# Patient Record
Sex: MALE | Race: WHITE | NOT HISPANIC OR LATINO | Employment: OTHER | URBAN - NONMETROPOLITAN AREA
[De-identification: names, ages, dates, MRNs, and addresses within clinical notes are randomized per-mention and may not be internally consistent; named-entity substitution may affect disease eponyms.]

---

## 2017-01-23 ENCOUNTER — LAB (OUTPATIENT)
Dept: LAB | Facility: HOSPITAL | Age: 57
End: 2017-01-23
Attending: INTERNAL MEDICINE

## 2017-01-23 ENCOUNTER — TRANSCRIBE ORDERS (OUTPATIENT)
Dept: ADMINISTRATIVE | Facility: HOSPITAL | Age: 57
End: 2017-01-23

## 2017-01-23 DIAGNOSIS — N25.81 HYPERPARATHYROIDISM DUE TO RENAL INSUFFICIENCY (HCC): ICD-10-CM

## 2017-01-23 DIAGNOSIS — B18.2 CHRONIC HEPATITIS C WITHOUT HEPATIC COMA (HCC): Primary | ICD-10-CM

## 2017-01-23 DIAGNOSIS — N18.4 CHRONIC KIDNEY DISEASE, STAGE IV (SEVERE) (HCC): ICD-10-CM

## 2017-01-23 DIAGNOSIS — B18.2 CHRONIC HEPATITIS C WITHOUT HEPATIC COMA (HCC): ICD-10-CM

## 2017-01-23 DIAGNOSIS — N18.4 CHRONIC KIDNEY DISEASE, STAGE IV (SEVERE) (HCC): Primary | ICD-10-CM

## 2017-01-23 LAB
25(OH)D3 SERPL-MCNC: 24 NG/ML
ANION GAP SERPL CALCULATED.3IONS-SCNC: 9.1 MMOL/L (ref 3.6–11.2)
BACTERIA UR QL AUTO: NORMAL /HPF
BILIRUB UR QL STRIP: NEGATIVE
BUN BLD-MCNC: 34 MG/DL (ref 7–21)
BUN/CREAT SERPL: 15.7 (ref 7–25)
CALCIUM SPEC-SCNC: 8.7 MG/DL (ref 7.7–10)
CHLORIDE SERPL-SCNC: 106 MMOL/L (ref 99–112)
CLARITY UR: CLEAR
CO2 SERPL-SCNC: 25.9 MMOL/L (ref 24.3–31.9)
COLOR UR: YELLOW
CREAT BLD-MCNC: 2.17 MG/DL (ref 0.43–1.29)
CREAT UR-MCNC: 26.2 MG/DL
GFR SERPL CREATININE-BSD FRML MDRD: 31 ML/MIN/1.73
GLUCOSE BLD-MCNC: 164 MG/DL (ref 70–110)
GLUCOSE UR STRIP-MCNC: NEGATIVE MG/DL
HGB UR QL STRIP.AUTO: NEGATIVE
HYALINE CASTS UR QL AUTO: NORMAL /LPF
KETONES UR QL STRIP: NEGATIVE
LEUKOCYTE ESTERASE UR QL STRIP.AUTO: NEGATIVE
NITRITE UR QL STRIP: NEGATIVE
OSMOLALITY SERPL CALC.SUM OF ELEC: 292.5 MOSM/KG (ref 273–305)
PH UR STRIP.AUTO: 6 [PH] (ref 5–8)
POTASSIUM BLD-SCNC: 4.5 MMOL/L (ref 3.5–5.3)
PROT UR QL STRIP: ABNORMAL
PROT UR-MCNC: 60.1 MG/DL
PROT/CREAT UR: 2293.9 MG/G CREA (ref 0–200)
PTH-INTACT SERPL-MCNC: 275.4 PG/ML (ref 14–72)
RBC # UR: NORMAL /HPF
REF LAB TEST METHOD: NORMAL
SODIUM BLD-SCNC: 141 MMOL/L (ref 135–153)
SP GR UR STRIP: 1.01 (ref 1–1.03)
SQUAMOUS #/AREA URNS HPF: NORMAL /HPF
UROBILINOGEN UR QL STRIP: ABNORMAL
WBC UR QL AUTO: NORMAL /HPF

## 2017-01-23 PROCEDURE — 87522 HEPATITIS C REVRS TRNSCRPJ: CPT | Performed by: INTERNAL MEDICINE

## 2017-01-23 PROCEDURE — 83970 ASSAY OF PARATHORMONE: CPT | Performed by: INTERNAL MEDICINE

## 2017-01-23 PROCEDURE — 36415 COLL VENOUS BLD VENIPUNCTURE: CPT

## 2017-01-23 PROCEDURE — 87521 HEPATITIS C PROBE&RVRS TRNSC: CPT | Performed by: INTERNAL MEDICINE

## 2017-01-23 PROCEDURE — 82570 ASSAY OF URINE CREATININE: CPT | Performed by: INTERNAL MEDICINE

## 2017-01-23 PROCEDURE — 80048 BASIC METABOLIC PNL TOTAL CA: CPT | Performed by: INTERNAL MEDICINE

## 2017-01-23 PROCEDURE — 82306 VITAMIN D 25 HYDROXY: CPT | Performed by: INTERNAL MEDICINE

## 2017-01-23 PROCEDURE — 84156 ASSAY OF PROTEIN URINE: CPT | Performed by: INTERNAL MEDICINE

## 2017-01-23 PROCEDURE — 81001 URINALYSIS AUTO W/SCOPE: CPT | Performed by: INTERNAL MEDICINE

## 2017-01-25 LAB
HCV RNA SERPL NAA+PROBE-ACNC: NORMAL IU/ML
TEST INFORMATION: NORMAL

## 2017-01-26 LAB — HEPATITIS C RNA-NAA: NEGATIVE

## 2017-02-27 ENCOUNTER — TRANSCRIBE ORDERS (OUTPATIENT)
Dept: ADMINISTRATIVE | Facility: HOSPITAL | Age: 57
End: 2017-02-27

## 2017-02-27 ENCOUNTER — LAB (OUTPATIENT)
Dept: LAB | Facility: HOSPITAL | Age: 57
End: 2017-02-27

## 2017-02-27 DIAGNOSIS — B18.2 HEP C W/ COMA, CHRONIC: Primary | ICD-10-CM

## 2017-02-27 DIAGNOSIS — B18.2 HEP C W/ COMA, CHRONIC: ICD-10-CM

## 2017-02-27 PROCEDURE — 87521 HEPATITIS C PROBE&RVRS TRNSC: CPT | Performed by: NURSE PRACTITIONER

## 2017-02-27 PROCEDURE — 87522 HEPATITIS C REVRS TRNSCRPJ: CPT | Performed by: NURSE PRACTITIONER

## 2017-02-27 PROCEDURE — 36415 COLL VENOUS BLD VENIPUNCTURE: CPT

## 2017-03-01 LAB
HCV RNA SERPL NAA+PROBE-ACNC: NORMAL IU/ML
HEPATITIS C RNA-NAA: NEGATIVE
TEST INFORMATION: NORMAL

## 2017-03-27 ENCOUNTER — LAB (OUTPATIENT)
Dept: LAB | Facility: HOSPITAL | Age: 57
End: 2017-03-27
Attending: INTERNAL MEDICINE

## 2017-03-27 ENCOUNTER — TRANSCRIBE ORDERS (OUTPATIENT)
Dept: ADMINISTRATIVE | Facility: HOSPITAL | Age: 57
End: 2017-03-27

## 2017-03-27 DIAGNOSIS — N18.4 CHRONIC KIDNEY DISEASE, STAGE IV (SEVERE) (HCC): ICD-10-CM

## 2017-03-27 DIAGNOSIS — N18.4 CHRONIC KIDNEY DISEASE, STAGE IV (SEVERE) (HCC): Primary | ICD-10-CM

## 2017-03-27 LAB
ANION GAP SERPL CALCULATED.3IONS-SCNC: 4.5 MMOL/L (ref 3.6–11.2)
BACTERIA UR QL AUTO: ABNORMAL /HPF
BILIRUB UR QL STRIP: NEGATIVE
BUN BLD-MCNC: 42 MG/DL (ref 7–21)
BUN/CREAT SERPL: 19.4 (ref 7–25)
CALCIUM SPEC-SCNC: 8.9 MG/DL (ref 7.7–10)
CHLORIDE SERPL-SCNC: 108 MMOL/L (ref 99–112)
CLARITY UR: CLEAR
CO2 SERPL-SCNC: 27.5 MMOL/L (ref 24.3–31.9)
COLOR UR: YELLOW
CREAT BLD-MCNC: 2.17 MG/DL (ref 0.43–1.29)
CREAT UR-MCNC: 62.5 MG/DL
GFR SERPL CREATININE-BSD FRML MDRD: 31 ML/MIN/1.73
GLUCOSE BLD-MCNC: 93 MG/DL (ref 70–110)
GLUCOSE UR STRIP-MCNC: NEGATIVE MG/DL
HGB UR QL STRIP.AUTO: NEGATIVE
HYALINE CASTS UR QL AUTO: ABNORMAL /LPF
KETONES UR QL STRIP: NEGATIVE
LEUKOCYTE ESTERASE UR QL STRIP.AUTO: NEGATIVE
NITRITE UR QL STRIP: NEGATIVE
OSMOLALITY SERPL CALC.SUM OF ELEC: 289.6 MOSM/KG (ref 273–305)
PH UR STRIP.AUTO: 5.5 [PH] (ref 5–8)
POTASSIUM BLD-SCNC: 5.3 MMOL/L (ref 3.5–5.3)
PROT UR QL STRIP: ABNORMAL
PROT UR-MCNC: 112.4 MG/DL
PROT/CREAT UR: 1798.4 MG/G CREA (ref 0–200)
RBC # UR: ABNORMAL /HPF
REF LAB TEST METHOD: ABNORMAL
SODIUM BLD-SCNC: 140 MMOL/L (ref 135–153)
SP GR UR STRIP: 1.02 (ref 1–1.03)
SQUAMOUS #/AREA URNS HPF: ABNORMAL /HPF
UROBILINOGEN UR QL STRIP: ABNORMAL
WBC UR QL AUTO: ABNORMAL /HPF

## 2017-03-27 PROCEDURE — 36415 COLL VENOUS BLD VENIPUNCTURE: CPT

## 2017-03-27 PROCEDURE — 80048 BASIC METABOLIC PNL TOTAL CA: CPT | Performed by: INTERNAL MEDICINE

## 2017-03-27 PROCEDURE — 82570 ASSAY OF URINE CREATININE: CPT | Performed by: INTERNAL MEDICINE

## 2017-03-27 PROCEDURE — 81001 URINALYSIS AUTO W/SCOPE: CPT | Performed by: INTERNAL MEDICINE

## 2017-03-27 PROCEDURE — 84156 ASSAY OF PROTEIN URINE: CPT | Performed by: INTERNAL MEDICINE

## 2017-08-16 ENCOUNTER — TRANSCRIBE ORDERS (OUTPATIENT)
Dept: ADMINISTRATIVE | Facility: HOSPITAL | Age: 57
End: 2017-08-16

## 2017-08-16 ENCOUNTER — LAB (OUTPATIENT)
Dept: LAB | Facility: HOSPITAL | Age: 57
End: 2017-08-16
Attending: INTERNAL MEDICINE

## 2017-08-16 DIAGNOSIS — E11.9 DIABETES MELLITUS WITHOUT COMPLICATION (HCC): ICD-10-CM

## 2017-08-16 DIAGNOSIS — N18.4 CHRONIC KIDNEY DISEASE, STAGE IV (SEVERE) (HCC): Primary | ICD-10-CM

## 2017-08-16 DIAGNOSIS — N18.4 CHRONIC KIDNEY DISEASE, STAGE IV (SEVERE) (HCC): ICD-10-CM

## 2017-08-16 LAB
ANION GAP SERPL CALCULATED.3IONS-SCNC: 6.1 MMOL/L (ref 3.6–11.2)
BACTERIA UR QL AUTO: ABNORMAL /HPF
BILIRUB UR QL STRIP: NEGATIVE
BUN BLD-MCNC: 50 MG/DL (ref 7–21)
BUN/CREAT SERPL: 26 (ref 7–25)
CALCIUM SPEC-SCNC: 9.1 MG/DL (ref 7.7–10)
CHLORIDE SERPL-SCNC: 106 MMOL/L (ref 99–112)
CLARITY UR: CLEAR
CO2 SERPL-SCNC: 24.9 MMOL/L (ref 24.3–31.9)
COLOR UR: YELLOW
CREAT BLD-MCNC: 1.92 MG/DL (ref 0.43–1.29)
CREAT UR-MCNC: 43.5 MG/DL
GFR SERPL CREATININE-BSD FRML MDRD: 36 ML/MIN/1.73
GLUCOSE BLD-MCNC: 124 MG/DL (ref 70–110)
GLUCOSE UR STRIP-MCNC: NEGATIVE MG/DL
HBA1C MFR BLD: 5.7 % (ref 4.5–5.7)
HGB UR QL STRIP.AUTO: NEGATIVE
HYALINE CASTS UR QL AUTO: ABNORMAL /LPF
KETONES UR QL STRIP: NEGATIVE
LEUKOCYTE ESTERASE UR QL STRIP.AUTO: NEGATIVE
NITRITE UR QL STRIP: NEGATIVE
OSMOLALITY SERPL CALC.SUM OF ELEC: 288.6 MOSM/KG (ref 273–305)
PH UR STRIP.AUTO: 5.5 [PH] (ref 5–8)
POTASSIUM BLD-SCNC: 5.7 MMOL/L (ref 3.5–5.3)
PROT UR QL STRIP: ABNORMAL
PROT UR-MCNC: 31.6 MG/DL
PROT/CREAT UR: 726.4 MG/G CREA (ref 0–200)
RBC # UR: ABNORMAL /HPF
REF LAB TEST METHOD: ABNORMAL
SODIUM BLD-SCNC: 137 MMOL/L (ref 135–153)
SP GR UR STRIP: 1.01 (ref 1–1.03)
SQUAMOUS #/AREA URNS HPF: ABNORMAL /HPF
UROBILINOGEN UR QL STRIP: ABNORMAL
WBC UR QL AUTO: ABNORMAL /HPF

## 2017-08-16 PROCEDURE — 83036 HEMOGLOBIN GLYCOSYLATED A1C: CPT | Performed by: INTERNAL MEDICINE

## 2017-08-16 PROCEDURE — 80048 BASIC METABOLIC PNL TOTAL CA: CPT | Performed by: INTERNAL MEDICINE

## 2017-08-16 PROCEDURE — 81001 URINALYSIS AUTO W/SCOPE: CPT | Performed by: INTERNAL MEDICINE

## 2017-08-16 PROCEDURE — 36415 COLL VENOUS BLD VENIPUNCTURE: CPT

## 2017-08-16 PROCEDURE — 84156 ASSAY OF PROTEIN URINE: CPT | Performed by: INTERNAL MEDICINE

## 2017-08-16 PROCEDURE — 82570 ASSAY OF URINE CREATININE: CPT | Performed by: INTERNAL MEDICINE

## 2017-08-25 ENCOUNTER — LAB (OUTPATIENT)
Dept: LAB | Facility: HOSPITAL | Age: 57
End: 2017-08-25
Attending: INTERNAL MEDICINE

## 2017-08-25 ENCOUNTER — TRANSCRIBE ORDERS (OUTPATIENT)
Dept: ADMINISTRATIVE | Facility: HOSPITAL | Age: 57
End: 2017-08-25

## 2017-08-25 DIAGNOSIS — N18.4 CHRONIC KIDNEY DISEASE, STAGE IV (SEVERE) (HCC): Primary | ICD-10-CM

## 2017-08-25 DIAGNOSIS — N18.4 CHRONIC KIDNEY DISEASE, STAGE IV (SEVERE) (HCC): ICD-10-CM

## 2017-08-25 LAB
ANION GAP SERPL CALCULATED.3IONS-SCNC: 6.6 MMOL/L (ref 3.6–11.2)
BUN BLD-MCNC: 56 MG/DL (ref 7–21)
BUN/CREAT SERPL: 21.7 (ref 7–25)
CALCIUM SPEC-SCNC: 9.1 MG/DL (ref 7.7–10)
CHLORIDE SERPL-SCNC: 106 MMOL/L (ref 99–112)
CO2 SERPL-SCNC: 23.4 MMOL/L (ref 24.3–31.9)
CREAT BLD-MCNC: 2.58 MG/DL (ref 0.43–1.29)
GFR SERPL CREATININE-BSD FRML MDRD: 26 ML/MIN/1.73
GLUCOSE BLD-MCNC: 108 MG/DL (ref 70–110)
OSMOLALITY SERPL CALC.SUM OF ELEC: 288 MOSM/KG (ref 273–305)
POTASSIUM BLD-SCNC: 4.3 MMOL/L (ref 3.5–5.3)
SODIUM BLD-SCNC: 136 MMOL/L (ref 135–153)

## 2017-08-25 PROCEDURE — 80048 BASIC METABOLIC PNL TOTAL CA: CPT | Performed by: INTERNAL MEDICINE

## 2017-08-25 PROCEDURE — 36415 COLL VENOUS BLD VENIPUNCTURE: CPT

## 2017-10-10 ENCOUNTER — TRANSCRIBE ORDERS (OUTPATIENT)
Dept: INFUSION THERAPY | Facility: HOSPITAL | Age: 57
End: 2017-10-10

## 2017-10-10 ENCOUNTER — HOSPITAL ENCOUNTER (OUTPATIENT)
Dept: INFUSION THERAPY | Facility: HOSPITAL | Age: 57
Discharge: HOME | End: 2017-10-10
Attending: INTERNAL MEDICINE | Admitting: INTERNAL MEDICINE

## 2017-10-10 VITALS
BODY MASS INDEX: 20.57 KG/M2 | WEIGHT: 128 LBS | RESPIRATION RATE: 20 BRPM | HEIGHT: 66 IN | HEART RATE: 98 BPM | SYSTOLIC BLOOD PRESSURE: 203 MMHG | DIASTOLIC BLOOD PRESSURE: 89 MMHG | TEMPERATURE: 98.9 F

## 2017-10-10 DIAGNOSIS — N18.4 CHRONIC KIDNEY DISEASE, STAGE IV (SEVERE) (HCC): Primary | ICD-10-CM

## 2017-10-10 PROCEDURE — 96361 HYDRATE IV INFUSION ADD-ON: CPT

## 2017-10-10 PROCEDURE — 96360 HYDRATION IV INFUSION INIT: CPT

## 2017-10-10 RX ORDER — HYDRALAZINE HYDROCHLORIDE 50 MG/1
100 TABLET, FILM COATED ORAL 3 TIMES DAILY
Status: ON HOLD | COMMUNITY
End: 2018-09-19

## 2017-10-10 RX ORDER — GLIMEPIRIDE 4 MG/1
4 TABLET ORAL
Status: ON HOLD | COMMUNITY
End: 2018-07-02

## 2017-10-10 RX ORDER — SODIUM CHLORIDE 9 MG/ML
1000 INJECTION, SOLUTION INTRAVENOUS ONCE
Status: COMPLETED | OUTPATIENT
Start: 2017-10-10 | End: 2017-10-10

## 2017-10-10 RX ORDER — METOPROLOL TARTRATE 50 MG/1
50 TABLET, FILM COATED ORAL 2 TIMES DAILY
COMMUNITY
End: 2018-06-05

## 2017-10-10 RX ORDER — SERTRALINE HYDROCHLORIDE 100 MG/1
100 TABLET, FILM COATED ORAL DAILY
COMMUNITY
End: 2019-04-23 | Stop reason: HOSPADM

## 2017-10-10 RX ORDER — BUPRENORPHINE HYDROCHLORIDE AND NALOXONE HYDROCHLORIDE DIHYDRATE 8; 2 MG/1; MG/1
3 TABLET SUBLINGUAL DAILY
Status: ON HOLD | COMMUNITY
End: 2018-07-02

## 2017-10-10 RX ORDER — NIFEDIPINE 90 MG/1
90 TABLET, EXTENDED RELEASE ORAL DAILY
Status: ON HOLD | COMMUNITY
End: 2018-07-02

## 2017-10-10 RX ORDER — AMLODIPINE BESYLATE 10 MG/1
10 TABLET ORAL DAILY
Status: ON HOLD | COMMUNITY
End: 2018-07-02 | Stop reason: SDDI

## 2017-10-10 RX ADMIN — SODIUM CHLORIDE 1000 ML: 9 INJECTION, SOLUTION INTRAVENOUS at 12:30

## 2017-10-10 NOTE — PATIENT INSTRUCTIONS
Rehydration, Adult  Rehydration is the replacement of body fluids lost during dehydration. Dehydration is an extreme loss of body fluids to the point of body function impairment. There are many ways extreme fluid loss can occur, including vomiting, diarrhea, or excess sweating. Recovering from dehydration requires replacing lost fluids, continuing to eat to maintain strength, and avoiding foods and beverages that may contribute to further fluid loss or may increase nausea.       EATING WHEN DEHYDRATED  Even if you have had severe sweating or you are having diarrhea, do not stop eating. Many healthy items in a normal diet are okay to continue eating while recovering from dehydration. The following tips can help you to lessen nausea when you eat:  · Ask someone else to prepare your food. Cooking smells may worsen nausea.  · Eat in a well-ventilated room away from cooking smells.  · Sit up when you eat. Avoid lying down until 1-2 hours after eating.  · Eat small amounts when you eat.  · Eat foods that are easy to digest. These include soft, well-cooked, or mashed foods.  FOODS AND BEVERAGES TO AVOID  Avoid eating or drinking the following foods and beverages that may increase nausea or further loss of fluid:   · Fruit juices with a high sugar content, such as concentrated juices.  · Alcohol.  · Beverages containing caffeine.  · Carbonated drinks. They may cause a lot of gas.  · Foods that may cause a lot of gas, such as cabbage, broccoli, and beans.  · Fatty, greasy, and fried foods.  · Spicy, very salty, and very sweet foods or drinks.  · Foods or drinks that are very hot or very cold. Consume food or drinks at or near room temperature.  · Foods that need a lot of chewing, such as raw vegetables.  · Foods that are sticky or hard to swallow, such as peanut butter.     This information is not intended to replace advice given to you by your health care provider. Make sure you discuss any questions you have with your  health care provider.     PT INSTRUCTED TO TAKE BLOOD PRESSURE PILLS AT HOME.  INSTRUCTED IF BLOOD PRESSURE DOES NOT COME DOWN TO NORMAL FOR PATIENT, PT INSTRUCTED TO GO TO EMERGENCY ROOM     Document Released: 03/11/2013 Document Revised: 09/11/2013 Document Reviewed: 03/11/2013  [employer] Patient Education ©2017 [employer]

## 2017-10-10 NOTE — CODE DOCUMENTATION
"DR. KRAMER MADE AWARE OF PT'S B/P  /89.  INFORMED HIM PT HADN'T TAKEN B/P MEDS AND PT STATED WOULD TAKE B/P MEDS AS SOON AS HE GETS HOME.DR. KRAMER \" STATED OK TO LET PATIENT GO HOME AND INSTRUCTED HIM IF B/P DOES NOT GO DOWN AFTER TAKING MEDS, HE SHOULD GO TO THE  EMERGENCY ROOM. PT VERBALIZED UNDERSTANDING  "

## 2017-10-10 NOTE — PROGRESS NOTES
Talked with Alysia at [facility] .  Requested a list of patients medication.  [phone]

## 2017-11-16 ENCOUNTER — TRANSCRIBE ORDERS (OUTPATIENT)
Dept: ADMINISTRATIVE | Facility: HOSPITAL | Age: 57
End: 2017-11-16

## 2017-11-16 ENCOUNTER — LAB (OUTPATIENT)
Dept: LAB | Facility: HOSPITAL | Age: 57
End: 2017-11-16
Attending: INTERNAL MEDICINE

## 2017-11-16 DIAGNOSIS — N18.4 CHRONIC RENAL DISEASE, STAGE IV (HCC): ICD-10-CM

## 2017-11-16 DIAGNOSIS — N18.4 CHRONIC RENAL DISEASE, STAGE IV (HCC): Primary | ICD-10-CM

## 2017-11-16 LAB
ANION GAP SERPL CALCULATED.3IONS-SCNC: 5.9 MMOL/L (ref 3.6–11.2)
BACTERIA UR QL AUTO: ABNORMAL /HPF
BILIRUB UR QL STRIP: NEGATIVE
BUN BLD-MCNC: 54 MG/DL (ref 7–21)
BUN/CREAT SERPL: 18.5 (ref 7–25)
CALCIUM SPEC-SCNC: 8.7 MG/DL (ref 7.7–10)
CHLORIDE SERPL-SCNC: 109 MMOL/L (ref 99–112)
CLARITY UR: CLEAR
CO2 SERPL-SCNC: 23.1 MMOL/L (ref 24.3–31.9)
COLOR UR: YELLOW
CREAT BLD-MCNC: 2.92 MG/DL (ref 0.43–1.29)
CREAT UR-MCNC: 121.4 MG/DL
GFR SERPL CREATININE-BSD FRML MDRD: 22 ML/MIN/1.73
GLUCOSE BLD-MCNC: 151 MG/DL (ref 70–110)
GLUCOSE UR STRIP-MCNC: NEGATIVE MG/DL
HGB UR QL STRIP.AUTO: NEGATIVE
HYALINE CASTS UR QL AUTO: ABNORMAL /LPF
KETONES UR QL STRIP: ABNORMAL
LEUKOCYTE ESTERASE UR QL STRIP.AUTO: NEGATIVE
NITRITE UR QL STRIP: NEGATIVE
OSMOLALITY SERPL CALC.SUM OF ELEC: 293.4 MOSM/KG (ref 273–305)
PH UR STRIP.AUTO: <=5 [PH] (ref 5–8)
POTASSIUM BLD-SCNC: 4.9 MMOL/L (ref 3.5–5.3)
PROT UR QL STRIP: ABNORMAL
PROT UR-MCNC: 34.3 MG/DL
PROT/CREAT UR: 282.5 MG/G CREA (ref 0–200)
RBC # UR: ABNORMAL /HPF
REF LAB TEST METHOD: ABNORMAL
SODIUM BLD-SCNC: 138 MMOL/L (ref 135–153)
SP GR UR STRIP: 1.02 (ref 1–1.03)
SQUAMOUS #/AREA URNS HPF: ABNORMAL /HPF
UROBILINOGEN UR QL STRIP: ABNORMAL
WBC UR QL AUTO: ABNORMAL /HPF

## 2017-11-16 PROCEDURE — 84156 ASSAY OF PROTEIN URINE: CPT | Performed by: INTERNAL MEDICINE

## 2017-11-16 PROCEDURE — 80048 BASIC METABOLIC PNL TOTAL CA: CPT | Performed by: INTERNAL MEDICINE

## 2017-11-16 PROCEDURE — 81001 URINALYSIS AUTO W/SCOPE: CPT | Performed by: INTERNAL MEDICINE

## 2017-11-16 PROCEDURE — 36415 COLL VENOUS BLD VENIPUNCTURE: CPT

## 2017-11-16 PROCEDURE — 82570 ASSAY OF URINE CREATININE: CPT | Performed by: INTERNAL MEDICINE

## 2018-04-17 ENCOUNTER — LAB (OUTPATIENT)
Dept: LAB | Facility: HOSPITAL | Age: 58
End: 2018-04-17

## 2018-04-17 ENCOUNTER — TRANSCRIBE ORDERS (OUTPATIENT)
Dept: GENERAL RADIOLOGY | Facility: HOSPITAL | Age: 58
End: 2018-04-17

## 2018-04-17 DIAGNOSIS — N18.4 CHRONIC KIDNEY DISEASE, STAGE IV (SEVERE) (HCC): ICD-10-CM

## 2018-04-17 DIAGNOSIS — N18.4 CHRONIC KIDNEY DISEASE, STAGE IV (SEVERE) (HCC): Primary | ICD-10-CM

## 2018-04-17 LAB
ALBUMIN SERPL-MCNC: 3.8 G/DL (ref 3.5–5)
ANION GAP SERPL CALCULATED.3IONS-SCNC: 4.5 MMOL/L (ref 3.6–11.2)
BACTERIA UR QL AUTO: ABNORMAL /HPF
BASOPHILS # BLD AUTO: 0.03 10*3/MM3 (ref 0–0.3)
BASOPHILS NFR BLD AUTO: 0.4 % (ref 0–2)
BILIRUB UR QL STRIP: NEGATIVE
BUN BLD-MCNC: 62 MG/DL (ref 7–21)
BUN/CREAT SERPL: 23.8 (ref 7–25)
CALCIUM SPEC-SCNC: 8.7 MG/DL (ref 7.7–10)
CHLORIDE SERPL-SCNC: 105 MMOL/L (ref 99–112)
CLARITY UR: CLEAR
CO2 SERPL-SCNC: 23.5 MMOL/L (ref 24.3–31.9)
COLOR UR: YELLOW
CREAT BLD-MCNC: 2.61 MG/DL (ref 0.43–1.29)
CREAT UR-MCNC: 72.9 MG/DL
DEPRECATED RDW RBC AUTO: 49 FL (ref 37–54)
EOSINOPHIL # BLD AUTO: 0.37 10*3/MM3 (ref 0–0.7)
EOSINOPHIL NFR BLD AUTO: 5.2 % (ref 0–5)
ERYTHROCYTE [DISTWIDTH] IN BLOOD BY AUTOMATED COUNT: 15.2 % (ref 11.5–14.5)
GFR SERPL CREATININE-BSD FRML MDRD: 25 ML/MIN/1.73
GLUCOSE BLD-MCNC: 119 MG/DL (ref 70–110)
GLUCOSE UR STRIP-MCNC: NEGATIVE MG/DL
HCT VFR BLD AUTO: 32.6 % (ref 42–52)
HGB BLD-MCNC: 10.7 G/DL (ref 14–18)
HGB UR QL STRIP.AUTO: NEGATIVE
HYALINE CASTS UR QL AUTO: ABNORMAL /LPF
IMM GRANULOCYTES # BLD: 0.04 10*3/MM3 (ref 0–0.03)
IMM GRANULOCYTES NFR BLD: 0.6 % (ref 0–0.5)
KETONES UR QL STRIP: NEGATIVE
LEUKOCYTE ESTERASE UR QL STRIP.AUTO: NEGATIVE
LYMPHOCYTES # BLD AUTO: 2.56 10*3/MM3 (ref 1–3)
LYMPHOCYTES NFR BLD AUTO: 36.2 % (ref 21–51)
MCH RBC QN AUTO: 29.8 PG (ref 27–33)
MCHC RBC AUTO-ENTMCNC: 32.8 G/DL (ref 33–37)
MCV RBC AUTO: 90.8 FL (ref 80–94)
MICRO TOTAL PROTEIN: 115 MG/DL
MONOCYTES # BLD AUTO: 0.74 10*3/MM3 (ref 0.1–0.9)
MONOCYTES NFR BLD AUTO: 10.5 % (ref 0–10)
NEUTROPHILS # BLD AUTO: 3.33 10*3/MM3 (ref 1.4–6.5)
NEUTROPHILS NFR BLD AUTO: 47.1 % (ref 30–70)
NITRITE UR QL STRIP: NEGATIVE
PH UR STRIP.AUTO: 6.5 [PH] (ref 5–8)
PHOSPHATE SERPL-MCNC: 5.9 MG/DL (ref 2.7–4.5)
PLATELET # BLD AUTO: 193 10*3/MM3 (ref 130–400)
PMV BLD AUTO: 10.5 FL (ref 6–10)
POTASSIUM BLD-SCNC: 5.3 MMOL/L (ref 3.5–5.3)
PROT UR QL STRIP: ABNORMAL
PTH-INTACT SERPL-MCNC: 541.1 PG/ML (ref 14–72)
RBC # BLD AUTO: 3.59 10*6/MM3 (ref 4.7–6.1)
RBC # UR: ABNORMAL /HPF
REF LAB TEST METHOD: ABNORMAL
SODIUM BLD-SCNC: 133 MMOL/L (ref 135–153)
SP GR UR STRIP: 1.01 (ref 1–1.03)
SQUAMOUS #/AREA URNS HPF: ABNORMAL /HPF
UROBILINOGEN UR QL STRIP: ABNORMAL
WBC NRBC COR # BLD: 7.07 10*3/MM3 (ref 4.5–12.5)
WBC UR QL AUTO: ABNORMAL /HPF

## 2018-04-17 PROCEDURE — 36415 COLL VENOUS BLD VENIPUNCTURE: CPT

## 2018-04-17 PROCEDURE — 82570 ASSAY OF URINE CREATININE: CPT

## 2018-04-17 PROCEDURE — 83970 ASSAY OF PARATHORMONE: CPT

## 2018-04-17 PROCEDURE — 81001 URINALYSIS AUTO W/SCOPE: CPT

## 2018-04-17 PROCEDURE — 84156 ASSAY OF PROTEIN URINE: CPT

## 2018-04-17 PROCEDURE — 85025 COMPLETE CBC W/AUTO DIFF WBC: CPT

## 2018-04-17 PROCEDURE — 80069 RENAL FUNCTION PANEL: CPT

## 2018-06-05 ENCOUNTER — OFFICE VISIT (OUTPATIENT)
Dept: CARDIOLOGY | Facility: CLINIC | Age: 58
End: 2018-06-05

## 2018-06-05 VITALS
OXYGEN SATURATION: 99 % | HEIGHT: 70 IN | BODY MASS INDEX: 20.57 KG/M2 | HEART RATE: 48 BPM | DIASTOLIC BLOOD PRESSURE: 77 MMHG | SYSTOLIC BLOOD PRESSURE: 113 MMHG | WEIGHT: 143.7 LBS

## 2018-06-05 DIAGNOSIS — R00.1 BRADYCARDIA: Primary | ICD-10-CM

## 2018-06-05 PROCEDURE — 99204 OFFICE O/P NEW MOD 45 MIN: CPT | Performed by: INTERNAL MEDICINE

## 2018-06-05 PROCEDURE — 93000 ELECTROCARDIOGRAM COMPLETE: CPT | Performed by: INTERNAL MEDICINE

## 2018-06-05 RX ORDER — METOPROLOL SUCCINATE 25 MG/1
25 TABLET, EXTENDED RELEASE ORAL DAILY
Qty: 30 TABLET | Refills: 3 | Status: SHIPPED | OUTPATIENT
Start: 2018-06-05 | End: 2018-09-16

## 2018-06-05 RX ORDER — LISINOPRIL 40 MG/1
40 TABLET ORAL DAILY
Status: ON HOLD | COMMUNITY
End: 2018-07-02 | Stop reason: ALTCHOICE

## 2018-06-05 RX ORDER — DOXAZOSIN 2 MG/1
2 TABLET ORAL NIGHTLY
COMMUNITY
End: 2019-03-28

## 2018-06-05 RX ORDER — CALCITRIOL 0.5 UG/1
0.5 CAPSULE, LIQUID FILLED ORAL 3 TIMES WEEKLY
COMMUNITY
End: 2019-03-28

## 2018-06-05 NOTE — PROGRESS NOTES
[facility] CARDIOLOGY  [address]  [place] [address]  Phone: [phone]  Fax: [phone]    06/05/2018    Chief Complaint: New Patient, CAD    History:   Axel Desir is a 58 y.o. male seen in consultation, referred by IVETTE Hester  for blockage in main arteries noted in [place]. Several months ago. No records available. Ultrasound of heart. Chemical stress test. Never underwent cath. Hx of kidney trouble. No hx MI. No hx stents. No hx OHS. Office settings. Was seeing cardiologist Dr. Rincon. Hx creatinine 2.6 up from 1.7 2016. Denies hx DM, No XOL. +HTN. Pt smoking. Occasional chest discomfort. Left sided chest. Sharp pain. 3-4 minutes. Breathing OK. No CHF or arrhythmias. No syncope. No eToh or DU. Pt with HR 46 today on lopressor. Feels some anxiety attacks treated with xanax.          Past Medical History:   Diagnosis Date   • Back pain    • Chronic kidney disease    • Diabetes mellitus    • High blood pressure        Review of Systems:  Please see HPI  Constitution: No chills, no rigors, no unexplained weight loss or weight gain  Eyes:  No diplopia, no blurred vision, no loss of vision, conjunctiva is pink and sclera is anicteric  ENT:  No tinnitus, no otorrhea, no epistaxis, no sore throat   Respiratory: No cough, no hemoptysis  Cardiovascular: see HPI  Gastrointestinal: No nausea, no vomiting, no hematemesis, no diarrhea or constipation, no melena  Genitourinary: No frequency of dysuria no hematuria  Integument: No pruritis and  no skin rash  Hematologic / Lymphatic: No excessive bleeding, easy bruising, fatigue, lymphadenopathy and petechiae  Musculoskeletal: No joint pain, joint stiffness, joint swelling, muscle pain, muscle weakness and neck pain  Neurological: No dizziness, headaches, light headedness, seizures and vertigo  Endocrine: No frequent urination and nocturia, temperature intolerance, weight gain, unintended and weight loss,  unintended        Past Social History:  Social History     Social History   • Marital status: Single     Social History Main Topics   • Smoking status: Former Smoker     Packs/day: 0.50     Years: 25.00     Types: Cigarettes     Quit date: 4/1/2017   • Smokeless tobacco: Never Used   • Alcohol use No   • Drug use: No     Other Topics Concern   • Not on file       Past Family History:  Family History   Problem Relation Age of Onset   • Heart disease Father        Current Outpatient Prescriptions on File Prior to Visit   Medication Sig Dispense Refill   • hydrALAZINE (APRESOLINE) 50 MG tablet Take 100 mg by mouth 3 (Three) Times a Day.     • metoprolol tartrate (LOPRESSOR) 50 MG tablet Take 50 mg by mouth 2 (Two) Times a Day.     • NIFEdipine XL (PROCARDIA XL) 90 MG 24 hr tablet Take 90 mg by mouth Daily.     • sertraline (ZOLOFT) 100 MG tablet Take 100 mg by mouth Daily.     • amLODIPine (NORVASC) 10 MG tablet Take 10 mg by mouth Daily.     • buprenorphine-naloxone (SUBOXONE) 8-2 MG per SL tablet Place 3 tablets under the tongue Daily. DISSOLVE 3 FILMS UNDER THE TONGUE DAILY     • CloNIDine (CATAPRES-TTS) 0.3 MG/24HR patch Place 1 patch on the skin 1 (One) Time Per Week.     • glimepiride (AMARYL) 4 MG tablet Take 4 mg by mouth Every Morning Before Breakfast.       No current facility-administered medications on file prior to visit.        No Known Allergies    Objective:  Vitals:    06/05/18 1159   BP: 113/77   Pulse: (!) 48   SpO2: 99%         Comfortable NAD  PERRL, conjunctiva clear  Neck supple, no JVD or thyromegaly appreciated  S1/S2 RRR, no m/r/g  Lungs CTA B, normal effort  Abdomen S/NT/ND (+) BS, no HSM appreciated  Extremities warm, no clubbing, cyanosis, or edema  Normal gait  No visible or palpable skin lesions  A/Ox4, mood and affect appropriate  Pulse exam: Graeme's:    DATA:                               ECG 12 Lead  Date/Time: 6/5/2018 12:04 PM  Performed by: MENDOZA RG  Authorized by: IFRAH  MENDOZA POWELL personally viewed and interpreted the patient's EKG/Telemetry data.    A/P:     Chest pain: atypical. Request records from [place] and have patient f/u in 1-2 weeks.    Bradycardia: decrease toprol xl to 25mg QD instead of 50 mg QD.           Thank you for allowing me to participate in the care of Axel Desir. Feel free to contact me directly with any further questions or concerns.

## 2018-06-27 ENCOUNTER — OFFICE VISIT (OUTPATIENT)
Dept: CARDIOLOGY | Facility: CLINIC | Age: 58
End: 2018-06-27

## 2018-06-27 VITALS
SYSTOLIC BLOOD PRESSURE: 144 MMHG | OXYGEN SATURATION: 99 % | DIASTOLIC BLOOD PRESSURE: 83 MMHG | WEIGHT: 136.4 LBS | HEART RATE: 67 BPM | HEIGHT: 70 IN | BODY MASS INDEX: 19.53 KG/M2

## 2018-06-27 DIAGNOSIS — R94.39 ABNORMAL STRESS TEST: Primary | ICD-10-CM

## 2018-06-27 DIAGNOSIS — N28.9 KIDNEY DISEASE: ICD-10-CM

## 2018-06-27 PROCEDURE — 99213 OFFICE O/P EST LOW 20 MIN: CPT | Performed by: NURSE PRACTITIONER

## 2018-06-27 NOTE — PROGRESS NOTES
Subjective     Chief Complaint: Slow Heart Rate    History of Present Illness   Axel Desir is a 58 y.o. male who presents with a past medical history significant for chronic kidney disease, essential hypertension, hyperlipidemia, borderline diabetes, hepatitis C, low back pain, tobacco use, and anxiety.  He denies past medical history of any coronary artery disease.  He has never been diagnosed with a heart condition.  He is under the impression that he is here today for the first time, however he has been seen earlier this month by Dr. Lema and this is his return appointment.    In January he was seen by cardiology NP Curry Mendez in Dr Marquez's office in [place].  A nuclear stress test was ordered and it was abnormal.  He was supposed to have a catheterization in [place] however he was taking care of his father who did not want him to be so far from home.  He presents now to our office because of that abnormal nuclear stress test.          Current Outpatient Prescriptions:   •  amLODIPine (NORVASC) 10 MG tablet, Take 10 mg by mouth Daily., Disp: , Rfl:   •  buprenorphine-naloxone (SUBOXONE) 8-2 MG per SL tablet, Place 3 tablets under the tongue Daily. DISSOLVE 3 FILMS UNDER THE TONGUE DAILY, Disp: , Rfl:   •  calcitriol (ROCALTROL) 0.5 MCG capsule, Take 0.5 mcg by mouth 3 (Three) Times a Week., Disp: , Rfl:   •  CloNIDine (CATAPRES-TTS) 0.3 MG/24HR patch, Place 1 patch on the skin 1 (One) Time Per Week., Disp: , Rfl:   •  doxazosin (CARDURA) 2 MG tablet, Take 2 mg by mouth Every Night., Disp: , Rfl:   •  glimepiride (AMARYL) 4 MG tablet, Take 4 mg by mouth Every Morning Before Breakfast., Disp: , Rfl:   •  hydrALAZINE (APRESOLINE) 50 MG tablet, Take 100 mg by mouth 3 (Three) Times a Day., Disp: , Rfl:   •  lisinopril (PRINIVIL,ZESTRIL) 40 MG tablet, Take 40 mg by mouth Daily., Disp: , Rfl:   •  metoprolol succinate XL (TOPROL-XL) 25 MG 24 hr tablet, Take 1 tablet by mouth Daily., Disp: 30 tablet, Rfl: 3  •   "NIFEdipine XL (PROCARDIA XL) 90 MG 24 hr tablet, Take 90 mg by mouth Daily., Disp: , Rfl:   •  sertraline (ZOLOFT) 100 MG tablet, Take 100 mg by mouth Daily., Disp: , Rfl:      The following portions of the patient's history were reviewed and updated as appropriate: allergies, current medications, past family history, past medical history, past social history, past surgical history and problem list.    Review of Systems   Constitution: Positive for weakness.   HENT: Negative.    Eyes: Negative.    Cardiovascular: Positive for leg swelling and palpitations.   Endocrine: Negative.    Hematologic/Lymphatic: Bruises/bleeds easily.   Skin: Negative.    Musculoskeletal: Negative.    Gastrointestinal: Negative.    Genitourinary: Negative.    Neurological: Positive for light-headedness.   Psychiatric/Behavioral: Negative.    Allergic/Immunologic: Negative.          Objective     /83 (BP Location: Left arm)   Pulse 67   Ht 177.8 cm (70\")   Wt 61.9 kg (136 lb 6.4 oz)   SpO2 99%   BMI 19.57 kg/m²     Physical Exam   Constitutional: He appears well-developed and well-nourished.   HENT:   Head: Normocephalic and atraumatic.   Eyes: Pupils are equal, round, and reactive to light.   Neck: No JVD present.   Cardiovascular: Normal rate and regular rhythm.  Exam reveals no gallop and no friction rub.    No murmur heard.  Pulses:       Dorsalis pedis pulses are 1+ on the right side, and 1+ on the left side.        Posterior tibial pulses are 0 on the right side, and 0 on the left side.   Bilateral ankle edema.     Pulmonary/Chest: Effort normal and breath sounds normal. No respiratory distress. He has no wheezes. He has no rales.   Abdominal: Soft. He exhibits no mass. There is no tenderness. No hernia.   Skin: Skin is warm and dry.   Psychiatric: He has a normal mood and affect.   Vitals reviewed.      Procedures      Assessment/Plan       There are no diagnoses linked to this encounter.      Assessment:  1. Abnormal " nuclear stress test      Plan:  1. Unfortunately Dr. Lema will need to review the nuclear stress test himself before I can schedule a cardiac catheterization.  I will have him do so as soon as possible and make plans at that time.  I have instructed the patient that I will call him and let him know the plan.    2. A three week follow up appointment has been made.      IVETTE Jackson    Addendum:  I have spoken with Dr Lema who has now reviewed the nuclear stress test from Dr Marquez's office.  He recommended that Mr Desir have a BMP drawn tomorrow and come to the clinic to see him afterwards.    I have tried to call Mr Desir twice this afternoon and I have left a message for him to contact the clinic.

## 2018-06-28 ENCOUNTER — OFFICE VISIT (OUTPATIENT)
Dept: CARDIOLOGY | Facility: CLINIC | Age: 58
End: 2018-06-28

## 2018-06-28 ENCOUNTER — LAB (OUTPATIENT)
Dept: LAB | Facility: HOSPITAL | Age: 58
End: 2018-06-28

## 2018-06-28 VITALS
WEIGHT: 136.6 LBS | BODY MASS INDEX: 19.56 KG/M2 | DIASTOLIC BLOOD PRESSURE: 75 MMHG | HEART RATE: 67 BPM | SYSTOLIC BLOOD PRESSURE: 119 MMHG | OXYGEN SATURATION: 99 % | HEIGHT: 70 IN

## 2018-06-28 DIAGNOSIS — N28.9 KIDNEY DISEASE: ICD-10-CM

## 2018-06-28 DIAGNOSIS — I20.9 ANGINA PECTORIS (HCC): Primary | ICD-10-CM

## 2018-06-28 DIAGNOSIS — R94.39 ABNORMAL STRESS TEST: ICD-10-CM

## 2018-06-28 LAB
ANION GAP SERPL CALCULATED.3IONS-SCNC: 9.6 MMOL/L (ref 3.6–11.2)
BUN BLD-MCNC: 70 MG/DL (ref 7–21)
BUN/CREAT SERPL: 24.3 (ref 7–25)
CALCIUM SPEC-SCNC: 8.4 MG/DL (ref 7.7–10)
CHLORIDE SERPL-SCNC: 106 MMOL/L (ref 99–112)
CO2 SERPL-SCNC: 21.4 MMOL/L (ref 24.3–31.9)
CREAT BLD-MCNC: 2.88 MG/DL (ref 0.43–1.29)
DEPRECATED RDW RBC AUTO: 50.8 FL (ref 37–54)
ERYTHROCYTE [DISTWIDTH] IN BLOOD BY AUTOMATED COUNT: 15.6 % (ref 11.5–14.5)
GFR SERPL CREATININE-BSD FRML MDRD: 22 ML/MIN/1.73
GLUCOSE BLD-MCNC: 174 MG/DL (ref 70–110)
HCT VFR BLD AUTO: 33.8 % (ref 42–52)
HGB BLD-MCNC: 11.3 G/DL (ref 14–18)
MCH RBC QN AUTO: 29.9 PG (ref 27–33)
MCHC RBC AUTO-ENTMCNC: 33.4 G/DL (ref 33–37)
MCV RBC AUTO: 89.4 FL (ref 80–94)
OSMOLALITY SERPL CALC.SUM OF ELEC: 298.5 MOSM/KG (ref 273–305)
PLATELET # BLD AUTO: 349 10*3/MM3 (ref 130–400)
PMV BLD AUTO: 10.1 FL (ref 6–10)
POTASSIUM BLD-SCNC: 4.7 MMOL/L (ref 3.5–5.3)
RBC # BLD AUTO: 3.78 10*6/MM3 (ref 4.7–6.1)
SODIUM BLD-SCNC: 137 MMOL/L (ref 135–153)
WBC NRBC COR # BLD: 9.53 10*3/MM3 (ref 4.5–12.5)

## 2018-06-28 PROCEDURE — 80048 BASIC METABOLIC PNL TOTAL CA: CPT

## 2018-06-28 PROCEDURE — 85027 COMPLETE CBC AUTOMATED: CPT

## 2018-06-28 PROCEDURE — 99213 OFFICE O/P EST LOW 20 MIN: CPT | Performed by: INTERNAL MEDICINE

## 2018-06-28 PROCEDURE — 36415 COLL VENOUS BLD VENIPUNCTURE: CPT

## 2018-06-28 RX ORDER — SODIUM CHLORIDE 9 MG/ML
1 INJECTION, SOLUTION INTRAVENOUS ONCE
Status: CANCELLED | OUTPATIENT
Start: 2018-06-28 | End: 2018-06-28

## 2018-06-28 RX ORDER — SODIUM CHLORIDE 9 MG/ML
3 INJECTION, SOLUTION INTRAVENOUS ONCE
Status: CANCELLED | OUTPATIENT
Start: 2018-06-28 | End: 2018-06-28

## 2018-06-28 NOTE — H&P
[facility] CARDIOLOGY  [address] [place] [address]  Phone: [phone]  Fax: [phone]    06/28/2018    Chief Complaint: Routine followup of , CAD    History:   Axel Desir is a 58 y.o. male seen in followup, pt seen in follow up. 2/2018 scheduled for CCL for abnormal ST but never completed it. Had high risk ST noted concerning for multivessel CAD. Reversible defect in distal LAD territory. LV dilation and TID c/w multivessel CAD. Pt with renal disease and is aware risk of ROSENDO would be elevated and asked to stop lisinopril prior to cath.             rECENT visit:   Axel Desir is a 58 y.o. male seen in consultation, referred by IVETTE Hester  for blockage in main arteries noted in [place]. Several months ago. No records available. Ultrasound of heart. Chemical stress test. Never underwent cath. Hx of kidney trouble. No hx MI. No hx stents. No hx OHS. Office settings. Was seeing cardiologist Dr. Rincon. Hx creatinine 2.6 up from 1.7 2016. Denies hx DM, No XOL. +HTN. Pt smoking. Occasional chest discomfort. Left sided chest. Sharp pain. 3-4 minutes. Breathing OK. No CHF or arrhythmias. No syncope. No eToh or DU. Pt with HR 46 today on lopressor. Feels some anxiety attacks treated with xanax.          Past Medical History:   Diagnosis Date   • Back pain    • Chronic kidney disease    • Diabetes mellitus    • High blood pressure        Past Social History:  Social History     Social History   • Marital status: Single     Social History Main Topics   • Smoking status: Former Smoker     Packs/day: 0.50     Years: 25.00     Types: Cigarettes     Quit date: 4/1/2017   • Smokeless tobacco: Never Used   • Alcohol use No   • Drug use: No     Other Topics Concern   • Not on file       Past Family History:  Family History   Problem Relation Age of Onset   • Heart disease Father        Review of Systems:   Please see HPI  Constitution: No chills, no rigors, no unexplained  weight loss or weight gain  Eyes:  No diplopia, no blurred vision, no loss of vision, conjunctiva is pink and sclera is anicteric  ENT:  No tinnitus, no otorrhea, no epistaxis, no sore throat   Respiratory: No cough, no hemoptysis  Cardiovascular: see HPI  Gastrointestinal: No nausea, no vomiting, no hematemesis, no diarrhea or constipation, no melena  Genitourinary: No frequency of dysuria no hematuria  Integument: No pruritis and  no skin rash  Hematologic / Lymphatic: No excessive bleeding, easy bruising, fatigue, lymphadenopathy and petechiae  Musculoskeletal: No joint pain, joint stiffness, joint swelling, muscle pain, muscle weakness and neck pain  Neurological: No dizziness, headaches, light headedness, seizures and vertigo  Endocrine: No frequent urination and nocturia, temperature intolerance, weight gain, unintended and weight loss, unintended      Current Outpatient Prescriptions on File Prior to Visit   Medication Sig Dispense Refill   • buprenorphine-naloxone (SUBOXONE) 8-2 MG per SL tablet Place 3 tablets under the tongue Daily. DISSOLVE 3 FILMS UNDER THE TONGUE DAILY     • calcitriol (ROCALTROL) 0.5 MCG capsule Take 0.5 mcg by mouth 3 (Three) Times a Week.     • CloNIDine (CATAPRES-TTS) 0.3 MG/24HR patch Place 1 patch on the skin 1 (One) Time Per Week.     • hydrALAZINE (APRESOLINE) 50 MG tablet Take 100 mg by mouth 3 (Three) Times a Day.     • lisinopril (PRINIVIL,ZESTRIL) 40 MG tablet Take 40 mg by mouth Daily.     • metoprolol succinate XL (TOPROL-XL) 25 MG 24 hr tablet Take 1 tablet by mouth Daily. 30 tablet 3   • amLODIPine (NORVASC) 10 MG tablet Take 10 mg by mouth Daily.     • doxazosin (CARDURA) 2 MG tablet Take 2 mg by mouth Every Night.     • glimepiride (AMARYL) 4 MG tablet Take 4 mg by mouth Every Morning Before Breakfast.     • NIFEdipine XL (PROCARDIA XL) 90 MG 24 hr tablet Take 90 mg by mouth Daily.     • sertraline (ZOLOFT) 100 MG tablet Take 100 mg by mouth Daily.       No current  facility-administered medications on file prior to visit.        No Known Allergies    Objective:  Vitals:    06/28/18 1439   BP: 119/75   Pulse: 67   SpO2: 99%     Comfortable NAD  PERRL, conjunctiva clear  Neck supple, no JVD or thyromegaly appreciated  S1/S2 RRR, no m/r/g  Lungs CTA B, normal effort  Abdomen S/NT/ND (+) BS, no HSM appreciated  Extremities warm, no clubbing, cyanosis, or edema  Normal gait  No visible or palpable skin lesions  A/Ox4, mood and affect appropriate  Pulse exam: Graeme's:    DATA:                                 A/P:    CAD: abnormal ST. Occasional chest pain. Severe renal insufficiency. Stop ACEI periprocedurally. Hydrate for several hours and perform late morning procedure. Also, only diagnositic procedure planned to minimize contrast exposure. Nml Allens test. Pt aware renal insufficiency is higher probability even possibly HD.    After discussing risks, benefits and alternatives with the patient,written informed consent was obtained.        Thank you for allowing me to participate in the care of Axel Desir. Feel free to contact me directly with any further questions or concerns.

## 2018-06-28 NOTE — PROGRESS NOTES
[facility] CARDIOLOGY  [address] [place] [place] [address]  Phone: [phone]  Fax: [phone]    06/28/2018    Chief Complaint: Routine followup of , CAD    History:   Axel Desir is a 58 y.o. male seen in followup, pt seen in follow up. 2/2018 scheduled for CCL for abnormal ST but never completed it. Had high risk ST noted concerning for multivessel CAD. Reversible defect in distal LAD territory. LV dilation and TID c/w multivessel CAD. Pt with renal disease and is aware risk of ROSENDO would be elevated and asked to stop lisinopril prior to cath.             rECENT visit:   Axel Desir is a 58 y.o. male seen in consultation, referred by IVETTE Hester  for blockage in main arteries noted in [place]. Several months ago. No records available. Ultrasound of heart. Chemical stress test. Never underwent cath. Hx of kidney trouble. No hx MI. No hx stents. No hx OHS. Office settings. Was seeing cardiologist Dr. Rincon. Hx creatinine 2.6 up from 1.7 2016. Denies hx DM, No XOL. +HTN. Pt smoking. Occasional chest discomfort. Left sided chest. Sharp pain. 3-4 minutes. Breathing OK. No CHF or arrhythmias. No syncope. No eToh or DU. Pt with HR 46 today on lopressor. Feels some anxiety attacks treated with xanax.          Past Medical History:   Diagnosis Date   • Back pain    • Chronic kidney disease    • Diabetes mellitus    • High blood pressure        Past Social History:  Social History     Social History   • Marital status: Single     Social History Main Topics   • Smoking status: Former Smoker     Packs/day: 0.50     Years: 25.00     Types: Cigarettes     Quit date: 4/1/2017   • Smokeless tobacco: Never Used   • Alcohol use No   • Drug use: No     Other Topics Concern   • Not on file       Past Family History:  Family History   Problem Relation Age of Onset   • Heart disease Father        Review of Systems:   Please see HPI  Constitution: No chills, no rigors, no unexplained  weight loss or weight gain  Eyes:  No diplopia, no blurred vision, no loss of vision, conjunctiva is pink and sclera is anicteric  ENT:  No tinnitus, no otorrhea, no epistaxis, no sore throat   Respiratory: No cough, no hemoptysis  Cardiovascular: see HPI  Gastrointestinal: No nausea, no vomiting, no hematemesis, no diarrhea or constipation, no melena  Genitourinary: No frequency of dysuria no hematuria  Integument: No pruritis and  no skin rash  Hematologic / Lymphatic: No excessive bleeding, easy bruising, fatigue, lymphadenopathy and petechiae  Musculoskeletal: No joint pain, joint stiffness, joint swelling, muscle pain, muscle weakness and neck pain  Neurological: No dizziness, headaches, light headedness, seizures and vertigo  Endocrine: No frequent urination and nocturia, temperature intolerance, weight gain, unintended and weight loss, unintended      Current Outpatient Prescriptions on File Prior to Visit   Medication Sig Dispense Refill   • buprenorphine-naloxone (SUBOXONE) 8-2 MG per SL tablet Place 3 tablets under the tongue Daily. DISSOLVE 3 FILMS UNDER THE TONGUE DAILY     • calcitriol (ROCALTROL) 0.5 MCG capsule Take 0.5 mcg by mouth 3 (Three) Times a Week.     • CloNIDine (CATAPRES-TTS) 0.3 MG/24HR patch Place 1 patch on the skin 1 (One) Time Per Week.     • hydrALAZINE (APRESOLINE) 50 MG tablet Take 100 mg by mouth 3 (Three) Times a Day.     • lisinopril (PRINIVIL,ZESTRIL) 40 MG tablet Take 40 mg by mouth Daily.     • metoprolol succinate XL (TOPROL-XL) 25 MG 24 hr tablet Take 1 tablet by mouth Daily. 30 tablet 3   • amLODIPine (NORVASC) 10 MG tablet Take 10 mg by mouth Daily.     • doxazosin (CARDURA) 2 MG tablet Take 2 mg by mouth Every Night.     • glimepiride (AMARYL) 4 MG tablet Take 4 mg by mouth Every Morning Before Breakfast.     • NIFEdipine XL (PROCARDIA XL) 90 MG 24 hr tablet Take 90 mg by mouth Daily.     • sertraline (ZOLOFT) 100 MG tablet Take 100 mg by mouth Daily.       No current  facility-administered medications on file prior to visit.        No Known Allergies    Objective:  Vitals:    06/28/18 1439   BP: 119/75   Pulse: 67   SpO2: 99%     Comfortable NAD  PERRL, conjunctiva clear  Neck supple, no JVD or thyromegaly appreciated  S1/S2 RRR, no m/r/g  Lungs CTA B, normal effort  Abdomen S/NT/ND (+) BS, no HSM appreciated  Extremities warm, no clubbing, cyanosis, or edema  Normal gait  No visible or palpable skin lesions  A/Ox4, mood and affect appropriate  Pulse exam: Graeme's:    DATA:                                 A/P:    CAD: abnormal ST. Occasional chest pain. Severe renal insufficiency. Stop ACEI periprocedurally. Hydrate for several hours and perform late morning procedure. Also, only diagnositic procedure planned to minimize contrast exposure. Nml Allens test. Pt aware renal insufficiency is higher probability even possibly HD. Stop ACEI. PT aware    After discussing risks, benefits and alternatives with the patient,written informed consent was obtained.        Thank you for allowing me to participate in the care of Axel Desir. Feel free to contact me directly with any further questions or concerns.

## 2018-06-29 ENCOUNTER — TRANSCRIBE ORDERS (OUTPATIENT)
Dept: INFUSION THERAPY | Facility: HOSPITAL | Age: 58
End: 2018-06-29

## 2018-06-29 ENCOUNTER — TELEPHONE (OUTPATIENT)
Dept: CARDIOLOGY | Facility: CLINIC | Age: 58
End: 2018-06-29

## 2018-06-29 DIAGNOSIS — N18.4 CHRONIC KIDNEY DISEASE (CKD) STAGE G4/A1, SEVERELY DECREASED GLOMERULAR FILTRATION RATE (GFR) BETWEEN 15-29 ML/MIN/1.73 SQUARE METER AND ALBUMINURIA CREATININE RATIO LESS THAN 30 MG/G (HCC): Primary | ICD-10-CM

## 2018-06-29 DIAGNOSIS — N18.9 CHRONIC KIDNEY DISEASE, UNSPECIFIED CKD STAGE: Primary | ICD-10-CM

## 2018-06-29 RX ORDER — SODIUM CHLORIDE 9 MG/ML
100 INJECTION, SOLUTION INTRAVENOUS ONCE
Status: DISCONTINUED | OUTPATIENT
Start: 2018-07-02 | End: 2018-07-02

## 2018-06-29 NOTE — TELEPHONE ENCOUNTER
Called pt.  Instructed him to be at the hospital[facility] at 7:30 for his IV fluids on Monday July 2.  He stated understanding.

## 2018-07-02 ENCOUNTER — TELEPHONE (OUTPATIENT)
Dept: CARDIOLOGY | Facility: CLINIC | Age: 58
End: 2018-07-02

## 2018-07-02 ENCOUNTER — HOSPITAL ENCOUNTER (OUTPATIENT)
Dept: INFUSION THERAPY | Facility: HOSPITAL | Age: 58
Discharge: HOME | End: 2018-07-02
Attending: INTERNAL MEDICINE

## 2018-07-02 ENCOUNTER — HOSPITAL ENCOUNTER (OUTPATIENT)
Facility: HOSPITAL | Age: 58
Discharge: HOME | End: 2018-07-02
Attending: INTERNAL MEDICINE | Admitting: INTERNAL MEDICINE

## 2018-07-02 VITALS
HEIGHT: 70 IN | WEIGHT: 132 LBS | DIASTOLIC BLOOD PRESSURE: 73 MMHG | TEMPERATURE: 98.4 F | SYSTOLIC BLOOD PRESSURE: 144 MMHG | HEART RATE: 56 BPM | RESPIRATION RATE: 18 BRPM | BODY MASS INDEX: 18.9 KG/M2

## 2018-07-02 VITALS
RESPIRATION RATE: 18 BRPM | HEART RATE: 70 BPM | BODY MASS INDEX: 18.58 KG/M2 | SYSTOLIC BLOOD PRESSURE: 150 MMHG | DIASTOLIC BLOOD PRESSURE: 82 MMHG | WEIGHT: 129.8 LBS | HEIGHT: 70 IN | OXYGEN SATURATION: 98 % | TEMPERATURE: 98.4 F

## 2018-07-02 DIAGNOSIS — I20.9 ANGINA PECTORIS (HCC): Primary | ICD-10-CM

## 2018-07-02 DIAGNOSIS — I20.9 ANGINA PECTORIS (HCC): ICD-10-CM

## 2018-07-02 DIAGNOSIS — I25.119 CORONARY ARTERY DISEASE INVOLVING NATIVE CORONARY ARTERY OF NATIVE HEART WITH ANGINA PECTORIS (HCC): ICD-10-CM

## 2018-07-02 DIAGNOSIS — N18.4 CHRONIC KIDNEY DISEASE (CKD) STAGE G4/A1, SEVERELY DECREASED GLOMERULAR FILTRATION RATE (GFR) BETWEEN 15-29 ML/MIN/1.73 SQUARE METER AND ALBUMINURIA CREATININE RATIO LESS THAN 30 MG/G (HCC): ICD-10-CM

## 2018-07-02 LAB
ANION GAP SERPL CALCULATED.3IONS-SCNC: 4.5 MMOL/L (ref 3.6–11.2)
ANION GAP SERPL CALCULATED.3IONS-SCNC: 5.7 MMOL/L (ref 3.6–11.2)
BUN BLD-MCNC: 39 MG/DL (ref 7–21)
BUN BLD-MCNC: 49 MG/DL (ref 7–21)
BUN/CREAT SERPL: 19.9 (ref 7–25)
BUN/CREAT SERPL: 24.1 (ref 7–25)
CALCIUM SPEC-SCNC: 8.3 MG/DL (ref 7.7–10)
CALCIUM SPEC-SCNC: 8.4 MG/DL (ref 7.7–10)
CHLORIDE SERPL-SCNC: 109 MMOL/L (ref 99–112)
CHLORIDE SERPL-SCNC: 111 MMOL/L (ref 99–112)
CO2 SERPL-SCNC: 18.3 MMOL/L (ref 24.3–31.9)
CO2 SERPL-SCNC: 19.5 MMOL/L (ref 24.3–31.9)
CREAT BLD-MCNC: 1.96 MG/DL (ref 0.43–1.29)
CREAT BLD-MCNC: 2.03 MG/DL (ref 0.43–1.29)
GFR SERPL CREATININE-BSD FRML MDRD: 34 ML/MIN/1.73
GFR SERPL CREATININE-BSD FRML MDRD: 35 ML/MIN/1.73
GLUCOSE BLD-MCNC: 124 MG/DL (ref 70–110)
GLUCOSE BLD-MCNC: 127 MG/DL (ref 70–110)
OSMOLALITY SERPL CALC.SUM OF ELEC: 280.8 MOSM/KG (ref 273–305)
OSMOLALITY SERPL CALC.SUM OF ELEC: 281.1 MOSM/KG (ref 273–305)
POTASSIUM BLD-SCNC: 4.9 MMOL/L (ref 3.5–5.3)
POTASSIUM BLD-SCNC: 5.6 MMOL/L (ref 3.5–5.3)
SODIUM BLD-SCNC: 133 MMOL/L (ref 135–153)
SODIUM BLD-SCNC: 135 MMOL/L (ref 135–153)

## 2018-07-02 PROCEDURE — C1894 INTRO/SHEATH, NON-LASER: HCPCS | Performed by: INTERNAL MEDICINE

## 2018-07-02 PROCEDURE — 96361 HYDRATE IV INFUSION ADD-ON: CPT

## 2018-07-02 PROCEDURE — S0260 H&P FOR SURGERY: HCPCS | Performed by: INTERNAL MEDICINE

## 2018-07-02 PROCEDURE — 63710000001 ASPIRIN 81 MG CHEWABLE TABLET: Performed by: INTERNAL MEDICINE

## 2018-07-02 PROCEDURE — G0378 HOSPITAL OBSERVATION PER HR: HCPCS

## 2018-07-02 PROCEDURE — 93454 CORONARY ARTERY ANGIO S&I: CPT | Performed by: INTERNAL MEDICINE

## 2018-07-02 PROCEDURE — A9270 NON-COVERED ITEM OR SERVICE: HCPCS | Performed by: INTERNAL MEDICINE

## 2018-07-02 PROCEDURE — 94799 UNLISTED PULMONARY SVC/PX: CPT

## 2018-07-02 PROCEDURE — C1769 GUIDE WIRE: HCPCS | Performed by: INTERNAL MEDICINE

## 2018-07-02 PROCEDURE — 0 IOPAMIDOL PER 1 ML: Performed by: INTERNAL MEDICINE

## 2018-07-02 PROCEDURE — 96360 HYDRATION IV INFUSION INIT: CPT

## 2018-07-02 PROCEDURE — 36415 COLL VENOUS BLD VENIPUNCTURE: CPT

## 2018-07-02 PROCEDURE — 25010000002 HEPARIN (PORCINE) PER 1000 UNITS: Performed by: INTERNAL MEDICINE

## 2018-07-02 PROCEDURE — 80048 BASIC METABOLIC PNL TOTAL CA: CPT | Performed by: INTERNAL MEDICINE

## 2018-07-02 RX ORDER — SODIUM CHLORIDE 9 MG/ML
150 INJECTION, SOLUTION INTRAVENOUS CONTINUOUS
Status: DISCONTINUED | OUTPATIENT
Start: 2018-07-02 | End: 2018-07-02 | Stop reason: HOSPADM

## 2018-07-02 RX ORDER — SODIUM CHLORIDE 9 MG/ML
1 INJECTION, SOLUTION INTRAVENOUS ONCE
Status: DISCONTINUED | OUTPATIENT
Start: 2018-07-02 | End: 2018-07-04 | Stop reason: HOSPADM

## 2018-07-02 RX ORDER — ASPIRIN 81 MG/1
81 TABLET, CHEWABLE ORAL DAILY
Status: DISCONTINUED | OUTPATIENT
Start: 2018-07-02 | End: 2018-07-02 | Stop reason: HOSPADM

## 2018-07-02 RX ORDER — ATORVASTATIN CALCIUM 10 MG/1
10 TABLET, FILM COATED ORAL DAILY
Qty: 30 TABLET | Refills: 11 | Status: SHIPPED | OUTPATIENT
Start: 2018-07-02 | End: 2018-08-16 | Stop reason: SDUPTHER

## 2018-07-02 RX ORDER — SODIUM CHLORIDE 9 MG/ML
3 INJECTION, SOLUTION INTRAVENOUS ONCE
Status: COMPLETED | OUTPATIENT
Start: 2018-07-02 | End: 2018-07-02

## 2018-07-02 RX ORDER — GABAPENTIN 800 MG/1
800 TABLET ORAL 3 TIMES DAILY
COMMUNITY
End: 2019-04-23 | Stop reason: HOSPADM

## 2018-07-02 RX ORDER — SODIUM POLYSTYRENE SULFONATE 4.1 MEQ/G
15 POWDER, FOR SUSPENSION ORAL; RECTAL ONCE
Status: DISCONTINUED | OUTPATIENT
Start: 2018-07-02 | End: 2018-07-02 | Stop reason: HOSPADM

## 2018-07-02 RX ORDER — SODIUM CHLORIDE 9 MG/ML
INJECTION, SOLUTION INTRAVENOUS CONTINUOUS PRN
Status: COMPLETED | OUTPATIENT
Start: 2018-07-02 | End: 2018-07-02

## 2018-07-02 RX ORDER — SODIUM BICARBONATE 650 MG/1
650 TABLET ORAL 3 TIMES DAILY
Qty: 90 TABLET | Refills: 0 | Status: ON HOLD | OUTPATIENT
Start: 2018-07-02 | End: 2018-09-19

## 2018-07-02 RX ORDER — BUPRENORPHINE AND NALOXONE 8; 2 MG/1; MG/1
1 FILM, SOLUBLE BUCCAL; SUBLINGUAL 3 TIMES DAILY
COMMUNITY
End: 2019-04-23 | Stop reason: HOSPADM

## 2018-07-02 RX ORDER — LIDOCAINE HYDROCHLORIDE 20 MG/ML
INJECTION, SOLUTION INFILTRATION; PERINEURAL AS NEEDED
Status: DISCONTINUED | OUTPATIENT
Start: 2018-07-02 | End: 2018-07-02 | Stop reason: HOSPADM

## 2018-07-02 RX ORDER — ASPIRIN 81 MG/1
81 TABLET, CHEWABLE ORAL DAILY
Qty: 30 TABLET | Refills: 0 | Status: SHIPPED | OUTPATIENT
Start: 2018-07-03 | End: 2019-03-28

## 2018-07-02 RX ORDER — ASPIRIN 81 MG/1
TABLET, CHEWABLE ORAL AS NEEDED
Status: DISCONTINUED | OUTPATIENT
Start: 2018-07-02 | End: 2018-07-02 | Stop reason: HOSPADM

## 2018-07-02 RX ORDER — SODIUM BICARBONATE 650 MG/1
650 TABLET ORAL 3 TIMES DAILY
Status: DISCONTINUED | OUTPATIENT
Start: 2018-07-02 | End: 2018-07-02 | Stop reason: HOSPADM

## 2018-07-02 RX ORDER — ISOSORBIDE MONONITRATE 30 MG/1
30 TABLET, EXTENDED RELEASE ORAL DAILY
Qty: 30 TABLET | Refills: 11 | Status: SHIPPED | OUTPATIENT
Start: 2018-07-02 | End: 2018-10-01 | Stop reason: ALTCHOICE

## 2018-07-02 RX ORDER — SODIUM CHLORIDE 9 MG/ML
100 INJECTION, SOLUTION INTRAVENOUS CONTINUOUS
Status: DISCONTINUED | OUTPATIENT
Start: 2018-07-02 | End: 2018-07-02

## 2018-07-02 RX ADMIN — SODIUM CHLORIDE 3 ML/KG/HR: 9 INJECTION, SOLUTION INTRAVENOUS at 08:09

## 2018-07-02 RX ADMIN — SODIUM CHLORIDE 150 ML/HR: 9 INJECTION, SOLUTION INTRAVENOUS at 14:05

## 2018-07-02 RX ADMIN — ASPIRIN 81 MG: 81 TABLET, CHEWABLE ORAL at 15:00

## 2018-07-02 NOTE — DISCHARGE INSTR - ACTIVITY
As tolerated. No lifting, flexing, pushing or pulling with RT wrist for 24-48 hours. See additional resources.

## 2018-07-02 NOTE — CONSULTS
Nephrology Consult Note    Referring Provider: Dr Lema  Reason for Consultation: CKD stage 4    Subjective       History of present illness:  Axel Desir is a 58 y.o. male who underwent elective cardiac catheterization today which showed severe 2 vessel CAD and medical management is recommended. He was give 50 cc dye. He has CKD stage 4 and baseline creatinine around from presumed diabetic nephropathy. He has not followed up with me for several months as his father was sick and recently passed. His lisinopril was stopped outpatietn in preparation for cardiac cath and creatinine has improved to 2 . K was 5.6 earlier today and repeat is  Normal. He was given kayexalate. He denies eating high K diet. Complains of loose stools. He denies chest pain , SOB, fever or chills. No nausea, vomiting. no Chronic NSAIDS use. Patient denies hematuria, dysuria, difficulty passing urine. No prior history of renal stones . No family history of renal disease      History  Past Medical History:   Diagnosis Date   • Back pain    • Chronic kidney disease    • Closed left hip fracture (CMS/HCC)    • Diabetes mellitus (CMS/HCC)    • High blood pressure    • Skull fracture (CMS/HCC)    , No past surgical history on file., Family History   Problem Relation Age of Onset   • Heart disease Father    , Social History   Substance Use Topics   • Smoking status: Former Smoker     Packs/day: 0.50     Years: 25.00     Types: Cigarettes     Quit date: 2017   • Smokeless tobacco: Never Used   • Alcohol use No   , Facility-Administered Medications Prior to Admission   Medication Dose Route Frequency Provider Last Rate Last Dose   • [] sodium chloride 0.9 % infusion  150 mL/hr Intravenous Continuous Rodney Lema MD       • [DISCONTINUED] sodium chloride 0.9 % infusion  100 mL/hr Intravenous Once Rodney Lema MD         Prescriptions Prior to Admission   Medication Sig Dispense Refill Last Dose   • buprenorphine-naloxone (SUBOXONE)  8-2 MG film film Place 3 films under the tongue Daily.      • gabapentin (NEURONTIN) 800 MG tablet Take 800 mg by mouth 2 (Two) Times a Day.      • hydrALAZINE (APRESOLINE) 50 MG tablet Take 100 mg by mouth 3 (Three) Times a Day.   7/2/2018 at Unknown time   • calcitriol (ROCALTROL) 0.5 MCG capsule Take 0.5 mcg by mouth 3 (Three) Times a Week.   Taking   • CloNIDine (CATAPRES-TTS) 0.3 MG/24HR patch Place 1 patch on the skin 1 (One) Time Per Week.   Taking   • doxazosin (CARDURA) 2 MG tablet Take 2 mg by mouth Every Night.   Not Taking   • metoprolol succinate XL (TOPROL-XL) 25 MG 24 hr tablet Take 1 tablet by mouth Daily. 30 tablet 3 Taking   • sertraline (ZOLOFT) 100 MG tablet Take 100 mg by mouth Daily.   Not Taking   , Scheduled Meds:    aspirin 81 mg Oral Daily   sodium polystyrene 15 g Oral Once   , Continuous Infusions:    sodium chloride 150 mL/hr Last Rate: 150 mL/hr (07/02/18 1405)   , PRN Meds:   and Allergies:  Patient has no known allergies.    Review of Systems  More than 10 point review of systems was done. Pertinent items are noted in HPI, all other systems reviewed and negative    Objective     Vital Signs  Temp:  [98.3 °F (36.8 °C)-98.4 °F (36.9 °C)] 98.3 °F (36.8 °C)  Heart Rate:  [56-93] 93  Resp:  [14-18] 18  BP: (137-189)/(62-90) 155/62    I/O this shift:  In: 321.5 [I.V.:321.5]  Out: 400 [Urine:400]  No intake/output data recorded.    Physical Examination:    General Appearance : alert, appears stated age, cooperative and no distress  Head : normocephalic, without obvious abnormality and atraumatic  Eyes : conjunctivae and sclerae normal, no icterus, no pallor and PERRLA  Throat : oral mucosa moist  Neck: no adenopathy, suppple, no carotid bruit and no JVD  Lungs : clear to auscultation, respirations regular and unlabored  Heart : regular rhythm & normal rate, normal S1, S2, no murmur, no sudarshan, no rub   Abdomen :  normal bowel sounds, no masses and soft non-tender  Rectal :  Deferred  Extremities : moves extremities well, no redness and no edema  Pulses :  palpable and equal bilaterally  Skin : no bleeding, bruising or rash  Neurologic : orientated to person, place, time, grossly no focal deficitis    Laboratory Data :      WBC No results found for: WBC   HGB No results found for: HGB   HCT No results found for: HCT   Platlets No results found for: LABPLAT   MCV No results found for: MCV       Sodium Sodium   Date Value Ref Range Status   07/02/2018 135 135 - 153 mmol/L Final   07/02/2018 133 (L) 135 - 153 mmol/L Final      Potassium Potassium   Date Value Ref Range Status   07/02/2018 4.9 3.5 - 5.3 mmol/L Final   07/02/2018 5.6 (C) 3.5 - 5.3 mmol/L Final     Comment:     Verified by Repeat Analysis       Chloride Chloride   Date Value Ref Range Status   07/02/2018 111 99 - 112 mmol/L Final   07/02/2018 109 99 - 112 mmol/L Final      CO2 CO2   Date Value Ref Range Status   07/02/2018 18.3 (L) 24.3 - 31.9 mmol/L Final   07/02/2018 19.5 (L) 24.3 - 31.9 mmol/L Final      BUN BUN   Date Value Ref Range Status   07/02/2018 39 (H) 7 - 21 mg/dL Final   07/02/2018 49 (H) 7 - 21 mg/dL Final      Creatinine Creatinine   Date Value Ref Range Status   07/02/2018 1.96 (H) 0.43 - 1.29 mg/dL Final   07/02/2018 2.03 (H) 0.43 - 1.29 mg/dL Final      Calcium Calcium   Date Value Ref Range Status   07/02/2018 8.3 7.7 - 10.0 mg/dL Final   07/02/2018 8.4 7.7 - 10.0 mg/dL Final      PO4 No results found for: CAPO4   Albumin No results found for: ALBUMIN   Magnesium No results found for: MG   Uric Acid No results found for: URICACID     Radiology results :     Imaging Results (last 72 hours)     ** No results found for the last 72 hours. **            Medications:        aspirin 81 mg Oral Daily   sodium polystyrene 15 g Oral Once       sodium chloride 150 mL/hr Last Rate: 150 mL/hr (07/02/18 1405)       Assessment/Plan     Active Problems:    Angina pectoris (CMS/HCC)      1. CKD stage 4 from presume  diabetic nephropathy: creatinine is better at 1.9 today. His baseline is around 2 but it got worse to 2.8 and is better after stopping lisinopril. Moderate risk of contrast nephropathy due to CKD 4 and proteinuria. Agree with normal saline     2. Metabolic acidosis 2/2 CKD : start sodium bicarbonate tablets    3. Hyperkalemia resolved    4. Bone mineral disorder: check  Labs    5. HTN : was elevated earlier today but improved now. Continue to monitor    Thanks Dr Lema for the consult. We will follow with you.  I discussed the patient's findings and my recommendations with patient, nursing staff and consulting provider    Tae Lott MD  07/02/18  4:22 PM

## 2018-07-02 NOTE — TELEPHONE ENCOUNTER
"Infusion clinic has called office to tell us that Axel has finished his hydration infusion. I told her it was my understanding on Friday when I discussed the case with Dr. Lema he was wanting him to be on an IV up until the time of heart cath but I would call and discuss this with him.    Called Dr. Lema who is requesting that Axel stay on 150 cc/hr of saline until time of procedure. I have confirmed that he wants to start cath at 12PM. He also requests STAT Basic Metabolic Panel at this time. Anastasia Lema is wanting to know if he is on any blood thinners/etc. I told him he was not; he is asking that I read off Axel's current medications which are as follows:     Metoprolol XL 25MG  Amlodipine 10MG  Suboxone 8/2MG  Calcitriol 0.5MG  Clonidine 0.3/24HR patch  Doxazosin 2MG  Glimepiride 4MG  Hydralazine 50MG  Lisinopril 40MG  Nifedipine XL 90MG  Sertraline 100MG  -- Dr. Lema is aware.    Dr. Lema wants to confirm with the patient that he stopped his Lisinopril the day he saw him in office (6/28/18; Thursday) and also wants to know if he has had a CBC; he has on 6/28. He is wanting to know the following:    WBC: 9.53   Hemoglobin: 11.3  Platelets: 349    Called infusion clinic back. Told them about STAT BMP and to ask patient about Lisinopril. Patient states he \"stopped Lisinopril the day he saw the doctor in the office\" (Dr. Lema has been made aware). Monica in the infusion clinic now tells me that patient has mentioned being \"on antibiotic that Dr. Lema put him on and took the last dose this AM\" I told her Dr. Lema has not ordered any antibiotics but I will call patient's pharmacy to confirm because I have just given Dr. Lema a list of medications that he currently is taking and it did not include any antibiotics.     Called [facility] to find out medication that patient is taking. He is taking Augmentin 500/125MG. I asked who prescribing physician is: Radha Brush. " I have called Dr. Lema aware of the antibiotics and he was already aware from talking with patient.    I have called infusion clinic to make them aware of all of this and that patient is still set to have cath at 12PM today. They are aware and understand.

## 2018-07-02 NOTE — PLAN OF CARE
Problem: Cardiac: ACS (Acute Coronary Syndrome) (Adult)  Goal: Signs and Symptoms of Listed Potential Problems Will be Absent, Minimized or Managed (Cardiac: ACS)  Outcome: Outcome(s) achieved Date Met: 07/02/18

## 2018-07-02 NOTE — DISCHARGE SUMMARY
Date of Discharge:  7/2/2018    Discharge Diagnosis: CAD    Presenting Problem/History of Present Illness  Angina pectoris [I20.9]  Angina pectoris [I20.9]       Hospital Course  Patient is a 58 y.o. male presented with elective cardiac cath for high risk stress test and chest pain. Pt with renal insifficiency therefore ACEI held for a week and creatinine came down from 2.8 to 2.0. K was elevated and bicarb administered as well for some mild acidemia. Kayexelate given for mild hyperkalemia. Only 53 cc contrast administered and 2 vessel calcified CAD noted. Staged PCI recommended. ASA, statin and imdur started. ACEI stopped. Repeat BMP ordered for 5 days. Pt hydrated prior to and post procedurally. Pt referred to [facility] for elective staged PCI where atherectomy can be performed due to extensive calcification.      Procedures Performed  Procedure(s):  Left Heart Cath       Consults:   Consults     No orders found from 6/3/2018 to 7/3/2018.          Pertinent Test Results:     Lab Results (last 24 hours)     ** No results found for the last 24 hours. **          Imaging Results (last 24 hours)     ** No results found for the last 24 hours. **          ECG/EMG Results (last 24 hours)     ** No results found for the last 24 hours. **          Condition on Discharge:  good    Vital Signs  Temp:  [98.4 °F (36.9 °C)] 98.4 °F (36.9 °C)  Heart Rate:  [56-72] 70  Resp:  [14-18] 14  BP: (137-189)/(73-87) 189/85    Discharge Disposition      Hospital Medications      Discharge Medications     Discharge Medications      New Medications      Instructions Start Date   atorvastatin 10 MG tablet  Commonly known as:  LIPITOR   10 mg, Oral, Daily      isosorbide mononitrate 30 MG 24 hr tablet  Commonly known as:  IMDUR   30 mg, Oral, Daily         Continue These Medications      Instructions Start Date   amLODIPine 10 MG tablet  Commonly known as:  NORVASC   10 mg, Oral, Daily      buprenorphine-naloxone 8-2 MG per SL  tablet  Commonly known as:  SUBOXONE   3 tablets, Sublingual, Daily, DISSOLVE 3 FILMS UNDER THE TONGUE DAILY       calcitriol 0.5 MCG capsule  Commonly known as:  ROCALTROL   0.5 mcg, Oral, 3 Times Weekly      CloNIDine 0.3 MG/24HR patch  Commonly known as:  CATAPRES-TTS   1 patch, Transdermal, Weekly      doxazosin 2 MG tablet  Commonly known as:  CARDURA   2 mg, Oral, Nightly      glimepiride 4 MG tablet  Commonly known as:  AMARYL   4 mg, Oral, Every Morning Before Breakfast      hydrALAZINE 50 MG tablet  Commonly known as:  APRESOLINE   100 mg, Oral, 3 Times Daily      metoprolol succinate XL 25 MG 24 hr tablet  Commonly known as:  TOPROL-XL   25 mg, Oral, Daily      NIFEdipine XL 90 MG 24 hr tablet  Commonly known as:  PROCARDIA XL   90 mg, Oral, Daily      sertraline 100 MG tablet  Commonly known as:  ZOLOFT   100 mg, Oral, Daily         Stop These Medications    lisinopril 40 MG tablet  Commonly known as:  PRINIVIL,ZESTRIL           Start Aspirin 81 mg daily    Discharge Diet: Low potassium    Activity at Discharge:     Follow-up Appointments  No future appointments.  Additional Instructions for the Follow-ups that You Need to Schedule     Basic Metabolic Panel     Jul 07, 2018 (Approximate)            Test Results Pending at Discharge       Rodney Lema MD  07/02/18  1:42 PM

## 2018-07-03 NOTE — NURSING NOTE
Patient educated on his increased risk for bleeding. Patient educated not to flex, push, pull, or lift anything for the next 24-48 hours. Also educated to hold pressure and return to ER as soon as possible if bleeding were to occur.  Patient verbalized understanding.

## 2018-07-05 ENCOUNTER — DOCUMENTATION (OUTPATIENT)
Dept: CARDIAC REHAB | Facility: HOSPITAL | Age: 58
End: 2018-07-05

## 2018-07-05 NOTE — PROGRESS NOTES
Reviewed patient chart Patient did not qualify at this time for Cardiac Rehab . See MD notes. Patient being referred to [facility] for further treatment.

## 2018-07-05 NOTE — H&P (VIEW-ONLY)
[facility] CARDIOLOGY  [address] [place] [address]  Phone: [phone]  Fax: [phone]    06/28/2018    Chief Complaint: Routine followup of , CAD    History:   Axel Desir is a 58 y.o. male seen in followup, pt seen in follow up. 2/2018 scheduled for CCL for abnormal ST but never completed it. Had high risk ST noted concerning for multivessel CAD. Reversible defect in distal LAD territory. LV dilation and TID c/w multivessel CAD. Pt with renal disease and is aware risk of ROSENDO would be elevated and asked to stop lisinopril prior to cath.             rECENT visit:   Axel Desir is a 58 y.o. male seen in consultation, referred by IVETTE Hester  for blockage in main arteries noted in [place]. Several months ago. No records available. Ultrasound of heart. Chemical stress test. Never underwent cath. Hx of kidney trouble. No hx MI. No hx stents. No hx OHS. Office settings. Was seeing cardiologist Dr. Rincon. Hx creatinine 2.6 up from 1.7 2016. Denies hx DM, No XOL. +HTN. Pt smoking. Occasional chest discomfort. Left sided chest. Sharp pain. 3-4 minutes. Breathing OK. No CHF or arrhythmias. No syncope. No eToh or DU. Pt with HR 46 today on lopressor. Feels some anxiety attacks treated with xanax.          Past Medical History:   Diagnosis Date   • Back pain    • Chronic kidney disease    • Diabetes mellitus    • High blood pressure        Past Social History:  Social History     Social History   • Marital status: Single     Social History Main Topics   • Smoking status: Former Smoker     Packs/day: 0.50     Years: 25.00     Types: Cigarettes     Quit date: 4/1/2017   • Smokeless tobacco: Never Used   • Alcohol use No   • Drug use: No     Other Topics Concern   • Not on file       Past Family History:  Family History   Problem Relation Age of Onset   • Heart disease Father        Review of Systems:   Please see HPI  Constitution: No chills, no rigors, no unexplained  weight loss or weight gain  Eyes:  No diplopia, no blurred vision, no loss of vision, conjunctiva is pink and sclera is anicteric  ENT:  No tinnitus, no otorrhea, no epistaxis, no sore throat   Respiratory: No cough, no hemoptysis  Cardiovascular: see HPI  Gastrointestinal: No nausea, no vomiting, no hematemesis, no diarrhea or constipation, no melena  Genitourinary: No frequency of dysuria no hematuria  Integument: No pruritis and  no skin rash  Hematologic / Lymphatic: No excessive bleeding, easy bruising, fatigue, lymphadenopathy and petechiae  Musculoskeletal: No joint pain, joint stiffness, joint swelling, muscle pain, muscle weakness and neck pain  Neurological: No dizziness, headaches, light headedness, seizures and vertigo  Endocrine: No frequent urination and nocturia, temperature intolerance, weight gain, unintended and weight loss, unintended      Current Outpatient Prescriptions on File Prior to Visit   Medication Sig Dispense Refill   • buprenorphine-naloxone (SUBOXONE) 8-2 MG per SL tablet Place 3 tablets under the tongue Daily. DISSOLVE 3 FILMS UNDER THE TONGUE DAILY     • calcitriol (ROCALTROL) 0.5 MCG capsule Take 0.5 mcg by mouth 3 (Three) Times a Week.     • CloNIDine (CATAPRES-TTS) 0.3 MG/24HR patch Place 1 patch on the skin 1 (One) Time Per Week.     • hydrALAZINE (APRESOLINE) 50 MG tablet Take 100 mg by mouth 3 (Three) Times a Day.     • lisinopril (PRINIVIL,ZESTRIL) 40 MG tablet Take 40 mg by mouth Daily.     • metoprolol succinate XL (TOPROL-XL) 25 MG 24 hr tablet Take 1 tablet by mouth Daily. 30 tablet 3   • amLODIPine (NORVASC) 10 MG tablet Take 10 mg by mouth Daily.     • doxazosin (CARDURA) 2 MG tablet Take 2 mg by mouth Every Night.     • glimepiride (AMARYL) 4 MG tablet Take 4 mg by mouth Every Morning Before Breakfast.     • NIFEdipine XL (PROCARDIA XL) 90 MG 24 hr tablet Take 90 mg by mouth Daily.     • sertraline (ZOLOFT) 100 MG tablet Take 100 mg by mouth Daily.       No current  facility-administered medications on file prior to visit.        No Known Allergies    Objective:  Vitals:    06/28/18 1439   BP: 119/75   Pulse: 67   SpO2: 99%     Comfortable NAD  PERRL, conjunctiva clear  Neck supple, no JVD or thyromegaly appreciated  S1/S2 RRR, no m/r/g  Lungs CTA B, normal effort  Abdomen S/NT/ND (+) BS, no HSM appreciated  Extremities warm, no clubbing, cyanosis, or edema  Normal gait  No visible or palpable skin lesions  A/Ox4, mood and affect appropriate  Pulse exam: Graeme's:    DATA:                                 A/P:    CAD: abnormal ST. Occasional chest pain. Severe renal insufficiency. Stop ACEI periprocedurally. Hydrate for several hours and perform late morning procedure. Also, only diagnositic procedure planned to minimize contrast exposure. Nml Allens test. Pt aware renal insufficiency is higher probability even possibly HD.    After discussing risks, benefits and alternatives with the patient,written informed consent was obtained.        Thank you for allowing me to participate in the care of Axel Desir. Feel free to contact me directly with any further questions or concerns.

## 2018-07-11 ENCOUNTER — OFFICE VISIT (OUTPATIENT)
Dept: CARDIOLOGY | Facility: CLINIC | Age: 58
End: 2018-07-11

## 2018-07-11 ENCOUNTER — LAB (OUTPATIENT)
Dept: LAB | Facility: HOSPITAL | Age: 58
End: 2018-07-11
Attending: INTERNAL MEDICINE

## 2018-07-11 VITALS
HEART RATE: 66 BPM | HEIGHT: 70 IN | BODY MASS INDEX: 20.81 KG/M2 | WEIGHT: 145.4 LBS | SYSTOLIC BLOOD PRESSURE: 151 MMHG | OXYGEN SATURATION: 99 % | DIASTOLIC BLOOD PRESSURE: 78 MMHG

## 2018-07-11 DIAGNOSIS — I25.10 ASCVD (ARTERIOSCLEROTIC CARDIOVASCULAR DISEASE): Primary | ICD-10-CM

## 2018-07-11 DIAGNOSIS — I20.9 ANGINA PECTORIS (HCC): ICD-10-CM

## 2018-07-11 LAB
ANION GAP SERPL CALCULATED.3IONS-SCNC: 5.8 MMOL/L (ref 3.6–11.2)
BUN BLD-MCNC: 52 MG/DL (ref 7–21)
BUN/CREAT SERPL: 22.6 (ref 7–25)
CALCIUM SPEC-SCNC: 8.4 MG/DL (ref 7.7–10)
CHLORIDE SERPL-SCNC: 107 MMOL/L (ref 99–112)
CO2 SERPL-SCNC: 25.2 MMOL/L (ref 24.3–31.9)
CREAT BLD-MCNC: 2.3 MG/DL (ref 0.43–1.29)
GFR SERPL CREATININE-BSD FRML MDRD: 29 ML/MIN/1.73
GLUCOSE BLD-MCNC: 171 MG/DL (ref 70–110)
OSMOLALITY SERPL CALC.SUM OF ELEC: 293.8 MOSM/KG (ref 273–305)
POTASSIUM BLD-SCNC: 5.4 MMOL/L (ref 3.5–5.3)
SODIUM BLD-SCNC: 138 MMOL/L (ref 135–153)

## 2018-07-11 PROCEDURE — 80048 BASIC METABOLIC PNL TOTAL CA: CPT

## 2018-07-11 PROCEDURE — 99213 OFFICE O/P EST LOW 20 MIN: CPT | Performed by: NURSE PRACTITIONER

## 2018-07-11 PROCEDURE — 36415 COLL VENOUS BLD VENIPUNCTURE: CPT

## 2018-07-11 RX ORDER — DILTIAZEM HYDROCHLORIDE 240 MG/1
240 CAPSULE, COATED, EXTENDED RELEASE ORAL 2 TIMES DAILY
COMMUNITY
End: 2018-09-23 | Stop reason: HOSPADM

## 2018-07-11 NOTE — PROGRESS NOTES
Subjective     Chief Complaint: Slow Heart Rate    History of Present Illness   Axel Desir is a 58 y.o. male who presents  with a past medical history significant for chronic kidney disease, essential hypertension, hyperlipidemia, borderline diabetes, hepatitis C, low back pain, tobacco use, and anxiety.  He denies past medical history of any coronary artery disease.  He has never been diagnosed with a heart condition.  He was seen in January 2018 by cardiology NP Curry Mendez in Dr. Marquez's to office in [place].  His nuclear stress test was abnormal.  He presented to our office in May with request for follow-up to the abnormal stress test.  He underwent cardiac catheterization on 7/2/2018 and was found to have severe 2 vessel coronary artery disease.  He is being referred to [facility] due to the probable need for atherectomy due to extensive calcification.  He is here today for follow-up of his recent cardiac catheterization.    Axel does report compliance with his medications.  He denies any recent chest pain, shortness of air and palpitations.  Referral to [facility] is still in process.      Current Outpatient Prescriptions:   •  aspirin 81 MG chewable tablet, Chew 1 tablet Daily., Disp: 30 tablet, Rfl: 0  •  atorvastatin (LIPITOR) 10 MG tablet, Take 1 tablet by mouth Daily., Disp: 30 tablet, Rfl: 11  •  calcitriol (ROCALTROL) 0.5 MCG capsule, Take 0.5 mcg by mouth 3 (Three) Times a Week., Disp: , Rfl:   •  diltiaZEM CD (CARDIZEM CD) 240 MG 24 hr capsule, Take 240 mg by mouth 2 (Two) Times a Day., Disp: , Rfl:   •  hydrALAZINE (APRESOLINE) 50 MG tablet, Take 100 mg by mouth 3 (Three) Times a Day., Disp: , Rfl:   •  isosorbide mononitrate (IMDUR) 30 MG 24 hr tablet, Take 1 tablet by mouth Daily., Disp: 30 tablet, Rfl: 11  •  metoprolol succinate XL (TOPROL-XL) 25 MG 24 hr tablet, Take 1 tablet by mouth Daily. (Patient taking differently: Take 50 mg by mouth Daily.), Disp: 30 tablet, Rfl: 3  •  sertraline  "(ZOLOFT) 100 MG tablet, Take 100 mg by mouth Daily., Disp: , Rfl:   •  sodium bicarbonate 650 MG tablet, Take 1 tablet by mouth 3 (Three) Times a Day., Disp: 90 tablet, Rfl: 0  •  buprenorphine-naloxone (SUBOXONE) 8-2 MG film film, Place 3 films under the tongue Daily., Disp: , Rfl:   •  CloNIDine (CATAPRES-TTS) 0.3 MG/24HR patch, Place 1 patch on the skin 1 (One) Time Per Week., Disp: , Rfl:   •  doxazosin (CARDURA) 2 MG tablet, Take 2 mg by mouth Every Night., Disp: , Rfl:   •  gabapentin (NEURONTIN) 800 MG tablet, Take 800 mg by mouth 2 (Two) Times a Day., Disp: , Rfl:      The following portions of the patient's history were reviewed and updated as appropriate: allergies, current medications, past family history, past medical history, past social history, past surgical history and problem list.    Review of Systems   Constitution: Negative.   HENT: Negative.    Eyes: Negative.    Cardiovascular: Negative.    Endocrine: Negative.    Skin: Negative.    Musculoskeletal: Negative.    Gastrointestinal: Negative.    Genitourinary: Negative.    Neurological: Negative.    Psychiatric/Behavioral: Negative.    Allergic/Immunologic: Negative.          Objective     /78 (BP Location: Left arm)   Pulse 66   Ht 177.8 cm (70\")   Wt 66 kg (145 lb 6.4 oz)   SpO2 99%   BMI 20.86 kg/m²     Physical Exam   Constitutional: He appears well-developed and well-nourished.   HENT:   Head: Normocephalic and atraumatic.   Eyes: Pupils are equal, round, and reactive to light.   Neck: No JVD present.   Cardiovascular: Normal rate, regular rhythm and intact distal pulses.  Exam reveals no gallop and no friction rub.    No murmur heard.  Right radial access site: No hematoma, no mass, no evidence of infection.  Pulses intact.   Pulmonary/Chest: Effort normal and breath sounds normal. No respiratory distress. He has no wheezes. He has no rales.   Abdominal: Soft. He exhibits no mass. There is no tenderness. No hernia.   Skin: Skin is " warm and dry.   Psychiatric: He has a normal mood and affect.   Vitals reviewed.      Procedures    7/2/2018  Cardiac Catheterization    Pre-operative Diagnosis: High risk stress test; CAD        Procedure Performed:     Selective coronary angiography. With precatheterization hydration. ACEI held x 5 days, Creatinine improved from 2.8 to 2.0 on day of procedure. Contrast sparing procedure performed with limited views and only 53 cc iodonated contrast administered. Bicarbonate administered for some acidemia. Kayexelate given for some mild hyperkalemia. Staged procedure elected for contrast sparing.        Technique: The patient was brought to the cardiac catheterization laboratory after informed consent was obtained. right radial artery area was prepped, draped, anesthestized in the usual manner. The radial artery was entered martha a Seldinger technique. A 6-Bahamian sheath over the wire was introduced into the radial artery. This was followed by the use of a 5 -Bahamian tig diagnostic catheter to perform the diagnostic cardiac catheterization. Patient tolerated the procedure well, was hemodynamically stable throughout the procedure. Radial cocktail was administered via the sheath side arm at the time of access.     At the end of the procedure sheath was removed, wrist band was placed. Excellent hemostasis was achieved. Patient transferred to post cath holding area in stable condition.     Findings:     Coronary anatomy:     The left main coronary artery bifurcated into the LAD and left circumflex coronary.  The LAD coursed in the anterior interventricular groove, gave rise to diagonal branches and reached the apex.     Left circumflex coursed in the left atrial ventricular groove and gives rise to several marginal branches.     The right coronary artery course in the right atrial ventricular groove I gave rise to several acute marginal branches.                               Dominant vessel: Co-dominant                                LM: Patent                            LAD: diffusely moderately calcified. Mid 80% lesion                              Diagonal Branch: patent                               LCX: IRregular but patent                              Obtuse marginal branch:   patent                                                                 RCA: Diffusely calcified moderately with 80% long mid lesion     LVEF: NA  LVEDP: NA  Aortic Gradient: NA     Impression: Severe 2 vessel CAD.        Post-operative Diagnosis:  CAD. Recommend stage PCI at facility with atherectomy available due to extensive calcification. Med rx for now.     Rodney Lema MD  Assessment/Plan       There are no diagnoses linked to this encounter.      Assessment:  1. Severe 2 vessel coronary artery disease, await consult with interventional cardiology in [place]  2. Chronic kidney disease, patient seen by nephrology  3. Essential hypertension  4. Tobacco use      Plan:  1. Continue current medications: Aspirin, atorvastatin, Cardizem CD, Imdur, metoprolol, Cardura, and clonidine  2. Patient counseled for less than 3 minutes regarding tobacco use.  3. We will see a him back in clinic in 3 months, sooner for any worsening or concerning symptoms.  We are working on his referral.  I have instructed the patient to call this office if he has not heard from [place] by Tuesday.      Return in about 3 months (around 10/11/2018).      IVETTE Jackson

## 2018-07-23 DIAGNOSIS — R73.09 ELEVATED GLUCOSE: ICD-10-CM

## 2018-07-23 DIAGNOSIS — I25.119 CORONARY ARTERY DISEASE INVOLVING NATIVE CORONARY ARTERY OF NATIVE HEART WITH ANGINA PECTORIS (HCC): Primary | ICD-10-CM

## 2018-07-23 DIAGNOSIS — E78.5 DYSLIPIDEMIA, GOAL LDL BELOW 70: ICD-10-CM

## 2018-07-23 NOTE — PROGRESS NOTES
Chente reviewed chart and films, will see in clinic prior to scheduling CBI. Order noted for BMP and A1c. Shruthi in office scheduling aware. CONNER

## 2018-08-16 ENCOUNTER — OFFICE VISIT (OUTPATIENT)
Dept: CARDIOLOGY | Facility: CLINIC | Age: 58
End: 2018-08-16

## 2018-08-16 ENCOUNTER — LAB (OUTPATIENT)
Dept: LAB | Facility: HOSPITAL | Age: 58
End: 2018-08-16

## 2018-08-16 ENCOUNTER — HOSPITAL ENCOUNTER (OUTPATIENT)
Dept: GENERAL RADIOLOGY | Facility: HOSPITAL | Age: 58
Discharge: HOME | End: 2018-08-16
Attending: INTERNAL MEDICINE | Admitting: INTERNAL MEDICINE

## 2018-08-16 VITALS
SYSTOLIC BLOOD PRESSURE: 106 MMHG | DIASTOLIC BLOOD PRESSURE: 65 MMHG | WEIGHT: 142.8 LBS | HEART RATE: 55 BPM | BODY MASS INDEX: 20.49 KG/M2

## 2018-08-16 DIAGNOSIS — I25.119 CORONARY ARTERY DISEASE INVOLVING NATIVE CORONARY ARTERY OF NATIVE HEART WITH ANGINA PECTORIS (HCC): ICD-10-CM

## 2018-08-16 DIAGNOSIS — Z72.0 TOBACCO ABUSE: ICD-10-CM

## 2018-08-16 DIAGNOSIS — R73.09 ELEVATED GLUCOSE: ICD-10-CM

## 2018-08-16 DIAGNOSIS — E78.5 DYSLIPIDEMIA, GOAL LDL BELOW 70: ICD-10-CM

## 2018-08-16 DIAGNOSIS — E78.2 MIXED HYPERLIPIDEMIA: ICD-10-CM

## 2018-08-16 DIAGNOSIS — I25.119 CORONARY ARTERY DISEASE INVOLVING NATIVE CORONARY ARTERY OF NATIVE HEART WITH ANGINA PECTORIS (HCC): Primary | ICD-10-CM

## 2018-08-16 LAB
ANION GAP SERPL CALCULATED.3IONS-SCNC: 9 MMOL/L (ref 3–11)
ARTICHOKE IGE QN: 66 MG/DL (ref 0–130)
BUN BLD-MCNC: 51 MG/DL (ref 9–23)
BUN/CREAT SERPL: 22.7 (ref 7–25)
CALCIUM SPEC-SCNC: 9.2 MG/DL (ref 8.7–10.4)
CHLORIDE SERPL-SCNC: 107 MMOL/L (ref 99–109)
CHOLEST SERPL-MCNC: 137 MG/DL (ref 0–200)
CO2 SERPL-SCNC: 20 MMOL/L (ref 20–31)
CREAT BLD-MCNC: 2.25 MG/DL (ref 0.6–1.3)
GFR SERPL CREATININE-BSD FRML MDRD: 30 ML/MIN/1.73
GLUCOSE BLD-MCNC: 139 MG/DL (ref 70–100)
HBA1C MFR BLD: 5.9 % (ref 4.8–5.6)
HDLC SERPL-MCNC: 70 MG/DL (ref 40–60)
POTASSIUM BLD-SCNC: 5 MMOL/L (ref 3.5–5.5)
SODIUM BLD-SCNC: 136 MMOL/L (ref 132–146)
TRIGL SERPL-MCNC: 39 MG/DL (ref 0–150)

## 2018-08-16 PROCEDURE — 71046 X-RAY EXAM CHEST 2 VIEWS: CPT

## 2018-08-16 PROCEDURE — 80061 LIPID PANEL: CPT

## 2018-08-16 PROCEDURE — 36415 COLL VENOUS BLD VENIPUNCTURE: CPT

## 2018-08-16 PROCEDURE — 99214 OFFICE O/P EST MOD 30 MIN: CPT | Performed by: INTERNAL MEDICINE

## 2018-08-16 PROCEDURE — 80048 BASIC METABOLIC PNL TOTAL CA: CPT

## 2018-08-16 PROCEDURE — 93000 ELECTROCARDIOGRAM COMPLETE: CPT | Performed by: INTERNAL MEDICINE

## 2018-08-16 PROCEDURE — 83036 HEMOGLOBIN GLYCOSYLATED A1C: CPT

## 2018-08-16 RX ORDER — NITROGLYCERIN 0.4 MG/1
TABLET SUBLINGUAL
Qty: 25 TABLET | Refills: 11 | Status: SHIPPED | OUTPATIENT
Start: 2018-08-16 | End: 2019-04-23 | Stop reason: HOSPADM

## 2018-08-16 RX ORDER — NALOXONE HYDROCHLORIDE 4 MG/.1ML
SPRAY NASAL AS NEEDED
Refills: 5 | COMMUNITY
Start: 2018-07-17 | End: 2018-10-30

## 2018-08-16 RX ORDER — ATORVASTATIN CALCIUM 80 MG/1
80 TABLET, FILM COATED ORAL NIGHTLY
Qty: 30 TABLET | Refills: 11 | Status: SHIPPED | OUTPATIENT
Start: 2018-08-16 | End: 2018-10-30
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
## 2018-08-27 ENCOUNTER — OFFICE VISIT (OUTPATIENT)
Dept: CARDIAC SURGERY | Facility: CLINIC | Age: 58
End: 2018-08-27

## 2018-08-27 VITALS
WEIGHT: 140 LBS | HEIGHT: 70 IN | SYSTOLIC BLOOD PRESSURE: 124 MMHG | BODY MASS INDEX: 20.04 KG/M2 | DIASTOLIC BLOOD PRESSURE: 68 MMHG | TEMPERATURE: 98.2 F | HEART RATE: 61 BPM | OXYGEN SATURATION: 97 %

## 2018-08-27 DIAGNOSIS — I25.10 ASCVD (ARTERIOSCLEROTIC CARDIOVASCULAR DISEASE): Primary | ICD-10-CM

## 2018-08-27 PROCEDURE — 99205 OFFICE O/P NEW HI 60 MIN: CPT | Performed by: THORACIC SURGERY (CARDIOTHORACIC VASCULAR SURGERY)

## 2018-08-27 RX ORDER — NIFEDIPINE 90 MG/1
90 TABLET, EXTENDED RELEASE ORAL DAILY
COMMUNITY
End: 2018-09-23 | Stop reason: HOSPADM

## 2018-08-27 RX ORDER — FUROSEMIDE 20 MG/1
20 TABLET ORAL DAILY
Status: ON HOLD | COMMUNITY
End: 2018-09-19

## 2018-08-27 NOTE — PROGRESS NOTES
08/27/2018  Patient Information  Axel Desir                                                                                          [address] [facility] [place] [address]   1960  'PCP/Referring Physician'  Sammy Glass MD  [phone]  No ref. provider found    Chief Complaint   Patient presents with   • Consult     Np referred for CAD, complains of shortness of breath, fatigue and occasional chest pain.   • Coronary Artery Disease       History of Present Illness:   The patient is an 84-year-old white male who has multiple medical problems.  He had been referred to Dr. Eldridge because of shortness of breath, fatigue and chest discomfort.  He underwent cardiac catheterization and was found to have severe two-vessel coronary disease of his right coronary artery and LAD.  Dr. Eldridge discussed this with me and gone over the case.  He feels that catheter-based intervention is extremely high risk because of the calcifications and other problems including his stage IV renal dysfunction.      Patient Active Problem List   Diagnosis   • Angina pectoris (CMS/[facility])   • ASCVD (arteriosclerotic cardiovascular disease), severe 2 vessel disease per [facility] 7/2/18     Past Medical History:   Diagnosis Date   • Anemia    • Anxiety    • Arthritis    • Back pain    • Chronic kidney disease    • Closed left hip fracture (CMS/HCC)    • Coronary artery disease    • Depression    • Diabetes mellitus (CMS/HCC)    • High blood pressure    • Hyperlipidemia    • Seasonal allergies    • Skull fracture (CMS/HCC)      Past Surgical History:   Procedure Laterality Date   • CARDIAC CATHETERIZATION N/A 7/2/2018    Procedure: Left Heart Cath;  Surgeon: Rodney Lema MD;  Location: [facility] INVASIVE LOCATION;  Service: Cardiovascular   • SHOULDER SURGERY Right        Current Outpatient Prescriptions:   •  aspirin 81 MG chewable tablet, Chew 1 tablet Daily., Disp: 30 tablet, Rfl: 0  •  atorvastatin (LIPITOR) 80 MG tablet, Take 1  tablet by mouth Every Night., Disp: 30 tablet, Rfl: 11  •  buprenorphine-naloxone (SUBOXONE) 8-2 MG film film, Place 1 film under the tongue Daily., Disp: , Rfl:   •  calcitriol (ROCALTROL) 0.5 MCG capsule, Take 0.5 mcg by mouth 3 (Three) Times a Week., Disp: , Rfl:   •  CloNIDine (CATAPRES-TTS) 0.3 MG/24HR patch, Place 1 patch on the skin 1 (One) Time Per Week., Disp: , Rfl:   •  diltiaZEM CD (CARDIZEM CD) 240 MG 24 hr capsule, Take 240 mg by mouth Daily., Disp: , Rfl:   •  doxazosin (CARDURA) 2 MG tablet, Take 2 mg by mouth Every Night., Disp: , Rfl:   •  furosemide (LASIX) 20 MG tablet, Take 20 mg by mouth Daily., Disp: , Rfl:   •  gabapentin (NEURONTIN) 800 MG tablet, Take 800 mg by mouth 2 (Two) Times a Day., Disp: , Rfl:   •  hydrALAZINE (APRESOLINE) 50 MG tablet, Take 100 mg by mouth 3 (Three) Times a Day., Disp: , Rfl:   •  isosorbide mononitrate (IMDUR) 30 MG 24 hr tablet, Take 1 tablet by mouth Daily., Disp: 30 tablet, Rfl: 11  •  metoprolol succinate XL (TOPROL-XL) 25 MG 24 hr tablet, Take 1 tablet by mouth Daily., Disp: 30 tablet, Rfl: 3  •  NARCAN 4 MG/0.1ML nasal spray, As Needed., Disp: , Rfl: 5  •  NIFEdipine XL (PROCARDIA XL) 90 MG 24 hr tablet, Take 90 mg by mouth Daily., Disp: , Rfl:   •  nitroglycerin (NITROSTAT) 0.4 MG SL tablet, 1 under the tongue as needed for angina, may repeat every 5mins for up to three doses, Disp: 25 tablet, Rfl: 11  •  sertraline (ZOLOFT) 100 MG tablet, Take 100 mg by mouth Daily., Disp: , Rfl:   •  sodium bicarbonate 650 MG tablet, Take 1 tablet by mouth 3 (Three) Times a Day. (Patient taking differently: Take 650 mg by mouth Daily.), Disp: 90 tablet, Rfl: 0  No Known Allergies  Social History     Social History   • Marital status: Single     Spouse name: N/A   • Number of children: 0   • Years of education: N/A     Occupational History   • disabled/      back problems     Social History Main Topics   • Smoking status: Current Every Day Smoker     Packs/day: 1.00  "    Years: 20.00     Types: Cigarettes     Last attempt to quit: 4/1/2017   • Smokeless tobacco: Never Used   • Alcohol use No   • Drug use: No   • Sexual activity: Defer     Other Topics Concern   • Not on file     Social History Narrative    Lives alone in [facility]     Family History   Problem Relation Age of Onset   • Heart disease Father    • No Known Problems Sister      Review of Systems   Constitution: Positive for chills and malaise/fatigue. Negative for fever, night sweats and weight loss.   HENT: Negative for hearing loss, odynophagia and sore throat.    Cardiovascular: Positive for chest pain, claudication and dyspnea on exertion. Negative for leg swelling, orthopnea and palpitations.   Respiratory: Positive for shortness of breath. Negative for cough and hemoptysis.    Endocrine: Negative.  Negative for cold intolerance, heat intolerance, polydipsia, polyphagia and polyuria.   Hematologic/Lymphatic: Bruises/bleeds easily.   Skin: Positive for rash. Negative for itching.   Musculoskeletal: Positive for arthritis, back pain, joint pain, muscle cramps and muscle weakness. Negative for joint swelling and myalgias.   Gastrointestinal: Positive for abdominal pain, diarrhea and dysphagia. Negative for constipation, hematemesis, hematochezia, melena, nausea and vomiting.   Genitourinary: Negative.  Negative for dysuria, frequency and hematuria.   Neurological: Positive for dizziness, headaches, loss of balance and numbness. Negative for focal weakness and seizures.   Psychiatric/Behavioral: Positive for depression. Negative for suicidal ideas.   Allergic/Immunologic: Positive for environmental allergies.   All other systems reviewed and are negative.    Vitals:    08/27/18 1203   BP: 124/68   BP Location: Right arm   Patient Position: Sitting   Pulse: 61   Temp: 98.2 °F (36.8 °C)   SpO2: 97%   Weight: 63.5 kg (140 lb)   Height: 177.8 cm (70\")      Physical Exam   Constitutional: He is oriented to person, place, " and time. He appears well-developed and well-nourished. No distress.   HENT:   Head: Normocephalic.   Eyes: Pupils are equal, round, and reactive to light. EOM are normal.   Neck: Normal range of motion. Carotid bruit is not present. No thyromegaly present.   Cardiovascular: Normal rate and regular rhythm.  Exam reveals no gallop and no friction rub.    No murmur heard.  Pulmonary/Chest: He has no wheezes. He has no rales.   Abdominal: Soft. Bowel sounds are normal. He exhibits no distension and no mass. There is no hepatomegaly. There is no tenderness.   Musculoskeletal: Normal range of motion. He exhibits no deformity.   Neurological: He is alert and oriented to person, place, and time. He has normal strength. No cranial nerve deficit or sensory deficit.   Skin: No bruising and no petechiae noted. No cyanosis. Nails show no clubbing.   Psychiatric: He has a normal mood and affect.       Labs/Imaging:  I obtained and reviewed medical records from Dr. Eldridge including the cardiac catheterization films demonstrating 70-80% right coronary artery lesion and 80% LAD.    Assessment/Plan:   This gentleman is a 58-year-old male who has been referred for evaluation and possible coronary artery bypass grafting.  I have obtained and reviewed his cardiac catheterization films.  I concur with about a 70-80% right coronary artery lesion.  He also has approximately 80% LAD lesion.  I have also discussed the case with Dr. Eldridge.  He poses some clinical dilemmas.  He has stage IV renal dysfunction.  He certainly would be very high risk for catheter-based intervention according to Dr. Eldridge and I would concur with that.  I think coronary bypass surgery offers him the best long-term option and result.  I discussed that with him and his family member present.  I have discussed the operation, risks, alternatives including risk to his life, bleeding, infection, stroke, organ failure, and permanent dialysis among other risks.  He appears  to understand all of the above and desires to proceed.         Patient Active Problem List   Diagnosis   • Angina pectoris (CMS/HCC)   • ASCVD (arteriosclerotic cardiovascular disease), severe 2 vessel disease per [facility] 7/2/18     CC: MD Axel Stafford MD Debbie Moore, , editing for Oral Calero M.D.    I, Oral Calero MD, have read and agree with the editing done by Candace Guy, .

## 2018-08-28 DIAGNOSIS — I25.119 CORONARY ARTERY DISEASE INVOLVING NATIVE HEART WITH ANGINA PECTORIS, UNSPECIFIED VESSEL OR LESION TYPE (HCC): Primary | ICD-10-CM

## 2018-09-05 ENCOUNTER — PREP FOR SURGERY (OUTPATIENT)
Dept: OTHER | Facility: HOSPITAL | Age: 58
End: 2018-09-05

## 2018-09-05 DIAGNOSIS — I25.10 CAD IN NATIVE ARTERY: Primary | ICD-10-CM

## 2018-09-05 RX ORDER — CHLORHEXIDINE GLUCONATE 500 MG/1
1 CLOTH TOPICAL EVERY 12 HOURS PRN
Status: CANCELLED | OUTPATIENT
Start: 2018-09-17

## 2018-09-05 RX ORDER — CHLORHEXIDINE GLUCONATE 500 MG/1
1 CLOTH TOPICAL EVERY 12 HOURS PRN
Status: CANCELLED | OUTPATIENT
Start: 2018-09-16

## 2018-09-05 RX ORDER — ACETAMINOPHEN 325 MG/1
650 TABLET ORAL EVERY 4 HOURS PRN
Status: CANCELLED | OUTPATIENT
Start: 2018-09-17

## 2018-09-05 RX ORDER — NITROGLYCERIN 0.4 MG/1
0.4 TABLET SUBLINGUAL
Status: CANCELLED | OUTPATIENT
Start: 2018-09-17

## 2018-09-05 RX ORDER — CHLORHEXIDINE GLUCONATE 0.12 MG/ML
15 RINSE ORAL ONCE
Status: CANCELLED | OUTPATIENT
Start: 2018-09-17 | End: 2018-09-17

## 2018-09-05 RX ORDER — ASPIRIN 325 MG
325 TABLET ORAL NIGHTLY
Status: CANCELLED | OUTPATIENT
Start: 2018-09-16 | End: 2018-09-17

## 2018-09-16 ENCOUNTER — HOSPITAL ENCOUNTER (OUTPATIENT)
Dept: PULMONOLOGY | Facility: HOSPITAL | Age: 58
Discharge: HOME | End: 2018-09-16

## 2018-09-16 ENCOUNTER — APPOINTMENT (OUTPATIENT)
Dept: PREADMISSION TESTING | Facility: HOSPITAL | Age: 58
End: 2018-09-16

## 2018-09-16 ENCOUNTER — ANESTHESIA EVENT (OUTPATIENT)
Dept: PERIOP | Facility: HOSPITAL | Age: 58
End: 2018-09-16

## 2018-09-16 ENCOUNTER — HOSPITAL ENCOUNTER (OUTPATIENT)
Dept: GENERAL RADIOLOGY | Facility: HOSPITAL | Age: 58
Discharge: HOME | End: 2018-09-16
Admitting: PHYSICIAN ASSISTANT

## 2018-09-16 DIAGNOSIS — I25.10 CAD IN NATIVE ARTERY: ICD-10-CM

## 2018-09-16 LAB
ABO GROUP BLD: NORMAL
ALBUMIN SERPL-MCNC: 4.09 G/DL (ref 3.2–4.8)
ALBUMIN/GLOB SERPL: 1.6 G/DL (ref 1.5–2.5)
ALP SERPL-CCNC: 86 U/L (ref 25–100)
ALT SERPL W P-5'-P-CCNC: 18 U/L (ref 7–40)
AMPHET+METHAMPHET UR QL: NEGATIVE
AMPHETAMINES UR QL: POSITIVE
ANION GAP SERPL CALCULATED.3IONS-SCNC: 12 MMOL/L (ref 3–11)
APTT PPP: 30.6 SECONDS (ref 24–31)
AST SERPL-CCNC: 27 U/L (ref 0–33)
BARBITURATES UR QL SCN: NEGATIVE
BASOPHILS # BLD AUTO: 0.03 10*3/MM3 (ref 0–0.2)
BASOPHILS NFR BLD AUTO: 0.3 % (ref 0–1)
BENZODIAZ UR QL SCN: NEGATIVE
BILIRUB SERPL-MCNC: 0.2 MG/DL (ref 0.3–1.2)
BLD GP AB SCN SERPL QL: NEGATIVE
BUN BLD-MCNC: 74 MG/DL (ref 9–23)
BUN/CREAT SERPL: 27.1 (ref 7–25)
BUPRENORPHINE SERPL-MCNC: POSITIVE NG/ML
CALCIUM SPEC-SCNC: 8.1 MG/DL (ref 8.7–10.4)
CANNABINOIDS SERPL QL: POSITIVE
CHLORIDE SERPL-SCNC: 105 MMOL/L (ref 99–109)
CO2 SERPL-SCNC: 18 MMOL/L (ref 20–31)
COCAINE UR QL: NEGATIVE
CREAT BLD-MCNC: 2.73 MG/DL (ref 0.6–1.3)
DEPRECATED RDW RBC AUTO: 51.8 FL (ref 37–54)
EOSINOPHIL # BLD AUTO: 0.05 10*3/MM3 (ref 0–0.3)
EOSINOPHIL NFR BLD AUTO: 0.5 % (ref 0–3)
ERYTHROCYTE [DISTWIDTH] IN BLOOD BY AUTOMATED COUNT: 15.6 % (ref 11.3–14.5)
GFR SERPL CREATININE-BSD FRML MDRD: 24 ML/MIN/1.73
GLOBULIN UR ELPH-MCNC: 2.5 GM/DL
GLUCOSE BLD-MCNC: 99 MG/DL (ref 70–100)
HBA1C MFR BLD: 5.7 % (ref 4.8–5.6)
HCT VFR BLD AUTO: 33.9 % (ref 38.9–50.9)
HGB BLD-MCNC: 11.3 G/DL (ref 13.1–17.5)
IMM GRANULOCYTES # BLD: 0.03 10*3/MM3 (ref 0–0.03)
IMM GRANULOCYTES NFR BLD: 0.3 % (ref 0–0.6)
INR PPP: 1.05 (ref 0.91–1.09)
LYMPHOCYTES # BLD AUTO: 1.73 10*3/MM3 (ref 0.6–4.8)
LYMPHOCYTES NFR BLD AUTO: 18.8 % (ref 24–44)
MAGNESIUM SERPL-MCNC: 2.2 MG/DL (ref 1.3–2.7)
MCH RBC QN AUTO: 30.1 PG (ref 27–31)
MCHC RBC AUTO-ENTMCNC: 33.3 G/DL (ref 32–36)
MCV RBC AUTO: 90.4 FL (ref 80–99)
METHADONE UR QL SCN: NEGATIVE
MONOCYTES # BLD AUTO: 0.78 10*3/MM3 (ref 0–1)
MONOCYTES NFR BLD AUTO: 8.5 % (ref 0–12)
NEUTROPHILS # BLD AUTO: 6.6 10*3/MM3 (ref 1.5–8.3)
NEUTROPHILS NFR BLD AUTO: 71.6 % (ref 41–71)
OPIATES UR QL: NEGATIVE
OXYCODONE UR QL SCN: NEGATIVE
PA ADP PRP-ACNC: 251 PRU
PCP UR QL SCN: NEGATIVE
PLATELET # BLD AUTO: 218 10*3/MM3 (ref 150–450)
PMV BLD AUTO: 10.1 FL (ref 6–12)
POTASSIUM BLD-SCNC: 4.3 MMOL/L (ref 3.5–5.5)
PROPOXYPH UR QL: NEGATIVE
PROT SERPL-MCNC: 6.6 G/DL (ref 5.7–8.2)
PROTHROMBIN TIME: 11 SECONDS (ref 9.6–11.5)
RBC # BLD AUTO: 3.75 10*6/MM3 (ref 4.2–5.76)
RH BLD: NEGATIVE
SODIUM BLD-SCNC: 135 MMOL/L (ref 132–146)
T&S EXPIRATION DATE: NORMAL
TRICYCLICS UR QL SCN: NEGATIVE
WBC NRBC COR # BLD: 9.22 10*3/MM3 (ref 3.5–10.8)

## 2018-09-16 PROCEDURE — 94010 BREATHING CAPACITY TEST: CPT

## 2018-09-16 PROCEDURE — 86923 COMPATIBILITY TEST ELECTRIC: CPT

## 2018-09-16 PROCEDURE — 85730 THROMBOPLASTIN TIME PARTIAL: CPT | Performed by: PHYSICIAN ASSISTANT

## 2018-09-16 PROCEDURE — 86850 RBC ANTIBODY SCREEN: CPT | Performed by: PHYSICIAN ASSISTANT

## 2018-09-16 PROCEDURE — 85576 BLOOD PLATELET AGGREGATION: CPT | Performed by: PHYSICIAN ASSISTANT

## 2018-09-16 PROCEDURE — 85025 COMPLETE CBC W/AUTO DIFF WBC: CPT | Performed by: PHYSICIAN ASSISTANT

## 2018-09-16 PROCEDURE — 85610 PROTHROMBIN TIME: CPT | Performed by: PHYSICIAN ASSISTANT

## 2018-09-16 PROCEDURE — 36415 COLL VENOUS BLD VENIPUNCTURE: CPT

## 2018-09-16 PROCEDURE — 94010 BREATHING CAPACITY TEST: CPT | Performed by: INTERNAL MEDICINE

## 2018-09-16 PROCEDURE — 86901 BLOOD TYPING SEROLOGIC RH(D): CPT | Performed by: PHYSICIAN ASSISTANT

## 2018-09-16 PROCEDURE — 86900 BLOOD TYPING SEROLOGIC ABO: CPT | Performed by: PHYSICIAN ASSISTANT

## 2018-09-16 PROCEDURE — 80053 COMPREHEN METABOLIC PANEL: CPT | Performed by: PHYSICIAN ASSISTANT

## 2018-09-16 PROCEDURE — 83735 ASSAY OF MAGNESIUM: CPT | Performed by: PHYSICIAN ASSISTANT

## 2018-09-16 PROCEDURE — 83036 HEMOGLOBIN GLYCOSYLATED A1C: CPT | Performed by: PHYSICIAN ASSISTANT

## 2018-09-16 PROCEDURE — 80306 DRUG TEST PRSMV INSTRMNT: CPT | Performed by: PHYSICIAN ASSISTANT

## 2018-09-16 PROCEDURE — 71046 X-RAY EXAM CHEST 2 VIEWS: CPT

## 2018-09-16 RX ORDER — DIPHENHYDRAMINE HCL 25 MG
50 CAPSULE ORAL EVERY 6 HOURS PRN
COMMUNITY
End: 2018-10-30

## 2018-09-16 RX ORDER — METOPROLOL SUCCINATE 50 MG/1
50 TABLET, EXTENDED RELEASE ORAL DAILY
COMMUNITY
End: 2018-09-23 | Stop reason: HOSPADM

## 2018-09-16 RX ORDER — LORATADINE 10 MG/1
10 CAPSULE, LIQUID FILLED ORAL DAILY
COMMUNITY
End: 2018-10-30

## 2018-09-17 ENCOUNTER — APPOINTMENT (OUTPATIENT)
Dept: GENERAL RADIOLOGY | Facility: HOSPITAL | Age: 58
End: 2018-09-17

## 2018-09-17 ENCOUNTER — HOSPITAL ENCOUNTER (INPATIENT)
Facility: HOSPITAL | Age: 58
LOS: 6 days | Discharge: HOME | End: 2018-09-23
Attending: THORACIC SURGERY (CARDIOTHORACIC VASCULAR SURGERY) | Admitting: THORACIC SURGERY (CARDIOTHORACIC VASCULAR SURGERY)

## 2018-09-17 ENCOUNTER — ANESTHESIA (OUTPATIENT)
Dept: PERIOP | Facility: HOSPITAL | Age: 58
End: 2018-09-17

## 2018-09-17 DIAGNOSIS — Z74.09 IMPAIRED FUNCTIONAL MOBILITY, BALANCE, GAIT, AND ENDURANCE: Primary | ICD-10-CM

## 2018-09-17 DIAGNOSIS — I25.10 CAD IN NATIVE ARTERY: ICD-10-CM

## 2018-09-17 PROBLEM — B19.20 HEPATITIS C: Status: ACTIVE | Noted: 2018-09-17

## 2018-09-17 PROBLEM — N18.4 CKD (CHRONIC KIDNEY DISEASE) STAGE 4, GFR 15-29 ML/MIN (HCC): Status: ACTIVE | Noted: 2018-09-17

## 2018-09-17 PROBLEM — E11.9 TYPE 2 DIABETES MELLITUS (HCC): Status: ACTIVE | Noted: 2018-09-17

## 2018-09-17 LAB
ABO GROUP BLD: NORMAL
ACT BLD: 120 SECONDS (ref 82–152)
ACT BLD: 136 SECONDS (ref 82–152)
ACT BLD: 400 SECONDS (ref 82–152)
ACT BLD: 406 SECONDS (ref 82–152)
ACT BLD: 450 SECONDS (ref 82–152)
ALBUMIN SERPL-MCNC: 3.18 G/DL (ref 3.2–4.8)
ALBUMIN SERPL-MCNC: 3.2 G/DL (ref 3.2–4.8)
ALBUMIN SERPL-MCNC: 3.77 G/DL (ref 3.2–4.8)
ANION GAP SERPL CALCULATED.3IONS-SCNC: 11 MMOL/L (ref 3–11)
ANION GAP SERPL CALCULATED.3IONS-SCNC: 13 MMOL/L (ref 3–11)
ANION GAP SERPL CALCULATED.3IONS-SCNC: 5 MMOL/L (ref 3–11)
ANION GAP SERPL CALCULATED.3IONS-SCNC: 8 MMOL/L (ref 3–11)
APTT PPP: 27.8 SECONDS (ref 24–31)
ARTERIAL PATENCY WRIST A: ABNORMAL
ATMOSPHERIC PRESS: ABNORMAL MMHG
BASE EXCESS BLDA CALC-SCNC: -10 MMOL/L (ref -5–5)
BASE EXCESS BLDA CALC-SCNC: -3 MMOL/L (ref -5–5)
BASE EXCESS BLDA CALC-SCNC: -3.9 MMOL/L (ref 0–2)
BASE EXCESS BLDA CALC-SCNC: -4 MMOL/L (ref -5–5)
BASE EXCESS BLDA CALC-SCNC: -4 MMOL/L (ref -5–5)
BASE EXCESS BLDA CALC-SCNC: -4.1 MMOL/L (ref 0–2)
BASE EXCESS BLDA CALC-SCNC: -6.1 MMOL/L (ref 0–2)
BASE EXCESS BLDA CALC-SCNC: -8 MMOL/L (ref -5–5)
BDY SITE: ABNORMAL
BUN BLD-MCNC: 50 MG/DL (ref 9–23)
BUN BLD-MCNC: 53 MG/DL (ref 9–23)
BUN BLD-MCNC: 56 MG/DL (ref 9–23)
BUN BLD-MCNC: 58 MG/DL (ref 9–23)
BUN/CREAT SERPL: 22.3 (ref 7–25)
BUN/CREAT SERPL: 22.7 (ref 7–25)
BUN/CREAT SERPL: 23 (ref 7–25)
BUN/CREAT SERPL: 24.5 (ref 7–25)
CA-I BLDA-SCNC: 0.91 MMOL/L (ref 1.2–1.32)
CA-I BLDA-SCNC: 0.93 MMOL/L (ref 1.2–1.32)
CA-I BLDA-SCNC: 1.11 MMOL/L (ref 1.2–1.32)
CA-I BLDA-SCNC: 1.16 MMOL/L (ref 1.2–1.32)
CA-I BLDA-SCNC: 1.38 MMOL/L (ref 1.2–1.32)
CA-I SERPL ISE-MCNC: 1.19 MMOL/L (ref 1.12–1.32)
CALCIUM SPEC-SCNC: 7.9 MG/DL (ref 8.7–10.4)
CALCIUM SPEC-SCNC: 8 MG/DL (ref 8.7–10.4)
CALCIUM SPEC-SCNC: 8.3 MG/DL (ref 8.7–10.4)
CALCIUM SPEC-SCNC: 8.7 MG/DL (ref 8.7–10.4)
CHLORIDE SERPL-SCNC: 108 MMOL/L (ref 99–109)
CHLORIDE SERPL-SCNC: 108 MMOL/L (ref 99–109)
CHLORIDE SERPL-SCNC: 110 MMOL/L (ref 99–109)
CHLORIDE SERPL-SCNC: 114 MMOL/L (ref 99–109)
CO2 BLDA-SCNC: 18 MMOL/L (ref 24–29)
CO2 BLDA-SCNC: 20.9 MMOL/L (ref 22–33)
CO2 BLDA-SCNC: 21.6 MMOL/L (ref 22–33)
CO2 BLDA-SCNC: 22 MMOL/L (ref 24–29)
CO2 BLDA-SCNC: 22.3 MMOL/L (ref 22–33)
CO2 BLDA-SCNC: 23 MMOL/L (ref 24–29)
CO2 BLDA-SCNC: 24 MMOL/L (ref 24–29)
CO2 BLDA-SCNC: 24 MMOL/L (ref 24–29)
CO2 SERPL-SCNC: 15 MMOL/L (ref 20–31)
CO2 SERPL-SCNC: 20 MMOL/L (ref 20–31)
CO2 SERPL-SCNC: 21 MMOL/L (ref 20–31)
CO2 SERPL-SCNC: 21 MMOL/L (ref 20–31)
COHGB MFR BLD: 0.5 % (ref 0–2)
COHGB MFR BLD: 0.6 % (ref 0–2)
COHGB MFR BLD: 0.7 % (ref 0–2)
CREAT BLD-MCNC: 2.2 MG/DL (ref 0.6–1.3)
CREAT BLD-MCNC: 2.29 MG/DL (ref 0.6–1.3)
CREAT BLD-MCNC: 2.3 MG/DL (ref 0.6–1.3)
CREAT BLD-MCNC: 2.6 MG/DL (ref 0.6–1.3)
DEPRECATED RDW RBC AUTO: 51.8 FL (ref 37–54)
DEPRECATED RDW RBC AUTO: 52 FL (ref 37–54)
ERYTHROCYTE [DISTWIDTH] IN BLOOD BY AUTOMATED COUNT: 15.7 % (ref 11.3–14.5)
ERYTHROCYTE [DISTWIDTH] IN BLOOD BY AUTOMATED COUNT: 16.1 % (ref 11.3–14.5)
GFR SERPL CREATININE-BSD FRML MDRD: 25 ML/MIN/1.73
GFR SERPL CREATININE-BSD FRML MDRD: 29 ML/MIN/1.73
GFR SERPL CREATININE-BSD FRML MDRD: 29 ML/MIN/1.73
GFR SERPL CREATININE-BSD FRML MDRD: 31 ML/MIN/1.73
GLUCOSE BLD-MCNC: 128 MG/DL (ref 70–100)
GLUCOSE BLD-MCNC: 136 MG/DL (ref 70–100)
GLUCOSE BLD-MCNC: 146 MG/DL (ref 70–100)
GLUCOSE BLD-MCNC: 150 MG/DL (ref 70–100)
GLUCOSE BLDC GLUCOMTR-MCNC: 104 MG/DL (ref 70–130)
GLUCOSE BLDC GLUCOMTR-MCNC: 115 MG/DL (ref 70–130)
GLUCOSE BLDC GLUCOMTR-MCNC: 124 MG/DL (ref 70–130)
GLUCOSE BLDC GLUCOMTR-MCNC: 131 MG/DL (ref 70–130)
GLUCOSE BLDC GLUCOMTR-MCNC: 134 MG/DL (ref 70–130)
GLUCOSE BLDC GLUCOMTR-MCNC: 135 MG/DL (ref 70–130)
GLUCOSE BLDC GLUCOMTR-MCNC: 143 MG/DL (ref 70–130)
GLUCOSE BLDC GLUCOMTR-MCNC: 144 MG/DL (ref 70–130)
GLUCOSE BLDC GLUCOMTR-MCNC: 148 MG/DL (ref 70–130)
GLUCOSE BLDC GLUCOMTR-MCNC: 151 MG/DL (ref 70–130)
GLUCOSE BLDC GLUCOMTR-MCNC: 159 MG/DL (ref 70–130)
GLUCOSE BLDC GLUCOMTR-MCNC: 183 MG/DL (ref 70–130)
GLUCOSE BLDC GLUCOMTR-MCNC: 189 MG/DL (ref 70–130)
GLUCOSE BLDC GLUCOMTR-MCNC: 194 MG/DL (ref 70–130)
GLUCOSE BLDC GLUCOMTR-MCNC: 92 MG/DL (ref 70–130)
GLUCOSE BLDC GLUCOMTR-MCNC: 98 MG/DL (ref 70–130)
HCO3 BLDA-SCNC: 17 MMOL/L (ref 22–26)
HCO3 BLDA-SCNC: 19.7 MMOL/L (ref 20–26)
HCO3 BLDA-SCNC: 20.2 MMOL/L (ref 22–26)
HCO3 BLDA-SCNC: 20.6 MMOL/L (ref 20–26)
HCO3 BLDA-SCNC: 21.1 MMOL/L (ref 20–26)
HCO3 BLDA-SCNC: 22 MMOL/L (ref 22–26)
HCO3 BLDA-SCNC: 22.3 MMOL/L (ref 22–26)
HCO3 BLDA-SCNC: 22.7 MMOL/L (ref 22–26)
HCT VFR BLD AUTO: 23.1 % (ref 38.9–50.9)
HCT VFR BLD AUTO: 24.9 % (ref 38.9–50.9)
HCT VFR BLD AUTO: 27.3 % (ref 38.9–50.9)
HCT VFR BLD CALC: 25.2 %
HCT VFR BLD CALC: 27.3 %
HCT VFR BLD CALC: 28.3 %
HCT VFR BLDA CALC: 21 % (ref 38–51)
HCT VFR BLDA CALC: 23 % (ref 38–51)
HCT VFR BLDA CALC: 24 % (ref 38–51)
HCT VFR BLDA CALC: 27 % (ref 38–51)
HCT VFR BLDA CALC: 32 % (ref 38–51)
HGB BLD-MCNC: 7.9 G/DL (ref 13.1–17.5)
HGB BLD-MCNC: 8.2 G/DL (ref 13.1–17.5)
HGB BLD-MCNC: 9.1 G/DL (ref 13.1–17.5)
HGB BLDA-MCNC: 10.9 G/DL (ref 12–17)
HGB BLDA-MCNC: 7.1 G/DL (ref 12–17)
HGB BLDA-MCNC: 7.8 G/DL (ref 12–17)
HGB BLDA-MCNC: 8.2 G/DL (ref 12–17)
HGB BLDA-MCNC: 8.2 G/DL (ref 13.5–17.5)
HGB BLDA-MCNC: 8.9 G/DL (ref 13.5–17.5)
HGB BLDA-MCNC: 9.2 G/DL (ref 12–17)
HGB BLDA-MCNC: 9.2 G/DL (ref 13.5–17.5)
HOROWITZ INDEX BLD+IHG-RTO: 40 %
HOROWITZ INDEX BLD+IHG-RTO: 40 %
HOROWITZ INDEX BLD+IHG-RTO: 60 %
INR PPP: 1.09 (ref 0.91–1.09)
MAGNESIUM SERPL-MCNC: 2.3 MG/DL (ref 1.3–2.7)
MAGNESIUM SERPL-MCNC: 2.6 MG/DL (ref 1.3–2.7)
MCH RBC QN AUTO: 30 PG (ref 27–31)
MCH RBC QN AUTO: 30.4 PG (ref 27–31)
MCHC RBC AUTO-ENTMCNC: 33.3 G/DL (ref 32–36)
MCHC RBC AUTO-ENTMCNC: 34.2 G/DL (ref 32–36)
MCV RBC AUTO: 88.8 FL (ref 80–99)
MCV RBC AUTO: 90.1 FL (ref 80–99)
METHGB BLD QL: 1.8 % (ref 0–1.5)
METHGB BLD QL: 1.9 % (ref 0–1.5)
METHGB BLD QL: 1.9 % (ref 0–1.5)
MODALITY: ABNORMAL
OXYHGB MFR BLDV: 95.5 % (ref 94–99)
OXYHGB MFR BLDV: 96.4 % (ref 94–99)
OXYHGB MFR BLDV: 96.5 % (ref 94–99)
PCO2 BLDA: 34 MM HG (ref 35–48)
PCO2 BLDA: 36.1 MM HG (ref 35–45)
PCO2 BLDA: 37.9 MM HG (ref 35–48)
PCO2 BLDA: 39.1 MM HG (ref 35–48)
PCO2 BLDA: 40.4 MM HG (ref 35–45)
PCO2 BLDA: 41.4 MM HG (ref 35–45)
PCO2 BLDA: 42.2 MM HG (ref 35–45)
PCO2 BLDA: 49.3 MM HG (ref 35–45)
PEEP RESPIRATORY: 10 CM[H2O]
PEEP RESPIRATORY: 10 CM[H2O]
PH BLDA: 7.22 PH UNITS (ref 7.35–7.6)
PH BLDA: 7.28 PH UNITS (ref 7.35–7.6)
PH BLDA: 7.31 PH UNITS (ref 7.35–7.45)
PH BLDA: 7.33 PH UNITS (ref 7.35–7.6)
PH BLDA: 7.34 PH UNITS (ref 7.35–7.6)
PH BLDA: 7.35 PH UNITS (ref 7.35–7.45)
PH BLDA: 7.35 PH UNITS (ref 7.35–7.6)
PH BLDA: 7.39 PH UNITS (ref 7.35–7.45)
PHOSPHATE SERPL-MCNC: 3.8 MG/DL (ref 2.4–5.1)
PHOSPHATE SERPL-MCNC: 4.1 MG/DL (ref 2.4–5.1)
PHOSPHATE SERPL-MCNC: 4.2 MG/DL (ref 2.4–5.1)
PLATELET # BLD AUTO: 164 10*3/MM3 (ref 150–450)
PLATELET # BLD AUTO: 184 10*3/MM3 (ref 150–450)
PMV BLD AUTO: 9.5 FL (ref 6–12)
PMV BLD AUTO: 9.6 FL (ref 6–12)
PO2 BLDA: 108 MM HG (ref 83–108)
PO2 BLDA: 142 MM HG (ref 83–108)
PO2 BLDA: 162 MM HG (ref 83–108)
PO2 BLDA: 261 MMHG (ref 80–105)
PO2 BLDA: 334 MMHG (ref 80–105)
PO2 BLDA: 434 MMHG (ref 80–105)
PO2 BLDA: 450 MMHG (ref 80–105)
PO2 BLDA: 488 MMHG (ref 80–105)
POTASSIUM BLD-SCNC: 3.3 MMOL/L (ref 3.5–5.5)
POTASSIUM BLD-SCNC: 3.6 MMOL/L (ref 3.5–5.5)
POTASSIUM BLD-SCNC: 3.8 MMOL/L (ref 3.5–5.5)
POTASSIUM BLD-SCNC: 4 MMOL/L (ref 3.5–5.5)
POTASSIUM BLDA-SCNC: 3.3 MMOL/L (ref 3.5–4.9)
POTASSIUM BLDA-SCNC: 3.8 MMOL/L (ref 3.5–4.9)
POTASSIUM BLDA-SCNC: 3.8 MMOL/L (ref 3.5–4.9)
POTASSIUM BLDA-SCNC: 4 MMOL/L (ref 3.5–4.9)
POTASSIUM BLDA-SCNC: 4.1 MMOL/L (ref 3.5–4.9)
PROTHROMBIN TIME: 11.4 SECONDS (ref 9.6–11.5)
RBC # BLD AUTO: 2.6 10*6/MM3 (ref 4.2–5.76)
RBC # BLD AUTO: 3.03 10*6/MM3 (ref 4.2–5.76)
RH BLD: NEGATIVE
SAO2 % BLDA: 100 % (ref 95–98)
SET MECH RESP RATE: 14
SET MECH RESP RATE: 16
SODIUM BLD-SCNC: 134 MMOL/L (ref 132–146)
SODIUM BLD-SCNC: 139 MMOL/L (ref 132–146)
SODIUM BLD-SCNC: 140 MMOL/L (ref 132–146)
SODIUM BLD-SCNC: 141 MMOL/L (ref 132–146)
SODIUM BLDA-SCNC: 139 MMOL/L (ref 138–146)
VENTILATOR MODE: ABNORMAL
VT ON VENT VENT: 550 ML
VT ON VENT VENT: 550 ML
WBC NRBC COR # BLD: 10.1 10*3/MM3 (ref 3.5–10.8)
WBC NRBC COR # BLD: 17.93 10*3/MM3 (ref 3.5–10.8)

## 2018-09-17 PROCEDURE — 85730 THROMBOPLASTIN TIME PARTIAL: CPT | Performed by: PHYSICIAN ASSISTANT

## 2018-09-17 PROCEDURE — 85014 HEMATOCRIT: CPT

## 2018-09-17 PROCEDURE — 33508 ENDOSCOPIC VEIN HARVEST: CPT | Performed by: THORACIC SURGERY (CARDIOTHORACIC VASCULAR SURGERY)

## 2018-09-17 PROCEDURE — 85027 COMPLETE CBC AUTOMATED: CPT | Performed by: PHYSICIAN ASSISTANT

## 2018-09-17 PROCEDURE — 0210093 BYPASS CORONARY ARTERY, ONE ARTERY FROM CORONARY ARTERY WITH AUTOLOGOUS VENOUS TISSUE, OPEN APPROACH: ICD-10-PCS | Performed by: THORACIC SURGERY (CARDIOTHORACIC VASCULAR SURGERY)

## 2018-09-17 PROCEDURE — 93005 ELECTROCARDIOGRAM TRACING: CPT | Performed by: PHYSICIAN ASSISTANT

## 2018-09-17 PROCEDURE — 86901 BLOOD TYPING SEROLOGIC RH(D): CPT

## 2018-09-17 PROCEDURE — 63710000001 INSULIN REGULAR HUMAN PER 5 UNITS: Performed by: ANESTHESIOLOGY

## 2018-09-17 PROCEDURE — 80069 RENAL FUNCTION PANEL: CPT | Performed by: THORACIC SURGERY (CARDIOTHORACIC VASCULAR SURGERY)

## 2018-09-17 PROCEDURE — 82330 ASSAY OF CALCIUM: CPT | Performed by: PHYSICIAN ASSISTANT

## 2018-09-17 PROCEDURE — 25010000002 HEPARIN (PORCINE) PER 1000 UNITS: Performed by: THORACIC SURGERY (CARDIOTHORACIC VASCULAR SURGERY)

## 2018-09-17 PROCEDURE — 33517 CABG ARTERY-VEIN SINGLE: CPT | Performed by: PHYSICIAN ASSISTANT

## 2018-09-17 PROCEDURE — 84295 ASSAY OF SERUM SODIUM: CPT

## 2018-09-17 PROCEDURE — 94799 UNLISTED PULMONARY SVC/PX: CPT

## 2018-09-17 PROCEDURE — A4648 IMPLANTABLE TISSUE MARKER: HCPCS | Performed by: THORACIC SURGERY (CARDIOTHORACIC VASCULAR SURGERY)

## 2018-09-17 PROCEDURE — 36430 TRANSFUSION BLD/BLD COMPNT: CPT

## 2018-09-17 PROCEDURE — P9035 PLATELET PHERES LEUKOREDUCED: HCPCS

## 2018-09-17 PROCEDURE — 82805 BLOOD GASES W/O2 SATURATION: CPT | Performed by: PHYSICIAN ASSISTANT

## 2018-09-17 PROCEDURE — 25010000002 CEFUROXIME: Performed by: PHYSICIAN ASSISTANT

## 2018-09-17 PROCEDURE — 33533 CABG ARTERIAL SINGLE: CPT | Performed by: PHYSICIAN ASSISTANT

## 2018-09-17 PROCEDURE — 33533 CABG ARTERIAL SINGLE: CPT | Performed by: THORACIC SURGERY (CARDIOTHORACIC VASCULAR SURGERY)

## 2018-09-17 PROCEDURE — 71045 X-RAY EXAM CHEST 1 VIEW: CPT

## 2018-09-17 PROCEDURE — 82803 BLOOD GASES ANY COMBINATION: CPT

## 2018-09-17 PROCEDURE — 25010000002 PROTAMINE SULFATE PER 10 MG: Performed by: ANESTHESIOLOGY

## 2018-09-17 PROCEDURE — 25010000003 DOPAMINE PER 40 MG: Performed by: ANESTHESIOLOGY

## 2018-09-17 PROCEDURE — 25010000002 PROPOFOL 10 MG/ML EMULSION: Performed by: ANESTHESIOLOGY

## 2018-09-17 PROCEDURE — 25010000002 PROTAMINE SULFATE PER 10 MG: Performed by: THORACIC SURGERY (CARDIOTHORACIC VASCULAR SURGERY)

## 2018-09-17 PROCEDURE — 5A1221Z PERFORMANCE OF CARDIAC OUTPUT, CONTINUOUS: ICD-10-PCS | Performed by: THORACIC SURGERY (CARDIOTHORACIC VASCULAR SURGERY)

## 2018-09-17 PROCEDURE — 25010000002 MIDAZOLAM PER 1 MG: Performed by: ANESTHESIOLOGY

## 2018-09-17 PROCEDURE — 94002 VENT MGMT INPAT INIT DAY: CPT

## 2018-09-17 PROCEDURE — 02100Z9 BYPASS CORONARY ARTERY, ONE ARTERY FROM LEFT INTERNAL MAMMARY, OPEN APPROACH: ICD-10-PCS | Performed by: THORACIC SURGERY (CARDIOTHORACIC VASCULAR SURGERY)

## 2018-09-17 PROCEDURE — 25010000002 MORPHINE SULFATE (PF) 2 MG/ML SOLUTION: Performed by: THORACIC SURGERY (CARDIOTHORACIC VASCULAR SURGERY)

## 2018-09-17 PROCEDURE — 99232 SBSQ HOSP IP/OBS MODERATE 35: CPT | Performed by: INTERNAL MEDICINE

## 2018-09-17 PROCEDURE — 86900 BLOOD TYPING SEROLOGIC ABO: CPT

## 2018-09-17 PROCEDURE — 99291 CRITICAL CARE FIRST HOUR: CPT | Performed by: INTERNAL MEDICINE

## 2018-09-17 PROCEDURE — 82947 ASSAY GLUCOSE BLOOD QUANT: CPT

## 2018-09-17 PROCEDURE — 33517 CABG ARTERY-VEIN SINGLE: CPT | Performed by: THORACIC SURGERY (CARDIOTHORACIC VASCULAR SURGERY)

## 2018-09-17 PROCEDURE — 85347 COAGULATION TIME ACTIVATED: CPT

## 2018-09-17 PROCEDURE — 25010000002 FENTANYL CITRATE (PF) 100 MCG/2ML SOLUTION: Performed by: THORACIC SURGERY (CARDIOTHORACIC VASCULAR SURGERY)

## 2018-09-17 PROCEDURE — 25010000002 CEFUROXIME PER 750 MG: Performed by: ANESTHESIOLOGY

## 2018-09-17 PROCEDURE — 80048 BASIC METABOLIC PNL TOTAL CA: CPT | Performed by: PHYSICIAN ASSISTANT

## 2018-09-17 PROCEDURE — 82330 ASSAY OF CALCIUM: CPT

## 2018-09-17 PROCEDURE — 25010000002 HEPARIN (PORCINE) PER 1000 UNITS: Performed by: ANESTHESIOLOGY

## 2018-09-17 PROCEDURE — 25010000002 PAPAVERINE PER 60 MG: Performed by: THORACIC SURGERY (CARDIOTHORACIC VASCULAR SURGERY)

## 2018-09-17 PROCEDURE — 25010000003 POTASSIUM CHLORIDE PER 2 MEQ: Performed by: PHYSICIAN ASSISTANT

## 2018-09-17 PROCEDURE — 25010000002 ALBUMIN HUMAN 5% PER 50 ML

## 2018-09-17 PROCEDURE — 85018 HEMOGLOBIN: CPT | Performed by: THORACIC SURGERY (CARDIOTHORACIC VASCULAR SURGERY)

## 2018-09-17 PROCEDURE — 80069 RENAL FUNCTION PANEL: CPT | Performed by: PHYSICIAN ASSISTANT

## 2018-09-17 PROCEDURE — P9041 ALBUMIN (HUMAN),5%, 50ML: HCPCS

## 2018-09-17 PROCEDURE — 83735 ASSAY OF MAGNESIUM: CPT | Performed by: PHYSICIAN ASSISTANT

## 2018-09-17 PROCEDURE — 06BP4ZZ EXCISION OF RIGHT SAPHENOUS VEIN, PERCUTANEOUS ENDOSCOPIC APPROACH: ICD-10-PCS | Performed by: THORACIC SURGERY (CARDIOTHORACIC VASCULAR SURGERY)

## 2018-09-17 PROCEDURE — 25010000002 PROPOFOL 1000 MG/ML EMULSION: Performed by: ANESTHESIOLOGY

## 2018-09-17 PROCEDURE — P9017 PLASMA 1 DONOR FRZ W/IN 8 HR: HCPCS

## 2018-09-17 PROCEDURE — 84132 ASSAY OF SERUM POTASSIUM: CPT

## 2018-09-17 PROCEDURE — C1751 CATH, INF, PER/CENT/MIDLINE: HCPCS | Performed by: ANESTHESIOLOGY

## 2018-09-17 PROCEDURE — 82805 BLOOD GASES W/O2 SATURATION: CPT | Performed by: THORACIC SURGERY (CARDIOTHORACIC VASCULAR SURGERY)

## 2018-09-17 PROCEDURE — 85014 HEMATOCRIT: CPT | Performed by: THORACIC SURGERY (CARDIOTHORACIC VASCULAR SURGERY)

## 2018-09-17 PROCEDURE — 85610 PROTHROMBIN TIME: CPT | Performed by: PHYSICIAN ASSISTANT

## 2018-09-17 PROCEDURE — 86927 PLASMA FRESH FROZEN: CPT

## 2018-09-17 PROCEDURE — 93010 ELECTROCARDIOGRAM REPORT: CPT | Performed by: INTERNAL MEDICINE

## 2018-09-17 DEVICE — DISK-SHAPED STYLE, SILICONE (1 PER STERILE PKG)
Type: IMPLANTABLE DEVICE | Site: HEART | Status: FUNCTIONAL
Brand: SCANLAN® RADIOMARK® GRAFT MARKERS

## 2018-09-17 RX ORDER — NITROGLYCERIN 20 MG/100ML
5-200 INJECTION INTRAVENOUS CONTINUOUS PRN
Status: DISCONTINUED | OUTPATIENT
Start: 2018-09-17 | End: 2018-09-21

## 2018-09-17 RX ORDER — ALBUMIN, HUMAN INJ 5% 5 %
SOLUTION INTRAVENOUS
Status: COMPLETED
Start: 2018-09-17 | End: 2018-09-17

## 2018-09-17 RX ORDER — SENNA AND DOCUSATE SODIUM 50; 8.6 MG/1; MG/1
2 TABLET, FILM COATED ORAL 2 TIMES DAILY
Status: DISCONTINUED | OUTPATIENT
Start: 2018-09-17 | End: 2018-09-23 | Stop reason: HOSPADM

## 2018-09-17 RX ORDER — ALBUMIN, HUMAN INJ 5% 5 %
500 SOLUTION INTRAVENOUS AS NEEDED
Status: DISCONTINUED | OUTPATIENT
Start: 2018-09-17 | End: 2018-09-22

## 2018-09-17 RX ORDER — SUFENTANIL CITRATE 50 UG/ML
INJECTION EPIDURAL; INTRAVENOUS AS NEEDED
Status: DISCONTINUED | OUTPATIENT
Start: 2018-09-17 | End: 2018-09-17 | Stop reason: SURG

## 2018-09-17 RX ORDER — CHLORHEXIDINE GLUCONATE 0.12 MG/ML
15 RINSE ORAL EVERY 12 HOURS SCHEDULED
Status: DISCONTINUED | OUTPATIENT
Start: 2018-09-17 | End: 2018-09-18

## 2018-09-17 RX ORDER — NITROGLYCERIN 0.4 MG/1
0.4 TABLET SUBLINGUAL
Status: DISCONTINUED | OUTPATIENT
Start: 2018-09-17 | End: 2018-09-17 | Stop reason: HOSPADM

## 2018-09-17 RX ORDER — AMINOCAPROIC ACID 250 MG/ML
INJECTION, SOLUTION INTRAVENOUS AS NEEDED
Status: DISCONTINUED | OUTPATIENT
Start: 2018-09-17 | End: 2018-09-17 | Stop reason: SURG

## 2018-09-17 RX ORDER — CHLORHEXIDINE GLUCONATE 500 MG/1
1 CLOTH TOPICAL EVERY 12 HOURS PRN
Status: DISCONTINUED | OUTPATIENT
Start: 2018-09-17 | End: 2018-09-17

## 2018-09-17 RX ORDER — ASPIRIN 325 MG
325 TABLET, DELAYED RELEASE (ENTERIC COATED) ORAL DAILY
Status: DISCONTINUED | OUTPATIENT
Start: 2018-09-18 | End: 2018-09-23 | Stop reason: HOSPADM

## 2018-09-17 RX ORDER — NALOXONE HCL 0.4 MG/ML
0.4 VIAL (ML) INJECTION
Status: DISCONTINUED | OUTPATIENT
Start: 2018-09-17 | End: 2018-09-18

## 2018-09-17 RX ORDER — SODIUM CHLORIDE 9 MG/ML
INJECTION, SOLUTION INTRAVENOUS AS NEEDED
Status: DISCONTINUED | OUTPATIENT
Start: 2018-09-17 | End: 2018-09-17 | Stop reason: HOSPADM

## 2018-09-17 RX ORDER — DEXTROSE MONOHYDRATE 50 MG/ML
30 INJECTION, SOLUTION INTRAVENOUS CONTINUOUS
Status: DISCONTINUED | OUTPATIENT
Start: 2018-09-17 | End: 2018-09-18

## 2018-09-17 RX ORDER — PAPAVERINE HYDROCHLORIDE 30 MG/ML
INJECTION INTRAMUSCULAR; INTRAVENOUS AS NEEDED
Status: DISCONTINUED | OUTPATIENT
Start: 2018-09-17 | End: 2018-09-17 | Stop reason: HOSPADM

## 2018-09-17 RX ORDER — PROTAMINE SULFATE 10 MG/ML
INJECTION, SOLUTION INTRAVENOUS
Status: DISPENSED
Start: 2018-09-17 | End: 2018-09-17

## 2018-09-17 RX ORDER — NOREPINEPHRINE BIT/0.9 % NACL 8 MG/250ML
.02-.3 INFUSION BOTTLE (ML) INTRAVENOUS CONTINUOUS PRN
Status: DISCONTINUED | OUTPATIENT
Start: 2018-09-17 | End: 2018-09-21

## 2018-09-17 RX ORDER — BISACODYL 5 MG/1
10 TABLET, DELAYED RELEASE ORAL DAILY PRN
Status: DISCONTINUED | OUTPATIENT
Start: 2018-09-17 | End: 2018-09-23 | Stop reason: HOSPADM

## 2018-09-17 RX ORDER — PROTAMINE SULFATE 10 MG/ML
50 INJECTION, SOLUTION INTRAVENOUS ONCE
Status: DISCONTINUED | OUTPATIENT
Start: 2018-09-17 | End: 2018-09-17

## 2018-09-17 RX ORDER — DOCUSATE SODIUM 100 MG/1
100 CAPSULE, LIQUID FILLED ORAL 2 TIMES DAILY PRN
Status: DISCONTINUED | OUTPATIENT
Start: 2018-09-17 | End: 2018-09-23 | Stop reason: HOSPADM

## 2018-09-17 RX ORDER — ATORVASTATIN CALCIUM 40 MG/1
40 TABLET, FILM COATED ORAL NIGHTLY
Status: DISCONTINUED | OUTPATIENT
Start: 2018-09-17 | End: 2018-09-18

## 2018-09-17 RX ORDER — PROPOFOL 10 MG/ML
VIAL (ML) INTRAVENOUS AS NEEDED
Status: DISCONTINUED | OUTPATIENT
Start: 2018-09-17 | End: 2018-09-17 | Stop reason: SURG

## 2018-09-17 RX ORDER — FAMOTIDINE 20 MG/1
20 TABLET, FILM COATED ORAL
Status: DISCONTINUED | OUTPATIENT
Start: 2018-09-17 | End: 2018-09-17 | Stop reason: HOSPADM

## 2018-09-17 RX ORDER — ASPIRIN 325 MG
325 TABLET ORAL NIGHTLY
Status: DISCONTINUED | OUTPATIENT
Start: 2018-09-17 | End: 2018-09-17

## 2018-09-17 RX ORDER — ACETAMINOPHEN 325 MG/1
650 TABLET ORAL EVERY 4 HOURS PRN
Status: DISCONTINUED | OUTPATIENT
Start: 2018-09-17 | End: 2018-09-23 | Stop reason: HOSPADM

## 2018-09-17 RX ORDER — FENTANYL CITRATE 50 UG/ML
25 INJECTION, SOLUTION INTRAMUSCULAR; INTRAVENOUS
Status: DISCONTINUED | OUTPATIENT
Start: 2018-09-17 | End: 2018-09-18

## 2018-09-17 RX ORDER — DOPAMINE HYDROCHLORIDE 80 MG/100ML
INJECTION, SOLUTION INTRAVENOUS CONTINUOUS PRN
Status: DISCONTINUED | OUTPATIENT
Start: 2018-09-17 | End: 2018-09-17 | Stop reason: SURG

## 2018-09-17 RX ORDER — CHLORHEXIDINE GLUCONATE 500 MG/1
1 CLOTH TOPICAL EVERY 12 HOURS PRN
Status: DISCONTINUED | OUTPATIENT
Start: 2018-09-17 | End: 2018-09-17 | Stop reason: HOSPADM

## 2018-09-17 RX ORDER — SODIUM CHLORIDE 9 MG/ML
9 INJECTION, SOLUTION INTRAVENOUS CONTINUOUS PRN
Status: DISCONTINUED | OUTPATIENT
Start: 2018-09-17 | End: 2018-09-17 | Stop reason: HOSPADM

## 2018-09-17 RX ORDER — POTASSIUM CHLORIDE 1.5 G/1.77G
40 POWDER, FOR SOLUTION ORAL AS NEEDED
Status: DISCONTINUED | OUTPATIENT
Start: 2018-09-17 | End: 2018-09-23 | Stop reason: HOSPADM

## 2018-09-17 RX ORDER — LIDOCAINE HYDROCHLORIDE 10 MG/ML
0.5 INJECTION, SOLUTION EPIDURAL; INFILTRATION; INTRACAUDAL; PERINEURAL ONCE AS NEEDED
Status: COMPLETED | OUTPATIENT
Start: 2018-09-17 | End: 2018-09-17

## 2018-09-17 RX ORDER — ALBUTEROL SULFATE 2.5 MG/3ML
2.5 SOLUTION RESPIRATORY (INHALATION) EVERY 4 HOURS PRN
Status: DISCONTINUED | OUTPATIENT
Start: 2018-09-17 | End: 2018-09-18

## 2018-09-17 RX ORDER — SODIUM CHLORIDE 0.9 % (FLUSH) 0.9 %
1-10 SYRINGE (ML) INJECTION AS NEEDED
Status: DISCONTINUED | OUTPATIENT
Start: 2018-09-17 | End: 2018-09-17 | Stop reason: HOSPADM

## 2018-09-17 RX ORDER — ROCURONIUM BROMIDE 10 MG/ML
INJECTION, SOLUTION INTRAVENOUS AS NEEDED
Status: DISCONTINUED | OUTPATIENT
Start: 2018-09-17 | End: 2018-09-17 | Stop reason: SURG

## 2018-09-17 RX ORDER — PHENYLEPHRINE HCL IN 0.9% NACL 0.5 MG/5ML
.5-3 SYRINGE (ML) INTRAVENOUS CONTINUOUS PRN
Status: DISCONTINUED | OUTPATIENT
Start: 2018-09-17 | End: 2018-09-21

## 2018-09-17 RX ORDER — OXYCODONE HYDROCHLORIDE AND ACETAMINOPHEN 5; 325 MG/1; MG/1
2 TABLET ORAL EVERY 4 HOURS PRN
Status: DISCONTINUED | OUTPATIENT
Start: 2018-09-17 | End: 2018-09-18

## 2018-09-17 RX ORDER — DEXMEDETOMIDINE HYDROCHLORIDE 4 UG/ML
.2-1.5 INJECTION, SOLUTION INTRAVENOUS CONTINUOUS PRN
Status: DISCONTINUED | OUTPATIENT
Start: 2018-09-17 | End: 2018-09-21

## 2018-09-17 RX ORDER — METOPROLOL TARTRATE 5 MG/5ML
2.5 INJECTION INTRAVENOUS EVERY 6 HOURS SCHEDULED
Status: DISCONTINUED | OUTPATIENT
Start: 2018-09-17 | End: 2018-09-18

## 2018-09-17 RX ORDER — ASPIRIN 325 MG
325 TABLET ORAL ONCE
Status: COMPLETED | OUTPATIENT
Start: 2018-09-17 | End: 2018-09-17

## 2018-09-17 RX ORDER — POTASSIUM CHLORIDE 750 MG/1
40 CAPSULE, EXTENDED RELEASE ORAL AS NEEDED
Status: DISCONTINUED | OUTPATIENT
Start: 2018-09-17 | End: 2018-09-23 | Stop reason: HOSPADM

## 2018-09-17 RX ORDER — POTASSIUM CHLORIDE, DEXTROSE MONOHYDRATE 150; 5 MG/100ML; G/100ML
30 INJECTION, SOLUTION INTRAVENOUS CONTINUOUS
Status: DISCONTINUED | OUTPATIENT
Start: 2018-09-17 | End: 2018-09-17

## 2018-09-17 RX ORDER — MIDAZOLAM HYDROCHLORIDE 1 MG/ML
INJECTION INTRAMUSCULAR; INTRAVENOUS AS NEEDED
Status: DISCONTINUED | OUTPATIENT
Start: 2018-09-17 | End: 2018-09-17 | Stop reason: SURG

## 2018-09-17 RX ORDER — MEPERIDINE HYDROCHLORIDE 25 MG/ML
25 INJECTION INTRAMUSCULAR; INTRAVENOUS; SUBCUTANEOUS EVERY 4 HOURS PRN
Status: DISPENSED | OUTPATIENT
Start: 2018-09-17 | End: 2018-09-17

## 2018-09-17 RX ORDER — PROTAMINE SULFATE 10 MG/ML
INJECTION, SOLUTION INTRAVENOUS AS NEEDED
Status: DISCONTINUED | OUTPATIENT
Start: 2018-09-17 | End: 2018-09-17 | Stop reason: SURG

## 2018-09-17 RX ORDER — HEPARIN SODIUM 1000 [USP'U]/ML
INJECTION, SOLUTION INTRAVENOUS; SUBCUTANEOUS AS NEEDED
Status: DISCONTINUED | OUTPATIENT
Start: 2018-09-17 | End: 2018-09-17 | Stop reason: SURG

## 2018-09-17 RX ORDER — DOPAMINE HYDROCHLORIDE 160 MG/100ML
2-20 INJECTION, SOLUTION INTRAVENOUS CONTINUOUS PRN
Status: DISCONTINUED | OUTPATIENT
Start: 2018-09-17 | End: 2018-09-18

## 2018-09-17 RX ORDER — SODIUM CHLORIDE 0.9 % (FLUSH) 0.9 %
30 SYRINGE (ML) INJECTION ONCE AS NEEDED
Status: DISCONTINUED | OUTPATIENT
Start: 2018-09-17 | End: 2018-09-17

## 2018-09-17 RX ORDER — MAGNESIUM HYDROXIDE 1200 MG/15ML
LIQUID ORAL AS NEEDED
Status: DISCONTINUED | OUTPATIENT
Start: 2018-09-17 | End: 2018-09-17 | Stop reason: HOSPADM

## 2018-09-17 RX ORDER — DOBUTAMINE HYDROCHLORIDE 100 MG/100ML
2-20 INJECTION INTRAVENOUS CONTINUOUS PRN
Status: DISCONTINUED | OUTPATIENT
Start: 2018-09-17 | End: 2018-09-18

## 2018-09-17 RX ORDER — SODIUM CHLORIDE 9 MG/ML
30 INJECTION, SOLUTION INTRAVENOUS CONTINUOUS PRN
Status: DISCONTINUED | OUTPATIENT
Start: 2018-09-17 | End: 2018-09-17

## 2018-09-17 RX ORDER — SODIUM CHLORIDE, SODIUM LACTATE, POTASSIUM CHLORIDE, CALCIUM CHLORIDE 600; 310; 30; 20 MG/100ML; MG/100ML; MG/100ML; MG/100ML
9 INJECTION, SOLUTION INTRAVENOUS CONTINUOUS PRN
Status: DISCONTINUED | OUTPATIENT
Start: 2018-09-17 | End: 2018-09-17

## 2018-09-17 RX ORDER — BISACODYL 10 MG
10 SUPPOSITORY, RECTAL RECTAL DAILY PRN
Status: DISCONTINUED | OUTPATIENT
Start: 2018-09-18 | End: 2018-09-23 | Stop reason: HOSPADM

## 2018-09-17 RX ORDER — ONDANSETRON 2 MG/ML
4 INJECTION INTRAMUSCULAR; INTRAVENOUS EVERY 6 HOURS PRN
Status: DISCONTINUED | OUTPATIENT
Start: 2018-09-17 | End: 2018-09-20

## 2018-09-17 RX ORDER — ACETAMINOPHEN 325 MG/1
650 TABLET ORAL EVERY 4 HOURS PRN
Status: DISCONTINUED | OUTPATIENT
Start: 2018-09-17 | End: 2018-09-17 | Stop reason: HOSPADM

## 2018-09-17 RX ORDER — LIDOCAINE HYDROCHLORIDE 20 MG/ML
INJECTION, SOLUTION INFILTRATION; PERINEURAL AS NEEDED
Status: DISCONTINUED | OUTPATIENT
Start: 2018-09-17 | End: 2018-09-17 | Stop reason: SURG

## 2018-09-17 RX ORDER — CHLORHEXIDINE GLUCONATE 0.12 MG/ML
15 RINSE ORAL ONCE
Status: COMPLETED | OUTPATIENT
Start: 2018-09-17 | End: 2018-09-17

## 2018-09-17 RX ORDER — PROTAMINE SULFATE 10 MG/ML
100 INJECTION, SOLUTION INTRAVENOUS ONCE
Status: COMPLETED | OUTPATIENT
Start: 2018-09-17 | End: 2018-09-17

## 2018-09-17 RX ORDER — POTASSIUM CHLORIDE 29.8 MG/ML
20 INJECTION INTRAVENOUS
Status: DISCONTINUED | OUTPATIENT
Start: 2018-09-17 | End: 2018-09-18

## 2018-09-17 RX ORDER — NITROGLYCERIN 20 MG/100ML
INJECTION INTRAVENOUS CONTINUOUS PRN
Status: DISCONTINUED | OUTPATIENT
Start: 2018-09-17 | End: 2018-09-17 | Stop reason: SURG

## 2018-09-17 RX ORDER — MORPHINE SULFATE 2 MG/ML
2 INJECTION, SOLUTION INTRAMUSCULAR; INTRAVENOUS
Status: DISCONTINUED | OUTPATIENT
Start: 2018-09-17 | End: 2018-09-18

## 2018-09-17 RX ORDER — HYDROCODONE BITARTRATE AND ACETAMINOPHEN 7.5; 325 MG/1; MG/1
1 TABLET ORAL EVERY 4 HOURS PRN
Status: DISCONTINUED | OUTPATIENT
Start: 2018-09-17 | End: 2018-09-23 | Stop reason: HOSPADM

## 2018-09-17 RX ADMIN — AMINOCAPROIC ACID 10 G: 250 INJECTION, SOLUTION INTRAVENOUS at 07:38

## 2018-09-17 RX ADMIN — ALBUMIN HUMAN 500 ML: 0.05 INJECTION, SOLUTION INTRAVENOUS at 10:45

## 2018-09-17 RX ADMIN — ROCURONIUM BROMIDE 25 MG: 10 SOLUTION INTRAVENOUS at 09:20

## 2018-09-17 RX ADMIN — POTASSIUM CHLORIDE 20 MEQ: 400 INJECTION, SOLUTION INTRAVENOUS at 17:30

## 2018-09-17 RX ADMIN — POTASSIUM CHLORIDE 20 MEQ: 400 INJECTION, SOLUTION INTRAVENOUS at 19:06

## 2018-09-17 RX ADMIN — MEPERIDINE HYDROCHLORIDE 25 MG: 25 INJECTION INTRAMUSCULAR; INTRAVENOUS; SUBCUTANEOUS at 21:38

## 2018-09-17 RX ADMIN — SUFENTANIL CITRATE 75 MCG: 50 INJECTION, SOLUTION EPIDURAL; INTRAVENOUS at 07:03

## 2018-09-17 RX ADMIN — CEFUROXIME 1.5 G: 1.5 INJECTION, POWDER, FOR SOLUTION INTRAVENOUS at 17:17

## 2018-09-17 RX ADMIN — SODIUM CHLORIDE 3 UNITS/HR: 9 INJECTION, SOLUTION INTRAVENOUS at 07:30

## 2018-09-17 RX ADMIN — METOPROLOL TARTRATE 2.5 MG: 1 INJECTION, SOLUTION INTRAVENOUS at 12:50

## 2018-09-17 RX ADMIN — CHLORHEXIDINE GLUCONATE 15 ML: 1.2 RINSE ORAL at 06:22

## 2018-09-17 RX ADMIN — CEFUROXIME 1.5 G: 90 INJECTION, POWDER, FOR SOLUTION INTRAVENOUS at 09:44

## 2018-09-17 RX ADMIN — SUFENTANIL CITRATE 75 MCG: 50 INJECTION, SOLUTION EPIDURAL; INTRAVENOUS at 07:51

## 2018-09-17 RX ADMIN — SODIUM CHLORIDE 9 ML/HR: 9 INJECTION, SOLUTION INTRAVENOUS at 06:27

## 2018-09-17 RX ADMIN — MUPIROCIN 1 APPLICATION: 20 OINTMENT TOPICAL at 06:22

## 2018-09-17 RX ADMIN — FENTANYL CITRATE 25 MCG: 50 INJECTION, SOLUTION INTRAMUSCULAR; INTRAVENOUS at 15:15

## 2018-09-17 RX ADMIN — HEPARIN SODIUM 23000 UNITS: 1000 INJECTION, SOLUTION INTRAVENOUS; SUBCUTANEOUS at 08:04

## 2018-09-17 RX ADMIN — METOPROLOL TARTRATE 2.5 MG: 1 INJECTION, SOLUTION INTRAVENOUS at 17:17

## 2018-09-17 RX ADMIN — DOCUSATE SODIUM,SENNOSIDES 2 TABLET: 50; 8.6 TABLET, FILM COATED ORAL at 20:17

## 2018-09-17 RX ADMIN — NITROGLYCERIN 75 MCG/MIN: 20 INJECTION INTRAVENOUS at 22:02

## 2018-09-17 RX ADMIN — DEXTROSE MONOHYDRATE 30 ML/HR: 50 INJECTION, SOLUTION INTRAVENOUS at 10:05

## 2018-09-17 RX ADMIN — AMINOCAPROIC ACID 10 G: 250 INJECTION, SOLUTION INTRAVENOUS at 09:34

## 2018-09-17 RX ADMIN — SODIUM BICARBONATE 50 MEQ: 84 INJECTION, SOLUTION INTRAVENOUS at 10:46

## 2018-09-17 RX ADMIN — PROTAMINE SULFATE 100 MG: 10 INJECTION, SOLUTION INTRAVENOUS at 10:46

## 2018-09-17 RX ADMIN — DESMOPRESSIN ACETATE 40 MG: 0.2 TABLET ORAL at 20:17

## 2018-09-17 RX ADMIN — CEFUROXIME 1.5 G: 90 INJECTION, POWDER, FOR SOLUTION INTRAVENOUS at 07:25

## 2018-09-17 RX ADMIN — DEXMEDETOMIDINE HYDROCHLORIDE 0.5 MCG/KG/HR: 4 INJECTION, SOLUTION INTRAVENOUS at 12:49

## 2018-09-17 RX ADMIN — LIDOCAINE HYDROCHLORIDE 100 MG: 20 INJECTION, SOLUTION INFILTRATION; PERINEURAL at 07:03

## 2018-09-17 RX ADMIN — SUFENTANIL CITRATE 50 MCG: 50 INJECTION, SOLUTION EPIDURAL; INTRAVENOUS at 09:00

## 2018-09-17 RX ADMIN — LIDOCAINE HYDROCHLORIDE 0.5 ML: 10 INJECTION, SOLUTION EPIDURAL; INFILTRATION; INTRACAUDAL; PERINEURAL at 06:23

## 2018-09-17 RX ADMIN — ROCURONIUM BROMIDE 15 MG: 10 SOLUTION INTRAVENOUS at 08:50

## 2018-09-17 RX ADMIN — DOPAMINE HYDROCHLORIDE 5 MCG/KG/MIN: 80 INJECTION, SOLUTION INTRAVENOUS at 09:11

## 2018-09-17 RX ADMIN — DEXMEDETOMIDINE HYDROCHLORIDE 1 MCG/KG/HR: 4 INJECTION, SOLUTION INTRAVENOUS at 17:17

## 2018-09-17 RX ADMIN — MORPHINE SULFATE 2 MG: 2 INJECTION, SOLUTION INTRAMUSCULAR; INTRAVENOUS at 15:41

## 2018-09-17 RX ADMIN — ASPIRIN 325 MG ORAL TABLET 325 MG: 325 PILL ORAL at 12:47

## 2018-09-17 RX ADMIN — MIDAZOLAM HYDROCHLORIDE 2 MG: 1 INJECTION, SOLUTION INTRAMUSCULAR; INTRAVENOUS at 06:30

## 2018-09-17 RX ADMIN — ROCURONIUM BROMIDE 65 MG: 10 SOLUTION INTRAVENOUS at 07:03

## 2018-09-17 RX ADMIN — NITROGLYCERIN 0.4 MCG/KG/MIN: 20 INJECTION INTRAVENOUS at 07:53

## 2018-09-17 RX ADMIN — OXYCODONE HYDROCHLORIDE AND ACETAMINOPHEN 2 TABLET: 5; 325 TABLET ORAL at 15:40

## 2018-09-17 RX ADMIN — ALBUMIN, HUMAN INJ 5% 500 ML: 5 SOLUTION at 10:45

## 2018-09-17 RX ADMIN — ROCURONIUM BROMIDE 20 MG: 10 SOLUTION INTRAVENOUS at 08:07

## 2018-09-17 RX ADMIN — PROPOFOL 100 MG: 10 INJECTION, EMULSION INTRAVENOUS at 07:03

## 2018-09-17 RX ADMIN — FAMOTIDINE 20 MG: 20 TABLET ORAL at 06:22

## 2018-09-17 RX ADMIN — OXYCODONE HYDROCHLORIDE AND ACETAMINOPHEN 2 TABLET: 5; 325 TABLET ORAL at 21:37

## 2018-09-17 RX ADMIN — PROTAMINE SULFATE 325 MG: 10 INJECTION, SOLUTION INTRAVENOUS at 09:18

## 2018-09-17 RX ADMIN — SUFENTANIL CITRATE 50 MCG: 50 INJECTION, SOLUTION EPIDURAL; INTRAVENOUS at 09:40

## 2018-09-17 RX ADMIN — PROPOFOL 50 MCG/KG/MIN: 10 INJECTION, EMULSION INTRAVENOUS at 09:39
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
## 2018-09-17 NOTE — OP NOTE
Operative Report  Axel Desir  [id number]  1960    Preop Diagnosis: Severe two-vessel coronary artery disease        Postop Diagnosis same        Procedure: Coronary artery bypass graft ×2 with EVH.  Saphenous vein graft to PDA, left internal mammary artery LAD        Surgeons: Oral Calero        Assistant: Benjamin sawant        Operative Findings:         Description: Patient was brought to the operating room placed under general anesthesia.  Patient had placement of [place] catheter, arterial line, and Ramírez catheter.  Patient was sterilely prepped and draped.  The vein was harvested from the right leg from below the knee to groin using EVH technique.  Simultaneously median sternotomy incision was made.  The left internal mammary artery taken down and divided distally.  The patient was heparinized and activating clotting time was confirmed be adequate.  Pericardium was opened stay sutures were placed.  2 Ethibond sutures and placed in ascending aorta and right appendage for cannulation purposes.  Patient was cannulated with a arterial and venous cannula.  Cardiopulmonary bypass was initiated and the aorta was cross-clamp.  The blood cardioplegia was given and the heart was elevated.  The PDA vessels opened sharply extended proximal distally.  An end-to-side anastomosis running 7-0 Prolene suture was constructed was tied down.  The LAD is open the midportion.  The left internal mammary is brought through pericardial tunnel.  It is anastomosed to this running 7-0 Prolene suture.  Single aortotomies then made and proximal anastomoses was carried out running 6-0 Prolene suture.  Prior to tying this down hot shot of cardioplegia was given.  Aortic cross-clamp was removed.  Patient was warmed and weaned from cardiopulmonary bypass in standard fashion.  Decannulation was carried out after administration of protamine.  A left chest tube and a mediastinal tubes were placed inferiorly and sutured in place.  The  sternum was reapproximated #7 wire.  The linea alba was closed with #1 Ethibond sutures.  Subcutaneous tissues were closed with 0 Vicryl, 2-0 Vicryl, Vicryl, and 0 Monocryl subcuticular stitch.  The cardiopulmonary bypass was 46 minutes cross-clamp time was 41 minutes.    EBL: 750 cc      Please note that portions of this note were completed with a voice recognition program. Efforts were made to edit the dictations, but words may be mistranscribed      Oral Calero MD  09/17/18 9:40 AM

## 2018-09-17 NOTE — PLAN OF CARE
Problem: Patient Care Overview  Goal: Plan of Care Review  Outcome: Ongoing (interventions implemented as appropriate)   09/17/18 1734   Coping/Psychosocial   Plan of Care Reviewed With patient;family   Plan of Care Review   Progress improving   OTHER   Outcome Summary S/P CABG X 2, extubated, on precedex and nitro.       Problem: Cardiac Surgery (Adult)  Goal: Signs and Symptoms of Listed Potential Problems Will be Absent, Minimized or Managed (Cardiac Surgery)  Outcome: Ongoing (interventions implemented as appropriate)   09/17/18 1734   Goal/Outcome Evaluation   Problems Assessed (Cardiac Surgery) all   Problems Present (Cardiac Surgery) pain     Goal: Anesthesia/Sedation Recovery  Outcome: Ongoing (interventions implemented as appropriate)   09/17/18 1734   Goal/Outcome Evaluation   Anesthesia/Sedation Recovery recovered to baseline       Problem: Fall Risk (Adult)  Goal: Identify Related Risk Factors and Signs and Symptoms  Outcome: Ongoing (interventions implemented as appropriate)   09/17/18 1734   Fall Risk (Adult)   Related Risk Factors (Fall Risk) age-related changes   Signs and Symptoms (Fall Risk) presence of risk factors     Goal: Absence of Fall  Outcome: Ongoing (interventions implemented as appropriate)   09/17/18 1734   Fall Risk (Adult)   Absence of Fall achieves outcome       Problem: Skin Injury Risk (Adult)  Goal: Identify Related Risk Factors and Signs and Symptoms  Outcome: Ongoing (interventions implemented as appropriate)   09/17/18 1734   Skin Injury Risk (Adult)   Related Risk Factors (Skin Injury Risk) critical care admission     Goal: Skin Health and Integrity  Outcome: Ongoing (interventions implemented as appropriate)   09/17/18 1734   Skin Injury Risk (Adult)   Skin Health and Integrity achieves outcome

## 2018-09-17 NOTE — ANESTHESIA PROCEDURE NOTES
Arterial Line    Patient location during procedure: pre-op  Start time: 9/17/2018 6:30 AM  Stop Time:9/17/2018 6:40 AM       Line placed for hemodynamic monitoring.  Performed By   Anesthesiologist: HUGO CELIS  Preanesthetic Checklist  Completed: patient identified, site marked, surgical consent, pre-op evaluation, timeout performed, IV checked, risks and benefits discussed and monitors and equipment checked  Arterial Line Prep   Sterile Tech: cap, gloves and sterile barriers  Prep: ChloraPrep  Patient monitoring: blood pressure monitoring, continuous pulse oximetry and EKG  Arterial Line Procedure   Laterality:right  Location:  radial artery  Catheter size: 20 G   Guidance: palpation technique  Number of attempts: 1  Successful placement: yes          Post Assessment   Dressing Type: line sutured, occlusive dressing applied, secured with tape and wrist guard applied.   Complications no  Circ/Move/Sens Assessment: normal and unchanged.   Patient Tolerance: patient tolerated the procedure well with no apparent complications
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
## 2018-09-17 NOTE — H&P (VIEW-ONLY)
08/27/2018  Patient Information  Axel Desir                                                                                          [address] [facility] [place] [address]   1960  'PCP/Referring Physician'  Sammy Glass MD  [phone]  No ref. provider found    Chief Complaint   Patient presents with   • Consult     Np referred for CAD, complains of shortness of breath, fatigue and occasional chest pain.   • Coronary Artery Disease       History of Present Illness:   The patient is an 84-year-old white male who has multiple medical problems.  He had been referred to Dr. Eldridge because of shortness of breath, fatigue and chest discomfort.  He underwent cardiac catheterization and was found to have severe two-vessel coronary disease of his right coronary artery and LAD.  Dr. Eldridge discussed this with me and gone over the case.  He feels that catheter-based intervention is extremely high risk because of the calcifications and other problems including his stage IV renal dysfunction.      Patient Active Problem List   Diagnosis   • Angina pectoris (CMS/[facility])   • ASCVD (arteriosclerotic cardiovascular disease), severe 2 vessel disease per [facility] 7/2/18     Past Medical History:   Diagnosis Date   • Anemia    • Anxiety    • Arthritis    • Back pain    • Chronic kidney disease    • Closed left hip fracture (CMS/HCC)    • Coronary artery disease    • Depression    • Diabetes mellitus (CMS/HCC)    • High blood pressure    • Hyperlipidemia    • Seasonal allergies    • Skull fracture (CMS/HCC)      Past Surgical History:   Procedure Laterality Date   • CARDIAC CATHETERIZATION N/A 7/2/2018    Procedure: Left Heart Cath;  Surgeon: Rodney Lema MD;  Location: [facility] INVASIVE LOCATION;  Service: Cardiovascular   • SHOULDER SURGERY Right        Current Outpatient Prescriptions:   •  aspirin 81 MG chewable tablet, Chew 1 tablet Daily., Disp: 30 tablet, Rfl: 0  •  atorvastatin (LIPITOR) 80 MG tablet, Take 1  tablet by mouth Every Night., Disp: 30 tablet, Rfl: 11  •  buprenorphine-naloxone (SUBOXONE) 8-2 MG film film, Place 1 film under the tongue Daily., Disp: , Rfl:   •  calcitriol (ROCALTROL) 0.5 MCG capsule, Take 0.5 mcg by mouth 3 (Three) Times a Week., Disp: , Rfl:   •  CloNIDine (CATAPRES-TTS) 0.3 MG/24HR patch, Place 1 patch on the skin 1 (One) Time Per Week., Disp: , Rfl:   •  diltiaZEM CD (CARDIZEM CD) 240 MG 24 hr capsule, Take 240 mg by mouth Daily., Disp: , Rfl:   •  doxazosin (CARDURA) 2 MG tablet, Take 2 mg by mouth Every Night., Disp: , Rfl:   •  furosemide (LASIX) 20 MG tablet, Take 20 mg by mouth Daily., Disp: , Rfl:   •  gabapentin (NEURONTIN) 800 MG tablet, Take 800 mg by mouth 2 (Two) Times a Day., Disp: , Rfl:   •  hydrALAZINE (APRESOLINE) 50 MG tablet, Take 100 mg by mouth 3 (Three) Times a Day., Disp: , Rfl:   •  isosorbide mononitrate (IMDUR) 30 MG 24 hr tablet, Take 1 tablet by mouth Daily., Disp: 30 tablet, Rfl: 11  •  metoprolol succinate XL (TOPROL-XL) 25 MG 24 hr tablet, Take 1 tablet by mouth Daily., Disp: 30 tablet, Rfl: 3  •  NARCAN 4 MG/0.1ML nasal spray, As Needed., Disp: , Rfl: 5  •  NIFEdipine XL (PROCARDIA XL) 90 MG 24 hr tablet, Take 90 mg by mouth Daily., Disp: , Rfl:   •  nitroglycerin (NITROSTAT) 0.4 MG SL tablet, 1 under the tongue as needed for angina, may repeat every 5mins for up to three doses, Disp: 25 tablet, Rfl: 11  •  sertraline (ZOLOFT) 100 MG tablet, Take 100 mg by mouth Daily., Disp: , Rfl:   •  sodium bicarbonate 650 MG tablet, Take 1 tablet by mouth 3 (Three) Times a Day. (Patient taking differently: Take 650 mg by mouth Daily.), Disp: 90 tablet, Rfl: 0  No Known Allergies  Social History     Social History   • Marital status: Single     Spouse name: N/A   • Number of children: 0   • Years of education: N/A     Occupational History   • disabled/      back problems     Social History Main Topics   • Smoking status: Current Every Day Smoker     Packs/day: 1.00  "    Years: 20.00     Types: Cigarettes     Last attempt to quit: 4/1/2017   • Smokeless tobacco: Never Used   • Alcohol use No   • Drug use: No   • Sexual activity: Defer     Other Topics Concern   • Not on file     Social History Narrative    Lives alone in [facility]     Family History   Problem Relation Age of Onset   • Heart disease Father    • No Known Problems Sister      Review of Systems   Constitution: Positive for chills and malaise/fatigue. Negative for fever, night sweats and weight loss.   HENT: Negative for hearing loss, odynophagia and sore throat.    Cardiovascular: Positive for chest pain, claudication and dyspnea on exertion. Negative for leg swelling, orthopnea and palpitations.   Respiratory: Positive for shortness of breath. Negative for cough and hemoptysis.    Endocrine: Negative.  Negative for cold intolerance, heat intolerance, polydipsia, polyphagia and polyuria.   Hematologic/Lymphatic: Bruises/bleeds easily.   Skin: Positive for rash. Negative for itching.   Musculoskeletal: Positive for arthritis, back pain, joint pain, muscle cramps and muscle weakness. Negative for joint swelling and myalgias.   Gastrointestinal: Positive for abdominal pain, diarrhea and dysphagia. Negative for constipation, hematemesis, hematochezia, melena, nausea and vomiting.   Genitourinary: Negative.  Negative for dysuria, frequency and hematuria.   Neurological: Positive for dizziness, headaches, loss of balance and numbness. Negative for focal weakness and seizures.   Psychiatric/Behavioral: Positive for depression. Negative for suicidal ideas.   Allergic/Immunologic: Positive for environmental allergies.   All other systems reviewed and are negative.    Vitals:    08/27/18 1203   BP: 124/68   BP Location: Right arm   Patient Position: Sitting   Pulse: 61   Temp: 98.2 °F (36.8 °C)   SpO2: 97%   Weight: 63.5 kg (140 lb)   Height: 177.8 cm (70\")      Physical Exam   Constitutional: He is oriented to person, place, " and time. He appears well-developed and well-nourished. No distress.   HENT:   Head: Normocephalic.   Eyes: Pupils are equal, round, and reactive to light. EOM are normal.   Neck: Normal range of motion. Carotid bruit is not present. No thyromegaly present.   Cardiovascular: Normal rate and regular rhythm.  Exam reveals no gallop and no friction rub.    No murmur heard.  Pulmonary/Chest: He has no wheezes. He has no rales.   Abdominal: Soft. Bowel sounds are normal. He exhibits no distension and no mass. There is no hepatomegaly. There is no tenderness.   Musculoskeletal: Normal range of motion. He exhibits no deformity.   Neurological: He is alert and oriented to person, place, and time. He has normal strength. No cranial nerve deficit or sensory deficit.   Skin: No bruising and no petechiae noted. No cyanosis. Nails show no clubbing.   Psychiatric: He has a normal mood and affect.       Labs/Imaging:  I obtained and reviewed medical records from Dr. Eldridge including the cardiac catheterization films demonstrating 70-80% right coronary artery lesion and 80% LAD.    Assessment/Plan:   This gentleman is a 58-year-old male who has been referred for evaluation and possible coronary artery bypass grafting.  I have obtained and reviewed his cardiac catheterization films.  I concur with about a 70-80% right coronary artery lesion.  He also has approximately 80% LAD lesion.  I have also discussed the case with Dr. Eldridge.  He poses some clinical dilemmas.  He has stage IV renal dysfunction.  He certainly would be very high risk for catheter-based intervention according to Dr. Eldridge and I would concur with that.  I think coronary bypass surgery offers him the best long-term option and result.  I discussed that with him and his family member present.  I have discussed the operation, risks, alternatives including risk to his life, bleeding, infection, stroke, organ failure, and permanent dialysis among other risks.  He appears  to understand all of the above and desires to proceed.         Patient Active Problem List   Diagnosis   • Angina pectoris (CMS/HCC)   • ASCVD (arteriosclerotic cardiovascular disease), severe 2 vessel disease per [facility] 7/2/18     CC: MD Axel Stafford MD Debbie Moore, , editing for Oral Calero M.D.    I, Oral Calero MD, have read and agree with the editing done by Candace Guy, .

## 2018-09-17 NOTE — PROGRESS NOTES
Intensive Care Follow-up     Hospital:  LOS: 0 days   Mr. Axel Desir, 58 y.o. male is followed for:   Coronary artery disease involving native heart with angina pectoris (CMS/HCC)   With postoperative management of diabetes mellitus and acute postoperative respiratory insufficiency     Subjective   Interval History:  This is a 58-year-old former smoker with a history of coronary artery disease, uncontrolled diabetes mellitus, hepatitis C, and stage IV chronic kidney disease who was found to have multivessel coronary artery disease and it was felt that it was too risky for percutaneous intervention secondary to calcified vessels as well as his chronic kidney disease. He underwent coronary artery bypass grafting ×2 today per Dr. Calero. I been in to review his preoperative pulmonary function tests which show normal spirometry. The patient is brought to the intensive care unit for postoperative management. He is currently on an insulin drip as well as dobutamine.    The patient's relevant past medical, surgical and social history were reviewed and updated in Epic as appropriate.        Objective     Infusions:    dexmedetomidine 0.2-1.5 mcg/kg/hr   dextrose 30 mL/hr   DOBUTamine 2-20 mcg/kg/min   DOPamine 2-20 mcg/kg/min   EPINEPHrine 0.02-0.3 mcg/kg/min   insulin regular infusion 1 unit/mL (CCU use) 0-50 Units/hr   niCARdipine 5-15 mg/hr   nitroglycerin 5-200 mcg/min   norepinephrine 0.02-0.3 mcg/kg/min   phenylephrine 0.5-3 mcg/kg/min   sodium chloride 30 mL/hr   vasopressin 0.02-0.1 Units/min     Medications:    albumin human      [START ON 9/18/2018] aspirin 325 mg Oral Daily   aspirin 325 mg Oral Once   atorvastatin 40 mg Oral Nightly   cefuroxime 1.5 g Intravenous Q8H   chlorhexidine 15 mL Mouth/Throat Q12H   [START ON 9/18/2018] metoprolol tartrate 12.5 mg Oral Q12H   metoprolol tartrate 2.5 mg Intravenous Q6H   [START ON 9/18/2018] pharmacy consult - MTM  Does not apply Daily   protamine      protamine     "  protamine 100 mg Intravenous Once   sennosides-docusate sodium 2 tablet Oral BID       Vital Sign Min/Max for last 24 hours  Temp  Min: 99.6 °F (37.6 °C)  Max: 99.6 °F (37.6 °C)   BP  Min: 115/95  Max: 138/77   Pulse  Min: 70  Max: 70   Resp  Min: 18  Max: 18   SpO2  Min: 95 %  Max: 100 %   No Data Recorded       Input/Output for last 24 hour shift  No intake/output data recorded.   FiO2 (%):  [60 %] 60 %  S RR:  [14] 14  PEEP/CPAP (cm H2O):  [5 cm H20] 5 cm H20  SD SUP:  [10 cm H20] 10 cm H20  MAP (cm H2O):  [0] 0  Objective:  General Appearance:  Comfortable, well-appearing and in no acute distress.    Vital signs: (most recent): Blood pressure 115/95, pulse 70, temperature 99.6 °F (37.6 °C), temperature source Temporal Artery , resp. rate 18, height 177.8 cm (70\"), weight 63.5 kg (140 lb), SpO2 100 %.  No fever.    Output: Producing urine.    HEENT: (Endotracheal tube is in place. There is a right internal jugular transducer with pulmonary artery catheter.)    Lungs:  Normal effort and normal respiratory rate.  Breath sounds clear to auscultation.  He is not in respiratory distress.  No stridor.  No rales, decreased breath sounds or rhonchi.    Heart: Normal rate.  Regular rhythm.  S1 normal and S2 normal.  No murmur or friction rub.   Chest: Symmetric chest wall expansion. (Mediastinal tubes in place. Patient status post median sternotomy which is dressed and dry.)  Abdomen: Abdomen is soft and non-distended.  Bowel sounds are normal.     Extremities: There is no deformity or dependent edema.    Neurological: (Currently sedated).    Pupils:  Pupils are equal, round, and reactive to light.  Pupils are equal.   Skin:  Warm and pale.                Results from last 7 days  Lab Units 09/17/18  0925 09/17/18  0855 09/17/18  0835  09/16/18  1426   WBC 10*3/mm3  --   --   --   --  9.22   HEMOGLOBIN g/dL  --   --   --   --  11.3*   HEMOGLOBIN, POC g/dL 8.2* 7.8* 7.1*  < >  --    PLATELETS 10*3/mm3  --   --   --   --  " 218   < > = values in this interval not displayed.    Results from last 7 days  Lab Units 09/17/18  0616 09/16/18  1426   SODIUM mmol/L 134 135   POTASSIUM mmol/L 3.6 4.3   CO2 mmol/L 15.0* 18.0*   BUN mg/dL 58* 74*   CREATININE mg/dL 2.60* 2.73*   MAGNESIUM mg/dL  --  2.2   GLUCOSE mg/dL 146* 99     Estimated Creatinine Clearance: 27.1 mL/min (A) (by C-G formula based on SCr of 2.6 mg/dL (H)).      Results from last 7 days  Lab Units 09/17/18  0925   PH, ARTERIAL pH units 7.34*       Images:   Postoperative chest film shows a well-placed endotracheal tube. Mediastinal tubes are in place. There are no pneumothoraces. There is mild congestion at the biapical lungs. No other infiltrates are noted. [place]-Rosaura catheter is in place.    I reviewed the patient's results and images.     Assessment/Plan   Impression      Principal Problem:    Coronary artery disease s/p CABG x 2  Active Problems:    CKD (chronic kidney disease) stage 4, GFR 15-29 ml/min (CMS/[facility])    Type 2 diabetes mellitus (CMS/[facility])    Hepatitis C       Plan        We will wean the ventilator per protocol.  Nebulizer therapy as needed. The patient has no underlying obstruction.  Continue with insulin drip and we will transition over to subcutaneous insulin when ready.  Avoid all nephrotoxins and we will monitor his renal function closely.  Follow-up labs and orders have been placed for tomorrow.  The patient remains critically ill from postoperative respiratory insufficiency.      discussed the patient's findings and my recommendations with nursing staff     Time spent Critical care 32 min (It does not include procedure time).    David Rosas MD, [facility]  Pulmonary and Critical Care Medicine  09/17/18 10:28 AM

## 2018-09-17 NOTE — ANESTHESIA PROCEDURE NOTES
Airway  Urgency: elective    Date/Time: 9/17/2018 7:04 AM  End Time:9/17/2018 7:04 AM  Airway not difficult    General Information and Staff    Patient location during procedure: OR  Anesthesiologist: HUGO CELIS    Indications and Patient Condition  Indications for airway management: airway protection    Preoxygenated: yes  MILS not maintained throughout  Mask difficulty assessment: 1 - vent by mask    Final Airway Details  Final airway type: endotracheal airway      Successful airway: ETT  Cuffed: yes   Successful intubation technique: direct laryngoscopy  Endotracheal tube insertion site: oral  Blade: Anjum  Blade size: 3  ETT size: 8.0 mm  Cormack-Lehane Classification: grade I - full view of glottis  Placement verified by: chest auscultation and capnometry   Measured from: lips  ETT to lips (cm): 20  Number of attempts at approach: 1    Additional Comments  Negative epigastric sounds, Breath sound equal bilaterally with symmetric chest rise and fall

## 2018-09-17 NOTE — RESEARCH
Procedure: CAB Only   Risk of Mortality: 1.084%   Morbidity or Mortality: 13.374%   Long Length of Stay: 4.122%   Short Length of Stay: 53.79%   Permanent Stroke: 0.639%   Prolonged Ventilation: 7.328%   DSW Infection: 0.256%   Renal Failure: 4.364%   Reoperation: 4.77%

## 2018-09-17 NOTE — ANESTHESIA PROCEDURE NOTES
Central Line    Patient location during procedure: OR  Start time: 9/17/2018 7:10 AM  Stop Time:9/17/2018 7:22 AM  Indications: vascular access  Staff  Anesthesiologist: HUGO CELIS  Preanesthetic Checklist  Completed: patient identified, site marked, surgical consent, pre-op evaluation, timeout performed, IV checked, risks and benefits discussed and monitors and equipment checked  Central Line Prep  Sterile Tech:cap, gloves, gown, mask and sterile barriers  Prep: chloraprep  Patient monitoring: blood pressure monitoring, continuous pulse oximetry and EKG  Central Line Procedure  Location:internal jugular  Catheter Type:Cordis and Hagerman-Rosaura  Catheter Size:9 Fr  Guidance:ultrasound guided  PROCEDURE NOTE/ULTRASOUND INTERPRETATION.  Using ultrasound guidance the potential vascular sites for insertion of the catheter were visualized to determine the patency of the vessel to be used for vascular access.  After selecting the appropriate site for insertion, the needle was visualized under ultrasound being inserted into the internal jugular vein, followed by ultrasound confirmation of wire and catheter placement. There were no abnormalities seen on ultrasound; an image was taken; and the patient tolerated the procedure with no complications.   Assessment  Post procedure:biopatch applied, line sutured, occlusive dressing applied and secured with tape  Assessement:blood return through all ports, free fluid flow and chest x-ray ordered  Complications:no  Patient Tolerance:patient tolerated the procedure well with no apparent complications

## 2018-09-17 NOTE — ANESTHESIA PREPROCEDURE EVALUATION
Anesthesia Evaluation     Patient summary reviewed and Nursing notes reviewed   no history of anesthetic complications:  NPO Solid Status: > 2 hours  NPO Liquid Status: > 8 hours           Airway   Mallampati: II  TM distance: >3 FB  Neck ROM: full  No difficulty expected  Dental    (+) upper dentures and poor dentition    Pulmonary    (+) a smoker Current Abstained day of surgery, COPD moderate, decreased breath sounds,   Cardiovascular   Exercise tolerance: poor (<4 METS)    ECG reviewed  Rhythm: regular  Rate: normal    (+) hypertension well controlled 2 medications or greater, CAD, angina, hyperlipidemia,     ROS comment: 2 vessel 80% L main and 80% RCA  60% EF  TR    Neuro/Psych  (+) psychiatric history Anxiety,     GI/Hepatic/Renal/Endo    (+)   hepatitis C, liver disease, renal disease, diabetes mellitus type 2 well controlled,     ROS Comment: PT WITH KNOWN RENAL INSUFFICIENCY AND FAMILY AWARE OF POSSIBILITY OF NEED FOR DIALYSIS AFTER CABG, HIGH RISK PT    Musculoskeletal     (+) back pain,   Abdominal   (-) obese    Abdomen: soft.   Substance History      OB/GYN          Other   (+) arthritis                   Anesthesia Plan    ASA 4     general     intravenous induction   Anesthetic plan, all risks, benefits, and alternatives have been provided, discussed and informed consent has been obtained with: patient.

## 2018-09-17 NOTE — ANESTHESIA POSTPROCEDURE EVALUATION
Patient: Axel Desir    Procedure Summary     Date:  09/17/18 Room / Location:   [facility] /  [facility]    Anesthesia Start:  0658 Anesthesia Stop:  1004    Procedure:  CORONARY ARTERY BYPASSx 2 WITH INTERNAL MAMMARY WITH EVH of the right greater saphenous vein (N/A Chest) Diagnosis:       Coronary artery disease involving native heart with angina pectoris, unspecified vessel or lesion type (CMS/HCC)      (Coronary artery disease involving native heart with angina pectoris, unspecified vessel or lesion type (CMS/HCC) [I25.119])    Surgeon:  Oral Calero MD Provider:  Kiran Echevarria MD    Anesthesia Type:  general ASA Status:  4          Anesthesia Type: general  Last vitals  BP   138/77 (09/17/18 0634)   Temp   99.6 °F (37.6 °C) (09/17/18 0631)   Pulse   70 (09/17/18 0631)   Resp   18 (09/17/18 0631)     SpO2   95 % (09/17/18 0631)     Post Anesthesia Care and Evaluation    Patient location during evaluation: ICU  Patient participation: complete - patient cannot participate  Level of consciousness: obtunded/minimal responses  Pain score: 0  Pain management: adequate  Airway patency: patent  Anesthetic complications: No anesthetic complications  PONV Status: none  Cardiovascular status: acceptable  Respiratory status: acceptable  Hydration status: acceptable

## 2018-09-18 ENCOUNTER — APPOINTMENT (OUTPATIENT)
Dept: GENERAL RADIOLOGY | Facility: HOSPITAL | Age: 58
End: 2018-09-18

## 2018-09-18 LAB
ABO + RH BLD: NORMAL
ALBUMIN SERPL-MCNC: 2.91 G/DL (ref 3.2–4.8)
ANION GAP SERPL CALCULATED.3IONS-SCNC: 11 MMOL/L (ref 3–11)
BASOPHILS # BLD AUTO: 0.01 10*3/MM3 (ref 0–0.2)
BASOPHILS NFR BLD AUTO: 0.1 % (ref 0–1)
BH BB BLOOD EXPIRATION DATE: NORMAL
BH BB BLOOD TYPE BARCODE: 6200
BH BB DISPENSE STATUS: NORMAL
BH BB PRODUCT CODE: NORMAL
BH BB UNIT NUMBER: NORMAL
BUN BLD-MCNC: 46 MG/DL (ref 9–23)
BUN/CREAT SERPL: 21.1 (ref 7–25)
CALCIUM SPEC-SCNC: 7.9 MG/DL (ref 8.7–10.4)
CHLORIDE SERPL-SCNC: 109 MMOL/L (ref 99–109)
CO2 SERPL-SCNC: 19 MMOL/L (ref 20–31)
CREAT BLD-MCNC: 2.18 MG/DL (ref 0.6–1.3)
DEPRECATED RDW RBC AUTO: 52.3 FL (ref 37–54)
EOSINOPHIL # BLD AUTO: 0.01 10*3/MM3 (ref 0–0.3)
EOSINOPHIL NFR BLD AUTO: 0.1 % (ref 0–3)
ERYTHROCYTE [DISTWIDTH] IN BLOOD BY AUTOMATED COUNT: 16 % (ref 11.3–14.5)
GFR SERPL CREATININE-BSD FRML MDRD: 31 ML/MIN/1.73
GLUCOSE BLD-MCNC: 106 MG/DL (ref 70–100)
GLUCOSE BLDC GLUCOMTR-MCNC: 104 MG/DL (ref 70–130)
GLUCOSE BLDC GLUCOMTR-MCNC: 105 MG/DL (ref 70–130)
GLUCOSE BLDC GLUCOMTR-MCNC: 106 MG/DL (ref 70–130)
GLUCOSE BLDC GLUCOMTR-MCNC: 107 MG/DL (ref 70–130)
GLUCOSE BLDC GLUCOMTR-MCNC: 109 MG/DL (ref 70–130)
GLUCOSE BLDC GLUCOMTR-MCNC: 110 MG/DL (ref 70–130)
GLUCOSE BLDC GLUCOMTR-MCNC: 112 MG/DL (ref 70–130)
GLUCOSE BLDC GLUCOMTR-MCNC: 116 MG/DL (ref 70–130)
GLUCOSE BLDC GLUCOMTR-MCNC: 116 MG/DL (ref 70–130)
GLUCOSE BLDC GLUCOMTR-MCNC: 130 MG/DL (ref 70–130)
GLUCOSE BLDC GLUCOMTR-MCNC: 145 MG/DL (ref 70–130)
GLUCOSE BLDC GLUCOMTR-MCNC: 156 MG/DL (ref 70–130)
GLUCOSE BLDC GLUCOMTR-MCNC: 216 MG/DL (ref 70–130)
GLUCOSE BLDC GLUCOMTR-MCNC: 231 MG/DL (ref 70–130)
HCT VFR BLD AUTO: 23.2 % (ref 38.9–50.9)
HGB BLD-MCNC: 7.7 G/DL (ref 13.1–17.5)
IMM GRANULOCYTES # BLD: 0.02 10*3/MM3 (ref 0–0.03)
IMM GRANULOCYTES NFR BLD: 0.3 % (ref 0–0.6)
INR PPP: 1.13 (ref 0.91–1.09)
LYMPHOCYTES # BLD AUTO: 0.99 10*3/MM3 (ref 0.6–4.8)
LYMPHOCYTES NFR BLD AUTO: 12.5 % (ref 24–44)
MAGNESIUM SERPL-MCNC: 2 MG/DL (ref 1.3–2.7)
MCH RBC QN AUTO: 30 PG (ref 27–31)
MCHC RBC AUTO-ENTMCNC: 33.2 G/DL (ref 32–36)
MCV RBC AUTO: 90.3 FL (ref 80–99)
MONOCYTES # BLD AUTO: 0.65 10*3/MM3 (ref 0–1)
MONOCYTES NFR BLD AUTO: 8.2 % (ref 0–12)
NEUTROPHILS # BLD AUTO: 6.26 10*3/MM3 (ref 1.5–8.3)
NEUTROPHILS NFR BLD AUTO: 79.1 % (ref 41–71)
PHOSPHATE SERPL-MCNC: 5 MG/DL (ref 2.4–5.1)
PLATELET # BLD AUTO: 154 10*3/MM3 (ref 150–450)
PMV BLD AUTO: 10.6 FL (ref 6–12)
POTASSIUM BLD-SCNC: 4 MMOL/L (ref 3.5–5.5)
PROTHROMBIN TIME: 11.9 SECONDS (ref 9.6–11.5)
RBC # BLD AUTO: 2.57 10*6/MM3 (ref 4.2–5.76)
SODIUM BLD-SCNC: 139 MMOL/L (ref 132–146)
UNIT  ABO: NORMAL
UNIT  RH: NORMAL
WBC NRBC COR # BLD: 7.92 10*3/MM3 (ref 3.5–10.8)

## 2018-09-18 PROCEDURE — 93010 ELECTROCARDIOGRAM REPORT: CPT | Performed by: INTERNAL MEDICINE

## 2018-09-18 PROCEDURE — 80069 RENAL FUNCTION PANEL: CPT | Performed by: PHYSICIAN ASSISTANT

## 2018-09-18 PROCEDURE — 94799 UNLISTED PULMONARY SVC/PX: CPT

## 2018-09-18 PROCEDURE — 97163 PT EVAL HIGH COMPLEX 45 MIN: CPT

## 2018-09-18 PROCEDURE — 93005 ELECTROCARDIOGRAM TRACING: CPT | Performed by: INTERNAL MEDICINE

## 2018-09-18 PROCEDURE — 25010000002 MORPHINE SULFATE (PF) 2 MG/ML SOLUTION: Performed by: THORACIC SURGERY (CARDIOTHORACIC VASCULAR SURGERY)

## 2018-09-18 PROCEDURE — 25010000002 HYDROMORPHONE PER 4 MG: Performed by: NURSE PRACTITIONER

## 2018-09-18 PROCEDURE — 25010000002 AMIODARONE IN DEXTROSE 5% 150-4.21 MG/100ML-% SOLUTION

## 2018-09-18 PROCEDURE — 85025 COMPLETE CBC W/AUTO DIFF WBC: CPT | Performed by: PHYSICIAN ASSISTANT

## 2018-09-18 PROCEDURE — 63710000001 INSULIN LISPRO (HUMAN) PER 5 UNITS: Performed by: THORACIC SURGERY (CARDIOTHORACIC VASCULAR SURGERY)

## 2018-09-18 PROCEDURE — 25010000002 CEFUROXIME: Performed by: PHYSICIAN ASSISTANT

## 2018-09-18 PROCEDURE — 82962 GLUCOSE BLOOD TEST: CPT

## 2018-09-18 PROCEDURE — 93005 ELECTROCARDIOGRAM TRACING: CPT | Performed by: PHYSICIAN ASSISTANT

## 2018-09-18 PROCEDURE — 71045 X-RAY EXAM CHEST 1 VIEW: CPT

## 2018-09-18 PROCEDURE — 83735 ASSAY OF MAGNESIUM: CPT | Performed by: PHYSICIAN ASSISTANT

## 2018-09-18 PROCEDURE — 94640 AIRWAY INHALATION TREATMENT: CPT

## 2018-09-18 PROCEDURE — 25010000002 AMIODARONE IN DEXTROSE 5% 360-4.14 MG/200ML-% SOLUTION

## 2018-09-18 PROCEDURE — 99233 SBSQ HOSP IP/OBS HIGH 50: CPT | Performed by: INTERNAL MEDICINE

## 2018-09-18 PROCEDURE — 99232 SBSQ HOSP IP/OBS MODERATE 35: CPT | Performed by: INTERNAL MEDICINE

## 2018-09-18 PROCEDURE — 85610 PROTHROMBIN TIME: CPT | Performed by: PHYSICIAN ASSISTANT

## 2018-09-18 RX ORDER — ATORVASTATIN CALCIUM 40 MG/1
80 TABLET, FILM COATED ORAL NIGHTLY
Status: DISCONTINUED | OUTPATIENT
Start: 2018-09-18 | End: 2018-09-23 | Stop reason: HOSPADM

## 2018-09-18 RX ORDER — AMIODARONE HYDROCHLORIDE 200 MG/1
200 TABLET ORAL EVERY 8 HOURS SCHEDULED
Status: DISCONTINUED | OUTPATIENT
Start: 2018-09-19 | End: 2018-09-19

## 2018-09-18 RX ORDER — AMIODARONE HYDROCHLORIDE 200 MG/1
200 TABLET ORAL ONCE
Status: DISCONTINUED | OUTPATIENT
Start: 2018-09-19 | End: 2018-09-19

## 2018-09-18 RX ORDER — CLONIDINE HYDROCHLORIDE 0.1 MG/1
0.1 TABLET ORAL EVERY 12 HOURS SCHEDULED
Status: DISCONTINUED | OUTPATIENT
Start: 2018-09-18 | End: 2018-09-18

## 2018-09-18 RX ORDER — SERTRALINE HYDROCHLORIDE 100 MG/1
100 TABLET, FILM COATED ORAL DAILY
Status: DISCONTINUED | OUTPATIENT
Start: 2018-09-18 | End: 2018-09-23 | Stop reason: HOSPADM

## 2018-09-18 RX ORDER — NIFEDIPINE 60 MG/1
60 TABLET, EXTENDED RELEASE ORAL
Status: DISCONTINUED | OUTPATIENT
Start: 2018-09-18 | End: 2018-09-21

## 2018-09-18 RX ORDER — CLONIDINE HYDROCHLORIDE 0.1 MG/1
0.1 TABLET ORAL EVERY 8 HOURS SCHEDULED
Status: DISCONTINUED | OUTPATIENT
Start: 2018-09-18 | End: 2018-09-22

## 2018-09-18 RX ORDER — OXYCODONE AND ACETAMINOPHEN 10; 325 MG/1; MG/1
1 TABLET ORAL EVERY 4 HOURS PRN
Status: DISCONTINUED | OUTPATIENT
Start: 2018-09-18 | End: 2018-09-22

## 2018-09-18 RX ORDER — IPRATROPIUM BROMIDE AND ALBUTEROL SULFATE 2.5; .5 MG/3ML; MG/3ML
3 SOLUTION RESPIRATORY (INHALATION)
Status: DISCONTINUED | OUTPATIENT
Start: 2018-09-18 | End: 2018-09-23 | Stop reason: HOSPADM

## 2018-09-18 RX ORDER — MORPHINE SULFATE 2 MG/ML
2 INJECTION, SOLUTION INTRAMUSCULAR; INTRAVENOUS
Status: DISCONTINUED | OUTPATIENT
Start: 2018-09-18 | End: 2018-09-22

## 2018-09-18 RX ORDER — AMIODARONE HYDROCHLORIDE 200 MG/1
200 TABLET ORAL EVERY 12 HOURS SCHEDULED
Status: DISCONTINUED | OUTPATIENT
Start: 2018-09-26 | End: 2018-09-19

## 2018-09-18 RX ORDER — DILTIAZEM HYDROCHLORIDE 60 MG/1
60 TABLET, FILM COATED ORAL EVERY 12 HOURS SCHEDULED
Status: DISCONTINUED | OUTPATIENT
Start: 2018-09-18 | End: 2018-09-19

## 2018-09-18 RX ORDER — AMIODARONE HYDROCHLORIDE 200 MG/1
200 TABLET ORAL DAILY
Status: DISCONTINUED | OUTPATIENT
Start: 2018-10-10 | End: 2018-09-19

## 2018-09-18 RX ORDER — GABAPENTIN 100 MG/1
100 CAPSULE ORAL EVERY 8 HOURS SCHEDULED
Status: DISCONTINUED | OUTPATIENT
Start: 2018-09-18 | End: 2018-09-23 | Stop reason: HOSPADM

## 2018-09-18 RX ORDER — HYDRALAZINE HYDROCHLORIDE 50 MG/1
50 TABLET, FILM COATED ORAL EVERY 8 HOURS SCHEDULED
Status: DISCONTINUED | OUTPATIENT
Start: 2018-09-18 | End: 2018-09-20

## 2018-09-18 RX ADMIN — INSULIN LISPRO 3 UNITS: 100 INJECTION, SOLUTION INTRAVENOUS; SUBCUTANEOUS at 16:49

## 2018-09-18 RX ADMIN — NITROGLYCERIN 45 MCG/MIN: 20 INJECTION INTRAVENOUS at 16:21

## 2018-09-18 RX ADMIN — NIFEDIPINE 60 MG: 60 TABLET, FILM COATED, EXTENDED RELEASE ORAL at 10:27

## 2018-09-18 RX ADMIN — AMIODARONE HYDROCHLORIDE 150 MG: 1.5 INJECTION, SOLUTION INTRAVENOUS at 10:31

## 2018-09-18 RX ADMIN — DOCUSATE SODIUM,SENNOSIDES 2 TABLET: 50; 8.6 TABLET, FILM COATED ORAL at 08:49

## 2018-09-18 RX ADMIN — NICARDIPINE HYDROCHLORIDE 5 MG/HR: 0.2 INJECTION, SOLUTION INTRAVENOUS at 08:45

## 2018-09-18 RX ADMIN — HYDROCODONE BITARTRATE AND ACETAMINOPHEN 1 TABLET: 7.5; 325 TABLET ORAL at 16:12

## 2018-09-18 RX ADMIN — SERTRALINE HYDROCHLORIDE 100 MG: 100 TABLET ORAL at 10:14

## 2018-09-18 RX ADMIN — GABAPENTIN 100 MG: 100 CAPSULE ORAL at 13:54

## 2018-09-18 RX ADMIN — CEFUROXIME 1.5 G: 1.5 INJECTION, POWDER, FOR SOLUTION INTRAVENOUS at 10:12

## 2018-09-18 RX ADMIN — OXYCODONE HYDROCHLORIDE AND ACETAMINOPHEN 1 TABLET: 10; 325 TABLET ORAL at 10:26

## 2018-09-18 RX ADMIN — CEFUROXIME 1.5 G: 1.5 INJECTION, POWDER, FOR SOLUTION INTRAVENOUS at 18:34

## 2018-09-18 RX ADMIN — MORPHINE SULFATE 2 MG: 2 INJECTION, SOLUTION INTRAMUSCULAR; INTRAVENOUS at 05:42

## 2018-09-18 RX ADMIN — MORPHINE SULFATE 2 MG: 2 INJECTION, SOLUTION INTRAMUSCULAR; INTRAVENOUS at 13:53

## 2018-09-18 RX ADMIN — HYDROCODONE BITARTRATE AND ACETAMINOPHEN 1 TABLET: 7.5; 325 TABLET ORAL at 11:38

## 2018-09-18 RX ADMIN — MORPHINE SULFATE 2 MG: 2 INJECTION, SOLUTION INTRAMUSCULAR; INTRAVENOUS at 11:39

## 2018-09-18 RX ADMIN — ASPIRIN 325 MG: 325 TABLET, DELAYED RELEASE ORAL at 08:49

## 2018-09-18 RX ADMIN — IPRATROPIUM BROMIDE AND ALBUTEROL SULFATE 3 ML: 2.5; .5 SOLUTION RESPIRATORY (INHALATION) at 19:37

## 2018-09-18 RX ADMIN — OXYCODONE HYDROCHLORIDE AND ACETAMINOPHEN 2 TABLET: 5; 325 TABLET ORAL at 05:39

## 2018-09-18 RX ADMIN — CLONIDINE HYDROCHLORIDE 0.1 MG: 0.1 TABLET ORAL at 13:54

## 2018-09-18 RX ADMIN — DILTIAZEM HYDROCHLORIDE 60 MG: 60 TABLET, FILM COATED ORAL at 17:24

## 2018-09-18 RX ADMIN — METOPROLOL TARTRATE 12.5 MG: 25 TABLET, FILM COATED ORAL at 10:13

## 2018-09-18 RX ADMIN — CEFUROXIME 1.5 G: 1.5 INJECTION, POWDER, FOR SOLUTION INTRAVENOUS at 02:09

## 2018-09-18 RX ADMIN — MORPHINE SULFATE 2 MG: 2 INJECTION, SOLUTION INTRAMUSCULAR; INTRAVENOUS at 10:25

## 2018-09-18 RX ADMIN — OXYCODONE HYDROCHLORIDE AND ACETAMINOPHEN 1 TABLET: 10; 325 TABLET ORAL at 21:36

## 2018-09-18 RX ADMIN — NITROGLYCERIN 200 MCG/MIN: 20 INJECTION INTRAVENOUS at 19:53

## 2018-09-18 RX ADMIN — IPRATROPIUM BROMIDE AND ALBUTEROL SULFATE 3 ML: 2.5; .5 SOLUTION RESPIRATORY (INHALATION) at 12:47

## 2018-09-18 RX ADMIN — CLONIDINE HYDROCHLORIDE 0.1 MG: 0.1 TABLET ORAL at 21:36

## 2018-09-18 RX ADMIN — AMIODARONE HYDROCHLORIDE 1 MG/MIN: 1.8 INJECTION, SOLUTION INTRAVENOUS at 10:32

## 2018-09-18 RX ADMIN — ATORVASTATIN CALCIUM 80 MG: 40 TABLET, FILM COATED ORAL at 21:36

## 2018-09-18 RX ADMIN — HYDROCODONE BITARTRATE AND ACETAMINOPHEN 1 TABLET: 7.5; 325 TABLET ORAL at 08:48

## 2018-09-18 RX ADMIN — METOPROLOL TARTRATE 12.5 MG: 25 TABLET, FILM COATED ORAL at 12:58

## 2018-09-18 RX ADMIN — DOCUSATE SODIUM,SENNOSIDES 2 TABLET: 50; 8.6 TABLET, FILM COATED ORAL at 21:37

## 2018-09-18 RX ADMIN — OXYCODONE HYDROCHLORIDE AND ACETAMINOPHEN 1 TABLET: 10; 325 TABLET ORAL at 15:06

## 2018-09-18 RX ADMIN — DEXMEDETOMIDINE HYDROCHLORIDE 0.8 MCG/KG/HR: 4 INJECTION, SOLUTION INTRAVENOUS at 02:09

## 2018-09-18 RX ADMIN — GABAPENTIN 100 MG: 100 CAPSULE ORAL at 21:36

## 2018-09-18 RX ADMIN — CHLORHEXIDINE GLUCONATE 15 ML: 1.2 RINSE ORAL at 08:49

## 2018-09-18 RX ADMIN — METOPROLOL TARTRATE 25 MG: 25 TABLET ORAL at 21:37

## 2018-09-18 RX ADMIN — HYDRALAZINE HYDROCHLORIDE 50 MG: 50 TABLET, FILM COATED ORAL at 13:54

## 2018-09-18 RX ADMIN — MORPHINE SULFATE 2 MG: 2 INJECTION, SOLUTION INTRAMUSCULAR; INTRAVENOUS at 16:11

## 2018-09-18 RX ADMIN — HYDROMORPHONE HYDROCHLORIDE 1 MG: 1 INJECTION, SOLUTION INTRAMUSCULAR; INTRAVENOUS; SUBCUTANEOUS at 18:35

## 2018-09-18 RX ADMIN — HYDRALAZINE HYDROCHLORIDE 50 MG: 50 TABLET, FILM COATED ORAL at 21:36

## 2018-09-18 RX ADMIN — INSULIN LISPRO 3 UNITS: 100 INJECTION, SOLUTION INTRAVENOUS; SUBCUTANEOUS at 21:40
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
## 2018-09-18 NOTE — NURSING NOTE
Argenis Tineo with cardiology paged regarding pt hypertension despite medication intervention. Cardizem ordered, see orders.

## 2018-09-18 NOTE — PROGRESS NOTES
"   LOS: 1 day    Patient Care Team:  Sammy Glass MD as PCP - General  aSmmy Glass MD as PCP - Family Medicine    Chief Complaint:  Shortness of breath  58-year-old  male history of chronic kidney disease likely diabetic nephropathy, nephrosclerosis.  He was last seen on 8/28/2018 [facility]    His previous labs weight from 2.05-2.61.  Has CK D stage IV.  Patient had a heart catheter done in July 2018 with 2 vessel disease, patient was admitted yesterday underwent a CABG today on 9/17/2018 with LIMA to LAD and SVG to PDA.  Subjective    Overall improved.  Renal function stable.   No new events    Review of Systems:   Denies nausea vomiting chest pain shortness of breath dysuria hematuria.    Objective     Vital Sign Min/Max for last 24 hours  Temp  Min: 96.8 °F (36 °C)  Max: 98.7 °F (37.1 °C)   BP  Min: 108/69  Max: 150/110   Pulse  Min: 58  Max: 103   Resp  Min: 14  Max: 22   SpO2  Min: 93 %  Max: 100 %   No Data Recorded   Weight  Min: 63.5 kg (139 lb 15.9 oz)  Max: 63.5 kg (139 lb 15.9 oz)     Flowsheet Rows      First Filed Value   Admission Height  177.8 cm (70\") Documented at 09/17/2018 0940   Admission Weight  63.5 kg (140 lb) Documented at 09/17/2018 0940          I/O this shift:  In: 438 [P.O.:125; I.V.:313]  Out: 1400 [Urine:1300; Chest Tube:100]  I/O last 3 completed shifts:  In: 3636.3 [P.O.:240; I.V.:1915.3; Blood:881; IV Piggyback:600]  Out: 3225 [Urine:2565; Chest Tube:660]    Physical Exam:   General appearance:  male comfortable in bed alert oriented ×3.  Eyes: Pupils reactive EOMI.  Neck: Supple no JVD.  Lungs: Few rhonchi's are heard equal chest movement.  Chest.  Chest tube in place.  Heart: No gallop or rub.  Abdomen: Soft nontender positive bowel sounds.  : Ramírez catheter in place.  Urine clear.  Extremities.  No edema cyanosis.  Neuro: Grossly intact.    Results from last 7 days  Lab Units 09/18/18  0413 09/17/18  9052 09/17/18  8093 " 09/17/18  1029 09/17/18  0925 09/17/18  0855 09/17/18  0835  09/17/18  0616 09/16/18  1426   WBC 10*3/mm3 7.92  --  10.10 17.93*  --   --   --   --   --  9.22   HEMOGLOBIN g/dL 7.7* 8.2* 7.9* 9.1*  --   --   --   --   --  11.3*   HEMOGLOBIN, POC g/dL  --   --   --   --  8.2* 7.8* 7.1*  < >  --   --    HEMATOCRIT % 23.2* 24.9* 23.1* 27.3*  --   --   --   --   --  33.9*   HEMATOCRIT POC %  --   --   --   --  24* 23* 21*  < >  --   --    PLATELETS 10*3/mm3 154  --  164 184  --   --   --   --   --  218   SODIUM mmol/L 139 140 139 141  --   --   --   --  134 135   POTASSIUM mmol/L 4.0 4.0 3.3* 3.8  --   --   --   --  3.6 4.3   CHLORIDE mmol/L 109 114* 110* 108  --   --   --   --  108 105   CO2 mmol/L 19.0* 21.0 21.0 20.0  --   --   --   --  15.0* 18.0*   BUN mg/dL 46* 50* 56* 53*  --   --   --   --  58* 74*   CREATININE mg/dL 2.18* 2.20* 2.29* 2.30*  --   --   --   --  2.60* 2.73*   GLUCOSE mg/dL 106* 128* 136* 150*  --   --   --   --  146* 99   CALCIUM mg/dL 7.9* 7.9* 8.0* 8.7  --   --   --   --  8.3* 8.1*   PHOSPHORUS mg/dL 5.0 4.1 3.8 4.2  --   --   --   --   --   --    < > = values in this interval not displayed.         Results Review:     I reviewed the patient's new clinical results.      [START ON 9/19/2018] amiodarone 200 mg Oral Once   Followed by      [START ON 9/19/2018] amiodarone 200 mg Oral Q8H   Followed by      [START ON 9/26/2018] amiodarone 200 mg Oral Q12H   Followed by      [START ON 10/10/2018] amiodarone 200 mg Oral Daily   aspirin 325 mg Oral Daily   atorvastatin 80 mg Oral Nightly   cefuroxime 1.5 g Intravenous Q8H   CloNIDine 0.1 mg Oral Q8H   gabapentin 100 mg Oral Q8H   hydrALAZINE 50 mg Oral Q8H   insulin lispro 0-7 Units Subcutaneous 4x Daily With Meals & Nightly   ipratropium-albuterol 3 mL Nebulization Q6H - RT   metoprolol tartrate 25 mg Oral Q12H   NIFEdipine XL 60 mg Oral Q24H   pharmacy consult - MTM  Does not apply Daily   sennosides-docusate sodium 2 tablet Oral BID   sertraline 100  mg Oral Daily       amiodarone 1 mg/min Last Rate: 1 mg/min (09/18/18 1032)   Followed by     amiodarone 0.5 mg/min    dexmedetomidine 0.2-1.5 mcg/kg/hr Last Rate: 0.4 mcg/kg/hr (09/18/18 0744)   niCARdipine 5-15 mg/hr Last Rate: 5 mg/hr (09/18/18 0845)   nitroglycerin 5-200 mcg/min Last Rate: 100 mcg/min (09/18/18 0605)   norepinephrine 0.02-0.3 mcg/kg/min    phenylephrine 0.5-3 mcg/kg/min        Medication Review: As above    Assessment/Plan      1.  Acute on chronic renal failure is.  Status post CABG, Ramírez catheter in place still making good urine.  2.  Status post CABG ×2 today.  Chest tube in place.   3.  Diabetes type 2.  4.  Anemia of chronic disease.  5.  Secondary hyperparathyroidism secondary to renal insufficiency  6.  Hepatitis C    Plan: Continue the current management.  Avoid nephrotoxic medications.  Keep systolic blood pressure greater than 100.  Check volume status.  Check labs in the morning.  Kartik Encinas MD  09/18/18  2:32 PM

## 2018-09-18 NOTE — NURSING NOTE
Bay Armstrong APRN in unit and updated on pt HTN despite medication interventions and uncontrolled pain. Orders received. See orders.

## 2018-09-18 NOTE — PLAN OF CARE
Problem: Patient Care Overview  Goal: Plan of Care Review  Outcome: Ongoing (interventions implemented as appropriate)   09/17/18 2000 09/18/18 0622   Coping/Psychosocial   Plan of Care Reviewed With patient;family --    Plan of Care Review   Progress --  improving   OTHER   Outcome Summary --  Pt stable overnight on insulin, nitro, and Precedex gtts. Moderate CT output, H/H dropped to 7.7/23.2 this AM. Significant UOP. Pain managed with PO meds overnight, but pt complains of more significant pain this morning. Up in chair this AM.     Goal: Individualization and Mutuality  Outcome: Ongoing (interventions implemented as appropriate)   09/18/18 0622   Individualization   Patient Specific Goals (Include Timeframe) Would like to walk today       Problem: Cardiac Surgery (Adult)  Goal: Signs and Symptoms of Listed Potential Problems Will be Absent, Minimized or Managed (Cardiac Surgery)  Outcome: Ongoing (interventions implemented as appropriate)   09/18/18 0622   Goal/Outcome Evaluation   Problems Assessed (Cardiac Surgery) all   Problems Present (Cardiac Surgery) hemodynamic instability;pain;situational response     Goal: Anesthesia/Sedation Recovery  Outcome: Outcome(s) achieved Date Met: 09/18/18 09/18/18 0622   Goal/Outcome Evaluation   Anesthesia/Sedation Recovery recovered to baseline       Problem: Fall Risk (Adult)  Goal: Identify Related Risk Factors and Signs and Symptoms  Outcome: Outcome(s) achieved Date Met: 09/18/18 09/18/18 0622   Fall Risk (Adult)   Related Risk Factors (Fall Risk) depression/anxiety;fatigue/slow reaction;fear of falling;history of falls;polypharmacy;sleep pattern alteration;environment unfamiliar   Signs and Symptoms (Fall Risk) presence of risk factors     Goal: Absence of Fall  Outcome: Ongoing (interventions implemented as appropriate)   09/18/18 0622   Fall Risk (Adult)   Absence of Fall making progress toward outcome       Problem: Skin Injury Risk (Adult)  Goal: Identify  Related Risk Factors and Signs and Symptoms  Outcome: Ongoing (interventions implemented as appropriate)   09/18/18 0622   Skin Injury Risk (Adult)   Related Risk Factors (Skin Injury Risk) critical care admission;medical devices;medication;mobility impaired;nutritional deficiencies     Goal: Skin Health and Integrity  Outcome: Ongoing (interventions implemented as appropriate)   09/18/18 0622   Skin Injury Risk (Adult)   Skin Health and Integrity making progress toward outcome
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
## 2018-09-18 NOTE — PROGRESS NOTES
Intensive Care Follow-up     Hospital:  LOS: 1 day   Mr. Axel Desir, 58 y.o. male is followed for:   Coronary artery disease involving native heart with angina pectoris (CMS/HCC)   With postoperative management of acute postoperative respiratory insufficiency     Subjective   Interval History:  The patient was extubated without difficulty. He is having some wheezing but he feels like he is breathing a bit better now. He is receiving minimal IV insulin.    The patient's relevant past medical, surgical and social history were reviewed and updated in Epic as appropriate.        Objective     Infusions:    amiodarone 1 mg/min Last Rate: 1 mg/min (09/18/18 1032)   Followed by     amiodarone 0.5 mg/min    dexmedetomidine 0.2-1.5 mcg/kg/hr Last Rate: 0.4 mcg/kg/hr (09/18/18 0744)   niCARdipine 5-15 mg/hr Last Rate: 5 mg/hr (09/18/18 0845)   nitroglycerin 5-200 mcg/min Last Rate: 100 mcg/min (09/18/18 0605)   norepinephrine 0.02-0.3 mcg/kg/min    phenylephrine 0.5-3 mcg/kg/min      Medications:    [START ON 9/19/2018] amiodarone 200 mg Oral Once   Followed by      [START ON 9/19/2018] amiodarone 200 mg Oral Q8H   Followed by      [START ON 9/26/2018] amiodarone 200 mg Oral Q12H   Followed by      [START ON 10/10/2018] amiodarone 200 mg Oral Daily   aspirin 325 mg Oral Daily   atorvastatin 80 mg Oral Nightly   cefuroxime 1.5 g Intravenous Q8H   gabapentin 100 mg Oral Q8H   hydrALAZINE 50 mg Oral Q8H   insulin lispro 0-7 Units Subcutaneous 4x Daily With Meals & Nightly   ipratropium-albuterol 3 mL Nebulization Q6H - RT   metoprolol tartrate 12.5 mg Oral Q12H   NIFEdipine XL 60 mg Oral Q24H   pharmacy consult - MTM  Does not apply Daily   sennosides-docusate sodium 2 tablet Oral BID   sertraline 100 mg Oral Daily       Vital Sign Min/Max for last 24 hours  Temp  Min: 94.6 °F (34.8 °C)  Max: 98.5 °F (36.9 °C)   BP  Min: 107/74  Max: 150/90   Pulse  Min: 58  Max: 75   Resp  Min: 14  Max: 22   SpO2  Min: 94 %  Max: 100 %  "  Flow (L/min)  Min: 2  Max: 4       Input/Output for last 24 hour shift  09/17 0701 - 09/18 0700  In: 3636.3 [P.O.:240; I.V.:1915.3]  Out: 3225 [Urine:2565]   FiO2 (%):  [40 %] 40 %  S RR:  [6-16] 6  PEEP/CPAP (cm H2O):  [5 cm H20-10 cm H20] 5 cm H20  CO SUP:  [10 cm H20] 10 cm H20  MAP (cm H2O):  [8.3-16] 8.3  Objective:  General Appearance:  Comfortable, well-appearing and in no acute distress.    Vital signs: (most recent): Blood pressure 131/93, pulse 64, temperature 98.3 °F (36.8 °C), temperature source Core, resp. rate 22, height 177.8 cm (70\"), weight 63.5 kg (139 lb 15.9 oz), SpO2 94 %.  No fever.    Output: Producing urine.    HEENT: (There is a right internal jugular transducer with pulmonary artery catheter.)    Lungs:  Normal effort and normal respiratory rate.  Breath sounds clear to auscultation.  He is not in respiratory distress.  No stridor.  No rales, decreased breath sounds or rhonchi.    Heart: Normal rate.  Regular rhythm.  S1 normal and S2 normal.  No murmur or friction rub.   Chest: Symmetric chest wall expansion. (Mediastinal tubes in place. Patient status post median sternotomy which is dressed and dry.)  Abdomen: Abdomen is soft and non-distended.  Bowel sounds are normal.   There is no abdominal tenderness.     Extremities: Normal range of motion.  There is no deformity or dependent edema.    Neurological: Patient is alert and oriented to person, place and time.    Pupils:  Pupils are equal, round, and reactive to light.  Pupils are equal.   Skin:  Warm and pale.                Results from last 7 days  Lab Units 09/18/18  0413 09/17/18  2112 09/17/18  1355 09/17/18  1029   WBC 10*3/mm3 7.92  --  10.10 17.93*   HEMOGLOBIN g/dL 7.7* 8.2* 7.9* 9.1*   PLATELETS 10*3/mm3 154  --  164 184       Results from last 7 days  Lab Units 09/18/18  0413 09/17/18  2112 09/17/18  1355 09/17/18  1029   SODIUM mmol/L 139 140 139 141   POTASSIUM mmol/L 4.0 4.0 3.3* 3.8   CO2 mmol/L 19.0* 21.0 21.0 20.0 "   BUN mg/dL 46* 50* 56* 53*   CREATININE mg/dL 2.18* 2.20* 2.29* 2.30*   MAGNESIUM mg/dL 2.0  --  2.3 2.6   PHOSPHORUS mg/dL 5.0 4.1 3.8 4.2   GLUCOSE mg/dL 106* 128* 136* 150*     Estimated Creatinine Clearance: 32.4 mL/min (A) (by C-G formula based on SCr of 2.18 mg/dL (H)).      Results from last 7 days  Lab Units 09/17/18  1500   PH, ARTERIAL pH units 7.391   PCO2, ARTERIAL mm Hg 34.0*   PO2 ART mm Hg 142.0*       I reviewed the patient's results and images.     Assessment/Plan   Impression      Principal Problem:    Coronary artery disease s/p CABG x 2  Active Problems:    CKD (chronic kidney disease) stage 4, GFR 15-29 ml/min (CMS/[facility])    Type 2 diabetes mellitus (CMS/[facility])    Hepatitis C       Plan        We will restart the patient's Procardia and hydralazine at lower doses. I will also restart a lower dose of his Neurontin.  Transition to subcutaneous coverage insulin at noon.  Follow-up labs and orders are placed for tomorrow morning.    Plan of care and goals reviewed with mulitdisciplinary team at daily rounds.   I discussed the patient's findings and my recommendations with patient, family and nursing staff         David Rosas MD, [facility]  Pulmonary and Critical Care Medicine  09/18/18 10:37 AM

## 2018-09-18 NOTE — PROGRESS NOTES
"Axel Desir  [id number]  1960     LOS: 1 day   Patient Care Team:  Sammy Glass MD as PCP - General  Sammy Glass MD as PCP - Family Medicine    Chief Complaint: Coronary artery disease      Subjective: No complaints    Objective:     Vital Sign Min/Max for last 24 hours  Temp  Min: 94.1 °F (34.5 °C)  Max: 98.5 °F (36.9 °C)   BP  Min: 105/56  Max: 150/90   Pulse  Min: 58  Max: 75   Resp  Min: 14  Max: 22   SpO2  Min: 94 %  Max: 100 %   No Data Recorded   Weight  Min: 63.5 kg (139 lb 15.9 oz)  Max: 63.5 kg (140 lb)     Flowsheet Rows      First Filed Value   Admission Height  177.8 cm (70\") Documented at 09/17/2018 0940   Admission Weight  63.5 kg (140 lb) Documented at 09/17/2018 0940          Physical Exam:    Wound: Satisfactory    Pulses:     Mediastinal and Chest Tube Drainage:       Results Review:     Results from last 7 days  Lab Units 09/18/18  0413   WBC 10*3/mm3 7.92   HEMOGLOBIN g/dL 7.7*   HEMATOCRIT % 23.2*   PLATELETS 10*3/mm3 154       Results from last 7 days  Lab Units 09/18/18  0413   SODIUM mmol/L 139   POTASSIUM mmol/L 4.0   CHLORIDE mmol/L 109   CO2 mmol/L 19.0*   BUN mg/dL 46*   CREATININE mg/dL 2.18*   GLUCOSE mg/dL 106*   CALCIUM mg/dL 7.9*       Results from last 7 days  Lab Units 09/17/18  1500   PH, ARTERIAL pH units 7.391   PO2 ART mm Hg 142.0*   PCO2, ARTERIAL mm Hg 34.0*   HCO3 ART mmol/L 20.6         Assessment    Principal Problem:    Coronary artery disease s/p CABG x 2  Active Problems:    CKD (chronic kidney disease) stage 4, GFR 15-29 ml/min (CMS/HCC)    Type 2 diabetes mellitus (CMS/HCC)    Hepatitis C      In satisfactory, renal status stable        Oral Calero MD  09/18/18  6:44 AM      Please note that portions of this note were completed with a voice recognition program. Efforts were made to edit the dictations, but words may be mistranscribed  "

## 2018-09-18 NOTE — PLAN OF CARE
Problem: Patient Care Overview  Goal: Plan of Care Review  Outcome: Ongoing (interventions implemented as appropriate)   09/18/18 1045   Coping/Psychosocial   Plan of Care Reviewed With patient;family   Plan of Care Review   Progress improving   OTHER   Outcome Summary PT evaluation completed patient is able to ambulate 70 ft with assist of 2 people he is limited by pain and fatigue today. He had sats drop to 88% with ambulation he is in A-fib but highest ventricular rate with activity was 100.

## 2018-09-18 NOTE — PROGRESS NOTES
Discharge Planning Assessment  [facility]     Patient Name: Axel Desir  MRN: [id number]  Today's Date: 9/18/2018    Admit Date: 9/17/2018          Discharge Needs Assessment     Row Name 09/18/18 1022       Living Environment    Lives With alone    Unique Family Situation Patient alone in a home with a ramp. Patient's cousin Candace to stay with him 1-2 weeks     Current Living Arrangements home/apartment/condo    Primary Care Provided by self    Provides Primary Care For no one    Family Caregiver if Needed other relative(s)    Family Caregiver Names Cousin Candace [phone]    Quality of Family Relationships involved;supportive    Able to Return to Prior Arrangements yes    Living Arrangement Comments Plan to return to his home at discharge and cousin Candace to be there.        Resource/Environmental Concerns    Transportation Concerns car, none       Transition Planning    Patient/Family Anticipates Transition to home with family    Patient/Family Anticipated Services at Transition none    Transportation Anticipated family or friend will provide       Discharge Needs Assessment    Equipment Currently Used at Home --   Patient has medical equipment in the home from his dad. Patient doesn't use this equipment. Ramp into the home, shower bench, handicapped bathtub, elevated toliet seat and lift chair.     Anticipated Changes Related to Illness none    Equipment Needed After Discharge none    Offered/Gave Vendor List no    Current Discharge Risk lives alone    Discharge Coordination/Progress Plan home  at discharge             Discharge Plan     Row Name 09/18/18 1027       Plan    Plan Home     Patient/Family in Agreement with Plan yes    Plan Comments Spoke with patient and cousin Candace at bedside. Candace's # [phone]. Candace lives in [place] but has a house next to patient in [place]. She plans to drive him home at discharge and stay 1-2 weeks with him. CM to follow for discharge needs. Ana @ 0279      Final Discharge Disposition Code 01 - home or self-care        Destination     No service coordination in this encounter.      Durable Medical Equipment     No service coordination in this encounter.      Dialysis/Infusion     No service coordination in this encounter.      Home Medical Care     No service coordination in this encounter.      Social Care     No service coordination in this encounter.                Demographic Summary     Row Name 09/18/18 1020       General Information    Admission Type inpatient    Referral Source admission list    Reason for Consult discharge planning    Preferred Language English     Used During This Interaction no       Contact Information    Permission Granted to Share Info With     Contact Information Obtained for     Contact Information Comments PCP: Sammy Glass             Functional Status     Row Name 09/18/18 1021       Functional Status    Usual Activity Tolerance good    Current Activity Tolerance fair       Functional Status, IADL    Medications independent    Meal Preparation independent    Housekeeping independent    Laundry independent    Shopping independent    IADL Comments Patient has coverage for medications with [employer] insurance.             Psychosocial    No documentation.           Abuse/Neglect    No documentation.           Legal    No documentation.           Substance Abuse    No documentation.           Patient Forms    No documentation.         Candace Oliveira RN

## 2018-09-19 ENCOUNTER — APPOINTMENT (OUTPATIENT)
Dept: GENERAL RADIOLOGY | Facility: HOSPITAL | Age: 58
End: 2018-09-19

## 2018-09-19 LAB
ANION GAP SERPL CALCULATED.3IONS-SCNC: 6 MMOL/L (ref 3–11)
BUN BLD-MCNC: 37 MG/DL (ref 9–23)
BUN/CREAT SERPL: 17.3 (ref 7–25)
CALCIUM SPEC-SCNC: 8.3 MG/DL (ref 8.7–10.4)
CHLORIDE SERPL-SCNC: 107 MMOL/L (ref 99–109)
CO2 SERPL-SCNC: 19 MMOL/L (ref 20–31)
CREAT BLD-MCNC: 2.14 MG/DL (ref 0.6–1.3)
DEPRECATED RDW RBC AUTO: 51.8 FL (ref 37–54)
ERYTHROCYTE [DISTWIDTH] IN BLOOD BY AUTOMATED COUNT: 16.1 % (ref 11.3–14.5)
GFR SERPL CREATININE-BSD FRML MDRD: 32 ML/MIN/1.73
GLUCOSE BLD-MCNC: 152 MG/DL (ref 70–100)
GLUCOSE BLDC GLUCOMTR-MCNC: 152 MG/DL (ref 70–130)
GLUCOSE BLDC GLUCOMTR-MCNC: 156 MG/DL (ref 70–130)
GLUCOSE BLDC GLUCOMTR-MCNC: 194 MG/DL (ref 70–130)
GLUCOSE BLDC GLUCOMTR-MCNC: 204 MG/DL (ref 70–130)
HCT VFR BLD AUTO: 27.9 % (ref 38.9–50.9)
HGB BLD-MCNC: 9.2 G/DL (ref 13.1–17.5)
MCH RBC QN AUTO: 29.4 PG (ref 27–31)
MCHC RBC AUTO-ENTMCNC: 33 G/DL (ref 32–36)
MCV RBC AUTO: 89.1 FL (ref 80–99)
PLATELET # BLD AUTO: 179 10*3/MM3 (ref 150–450)
PMV BLD AUTO: 10.7 FL (ref 6–12)
POTASSIUM BLD-SCNC: 3.5 MMOL/L (ref 3.5–5.5)
POTASSIUM BLD-SCNC: 3.9 MMOL/L (ref 3.5–5.5)
RBC # BLD AUTO: 3.13 10*6/MM3 (ref 4.2–5.76)
SODIUM BLD-SCNC: 132 MMOL/L (ref 132–146)
WBC NRBC COR # BLD: 11.94 10*3/MM3 (ref 3.5–10.8)

## 2018-09-19 PROCEDURE — 94640 AIRWAY INHALATION TREATMENT: CPT

## 2018-09-19 PROCEDURE — 99233 SBSQ HOSP IP/OBS HIGH 50: CPT | Performed by: INTERNAL MEDICINE

## 2018-09-19 PROCEDURE — 94799 UNLISTED PULMONARY SVC/PX: CPT

## 2018-09-19 PROCEDURE — 97530 THERAPEUTIC ACTIVITIES: CPT

## 2018-09-19 PROCEDURE — 93005 ELECTROCARDIOGRAM TRACING: CPT | Performed by: PHYSICIAN ASSISTANT

## 2018-09-19 PROCEDURE — 94760 N-INVAS EAR/PLS OXIMETRY 1: CPT

## 2018-09-19 PROCEDURE — 93010 ELECTROCARDIOGRAM REPORT: CPT | Performed by: INTERNAL MEDICINE

## 2018-09-19 PROCEDURE — 80048 BASIC METABOLIC PNL TOTAL CA: CPT | Performed by: PHYSICIAN ASSISTANT

## 2018-09-19 PROCEDURE — 82962 GLUCOSE BLOOD TEST: CPT

## 2018-09-19 PROCEDURE — 99232 SBSQ HOSP IP/OBS MODERATE 35: CPT | Performed by: INTERNAL MEDICINE

## 2018-09-19 PROCEDURE — 84132 ASSAY OF SERUM POTASSIUM: CPT | Performed by: THORACIC SURGERY (CARDIOTHORACIC VASCULAR SURGERY)

## 2018-09-19 PROCEDURE — 25010000002 AMIODARONE IN DEXTROSE 5% 360-4.14 MG/200ML-% SOLUTION: Performed by: THORACIC SURGERY (CARDIOTHORACIC VASCULAR SURGERY)

## 2018-09-19 PROCEDURE — 71045 X-RAY EXAM CHEST 1 VIEW: CPT

## 2018-09-19 PROCEDURE — 85027 COMPLETE CBC AUTOMATED: CPT | Performed by: PHYSICIAN ASSISTANT

## 2018-09-19 PROCEDURE — 25010000002 HYDROMORPHONE PER 4 MG: Performed by: NURSE PRACTITIONER

## 2018-09-19 PROCEDURE — 25010000002 CEFUROXIME: Performed by: PHYSICIAN ASSISTANT

## 2018-09-19 RX ORDER — FUROSEMIDE 20 MG/1
20 TABLET ORAL DAILY PRN
COMMUNITY
End: 2019-03-28

## 2018-09-19 RX ORDER — HYDRALAZINE HYDROCHLORIDE 100 MG/1
100 TABLET, FILM COATED ORAL 3 TIMES DAILY
COMMUNITY
End: 2019-04-23 | Stop reason: HOSPADM

## 2018-09-19 RX ADMIN — OXYCODONE HYDROCHLORIDE AND ACETAMINOPHEN 1 TABLET: 10; 325 TABLET ORAL at 02:49

## 2018-09-19 RX ADMIN — POTASSIUM CHLORIDE 40 MEQ: 750 CAPSULE, EXTENDED RELEASE ORAL at 14:56

## 2018-09-19 RX ADMIN — NITROGLYCERIN 100 MCG/MIN: 20 INJECTION INTRAVENOUS at 17:26

## 2018-09-19 RX ADMIN — CLONIDINE HYDROCHLORIDE 0.1 MG: 0.1 TABLET ORAL at 06:37

## 2018-09-19 RX ADMIN — OXYCODONE HYDROCHLORIDE AND ACETAMINOPHEN 1 TABLET: 10; 325 TABLET ORAL at 12:50

## 2018-09-19 RX ADMIN — GABAPENTIN 100 MG: 100 CAPSULE ORAL at 20:59

## 2018-09-19 RX ADMIN — CLONIDINE HYDROCHLORIDE 0.1 MG: 0.1 TABLET ORAL at 20:59

## 2018-09-19 RX ADMIN — NIFEDIPINE 60 MG: 60 TABLET, FILM COATED, EXTENDED RELEASE ORAL at 08:09

## 2018-09-19 RX ADMIN — ATORVASTATIN CALCIUM 80 MG: 40 TABLET, FILM COATED ORAL at 20:59

## 2018-09-19 RX ADMIN — METOPROLOL TARTRATE 25 MG: 25 TABLET ORAL at 08:09

## 2018-09-19 RX ADMIN — IPRATROPIUM BROMIDE AND ALBUTEROL SULFATE 3 ML: 2.5; .5 SOLUTION RESPIRATORY (INHALATION) at 01:15

## 2018-09-19 RX ADMIN — CEFUROXIME 1.5 G: 1.5 INJECTION, POWDER, FOR SOLUTION INTRAVENOUS at 01:57

## 2018-09-19 RX ADMIN — HYDROMORPHONE HYDROCHLORIDE 1 MG: 1 INJECTION, SOLUTION INTRAMUSCULAR; INTRAVENOUS; SUBCUTANEOUS at 00:29

## 2018-09-19 RX ADMIN — IPRATROPIUM BROMIDE AND ALBUTEROL SULFATE 3 ML: 2.5; .5 SOLUTION RESPIRATORY (INHALATION) at 07:13

## 2018-09-19 RX ADMIN — DOCUSATE SODIUM,SENNOSIDES 2 TABLET: 50; 8.6 TABLET, FILM COATED ORAL at 20:59

## 2018-09-19 RX ADMIN — ASPIRIN 325 MG: 325 TABLET, DELAYED RELEASE ORAL at 08:09

## 2018-09-19 RX ADMIN — HYDRALAZINE HYDROCHLORIDE 50 MG: 50 TABLET, FILM COATED ORAL at 14:56

## 2018-09-19 RX ADMIN — INSULIN LISPRO 3 UNITS: 100 INJECTION, SOLUTION INTRAVENOUS; SUBCUTANEOUS at 21:00

## 2018-09-19 RX ADMIN — SERTRALINE HYDROCHLORIDE 100 MG: 100 TABLET ORAL at 08:09

## 2018-09-19 RX ADMIN — OXYCODONE HYDROCHLORIDE AND ACETAMINOPHEN 1 TABLET: 10; 325 TABLET ORAL at 17:48

## 2018-09-19 RX ADMIN — GABAPENTIN 100 MG: 100 CAPSULE ORAL at 15:03

## 2018-09-19 RX ADMIN — INSULIN LISPRO 2 UNITS: 100 INJECTION, SOLUTION INTRAVENOUS; SUBCUTANEOUS at 08:19

## 2018-09-19 RX ADMIN — HYDROMORPHONE HYDROCHLORIDE 1 MG: 1 INJECTION, SOLUTION INTRAMUSCULAR; INTRAVENOUS; SUBCUTANEOUS at 09:52

## 2018-09-19 RX ADMIN — GABAPENTIN 100 MG: 100 CAPSULE ORAL at 06:38

## 2018-09-19 RX ADMIN — INSULIN LISPRO 2 UNITS: 100 INJECTION, SOLUTION INTRAVENOUS; SUBCUTANEOUS at 17:25

## 2018-09-19 RX ADMIN — INSULIN LISPRO 2 UNITS: 100 INJECTION, SOLUTION INTRAVENOUS; SUBCUTANEOUS at 12:12

## 2018-09-19 RX ADMIN — IPRATROPIUM BROMIDE AND ALBUTEROL SULFATE 3 ML: 2.5; .5 SOLUTION RESPIRATORY (INHALATION) at 19:22

## 2018-09-19 RX ADMIN — AMIODARONE HYDROCHLORIDE 0.5 MG/MIN: 1.8 INJECTION, SOLUTION INTRAVENOUS at 00:29

## 2018-09-19 RX ADMIN — CLONIDINE HYDROCHLORIDE 0.1 MG: 0.1 TABLET ORAL at 14:56

## 2018-09-19 RX ADMIN — HYDRALAZINE HYDROCHLORIDE 50 MG: 50 TABLET, FILM COATED ORAL at 06:37

## 2018-09-19 RX ADMIN — IPRATROPIUM BROMIDE AND ALBUTEROL SULFATE 3 ML: 2.5; .5 SOLUTION RESPIRATORY (INHALATION) at 13:28

## 2018-09-19 RX ADMIN — HYDRALAZINE HYDROCHLORIDE 50 MG: 50 TABLET, FILM COATED ORAL at 21:00

## 2018-09-19 RX ADMIN — METOPROLOL TARTRATE 12.5 MG: 25 TABLET, FILM COATED ORAL at 20:59

## 2018-09-19 RX ADMIN — HYDROCODONE BITARTRATE AND ACETAMINOPHEN 1 TABLET: 7.5; 325 TABLET ORAL at 20:59

## 2018-09-19 RX ADMIN — OXYCODONE HYDROCHLORIDE AND ACETAMINOPHEN 1 TABLET: 10; 325 TABLET ORAL at 07:35

## 2018-09-19 RX ADMIN — DOCUSATE SODIUM,SENNOSIDES 2 TABLET: 50; 8.6 TABLET, FILM COATED ORAL at 08:09

## 2018-09-19 NOTE — PROGRESS NOTES
"   LOS: 2 days    Patient Care Team:  Sammy Glass MD as PCP - General  Sammy Glass MD as PCP - Family Medicine    Chief Complaint:  Shortness of breath  58-year-old  male history of chronic kidney disease likely diabetic nephropathy, nephrosclerosis.  He was [facility] on 8/28/[facility] and [facility]    His previous labs weight from 2.05-2.61.  Has CK D stage IV.  Patient had a heart catheter done in July 2018 with 2 vessel disease, patient was admitted yesterday underwent a CABG today on 9/17/2018 with LIMA to LAD and SVG to PDA.  Subjective   Renal function stable, good urine output, no new complaints, no new events.    Review of Systems:      Denies nausea vomiting, chest pain or shortness of breath.  Objective     Vital Sign Min/Max for last 24 hours  Temp  Min: 96.8 °F (36 °C)  Max: 98.8 °F (37.1 °C)   BP  Min: 87/70  Max: 154/94   Pulse  Min: 45  Max: 123   Resp  Min: 16  Max: 22   SpO2  Min: 82 %  Max: 96 %   No Data Recorded   No Data Recorded     Flowsheet Rows      First Filed Value   Admission Height  177.8 cm (70\") Documented at 09/17/2018 0940   Admission Weight  63.5 kg (140 lb) Documented at 09/17/2018 0940          No intake/output data recorded.  I/O last 3 completed shifts:  In: 3113.6 [P.O.:125; I.V.:2593.6; IV Piggyback:395]  Out: 6350 [Urine:5720; Chest Tube:630]    Physical Exam:   General appearance: Sitting comfortable in chair.  No obvious distress.  Eyes: Pupils reactive EOMI.  Neck: Supple, no JVD.  Lungs: Few rhonchi's are heard equal chest movement.  Chest.  Chest tube in place.  Heart: No gallop or rub.  Abdomen: Soft nontender positive bowel sounds.  : Ramírez catheter in place.  Urine clear.  Extremities.  No edema cyanosis.  Neuro: Grossly intact.    Results from last 7 days  Lab Units 09/19/18  0325 09/19/18  0322 09/18/18  0413 09/17/18  2112 09/17/18  1355 09/17/18  1029 09/17/18  0925 09/17/18  0855  09/17/18  0616 09/16/18  1426   WBC " 10*3/mm3  --  11.94* 7.92  --  10.10 17.93*  --   --   --   --  9.22   HEMOGLOBIN g/dL  --  9.2* 7.7* 8.2* 7.9* 9.1*  --   --   --   --  11.3*   HEMOGLOBIN, POC g/dL  --   --   --   --   --   --  8.2* 7.8*  < >  --   --    HEMATOCRIT %  --  27.9* 23.2* 24.9* 23.1* 27.3*  --   --   --   --  33.9*   HEMATOCRIT POC %  --   --   --   --   --   --  24* 23*  < >  --   --    PLATELETS 10*3/mm3  --  179 154  --  164 184  --   --   --   --  218   SODIUM mmol/L 132  --  139 140 139 141  --   --   --  134 135   POTASSIUM mmol/L 3.5  --  4.0 4.0 3.3* 3.8  --   --   --  3.6 4.3   CHLORIDE mmol/L 107  --  109 114* 110* 108  --   --   --  108 105   CO2 mmol/L 19.0*  --  19.0* 21.0 21.0 20.0  --   --   --  15.0* 18.0*   BUN mg/dL 37*  --  46* 50* 56* 53*  --   --   --  58* 74*   CREATININE mg/dL 2.14*  --  2.18* 2.20* 2.29* 2.30*  --   --   --  2.60* 2.73*   GLUCOSE mg/dL 152*  --  106* 128* 136* 150*  --   --   --  146* 99   CALCIUM mg/dL 8.3*  --  7.9* 7.9* 8.0* 8.7  --   --   --  8.3* 8.1*   PHOSPHORUS mg/dL  --   --  5.0 4.1 3.8 4.2  --   --   --   --   --    < > = values in this interval not displayed.         Results Review:     I reviewed the patient's new clinical results.      aspirin 325 mg Oral Daily   atorvastatin 80 mg Oral Nightly   CloNIDine 0.1 mg Oral Q8H   gabapentin 100 mg Oral Q8H   hydrALAZINE 50 mg Oral Q8H   insulin lispro 0-7 Units Subcutaneous 4x Daily With Meals & Nightly   ipratropium-albuterol 3 mL Nebulization Q6H - RT   metoprolol tartrate 12.5 mg Oral Q12H   NIFEdipine XL 60 mg Oral Q24H   pharmacy consult - MTM  Does not apply Daily   sennosides-docusate sodium 2 tablet Oral BID   sertraline 100 mg Oral Daily       dexmedetomidine 0.2-1.5 mcg/kg/hr Last Rate: 0.4 mcg/kg/hr (09/18/18 0744)   niCARdipine 5-15 mg/hr Last Rate: 5 mg/hr (09/18/18 0845)   nitroglycerin 5-200 mcg/min Last Rate: Stopped (09/18/18 2249)   norepinephrine 0.02-0.3 mcg/kg/min    phenylephrine 0.5-3 mcg/kg/min        Medication  Review: As above    Assessment/Plan      1.  Acute on chronic renal failure is.  Status post CABG, Ramírez catheter in place still making good urine.  2.  Status post CABG ×2 today.  Chest tube in place.   3.  Diabetes type 2.  4.  Anemia of chronic disease.  5.  Secondary hyperparathyroidism secondary to renal insufficiency  6.  Hepatitis C    Plan: Discontinue Ramírez catheter  Avoid nephrotoxic medications.  Keep systolic blood pressure greater than 100.  Check volume status.  Check labs in the morning.  Kartik Encinas MD  09/19/18  10:10 AM

## 2018-09-19 NOTE — PLAN OF CARE
Problem: Patient Care Overview  Goal: Plan of Care Review  Outcome: Ongoing (interventions implemented as appropriate)   09/19/18 1805   Coping/Psychosocial   Plan of Care Reviewed With patient   OTHER   Outcome Summary Patient restarted on nitroglycerin, Patient ambulated twice, hi-flow NC with O2 sats >93%, ames catheter removed, Potassium replaced       Problem: Cardiac Surgery (Adult)  Goal: Signs and Symptoms of Listed Potential Problems Will be Absent, Minimized or Managed (Cardiac Surgery)  Outcome: Ongoing (interventions implemented as appropriate)   09/19/18 1805   Goal/Outcome Evaluation   Problems Assessed (Cardiac Surgery) all   Problems Present (Cardiac Surgery) bowel motility decreased;cardiac complications;hemodynamic instability;situational response       Problem: Fall Risk (Adult)  Goal: Absence of Fall  Outcome: Outcome(s) achieved Date Met: 09/19/18 09/19/18 1805   Fall Risk (Adult)   Absence of Fall achieves outcome       Problem: Skin Injury Risk (Adult)  Goal: Skin Health and Integrity  Outcome: Ongoing (interventions implemented as appropriate)   09/19/18 1805   Skin Injury Risk (Adult)   Skin Health and Integrity making progress toward outcome

## 2018-09-19 NOTE — PROGRESS NOTES
Clinical Nutrition     Multidisciplinary Rounds    Time: 20min  Patient Name: Axel Desir  Date of Encounter: 09/19/18 9:29 AM  MRN: [id number]  Admission date: 9/17/2018      Reason for visit: MDR. RD to continue to follow per protocol.     Additional information obtained during MDR:  Remains on HFNC 50%. Chest tube removed this morning.      Patient reports ate better this morning.     Current diet: Diet Regular; Cardiac, Consistent Carbohydrate    Intake: 15% x 2 meals.          EMR reviewed     Intervention:  Follow treatment plan  Care plan reviewed  Review menu options   Encourage intake     Follow up:   Per protocol      Lucie Conrad RD  9:29 AM

## 2018-09-19 NOTE — PROGRESS NOTES
Continued Stay Note   Silvino     Patient Name: Axel Desir  MRN: [id number]  Today's Date: 9/19/2018    Admit Date: 9/17/2018          Discharge Plan     Row Name 09/19/18 1310       Plan    Plan Home    Patient/Family in Agreement with Plan yes    Plan Comments Spoke with patient and cousin Candace at bedside. Patient remains in ICU, improving. His plan remains the same, home with Candace to help as needed. Ana @ [phone]              Discharge Codes    No documentation.           Candace Oliveira RN
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
## 2018-09-19 NOTE — PLAN OF CARE
Problem: Patient Care Overview  Goal: Plan of Care Review  Outcome: Ongoing (interventions implemented as appropriate)   09/19/18 0910   Coping/Psychosocial   Plan of Care Reviewed With patient   Plan of Care Review   Progress improving   OTHER   Outcome Summary Pt increased ambulation distance to 150ft with CGA 1+1. Pt demonstrated slow марина and decreased step length. Pt's mobility limited by increased SOA. Continue to progress as appropriate.

## 2018-09-19 NOTE — THERAPY TREATMENT NOTE
Acute Care - Physical Therapy Treatment Note  [facility]     Patient Name: Axel Desir  : 1960  MRN: [id number]  Today's Date: 2018  Onset of Illness/Injury or Date of Surgery: 18  Date of Referral to PT: 18  Referring Physician: MD Calero    Admit Date: 2018    Visit Dx:    ICD-10-CM ICD-9-CM   1. Impaired functional mobility, balance, gait, and endurance Z74.09 V49.89   2. CAD in native artery I25.10 414.01     Patient Active Problem List   Diagnosis   • Angina pectoris (CMS/HCC)   • ASCVD (arteriosclerotic cardiovascular disease), severe 2 vessel disease per [facility] 18   • Coronary artery disease s/p CABG x 2   • CAD in native artery   • CKD (chronic kidney disease) stage 4, GFR 15-29 ml/min (CMS/HCC)   • Type 2 diabetes mellitus (CMS/HCC)   • Hepatitis C       Therapy Treatment          Rehabilitation Treatment Summary     Row Name 18 0910             Treatment Time/Intention    Discipline physical therapist  -KR      Document Type therapy note (daily note)  -KR      Subjective Information complains of;pain  -KR      Mode of Treatment physical therapy  -KR      Care Plan Review care plan/treatment goals reviewed;risks/benefits reviewed;patient/other agree to care plan  -KR      Therapy Frequency (PT Clinical Impression) daily  -KR      Patient Effort good  -KR      Existing Precautions/Restrictions cardiac;fall;oxygen therapy device and L/min;sternal  -KR      Recorded by [KR] Roxana Vasques, PT 18 1152      Row Name 18 0910             Vital Signs    Pre Systolic BP Rehab 140  -KR      Pre Treatment Diastolic BP 81  -KR      Post Systolic BP Rehab 156  -KR      Post Treatment Diastolic BP 81  -KR      Pretreatment Heart Rate (beats/min) 69  -KR      Posttreatment Heart Rate (beats/min) 70  -KR      Pre SpO2 (%) 95  -KR      O2 Delivery Pre Treatment hi-flow  -KR      Post SpO2 (%) 92  -KR      O2 Delivery Post Treatment supplemental O2  -KR      Pre Patient  Position Sitting  -KR      Intra Patient Position Standing  -KR      Post Patient Position Sitting  -KR      Recorded by [KR] Roxana Vasques, PT 09/19/18 1152      Row Name 09/19/18 0910             Cognitive Assessment/Intervention    Additional Documentation Cognitive Assessment/Intervention (Group)  -KR      Recorded by [KR] Roxana Vasques, PT 09/19/18 1152      Row Name 09/19/18 0910             Cognitive Assessment/Intervention- PT/OT    Affect/Mental Status (Cognitive) WFL  -KR      Orientation Status (Cognition) oriented x 4  -KR      Follows Commands (Cognition) WFL  -KR      Cognitive Function (Cognitive) safety deficit  -KR      Safety Deficit (Cognitive) mild deficit;awareness of need for assistance;insight into deficits/self awareness;safety precautions awareness;safety precautions follow-through/compliance  -KR      Personal Safety Interventions fall prevention program maintained;gait belt;nonskid shoes/slippers when out of bed  -KR      Recorded by [KR] Roxana Vasques, PT 09/19/18 1152      Row Name 09/19/18 0910             Safety Issues, Functional Mobility    Safety Issues Affecting Function (Mobility) awareness of need for assistance;insight into deficits/self awareness;safety precaution awareness;safety precautions follow-through/compliance  -KR      Impairments Affecting Function (Mobility) balance;endurance/activity tolerance;shortness of breath;strength  -KR      Recorded by [KR] Roxana Vasques, PT 09/19/18 1152      Row Name 09/19/18 0910             Bed Mobility Assessment/Treatment    Comment (Bed Mobility) UIC  -KR      Recorded by [KR] Roxana Vasques, PT 09/19/18 1152      Row Name 09/19/18 0910             Transfer Assessment/Treatment    Transfer Assessment/Treatment sit-stand transfer;stand-sit transfer  -KR      Comment (Transfers) VC's for sequencing and hand placement.   -KR      Recorded by [KR] Roxana Vasques, PT 09/19/18 1152      Row Name 09/19/18 0910             Sit-Stand  Transfer    Sit-Stand [place] (Transfers) contact guard;verbal cues  -KR      Recorded by [KR] Roxana Vasques, PT 09/19/18 1152      Row Name 09/19/18 0910             Stand-Sit Transfer    Stand-Sit [place] (Transfers) contact guard;verbal cues  -KR      Recorded by [KR] Roxana Vasques, PT 09/19/18 1152      Row Name 09/19/18 0910             Gait/Stairs Assessment/Training    Gait/Stairs Assessment/Training gait/ambulation independence  -KR      [place] Level (Gait) contact guard;1 person assist;1 person to manage equipment;verbal cues  -KR      Distance in Feet (Gait) 150  -KR      Pattern (Gait) step-through  -KR      Deviations/Abnormal Patterns (Gait) base of support, narrow;марина decreased;other (see comments)   decreased step length  -KR      Bilateral Gait Deviations forward flexed posture  -KR      Comment (Gait/Stairs) Pt demonstrated step through gait pattern with slow марина and narrow MONTEZ. VC's for upright posture and increased step length. Pt limited by increased SOA.   -KR      Recorded by [KR] Roxana Vasques, PT 09/19/18 1152      Row Name 09/19/18 0910             Motor Skills Assessment/Interventions    Additional Documentation Balance (Group);Therapeutic Exercise (Group)  -KR      Recorded by [KR] Roxana Vasques, PT 09/19/18 1156      Row Name 09/19/18 0910             Therapeutic Exercise    99738 - PT Therapeutic Activity Minutes 23  -KR      Recorded by [KR] Roxana Vasques, PT 09/19/18 1156      Row Name 09/19/18 0910             Balance    Balance static sitting balance;static standing balance  -KR      Recorded by [KR] Roxana Vasques, PT 09/19/18 1152      Row Name 09/19/18 0910             Static Sitting Balance    Level of [place] (Unsupported Sitting, Static Balance) supervision  -KR      Sitting Position (Unsupported Sitting, Static Balance) sitting in chair  -KR      Recorded by [KR] Roxana Vasques, PT 09/19/18 1152      Row Name 09/19/18 0910              Static Standing Balance    Level of [place] (Supported Standing, Static Balance) contact guard assist  -KR      Recorded by [KR] Roxana Vasques, PT 09/19/18 1152      Row Name 09/19/18 0910             Positioning and Restraints    Pre-Treatment Position sitting in chair/recliner  -KR      Post Treatment Position chair  -KR      In Chair notified nsg;reclined;call light within reach;encouraged to call for assist;with family/caregiver;RUE elevated;LUE elevated;legs elevated  -KR      Recorded by [KR] Roxana Vasques, PT 09/19/18 1152      Row Name 09/19/18 0910             Pain Assessment    Additional Documentation Pain Scale: Numbers Pre/Post-Treatment (Group)  -KR      Recorded by [KR] Roxana Vasques, PT 09/19/18 1152      Row Name 09/19/18 0910             Pain Scale: Numbers Pre/Post-Treatment    Pain Scale: Numbers, Pretreatment 8/10  -KR      Pain Scale: Numbers, Post-Treatment 8/10  -KR      Pain Location - Orientation incisional  -KR      Pain Location chest  -KR      Pain Intervention(s) Repositioned;Ambulation/increased activity  -KR      Recorded by [KR] Roxana Vasques, PT 09/19/18 1152      Row Name                Wound 09/17/18 0634 chest incision    Wound - Properties Group Date first assessed: 09/17/18 [SH] Time first assessed: 0634 [SH] Location: chest [SH] Type: incision [SH] Recorded by:  [SH] Haase, Sherri L, RN 09/17/18 0634    Row Name                Wound 09/17/18 0750 Right leg incision    Wound - Properties Group Date first assessed: 09/17/18 [SH] Time first assessed: 0750 [SH] Side: Right [SH] Location: leg [SH] Type: incision [SH] Recorded by:  [SH] Haase, Sherri L, RN 09/17/18 0750    Row Name 09/19/18 0910             Outcome Summary/Treatment Plan (PT)    Daily Summary of Progress (PT) progress toward functional goals is good  -KR      Recorded by [KR] Roxana Vasques, PT 09/19/18 1152        User Key  (r) = Recorded By, (t) = Taken By, (c) = Cosigned By    Initials Name  Effective Dates Discipline     Haase, Sherri L, RN 06/16/16 -  Nurse    Roxana Lozano, PT 04/03/18 -  PT          Wound 09/17/18 0634 chest incision (Active)   Dressing Appearance dry;intact 9/19/2018 10:00 AM   Closure Approximated;Liquid skin adhesive 9/19/2018  6:00 AM   Drainage Amount none 9/19/2018 10:00 AM   Care, Wound cleansed with;soap and water;antimicrobial agent applied 9/18/2018  8:00 PM   Dressing Care, Wound low-adherent 9/19/2018 10:00 AM       Wound 09/17/18 0750 Right leg incision (Active)   Dressing Appearance dry;intact 9/19/2018 10:00 AM   Closure Approximated;Liquid skin adhesive 9/19/2018  6:00 AM   Drainage Amount none 9/19/2018 10:00 AM   Care, Wound cleansed with;soap and water;antimicrobial agent applied 9/18/2018  8:00 PM   Dressing Care, Wound open to air 9/19/2018 10:00 AM             Physical Therapy Education     Title: PT OT SLP Therapies (Active)     Topic: Physical Therapy (Active)     Point: Mobility training (Active)    Learning Progress Summary     Learner Status Readiness Method Response Comment Documented by    Patient Active Acceptance E NR  KR 09/19/18 1153     Active Acceptance E NR  MUSA 09/18/18 1045          Point: Home exercise program (Active)    Learning Progress Summary     Learner Status Readiness Method Response Comment Documented by    Patient Active Acceptance E NR  KR 09/19/18 1153     Active Acceptance E NR  MUSA 09/18/18 1045          Point: Body mechanics (Active)    Learning Progress Summary     Learner Status Readiness Method Response Comment Documented by    Patient Active Acceptance E NR  KR 09/19/18 1153     Active Acceptance E NR  MUSA 09/18/18 1045          Point: Precautions (Active)    Learning Progress Summary     Learner Status Readiness Method Response Comment Documented by    Patient Active Acceptance E NR  KR 09/19/18 1153     Active Acceptance E NR  MUSA 09/18/18 1045                      User Key     Initials Effective Dates Name Provider Type  Discipline    MUSA 06/19/15 -  Elizabeth Michel, PT Physical Therapist PT    KR 04/03/18 -  Roxana Vasques PT Physical Therapist PT                    PT Recommendation and Plan  Therapy Frequency (PT Clinical Impression): daily  Outcome Summary/Treatment Plan (PT)  Daily Summary of Progress (PT): progress toward functional goals is good  Plan of Care Reviewed With: patient  Progress: improving  Outcome Summary: Pt increased ambulation distance to 150ft with CGA 1+1. Pt demonstrated slow марина and decreased step length. Pt's mobility limited by increased SOA. Continue to progress as appropriate.           Outcome Measures     Row Name 09/19/18 0910 09/18/18 1045          How much help from another person do you currently need...    Turning from your back to your side while in flat bed without using bedrails? 3  -KR 3  -MUSA     Moving from lying on back to sitting on the side of a flat bed without bedrails? 2  -KR 2  -MUSA     Moving to and from a bed to a chair (including a wheelchair)? 3  -KR 3  -MUSA     Standing up from a chair using your arms (e.g., wheelchair, bedside chair)? 3  -KR 3  -MUSA     Climbing 3-5 steps with a railing? 2  -KR 2  -MUSA     To walk in hospital room? 3  -KR 3  -MUSA     AM-PAC 6 Clicks Score 16  -KR 16  -MUSA        Functional Assessment    Outcome Measure Options AM-PAC 6 Clicks Basic Mobility (PT)  -KR AM-PAC 6 Clicks Basic Mobility (PT)  -MUSA       User Key  (r) = Recorded By, (t) = Taken By, (c) = Cosigned By    Initials Name Provider Type    Elizabeth Ortiz, PT Physical Therapist    Roxana Lozano PT Physical Therapist           Time Calculation:         PT Charges     Row Name 09/19/18 0910             Time Calculation    Start Time 0910  -KR      PT Received On 09/19/18  -KR      PT Goal Re-Cert Due Date 09/28/18  -KR         Time Calculation- PT    Total Timed Code Minutes- PT 23 minute(s)  -KR         Timed Charges    77370 - PT Therapeutic Activity Minutes 23  -KR        User  Key  (r) = Recorded By, (t) = Taken By, (c) = Cosigned By    Initials Name Provider Type    KR Roxana Vasques, PT Physical Therapist        Therapy Suggested Charges     Code   Minutes Charges    00067 (CPT®) Hc Pt Neuromusc Re Education Ea 15 Min      25357 (CPT®) Hc Pt Ther Proc Ea 15 Min      83177 (CPT®) Hc Gait Training Ea 15 Min      83956 (CPT®) Hc Pt Therapeutic Act Ea 15 Min 23 2    78938 (CPT®) Hc Pt Manual Therapy Ea 15 Min      55149 (CPT®) Hc Pt Iontophoresis Ea 15 Min      39235 (CPT®) Hc Pt Elec Stim Ea-Per 15 Min      24960 (CPT®) Hc Pt Ultrasound Ea 15 Min      50528 (CPT®) Hc Pt Self Care/Mgmt/Train Ea 15 Min      09258 (CPT®) Hc Pt Prosthetic (S) Train Initial Encounter, Each 15 Min      55175 (CPT®) Hc Pt Orthotic(S)/Prosthetic(S) Encounter, Each 15 Min      47570 (CPT®) Hc Orthotic(S) Mgmt/Train Initial Encounter, Each 15min      Total  23 2        Therapy Charges for Today     Code Description Service Date Service Provider Modifiers Qty    66425597183 HC PT THERAPEUTIC ACT EA 15 MIN 9/19/2018 Roxana Vasques, PT GP 2    50525482224 HC PT THER SUPP EA 15 MIN 9/19/2018 Roxana Vasques, PT GP 2          PT G-Codes  Outcome Measure Options: AM-PAC 6 Clicks Basic Mobility (PT)  AM-PAC 6 Clicks Score: 16    Miriam Vasques PT  9/19/2018

## 2018-09-19 NOTE — PLAN OF CARE
Problem: Patient Care Overview  Goal: Plan of Care Review  Outcome: Ongoing (interventions implemented as appropriate)   09/19/18 0400 09/19/18 0651   Coping/Psychosocial   Plan of Care Reviewed With patient;family --    Plan of Care Review   Progress --  improving   OTHER   Outcome Summary --  Pt stable overnight on 60-70% hi-flow nasal cannula. Nitro off with PO antihypertensives. Afib converted to SR/SB at ~0500; amio gtt turned off. Decreasing CT output, moderate UOP.        Problem: Cardiac Surgery (Adult)  Goal: Signs and Symptoms of Listed Potential Problems Will be Absent, Minimized or Managed (Cardiac Surgery)  Outcome: Ongoing (interventions implemented as appropriate)   09/19/18 0651   Goal/Outcome Evaluation   Problems Assessed (Cardiac Surgery) all   Problems Present (Cardiac Surgery) cardiac complications;pain;respiratory compromise       Problem: Fall Risk (Adult)  Goal: Absence of Fall  Outcome: Ongoing (interventions implemented as appropriate)   09/19/18 0651   Fall Risk (Adult)   Absence of Fall making progress toward outcome       Problem: Skin Injury Risk (Adult)  Goal: Identify Related Risk Factors and Signs and Symptoms  Outcome: Outcome(s) achieved Date Met: 09/19/18 09/19/18 0651   Skin Injury Risk (Adult)   Related Risk Factors (Skin Injury Risk) critical care admission;medical devices;medication;mobility impaired;nutritional deficiencies     Goal: Skin Health and Integrity  Outcome: Ongoing (interventions implemented as appropriate)   09/19/18 0651   Skin Injury Risk (Adult)   Skin Health and Integrity making progress toward outcome

## 2018-09-19 NOTE — PROGRESS NOTES
Intensive Care Follow-up     Hospital:  LOS: 2 days   Mr. Axel Desir, 58 y.o. male is followed for:   Coronary artery disease involving native heart with angina pectoris (CMS/HCC)   With postoperative management of hyperglycemia and acute postoperative respiratory insufficiency.     Subjective   Interval History:  The chart has been reviewed. The patient remains on high flow nasal cannula oxygen. He did have episodes of bradycardia through the night. His amiodarone has been turned off as he is now in normal sinus rhythm.    The patient's relevant past medical, surgical and social history were reviewed and updated in Epic as appropriate.        Objective     Infusions:    dexmedetomidine 0.2-1.5 mcg/kg/hr Last Rate: 0.4 mcg/kg/hr (09/18/18 0744)   niCARdipine 5-15 mg/hr Last Rate: 5 mg/hr (09/18/18 0845)   nitroglycerin 5-200 mcg/min Last Rate: Stopped (09/18/18 2249)   norepinephrine 0.02-0.3 mcg/kg/min    phenylephrine 0.5-3 mcg/kg/min      Medications:    aspirin 325 mg Oral Daily   atorvastatin 80 mg Oral Nightly   CloNIDine 0.1 mg Oral Q8H   gabapentin 100 mg Oral Q8H   hydrALAZINE 50 mg Oral Q8H   insulin lispro 0-7 Units Subcutaneous 4x Daily With Meals & Nightly   ipratropium-albuterol 3 mL Nebulization Q6H - RT   metoprolol tartrate 12.5 mg Oral Q12H   NIFEdipine XL 60 mg Oral Q24H   pharmacy consult - MTM  Does not apply Daily   sennosides-docusate sodium 2 tablet Oral BID   sertraline 100 mg Oral Daily       Vital Sign Min/Max for last 24 hours  Temp  Min: 97.8 °F (36.6 °C)  Max: 98.8 °F (37.1 °C)   BP  Min: 87/70  Max: 154/94   Pulse  Min: 45  Max: 123   Resp  Min: 16  Max: 22   SpO2  Min: 82 %  Max: 96 %   Flow (L/min)  Min: 6  Max: 60       Input/Output for last 24 hour shift  09/18 0701 - 09/19 0700  In: 1969.3 [P.O.:125; I.V.:1549.3]  Out: 4395 [Urine:4075]      Objective:  General Appearance:  Comfortable, well-appearing and in no acute distress.    Vital signs: (most recent): Blood pressure  "152/86, pulse 69, temperature 97.9 °F (36.6 °C), temperature source Oral, resp. rate 18, height 177.8 cm (70\"), weight 63.5 kg (139 lb 15.9 oz), SpO2 94 %.  No fever.    Output: Producing urine.    Lungs:  Normal effort and normal respiratory rate.  Breath sounds clear to auscultation.  He is not in respiratory distress.  No stridor.  No rales, decreased breath sounds or rhonchi.    Heart: Normal rate.  Regular rhythm.  S1 normal and S2 normal.  No murmur or friction rub.   Chest: Symmetric chest wall expansion. (Patient status post median sternotomy which is dressed and dry.)  Abdomen: Abdomen is soft and non-distended.  Bowel sounds are normal.   There is no abdominal tenderness.     Extremities: Normal range of motion.  There is no deformity or dependent edema.    Neurological: Patient is alert and oriented to person, place and time.    Pupils:  Pupils are equal, round, and reactive to light.  Pupils are equal.   Skin:  Warm and pale.  No rash or cyanosis.               Results from last 7 days  Lab Units 09/19/18  0322 09/18/18  0413 09/17/18 2112 09/17/18  1355   WBC 10*3/mm3 11.94* 7.92  --  10.10   HEMOGLOBIN g/dL 9.2* 7.7* 8.2* 7.9*   PLATELETS 10*3/mm3 179 154  --  164       Results from last 7 days  Lab Units 09/19/18  0325 09/18/18  0413 09/17/18  2112 09/17/18  1355 09/17/18  1029   SODIUM mmol/L 132 139 140 139 141   POTASSIUM mmol/L 3.5 4.0 4.0 3.3* 3.8   CO2 mmol/L 19.0* 19.0* 21.0 21.0 20.0   BUN mg/dL 37* 46* 50* 56* 53*   CREATININE mg/dL 2.14* 2.18* 2.20* 2.29* 2.30*   MAGNESIUM mg/dL  --  2.0  --  2.3 2.6   PHOSPHORUS mg/dL  --  5.0 4.1 3.8 4.2   GLUCOSE mg/dL 152* 106* 128* 136* 150*     Estimated Creatinine Clearance: 33 mL/min (A) (by C-G formula based on SCr of 2.14 mg/dL (H)).      Results from last 7 days  Lab Units 09/17/18  1500   PH, ARTERIAL pH units 7.391   PCO2, ARTERIAL mm Hg 34.0*   PO2 ART mm Hg 142.0*         I reviewed the patient's results and images.     Assessment/Plan "   Impression      Principal Problem:    Coronary artery disease s/p CABG x 2  Active Problems:    CKD (chronic kidney disease) stage 4, GFR 15-29 ml/min (CMS/[facility])    Type 2 diabetes mellitus (CMS/[facility])    Hepatitis C       Plan        Discontinue Cardizem for now and we will hold Lopressor today due to bradycardia.  Continue to wean oxygen as tolerated.  Creatinine is stable.  Mobilize as tolerated.  Follow-up labs and orders are placed for tomorrow morning.    Plan of care and goals reviewed with mulitdisciplinary team at daily rounds.   I discussed the patient's findings and my recommendations with patient and nursing staff         David Rosas MD, [facility]  Pulmonary and Critical Care Medicine  09/19/18 12:32 PM

## 2018-09-19 NOTE — PROGRESS NOTES
"Axel Desir  [id number]  1960     LOS: 2 days   Patient Care Team:  Sammy Glass MD as PCP - General  Sammy Glass MD as PCP - Family Medicine    Chief Complaint: Coronary artery disease      Subjective: No complaints    Objective:     Vital Sign Min/Max for last 24 hours  Temp  Min: 96.8 °F (36 °C)  Max: 98.8 °F (37.1 °C)   BP  Min: 87/70  Max: 154/94   Pulse  Min: 60  Max: 123   Resp  Min: 16  Max: 22   SpO2  Min: 87 %  Max: 95 %   No Data Recorded   No Data Recorded     Flowsheet Rows      First Filed Value   Admission Height  177.8 cm (70\") Documented at 09/17/2018 0940   Admission Weight  63.5 kg (140 lb) Documented at 09/17/2018 0940          Physical Exam:    Wound: Satisfactory    Pulses:     Mediastinal and Chest Tube Drainage:       Results Review:     Results from last 7 days  Lab Units 09/19/18  0322   WBC 10*3/mm3 11.94*   HEMOGLOBIN g/dL 9.2*   HEMATOCRIT % 27.9*   PLATELETS 10*3/mm3 179       Results from last 7 days  Lab Units 09/19/18  0325   SODIUM mmol/L 132   POTASSIUM mmol/L 3.5   CHLORIDE mmol/L 107   CO2 mmol/L 19.0*   BUN mg/dL 37*   CREATININE mg/dL 2.14*   GLUCOSE mg/dL 152*   CALCIUM mg/dL 8.3*       Results from last 7 days  Lab Units 09/17/18  1500   PH, ARTERIAL pH units 7.391   PO2 ART mm Hg 142.0*   PCO2, ARTERIAL mm Hg 34.0*   HCO3 ART mmol/L 20.6         Assessment    Principal Problem:    Coronary artery disease s/p CABG x 2  Active Problems:    CKD (chronic kidney disease) stage 4, GFR 15-29 ml/min (CMS/HCC)    Type 2 diabetes mellitus (CMS/[facility])    Hepatitis C      Doing satisfactory as per renal        Oral Calero MD  09/19/18  6:48 AM      Please note that portions of this note were completed with a voice recognition program. Efforts were made to edit the dictations, but words may be mistranscribed  "

## 2018-09-20 ENCOUNTER — APPOINTMENT (OUTPATIENT)
Dept: GENERAL RADIOLOGY | Facility: HOSPITAL | Age: 58
End: 2018-09-20

## 2018-09-20 LAB
ABO + RH BLD: NORMAL
ABO + RH BLD: NORMAL
ANION GAP SERPL CALCULATED.3IONS-SCNC: 10 MMOL/L (ref 3–11)
BH BB BLOOD EXPIRATION DATE: NORMAL
BH BB BLOOD EXPIRATION DATE: NORMAL
BH BB BLOOD TYPE BARCODE: 600
BH BB BLOOD TYPE BARCODE: 600
BH BB DISPENSE STATUS: NORMAL
BH BB DISPENSE STATUS: NORMAL
BH BB PRODUCT CODE: NORMAL
BH BB PRODUCT CODE: NORMAL
BH BB UNIT NUMBER: NORMAL
BH BB UNIT NUMBER: NORMAL
BUN BLD-MCNC: 36 MG/DL (ref 9–23)
BUN/CREAT SERPL: 18 (ref 7–25)
CALCIUM SPEC-SCNC: 8.3 MG/DL (ref 8.7–10.4)
CHLORIDE SERPL-SCNC: 106 MMOL/L (ref 99–109)
CO2 SERPL-SCNC: 21 MMOL/L (ref 20–31)
CREAT BLD-MCNC: 2 MG/DL (ref 0.6–1.3)
DEPRECATED RDW RBC AUTO: 52 FL (ref 37–54)
ERYTHROCYTE [DISTWIDTH] IN BLOOD BY AUTOMATED COUNT: 16 % (ref 11.3–14.5)
GFR SERPL CREATININE-BSD FRML MDRD: 34 ML/MIN/1.73
GLUCOSE BLD-MCNC: 106 MG/DL (ref 70–100)
GLUCOSE BLDC GLUCOMTR-MCNC: 136 MG/DL (ref 70–130)
GLUCOSE BLDC GLUCOMTR-MCNC: 174 MG/DL (ref 70–130)
GLUCOSE BLDC GLUCOMTR-MCNC: 202 MG/DL (ref 70–130)
GLUCOSE BLDC GLUCOMTR-MCNC: 99 MG/DL (ref 70–130)
HCT VFR BLD AUTO: 27.2 % (ref 38.9–50.9)
HGB BLD-MCNC: 9.1 G/DL (ref 13.1–17.5)
MCH RBC QN AUTO: 29.8 PG (ref 27–31)
MCHC RBC AUTO-ENTMCNC: 33.5 G/DL (ref 32–36)
MCV RBC AUTO: 89.2 FL (ref 80–99)
PLATELET # BLD AUTO: 166 10*3/MM3 (ref 150–450)
PMV BLD AUTO: 10.2 FL (ref 6–12)
POTASSIUM BLD-SCNC: 3.8 MMOL/L (ref 3.5–5.5)
RBC # BLD AUTO: 3.05 10*6/MM3 (ref 4.2–5.76)
SODIUM BLD-SCNC: 137 MMOL/L (ref 132–146)
UNIT  ABO: NORMAL
UNIT  ABO: NORMAL
UNIT  RH: NORMAL
UNIT  RH: NORMAL
WBC NRBC COR # BLD: 9.04 10*3/MM3 (ref 3.5–10.8)

## 2018-09-20 PROCEDURE — 97530 THERAPEUTIC ACTIVITIES: CPT

## 2018-09-20 PROCEDURE — 94799 UNLISTED PULMONARY SVC/PX: CPT

## 2018-09-20 PROCEDURE — 71045 X-RAY EXAM CHEST 1 VIEW: CPT

## 2018-09-20 PROCEDURE — 94640 AIRWAY INHALATION TREATMENT: CPT

## 2018-09-20 PROCEDURE — 85027 COMPLETE CBC AUTOMATED: CPT | Performed by: PHYSICIAN ASSISTANT

## 2018-09-20 PROCEDURE — 82962 GLUCOSE BLOOD TEST: CPT

## 2018-09-20 PROCEDURE — 99233 SBSQ HOSP IP/OBS HIGH 50: CPT | Performed by: INTERNAL MEDICINE

## 2018-09-20 PROCEDURE — 80048 BASIC METABOLIC PNL TOTAL CA: CPT | Performed by: PHYSICIAN ASSISTANT

## 2018-09-20 PROCEDURE — 94760 N-INVAS EAR/PLS OXIMETRY 1: CPT

## 2018-09-20 RX ORDER — AMIODARONE HYDROCHLORIDE 200 MG/1
200 TABLET ORAL EVERY 12 HOURS SCHEDULED
Status: DISCONTINUED | OUTPATIENT
Start: 2018-09-20 | End: 2018-09-23 | Stop reason: HOSPADM

## 2018-09-20 RX ORDER — HYDRALAZINE HYDROCHLORIDE 50 MG/1
100 TABLET, FILM COATED ORAL EVERY 8 HOURS SCHEDULED
Status: DISCONTINUED | OUTPATIENT
Start: 2018-09-20 | End: 2018-09-23 | Stop reason: HOSPADM

## 2018-09-20 RX ORDER — NICOTINE 21 MG/24HR
1 PATCH, TRANSDERMAL 24 HOURS TRANSDERMAL
Status: DISCONTINUED | OUTPATIENT
Start: 2018-09-20 | End: 2018-09-23 | Stop reason: HOSPADM

## 2018-09-20 RX ADMIN — AMIODARONE HYDROCHLORIDE 200 MG: 200 TABLET ORAL at 20:47

## 2018-09-20 RX ADMIN — NITROGLYCERIN 25 MCG/MIN: 20 INJECTION INTRAVENOUS at 04:37

## 2018-09-20 RX ADMIN — OXYCODONE HYDROCHLORIDE AND ACETAMINOPHEN 1 TABLET: 10; 325 TABLET ORAL at 08:00

## 2018-09-20 RX ADMIN — SERTRALINE HYDROCHLORIDE 100 MG: 100 TABLET ORAL at 08:00

## 2018-09-20 RX ADMIN — CLONIDINE HYDROCHLORIDE 0.1 MG: 0.1 TABLET ORAL at 14:15

## 2018-09-20 RX ADMIN — IPRATROPIUM BROMIDE AND ALBUTEROL SULFATE 3 ML: 2.5; .5 SOLUTION RESPIRATORY (INHALATION) at 19:20

## 2018-09-20 RX ADMIN — METOPROLOL TARTRATE 12.5 MG: 25 TABLET, FILM COATED ORAL at 20:47

## 2018-09-20 RX ADMIN — IPRATROPIUM BROMIDE AND ALBUTEROL SULFATE 3 ML: 2.5; .5 SOLUTION RESPIRATORY (INHALATION) at 00:16

## 2018-09-20 RX ADMIN — HYDROCODONE BITARTRATE AND ACETAMINOPHEN 1 TABLET: 7.5; 325 TABLET ORAL at 20:47

## 2018-09-20 RX ADMIN — CLONIDINE HYDROCHLORIDE 0.1 MG: 0.1 TABLET ORAL at 20:48

## 2018-09-20 RX ADMIN — INSULIN LISPRO 2 UNITS: 100 INJECTION, SOLUTION INTRAVENOUS; SUBCUTANEOUS at 20:48

## 2018-09-20 RX ADMIN — HYDRALAZINE HYDROCHLORIDE 50 MG: 50 TABLET, FILM COATED ORAL at 05:52

## 2018-09-20 RX ADMIN — ATORVASTATIN CALCIUM 80 MG: 40 TABLET, FILM COATED ORAL at 20:47

## 2018-09-20 RX ADMIN — INSULIN LISPRO 3 UNITS: 100 INJECTION, SOLUTION INTRAVENOUS; SUBCUTANEOUS at 12:28

## 2018-09-20 RX ADMIN — NIFEDIPINE 60 MG: 60 TABLET, FILM COATED, EXTENDED RELEASE ORAL at 08:00

## 2018-09-20 RX ADMIN — IPRATROPIUM BROMIDE AND ALBUTEROL SULFATE 3 ML: 2.5; .5 SOLUTION RESPIRATORY (INHALATION) at 07:25

## 2018-09-20 RX ADMIN — HYDRALAZINE HYDROCHLORIDE 100 MG: 50 TABLET, FILM COATED ORAL at 14:24

## 2018-09-20 RX ADMIN — GABAPENTIN 100 MG: 100 CAPSULE ORAL at 05:52

## 2018-09-20 RX ADMIN — DOCUSATE SODIUM,SENNOSIDES 2 TABLET: 50; 8.6 TABLET, FILM COATED ORAL at 08:00

## 2018-09-20 RX ADMIN — HYDRALAZINE HYDROCHLORIDE 100 MG: 50 TABLET, FILM COATED ORAL at 20:46

## 2018-09-20 RX ADMIN — ASPIRIN 325 MG: 325 TABLET, DELAYED RELEASE ORAL at 08:00

## 2018-09-20 RX ADMIN — GABAPENTIN 100 MG: 100 CAPSULE ORAL at 20:47

## 2018-09-20 RX ADMIN — GABAPENTIN 100 MG: 100 CAPSULE ORAL at 14:15

## 2018-09-20 RX ADMIN — METOPROLOL TARTRATE 12.5 MG: 25 TABLET, FILM COATED ORAL at 08:00

## 2018-09-20 RX ADMIN — AMIODARONE HYDROCHLORIDE 200 MG: 200 TABLET ORAL at 12:28

## 2018-09-20 RX ADMIN — CLONIDINE HYDROCHLORIDE 0.1 MG: 0.1 TABLET ORAL at 08:00

## 2018-09-20 RX ADMIN — DOCUSATE SODIUM,SENNOSIDES 2 TABLET: 50; 8.6 TABLET, FILM COATED ORAL at 20:47

## 2018-09-20 RX ADMIN — NICOTINE 1 PATCH: 21 PATCH, EXTENDED RELEASE TRANSDERMAL at 14:15

## 2018-09-20 NOTE — PROGRESS NOTES
"   LOS: 3 days    Patient Care Team:  Sammy Glass MD as PCP - General  Sammy Glass MD as PCP - Family Medicine    Chief Complaint:  Shortness of breath  58-year-old  male history of chronic kidney disease likely diabetic nephropathy, nephrosclerosis.  He was last seen on 8/28/2018 and [facility]    His previous labs weight from 2.05-2.61.  Has CK D stage IV.  Patient had a heart catheter done in July 2018 with 2 vessel disease, patient was admitted yesterday underwent a CABG today on 9/17/2018 with LIMA to LAD and SVG to PDA.  Subjective   Renal function improved , good urine output, no new complaints, no new events.    Review of Systems:      Denies  Dysuria, hematuria, nausea vomiting, chest pain or shortness of breath.  Objective     Vital Sign Min/Max for last 24 hours  Temp  Min: 97.8 °F (36.6 °C)  Max: 99 °F (37.2 °C)   BP  Min: 97/69  Max: 156/90   Pulse  Min: 61  Max: 147   Resp  Min: 16  Max: 22   SpO2  Min: 91 %  Max: 100 %   No Data Recorded   No Data Recorded     Flowsheet Rows      First Filed Value   Admission Height  177.8 cm (70\") Documented at 09/17/2018 0940   Admission Weight  63.5 kg (140 lb) Documented at 09/17/2018 0940          I/O this shift:  In: 107.5 [I.V.:107.5]  Out: 850 [Urine:850]  I/O last 3 completed shifts:  In: 1570.3 [P.O.:487; I.V.:883.3; IV Piggyback:200]  Out: 4710 [Urine:4600; Chest Tube:110]    Physical Exam:   General appearance: Alert oriented ×3 no obvious distress.  Eyes: Pupils reactive EOMI.  Neck: Supple, no JVD.  Lungs: Few rhonchi's are heard equal chest movement.  Chest.  Chest tube in place.  Heart: No gallop or rub.  Abdomen: Soft nontender positive bowel sounds.  : No suprapubic fullness, urine clear  Extremities.  No edema cyanosis.  Neuro: Grossly intact.  No focal deficit.  Moving all extremities.    Results from last 7 days  Lab Units 09/20/18  0401 09/19/18  1925 09/19/18  0325 09/19/18  0322 09/18/18  0413 " 09/17/18  2112 09/17/18  1355 09/17/18  1029 09/17/18  0925  09/17/18  0616 09/16/18  1426   WBC 10*3/mm3 9.04  --   --  11.94* 7.92  --  10.10 17.93*  --   --   --  9.22   HEMOGLOBIN g/dL 9.1*  --   --  9.2* 7.7* 8.2* 7.9* 9.1*  --   --   --  11.3*   HEMOGLOBIN, POC g/dL  --   --   --   --   --   --   --   --  8.2*  < >  --   --    HEMATOCRIT % 27.2*  --   --  27.9* 23.2* 24.9* 23.1* 27.3*  --   --   --  33.9*   HEMATOCRIT POC %  --   --   --   --   --   --   --   --  24*  < >  --   --    PLATELETS 10*3/mm3 166  --   --  179 154  --  164 184  --   --   --  218   SODIUM mmol/L 137  --  132  --  139 140 139 141  --   --  134 135   POTASSIUM mmol/L 3.8 3.9 3.5  --  4.0 4.0 3.3* 3.8  --   --  3.6 4.3   CHLORIDE mmol/L 106  --  107  --  109 114* 110* 108  --   --  108 105   CO2 mmol/L 21.0  --  19.0*  --  19.0* 21.0 21.0 20.0  --   --  15.0* 18.0*   BUN mg/dL 36*  --  37*  --  46* 50* 56* 53*  --   --  58* 74*   CREATININE mg/dL 2.00*  --  2.14*  --  2.18* 2.20* 2.29* 2.30*  --   --  2.60* 2.73*   GLUCOSE mg/dL 106*  --  152*  --  106* 128* 136* 150*  --   --  146* 99   CALCIUM mg/dL 8.3*  --  8.3*  --  7.9* 7.9* 8.0* 8.7  --   --  8.3* 8.1*   PHOSPHORUS mg/dL  --   --   --   --  5.0 4.1 3.8 4.2  --   --   --   --    < > = values in this interval not displayed.         Results Review:     I reviewed the patient's new clinical results.      amiodarone 200 mg Oral Q12H   aspirin 325 mg Oral Daily   atorvastatin 80 mg Oral Nightly   CloNIDine 0.1 mg Oral Q8H   gabapentin 100 mg Oral Q8H   hydrALAZINE 100 mg Oral Q8H   insulin lispro 0-7 Units Subcutaneous 4x Daily With Meals & Nightly   ipratropium-albuterol 3 mL Nebulization Q6H - RT   metoprolol tartrate 12.5 mg Oral Q12H   nicotine 1 patch Transdermal Q24H   NIFEdipine XL 60 mg Oral Q24H   pharmacy consult - MTM  Does not apply Daily   sennosides-docusate sodium 2 tablet Oral BID   sertraline 100 mg Oral Daily       dexmedetomidine 0.2-1.5 mcg/kg/hr Last Rate: 0.4  mcg/kg/hr (09/18/18 0744)   niCARdipine 5-15 mg/hr Last Rate: 5 mg/hr (09/18/18 0845)   nitroglycerin 5-200 mcg/min Last Rate: 25 mcg/min (09/20/18 0437)   norepinephrine 0.02-0.3 mcg/kg/min    phenylephrine 0.5-3 mcg/kg/min        Medication Review: As above    Assessment/Plan      1.  Acute on chronic renal failure creatinine better.  Status post CABG, still making good urine.  2.  Status post CABG ×2 today.  Chest tube in place.   3.  Diabetes type 2.  4.  Anemia of chronic disease.  5.  Secondary hyperparathyroidism secondary to renal insufficiency  6.  Hepatitis C    Plan:    Avoid nephrotoxic medications.  Keep systolic blood pressure greater than 100.  Check volume status.  Check labs in the morning.  Kartik Encinas MD  09/20/18  5:02 PM

## 2018-09-20 NOTE — THERAPY TREATMENT NOTE
Acute Care - Physical Therapy Treatment Note  [facility]     Patient Name: Axel Desir  : 1960  MRN: [id number]  Today's Date: 2018  Onset of Illness/Injury or Date of Surgery: 18  Date of Referral to PT: 18  Referring Physician: MD Calero    Admit Date: 2018    Visit Dx:    ICD-10-CM ICD-9-CM   1. Impaired functional mobility, balance, gait, and endurance Z74.09 V49.89   2. CAD in native artery I25.10 414.01     Patient Active Problem List   Diagnosis   • Angina pectoris (CMS/HCC)   • ASCVD (arteriosclerotic cardiovascular disease), severe 2 vessel disease per [facility] 18   • Coronary artery disease s/p CABG x 2   • CAD in native artery   • CKD (chronic kidney disease) stage 4, GFR 15-29 ml/min (CMS/HCC)   • Type 2 diabetes mellitus (CMS/HCC)   • Hepatitis C       Therapy Treatment          Rehabilitation Treatment Summary     Row Name 18 0830             Treatment Time/Intention    Discipline physical therapist  -MUSA      Document Type therapy note (daily note)  -MUSA      Subjective Information complains of;fatigue  -MUSA      Mode of Treatment physical therapy  -MUSA      Care Plan Review care plan/treatment goals reviewed;risks/benefits reviewed;patient/other agree to care plan  -MUSA      Therapy Frequency (PT Clinical Impression) daily  -MUSA      Patient Effort good  -MUSA      Existing Precautions/Restrictions cardiac;oxygen therapy device and L/min;sternal  -MUSA      Recorded by [MUSA] Elizabeth Michel, PT 18 1123      Row Name 18 0830             Vital Signs    Pre Systolic BP Rehab 120  -MUSA      Pre Treatment Diastolic BP 80  -MUSA      Post Systolic BP Rehab 140  -MUSA      Post Treatment Diastolic BP 80  -MUSA      Pretreatment Heart Rate (beats/min) 103  -MUSA      Intratreatment Heart Rate (beats/min) 125  -MUSA      Posttreatment Heart Rate (beats/min) 100   A-fib  -MUSA      Pre SpO2 (%) 98  -MUSA      O2 Delivery Pre Treatment hi-flow  -MUSA      Intra SpO2 (%) 95  -MUSA       O2 Delivery Intra Treatment supplemental O2   100% nonrebreather to ambulate  -MUSA      Post SpO2 (%) 100  -MUSA      O2 Delivery Post Treatment supplemental O2   high flow nasal canula  -MUSA      Pre Patient Position Sitting  -MUSA      Intra Patient Position Standing  -MUSA      Post Patient Position Sitting  -MUSA      Recorded by [MUSA] Elizabeth Michel, PT 09/20/18 1123      Row Name 09/20/18 0830             Cognitive Assessment/Intervention- PT/OT    Affect/Mental Status (Cognitive) WFL  -MUSA      Orientation Status (Cognition) oriented x 4  -MUSA      Follows Commands (Cognition) WFL  -MSUA      Cognitive Function (Cognitive) WFL  -MUSA      Safety Deficit (Cognitive) safety precautions awareness;safety precautions follow-through/compliance  -MUSA      Personal Safety Interventions gait belt;nonskid shoes/slippers when out of bed  -MUSA      Recorded by [MUSA] Elizabeth Michel, PT 09/20/18 1123      Row Name 09/20/18 0830             Safety Issues, Functional Mobility    Safety Issues Affecting Function (Mobility) insight into deficits/self awareness;safety precaution awareness;safety precautions follow-through/compliance;awareness of need for assistance  -MUSA      Impairments Affecting Function (Mobility) balance;endurance/activity tolerance;shortness of breath;strength  -MUSA      Recorded by [MUSA] Elizabeth Michel, PT 09/20/18 1123      Row Name 09/20/18 0830             Bed Mobility Assessment/Treatment    Comment (Bed Mobility) patient is OOB and returns to the chair  -MUSA      Recorded by [MUSA] Elizabeth Michel, PT 09/20/18 1123      Row Name 09/20/18 0830             Transfer Assessment/Treatment    Transfer Assessment/Treatment sit-stand transfer;stand-sit transfer  -MUSA      Recorded by [MUSA] Elizabeth Michel, PT 09/20/18 1123      Row Name 09/20/18 0830             Sit-Stand Transfer    Sit-Stand Roy (Transfers) supervision  -MUSA      Recorded by [MUSA] Elizabeth Michel, PT 09/20/18 1123      Row Name 09/20/18  0830             Stand-Sit Transfer    Stand-Sit [place] (Transfers) supervision  -MUSA      Recorded by [MUSA] Elizabeth Michel, PT 09/20/18 1123      Row Name 09/20/18 0830             Gait/Stairs Assessment/Training    Gait/Stairs Assessment/Training gait/ambulation independence  -MUSA      Carbon Level (Gait) contact guard  -MUSA      Distance in Feet (Gait) 200  -MUSA      Pattern (Gait) step-through  -MUSA      Deviations/Abnormal Patterns (Gait) stride length decreased  -MUSA      Bilateral Gait Deviations forward flexed posture  -MUSA      Comment (Gait/Stairs) patient able to ambulate with CGA and stable sats today on 100% non rebreather mask  -MUSA      Recorded by [MUSA] Elizabeth Michel, PT 09/20/18 1123      Row Name 09/20/18 0830             Motor Skills Assessment/Interventions    Additional Documentation Therapeutic Exercise Interventions (Group)  -MUSA      Recorded by [MUSA] Elizabeth Michel, PT 09/20/18 1123      Row Name 09/20/18 0830             Therapeutic Exercise    Therapeutic Exercise seated, lower extremities;seated, upper extremities  -MUSA      Additional Documentation Therapeutic Exercise (Row)  -MUSA      36010 - PT Therapeutic Activity Minutes 23  -MUSA      Recorded by [MUSA] Elizabeth Michel, PT 09/20/18 1123      Row Name 09/20/18 0830             Upper Extremity Seated Therapeutic Exercise    Performed, Seated Upper Extremity (Therapeutic Exercise) shoulder flexion/extension;shoulder abduction/adduction;elbow flexion/extension;wrist flexion/extension  -MUSA      Exercise Type, Seated Upper Extremity (Therapeutic Exercise) AROM (active range of motion)  -MUSA      Sets/Reps Detail, Seated Upper Extremity (Therapeutic Exercise) 1/10  -MUSA      Recorded by [MUSA] Elizabeth Michel, PT 09/20/18 1123      Row Name 09/20/18 0830             Lower Extremity Seated Therapeutic Exercise    Performed, Seated Lower Extremity (Therapeutic Exercise) hip flexion/extension;knee flexion/extension;ankle  dorsiflexion/plantarflexion  -MUSA      Exercise Type, Seated Lower Extremity (Therapeutic Exercise) AROM (active range of motion)  -MUSA      Sets/Reps Detail, Seated Lower Extremity (Therapeutic Exercise) 1/10  -MUSA      Recorded by [MUSA] Elizabeth Michel, PT 09/20/18 1123      Row Name 09/20/18 0830             Static Sitting Balance    Level of Castella (Unsupported Sitting, Static Balance) independent  -MUSA      Sitting Position (Unsupported Sitting, Static Balance) sitting in chair  -MUSA      Recorded by [MUSA] Elizabeth Michel, PT 09/20/18 1123      Row Name 09/20/18 0830             Static Standing Balance    Level of Castella (Supported Standing, Static Balance) contact guard assist  -MUSA      Recorded by [MUSA] Elizabeth Michel, PT 09/20/18 1123      Row Name 09/20/18 0830             Positioning and Restraints    Pre-Treatment Position sitting in chair/recliner  -MUSA      Post Treatment Position chair  -MUSA      In Chair notified nsg;sitting;reclined;call light within reach  -MUSA      Recorded by [MUSA] Elizabeth Michel, PT 09/20/18 1123      Row Name 09/20/18 0830             Pain Scale: Numbers Pre/Post-Treatment    Pain Scale: Numbers, Pretreatment 3/10  -MUSA      Pain Scale: Numbers, Post-Treatment 4/10  -MUSA      Pain Location - Orientation incisional  -MUSA      Pain Location chest  -MUSA      Pain Intervention(s) Repositioned;Ambulation/increased activity;Splinting  -MUSA      Recorded by [MUSA] Elizabeth Michel, PT 09/20/18 1123      Row Name                Wound 09/17/18 0634 chest incision    Wound - Properties Group Date first assessed: 09/17/18 [SH] Time first assessed: 0634 [SH] Location: chest [SH] Type: incision [SH] Recorded by:  [SH] Haase, Sherri L, RN 09/17/18 0634    Row Name                Wound 09/17/18 0750 Right leg incision    Wound - Properties Group Date first assessed: 09/17/18 [SH] Time first assessed: 0750 [SH] Side: Right [SH] Location: leg [SH] Type: incision [SH] Recorded by:  [SH]  Haase, Jayleen L, RN 09/17/18 0750    Row Name 09/20/18 0830             Coping    Observed Emotional State calm;cooperative  -MUSA      Verbalized Emotional State acceptance  -MUSA      Recorded by [MUSA] Elizabeth Michel, PT 09/20/18 1123      Row Name 09/20/18 0830             Plan of Care Review    Plan of Care Reviewed With patient  -MUSA      Recorded by [MUSA] Elizabeth Michel, PT 09/20/18 1123      Row Name 09/20/18 0830             Outcome Summary/Treatment Plan (PT)    Daily Summary of Progress (PT) progress toward functional goals as expected  -MUSA      Barriers to Overall Progress (PT) still on high flow nasal cannula  -MUSA      Anticipated Discharge Disposition (PT) home with assist  -MUSA      Recorded by [MUSA] Elizabeth Michel, PT 09/20/18 1123        User Key  (r) = Recorded By, (t) = Taken By, (c) = Cosigned By    Initials Name Effective Dates Discipline    MUSA Elizabeth Michel, PT 06/19/15 -  PT    SH Haase, Sherri L, RN 06/16/16 -  Nurse          Wound 09/17/18 0634 chest incision (Active)   Dressing Appearance intact;dry 9/20/2018  8:00 AM   Closure Approximated 9/20/2018  8:00 AM   Drainage Amount none 9/20/2018  8:00 AM   Dressing Care, Wound low-adherent 9/19/2018  6:00 PM       Wound 09/17/18 0750 Right leg incision (Active)   Dressing Appearance dry;intact 9/20/2018  8:00 AM   Closure Approximated 9/20/2018  8:00 AM   Drainage Amount none 9/20/2018  8:00 AM   Dressing Care, Wound open to air 9/19/2018  8:00 PM             Physical Therapy Education     Title: PT OT SLP Therapies (Active)     Topic: Physical Therapy (Active)     Point: Mobility training (Active)    Learning Progress Summary     Learner Status Readiness Method Response Comment Documented by    Patient Active Acceptance E NR  MUSA 09/20/18 0830     Active Acceptance E NR  KR 09/19/18 1153     Active Acceptance E NR  MUSA 09/18/18 1045          Point: Home exercise program (Active)    Learning Progress Summary     Learner Status Readiness  Method Response Comment Documented by    Patient Active Acceptance E NR  MUSA 09/20/18 0830     Active Acceptance E NR  KR 09/19/18 1153     Active Acceptance E NR  MUSA 09/18/18 1045          Point: Body mechanics (Active)    Learning Progress Summary     Learner Status Readiness Method Response Comment Documented by    Patient Active Acceptance E NR  MUSA 09/20/18 0830     Active Acceptance E NR  KR 09/19/18 1153     Active Acceptance E NR  MUSA 09/18/18 1045          Point: Precautions (Active)    Learning Progress Summary     Learner Status Readiness Method Response Comment Documented by    Patient Active Acceptance E NR  MUSA 09/20/18 0830     Active Acceptance E NR  KR 09/19/18 1153     Active Acceptance E NR  MUSA 09/18/18 1045                      User Key     Initials Effective Dates Name Provider Type Discipline    MUSA 06/19/15 -  Elizabeth Michel, PT Physical Therapist PT    KR 04/03/18 -  Roxana Vasques, PT Physical Therapist PT                    PT Recommendation and Plan  Anticipated Discharge Disposition (PT): home with assist  Planned Therapy Interventions (PT Eval): balance training, bed mobility training, gait training, home exercise program, transfer training  Therapy Frequency (PT Clinical Impression): daily  Outcome Summary/Treatment Plan (PT)  Daily Summary of Progress (PT): progress toward functional goals as expected  Barriers to Overall Progress (PT): still on high flow nasal cannula  Anticipated Discharge Disposition (PT): home with assist  Plan of Care Reviewed With: patient  Progress: improving  Outcome Summary: patient is able to ambulate 200 ft with stable sats on 100% nonrebreather for ambulation.           Outcome Measures     Row Name 09/20/18 0830 09/19/18 0910 09/18/18 1045       How much help from another person do you currently need...    Turning from your back to your side while in flat bed without using bedrails? 3  -MUSA 3  -KR 3  -MUSA    Moving from lying on back to sitting on the side  of a flat bed without bedrails? 3  -MUSA 2  -KR 2  -MUSA    Moving to and from a bed to a chair (including a wheelchair)? 3  -MUSA 3  -KR 3  -MUSA    Standing up from a chair using your arms (e.g., wheelchair, bedside chair)? 3  -MUSA 3  -KR 3  -MUSA    Climbing 3-5 steps with a railing? 3  -MUSA 2  -KR 2  -MUSA    To walk in hospital room? 3  -MUSA 3  -KR 3  -MUSA    AM-PAC 6 Clicks Score 18  -MUSA 16  -KR 16  -MUSA       Functional Assessment    Outcome Measure Options  -- AM-PAC 6 Clicks Basic Mobility (PT)  -KR AM-PAC 6 Clicks Basic Mobility (PT)  -MUSA      User Key  (r) = Recorded By, (t) = Taken By, (c) = Cosigned By    Initials Name Provider Type    Elizabeth Ortiz, PT Physical Therapist    Roxana Lozano, PT Physical Therapist           Time Calculation:         PT Charges     Row Name 09/20/18 0830             Time Calculation    Start Time 0830  -MUSA      PT Received On 09/20/18  -MUSA      PT Goal Re-Cert Due Date 09/28/18  -MUSA         Time Calculation- PT    Total Timed Code Minutes- PT 23 minute(s)  -MUSA         Timed Charges    28256 - PT Therapeutic Activity Minutes 23  -MUSA        User Key  (r) = Recorded By, (t) = Taken By, (c) = Cosigned By    Initials Name Provider Type    Elizabeth Ortiz, PT Physical Therapist        Therapy Suggested Charges     Code   Minutes Charges    65069 (CPT®) Hc Pt Neuromusc Re Education Ea 15 Min      03001 (CPT®) Hc Pt Ther Proc Ea 15 Min      45171 (CPT®) Hc Gait Training Ea 15 Min      62768 (CPT®) Hc Pt Therapeutic Act Ea 15 Min 23 2    54381 (CPT®) Hc Pt Manual Therapy Ea 15 Min      17495 (CPT®) Hc Pt Iontophoresis Ea 15 Min      82084 (CPT®) Hc Pt Elec Stim Ea-Per 15 Min      90667 (CPT®) Hc Pt Ultrasound Ea 15 Min      47478 (CPT®) Hc Pt Self Care/Mgmt/Train Ea 15 Min      17539 (CPT®) Hc Pt Prosthetic (S) Train Initial Encounter, Each 15 Min      05950 (CPT®) Hc Pt Orthotic(S)/Prosthetic(S) Encounter, Each 15 Min      02538 (CPT®) Hc Orthotic(S) Mgmt/Train Initial Encounter,  Each 15min      Total  23 2        Therapy Charges for Today     Code Description Service Date Service Provider Modifiers Qty    [id number] HC PT THERAPEUTIC ACT EA 15 MIN 9/20/2018 Elizabeth Michel, PT GP 2          PT G-Codes  Outcome Measure Options: AM-PAC 6 Clicks Basic Mobility (PT)  AM-PAC 6 Clicks Score: 18    Elizabeth Michel, PT  9/20/2018

## 2018-09-20 NOTE — PLAN OF CARE
Problem: Patient Care Overview  Goal: Plan of Care Review  Outcome: Ongoing (interventions implemented as appropriate)   09/20/18 8540   Coping/Psychosocial   Plan of Care Reviewed With patient   Plan of Care Review   Progress improving   OTHER   Outcome Summary Pt remains on high-flow NC. Pt's Nitro gtt titrated from 100 to 25mcg. Only one request for pain meds overnight. Pt starting to ask about DC home date. No BM yet.

## 2018-09-20 NOTE — PLAN OF CARE
Problem: Patient Care Overview  Goal: Plan of Care Review  Outcome: Ongoing (interventions implemented as appropriate)   09/20/18 [facility]   Plan of Care Review   Progress improving   OTHER   Outcome Summary no c/o pain today. pt able to have a BM is on HFNC. NTG drip is set for 7.5mcg/hr. VSS will continue to monitor.     Goal: Individualization and Mutuality  Outcome: Ongoing (interventions implemented as appropriate)      Problem: Cardiac Surgery (Adult)  Goal: Signs and Symptoms of Listed Potential Problems Will be Absent, Minimized or Managed (Cardiac Surgery)  Outcome: Ongoing (interventions implemented as appropriate)      Problem: Fall Risk (Adult)  Goal: Absence of Fall  Outcome: Ongoing (interventions implemented as appropriate)      Problem: Skin Injury Risk (Adult)  Goal: Skin Health and Integrity  Outcome: Ongoing (interventions implemented as appropriate)

## 2018-09-20 NOTE — PROGRESS NOTES
Intensive Care Follow-up     Hospital:  LOS: 3 days   Mr. Axel Desir, 58 y.o. male is followed for:   Coronary artery disease involving native heart with angina pectoris (CMS/HCC)   Followed for hyperglycemia and acute postoperative respiratory insufficiency     Subjective   Interval History:  The chart is been reviewed. The patient unfortunately has gone back into atrial fibrillation this morning. He states that he feels he is breathing much better today however. He remains on high flow nasal cannula oxygen.    The patient's relevant past medical, surgical and social history were reviewed and updated in Epic as appropriate.        Objective     Infusions:    dexmedetomidine 0.2-1.5 mcg/kg/hr Last Rate: 0.4 mcg/kg/hr (09/18/18 0744)   niCARdipine 5-15 mg/hr Last Rate: 5 mg/hr (09/18/18 0845)   nitroglycerin 5-200 mcg/min Last Rate: 25 mcg/min (09/20/18 0437)   norepinephrine 0.02-0.3 mcg/kg/min    phenylephrine 0.5-3 mcg/kg/min      Medications:    amiodarone 200 mg Oral Q12H   aspirin 325 mg Oral Daily   atorvastatin 80 mg Oral Nightly   CloNIDine 0.1 mg Oral Q8H   gabapentin 100 mg Oral Q8H   hydrALAZINE 100 mg Oral Q8H   insulin lispro 0-7 Units Subcutaneous 4x Daily With Meals & Nightly   ipratropium-albuterol 3 mL Nebulization Q6H - RT   metoprolol tartrate 12.5 mg Oral Q12H   NIFEdipine XL 60 mg Oral Q24H   pharmacy consult - MTM  Does not apply Daily   sennosides-docusate sodium 2 tablet Oral BID   sertraline 100 mg Oral Daily       Vital Sign Min/Max for last 24 hours  Temp  Min: 97.8 °F (36.6 °C)  Max: 99 °F (37.2 °C)   BP  Min: 97/69  Max: 161/93   Pulse  Min: 61  Max: 147   Resp  Min: 18  Max: 22   SpO2  Min: 92 %  Max: 100 %   Flow (L/min)  Min: 35  Max: 50       Input/Output for last 24 hour shift  09/19 0701 - 09/20 0700  In: 968 [P.O.:487; I.V.:481]  Out: 3575 [Urine:3575]      Objective:  General Appearance:  Comfortable, well-appearing, in no acute distress and not in pain.    Vital signs:  "(most recent): Blood pressure 137/90, pulse 75, temperature 97.8 °F (36.6 °C), temperature source Oral, resp. rate 18, height 177.8 cm (70\"), weight 63.5 kg (139 lb 15.9 oz), SpO2 100 %.  No fever.    Output: Producing urine.    Lungs:  Normal effort and normal respiratory rate.  He is not in respiratory distress.  No stridor.  There are decreased breath sounds.  No rales, wheezes or rhonchi.    Heart: Normal rate.  Irregular rhythm.  S1 normal and S2 normal.  No murmur or friction rub.   Chest: Symmetric chest wall expansion. (Patient status post median sternotomy)  Abdomen: Abdomen is soft and non-distended.  Bowel sounds are normal.   There is no abdominal tenderness.     Extremities: Normal range of motion.  There is no deformity or dependent edema.    Neurological: Patient is alert and oriented to person, place and time.    Pupils:  Pupils are equal, round, and reactive to light.  Pupils are equal.   Skin:  Warm and pale.  No rash or cyanosis.               Results from last 7 days  Lab Units 09/20/18  0401 09/19/18  0322 09/18/18  0413   WBC 10*3/mm3 9.04 11.94* 7.92   HEMOGLOBIN g/dL 9.1* 9.2* 7.7*   PLATELETS 10*3/mm3 166 179 154       Results from last 7 days  Lab Units 09/20/18  0401 09/19/18  1925 09/19/18  0325 09/18/18  0413 09/17/18  2112 09/17/18  1355 09/17/18  1029   SODIUM mmol/L 137  --  132 139 140 139 141   POTASSIUM mmol/L 3.8 3.9 3.5 4.0 4.0 3.3* 3.8   CO2 mmol/L 21.0  --  19.0* 19.0* 21.0 21.0 20.0   BUN mg/dL 36*  --  37* 46* 50* 56* 53*   CREATININE mg/dL 2.00*  --  2.14* 2.18* 2.20* 2.29* 2.30*   MAGNESIUM mg/dL  --   --   --  2.0  --  2.3 2.6   PHOSPHORUS mg/dL  --   --   --  5.0 4.1 3.8 4.2   GLUCOSE mg/dL 106*  --  152* 106* 128* 136* 150*     Estimated Creatinine Clearance: 35.3 mL/min (A) (by C-G formula based on SCr of 2 mg/dL (H)).      Results from last 7 days  Lab Units 09/17/18  1500   PH, ARTERIAL pH units 7.391   PCO2, ARTERIAL mm Hg 34.0*   PO2 ART mm Hg 142.0*       Images: "   Chest x-ray has been reviewed and shows increased aeration at the right base.    I reviewed the patient's results and images.     Assessment/Plan   Impression      Principal Problem:    Coronary artery disease s/p CABG x 2  Active Problems:    CKD (chronic kidney disease) stage 4, GFR 15-29 ml/min (CMS/[facility])    Type 2 diabetes mellitus (CMS/[facility])    Hepatitis C    Acute postoperative respiratory insufficiency     Plan        We will plan to restart the patient's amiodarone as he is back in atrial fibrillation.  Continue Lopressor for now.  Continue with good pulmonary hygiene and we will wean oxygen as tolerated.  He may require anticoagulation secondary to his paroxysmal atrial fibrillation.  We will continue to monitor his renal function.  Follow-up orders have been placed for tomorrow morning.  He will remain in the intensive care unit today.    Plan of care and goals reviewed with mulitdisciplinary team at daily rounds.   I discussed the patient's findings and my recommendations with patient and nursing staff       David Rosas MD, [facility]  Pulmonary and Critical Care Medicine  09/20/18 12:18 PM

## 2018-09-20 NOTE — PLAN OF CARE
Problem: Cardiac Surgery (Adult)  Goal: Signs and Symptoms of Listed Potential Problems Will be Absent, Minimized or Managed (Cardiac Surgery)  Outcome: Ongoing (interventions implemented as appropriate)

## 2018-09-20 NOTE — PROGRESS NOTES
"Axel Desir  [id number]  1960     LOS: 3 days   Patient Care Team:  Sammy Glass MD as PCP - General  Sammy Glass MD as PCP - Family Medicine    Chief Complaint: Coronary artery disease      Subjective: No complaints    Objective:     Vital Sign Min/Max for last 24 hours  Temp  Min: 97.9 °F (36.6 °C)  Max: 99 °F (37.2 °C)   BP  Min: 97/69  Max: 161/93   Pulse  Min: 61  Max: 113   Resp  Min: 18  Max: 22   SpO2  Min: 91 %  Max: 99 %   No Data Recorded   No Data Recorded     Flowsheet Rows      First Filed Value   Admission Height  177.8 cm (70\") Documented at 09/17/2018 0940   Admission Weight  63.5 kg (140 lb) Documented at 09/17/2018 0940          Physical Exam:    Wound:    Pulses:     Mediastinal and Chest Tube Drainage:       Results Review:     Results from last 7 days  Lab Units 09/20/18  0401   WBC 10*3/mm3 9.04   HEMOGLOBIN g/dL 9.1*   HEMATOCRIT % 27.2*   PLATELETS 10*3/mm3 166       Results from last 7 days  Lab Units 09/20/18  0401   SODIUM mmol/L 137   POTASSIUM mmol/L 3.8   CHLORIDE mmol/L 106   CO2 mmol/L 21.0   BUN mg/dL 36*   CREATININE mg/dL 2.00*   GLUCOSE mg/dL 106*   CALCIUM mg/dL 8.3*       Results from last 7 days  Lab Units 09/17/18  1500   PH, ARTERIAL pH units 7.391   PO2 ART mm Hg 142.0*   PCO2, ARTERIAL mm Hg 34.0*   HCO3 ART mmol/L 20.6         Assessment    Principal Problem:    Coronary artery disease s/p CABG x 2  Active Problems:    CKD (chronic kidney disease) stage 4, GFR 15-29 ml/min (CMS/HCC)    Type 2 diabetes mellitus (CMS/HCC)    Hepatitis C      Doing satisfactory.  Creatinine stable        Oral Calero MD  09/20/18  6:41 AM      Please note that portions of this note were completed with a voice recognition program. Efforts were made to edit the dictations, but words may be mistranscribed  "

## 2018-09-20 NOTE — PLAN OF CARE
Problem: Patient Care Overview  Goal: Plan of Care Review  Outcome: Ongoing (interventions implemented as appropriate)   09/20/18 0830   Coping/Psychosocial   Plan of Care Reviewed With patient   Plan of Care Review   Progress improving   OTHER   Outcome Summary patient is able to ambulate 200 ft with stable sats on 100% nonrebreather for ambulation.
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
## 2018-09-21 LAB
ANION GAP SERPL CALCULATED.3IONS-SCNC: 11 MMOL/L (ref 3–11)
BUN BLD-MCNC: 43 MG/DL (ref 9–23)
BUN/CREAT SERPL: 21.6 (ref 7–25)
CALCIUM SPEC-SCNC: 8 MG/DL (ref 8.7–10.4)
CHLORIDE SERPL-SCNC: 104 MMOL/L (ref 99–109)
CO2 SERPL-SCNC: 23 MMOL/L (ref 20–31)
CREAT BLD-MCNC: 1.99 MG/DL (ref 0.6–1.3)
GFR SERPL CREATININE-BSD FRML MDRD: 34 ML/MIN/1.73
GLUCOSE BLD-MCNC: 116 MG/DL (ref 70–100)
GLUCOSE BLDC GLUCOMTR-MCNC: 135 MG/DL (ref 70–130)
GLUCOSE BLDC GLUCOMTR-MCNC: 161 MG/DL (ref 70–130)
GLUCOSE BLDC GLUCOMTR-MCNC: 173 MG/DL (ref 70–130)
GLUCOSE BLDC GLUCOMTR-MCNC: 179 MG/DL (ref 70–130)
POTASSIUM BLD-SCNC: 3.9 MMOL/L (ref 3.5–5.5)
SODIUM BLD-SCNC: 138 MMOL/L (ref 132–146)

## 2018-09-21 PROCEDURE — 94799 UNLISTED PULMONARY SVC/PX: CPT

## 2018-09-21 PROCEDURE — 99232 SBSQ HOSP IP/OBS MODERATE 35: CPT | Performed by: INTERNAL MEDICINE

## 2018-09-21 PROCEDURE — 94760 N-INVAS EAR/PLS OXIMETRY 1: CPT

## 2018-09-21 PROCEDURE — 97530 THERAPEUTIC ACTIVITIES: CPT

## 2018-09-21 PROCEDURE — 80048 BASIC METABOLIC PNL TOTAL CA: CPT | Performed by: PHYSICIAN ASSISTANT

## 2018-09-21 PROCEDURE — 82962 GLUCOSE BLOOD TEST: CPT

## 2018-09-21 PROCEDURE — 94640 AIRWAY INHALATION TREATMENT: CPT

## 2018-09-21 RX ORDER — NIFEDIPINE 30 MG/1
30 TABLET, EXTENDED RELEASE ORAL ONCE
Status: COMPLETED | OUTPATIENT
Start: 2018-09-21 | End: 2018-09-21

## 2018-09-21 RX ORDER — NITROGLYCERIN 20 MG/100ML
5-200 INJECTION INTRAVENOUS CONTINUOUS PRN
Status: DISCONTINUED | OUTPATIENT
Start: 2018-09-21 | End: 2018-09-23 | Stop reason: HOSPADM

## 2018-09-21 RX ADMIN — HYDRALAZINE HYDROCHLORIDE 100 MG: 50 TABLET, FILM COATED ORAL at 05:33

## 2018-09-21 RX ADMIN — INSULIN LISPRO 2 UNITS: 100 INJECTION, SOLUTION INTRAVENOUS; SUBCUTANEOUS at 20:35

## 2018-09-21 RX ADMIN — IPRATROPIUM BROMIDE AND ALBUTEROL SULFATE 3 ML: 2.5; .5 SOLUTION RESPIRATORY (INHALATION) at 19:49

## 2018-09-21 RX ADMIN — CLONIDINE HYDROCHLORIDE 0.1 MG: 0.1 TABLET ORAL at 05:33

## 2018-09-21 RX ADMIN — HYDRALAZINE HYDROCHLORIDE 100 MG: 50 TABLET, FILM COATED ORAL at 14:51

## 2018-09-21 RX ADMIN — GABAPENTIN 100 MG: 100 CAPSULE ORAL at 20:35

## 2018-09-21 RX ADMIN — HYDRALAZINE HYDROCHLORIDE 100 MG: 50 TABLET, FILM COATED ORAL at 20:35

## 2018-09-21 RX ADMIN — IPRATROPIUM BROMIDE AND ALBUTEROL SULFATE 3 ML: 2.5; .5 SOLUTION RESPIRATORY (INHALATION) at 07:21

## 2018-09-21 RX ADMIN — HYDROCODONE BITARTRATE AND ACETAMINOPHEN 1 TABLET: 7.5; 325 TABLET ORAL at 09:43

## 2018-09-21 RX ADMIN — CLONIDINE HYDROCHLORIDE 0.1 MG: 0.1 TABLET ORAL at 14:51

## 2018-09-21 RX ADMIN — NIFEDIPINE 30 MG: 30 TABLET, FILM COATED, EXTENDED RELEASE ORAL at 12:26

## 2018-09-21 RX ADMIN — HYDROCODONE BITARTRATE AND ACETAMINOPHEN 1 TABLET: 7.5; 325 TABLET ORAL at 20:35

## 2018-09-21 RX ADMIN — INSULIN LISPRO 2 UNITS: 100 INJECTION, SOLUTION INTRAVENOUS; SUBCUTANEOUS at 08:18

## 2018-09-21 RX ADMIN — GABAPENTIN 100 MG: 100 CAPSULE ORAL at 14:51

## 2018-09-21 RX ADMIN — METOPROLOL TARTRATE 12.5 MG: 25 TABLET, FILM COATED ORAL at 20:35

## 2018-09-21 RX ADMIN — AMIODARONE HYDROCHLORIDE 200 MG: 200 TABLET ORAL at 20:35

## 2018-09-21 RX ADMIN — METOPROLOL TARTRATE 12.5 MG: 25 TABLET, FILM COATED ORAL at 08:15

## 2018-09-21 RX ADMIN — AMIODARONE HYDROCHLORIDE 200 MG: 200 TABLET ORAL at 08:15

## 2018-09-21 RX ADMIN — INSULIN LISPRO 2 UNITS: 100 INJECTION, SOLUTION INTRAVENOUS; SUBCUTANEOUS at 12:30

## 2018-09-21 RX ADMIN — NICOTINE 1 PATCH: 21 PATCH, EXTENDED RELEASE TRANSDERMAL at 08:19

## 2018-09-21 RX ADMIN — CLONIDINE HYDROCHLORIDE 0.1 MG: 0.1 TABLET ORAL at 20:35

## 2018-09-21 RX ADMIN — GABAPENTIN 100 MG: 100 CAPSULE ORAL at 05:33

## 2018-09-21 RX ADMIN — IPRATROPIUM BROMIDE AND ALBUTEROL SULFATE 3 ML: 2.5; .5 SOLUTION RESPIRATORY (INHALATION) at 12:52

## 2018-09-21 RX ADMIN — HYDROCODONE BITARTRATE AND ACETAMINOPHEN 1 TABLET: 7.5; 325 TABLET ORAL at 14:51

## 2018-09-21 RX ADMIN — HYDROCODONE BITARTRATE AND ACETAMINOPHEN 1 TABLET: 7.5; 325 TABLET ORAL at 04:43

## 2018-09-21 RX ADMIN — SERTRALINE HYDROCHLORIDE 100 MG: 100 TABLET ORAL at 08:15

## 2018-09-21 RX ADMIN — NIFEDIPINE 60 MG: 60 TABLET, FILM COATED, EXTENDED RELEASE ORAL at 08:18

## 2018-09-21 RX ADMIN — ASPIRIN 325 MG: 325 TABLET, DELAYED RELEASE ORAL at 08:15

## 2018-09-21 RX ADMIN — IPRATROPIUM BROMIDE AND ALBUTEROL SULFATE 3 ML: 2.5; .5 SOLUTION RESPIRATORY (INHALATION) at 01:28

## 2018-09-21 RX ADMIN — ATORVASTATIN CALCIUM 80 MG: 40 TABLET, FILM COATED ORAL at 20:35
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
## 2018-09-21 NOTE — PLAN OF CARE
Problem: Patient Care Overview  Goal: Plan of Care Review  Outcome: Ongoing (interventions implemented as appropriate)   09/21/18 1005   Coping/Psychosocial   Plan of Care Reviewed With patient   Plan of Care Review   Progress improving   OTHER   Outcome Summary patient is able to increase ambulation to 300 ft his ventricular rate is controlled today during ambulation. patient met goals for transfers and is progressing well toward independence

## 2018-09-21 NOTE — PROGRESS NOTES
"Axel Desir  [id number]  1960     LOS: 4 days   Patient Care Team:  Sammy Glass MD as PCP - General  Sammy Glass MD as PCP - Family Medicine    Chief Complaint: Coronary artery disease      Subjective: No complaints    Objective:     Vital Sign Min/Max for last 24 hours  Temp  Min: 97.8 °F (36.6 °C)  Max: 98.7 °F (37.1 °C)   BP  Min: 105/72  Max: 168/100   Pulse  Min: 62  Max: 147   Resp  Min: 16  Max: 20   SpO2  Min: 87 %  Max: 100 %   No Data Recorded   No Data Recorded     Flowsheet Rows      First Filed Value   Admission Height  177.8 cm (70\") Documented at 09/17/2018 0940   Admission Weight  63.5 kg (140 lb) Documented at 09/17/2018 0940          Physical Exam:    Wound: Satisfactory    Pulses:     Mediastinal and Chest Tube Drainage:       Results Review:     Results from last 7 days  Lab Units 09/20/18  0401   WBC 10*3/mm3 9.04   HEMOGLOBIN g/dL 9.1*   HEMATOCRIT % 27.2*   PLATELETS 10*3/mm3 166       Results from last 7 days  Lab Units 09/21/18  0325   SODIUM mmol/L 138   POTASSIUM mmol/L 3.9   CHLORIDE mmol/L 104   CO2 mmol/L 23.0   BUN mg/dL 43*   CREATININE mg/dL 1.99*   GLUCOSE mg/dL 116*   CALCIUM mg/dL 8.0*       Results from last 7 days  Lab Units 09/17/18  1500   PH, ARTERIAL pH units 7.391   PO2 ART mm Hg 142.0*   PCO2, ARTERIAL mm Hg 34.0*   HCO3 ART mmol/L 20.6         Assessment    Principal Problem:    Coronary artery disease s/p CABG x 2  Active Problems:    CKD (chronic kidney disease) stage 4, GFR 15-29 ml/min (CMS/[facility])    Type 2 diabetes mellitus (CMS/[facility])    Hepatitis C      Doing satisfactory, transfer to telemetry        Oral Calero MD  09/21/18  6:34 AM      Please note that portions of this note were completed with a voice recognition program. Efforts were made to edit the dictations, but words may be mistranscribed  "
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
## 2018-09-21 NOTE — PROGRESS NOTES
"   LOS: 4 days    Patient Care Team:  Sammy Glass MD as PCP - General  Sammy Glass MD as PCP - Family Medicine    Chief Complaint:  Shortness of breath  58-year-old  male history of chronic kidney disease likely diabetic nephropathy, nephrosclerosis.  He was last seen on 8/28/2018 and [facility]    His previous labs weight from 2.05-2.61.  Has CK D stage IV.  Patient had a heart catheter done in July 2018 with 2 vessel disease, patient was admitted yesterday underwent a CABG today on 9/17/2018 with LIMA to LAD and SVG to PDA.  Subjective   No new events, renal function improved, good urine output.    Review of Systems:      Denies shortness of breath chest pain nausea or vomiting.  No dysuria or hematuria.  Objective     Vital Sign Min/Max for last 24 hours  Temp  Min: 98 °F (36.7 °C)  Max: 98.7 °F (37.1 °C)   BP  Min: 105/72  Max: 168/100   Pulse  Min: 62  Max: 147   Resp  Min: 16  Max: 18   SpO2  Min: 87 %  Max: 100 %   No Data Recorded   No Data Recorded     Flowsheet Rows      First Filed Value   Admission Height  177.8 cm (70\") Documented at 09/17/2018 0940   Admission Weight  63.5 kg (140 lb) Documented at 09/17/2018 0940          No intake/output data recorded.  I/O last 3 completed shifts:  In: 1067.7 [P.O.:450; I.V.:617.7]  Out: 3625 [Urine:3625]    Physical Exam:   General appearance: No obvious distress, alert 20×3 sitting in chair.  Eyes: Pupils reactive EOMI.  Neck: Supple, no JVD.  Lungs: Few rhonchi's, good chest movement.   Heart: No gallop or rub.  Abdomen: Soft nontender positive bowel sounds.  : No suprapubic fullness, urine clear  Extremities.  No edema cyanosis.  Neuro: Grossly intact.  No focal deficit.  Moving all extremities.    Results from last 7 days  Lab Units 09/21/18  0325 09/20/18  0401 09/19/18  1925 09/19/18  0325 09/19/18  0322 09/18/18  0413 09/17/18  2112 09/17/18  1355 09/17/18  1029 09/17/18  0925  09/16/18  1426   WBC 10*3/mm3  --  " 9.04  --   --  11.94* 7.92  --  10.10 17.93*  --   --  9.22   HEMOGLOBIN g/dL  --  9.1*  --   --  9.2* 7.7* 8.2* 7.9* 9.1*  --   --  11.3*   HEMOGLOBIN, POC g/dL  --   --   --   --   --   --   --   --   --  8.2*  < >  --    HEMATOCRIT %  --  27.2*  --   --  27.9* 23.2* 24.9* 23.1* 27.3*  --   --  33.9*   HEMATOCRIT POC %  --   --   --   --   --   --   --   --   --  24*  < >  --    PLATELETS 10*3/mm3  --  166  --   --  179 154  --  164 184  --   --  218   SODIUM mmol/L 138 137  --  132  --  139 140 139 141  --   < > 135   POTASSIUM mmol/L 3.9 3.8 3.9 3.5  --  4.0 4.0 3.3* 3.8  --   < > 4.3   CHLORIDE mmol/L 104 106  --  107  --  109 114* 110* 108  --   < > 105   CO2 mmol/L 23.0 21.0  --  19.0*  --  19.0* 21.0 21.0 20.0  --   < > 18.0*   BUN mg/dL 43* 36*  --  37*  --  46* 50* 56* 53*  --   < > 74*   CREATININE mg/dL 1.99* 2.00*  --  2.14*  --  2.18* 2.20* 2.29* 2.30*  --   < > 2.73*   GLUCOSE mg/dL 116* 106*  --  152*  --  106* 128* 136* 150*  --   < > 99   CALCIUM mg/dL 8.0* 8.3*  --  8.3*  --  7.9* 7.9* 8.0* 8.7  --   < > 8.1*   PHOSPHORUS mg/dL  --   --   --   --   --  5.0 4.1 3.8 4.2  --   --   --    < > = values in this interval not displayed.         Results Review:     I reviewed the patient's new clinical results.      amiodarone 200 mg Oral Q12H   aspirin 325 mg Oral Daily   atorvastatin 80 mg Oral Nightly   CloNIDine 0.1 mg Oral Q8H   gabapentin 100 mg Oral Q8H   hydrALAZINE 100 mg Oral Q8H   insulin lispro 0-7 Units Subcutaneous 4x Daily With Meals & Nightly   ipratropium-albuterol 3 mL Nebulization Q6H - RT   metoprolol tartrate 12.5 mg Oral Q12H   nicotine 1 patch Transdermal Q24H   NIFEdipine XL 60 mg Oral Q24H   pharmacy consult - MTM  Does not apply Daily   sennosides-docusate sodium 2 tablet Oral BID   sertraline 100 mg Oral Daily       dexmedetomidine 0.2-1.5 mcg/kg/hr Last Rate: 0.4 mcg/kg/hr (09/18/18 0744)   niCARdipine 5-15 mg/hr Last Rate: 5 mg/hr (09/18/18 0845)   nitroglycerin 5-200 mcg/min  Last Rate: 30 mcg/min (09/20/18 4714)   norepinephrine 0.02-0.3 mcg/kg/min    phenylephrine 0.5-3 mcg/kg/min        Medication Review: As above    Assessment/Plan      1.   acute kidney injury on chronic kidney disease, renal function is better status post CABG.   2.  Status post CABG ×2 today.  Chest tube in place.   3.  Diabetes type 2.  4.  Anemia of chronic disease.  5.  Secondary hyperparathyroidism secondary to renal insufficiency  6.  Hepatitis C    Plan:    Avoid nephrotoxic medications.  Keep systolic blood pressure greater than 100.  Check volume status.  Check labs in the morning.  Okay to transfer to the floor from renal point  Kartik Encinas MD  09/21/18  8:15 AM

## 2018-09-21 NOTE — PROGRESS NOTES
"Intensive Care Follow-up     Hospital:  LOS: 4 days   Mr. Axel Desir, 58 y.o. male is followed for:   Coronary artery disease involving native heart with angina pectoris (CMS/HCC)   With postoperative management of diabetes mellitus and acute postoperative respiratory insufficiency     Subjective   Interval History:  The chart has been reviewed. The patient has been weaned down to room air and is breathing without difficulty. He is been participated with physical therapy without problems.    The patient's relevant past medical, surgical and social history were reviewed and updated in Epic as appropriate.        Objective     Infusions:     Medications:    amiodarone 200 mg Oral Q12H   aspirin 325 mg Oral Daily   atorvastatin 80 mg Oral Nightly   CloNIDine 0.1 mg Oral Q8H   gabapentin 100 mg Oral Q8H   hydrALAZINE 100 mg Oral Q8H   insulin lispro 0-7 Units Subcutaneous 4x Daily With Meals & Nightly   ipratropium-albuterol 3 mL Nebulization Q6H - RT   metoprolol tartrate 12.5 mg Oral Q12H   nicotine 1 patch Transdermal Q24H   NIFEdipine XL 30 mg Oral Once   [START ON 9/22/2018] NIFEdipine XL 90 mg Oral Q24H   pharmacy consult - MTM  Does not apply Daily   sennosides-docusate sodium 2 tablet Oral BID   sertraline 100 mg Oral Daily       Vital Sign Min/Max for last 24 hours  Temp  Min: 97.8 °F (36.6 °C)  Max: 98.7 °F (37.1 °C)   BP  Min: 105/72  Max: 168/100   Pulse  Min: 62  Max: 116   Resp  Min: 14  Max: 18   SpO2  Min: 87 %  Max: 100 %   Flow (L/min)  Min: 2  Max: 45       Input/Output for last 24 hour shift  09/20 0701 - 09/21 0700  In: 744.7 [P.O.:450; I.V.:294.7]  Out: 1950 [Urine:1950]      Objective:  General Appearance:  Comfortable, well-appearing, in no acute distress and not in pain.    Vital signs: (most recent): Blood pressure 139/94, pulse 68, temperature 97.8 °F (36.6 °C), temperature source Oral, resp. rate 14, height 177.8 cm (70\"), weight 63.5 kg (139 lb 15.9 oz), SpO2 97 %.  No fever.    Output: " Producing urine.    Lungs:  Normal effort and normal respiratory rate.  Breath sounds clear to auscultation.  He is not in respiratory distress.  No stridor.  No rales, decreased breath sounds, wheezes or rhonchi.    Heart: Normal rate.  Irregular rhythm.  S1 normal and S2 normal.  No murmur or friction rub.   Chest: Symmetric chest wall expansion. (Patient status post median sternotomy)  Abdomen: Abdomen is soft and non-distended.  Bowel sounds are normal.   There is no abdominal tenderness.     Extremities: Normal range of motion.  There is no deformity or dependent edema.    Neurological: Patient is alert and oriented to person, place and time.    Pupils:  Pupils are equal, round, and reactive to light.  Pupils are equal.   Skin:  Warm and pale.  No rash or cyanosis.               Results from last 7 days  Lab Units 09/20/18  0401 09/19/18  0322 09/18/18  0413   WBC 10*3/mm3 9.04 11.94* 7.92   HEMOGLOBIN g/dL 9.1* 9.2* 7.7*   PLATELETS 10*3/mm3 166 179 154       Results from last 7 days  Lab Units 09/21/18  0325 09/20/18  0401 09/19/18  1925 09/19/18  0325 09/18/18  0413 09/17/18  2112 09/17/18  1355 09/17/18  1029   SODIUM mmol/L 138 137  --  132 139 140 139 141   POTASSIUM mmol/L 3.9 3.8 3.9 3.5 4.0 4.0 3.3* 3.8   CO2 mmol/L 23.0 21.0  --  19.0* 19.0* 21.0 21.0 20.0   BUN mg/dL 43* 36*  --  37* 46* 50* 56* 53*   CREATININE mg/dL 1.99* 2.00*  --  2.14* 2.18* 2.20* 2.29* 2.30*   MAGNESIUM mg/dL  --   --   --   --  2.0  --  2.3 2.6   PHOSPHORUS mg/dL  --   --   --   --  5.0 4.1 3.8 4.2   GLUCOSE mg/dL 116* 106*  --  152* 106* 128* 136* 150*     Estimated Creatinine Clearance: 35.5 mL/min (A) (by C-G formula based on SCr of 1.99 mg/dL (H)).      Results from last 7 days  Lab Units 09/17/18  1500   PH, ARTERIAL pH units 7.391   PCO2, ARTERIAL mm Hg 34.0*   PO2 ART mm Hg 142.0*       I reviewed the patient's results and images.     Assessment/Plan   Impression      Principal Problem:    Coronary artery disease s/p  CABG x 2  Active Problems:    CKD (chronic kidney disease) stage 4, GFR 15-29 ml/min (CMS/[facility])    Type 2 diabetes mellitus (CMS/HCC)    Hepatitis C       Plan        Continue with good pulmonary hygiene.  Continue with current diabetes control.  Physical therapy as tolerated.  Orders of been placed for tomorrow. I will ask the hospitalist to follow him for his diabetes on the floor.    Plan of care and goals reviewed with mulitdisciplinary team at daily rounds.   I discussed the patient's findings and my recommendations with patient and nursing staff         David Rosas MD, [facility]  Pulmonary and Critical Care Medicine  09/21/18 10:50 AM

## 2018-09-21 NOTE — THERAPY TREATMENT NOTE
Acute Care - Physical Therapy Treatment Note  [facility]     Patient Name: Axel Desir  : 1960  MRN: [id number]  Today's Date: 2018  Onset of Illness/Injury or Date of Surgery: 18  Date of Referral to PT: 18  Referring Physician: MD Calero    Admit Date: 2018    Visit Dx:    ICD-10-CM ICD-9-CM   1. Impaired functional mobility, balance, gait, and endurance Z74.09 V49.89   2. CAD in native artery I25.10 414.01     Patient Active Problem List   Diagnosis   • Angina pectoris (CMS/HCC)   • ASCVD (arteriosclerotic cardiovascular disease), severe 2 vessel disease per [facility] 18   • Coronary artery disease s/p CABG x 2   • CAD in native artery   • CKD (chronic kidney disease) stage 4, GFR 15-29 ml/min (CMS/HCC)   • Type 2 diabetes mellitus (CMS/HCC)   • Hepatitis C       Therapy Treatment          Rehabilitation Treatment Summary     Row Name 18 1005             Treatment Time/Intention    Discipline physical therapist  -MUSA      Document Type therapy note (daily note)  -MUSA      Subjective Information complains of;fatigue  -MUSA      Mode of Treatment physical therapy  -MUSA      Care Plan Review care plan/treatment goals reviewed;risks/benefits reviewed;patient/other agree to care plan  -MUSA      Therapy Frequency (PT Clinical Impression) daily  -MSUA      Patient Effort good  -MUSA      Existing Precautions/Restrictions cardiac;oxygen therapy device and L/min;sternal  -MUSA      Recorded by [MUSA] Elizabeth Michel, PT 18 1156      Row Name 18 1005             Vital Signs    Pre Systolic BP Rehab 149  -MUSA      Pre Treatment Diastolic BP 90  -MUSA      Post Systolic BP Rehab 144  -MUSA      Post Treatment Diastolic BP 87  -MUSA      Pretreatment Heart Rate (beats/min) 64  -MUSA      Posttreatment Heart Rate (beats/min) 70  -MUSA      Pre SpO2 (%) 95  -MUSA      O2 Delivery Pre Treatment nasal cannula  -MUSA      Post SpO2 (%) 98  -MUSA      O2 Delivery Post Treatment supplemental O2  -MUSA      Pre  Patient Position Sitting  -MUSA      Intra Patient Position Standing  -MUSA      Post Patient Position Supine  -MUSA      Recorded by [MUSA] Elizabeth Michel, PT 09/21/18 1156      Row Name 09/21/18 1005             Cognitive Assessment/Intervention- PT/OT    Affect/Mental Status (Cognitive) WFL  -MUSA      Orientation Status (Cognition) oriented x 4  -MUSA      Follows Commands (Cognition) WFL  -MUSA      Cognitive Function (Cognitive) WFL  -MUSA      Safety Deficit (Cognitive) safety precautions awareness;safety precautions follow-through/compliance  -MUSA      Personal Safety Interventions gait belt;nonskid shoes/slippers when out of bed  -MUSA      Recorded by [MUSA] Elizabeth Michel, PT 09/21/18 1156      Row Name 09/21/18 1005             Safety Issues, Functional Mobility    Safety Issues Affecting Function (Mobility) insight into deficits/self awareness;safety precautions follow-through/compliance  -MUSA      Impairments Affecting Function (Mobility) balance;endurance/activity tolerance;shortness of breath;strength  -MUSA      Recorded by [MUSA] Elizabeth Michel, PT 09/21/18 1156      Row Name 09/21/18 1005             Bed Mobility Assessment/Treatment    Bed Mobility Assessment/Treatment scooting/bridging;sit-supine  -MUSA      Scooting/Bridging [place] (Bed Mobility) contact guard  -MUSA      Sit-Supine [place] (Bed Mobility) minimum assist (75% patient effort);2 person assist  -MUSA      Bed Mobility, Safety Issues decreased use of arms for pushing/pulling  -MUSA      Assistive Device (Bed Mobility) draw sheet  -MUSA      Recorded by [MUSA] Elizabeth Michel, PT 09/21/18 1156      Row Name 09/21/18 1005             Transfer Assessment/Treatment    Transfer Assessment/Treatment sit-stand transfer;stand-sit transfer  -MUSA      Recorded by [MUSA] Elizabeth Michel, PT 09/21/18 1156      Row Name 09/21/18 1005             Chair-Bed Transfer    Chair-Bed [place] (Transfers) contact guard  -MUSA      Recorded by [MUSA] Anand  Elizabeth BECKER, PT 09/21/18 1156      Row Name 09/21/18 1005             Sit-Stand Transfer    Sit-Stand [place] (Transfers) independent  -MUSA      Recorded by [MUSA] Elizabeth Michel, PT 09/21/18 1156      Row Name 09/21/18 1005             Stand-Sit Transfer    Stand-Sit [place] (Transfers) independent  -MUSA      Recorded by [MUSA] Elizabeth Michel, PT 09/21/18 1156      Row Name 09/21/18 1005             Gait/Stairs Assessment/Training    Gait/Stairs Assessment/Training gait/ambulation independence  -MUSA      [place] Level (Gait) contact guard  -MUSA      Distance in Feet (Gait) 300  -MUSA      Pattern (Gait) step-through  -MUSA      Recorded by [MUSA] Elizabeth Michel, PT 09/21/18 1156      Row Name 09/21/18 1005             Therapeutic Exercise    38621 - PT Therapeutic Activity Minutes 15  -MUSA      Recorded by [MUSA] Elizabeth Michel, PT 09/21/18 1156      Row Name 09/21/18 1005             Lower Extremity Seated Therapeutic Exercise    Performed, Seated Lower Extremity (Therapeutic Exercise) hip flexion/extension;knee flexion/extension;ankle dorsiflexion/plantarflexion  -MUSA      Exercise Type, Seated Lower Extremity (Therapeutic Exercise) AROM (active range of motion)  -MUSA      Sets/Reps Detail, Seated Lower Extremity (Therapeutic Exercise) 1/10  -MUSA      Recorded by [MUSA] Elizabeth Michel, PT 09/21/18 1156      Row Name 09/21/18 1005             Static Standing Balance    Level of [place] (Supported Standing, Static Balance) contact guard assist  -MUSA      Recorded by [MUSA] Elizabeth Michel, PT 09/21/18 1156      Row Name 09/21/18 1005             Positioning and Restraints    Pre-Treatment Position sitting in chair/recliner  -MUSA      Post Treatment Position bed  -MUSA      In Bed notified nsg;supine;call light within reach;encouraged to call for assist  -MUSA      Recorded by [MUSA] Elizabeth Michel, PT 09/21/18 1156      Row Name                Wound 09/17/18 0634 chest incision    Wound - Properties  Group Date first assessed: 09/17/18 [SH] Time first assessed: 0634 [SH] Location: chest [SH] Type: incision [SH] Recorded by:  [SH] Haase, Sherri L, RN 09/17/18 0634    Row Name                Wound 09/17/18 0750 Right leg incision    Wound - Properties Group Date first assessed: 09/17/18 [SH] Time first assessed: 0750 [SH] Side: Right [SH] Location: leg [SH] Type: incision [SH] Recorded by:  [SH] Haase, Sherri L, RN 09/17/18 0750      User Key  (r) = Recorded By, (t) = Taken By, (c) = Cosigned By    Initials Name Effective Dates Discipline    Elizabeth Ortiz, PT 06/19/15 -  PT    SH Haase, Sherri L, RN 06/16/16 -  Nurse          Wound 09/17/18 0634 chest incision (Active)   Dressing Appearance intact;dry 9/21/2018  8:00 AM   Closure Liquid skin adhesive 9/21/2018  8:00 AM   Base dry;pink;red/granulating;scab 9/21/2018  8:00 AM   Drainage Amount none 9/21/2018  8:00 AM   Dressing Care, Wound low-adherent 9/21/2018  8:00 AM       Wound 09/17/18 0750 Right leg incision (Active)   Dressing Appearance dry;intact 9/20/2018  6:00 PM   Closure Open to air;Liquid skin adhesive 9/21/2018  8:00 AM   Base dry;pink;red/granulating;scab 9/21/2018  8:00 AM   Drainage Amount none 9/21/2018  8:00 AM   Care, Wound cleansed with;soap and water;antimicrobial agent applied 9/20/2018  8:00 PM   Dressing Care, Wound open to air 9/21/2018  8:00 AM               PT Rehab Goals     Row Name 09/21/18 1100             Transfer Goal 1 (PT)    Activity/Assistive Device (Transfer Goal 1, PT) sit-to-stand/stand-to-sit  -MUSA      [place] Level/Cues Needed (Transfer Goal 1, PT) independent  -MUSA      Time Frame (Transfer Goal 1, PT) long term goal (LTG);10 days  -MUSA      Progress/Outcome (Transfer Goal 1, PT) goal met  -MUSA        User Key  (r) = Recorded By, (t) = Taken By, (c) = Cosigned By    Initials Name Provider Type Discipline    Elizabeth Ortiz, PT Physical Therapist PT          Physical Therapy Education     Title: PT OT SLP  Therapies (Active)     Topic: Physical Therapy (Active)     Point: Mobility training (Active)    Learning Progress Summary     Learner Status Readiness Method Response Comment Documented by    Patient Active Acceptance E NR  MUSA 09/21/18 1005     Active Acceptance E NR  MUSA 09/20/18 0830     Active Acceptance E NR  KR 09/19/18 1153     Active Acceptance E NR  MUSA 09/18/18 1045          Point: Home exercise program (Active)    Learning Progress Summary     Learner Status Readiness Method Response Comment Documented by    Patient Active Acceptance E NR  MUSA 09/21/18 1005     Active Acceptance E NR  MUSA 09/20/18 0830     Active Acceptance E NR  KR 09/19/18 1153     Active Acceptance E NR  MUSA 09/18/18 1045          Point: Body mechanics (Active)    Learning Progress Summary     Learner Status Readiness Method Response Comment Documented by    Patient Active Acceptance E NR  MUSA 09/21/18 1005     Active Acceptance E NR  MUSA 09/20/18 0830     Active Acceptance E NR  KR 09/19/18 1153     Active Acceptance E NR  MUSA 09/18/18 1045          Point: Precautions (Active)    Learning Progress Summary     Learner Status Readiness Method Response Comment Documented by    Patient Active Acceptance E NR  MUSA 09/21/18 1005     Active Acceptance E NR  MUSA 09/20/18 0830     Active Acceptance E NR  KR 09/19/18 1153     Active Acceptance E NR  MUSA 09/18/18 1045                      User Key     Initials Effective Dates Name Provider Type Discipline    MUSA 06/19/15 -  Elizabeth Michel, PT Physical Therapist PT    KR 04/03/18 -  Roxana Vasques, PT Physical Therapist PT                    PT Recommendation and Plan  Anticipated Discharge Disposition (PT): home with assist  Planned Therapy Interventions (PT Eval): balance training, bed mobility training, gait training, home exercise program, transfer training  Therapy Frequency (PT Clinical Impression): daily  Outcome Summary/Treatment Plan (PT)  Daily Summary of Progress (PT): progress toward  functional goals as expected  Barriers to Overall Progress (PT): still on high flow nasal cannula  Anticipated Discharge Disposition (PT): home with assist  Plan of Care Reviewed With: patient  Progress: improving  Outcome Summary: patient is able to increase ambulation to 300 ft his ventricular rate is controlled today during ambulation. patient met goals for transfers and is progressing well toward independence          Outcome Measures     Row Name 09/21/18 1005 09/20/18 0830 09/19/18 0910       How much help from another person do you currently need...    Turning from your back to your side while in flat bed without using bedrails? 3  -MUSA 3  -MUSA 3  -KR    Moving from lying on back to sitting on the side of a flat bed without bedrails? 3  -MUSA 3  -MUSA 2  -KR    Moving to and from a bed to a chair (including a wheelchair)? 3  -MUSA 3  -MUSA 3  -KR    Standing up from a chair using your arms (e.g., wheelchair, bedside chair)? 4  -MUSA 3  -MUSA 3  -KR    Climbing 3-5 steps with a railing? 3  -MUSA 3  -MUSA 2  -KR    To walk in hospital room? 3  -MUSA 3  -MUSA 3  -KR    AM-PAC 6 Clicks Score 19  -MUSA 18  -MUSA 16  -KR       Functional Assessment    Outcome Measure Options  --  -- AM-PAC 6 Clicks Basic Mobility (PT)  -KR      User Key  (r) = Recorded By, (t) = Taken By, (c) = Cosigned By    Initials Name Provider Type    Elizabeth Ortiz, PT Physical Therapist    Roxana Lozano, PT Physical Therapist           Time Calculation:         PT Charges     Row Name 09/21/18 1005             Time Calculation    Start Time 1005  -MUSA      PT Received On 09/21/18  -MUSA      PT Goal Re-Cert Due Date 09/28/18  -MUSA         Time Calculation- PT    Total Timed Code Minutes- PT 15 minute(s)  -MUSA         Timed Charges    33543 - PT Therapeutic Activity Minutes 15  -MUSA        User Key  (r) = Recorded By, (t) = Taken By, (c) = Cosigned By    Initials Name Provider Type    Elizabeth Ortiz, PT Physical Therapist        Therapy Suggested Charges      Code   Minutes Charges    33563 (CPT®) Hc Pt Neuromusc Re Education Ea 15 Min      74409 (CPT®) Hc Pt Ther Proc Ea 15 Min      92003 (CPT®) Hc Gait Training Ea 15 Min      41749 (CPT®) Hc Pt Therapeutic Act Ea 15 Min 15 1    86717 (CPT®) Hc Pt Manual Therapy Ea 15 Min      15865 (CPT®) Hc Pt Iontophoresis Ea 15 Min      79552 (CPT®) Hc Pt Elec Stim Ea-Per 15 Min      11142 (CPT®) Hc Pt Ultrasound Ea 15 Min      46660 (CPT®) Hc Pt Self Care/Mgmt/Train Ea 15 Min      33171 (CPT®) Hc Pt Prosthetic (S) Train Initial Encounter, Each 15 Min      18704 (CPT®) Hc Pt Orthotic(S)/Prosthetic(S) Encounter, Each 15 Min      76612 (CPT®) Hc Orthotic(S) Mgmt/Train Initial Encounter, Each 15min      Total  15 1        Therapy Charges for Today     Code Description Service Date Service Provider Modifiers Qty    49827709079 HC PT THERAPEUTIC ACT EA 15 MIN 9/20/2018 Elizabeth Michel, PT GP 2    90728480123 HC PT THERAPEUTIC ACT EA 15 MIN 9/21/2018 Elizabeth Michel, PT GP 1    09040722333 HC PT THER SUPP EA 15 MIN 9/21/2018 Elizabeth Michel, PT GP 1          PT G-Codes  Outcome Measure Options: AM-PAC 6 Clicks Basic Mobility (PT)  AM-PAC 6 Clicks Score: 19    Elizabeth Michel, PT  9/21/2018

## 2018-09-21 NOTE — PROGRESS NOTES
"                  Clinical Nutrition     Nutrition Assessment  Reason for Visit:   MDR, Follow-up protocol    Patient Name: Axel Desir  YOB: 1960  MRN: [id number]  Date of Encounter: 09/21/18 10:38 AM  Admission date: 9/17/2018    Nutrition Assessment     Hospital Problem List  Principal Problem:    Coronary artery disease s/p CABG x 2  Active Problems:    CKD (chronic kidney disease) stage 4, GFR 15-29 ml/min (CMS/HCC)    Type 2 diabetes mellitus (CMS/HCC)    Hepatitis C    Reported/Observed/Food/Nutrition Related History:   Reports good appetite; improved PO Intake.  Order to transfer out of the ICU.     Anthropometrics     Height: 177.8 cm (70\")  Last filed wt: Weight: 63.5 kg (139 lb 15.9 oz) (09/17/18 2000)       Labs reviewed       Results from last 7 days  Lab Units 09/21/18  0325  09/16/18  1426   SODIUM mmol/L 138  < > 135   POTASSIUM mmol/L 3.9  < > 4.3   CHLORIDE mmol/L 104  < > 105   CO2 mmol/L 23.0  < > 18.0*   BUN mg/dL 43*  < > 74*   CREATININE mg/dL 1.99*  < > 2.73*   CALCIUM mg/dL 8.0*  < > 8.1*   BILIRUBIN mg/dL  --   --  0.2*   ALK PHOS U/L  --   --  86   ALT (SGPT) U/L  --   --  18   AST (SGOT) U/L  --   --  27   GLUCOSE mg/dL 116*  < > 99   < > = values in this interval not displayed.      Results from last 7 days  Lab Units 09/21/18  0727 09/20/18  2032 09/20/18  1613 09/20/18  1120 09/20/18  0741 09/19/18  2047   GLUCOSE mg/dL 161* 174* 99 202* 136* 204*       Medications reviewed   Pertinent:  Reviewed     Current Nutrition Prescription     PO: Diet Regular; Cardiac, Consistent Carbohydrate    Intake/Delivery:  PO Evaluation    Number of Days PO Intake Evaluated:  3 days    Number of Meals: 5 meals    % of PO Intake:  60%       Nutrition Diagnosis   9/18/2018  Problem No nutrition diagnosis at this time   Etiology    Signs/Symptoms      Nutrition Intervention     Interventions Goal   General: Maintain nutrition    Nutrition Interventions  1.  Follow treatment progress, " Care plan reviewed, Menu provided    Monitor/ Evaluation   Per protocol, PO intake, Pertinent labs      Will Continue to follow per protocol      Lucie Conrad RD  Time Spent: 20min

## 2018-09-21 NOTE — PROGRESS NOTES
Continued Stay Note   [facility]     Patient Name: Axel Desir  MRN: [id number]  Today's Date: 9/21/2018    Admit Date: 9/17/2018          Discharge Plan     Row Name 09/21/18 1246       Plan    Plan Home     Patient/Family in Agreement with Plan yes    Plan Comments Patient in ICU with orders to transfer to tele bed. Plan is still to return to his home in [place] and cousin Candace to stay with him up to two weeks. Ana @ [id number]               Discharge Codes    No documentation.           Candace Oliveira RN

## 2018-09-22 LAB
GLUCOSE BLDC GLUCOMTR-MCNC: 133 MG/DL (ref 70–130)
GLUCOSE BLDC GLUCOMTR-MCNC: 146 MG/DL (ref 70–130)
GLUCOSE BLDC GLUCOMTR-MCNC: 258 MG/DL (ref 70–130)
GLUCOSE BLDC GLUCOMTR-MCNC: 266 MG/DL (ref 70–130)

## 2018-09-22 PROCEDURE — 99232 SBSQ HOSP IP/OBS MODERATE 35: CPT | Performed by: INTERNAL MEDICINE

## 2018-09-22 PROCEDURE — 99024 POSTOP FOLLOW-UP VISIT: CPT | Performed by: PHYSICIAN ASSISTANT

## 2018-09-22 PROCEDURE — 97530 THERAPEUTIC ACTIVITIES: CPT

## 2018-09-22 PROCEDURE — 94640 AIRWAY INHALATION TREATMENT: CPT

## 2018-09-22 PROCEDURE — 94799 UNLISTED PULMONARY SVC/PX: CPT

## 2018-09-22 PROCEDURE — 94760 N-INVAS EAR/PLS OXIMETRY 1: CPT

## 2018-09-22 PROCEDURE — 82962 GLUCOSE BLOOD TEST: CPT

## 2018-09-22 PROCEDURE — 25010000002 HEPARIN (PORCINE) PER 1000 UNITS: Performed by: INTERNAL MEDICINE

## 2018-09-22 RX ORDER — CLONIDINE HYDROCHLORIDE 0.1 MG/1
0.2 TABLET ORAL EVERY 8 HOURS SCHEDULED
Status: DISCONTINUED | OUTPATIENT
Start: 2018-09-22 | End: 2018-09-23 | Stop reason: HOSPADM

## 2018-09-22 RX ORDER — HEPARIN SODIUM 5000 [USP'U]/ML
5000 INJECTION, SOLUTION INTRAVENOUS; SUBCUTANEOUS EVERY 8 HOURS SCHEDULED
Status: DISCONTINUED | OUTPATIENT
Start: 2018-09-22 | End: 2018-09-23 | Stop reason: HOSPADM

## 2018-09-22 RX ADMIN — HEPARIN SODIUM 5000 UNITS: 5000 INJECTION, SOLUTION INTRAVENOUS; SUBCUTANEOUS at 21:39

## 2018-09-22 RX ADMIN — METOPROLOL TARTRATE 12.5 MG: 25 TABLET, FILM COATED ORAL at 21:39

## 2018-09-22 RX ADMIN — IPRATROPIUM BROMIDE AND ALBUTEROL SULFATE 3 ML: 2.5; .5 SOLUTION RESPIRATORY (INHALATION) at 07:25

## 2018-09-22 RX ADMIN — AMIODARONE HYDROCHLORIDE 200 MG: 200 TABLET ORAL at 21:40

## 2018-09-22 RX ADMIN — OXYCODONE HYDROCHLORIDE AND ACETAMINOPHEN 1 TABLET: 10; 325 TABLET ORAL at 05:35

## 2018-09-22 RX ADMIN — GABAPENTIN 100 MG: 100 CAPSULE ORAL at 21:50

## 2018-09-22 RX ADMIN — ATORVASTATIN CALCIUM 80 MG: 40 TABLET, FILM COATED ORAL at 21:39

## 2018-09-22 RX ADMIN — HYDRALAZINE HYDROCHLORIDE 100 MG: 50 TABLET, FILM COATED ORAL at 21:40

## 2018-09-22 RX ADMIN — HYDRALAZINE HYDROCHLORIDE 100 MG: 50 TABLET, FILM COATED ORAL at 05:35

## 2018-09-22 RX ADMIN — IPRATROPIUM BROMIDE AND ALBUTEROL SULFATE 3 ML: 2.5; .5 SOLUTION RESPIRATORY (INHALATION) at 00:03

## 2018-09-22 RX ADMIN — IPRATROPIUM BROMIDE AND ALBUTEROL SULFATE 3 ML: 2.5; .5 SOLUTION RESPIRATORY (INHALATION) at 19:04

## 2018-09-22 RX ADMIN — GABAPENTIN 100 MG: 100 CAPSULE ORAL at 05:35

## 2018-09-22 RX ADMIN — AMIODARONE HYDROCHLORIDE 200 MG: 200 TABLET ORAL at 08:34

## 2018-09-22 RX ADMIN — OXYCODONE HYDROCHLORIDE AND ACETAMINOPHEN 1 TABLET: 10; 325 TABLET ORAL at 00:06

## 2018-09-22 RX ADMIN — METOPROLOL TARTRATE 12.5 MG: 25 TABLET, FILM COATED ORAL at 08:34

## 2018-09-22 RX ADMIN — ASPIRIN 325 MG: 325 TABLET, DELAYED RELEASE ORAL at 08:34

## 2018-09-22 RX ADMIN — NITROGLYCERIN 5 MCG/MIN: 20 INJECTION INTRAVENOUS at 00:06

## 2018-09-22 RX ADMIN — INSULIN LISPRO 4 UNITS: 100 INJECTION, SOLUTION INTRAVENOUS; SUBCUTANEOUS at 12:05

## 2018-09-22 RX ADMIN — CLONIDINE HYDROCHLORIDE 0.2 MG: 0.1 TABLET ORAL at 21:40

## 2018-09-22 RX ADMIN — NIFEDIPINE 90 MG: 60 TABLET, FILM COATED, EXTENDED RELEASE ORAL at 08:34

## 2018-09-22 RX ADMIN — GABAPENTIN 100 MG: 100 CAPSULE ORAL at 13:42

## 2018-09-22 RX ADMIN — HYDRALAZINE HYDROCHLORIDE 100 MG: 50 TABLET, FILM COATED ORAL at 13:43

## 2018-09-22 RX ADMIN — CLONIDINE HYDROCHLORIDE 0.1 MG: 0.1 TABLET ORAL at 05:35

## 2018-09-22 RX ADMIN — INSULIN LISPRO 4 UNITS: 100 INJECTION, SOLUTION INTRAVENOUS; SUBCUTANEOUS at 21:50

## 2018-09-22 RX ADMIN — CLONIDINE HYDROCHLORIDE 0.2 MG: 0.1 TABLET ORAL at 13:42

## 2018-09-22 RX ADMIN — SERTRALINE HYDROCHLORIDE 100 MG: 100 TABLET ORAL at 08:34

## 2018-09-22 RX ADMIN — NICOTINE 1 PATCH: 21 PATCH, EXTENDED RELEASE TRANSDERMAL at 08:38

## 2018-09-22 RX ADMIN — IPRATROPIUM BROMIDE AND ALBUTEROL SULFATE 3 ML: 2.5; .5 SOLUTION RESPIRATORY (INHALATION) at 12:23

## 2018-09-22 NOTE — THERAPY TREATMENT NOTE
Acute Care - Physical Therapy Treatment Note  [facility]     Patient Name: Axel Desir  : 1960  MRN: [id number]  Today's Date: 2018  Onset of Illness/Injury or Date of Surgery: 18  Date of Referral to PT: 18  Referring Physician: MD Calero    Admit Date: 2018    Visit Dx:    ICD-10-CM ICD-9-CM   1. Impaired functional mobility, balance, gait, and endurance Z74.09 V49.89   2. CAD in native artery I25.10 414.01     Patient Active Problem List   Diagnosis   • Angina pectoris (CMS/HCC)   • ASCVD (arteriosclerotic cardiovascular disease), severe 2 vessel disease per [facility] 18   • Coronary artery disease s/p CABG x 2   • CAD in native artery   • CKD (chronic kidney disease) stage 4, GFR 15-29 ml/min (CMS/HCC)   • Type 2 diabetes mellitus (CMS/HCC)   • Hepatitis C       Therapy Treatment          Rehabilitation Treatment Summary     Row Name 18 0925             Treatment Time/Intention    Discipline physical therapist  -KR      Document Type therapy note (daily note)  -KR      Subjective Information no complaints  -KR      Mode of Treatment physical therapy  -KR      Care Plan Review care plan/treatment goals reviewed;risks/benefits reviewed;patient/other agree to care plan  -KR      Therapy Frequency (PT Clinical Impression) daily  -KR      Patient Effort good  -KR      Existing Precautions/Restrictions cardiac;fall;sternal  -KR      Recorded by [KR] Roxana Vasques, PT 18 1505      Row Name 18 0925             Vital Signs    Pre Systolic BP Rehab 133  -KR      Pre Treatment Diastolic BP 84  -KR      Post Systolic BP Rehab 149  -KR      Post Treatment Diastolic BP 84  -KR      Pretreatment Heart Rate (beats/min) 81  -KR      Posttreatment Heart Rate (beats/min) 76  -KR      Pre SpO2 (%) 97  -KR      O2 Delivery Pre Treatment room air  -KR      Post SpO2 (%) 98  -KR      O2 Delivery Post Treatment room air  -KR      Pre Patient Position Sitting  -KR      Intra Patient  Position Standing  -KR      Post Patient Position Supine  -KR      Recorded by [KR] Roxana Vasques, PT 09/22/18 1505      Row Name 09/22/18 0925             Cognitive Assessment/Intervention    Additional Documentation Cognitive Assessment/Intervention (Group)  -KR      Recorded by [KR] Roxana Vasques, PT 09/22/18 1505      Row Name 09/22/18 0925             Cognitive Assessment/Intervention- PT/OT    Affect/Mental Status (Cognitive) WFL  -KR      Orientation Status (Cognition) oriented x 4  -KR      Follows Commands (Cognition) WFL  -KR      Cognitive Function (Cognitive) WFL  -KR      Personal Safety Interventions fall prevention program maintained;gait belt;nonskid shoes/slippers when out of bed  -KR      Recorded by [KR] Roxana Vasques, PT 09/22/18 1505      Row Name 09/22/18 0925             Safety Issues, Functional Mobility    Impairments Affecting Function (Mobility) balance;endurance/activity tolerance;shortness of breath  -KR      Recorded by [KR] Roxana Vasques, PT 09/22/18 1505      Row Name 09/22/18 0925             Bed Mobility Assessment/Treatment    Bed Mobility Assessment/Treatment sit-supine  -KR      Sit-Supine [place] (Bed Mobility) minimum assist (75% patient effort);verbal cues  -KR      Bed Mobility, Safety Issues decreased use of arms for pushing/pulling  -KR      Assistive Device (Bed Mobility) head of bed elevated  -KR      Comment (Bed Mobility) VC's for sequencing.   -KR      Recorded by [KR] Roxana Vasques, PT 09/22/18 1505      Row Name 09/22/18 0925             Transfer Assessment/Treatment    Transfer Assessment/Treatment sit-stand transfer;stand-sit transfer  -KR      Comment (Transfers) VC's for sequencing and hand placement.   -KR      Recorded by [KR] Roxana Vasques, PT 09/22/18 1505      Row Name 09/22/18 0925             Sit-Stand Transfer    Sit-Stand [place] (Transfers) supervision;verbal cues  -KR      Recorded by [KR] Roxana Vasques, PT 09/22/18 1501       Row Name 09/22/18 0925             Stand-Sit Transfer    Stand-Sit [place] (Transfers) supervision;verbal cues  -KR      Recorded by [KR] Roxana Vasques, PT 09/22/18 1505      Row Name 09/22/18 0925             Gait/Stairs Assessment/Training    Gait/Stairs Assessment/Training gait/ambulation independence  -KR      [place] Level (Gait) contact guard;verbal cues  -KR      Distance in Feet (Gait) 300  -KR      Pattern (Gait) step-through  -KR      Deviations/Abnormal Patterns (Gait) марина decreased;other (see comments)   decreased step length  -KR      Bilateral Gait Deviations forward flexed posture  -KR      Comment (Gait/Stairs) Pt demonstrated step through gait pattern with slow мариан and decreased step length. Pt demonstrated improved stability. Mobility limited by fatigue.   -KR      Recorded by [KR] Roxana Vasques, PT 09/22/18 1505      Row Name 09/22/18 0925             Motor Skills Assessment/Interventions    Additional Documentation Balance (Group)  -KR      Recorded by [KR] Roxana Vasques, PT 09/22/18 1505      Row Name 09/22/18 0925             Therapeutic Exercise    36284 - PT Therapeutic Activity Minutes 23  -KR      Recorded by [KR] Roxana Vasques, PT 09/22/18 1505      Row Name 09/22/18 0925             Balance    Balance static sitting balance;static standing balance  -KR      Recorded by [KR] Roxana Vasques, PT 09/22/18 1505      Row Name 09/22/18 0925             Static Sitting Balance    Level of [place] (Unsupported Sitting, Static Balance) supervision  -KR      Sitting Position (Unsupported Sitting, Static Balance) sitting in chair  -KR      Recorded by [KR] Roxana Vasques, PT 09/22/18 1505      Row Name 09/22/18 0925             Static Standing Balance    Level of [place] (Supported Standing, Static Balance) supervision  -KR      Recorded by [KR] Roxana Vasques, PT 09/22/18 1505      Row Name 09/22/18 0925             Positioning and Restraints    Pre-Treatment  Position sitting in chair/recliner  -KR      Post Treatment Position bed  -KR      In Bed notified nsg;supine;call light within reach;encouraged to call for assist;side rails up x2  -KR      Recorded by [KR] Roxana Vasques, PT 09/22/18 1505      Row Name 09/22/18 0925             Pain Assessment    Additional Documentation Pain Scale: Numbers Pre/Post-Treatment (Group)  -KR      Recorded by [DEJON] Roxana Vasques, PT 09/22/18 1505      Row Name 09/22/18 0925             Pain Scale: Numbers Pre/Post-Treatment    Pain Scale: Numbers, Pretreatment 5/10  -KR      Pain Scale: Numbers, Post-Treatment 5/10  -KR      Pain Location - Orientation incisional  -KR      Pain Location chest  -KR      Pain Intervention(s) Repositioned;Ambulation/increased activity  -KR      Recorded by [DEJON] Roxana Vasques, PT 09/22/18 1505      Row Name                Wound 09/17/18 0634 chest incision    Wound - Properties Group Date first assessed: 09/17/18 [SH] Time first assessed: 0634 [SH] Location: chest [SH] Type: incision [SH] Recorded by:  [SH] Haase, Sherri L, RN 09/17/18 0634    Row Name                Wound 09/17/18 0750 Right leg incision    Wound - Properties Group Date first assessed: 09/17/18 [SH] Time first assessed: 0750 [SH] Side: Right [SH] Location: leg [SH] Type: incision [SH] Recorded by:  [SH] Haase, Sherri L, RN 09/17/18 0750    Row Name 09/22/18 0925             Plan of Care Review    Plan of Care Reviewed With patient  -KR      Recorded by [KR] Roxana Vasques, PT 09/22/18 1505      Row Name 09/22/18 0945             Outcome Summary/Treatment Plan (PT)    Daily Summary of Progress (PT) progress toward functional goals is good  -KR      Recorded by [DEJON] Roxana Vasques, PT 09/22/18 1505        User Key  (r) = Recorded By, (t) = Taken By, (c) = Cosigned By    Initials Name Effective Dates Discipline     Haase, Sherri L, RN 06/16/16 -  Nurse    Roxana Lozano, PT 04/03/18 -  PT          Wound 09/17/18 0634 chest  incision (Active)   Dressing Appearance open to air 9/22/2018 12:00 PM   Closure Liquid skin adhesive 9/22/2018 12:00 PM   Base dry;pink;red/granulating;scab 9/22/2018 12:00 PM   Drainage Amount none 9/22/2018 12:00 PM   Dressing Care, Wound open to air 9/22/2018 12:00 PM       Wound 09/17/18 0750 Right leg incision (Active)   Closure Open to air;Liquid skin adhesive 9/22/2018 12:00 PM   Base dry;pink;red/granulating;scab 9/22/2018 12:00 PM   Drainage Amount none 9/22/2018 12:00 PM   Dressing Care, Wound open to air 9/22/2018 12:00 PM             Physical Therapy Education     Title: PT OT SLP Therapies (Active)     Topic: Physical Therapy (Active)     Point: Mobility training (Active)    Learning Progress Summary     Learner Status Readiness Method Response Comment Documented by    Patient Active Acceptance E NR  KR 09/22/18 1505     Active Acceptance E NR  MUSA 09/21/18 1005     Active Acceptance E NR  MUSA 09/20/18 0830     Active Acceptance E NR  KR 09/19/18 1153     Active Acceptance E NR  MUSA 09/18/18 1045          Point: Home exercise program (Active)    Learning Progress Summary     Learner Status Readiness Method Response Comment Documented by    Patient Active Acceptance E NR  KR 09/22/18 1505     Active Acceptance E NR  MUSA 09/21/18 1005     Active Acceptance E NR  MUSA 09/20/18 0830     Active Acceptance E NR  KR 09/19/18 1153     Active Acceptance E NR  MUSA 09/18/18 1045          Point: Body mechanics (Active)    Learning Progress Summary     Learner Status Readiness Method Response Comment Documented by    Patient Active Acceptance E NR  KR 09/22/18 1505     Active Acceptance E NR  MUSA 09/21/18 1005     Active Acceptance E NR  MUSA 09/20/18 0830     Active Acceptance E NR  KR 09/19/18 1153     Active Acceptance E NR  MUSA 09/18/18 1045          Point: Precautions (Active)    Learning Progress Summary     Learner Status Readiness Method Response Comment Documented by    Patient Active Acceptance E NR  KR 09/22/18  1505     Active Acceptance E NR  MUSA 09/21/18 1005     Active Acceptance E NR  MUSA 09/20/18 0830     Active Acceptance E NR  KR 09/19/18 1153     Active Acceptance E NR  MUSA 09/18/18 1045                      User Key     Initials Effective Dates Name Provider Type Discipline    MUSA 06/19/15 -  Elizabeth Michel, PT Physical Therapist PT    DEJON 04/03/18 -  Roxana Vasques, PT Physical Therapist PT                    PT Recommendation and Plan  Therapy Frequency (PT Clinical Impression): daily  Outcome Summary/Treatment Plan (PT)  Daily Summary of Progress (PT): progress toward functional goals is good  Plan of Care Reviewed With: patient  Progress: improving  Outcome Summary: Pt ambulated 300ft with CGA. Pt demonstrated improved stability and gait speed. VC's for increased step length. Pt limited by fatigue and chest pain. Continue to progress as appropriate.           Outcome Measures     Row Name 09/22/18 0925 09/21/18 1005 09/20/18 0830       How much help from another person do you currently need...    Turning from your back to your side while in flat bed without using bedrails? 3  -KR 3  -MUSA 3  -MUSA    Moving from lying on back to sitting on the side of a flat bed without bedrails? 3  -KR 3  -MUSA 3  -MUSA    Moving to and from a bed to a chair (including a wheelchair)? 3  -KR 3  -MSUA 3  -MUSA    Standing up from a chair using your arms (e.g., wheelchair, bedside chair)? 3  -KR 4  -MUSA 3  -MUSA    Climbing 3-5 steps with a railing? 3  -KR 3  -MUSA 3  -MUSA    To walk in hospital room? 3  -KR 3  -MUSA 3  -MUSA    AM-PAC 6 Clicks Score 18  -KR 19  -MUSA 18  -MUSA       Functional Assessment    Outcome Measure Options AM-PAC 6 Clicks Basic Mobility (PT)  -KR  --  --      User Key  (r) = Recorded By, (t) = Taken By, (c) = Cosigned By    Initials Name Provider Type    Elizabeth Ortiz, PT Physical Therapist    Roxana Lozano, PT Physical Therapist           Time Calculation:         PT Charges     Row Name 09/22/18 0925              Time Calculation    Start Time 0925  -KR      PT Received On 09/22/18  -KR      PT Goal Re-Cert Due Date 09/28/18  -KR         Time Calculation- PT    Total Timed Code Minutes- PT 23 minute(s)  -KR         Timed Charges    57217 - PT Therapeutic Activity Minutes 23  -KR        User Key  (r) = Recorded By, (t) = Taken By, (c) = Cosigned By    Initials Name Provider Type    Roxana Lozano, PT Physical Therapist        Therapy Suggested Charges     Code   Minutes Charges    37882 (CPT®) Hc Pt Neuromusc Re Education Ea 15 Min      26047 (CPT®) Hc Pt Ther Proc Ea 15 Min      37629 (CPT®) Hc Gait Training Ea 15 Min      63613 (CPT®) Hc Pt Therapeutic Act Ea 15 Min 23 2    38982 (CPT®) Hc Pt Manual Therapy Ea 15 Min      96727 (CPT®) Hc Pt Iontophoresis Ea 15 Min      29739 (CPT®) Hc Pt Elec Stim Ea-Per 15 Min      84043 (CPT®) Hc Pt Ultrasound Ea 15 Min      19987 (CPT®) Hc Pt Self Care/Mgmt/Train Ea 15 Min      73569 (CPT®) Hc Pt Prosthetic (S) Train Initial Encounter, Each 15 Min      89936 (CPT®) Hc Pt Orthotic(S)/Prosthetic(S) Encounter, Each 15 Min      87306 (CPT®) Hc Orthotic(S) Mgmt/Train Initial Encounter, Each 15min      Total  23 2        Therapy Charges for Today     Code Description Service Date Service Provider Modifiers Qty    [id number] HC PT THERAPEUTIC ACT EA 15 MIN 9/22/2018 Roxana Vasques, PT GP 2          PT G-Codes  Outcome Measure Options: AM-PAC 6 Clicks Basic Mobility (PT)  AM-PAC 6 Clicks Score: 18    Miriam Vasques PT  9/22/2018

## 2018-09-22 NOTE — PROGRESS NOTES
"[facility] Progress Note     LOS: 5 days   Patient Care Team:  Sammy Glass MD as PCP - General  Sammy Glass MD as PCP - Family Medicine  PCP:  Sammy Glass MD    Chief Complaint:  Follow-up coronary artery disease    Subjective: Patient feels well this morning without complaints.  He is scheduled to admitted to telemetry.  Blood pressure is better controlled systolics in the 140s, case will spikes to 150      Review of Systems:   All systems have been reviewed and are negative with the exception of those mentioned above.      Objective:    Vital Sign Min/Max for last 24 hours  Temp  Min: 97.7 °F (36.5 °C)  Max: 98.4 °F (36.9 °C)   BP  Min: 110/71  Max: 175/99   Pulse  Min: 61  Max: 79   Resp  Min: 14  Max: 16   SpO2  Min: 94 %  Max: 99 %   No Data Recorded   No Data Recorded     Flowsheet Rows      First Filed Value   Admission Height  177.8 cm (70\") Documented at 09/17/2018 0940   Admission Weight  63.5 kg (140 lb) Documented at 09/17/2018 0940          Telemetry: Sinus rhythm      Intake/Output Summary (Last 24 hours) at 09/22/18 1247  Last data filed at 09/22/18 0600   Gross per 24 hour   Intake           340.76 ml   Output             1550 ml   Net         -1209.24 ml     Intake & Output (last 3 days)       09/19 0701 - 09/20 0700 09/20 0701 - 09/21 0700 09/21 0701 - 09/22 0700 09/22 0701 - 09/23 0700    P.O. 487 450 200     I.V. (mL/kg) 481 (7.6) 294.7 (4.6) 140.8 (2.2)     Total Intake(mL/kg) 968 (15.2) 744.7 (11.7) 340.8 (5.4)     Urine (mL/kg/hr) 3575 (2.3) 1950 (1.3) 1550 (1)     Total Output 3575 1950 1550      Net -2607 -1205.3 -1209.2              Unmeasured Urine Occurrence  1 x 3 x     Unmeasured Stool Occurrence  3 x 4 x            Physical Exam:  Physical Exam   GENERAL: Alert, cooperative, in no acute distress.   HEENT: Normocephalic, no jugular venous distention  HEART: No discrete PMI is noted. Regular rhythm, normal rate, " and no murmur, peripheral pulses intact, healing sternotomy  LUNGS:  No wheezing, rales or rhonchi. 97% on room air  ABDOMEN: Soft, bowel sounds present, non-tender   NEUROLOGIC: No focal abnormalities involving strength or sensation are noted.   EXTREMITIES: No clubbing, cyanosis, or edema noted.      LABS/DIAGNOSTIC DATA:    Results from last 7 days  Lab Units 09/20/18  0401 09/19/18  0322 09/18/18  0413   WBC 10*3/mm3 9.04 11.94* 7.92   HEMOGLOBIN g/dL 9.1* 9.2* 7.7*   HEMATOCRIT % 27.2* 27.9* 23.2*   PLATELETS 10*3/mm3 166 179 154     No results found for: TROPONINT    Results from last 7 days  Lab Units 09/18/18  0413 09/17/18  1029 09/16/18  1426   INR  1.13* 1.09 1.05   APTT seconds  --  27.8 30.6       Results from last 7 days  Lab Units 09/21/18  0325 09/20/18  0401 09/19/18  1925 09/19/18  0325  09/16/18  1426   SODIUM mmol/L 138 137  --  132  < > 135   POTASSIUM mmol/L 3.9 3.8 3.9 3.5  < > 4.3   CHLORIDE mmol/L 104 106  --  107  < > 105   CO2 mmol/L 23.0 21.0  --  19.0*  < > 18.0*   BUN mg/dL 43* 36*  --  37*  < > 74*   CREATININE mg/dL 1.99* 2.00*  --  2.14*  < > 2.73*   CALCIUM mg/dL 8.0* 8.3*  --  8.3*  < > 8.1*   BILIRUBIN mg/dL  --   --   --   --   --  0.2*   ALK PHOS U/L  --   --   --   --   --  86   ALT (SGPT) U/L  --   --   --   --   --  18   AST (SGOT) U/L  --   --   --   --   --  27   GLUCOSE mg/dL 116* 106*  --  152*  < > 99   < > = values in this interval not displayed.    Results from last 7 days  Lab Units 09/16/18  1426   HEMOGLOBIN A1C % 5.70*                   Medication Review:     amiodarone 200 mg Oral Q12H   aspirin 325 mg Oral Daily   atorvastatin 80 mg Oral Nightly   CloNIDine 0.1 mg Oral Q8H   gabapentin 100 mg Oral Q8H   hydrALAZINE 100 mg Oral Q8H   insulin lispro 0-7 Units Subcutaneous 4x Daily With Meals & Nightly   ipratropium-albuterol 3 mL Nebulization Q6H - RT   metoprolol tartrate 12.5 mg Oral Q12H   nicotine 1 patch Transdermal Q24H   NIFEdipine XL 90 mg Oral Q24H    pharmacy consult - MTM  Does not apply Daily   sennosides-docusate sodium 2 tablet Oral BID   sertraline 100 mg Oral Daily        nitroglycerin 5-200 mcg/min Last Rate: Stopped (09/22/18 0839)        Principal Problem:    Coronary artery disease s/p CABG x 2  Active Problems:    CKD (chronic kidney disease) stage 4, GFR 15-29 ml/min (CMS/[facility])    Type 2 diabetes mellitus (CMS/[facility])    Hepatitis C      Assessment/Plan:  1. CAD with severe calcifications  - normal EF pre-op  - s/p CABG x2, POD 4  - stable post op course   -Continue aspirin and statin beta blocker     2. Post-op PAF  - converted to NSR with Amiodarone  - maintaining SR on PO Amiodarone  -Continue aspirin, holding on anticoagulation given current resolution  -We'll consider discharge with Holter to assess for any A. fib burden as an outpatient     3. CKD IV  - Cr is stable  - NAL following     4. DM2   - per intensivist      5. HTN  - remains elevated, will increase Nifedipine to 90mg  -increase clonidine 0.2mg TID     6. Tobacco abuse  - Nicotine patch while in hospital     Transferring to telemetry      Micah Olivera MD   09/22/18  12:47 PM

## 2018-09-22 NOTE — PLAN OF CARE
Problem: Patient Care Overview  Goal: Plan of Care Review  Outcome: Ongoing (interventions implemented as appropriate)   09/22/18 3554   Coping/Psychosocial   Plan of Care Reviewed With patient   Plan of Care Review   Progress improving   OTHER   Outcome Summary Pt ambulated 300ft with CGA. Pt demonstrated improved stability and gait speed. VC's for increased step length. Pt limited by fatigue and chest pain. Continue to progress as appropriate.

## 2018-09-22 NOTE — PROGRESS NOTES
"   LOS: 5 days    Patient Care Team:  Sammy Glass MD as PCP - General  Sammy Glass MD as PCP - Family Medicine    Chief Complaint:  Shortness of breath  58-year-old  male history of chronic kidney disease likely diabetic nephropathy, nephrosclerosis.  He was last [facility] on 8/28/2018 and [facility]    His previous labs weight from 2.05-2.61.  Has CK D stage IV.  Patient had a heart catheter done in July 2018 with 2 vessel disease, patient was admitted yesterday underwent a CABG today on 9/17/2018 with LIMA to LAD and SVG to PDA.  Subjective   No new events, renal function stable.    Review of Systems:      Patient denies shortness of breath, chest pain, dysuria, hematuria, nausea, vomiting.    Objective     Vital Sign Min/Max for last 24 hours  Temp  Min: 97.7 °F (36.5 °C)  Max: 98.4 °F (36.9 °C)   BP  Min: 110/71  Max: 175/99   Pulse  Min: 60  Max: 79   Resp  Min: 14  Max: 16   SpO2  Min: 94 %  Max: 99 %   No Data Recorded   No Data Recorded     Flowsheet Rows      First Filed Value   Admission Height  177.8 cm (70\") Documented at 09/17/2018 0940   Admission Weight  63.5 kg (140 lb) Documented at 09/17/2018 0940          No intake/output data recorded.  I/O last 3 completed shifts:  In: 728.7 [P.O.:450; I.V.:278.7]  Out: 2650 [Urine:2650]    Physical Exam:   General appearance: Awake alert, oriented, no obvious distress.  Eyes: Pupils reactive EOMI.  Neck: Supple, no JVD.  Lungs: Few rhonchi's, good chest movement.   Heart: No gallop or rub.  Abdomen: Soft nontender positive bowel sounds.  : No suprapubic fullness,    Extremities.  No edema cyanosis.  Neuro: Grossly intact.  No focal deficit.  Moving all extremities.    Results from last 7 days  Lab Units 09/21/18  0325 09/20/18  0401 09/19/18  1925 09/19/18  0325 09/19/18  0322 09/18/18  0413 09/17/18  2112 09/17/18  1355 09/17/18  1029 09/17/18 0925 09/16/18  1426   WBC 10*3/mm3  --  9.04  --   --  11.94* 7.92  --  " 10.10 17.93*  --   --  9.22   HEMOGLOBIN g/dL  --  9.1*  --   --  9.2* 7.7* 8.2* 7.9* 9.1*  --   --  11.3*   HEMOGLOBIN, POC g/dL  --   --   --   --   --   --   --   --   --  8.2*  < >  --    HEMATOCRIT %  --  27.2*  --   --  27.9* 23.2* 24.9* 23.1* 27.3*  --   --  33.9*   HEMATOCRIT POC %  --   --   --   --   --   --   --   --   --  24*  < >  --    PLATELETS 10*3/mm3  --  166  --   --  179 154  --  164 184  --   --  218   SODIUM mmol/L 138 137  --  132  --  139 140 139 141  --   < > 135   POTASSIUM mmol/L 3.9 3.8 3.9 3.5  --  4.0 4.0 3.3* 3.8  --   < > 4.3   CHLORIDE mmol/L 104 106  --  107  --  109 114* 110* 108  --   < > 105   CO2 mmol/L 23.0 21.0  --  19.0*  --  19.0* 21.0 21.0 20.0  --   < > 18.0*   BUN mg/dL 43* 36*  --  37*  --  46* 50* 56* 53*  --   < > 74*   CREATININE mg/dL 1.99* 2.00*  --  2.14*  --  2.18* 2.20* 2.29* 2.30*  --   < > 2.73*   GLUCOSE mg/dL 116* 106*  --  152*  --  106* 128* 136* 150*  --   < > 99   CALCIUM mg/dL 8.0* 8.3*  --  8.3*  --  7.9* 7.9* 8.0* 8.7  --   < > 8.1*   PHOSPHORUS mg/dL  --   --   --   --   --  5.0 4.1 3.8 4.2  --   --   --    < > = values in this interval not displayed.         Results Review:     I reviewed the patient's new clinical results.      amiodarone 200 mg Oral Q12H   aspirin 325 mg Oral Daily   atorvastatin 80 mg Oral Nightly   CloNIDine 0.1 mg Oral Q8H   gabapentin 100 mg Oral Q8H   hydrALAZINE 100 mg Oral Q8H   insulin lispro 0-7 Units Subcutaneous 4x Daily With Meals & Nightly   ipratropium-albuterol 3 mL Nebulization Q6H - RT   metoprolol tartrate 12.5 mg Oral Q12H   nicotine 1 patch Transdermal Q24H   NIFEdipine XL 90 mg Oral Q24H   pharmacy consult - MTM  Does not apply Daily   sennosides-docusate sodium 2 tablet Oral BID   sertraline 100 mg Oral Daily       nitroglycerin 5-200 mcg/min Last Rate: Stopped (09/22/18 0839)       Medication Review: As above    Assessment/Plan      1.   acute kidney injury on chronic kidney disease, renal function stable  check the labs again tomorrow morning.   2.  Status post CABG ×2 today.  Chest tube in place.   3.  Diabetes type 2.  4.  Anemia of chronic disease.  5.  Secondary hyperparathyroidism secondary to renal insufficiency  6.  Hepatitis C    Plan:    Avoid nephrotoxic medications.  Keep systolic blood pressure greater than 100.  Check volume status.  Check labs in the morning.  Okay to transfer to the floor from renal point  Kartik Encinas MD  09/22/18  10:45 AM

## 2018-09-22 NOTE — PROGRESS NOTES
INTENSIVIST   PROGRESS NOTE     Hospital:  LOS: 5 days     Mr. Axel Desir, 58 y.o. male is followed for a Chief Complaint of: CAD s/p CABG, Postoperative management of hyperglycemia       Subjective   S   Mr. Desir is a 57yo M who underwent CABG by Dr. Calero on 9/17/18.     Interval History:  No events overnight.        The patient's relevant past medical, surgical and social history were reviewed and updated in Epic as appropriate.      ROS:   Constitutional: Negative for fever.   Respiratory: Negative for dyspnea.   Cardiovascular: Negative for chest pain.   Gastrointestinal: Negative for  nausea, vomiting and diarrhea.     Objective   O     Vitals:  Temp  Min: 97.7 °F (36.5 °C)  Max: 98.4 °F (36.9 °C)  BP  Min: 110/71  Max: 175/99  Pulse  Min: 62  Max: 79  Resp  Min: 14  Max: 16  SpO2  Min: 94 %  Max: 99 % No Data Recorded    Intake/Ouptut 24 hrs (7:00AM - 6:59 AM)  Intake & Output (last 3 days)       09/19 0701 - 09/20 0700 09/20 0701 - 09/21 0700 09/21 0701 - 09/22 0700 09/22 0701 - 09/23 0700    P.O. 487 450 200     I.V. (mL/kg) 481 (7.6) 294.7 (4.6) 140.8 (2.2)     Total Intake(mL/kg) 968 (15.2) 744.7 (11.7) 340.8 (5.4)     Urine (mL/kg/hr) 3575 (2.3) 1950 (1.3) 1550 (1)     Total Output 3575 1950 1550      Net -2607 -1205.3 -1209.2              Unmeasured Urine Occurrence  1 x 3 x     Unmeasured Stool Occurrence  3 x 4 x             Physical Examination  Telemetry:  Normal sinus rhythm.    Constitutional:  No acute distress.  Walking with PT.    Cardiovascular: Normal rate, regular and rhythm. Normal heart sounds.  No murmurs, gallop or rub.   Respiratory: No respiratory distress. Normal respiratory effort.  Normal breath sounds  Clear to ascultation.    Abdominal:  Soft. No masses. Non-tender. No distension. No HSM.   Extremities: No digital cyanosis. No clubbing.  No peripheral edema.   Neurological:   Alert and Oriented to person, place, and time.             Results from last 7 days  Lab Units  09/20/18  0401 09/19/18  0322 09/18/18  0413   WBC 10*3/mm3 9.04 11.94* 7.92   HEMOGLOBIN g/dL 9.1* 9.2* 7.7*   MCV fL 89.2 89.1 90.3   PLATELETS 10*3/mm3 166 179 154       Results from last 7 days  Lab Units 09/21/18  0325 09/20/18  0401 09/19/18  1925 09/19/18  0325 09/18/18  0413 09/17/18  2112 09/17/18  1355 09/17/18  1029   SODIUM mmol/L 138 137  --  132 139 140 139 141   POTASSIUM mmol/L 3.9 3.8 3.9 3.5 4.0 4.0 3.3* 3.8   CO2 mmol/L 23.0 21.0  --  19.0* 19.0* 21.0 21.0 20.0   CREATININE mg/dL 1.99* 2.00*  --  2.14* 2.18* 2.20* 2.29* 2.30*   GLUCOSE mg/dL 116* 106*  --  152* 106* 128* 136* 150*   MAGNESIUM mg/dL  --   --   --   --  2.0  --  2.3 2.6   PHOSPHORUS mg/dL  --   --   --   --  5.0 4.1 3.8 4.2     Estimated Creatinine Clearance: 35.5 mL/min (A) (by C-G formula based on SCr of 1.99 mg/dL (H)).    Results from last 7 days  Lab Units 09/16/18  1426   ALK PHOS U/L 86   BILIRUBIN mg/dL 0.2*   ALT (SGPT) U/L 18   AST (SGOT) U/L 27         Results from last 7 days  Lab Units 09/17/18  1500 09/17/18  1232 09/17/18  1029 09/17/18  0925 09/17/18  0855   PH, ARTERIAL pH units 7.391 7.354 7.311* 7.34* 7.35   PCO2, ARTERIAL mm Hg 34.0* 37.9 39.1  --   --    PO2 ART mm Hg 142.0* 108.0 162.0*  --   --    FIO2 % 40 40 60  --   --        Images:  Imaging Results (last 24 hours)     ** No results found for the last 24 hours. **            Results: Reviewed.  I reviewed the patient's new laboratory and imaging results.  I independently reviewed the patient's new images.    Medications: Reviewed.    Assessment/Plan   A / P     Mr. Desir is a 57yo M who underwent CABG by Dr. Calero on 9/17/18.     Nutrition:   Diet Regular; Cardiac, Consistent Carbohydrate  Advance Directives:   Code Status and Medical Interventions:   Ordered at: 09/17/18 1029     Code Status:    CPR     Medical Interventions (Level of Support Prior to Arrest):    Full       Hospital Problem List     * (Principal)Coronary artery disease s/p CABG x 2     Overview Signed 8/28/2018  2:08 PM by Tricia Bianchi PA-C     Added automatically from request for surgery [id number]         CKD (chronic kidney disease) stage 4, GFR 15-29 ml/min (CMS/[facility])    Type 2 diabetes mellitus (CMS/[facility])    Hepatitis C          Assessment / Plan:    1. Continue SSI  2. Ambulation/ IS  3. Nifedipine and Clonidine increased per Cardiology  4. Start SQH now that chest tubes are out.   5. Okay to transfer to telemetry.       Plan of care and goals reviewed during interdisciplinary rounds.  I discussed the patient's findings and my recommendations with patient and nursing staff    Time: was greater than 25 minutes.      Kiersten Khan, DO    Intensive Care Medicine and Pulmonary Medicine

## 2018-09-22 NOTE — PLAN OF CARE
Problem: Patient Care Overview  Goal: Plan of Care Review  Outcome: Ongoing (interventions implemented as appropriate)   09/22/18 4293   Coping/Psychosocial   Plan of Care Reviewed With patient   Plan of Care Review   Progress improving   OTHER   Outcome Summary nitro drip off on dayshift, htn increasing more during night nitro at 5 mcg/hr restarted and tolerated, good uop, rested better tonight than last night, did c/o incisional pain at times meds tolerated, ra, sb, ordered to tele waiting for bed

## 2018-09-22 NOTE — PLAN OF CARE
Problem: Patient Care Overview  Goal: Plan of Care Review  Outcome: Ongoing (interventions implemented as appropriate)   09/22/18 1345   Coping/Psychosocial   Plan of Care Reviewed With patient   Plan of Care Review   Progress improving   OTHER   Outcome Summary pt transfered to the floor. nitro weaned off. cordis d/c'd. pt ambualting well. tolerating pain well.        Problem: Cardiac Surgery (Adult)  Goal: Signs and Symptoms of Listed Potential Problems Will be Absent, Minimized or Managed (Cardiac Surgery)  Outcome: Ongoing (interventions implemented as appropriate)   09/22/18 1437   Goal/Outcome Evaluation   Problems Assessed (Cardiac Surgery) all   Problems Present (Cardiac Surgery) pain;situational response       Problem: Fall Risk (Adult)  Goal: Absence of Fall  Outcome: Ongoing (interventions implemented as appropriate)   09/22/18 1437   Fall Risk (Adult)   Absence of Fall making progress toward outcome       Problem: Skin Injury Risk (Adult)  Goal: Skin Health and Integrity  Outcome: Ongoing (interventions implemented as appropriate)   09/22/18 1437   Skin Injury Risk (Adult)   Skin Health and Integrity making progress toward outcome

## 2018-09-22 NOTE — PROGRESS NOTES
CTS Progress Note      POD 5 s/p CABG x2     LOS: 5 days     Subjective  On room air.  No acute events overnight.  VSS. Ambulating 300 ft with PT.  No complaints.  Reports multiple BM.    Objective    Vital Signs  Temp:  [97.7 °F (36.5 °C)-98.4 °F (36.9 °C)] 97.7 °F (36.5 °C)  Heart Rate:  [60-76] 72  Resp:  [14-16] 16  BP: (110-175)/(67-99) 136/79    Physical Exam:   General Appearance: alert, appears stated age and cooperative   Lungs: clear to auscultation     Heart: regular rhythm & normal rate, normal S1, S2 and no murmur, no gallop, no rub   Chest: sternum stable   Skin: Incision c/d/i     Results     Results from last 7 days  Lab Units 09/20/18  0401   WBC 10*3/mm3 9.04   HEMOGLOBIN g/dL 9.1*   HEMATOCRIT % 27.2*   PLATELETS 10*3/mm3 166       Results from last 7 days  Lab Units 09/21/18  0325   SODIUM mmol/L 138   POTASSIUM mmol/L 3.9   CHLORIDE mmol/L 104   CO2 mmol/L 23.0   BUN mg/dL 43*   CREATININE mg/dL 1.99*   GLUCOSE mg/dL 116*   CALCIUM mg/dL 8.0*       Imaging Results (last 24 hours)     ** No results found for the last 24 hours. **          Assessment  Principal Problem:    Coronary artery disease s/p CABG x 2  Active Problems:    CKD (chronic kidney disease) stage 4, GFR 15-29 ml/min (CMS/HCC)    Type 2 diabetes mellitus (CMS/HCC)    Hepatitis C      Plan   Hold bowel regimen  Ambulate  Pulm toilet    Awaiting telemetry bed    Tricia Bianchi PA-C  09/22/18  9:04 AM

## 2018-09-23 VITALS
RESPIRATION RATE: 18 BRPM | OXYGEN SATURATION: 97 % | BODY MASS INDEX: 19.9 KG/M2 | TEMPERATURE: 98.6 F | SYSTOLIC BLOOD PRESSURE: 128 MMHG | DIASTOLIC BLOOD PRESSURE: 86 MMHG | HEIGHT: 70 IN | HEART RATE: 64 BPM | WEIGHT: 139 LBS

## 2018-09-23 LAB
ALBUMIN SERPL-MCNC: 3.3 G/DL (ref 3.2–4.8)
ANION GAP SERPL CALCULATED.3IONS-SCNC: 8 MMOL/L (ref 3–11)
BUN BLD-MCNC: 44 MG/DL (ref 9–23)
BUN/CREAT SERPL: 22.6 (ref 7–25)
CALCIUM SPEC-SCNC: 8.2 MG/DL (ref 8.7–10.4)
CHLORIDE SERPL-SCNC: 106 MMOL/L (ref 99–109)
CO2 SERPL-SCNC: 22 MMOL/L (ref 20–31)
CREAT BLD-MCNC: 1.95 MG/DL (ref 0.6–1.3)
GFR SERPL CREATININE-BSD FRML MDRD: 35 ML/MIN/1.73
GLUCOSE BLD-MCNC: 141 MG/DL (ref 70–100)
GLUCOSE BLDC GLUCOMTR-MCNC: 147 MG/DL (ref 70–130)
GLUCOSE BLDC GLUCOMTR-MCNC: 230 MG/DL (ref 70–130)
PHOSPHATE SERPL-MCNC: 3.5 MG/DL (ref 2.4–5.1)
POTASSIUM BLD-SCNC: 4.9 MMOL/L (ref 3.5–5.5)
SODIUM BLD-SCNC: 136 MMOL/L (ref 132–146)

## 2018-09-23 PROCEDURE — 80069 RENAL FUNCTION PANEL: CPT | Performed by: INTERNAL MEDICINE

## 2018-09-23 PROCEDURE — 99024 POSTOP FOLLOW-UP VISIT: CPT | Performed by: PHYSICIAN ASSISTANT

## 2018-09-23 PROCEDURE — 99232 SBSQ HOSP IP/OBS MODERATE 35: CPT | Performed by: INTERNAL MEDICINE

## 2018-09-23 PROCEDURE — 94760 N-INVAS EAR/PLS OXIMETRY 1: CPT

## 2018-09-23 PROCEDURE — 25010000002 HEPARIN (PORCINE) PER 1000 UNITS: Performed by: INTERNAL MEDICINE

## 2018-09-23 PROCEDURE — 94799 UNLISTED PULMONARY SVC/PX: CPT

## 2018-09-23 PROCEDURE — 82962 GLUCOSE BLOOD TEST: CPT

## 2018-09-23 PROCEDURE — 94640 AIRWAY INHALATION TREATMENT: CPT

## 2018-09-23 RX ORDER — AMIODARONE HYDROCHLORIDE 200 MG/1
200 TABLET ORAL EVERY 12 HOURS SCHEDULED
Qty: 60 TABLET | Refills: 1 | Status: SHIPPED | OUTPATIENT
Start: 2018-09-23 | End: 2018-10-01 | Stop reason: ALTCHOICE

## 2018-09-23 RX ORDER — CLONIDINE HYDROCHLORIDE 0.2 MG/1
0.2 TABLET ORAL EVERY 8 HOURS SCHEDULED
Qty: 90 TABLET | Refills: 1 | Status: SHIPPED | OUTPATIENT
Start: 2018-09-23 | End: 2019-03-28

## 2018-09-23 RX ORDER — NIFEDIPINE 90 MG/1
90 TABLET, EXTENDED RELEASE ORAL
Qty: 30 TABLET | Refills: 0 | Status: SHIPPED | OUTPATIENT
Start: 2018-09-24 | End: 2019-04-23 | Stop reason: HOSPADM

## 2018-09-23 RX ADMIN — HEPARIN SODIUM 5000 UNITS: 5000 INJECTION, SOLUTION INTRAVENOUS; SUBCUTANEOUS at 05:13

## 2018-09-23 RX ADMIN — CLONIDINE HYDROCHLORIDE 0.2 MG: 0.1 TABLET ORAL at 05:13

## 2018-09-23 RX ADMIN — HYDRALAZINE HYDROCHLORIDE 100 MG: 50 TABLET, FILM COATED ORAL at 05:13

## 2018-09-23 RX ADMIN — INSULIN LISPRO 3 UNITS: 100 INJECTION, SOLUTION INTRAVENOUS; SUBCUTANEOUS at 12:03

## 2018-09-23 RX ADMIN — GABAPENTIN 100 MG: 100 CAPSULE ORAL at 14:06

## 2018-09-23 RX ADMIN — HYDROCODONE BITARTRATE AND ACETAMINOPHEN 1 TABLET: 7.5; 325 TABLET ORAL at 16:52

## 2018-09-23 RX ADMIN — GABAPENTIN 100 MG: 100 CAPSULE ORAL at 05:13

## 2018-09-23 RX ADMIN — HYDRALAZINE HYDROCHLORIDE 100 MG: 50 TABLET, FILM COATED ORAL at 14:06

## 2018-09-23 RX ADMIN — METOPROLOL TARTRATE 12.5 MG: 25 TABLET, FILM COATED ORAL at 09:51

## 2018-09-23 RX ADMIN — IPRATROPIUM BROMIDE AND ALBUTEROL SULFATE 3 ML: 2.5; .5 SOLUTION RESPIRATORY (INHALATION) at 07:11

## 2018-09-23 RX ADMIN — ASPIRIN 325 MG: 325 TABLET, DELAYED RELEASE ORAL at 09:51

## 2018-09-23 RX ADMIN — NIFEDIPINE 90 MG: 60 TABLET, FILM COATED, EXTENDED RELEASE ORAL at 09:51

## 2018-09-23 RX ADMIN — CLONIDINE HYDROCHLORIDE 0.2 MG: 0.1 TABLET ORAL at 14:06

## 2018-09-23 RX ADMIN — AMIODARONE HYDROCHLORIDE 200 MG: 200 TABLET ORAL at 09:51

## 2018-09-23 RX ADMIN — SERTRALINE HYDROCHLORIDE 100 MG: 100 TABLET ORAL at 09:51

## 2018-09-23 RX ADMIN — HEPARIN SODIUM 5000 UNITS: 5000 INJECTION, SOLUTION INTRAVENOUS; SUBCUTANEOUS at 14:06

## 2018-09-23 RX ADMIN — IPRATROPIUM BROMIDE AND ALBUTEROL SULFATE 3 ML: 2.5; .5 SOLUTION RESPIRATORY (INHALATION) at 00:48

## 2018-09-23 NOTE — PROGRESS NOTES
CTS Progress Note      POD 6 s/p CABG x2     LOS: 6 days     Subjective  On room air.  No acute events overnight.  VSS. Ambulating 300 ft with PT.  No complaints.  Reports multiple BM.    Objective    Vital Signs  Temp:  [97.6 °F (36.4 °C)-98.7 °F (37.1 °C)] 98.6 °F (37 °C)  Heart Rate:  [63-79] 69  Resp:  [14-20] 18  BP: (121-155)/(72-94) 129/82    Physical Exam:   General Appearance: alert, appears stated age and cooperative   Lungs: clear to auscultation     Heart: regular rhythm & normal rate, normal S1, S2 and no murmur, no gallop, no rub   Chest: sternum stable   Skin: Incision c/d/i     Results     Results from last 7 days  Lab Units 09/20/18  0401   WBC 10*3/mm3 9.04   HEMOGLOBIN g/dL 9.1*   HEMATOCRIT % 27.2*   PLATELETS 10*3/mm3 166       Results from last 7 days  Lab Units 09/21/18  0325   SODIUM mmol/L 138   POTASSIUM mmol/L 3.9   CHLORIDE mmol/L 104   CO2 mmol/L 23.0   BUN mg/dL 43*   CREATININE mg/dL 1.99*   GLUCOSE mg/dL 116*   CALCIUM mg/dL 8.0*       Imaging Results (last 24 hours)     ** No results found for the last 24 hours. **          Assessment  Principal Problem:    Coronary artery disease s/p CABG x 2  Active Problems:    CKD (chronic kidney disease) stage 4, GFR 15-29 ml/min (CMS/HCC)    Type 2 diabetes mellitus (CMS/HCC)    Hepatitis C      Plan   Hold bowel regimen  Ambulate  Pulm toilet  Follow creatinine       Kali Kamara PA-C  09/23/18  7:39 AM

## 2018-09-23 NOTE — PROGRESS NOTES
"[facility] at [facility]  IP Progress Note   LOS: 6 days   Patient Care Team:  Sammy Glass MD as PCP - General  Sammy Glass MD as PCP - Family Medicine    Chief Complaint: Follow up for Coronary Artery Disease  Atrial Fibrilation    Subjective Denies Chest Pain Denies Dyspnea Eating OK Ambulating.  Feels ready for discharge             No problems updated.        Tele: Sinus Rythym    Vitals:  -151  Blood pressure 151/95, pulse 74, temperature 98.6 °F (37 °C), temperature source Oral, resp. rate 18, height 177.8 cm (70\"), weight 63 kg to  (139 lb), SpO2 97 %.   No intake or output data in the 24 hours ending 09/23/18 1249    Physical Exam:      General: alert, no acute distress, acyanotic, well developed, well nourished   Chest: Clear Auscultation and No Wheezes.  + sternotomy   CV: Heart sounds are normal.  Regular rate and rhythm without murmur, gallop or rub.   Extremities: negative    Results Review:     I reviewed the patient's new clinical results.      Results from last 7 days  Lab Units 09/20/18  0401   WBC 10*3/mm3 9.04   HEMOGLOBIN g/dL 9.1*   HEMATOCRIT % 27.2*   PLATELETS 10*3/mm3 166       Results from last 7 days  Lab Units 09/23/18  0728  09/16/18  1426   SODIUM mmol/L 136  < > 135   POTASSIUM mmol/L 4.9  < > 4.3   CHLORIDE mmol/L 106  < > 105   CO2 mmol/L 22.0  < > 18.0*   BUN mg/dL 44*  < > 74*   CREATININE mg/dL 1.95*  < > 2.73*   CALCIUM mg/dL 8.2*  < > 8.1*   BILIRUBIN mg/dL  --   --  0.2*   ALK PHOS U/L  --   --  86   ALT (SGPT) U/L  --   --  18   AST (SGOT) U/L  --   --  27   GLUCOSE mg/dL 141*  < > 99   < > = values in this interval not displayed.    Results from last 7 days  Lab Units 09/18/18  0413 09/17/18  1029 09/16/18  1426   INR  1.13* 1.09 1.05     No results found for: TROPONINI                Scheduled Meds:  amiodarone 200 mg Oral Q12H   aspirin 325 mg Oral Daily   atorvastatin 80 mg Oral Nightly   CloNIDine 0.2 mg Oral Q8H "   gabapentin 100 mg Oral Q8H   heparin (porcine) 5,000 Units Subcutaneous Q8H   hydrALAZINE 100 mg Oral Q8H   insulin lispro 0-7 Units Subcutaneous 4x Daily With Meals & Nightly   ipratropium-albuterol 3 mL Nebulization Q6H - RT   metoprolol tartrate 12.5 mg Oral Q12H   nicotine 1 patch Transdermal Q24H   NIFEdipine XL 90 mg Oral Q24H   pharmacy consult - MTM  Does not apply Daily   sennosides-docusate sodium 2 tablet Oral BID   sertraline 100 mg Oral Daily       Continuous Infusions:  nitroglycerin 5-200 mcg/min Last Rate: Stopped (09/22/18 0839)         Assessment/Plan:  1. CAD with severe calcifications  - normal EF pre-op  - s/p CABG x2, POD 5  - stable post op course   -Continue aspirin and statin beta blocker     2. Post-op PAF  - converted to NSR with Amiodarone  - maintaining SR on PO Amiodarone  -Continue aspirin, holding on anticoagulation given current resolution  -We'll consider discharge with Holter to assess for any A. fib burden as an outpatient     3. CKD IV  - Cr is improving  - NAL following     4. DM2   - per intensivist      5. HTN  - remains elevated, will increase Nifedipine to 90mg  -increase clonidine 0.2mg TID     6. Tobacco abuse  - Nicotine patch while in hospital       Home today or tomorrow Okay with us.   Follow up with Dr Eldridge in 1 month          MARLENY Pena  09/23/18  12:49 PM    IMicah M.D.,  have reviewed the notes, assessments, and/or procedures performed by IVETTE/PA, I CONCUR with the documentation of Axel Desir.    To monitor for recurrence of A. fib as outpatient will arrange to send patient mobile telemetry monitor.

## 2018-09-23 NOTE — PROGRESS NOTES
"   LOS: 6 days    Patient Care Team:  Sammy Glass MD as PCP - General  Sammy Glass MD as PCP - Family Medicine    Chief Complaint:  Shortness of breath  58-year-old  male history of chronic kidney disease likely diabetic nephropathy, nephrosclerosis.  He was last seen on 8/28/2018 and [facility]    His previous labs weight from 2.05-2.61.  Has CK D stage IV.  Patient had a heart catheter done in July 2018 with 2 vessel disease, patient was admitted yesterday underwent a CABG today on 9/17/2018 with LIMA to LAD and SVG to PDA.  Subjective   Renal function improved.    Review of Systems:      Patient denies shortness of breath, chest pain, dysuria, hematuria, nausea, vomiting.    Objective     Vital Sign Min/Max for last 24 hours  Temp  Min: 97.6 °F (36.4 °C)  Max: 98.7 °F (37.1 °C)   BP  Min: 121/83  Max: 155/94   Pulse  Min: 63  Max: 76   Resp  Min: 16  Max: 20   SpO2  Min: 94 %  Max: 100 %   No Data Recorded   Weight  Min: 63 kg (139 lb)  Max: 63 kg (139 lb)     Flowsheet Rows      First Filed Value   Admission Height  177.8 cm (70\") Documented at 09/17/2018 0940   Admission Weight  63.5 kg (140 lb) Documented at 09/17/2018 0940          No intake/output data recorded.  I/O last 3 completed shifts:  In: 205.8 [P.O.:200; I.V.:5.8]  Out: 1400 [Urine:1400]    Physical Exam:   General appearance: Awake alert, oriented, no obvious distress.  Eyes: Pupils reactive EOMI.  Neck: Supple, no JVD.  Lungs: Few rhonchi's, good chest movement.   Heart: No gallop or rub.  Abdomen: Soft nontender positive bowel sounds.  : No suprapubic fullness,    Extremities.  No edema cyanosis.  Neuro: Grossly intact.  No focal deficit.  Moving all extremities.    Results from last 7 days  Lab Units 09/23/18  0728 09/21/18  0325 09/20/18  0401 09/19/18  1925 09/19/18  0325 09/19/18  0322 09/18/18  0413 09/17/18  2112 09/17/18  1355 09/17/18  1029 09/17/18  0925  09/16/18  1426   WBC 10*3/mm3  --   " --  9.04  --   --  11.94* 7.92  --  10.10 17.93*  --   --  9.22   HEMOGLOBIN g/dL  --   --  9.1*  --   --  9.2* 7.7* 8.2* 7.9* 9.1*  --   --  11.3*   HEMOGLOBIN, POC g/dL  --   --   --   --   --   --   --   --   --   --  8.2*  < >  --    HEMATOCRIT %  --   --  27.2*  --   --  27.9* 23.2* 24.9* 23.1* 27.3*  --   --  33.9*   HEMATOCRIT POC %  --   --   --   --   --   --   --   --   --   --  24*  < >  --    PLATELETS 10*3/mm3  --   --  166  --   --  179 154  --  164 184  --   --  218   SODIUM mmol/L 136 138 137  --  132  --  139 140 139 141  --   < > 135   POTASSIUM mmol/L 4.9 3.9 3.8 3.9 3.5  --  4.0 4.0 3.3* 3.8  --   < > 4.3   CHLORIDE mmol/L 106 104 106  --  107  --  109 114* 110* 108  --   < > 105   CO2 mmol/L 22.0 23.0 21.0  --  19.0*  --  19.0* 21.0 21.0 20.0  --   < > 18.0*   BUN mg/dL 44* 43* 36*  --  37*  --  46* 50* 56* 53*  --   < > 74*   CREATININE mg/dL 1.95* 1.99* 2.00*  --  2.14*  --  2.18* 2.20* 2.29* 2.30*  --   < > 2.73*   GLUCOSE mg/dL 141* 116* 106*  --  152*  --  106* 128* 136* 150*  --   < > 99   CALCIUM mg/dL 8.2* 8.0* 8.3*  --  8.3*  --  7.9* 7.9* 8.0* 8.7  --   < > 8.1*   PHOSPHORUS mg/dL 3.5  --   --   --   --   --  5.0 4.1 3.8 4.2  --   --   --    < > = values in this interval not displayed.         Results Review:     I reviewed the patient's new clinical results.      amiodarone 200 mg Oral Q12H   aspirin 325 mg Oral Daily   atorvastatin 80 mg Oral Nightly   CloNIDine 0.2 mg Oral Q8H   gabapentin 100 mg Oral Q8H   heparin (porcine) 5,000 Units Subcutaneous Q8H   hydrALAZINE 100 mg Oral Q8H   insulin lispro 0-7 Units Subcutaneous 4x Daily With Meals & Nightly   ipratropium-albuterol 3 mL Nebulization Q6H - RT   metoprolol tartrate 12.5 mg Oral Q12H   nicotine 1 patch Transdermal Q24H   NIFEdipine XL 90 mg Oral Q24H   pharmacy consult - MTM  Does not apply Daily   sennosides-docusate sodium 2 tablet Oral BID   sertraline 100 mg Oral Daily       nitroglycerin 5-200 mcg/min Last Rate: Stopped  (09/22/18 0839)       Medication Review: As above    Assessment/Plan      1.   acute kidney injury on chronic kidney disease, renal function improving   2.  Status post CABG ×2 .   3.  Diabetes type 2.  4.  Anemia of chronic disease.  5.  Secondary hyperparathyroidism secondary to renal insufficiency  6.  Hepatitis C    Plan:    Avoid nephrotoxic medications.  Keep systolic blood pressure greater than 100.  Check volume status.  Check labs in the morning.     Kartik Encinas MD  09/23/18  1:52 PM

## 2018-09-23 NOTE — PLAN OF CARE
Problem: Patient Care Overview  Goal: Plan of Care Review  Outcome: Ongoing (interventions implemented as appropriate)   09/23/18 0329   Coping/Psychosocial   Plan of Care Reviewed With patient   Plan of Care Review   Progress improving   OTHER   Outcome Summary VSS. Incision is dry and intact. Stated he walked 3x last shift. No c/o of pain and rested well.        Problem: Cardiac Surgery (Adult)  Goal: Signs and Symptoms of Listed Potential Problems Will be Absent, Minimized or Managed (Cardiac Surgery)  Outcome: Ongoing (interventions implemented as appropriate)   09/23/18 0329   Goal/Outcome Evaluation   Problems Assessed (Cardiac Surgery) infection;VTE (venous thromboembolism);wound healing impaired       Problem: Fall Risk (Adult)  Goal: Absence of Fall  Outcome: Outcome(s) achieved Date Met: 09/23/18 09/23/18 0329   Fall Risk (Adult)   Absence of Fall making progress toward outcome       Problem: Skin Injury Risk (Adult)  Goal: Skin Health and Integrity  Outcome: Ongoing (interventions implemented as appropriate)   09/23/18 0329   Skin Injury Risk (Adult)   Skin Health and Integrity making progress toward outcome

## 2018-09-24 ENCOUNTER — READMISSION MANAGEMENT (OUTPATIENT)
Dept: CALL CENTER | Facility: HOSPITAL | Age: 58
End: 2018-09-24

## 2018-09-24 ENCOUNTER — DOCUMENTATION (OUTPATIENT)
Dept: CARDIAC REHAB | Facility: HOSPITAL | Age: 58
End: 2018-09-24

## 2018-09-24 NOTE — PROGRESS NOTES
Order received for Phase II Cardiac Rehab.  Staff to contact patient regarding program information and scheduling.

## 2018-09-24 NOTE — OUTREACH NOTE
Prep Survey      Responses   Facility patient discharged from?  [facility]   Is patient eligible?  Yes   Discharge diagnosis  CABG X 2   Does the patient have one of the following disease processes/diagnoses(primary or secondary)?  Cardiothoracic surgery   Does the patient have Home health ordered?  No   Is there a DME ordered?  No   Comments regarding appointments  call for apmt   Prep survey completed?  Yes          Marcy Cruz RN

## 2018-09-25 ENCOUNTER — READMISSION MANAGEMENT (OUTPATIENT)
Dept: CALL CENTER | Facility: HOSPITAL | Age: 58
End: 2018-09-25

## 2018-09-25 ENCOUNTER — DOCUMENTATION (OUTPATIENT)
Dept: CARDIAC REHAB | Facility: HOSPITAL | Age: 58
End: 2018-09-25

## 2018-09-25 NOTE — PROGRESS NOTES
Order received for Phase II Cardiac Rehab. Staff to forward referral to [facility] for scheduling.

## 2018-09-25 NOTE — OUTREACH NOTE
CT Surgery Week 1 Survey      Responses   Facility patient discharged from?  [facility]   Does the patient have one of the following disease processes/diagnoses(primary or secondary)?  Cardiothoracic surgery   Is there a successful TCM telephone encounter documented?  No   Week 1 attempt successful?  No   Unsuccessful attempts  Attempt 1            Collette Escobar RN

## 2018-09-26 ENCOUNTER — HOSPITAL ENCOUNTER (OUTPATIENT)
Dept: RESPIRATORY THERAPY | Facility: HOSPITAL | Age: 58
Discharge: HOME | End: 2018-09-26
Admitting: INTERNAL MEDICINE

## 2018-09-26 ENCOUNTER — CLINICAL SUPPORT (OUTPATIENT)
Dept: CARDIOLOGY | Facility: CLINIC | Age: 58
End: 2018-09-26

## 2018-09-26 ENCOUNTER — READMISSION MANAGEMENT (OUTPATIENT)
Dept: CALL CENTER | Facility: HOSPITAL | Age: 58
End: 2018-09-26

## 2018-09-26 VITALS
SYSTOLIC BLOOD PRESSURE: 111 MMHG | WEIGHT: 141.1 LBS | DIASTOLIC BLOOD PRESSURE: 64 MMHG | HEART RATE: 49 BPM | BODY MASS INDEX: 20.25 KG/M2

## 2018-09-26 DIAGNOSIS — R00.1 BRADYCARDIA: Primary | ICD-10-CM

## 2018-09-26 DIAGNOSIS — I25.10 CORONARY ARTERY DISEASE INVOLVING NATIVE CORONARY ARTERY OF NATIVE HEART WITHOUT ANGINA PECTORIS: ICD-10-CM

## 2018-09-26 DIAGNOSIS — I48.0 PAROXYSMAL ATRIAL FIBRILLATION (HCC): ICD-10-CM

## 2018-09-26 DIAGNOSIS — R00.1 BRADYCARDIA: ICD-10-CM

## 2018-09-26 PROCEDURE — 93005 ELECTROCARDIOGRAM TRACING: CPT

## 2018-09-26 NOTE — PROGRESS NOTES
"Patient is presenting to office: \"Per [facility] pt needs EKG today. Pt aware and will go to [facility] for EKG ASAP\"     Patient registered at outpatient registration for EKG however came to our office for EKG.      Vitals:    09/26/18 1558   BP: 111/64   Pulse: (!) 49           I have called Dr. Eldridge' nurse Meli to make her aware that I was scanning this into patient's chart. She is aware and will be looking for it to give to Dr. Eldridge.   "

## 2018-09-26 NOTE — OUTREACH NOTE
CT Surgery Week 1 Survey      Responses   Facility patient discharged from?  [facility]   Does the patient have one of the following disease processes/diagnoses(primary or secondary)?  Cardiothoracic surgery   Is there a successful TCM telephone encounter documented?  No   Week 1 attempt successful?  No   Unsuccessful attempts  Attempt 2            Long Huerta RN

## 2018-09-26 NOTE — PROGRESS NOTES
Per Dr. Olivera at discharge pt needs to wear monitor to check AF burden post op. On amiodarone and spoke with caretaker today. He reports HR in the high 40's and 50's. SBP in the 100-110's. Per nephrology keep SBP > 100. Will discuss low HR with [facility]J and defer BP management to nephrology.    Current medications:  Amiodarone 200 mg BID  ASA 81 mg daily  Atorvastatin 80 mg daily at bedtime  Calcitrol 0.5 mcg 3 times weekly  Clonidine 0.2 mg every eight hours  Doxazosin 2 mg daily at bedtime  Gabapentin 800 mg TID  Hydralazine 100 mg TID  Isosorbide mononitrate 30 mg daily  Metoprolol tartrate 25 mg BID  Nifedipine XL 90 mg daily  Sertraline 100 mg daily  Suboxone 2 mg TID (taking once daily)      Per [facility] pt needs EKG today. Pt aware and will go to [facility] for EKG ASAP.    EKG reviewed, SB noted. Per [facility] discontinue amiodarone. Cousin and pt aware. Will schedule follow up in 1 month with [facility].

## 2018-09-27 ENCOUNTER — READMISSION MANAGEMENT (OUTPATIENT)
Dept: CALL CENTER | Facility: HOSPITAL | Age: 58
End: 2018-09-27

## 2018-09-27 NOTE — OUTREACH NOTE
CT Surgery Week 1 Survey      Responses   Facility patient discharged from?  [place]   Does the patient have one of the following disease processes/diagnoses(primary or secondary)?  Cardiothoracic surgery   Is there a successful TCM telephone encounter documented?  No   Week 1 attempt successful?  Yes   Call start time  1148   Call end time  1155   Discharge diagnosis  CABG X 2   Meds reviewed with patient/caregiver?  Yes   Is the patient having any side effects they believe may be caused by any medication additions or changes?  No   Does the patient have all medications related to this admission filled (includes all antibiotics, pain medications, cardiac medications, etc.)  Yes   Is the patient taking all medications as directed (includes completed medication regime)?  Yes   Comments regarding appointments  Has appt with Laya Hebertn on Oct 1 2018 Has an appt on 10/29/2018   Does the patient have a primary care provider?   Yes   Does the patient have an appointment scheduled with their C/T surgeon?  Yes   Has the patient kept scheduled appointments due by today?  Yes   Psychosocial issues?  No   Did the patient receive a copy of their discharge instructions?  Yes   Nursing interventions  Reviewed instructions with patient   What is the patient's perception of their health status since discharge?  Improving   Nursing interventions  Nurse provided patient education   Is the patient/caregiver able to teach back normal signs of recovery?  Nausea and lack of appetite, Depression or irritability, Pain or discomfort at incisional site, Constipation   Nursing interventions  Reassured on normal signs of recovery   Is the patient /caregiver able to teach back basic post-op care?  Lifting as instructed by MD in discharge instructions, Take showers only when approved by MD-sponge bathe until then, Drive as instructed by MD in discharge instructions, Practice 'cough and deep breath', No tub bath, swimming, or hot tub until  instructed by MD, Keep incision areas clean, dry and protected   Is the patient/caregiver able to teach back signs and symptoms of incisional infection?  Increased redness, swelling or pain at the incisonal site, Incisional warmth, Increased drainage or bleeding, Pus or odor from incision, Fever   Is the patient/caregiver able to teach back steps to recovery at home?  Set small, achievable goals for return to baseline health, Rest and rebuild strength, gradually increase activity, Make a list of questions for surgeon's appointment   Is the patient /caregiver able to teach back the importance of cardiac rehab?  Yes   Nursing interventions  Provided education on importance of cardiac rehab   Is the patient/caregiver able to teach back the hierarchy of who to call/visit for symptoms/problems? PCP, Specialist, Home health nurse, Urgent Care, ED, 911  Yes   Week 1 call completed?  Yes            Long Huerta RN

## 2018-10-01 ENCOUNTER — OFFICE VISIT (OUTPATIENT)
Dept: CARDIOLOGY | Facility: HOSPITAL | Age: 58
End: 2018-10-01

## 2018-10-01 ENCOUNTER — APPOINTMENT (OUTPATIENT)
Dept: LAB | Facility: HOSPITAL | Age: 58
End: 2018-10-01

## 2018-10-01 ENCOUNTER — HOSPITAL ENCOUNTER (OUTPATIENT)
Dept: CARDIOLOGY | Facility: HOSPITAL | Age: 58
Discharge: HOME | End: 2018-10-01
Admitting: NURSE PRACTITIONER

## 2018-10-01 VITALS
SYSTOLIC BLOOD PRESSURE: 122 MMHG | BODY MASS INDEX: 20.7 KG/M2 | HEART RATE: 52 BPM | RESPIRATION RATE: 16 BRPM | DIASTOLIC BLOOD PRESSURE: 76 MMHG | OXYGEN SATURATION: 97 % | WEIGHT: 144.6 LBS | HEIGHT: 70 IN | TEMPERATURE: 97.8 F

## 2018-10-01 DIAGNOSIS — I48.0 PAF (PAROXYSMAL ATRIAL FIBRILLATION) (HCC): ICD-10-CM

## 2018-10-01 DIAGNOSIS — N18.4 TYPE 2 DIABETES MELLITUS WITH STAGE 4 CHRONIC KIDNEY DISEASE, WITH LONG-TERM CURRENT USE OF INSULIN (HCC): ICD-10-CM

## 2018-10-01 DIAGNOSIS — E11.22 TYPE 2 DIABETES MELLITUS WITH STAGE 4 CHRONIC KIDNEY DISEASE, WITH LONG-TERM CURRENT USE OF INSULIN (HCC): ICD-10-CM

## 2018-10-01 DIAGNOSIS — I25.119 CORONARY ARTERY DISEASE INVOLVING NATIVE HEART WITH ANGINA PECTORIS, UNSPECIFIED VESSEL OR LESION TYPE (HCC): Primary | ICD-10-CM

## 2018-10-01 DIAGNOSIS — Z79.4 TYPE 2 DIABETES MELLITUS WITH STAGE 4 CHRONIC KIDNEY DISEASE, WITH LONG-TERM CURRENT USE OF INSULIN (HCC): ICD-10-CM

## 2018-10-01 DIAGNOSIS — I25.10 ASCVD (ARTERIOSCLEROTIC CARDIOVASCULAR DISEASE): ICD-10-CM

## 2018-10-01 DIAGNOSIS — N18.4 CKD (CHRONIC KIDNEY DISEASE) STAGE 4, GFR 15-29 ML/MIN (HCC): ICD-10-CM

## 2018-10-01 LAB
ANION GAP SERPL CALCULATED.3IONS-SCNC: 10 MMOL/L (ref 3–11)
BUN BLD-MCNC: 77 MG/DL (ref 9–23)
BUN/CREAT SERPL: 29.2 (ref 7–25)
CALCIUM SPEC-SCNC: 8.6 MG/DL (ref 8.7–10.4)
CHLORIDE SERPL-SCNC: 103 MMOL/L (ref 99–109)
CO2 SERPL-SCNC: 20 MMOL/L (ref 20–31)
CREAT BLD-MCNC: 2.64 MG/DL (ref 0.6–1.3)
GFR SERPL CREATININE-BSD FRML MDRD: 25 ML/MIN/1.73
GLUCOSE BLD-MCNC: 150 MG/DL (ref 70–100)
POTASSIUM BLD-SCNC: 5.2 MMOL/L (ref 3.5–5.5)
SODIUM BLD-SCNC: 133 MMOL/L (ref 132–146)

## 2018-10-01 PROCEDURE — 36415 COLL VENOUS BLD VENIPUNCTURE: CPT | Performed by: NURSE PRACTITIONER

## 2018-10-01 PROCEDURE — 80048 BASIC METABOLIC PNL TOTAL CA: CPT | Performed by: NURSE PRACTITIONER

## 2018-10-01 PROCEDURE — 93005 ELECTROCARDIOGRAM TRACING: CPT | Performed by: NURSE PRACTITIONER

## 2018-10-01 PROCEDURE — 93010 ELECTROCARDIOGRAM REPORT: CPT | Performed by: INTERNAL MEDICINE

## 2018-10-01 PROCEDURE — 99024 POSTOP FOLLOW-UP VISIT: CPT | Performed by: NURSE PRACTITIONER
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
## 2018-10-08 ENCOUNTER — READMISSION MANAGEMENT (OUTPATIENT)
Dept: CALL CENTER | Facility: HOSPITAL | Age: 58
End: 2018-10-08

## 2018-10-08 NOTE — OUTREACH NOTE
CT Surgery Week 2 Survey      Responses   Facility patient discharged from?  [place]   Does the patient have one of the following disease processes/diagnoses(primary or secondary)?  Cardiothoracic surgery   Week 2 attempt successful?  Yes   Call start time  1029   Call end time  1030   Discharge diagnosis  CABG X 2   Meds reviewed with patient/caregiver?  Yes   Is the patient taking all medications as directed (includes completed medication regime)?  Yes   Has the patient kept scheduled appointments due by today?  Yes   What is the patient's perception of their health status since discharge?  Improving   Week 2 call completed?  Yes          Crystal Jo, RN

## 2018-10-16 ENCOUNTER — READMISSION MANAGEMENT (OUTPATIENT)
Dept: CALL CENTER | Facility: HOSPITAL | Age: 58
End: 2018-10-16

## 2018-10-16 NOTE — OUTREACH NOTE
CT Surgery Week 3 Survey      Responses   Facility patient discharged from?  [place]   Does the patient have one of the following disease processes/diagnoses(primary or secondary)?  Cardiothoracic surgery   Week 3 attempt successful?  Yes   Call start time  1700   Call end time  1704   Discharge diagnosis  CABG X 2   Is patient permission given to speak with other caregiver?  No   Meds reviewed with patient/caregiver?  Yes   Is the patient having any side effects they believe may be caused by any medication additions or changes?  No   Does the patient have all medications related to this admission filled (includes all antibiotics, pain medications, cardiac medications, etc.)  Yes   Is the patient taking all medications as directed (includes completed medication regime)?  Yes   Does the patient have a primary care provider?   Yes   Does the patient have an appointment scheduled with their C/T surgeon?  Yes   Comments regarding PCP  He saw his PCP on October 1st   Has the patient kept scheduled appointments due by today?  Yes   Comments  October 30th    Did the patient receive a copy of their discharge instructions?  Yes   Nursing interventions  Reviewed instructions with patient   What is the patient's perception of their health status since discharge?  Improving   Nursing interventions  Nurse provided patient education   Is the patient/caregiver able to teach back normal signs of recovery?  Nausea and lack of appetite, Depression or irritability, Pain or discomfort at incisional site, Constipation   Nursing interventions  Reassured on normal signs of recovery   Is the patient /caregiver able to teach back basic post-op care?  Lifting as instructed by MD in discharge instructions, Take showers only when approved by MD-sponge bathe until then, Drive as instructed by MD in discharge instructions, Practice 'cough and deep breath', No tub bath, swimming, or hot tub until instructed by MD, Keep incision areas clean, dry  and protected   Is the patient/caregiver able to teach back steps to recovery at home?  Set small, achievable goals for return to baseline health, Rest and rebuild strength, gradually increase activity, Make a list of questions for surgeon's appointment   Is the patient /caregiver able to teach back the importance of cardiac rehab?  Yes   Nursing interventions  Provided education on importance of cardiac rehab   Is the patient/caregiver able to teach back the hierarchy of who to call/visit for symptoms/problems? PCP, Specialist, Home health nurse, Urgent Care, ED, 911  Yes   Week 3 call completed?  Yes          Candida Padilla RN

## 2018-10-23 ENCOUNTER — READMISSION MANAGEMENT (OUTPATIENT)
Dept: CALL CENTER | Facility: HOSPITAL | Age: 58
End: 2018-10-23

## 2018-10-23 NOTE — OUTREACH NOTE
CT Surgery Week 4 Survey      Responses   Facility patient discharged from?  [place]   Does the patient have one of the following disease processes/diagnoses(primary or secondary)?  Cardiothoracic surgery   Week 4 attempt successful?  No          Asha Little RN

## 2018-10-30 ENCOUNTER — OFFICE VISIT (OUTPATIENT)
Dept: CARDIOLOGY | Facility: CLINIC | Age: 58
End: 2018-10-30

## 2018-10-30 VITALS
DIASTOLIC BLOOD PRESSURE: 80 MMHG | HEIGHT: 70 IN | SYSTOLIC BLOOD PRESSURE: 154 MMHG | BODY MASS INDEX: 20.07 KG/M2 | WEIGHT: 140.2 LBS | HEART RATE: 64 BPM

## 2018-10-30 DIAGNOSIS — I20.9 ANGINA PECTORIS (HCC): Primary | ICD-10-CM

## 2018-10-30 PROCEDURE — 99214 OFFICE O/P EST MOD 30 MIN: CPT | Performed by: INTERNAL MEDICINE

## 2018-10-30 RX ORDER — CLONIDINE 0.3 MG/24H
1 PATCH, EXTENDED RELEASE TRANSDERMAL WEEKLY
COMMUNITY
End: 2018-10-30 | Stop reason: ALTCHOICE

## 2018-10-30 RX ORDER — DILTIAZEM HYDROCHLORIDE 240 MG/1
240 CAPSULE, EXTENDED RELEASE ORAL 2 TIMES DAILY
COMMUNITY
End: 2018-10-30

## 2018-10-30 RX ORDER — ISOSORBIDE MONONITRATE 30 MG/1
30 TABLET, EXTENDED RELEASE ORAL DAILY
COMMUNITY
End: 2019-04-23 | Stop reason: HOSPADM

## 2018-10-30 RX ORDER — ISOSORBIDE DINITRATE 30 MG/1
30 TABLET ORAL DAILY
COMMUNITY
End: 2018-10-30 | Stop reason: ALTCHOICE

## 2018-10-30 RX ORDER — SODIUM BICARBONATE 325 MG/1
1300 TABLET ORAL 3 TIMES DAILY
COMMUNITY
End: 2019-04-23 | Stop reason: HOSPADM

## 2018-10-30 RX ORDER — BUSPIRONE HYDROCHLORIDE 10 MG/1
10 TABLET ORAL 3 TIMES DAILY
COMMUNITY
End: 2019-03-28

## 2018-10-30 RX ORDER — METOPROLOL SUCCINATE 25 MG/1
25 TABLET, EXTENDED RELEASE ORAL DAILY
COMMUNITY
End: 2018-10-30 | Stop reason: DRUGHIGH

## 2018-10-30 RX ORDER — ATORVASTATIN CALCIUM 10 MG/1
10 TABLET, FILM COATED ORAL DAILY
COMMUNITY
End: 2018-10-30 | Stop reason: DRUGHIGH

## 2018-10-30 RX ORDER — ATORVASTATIN CALCIUM 80 MG/1
80 TABLET, FILM COATED ORAL NIGHTLY
COMMUNITY
End: 2019-04-23 | Stop reason: HOSPADM
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
## 2018-12-04 ENCOUNTER — LAB (OUTPATIENT)
Dept: LAB | Facility: HOSPITAL | Age: 58
End: 2018-12-04

## 2018-12-04 ENCOUNTER — TRANSCRIBE ORDERS (OUTPATIENT)
Dept: INTERVENTIONAL RADIOLOGY/VASCULAR | Facility: HOSPITAL | Age: 58
End: 2018-12-04

## 2018-12-04 DIAGNOSIS — D64.9 ANEMIA, UNSPECIFIED TYPE: ICD-10-CM

## 2018-12-04 DIAGNOSIS — E55.9 VITAMIN D DEFICIENCY: ICD-10-CM

## 2018-12-04 DIAGNOSIS — N18.30 CHRONIC RENAL DISEASE, STAGE III (HCC): ICD-10-CM

## 2018-12-04 DIAGNOSIS — E55.9 VITAMIN D DEFICIENCY: Primary | ICD-10-CM

## 2018-12-04 LAB
ALBUMIN SERPL-MCNC: 4.2 G/DL (ref 3.4–4.8)
ANION GAP SERPL CALCULATED.3IONS-SCNC: 8.5 MMOL/L (ref 3.6–11.2)
BASOPHILS # BLD AUTO: 0.03 10*3/MM3 (ref 0–0.3)
BASOPHILS NFR BLD AUTO: 0.5 % (ref 0–2)
BUN BLD-MCNC: 58 MG/DL (ref 7–21)
BUN/CREAT SERPL: 17.2 (ref 7–25)
CALCIUM SPEC-SCNC: 8.8 MG/DL (ref 7.7–10)
CHLORIDE SERPL-SCNC: 109 MMOL/L (ref 99–112)
CO2 SERPL-SCNC: 22.5 MMOL/L (ref 24.3–31.9)
CREAT BLD-MCNC: 3.37 MG/DL (ref 0.43–1.29)
CREAT UR-MCNC: 88.3 MG/DL
DEPRECATED RDW RBC AUTO: 50.4 FL (ref 37–54)
EOSINOPHIL # BLD AUTO: 0.16 10*3/MM3 (ref 0–0.7)
EOSINOPHIL NFR BLD AUTO: 2.6 % (ref 0–5)
ERYTHROCYTE [DISTWIDTH] IN BLOOD BY AUTOMATED COUNT: 15.9 % (ref 11.5–14.5)
FERRITIN SERPL-MCNC: 49 NG/ML (ref 21.9–321.7)
GFR SERPL CREATININE-BSD FRML MDRD: 19 ML/MIN/1.73
GLUCOSE BLD-MCNC: 119 MG/DL (ref 70–110)
HCT VFR BLD AUTO: 34.1 % (ref 42–52)
HGB BLD-MCNC: 11 G/DL (ref 14–18)
IMM GRANULOCYTES # BLD: 0.02 10*3/MM3 (ref 0–0.03)
IMM GRANULOCYTES NFR BLD: 0.3 % (ref 0–0.5)
IRON 24H UR-MRATE: 37 MCG/DL (ref 53–167)
IRON SATN MFR SERPL: 11 % (ref 20–50)
LYMPHOCYTES # BLD AUTO: 1.33 10*3/MM3 (ref 1–3)
LYMPHOCYTES NFR BLD AUTO: 21.8 % (ref 21–51)
MCH RBC QN AUTO: 29.3 PG (ref 27–33)
MCHC RBC AUTO-ENTMCNC: 32.3 G/DL (ref 33–37)
MCV RBC AUTO: 90.9 FL (ref 80–94)
MICRO TOTAL PROTEIN: 246.1 MG/DL
MONOCYTES # BLD AUTO: 0.56 10*3/MM3 (ref 0.1–0.9)
MONOCYTES NFR BLD AUTO: 9.2 % (ref 0–10)
NEUTROPHILS # BLD AUTO: 3.99 10*3/MM3 (ref 1.4–6.5)
NEUTROPHILS NFR BLD AUTO: 65.6 % (ref 30–70)
PHOSPHATE SERPL-MCNC: 5.2 MG/DL (ref 2.7–4.5)
PLATELET # BLD AUTO: 237 10*3/MM3 (ref 130–400)
PMV BLD AUTO: 11.1 FL (ref 6–10)
POTASSIUM BLD-SCNC: 5 MMOL/L (ref 3.5–5.3)
PTH-INTACT SERPL-MCNC: 387.1 PG/ML (ref 14–72)
RBC # BLD AUTO: 3.75 10*6/MM3 (ref 4.7–6.1)
SODIUM BLD-SCNC: 140 MMOL/L (ref 135–153)
TIBC SERPL-MCNC: 332 MCG/DL (ref 241–421)
WBC NRBC COR # BLD: 6.09 10*3/MM3 (ref 4.5–12.5)

## 2018-12-04 PROCEDURE — 82570 ASSAY OF URINE CREATININE: CPT

## 2018-12-04 PROCEDURE — 80069 RENAL FUNCTION PANEL: CPT

## 2018-12-04 PROCEDURE — 83550 IRON BINDING TEST: CPT

## 2018-12-04 PROCEDURE — 85025 COMPLETE CBC W/AUTO DIFF WBC: CPT

## 2018-12-04 PROCEDURE — 82728 ASSAY OF FERRITIN: CPT

## 2018-12-04 PROCEDURE — 84156 ASSAY OF PROTEIN URINE: CPT

## 2018-12-04 PROCEDURE — 83540 ASSAY OF IRON: CPT

## 2018-12-04 PROCEDURE — 82652 VIT D 1 25-DIHYDROXY: CPT

## 2018-12-04 PROCEDURE — 36415 COLL VENOUS BLD VENIPUNCTURE: CPT

## 2018-12-04 PROCEDURE — 83970 ASSAY OF PARATHORMONE: CPT

## 2018-12-07 ENCOUNTER — TRANSCRIBE ORDERS (OUTPATIENT)
Dept: INFUSION THERAPY | Facility: HOSPITAL | Age: 58
End: 2018-12-07

## 2018-12-07 DIAGNOSIS — D50.9 IRON DEFICIENCY ANEMIA, UNSPECIFIED IRON DEFICIENCY ANEMIA TYPE: Primary | ICD-10-CM

## 2018-12-07 DIAGNOSIS — N18.30 CHRONIC KIDNEY DISEASE, STAGE III (MODERATE) (HCC): ICD-10-CM

## 2018-12-07 LAB — 1,25(OH)2D3 SERPL-MCNC: 22 PG/ML (ref 19.9–79.3)

## 2018-12-14 PROBLEM — D50.9 IRON DEFICIENCY ANEMIA, UNSPECIFIED: Status: ACTIVE | Noted: 2018-12-14

## 2019-01-07 ENCOUNTER — LAB (OUTPATIENT)
Dept: LAB | Facility: HOSPITAL | Age: 59
End: 2019-01-07

## 2019-01-07 ENCOUNTER — TRANSCRIBE ORDERS (OUTPATIENT)
Dept: ADMINISTRATIVE | Facility: HOSPITAL | Age: 59
End: 2019-01-07

## 2019-01-07 DIAGNOSIS — N18.30 CHRONIC KIDNEY DISEASE, STAGE III (MODERATE) (HCC): Primary | ICD-10-CM

## 2019-01-07 DIAGNOSIS — D64.9 ANEMIA, UNSPECIFIED TYPE: ICD-10-CM

## 2019-01-07 DIAGNOSIS — N18.30 CHRONIC KIDNEY DISEASE, STAGE III (MODERATE) (HCC): ICD-10-CM

## 2019-01-07 LAB
ALBUMIN SERPL-MCNC: 4.3 G/DL (ref 3.4–4.8)
ANION GAP SERPL CALCULATED.3IONS-SCNC: 12.7 MMOL/L (ref 3.6–11.2)
BACTERIA UR QL AUTO: ABNORMAL /HPF
BASOPHILS # BLD AUTO: 0.03 10*3/MM3 (ref 0–0.3)
BASOPHILS NFR BLD AUTO: 0.3 % (ref 0–2)
BILIRUB UR QL STRIP: NEGATIVE
BUN BLD-MCNC: 96 MG/DL (ref 7–21)
BUN/CREAT SERPL: 19.7 (ref 7–25)
CALCIUM SPEC-SCNC: 9 MG/DL (ref 7.7–10)
CHLORIDE SERPL-SCNC: 102 MMOL/L (ref 99–112)
CLARITY UR: CLEAR
CO2 SERPL-SCNC: 16.3 MMOL/L (ref 24.3–31.9)
COLOR UR: YELLOW
CREAT BLD-MCNC: 4.87 MG/DL (ref 0.43–1.29)
CREAT UR-MCNC: 83 MG/DL
DEPRECATED RDW RBC AUTO: 48.9 FL (ref 37–54)
EOSINOPHIL # BLD AUTO: 0.09 10*3/MM3 (ref 0–0.7)
EOSINOPHIL NFR BLD AUTO: 0.9 % (ref 0–5)
ERYTHROCYTE [DISTWIDTH] IN BLOOD BY AUTOMATED COUNT: 15.7 % (ref 11.5–14.5)
GFR SERPL CREATININE-BSD FRML MDRD: 12 ML/MIN/1.73
GFR SERPL CREATININE-BSD FRML MDRD: ABNORMAL ML/MIN/1.73
GLUCOSE BLD-MCNC: 158 MG/DL (ref 70–110)
GLUCOSE UR STRIP-MCNC: NEGATIVE MG/DL
HCT VFR BLD AUTO: 33.4 % (ref 42–52)
HGB BLD-MCNC: 11.1 G/DL (ref 14–18)
HGB UR QL STRIP.AUTO: NEGATIVE
HYALINE CASTS UR QL AUTO: ABNORMAL /LPF
IMM GRANULOCYTES # BLD AUTO: 0.03 10*3/MM3 (ref 0–0.03)
IMM GRANULOCYTES NFR BLD AUTO: 0.3 % (ref 0–0.5)
KETONES UR QL STRIP: NEGATIVE
LEUKOCYTE ESTERASE UR QL STRIP.AUTO: NEGATIVE
LYMPHOCYTES # BLD AUTO: 1.28 10*3/MM3 (ref 1–3)
LYMPHOCYTES NFR BLD AUTO: 13.3 % (ref 21–51)
MCH RBC QN AUTO: 28.8 PG (ref 27–33)
MCHC RBC AUTO-ENTMCNC: 33.2 G/DL (ref 33–37)
MCV RBC AUTO: 86.8 FL (ref 80–94)
MICRO TOTAL PROTEIN: 224 MG/DL
MONOCYTES # BLD AUTO: 1.05 10*3/MM3 (ref 0.1–0.9)
MONOCYTES NFR BLD AUTO: 10.9 % (ref 0–10)
NEUTROPHILS # BLD AUTO: 7.14 10*3/MM3 (ref 1.4–6.5)
NEUTROPHILS NFR BLD AUTO: 74.3 % (ref 30–70)
NITRITE UR QL STRIP: NEGATIVE
PH UR STRIP.AUTO: <=5 [PH] (ref 5–8)
PHOSPHATE SERPL-MCNC: 6.8 MG/DL (ref 2.7–4.5)
PLATELET # BLD AUTO: 232 10*3/MM3 (ref 130–400)
PMV BLD AUTO: 11.4 FL (ref 6–10)
POTASSIUM BLD-SCNC: 5.1 MMOL/L (ref 3.5–5.3)
PROT UR QL STRIP: ABNORMAL
RBC # BLD AUTO: 3.85 10*6/MM3 (ref 4.7–6.1)
RBC # UR: ABNORMAL /HPF
REF LAB TEST METHOD: ABNORMAL
SODIUM BLD-SCNC: 131 MMOL/L (ref 135–153)
SP GR UR STRIP: 1.02 (ref 1–1.03)
SQUAMOUS #/AREA URNS HPF: ABNORMAL /HPF
UROBILINOGEN UR QL STRIP: ABNORMAL
WBC NRBC COR # BLD: 9.62 10*3/MM3 (ref 4.5–12.5)
WBC UR QL AUTO: ABNORMAL /HPF

## 2019-01-07 PROCEDURE — 85025 COMPLETE CBC W/AUTO DIFF WBC: CPT

## 2019-01-07 PROCEDURE — 87205 SMEAR GRAM STAIN: CPT

## 2019-01-07 PROCEDURE — 82570 ASSAY OF URINE CREATININE: CPT

## 2019-01-07 PROCEDURE — 81001 URINALYSIS AUTO W/SCOPE: CPT

## 2019-01-07 PROCEDURE — 80069 RENAL FUNCTION PANEL: CPT

## 2019-01-07 PROCEDURE — 36415 COLL VENOUS BLD VENIPUNCTURE: CPT

## 2019-01-07 PROCEDURE — 84156 ASSAY OF PROTEIN URINE: CPT

## 2019-01-08 LAB — EOSINOPHIL SPEC QL WRIGHT STN: NORMAL %

## 2019-02-08 DIAGNOSIS — I25.10 CAD IN NATIVE ARTERY: Primary | ICD-10-CM

## 2019-02-12 ENCOUNTER — HOSPITAL ENCOUNTER (OUTPATIENT)
Dept: GENERAL RADIOLOGY | Facility: HOSPITAL | Age: 59
Discharge: HOME | End: 2019-02-12
Admitting: PHYSICIAN ASSISTANT

## 2019-02-12 DIAGNOSIS — I25.10 CAD IN NATIVE ARTERY: ICD-10-CM

## 2019-02-12 PROCEDURE — 71046 X-RAY EXAM CHEST 2 VIEWS: CPT

## 2019-02-12 PROCEDURE — 71046 X-RAY EXAM CHEST 2 VIEWS: CPT | Performed by: RADIOLOGY

## 2019-03-22 ENCOUNTER — TELEPHONE (OUTPATIENT)
Dept: CARDIAC SURGERY | Facility: CLINIC | Age: 59
End: 2019-03-22

## 2019-03-28 ENCOUNTER — APPOINTMENT (OUTPATIENT)
Dept: GENERAL RADIOLOGY | Facility: HOSPITAL | Age: 59
End: 2019-03-28

## 2019-03-28 ENCOUNTER — APPOINTMENT (OUTPATIENT)
Dept: CT IMAGING | Facility: HOSPITAL | Age: 59
End: 2019-03-28

## 2019-03-28 ENCOUNTER — APPOINTMENT (OUTPATIENT)
Dept: CARDIOLOGY | Facility: HOSPITAL | Age: 59
End: 2019-03-28

## 2019-03-28 ENCOUNTER — HOSPITAL ENCOUNTER (INPATIENT)
Facility: HOSPITAL | Age: 59
LOS: 26 days | Discharge: NURSING, REHAB OR LONG-TERM CARE | End: 2019-04-23
Attending: EMERGENCY MEDICINE | Admitting: INTERNAL MEDICINE

## 2019-03-28 DIAGNOSIS — D64.9 ANEMIA, UNSPECIFIED TYPE: ICD-10-CM

## 2019-03-28 DIAGNOSIS — T68.XXXA HYPOTHERMIA, INITIAL ENCOUNTER: ICD-10-CM

## 2019-03-28 DIAGNOSIS — N18.9 ACUTE KIDNEY INJURY SUPERIMPOSED ON CHRONIC KIDNEY DISEASE (HCC): ICD-10-CM

## 2019-03-28 DIAGNOSIS — I95.9 HYPOTENSION, UNSPECIFIED HYPOTENSION TYPE: ICD-10-CM

## 2019-03-28 DIAGNOSIS — J96.01 ACUTE RESPIRATORY FAILURE WITH HYPOXIA (HCC): ICD-10-CM

## 2019-03-28 DIAGNOSIS — E87.1 HYPONATREMIA: ICD-10-CM

## 2019-03-28 DIAGNOSIS — I50.9 CONGESTIVE HEART FAILURE, UNSPECIFIED HF CHRONICITY, UNSPECIFIED HEART FAILURE TYPE (HCC): ICD-10-CM

## 2019-03-28 DIAGNOSIS — R00.1 BRADYCARDIA: ICD-10-CM

## 2019-03-28 DIAGNOSIS — N17.9 ACUTE KIDNEY INJURY SUPERIMPOSED ON CHRONIC KIDNEY DISEASE (HCC): ICD-10-CM

## 2019-03-28 DIAGNOSIS — R57.9 SHOCK (HCC): Primary | ICD-10-CM

## 2019-03-28 PROBLEM — D50.9 IRON DEFICIENCY ANEMIA, UNSPECIFIED: Chronic | Status: ACTIVE | Noted: 2018-12-14

## 2019-03-28 PROBLEM — N18.4 CKD (CHRONIC KIDNEY DISEASE) STAGE 4, GFR 15-29 ML/MIN (HCC): Chronic | Status: ACTIVE | Noted: 2018-09-17

## 2019-03-28 PROBLEM — B19.20 HEPATITIS C: Chronic | Status: ACTIVE | Noted: 2018-09-17

## 2019-03-28 PROBLEM — I48.0 PAF (PAROXYSMAL ATRIAL FIBRILLATION): Chronic | Status: ACTIVE | Noted: 2018-10-01

## 2019-03-28 PROBLEM — I25.119 CORONARY ARTERY DISEASE INVOLVING NATIVE HEART WITH ANGINA PECTORIS: Chronic | Status: ACTIVE | Noted: 2018-08-28

## 2019-03-28 PROBLEM — I25.10 ASCVD (ARTERIOSCLEROTIC CARDIOVASCULAR DISEASE): Chronic | Status: ACTIVE | Noted: 2018-07-11

## 2019-03-28 PROBLEM — E11.9 TYPE 2 DIABETES MELLITUS: Chronic | Status: ACTIVE | Noted: 2018-09-17

## 2019-03-28 LAB
A-A DO2: 101.6 MMHG (ref 0–300)
ALBUMIN SERPL-MCNC: 2.37 G/DL (ref 3.5–5.2)
ALBUMIN SERPL-MCNC: 2.69 G/DL (ref 3.5–5.2)
ALBUMIN/GLOB SERPL: 0.6 G/DL
ALBUMIN/GLOB SERPL: 0.7 G/DL
ALP SERPL-CCNC: 69 U/L (ref 39–117)
ALP SERPL-CCNC: 77 U/L (ref 39–117)
ALT SERPL W P-5'-P-CCNC: 24 U/L (ref 1–41)
ALT SERPL W P-5'-P-CCNC: 24 U/L (ref 1–41)
AMMONIA BLD-SCNC: 32 UMOL/L (ref 16–60)
ANION GAP SERPL CALCULATED.3IONS-SCNC: 21.9 MMOL/L
ANION GAP SERPL CALCULATED.3IONS-SCNC: 21.9 MMOL/L
APAP SERPL-MCNC: <5 MCG/ML (ref 10–30)
ARTERIAL PATENCY WRIST A: ABNORMAL
AST SERPL-CCNC: 47 U/L (ref 1–40)
AST SERPL-CCNC: 47 U/L (ref 1–40)
ATMOSPHERIC PRESS: 735 MMHG
BACTERIA UR QL AUTO: ABNORMAL /HPF
BASE EXCESS BLDA CALC-SCNC: -11.9 MMOL/L
BASOPHILS # BLD AUTO: 0 10*3/MM3 (ref 0–0.2)
BASOPHILS # BLD AUTO: 0.01 10*3/MM3 (ref 0–0.2)
BASOPHILS NFR BLD AUTO: 0 % (ref 0–1.5)
BASOPHILS NFR BLD AUTO: 0.2 % (ref 0–1.5)
BDY SITE: ABNORMAL
BH CV ECHO MEAS - % IVS THICK: 26.5 %
BH CV ECHO MEAS - % LVPW THICK: 38.2 %
BH CV ECHO MEAS - ACS: 1.7 CM
BH CV ECHO MEAS - AO MAX PG: 8.6 MMHG
BH CV ECHO MEAS - AO MEAN PG: 4.4 MMHG
BH CV ECHO MEAS - AO ROOT AREA (BSA CORRECTED): 1.5
BH CV ECHO MEAS - AO ROOT AREA: 5.9 CM^2
BH CV ECHO MEAS - AO ROOT DIAM: 2.7 CM
BH CV ECHO MEAS - AO V2 MAX: 146.6 CM/SEC
BH CV ECHO MEAS - AO V2 MEAN: 94.9 CM/SEC
BH CV ECHO MEAS - AO V2 VTI: 35.4 CM
BH CV ECHO MEAS - BSA(HAYCOCK): 1.7 M^2
BH CV ECHO MEAS - BSA: 1.8 M^2
BH CV ECHO MEAS - BZI_BMI: 19.4 KILOGRAMS/M^2
BH CV ECHO MEAS - BZI_METRIC_HEIGHT: 177.8 CM
BH CV ECHO MEAS - BZI_METRIC_WEIGHT: 61.2 KG
BH CV ECHO MEAS - EDV(CUBED): 147.2 ML
BH CV ECHO MEAS - EDV(TEICH): 134.2 ML
BH CV ECHO MEAS - EF(CUBED): 84.9 %
BH CV ECHO MEAS - EF(TEICH): 77.8 %
BH CV ECHO MEAS - ESV(CUBED): 22.2 ML
BH CV ECHO MEAS - ESV(TEICH): 29.8 ML
BH CV ECHO MEAS - FS: 46.8 %
BH CV ECHO MEAS - IVS/LVPW: 0.89
BH CV ECHO MEAS - IVSD: 1.2 CM
BH CV ECHO MEAS - IVSS: 1.5 CM
BH CV ECHO MEAS - LV MASS(C)D: 272.9 GRAMS
BH CV ECHO MEAS - LV MASS(C)DI: 154.5 GRAMS/M^2
BH CV ECHO MEAS - LV MASS(C)S: 175.9 GRAMS
BH CV ECHO MEAS - LV MASS(C)SI: 99.6 GRAMS/M^2
BH CV ECHO MEAS - LVIDD: 5.3 CM
BH CV ECHO MEAS - LVIDS: 2.8 CM
BH CV ECHO MEAS - LVPWD: 1.3 CM
BH CV ECHO MEAS - LVPWS: 1.8 CM
BH CV ECHO MEAS - RAP SYSTOLE: 10 MMHG
BH CV ECHO MEAS - RVSP: 38.3 MMHG
BH CV ECHO MEAS - SI(AO): 117.7 ML/M^2
BH CV ECHO MEAS - SI(CUBED): 70.8 ML/M^2
BH CV ECHO MEAS - SI(TEICH): 59.1 ML/M^2
BH CV ECHO MEAS - SV(AO): 207.8 ML
BH CV ECHO MEAS - SV(CUBED): 125.1 ML
BH CV ECHO MEAS - SV(TEICH): 104.4 ML
BH CV ECHO MEAS - TR MAX VEL: 266 CM/SEC
BILIRUB SERPL-MCNC: 0.2 MG/DL (ref 0.2–1.2)
BILIRUB SERPL-MCNC: 0.3 MG/DL (ref 0.2–1.2)
BILIRUB UR QL STRIP: NEGATIVE
BODY TEMPERATURE: 98.6 C
BUN BLD-MCNC: 64 MG/DL (ref 8–23)
BUN BLD-MCNC: 66 MG/DL (ref 8–23)
BUN/CREAT SERPL: 17.8 (ref 7–25)
BUN/CREAT SERPL: 18.2 (ref 7–25)
CALCIUM SPEC-SCNC: 7.4 MG/DL (ref 8.6–10.5)
CALCIUM SPEC-SCNC: 7.8 MG/DL (ref 8.6–10.5)
CHLORIDE SERPL-SCNC: 95 MMOL/L (ref 98–107)
CHLORIDE SERPL-SCNC: 99 MMOL/L (ref 98–107)
CLARITY UR: CLEAR
CO2 SERPL-SCNC: 11.1 MMOL/L (ref 22–29)
CO2 SERPL-SCNC: 14.1 MMOL/L (ref 22–29)
COHGB MFR BLD: 1.1 % (ref 0–5)
COLOR UR: YELLOW
CORTIS SERPL-MCNC: 35.59 MCG/DL
CREAT BLD-MCNC: 3.6 MG/DL (ref 0.76–1.27)
CREAT BLD-MCNC: 3.63 MG/DL (ref 0.76–1.27)
D-LACTATE SERPL-SCNC: 0.9 MMOL/L (ref 0.5–2)
D-LACTATE SERPL-SCNC: 1.1 MMOL/L (ref 0.5–2)
DEPRECATED RDW RBC AUTO: 46.1 FL (ref 37–54)
DEPRECATED RDW RBC AUTO: 49.4 FL (ref 37–54)
EOSINOPHIL # BLD AUTO: 0 10*3/MM3 (ref 0–0.4)
EOSINOPHIL # BLD AUTO: 0.01 10*3/MM3 (ref 0–0.4)
EOSINOPHIL NFR BLD AUTO: 0 % (ref 0.3–6.2)
EOSINOPHIL NFR BLD AUTO: 0.2 % (ref 0.3–6.2)
ERYTHROCYTE [DISTWIDTH] IN BLOOD BY AUTOMATED COUNT: 15.4 % (ref 12.3–15.4)
ERYTHROCYTE [DISTWIDTH] IN BLOOD BY AUTOMATED COUNT: 15.8 % (ref 12.3–15.4)
FLUAV AG NPH QL: NEGATIVE
FLUBV AG NPH QL IA: NEGATIVE
GFR SERPL CREATININE-BSD FRML MDRD: 17 ML/MIN/1.73
GFR SERPL CREATININE-BSD FRML MDRD: 17 ML/MIN/1.73
GLOBULIN UR ELPH-MCNC: 3.6 GM/DL
GLOBULIN UR ELPH-MCNC: 3.8 GM/DL
GLUCOSE BLD-MCNC: 174 MG/DL (ref 65–99)
GLUCOSE BLD-MCNC: 281 MG/DL (ref 65–99)
GLUCOSE BLDC GLUCOMTR-MCNC: 184 MG/DL (ref 70–130)
GLUCOSE BLDC GLUCOMTR-MCNC: 217 MG/DL (ref 70–130)
GLUCOSE UR STRIP-MCNC: NEGATIVE MG/DL
HCO3 BLDA-SCNC: 12.5 MMOL/L (ref 22–26)
HCT VFR BLD AUTO: 25.4 % (ref 37.5–51)
HCT VFR BLD AUTO: 25.5 % (ref 37.5–51)
HCT VFR BLD CALC: 27 % (ref 42–52)
HGB BLD-MCNC: 8.1 G/DL (ref 13–17.7)
HGB BLD-MCNC: 8.3 G/DL (ref 13–17.7)
HGB BLDA-MCNC: 9.1 G/DL (ref 12–16)
HGB UR QL STRIP.AUTO: NEGATIVE
HOLD SPECIMEN: NORMAL
HOLD SPECIMEN: NORMAL
HOROWITZ INDEX BLD+IHG-RTO: 30 %
HYALINE CASTS UR QL AUTO: ABNORMAL /LPF
IMM GRANULOCYTES # BLD AUTO: 0.02 10*3/MM3 (ref 0–0.05)
IMM GRANULOCYTES # BLD AUTO: 0.04 10*3/MM3 (ref 0–0.05)
IMM GRANULOCYTES NFR BLD AUTO: 0.3 % (ref 0–0.5)
IMM GRANULOCYTES NFR BLD AUTO: 0.4 % (ref 0–0.5)
KETONES UR QL STRIP: ABNORMAL
LEUKOCYTE ESTERASE UR QL STRIP.AUTO: NEGATIVE
LYMPHOCYTES # BLD AUTO: 0.51 10*3/MM3 (ref 0.7–3.1)
LYMPHOCYTES # BLD AUTO: 0.73 10*3/MM3 (ref 0.7–3.1)
LYMPHOCYTES NFR BLD AUTO: 12.6 % (ref 19.6–45.3)
LYMPHOCYTES NFR BLD AUTO: 5.3 % (ref 19.6–45.3)
MAGNESIUM SERPL-MCNC: 1.7 MG/DL (ref 1.6–2.4)
MAXIMAL PREDICTED HEART RATE: 160 BPM
MCH RBC QN AUTO: 27.5 PG (ref 26.6–33)
MCH RBC QN AUTO: 28.1 PG (ref 26.6–33)
MCHC RBC AUTO-ENTMCNC: 31.9 G/DL (ref 31.5–35.7)
MCHC RBC AUTO-ENTMCNC: 32.5 G/DL (ref 31.5–35.7)
MCV RBC AUTO: 84.4 FL (ref 79–97)
MCV RBC AUTO: 88.2 FL (ref 79–97)
METHGB BLD QL: 0.4 % (ref 0–3)
MODALITY: ABNORMAL
MONOCYTES # BLD AUTO: 0.15 10*3/MM3 (ref 0.1–0.9)
MONOCYTES # BLD AUTO: 0.25 10*3/MM3 (ref 0.1–0.9)
MONOCYTES NFR BLD AUTO: 1.6 % (ref 5–12)
MONOCYTES NFR BLD AUTO: 4.3 % (ref 5–12)
NEUTROPHILS # BLD AUTO: 4.78 10*3/MM3 (ref 1.4–7)
NEUTROPHILS # BLD AUTO: 8.91 10*3/MM3 (ref 1.4–7)
NEUTROPHILS NFR BLD AUTO: 82.4 % (ref 42.7–76)
NEUTROPHILS NFR BLD AUTO: 92.7 % (ref 42.7–76)
NITRITE UR QL STRIP: NEGATIVE
NT-PROBNP SERPL-MCNC: ABNORMAL PG/ML (ref 5–900)
OXYHGB MFR BLDV: 91 % (ref 85–100)
PCO2 BLDA: 24 MM HG (ref 35–45)
PEEP RESPIRATORY: 5 CM[H2O]
PH BLDA: 7.34 PH UNITS (ref 7.35–7.45)
PH UR STRIP.AUTO: <=5 [PH] (ref 5–8)
PHOSPHATE SERPL-MCNC: 7.5 MG/DL (ref 2.5–4.5)
PLATELET # BLD AUTO: 243 10*3/MM3 (ref 140–450)
PLATELET # BLD AUTO: 256 10*3/MM3 (ref 140–450)
PMV BLD AUTO: 10.5 FL (ref 6–12)
PMV BLD AUTO: 11.1 FL (ref 6–12)
PO2 BLDA: 76.6 MM HG (ref 80–100)
POTASSIUM BLD-SCNC: 3.8 MMOL/L (ref 3.5–5.2)
POTASSIUM BLD-SCNC: 3.9 MMOL/L (ref 3.5–5.2)
PROT SERPL-MCNC: 6.2 G/DL (ref 6–8.5)
PROT SERPL-MCNC: 6.3 G/DL (ref 6–8.5)
PROT UR QL STRIP: ABNORMAL
RBC # BLD AUTO: 2.88 10*6/MM3 (ref 4.14–5.8)
RBC # BLD AUTO: 3.02 10*6/MM3 (ref 4.14–5.8)
RBC # UR: ABNORMAL /HPF
REF LAB TEST METHOD: ABNORMAL
SALICYLATES SERPL-MCNC: <0.3 MG/DL
SAO2 % BLDCOA: 92.4 % (ref 90–100)
SET MECH RESP RATE: 20
SODIUM BLD-SCNC: 131 MMOL/L (ref 136–145)
SODIUM BLD-SCNC: 132 MMOL/L (ref 136–145)
SP GR UR STRIP: 1.02 (ref 1–1.03)
SQUAMOUS #/AREA URNS HPF: ABNORMAL /HPF
STRESS TARGET HR: 136 BPM
TROPONIN T SERPL-MCNC: 0.01 NG/ML (ref 0–0.03)
TROPONIN T SERPL-MCNC: <0.01 NG/ML (ref 0–0.03)
TROPONIN T SERPL-MCNC: <0.01 NG/ML (ref 0–0.03)
TSH SERPL DL<=0.05 MIU/L-ACNC: 5.62 MIU/ML (ref 0.27–4.2)
UROBILINOGEN UR QL STRIP: ABNORMAL
VENTILATOR MODE: ABNORMAL
VT ON VENT VENT: 500 ML
WBC NRBC COR # BLD: 5.8 10*3/MM3 (ref 3.4–10.8)
WBC NRBC COR # BLD: 9.61 10*3/MM3 (ref 3.4–10.8)
WBC UR QL AUTO: ABNORMAL /HPF
WHOLE BLOOD HOLD SPECIMEN: NORMAL
WHOLE BLOOD HOLD SPECIMEN: NORMAL

## 2019-03-28 PROCEDURE — 85610 PROTHROMBIN TIME: CPT | Performed by: INTERNAL MEDICINE

## 2019-03-28 PROCEDURE — 94799 UNLISTED PULMONARY SVC/PX: CPT

## 2019-03-28 PROCEDURE — 25010000002 DEXAMETHASONE PER 1 MG: Performed by: EMERGENCY MEDICINE

## 2019-03-28 PROCEDURE — 25010000002 SUCCINYLCHOLINE PER 20 MG

## 2019-03-28 PROCEDURE — 99285 EMERGENCY DEPT VISIT HI MDM: CPT

## 2019-03-28 PROCEDURE — 0BH17EZ INSERTION OF ENDOTRACHEAL AIRWAY INTO TRACHEA, VIA NATURAL OR ARTIFICIAL OPENING: ICD-10-PCS | Performed by: INTERNAL MEDICINE

## 2019-03-28 PROCEDURE — 99223 1ST HOSP IP/OBS HIGH 75: CPT | Performed by: INTERNAL MEDICINE

## 2019-03-28 PROCEDURE — 87804 INFLUENZA ASSAY W/OPTIC: CPT | Performed by: EMERGENCY MEDICINE

## 2019-03-28 PROCEDURE — 83880 ASSAY OF NATRIURETIC PEPTIDE: CPT | Performed by: EMERGENCY MEDICINE

## 2019-03-28 PROCEDURE — 71045 X-RAY EXAM CHEST 1 VIEW: CPT | Performed by: RADIOLOGY

## 2019-03-28 PROCEDURE — 83605 ASSAY OF LACTIC ACID: CPT | Performed by: INTERNAL MEDICINE

## 2019-03-28 PROCEDURE — 70450 CT HEAD/BRAIN W/O DYE: CPT

## 2019-03-28 PROCEDURE — 82533 TOTAL CORTISOL: CPT | Performed by: EMERGENCY MEDICINE

## 2019-03-28 PROCEDURE — 82805 BLOOD GASES W/O2 SATURATION: CPT | Performed by: EMERGENCY MEDICINE

## 2019-03-28 PROCEDURE — 85025 COMPLETE CBC W/AUTO DIFF WBC: CPT | Performed by: INTERNAL MEDICINE

## 2019-03-28 PROCEDURE — 80307 DRUG TEST PRSMV CHEM ANLYZR: CPT | Performed by: EMERGENCY MEDICINE

## 2019-03-28 PROCEDURE — 82375 ASSAY CARBOXYHB QUANT: CPT | Performed by: EMERGENCY MEDICINE

## 2019-03-28 PROCEDURE — 99292 CRITICAL CARE ADDL 30 MIN: CPT | Performed by: INTERNAL MEDICINE

## 2019-03-28 PROCEDURE — 93306 TTE W/DOPPLER COMPLETE: CPT | Performed by: INTERNAL MEDICINE

## 2019-03-28 PROCEDURE — 51702 INSERT TEMP BLADDER CATH: CPT

## 2019-03-28 PROCEDURE — 82140 ASSAY OF AMMONIA: CPT | Performed by: INTERNAL MEDICINE

## 2019-03-28 PROCEDURE — 31500 INSERT EMERGENCY AIRWAY: CPT | Performed by: INTERNAL MEDICINE

## 2019-03-28 PROCEDURE — 71045 X-RAY EXAM CHEST 1 VIEW: CPT

## 2019-03-28 PROCEDURE — 99291 CRITICAL CARE FIRST HOUR: CPT | Performed by: INTERNAL MEDICINE

## 2019-03-28 PROCEDURE — 84484 ASSAY OF TROPONIN QUANT: CPT | Performed by: INTERNAL MEDICINE

## 2019-03-28 PROCEDURE — 25010000002 MIDAZOLAM PER 1 MG: Performed by: EMERGENCY MEDICINE

## 2019-03-28 PROCEDURE — 25010000002 PIPERACILLIN-TAZOBACTAM: Performed by: EMERGENCY MEDICINE

## 2019-03-28 PROCEDURE — 25010000002 GLUCAGON (HUMAN RECOMBINANT) 1 MG RECONSTITUTED SOLUTION: Performed by: EMERGENCY MEDICINE

## 2019-03-28 PROCEDURE — 25010000002 VANCOMYCIN 5 G RECONSTITUTED SOLUTION 5,000 MG VIAL: Performed by: EMERGENCY MEDICINE

## 2019-03-28 PROCEDURE — 93306 TTE W/DOPPLER COMPLETE: CPT

## 2019-03-28 PROCEDURE — 71250 CT THORAX DX C-: CPT

## 2019-03-28 PROCEDURE — 85730 THROMBOPLASTIN TIME PARTIAL: CPT | Performed by: INTERNAL MEDICINE

## 2019-03-28 PROCEDURE — 80053 COMPREHEN METABOLIC PANEL: CPT | Performed by: EMERGENCY MEDICINE

## 2019-03-28 PROCEDURE — 81001 URINALYSIS AUTO W/SCOPE: CPT | Performed by: EMERGENCY MEDICINE

## 2019-03-28 PROCEDURE — 5A1945Z RESPIRATORY VENTILATION, 24-96 CONSECUTIVE HOURS: ICD-10-PCS | Performed by: INTERNAL MEDICINE

## 2019-03-28 PROCEDURE — 25010000002 FENTANYL CITRATE (PF) 100 MCG/2ML SOLUTION: Performed by: EMERGENCY MEDICINE

## 2019-03-28 PROCEDURE — 84145 PROCALCITONIN (PCT): CPT | Performed by: INTERNAL MEDICINE

## 2019-03-28 PROCEDURE — 84100 ASSAY OF PHOSPHORUS: CPT | Performed by: INTERNAL MEDICINE

## 2019-03-28 PROCEDURE — 80053 COMPREHEN METABOLIC PANEL: CPT | Performed by: INTERNAL MEDICINE

## 2019-03-28 PROCEDURE — 25010000002 DOBUTAMINE PER 250 MG: Performed by: EMERGENCY MEDICINE

## 2019-03-28 PROCEDURE — 25010000002 ONDANSETRON PER 1 MG

## 2019-03-28 PROCEDURE — 93005 ELECTROCARDIOGRAM TRACING: CPT | Performed by: EMERGENCY MEDICINE

## 2019-03-28 PROCEDURE — 84443 ASSAY THYROID STIM HORMONE: CPT | Performed by: EMERGENCY MEDICINE

## 2019-03-28 PROCEDURE — 36600 WITHDRAWAL OF ARTERIAL BLOOD: CPT | Performed by: EMERGENCY MEDICINE

## 2019-03-28 PROCEDURE — 70450 CT HEAD/BRAIN W/O DYE: CPT | Performed by: RADIOLOGY

## 2019-03-28 PROCEDURE — 82962 GLUCOSE BLOOD TEST: CPT

## 2019-03-28 PROCEDURE — 94002 VENT MGMT INPAT INIT DAY: CPT

## 2019-03-28 PROCEDURE — 93010 ELECTROCARDIOGRAM REPORT: CPT | Performed by: INTERNAL MEDICINE

## 2019-03-28 PROCEDURE — 83050 HGB METHEMOGLOBIN QUAN: CPT | Performed by: EMERGENCY MEDICINE

## 2019-03-28 PROCEDURE — 25010000002 DOPAMINE PER 40 MG: Performed by: EMERGENCY MEDICINE

## 2019-03-28 PROCEDURE — 87086 URINE CULTURE/COLONY COUNT: CPT | Performed by: EMERGENCY MEDICINE

## 2019-03-28 PROCEDURE — 85025 COMPLETE CBC W/AUTO DIFF WBC: CPT | Performed by: EMERGENCY MEDICINE

## 2019-03-28 PROCEDURE — 83735 ASSAY OF MAGNESIUM: CPT | Performed by: INTERNAL MEDICINE

## 2019-03-28 PROCEDURE — 83605 ASSAY OF LACTIC ACID: CPT | Performed by: EMERGENCY MEDICINE

## 2019-03-28 PROCEDURE — 36415 COLL VENOUS BLD VENIPUNCTURE: CPT

## 2019-03-28 PROCEDURE — 87040 BLOOD CULTURE FOR BACTERIA: CPT | Performed by: EMERGENCY MEDICINE

## 2019-03-28 PROCEDURE — 25010000002 CALCIUM GLUCONATE PER 10 ML: Performed by: EMERGENCY MEDICINE

## 2019-03-28 PROCEDURE — 84484 ASSAY OF TROPONIN QUANT: CPT | Performed by: EMERGENCY MEDICINE

## 2019-03-28 PROCEDURE — 71250 CT THORAX DX C-: CPT | Performed by: RADIOLOGY

## 2019-03-28 RX ORDER — METOPROLOL TARTRATE 50 MG/1
50 TABLET, FILM COATED ORAL DAILY
COMMUNITY
End: 2019-04-23 | Stop reason: HOSPADM

## 2019-03-28 RX ORDER — CALCITRIOL 0.25 UG/1
0.5 CAPSULE, LIQUID FILLED ORAL
Status: CANCELLED | OUTPATIENT
Start: 2019-03-29

## 2019-03-28 RX ORDER — ASPIRIN 81 MG/1
81 TABLET, CHEWABLE ORAL DAILY
Status: DISCONTINUED | OUTPATIENT
Start: 2019-03-29 | End: 2019-04-23 | Stop reason: HOSPADM

## 2019-03-28 RX ORDER — NALOXONE HYDROCHLORIDE 1 MG/ML
2 INJECTION INTRAMUSCULAR; INTRAVENOUS; SUBCUTANEOUS ONCE
Status: COMPLETED | OUTPATIENT
Start: 2019-03-28 | End: 2019-03-28

## 2019-03-28 RX ORDER — SODIUM CHLORIDE 9 MG/ML
INJECTION, SOLUTION INTRAVENOUS
Status: COMPLETED
Start: 2019-03-28 | End: 2019-03-28

## 2019-03-28 RX ORDER — METOPROLOL TARTRATE 50 MG/1
50 TABLET, FILM COATED ORAL DAILY
Status: CANCELLED | OUTPATIENT
Start: 2019-03-29

## 2019-03-28 RX ORDER — HEPARIN SODIUM 5000 [USP'U]/ML
60 INJECTION, SOLUTION INTRAVENOUS; SUBCUTANEOUS ONCE
Status: DISCONTINUED | OUTPATIENT
Start: 2019-03-29 | End: 2019-04-02

## 2019-03-28 RX ORDER — ATORVASTATIN CALCIUM 40 MG/1
80 TABLET, FILM COATED ORAL NIGHTLY
Status: DISCONTINUED | OUTPATIENT
Start: 2019-03-28 | End: 2019-04-16

## 2019-03-28 RX ORDER — HEPARIN SODIUM 10000 [USP'U]/100ML
12 INJECTION, SOLUTION INTRAVENOUS
Status: DISCONTINUED | OUTPATIENT
Start: 2019-03-29 | End: 2019-04-02

## 2019-03-28 RX ORDER — NALOXONE HYDROCHLORIDE 1 MG/ML
INJECTION INTRAMUSCULAR; INTRAVENOUS; SUBCUTANEOUS
Status: COMPLETED
Start: 2019-03-28 | End: 2019-03-28

## 2019-03-28 RX ORDER — HEPARIN SODIUM 5000 [USP'U]/ML
60 INJECTION, SOLUTION INTRAVENOUS; SUBCUTANEOUS AS NEEDED
Status: DISCONTINUED | OUTPATIENT
Start: 2019-03-28 | End: 2019-04-02

## 2019-03-28 RX ORDER — SODIUM CHLORIDE 0.9 % (FLUSH) 0.9 %
3 SYRINGE (ML) INJECTION EVERY 12 HOURS SCHEDULED
Status: DISCONTINUED | OUTPATIENT
Start: 2019-03-28 | End: 2019-04-22

## 2019-03-28 RX ORDER — ASPIRIN 325 MG
325 TABLET ORAL ONCE
Status: COMPLETED | OUTPATIENT
Start: 2019-03-28 | End: 2019-03-28

## 2019-03-28 RX ORDER — MIDAZOLAM HYDROCHLORIDE 1 MG/ML
2 INJECTION INTRAMUSCULAR; INTRAVENOUS
Status: DISCONTINUED | OUTPATIENT
Start: 2019-03-28 | End: 2019-04-12

## 2019-03-28 RX ORDER — NICOTINE POLACRILEX 4 MG
15 LOZENGE BUCCAL
Status: DISCONTINUED | OUTPATIENT
Start: 2019-03-28 | End: 2019-03-29

## 2019-03-28 RX ORDER — DOPAMINE HYDROCHLORIDE 160 MG/100ML
5 INJECTION, SOLUTION INTRAVENOUS
Status: DISCONTINUED | OUTPATIENT
Start: 2019-03-28 | End: 2019-03-30

## 2019-03-28 RX ORDER — CALCITRIOL 0.5 UG/1
0.5 CAPSULE, LIQUID FILLED ORAL
COMMUNITY
End: 2019-04-23 | Stop reason: HOSPADM

## 2019-03-28 RX ORDER — HEPARIN SODIUM 5000 [USP'U]/ML
30 INJECTION, SOLUTION INTRAVENOUS; SUBCUTANEOUS AS NEEDED
Status: DISCONTINUED | OUTPATIENT
Start: 2019-03-28 | End: 2019-04-02

## 2019-03-28 RX ORDER — DOBUTAMINE HYDROCHLORIDE 200 MG/100ML
2-20 INJECTION INTRAVENOUS
Status: DISCONTINUED | OUTPATIENT
Start: 2019-03-28 | End: 2019-03-30

## 2019-03-28 RX ORDER — FENTANYL CITRATE 50 UG/ML
50 INJECTION, SOLUTION INTRAMUSCULAR; INTRAVENOUS ONCE
Status: COMPLETED | OUTPATIENT
Start: 2019-03-28 | End: 2019-03-28

## 2019-03-28 RX ORDER — MIDAZOLAM HYDROCHLORIDE 1 MG/ML
1 INJECTION INTRAMUSCULAR; INTRAVENOUS ONCE
Status: COMPLETED | OUTPATIENT
Start: 2019-03-28 | End: 2019-03-28

## 2019-03-28 RX ORDER — SODIUM CHLORIDE 0.9 % (FLUSH) 0.9 %
3-10 SYRINGE (ML) INJECTION AS NEEDED
Status: DISCONTINUED | OUTPATIENT
Start: 2019-03-28 | End: 2019-04-23 | Stop reason: HOSPADM

## 2019-03-28 RX ORDER — ONDANSETRON 2 MG/ML
INJECTION INTRAMUSCULAR; INTRAVENOUS
Status: COMPLETED
Start: 2019-03-28 | End: 2019-03-28

## 2019-03-28 RX ORDER — IPRATROPIUM BROMIDE AND ALBUTEROL SULFATE 2.5; .5 MG/3ML; MG/3ML
3 SOLUTION RESPIRATORY (INHALATION)
Status: DISCONTINUED | OUTPATIENT
Start: 2019-03-29 | End: 2019-04-08

## 2019-03-28 RX ORDER — CLONIDINE 0.3 MG/24H
1 PATCH, EXTENDED RELEASE TRANSDERMAL WEEKLY
COMMUNITY
End: 2019-04-23 | Stop reason: HOSPADM

## 2019-03-28 RX ORDER — MIDAZOLAM HYDROCHLORIDE 1 MG/ML
2 INJECTION INTRAMUSCULAR; INTRAVENOUS ONCE
Status: COMPLETED | OUTPATIENT
Start: 2019-03-28 | End: 2019-03-28

## 2019-03-28 RX ORDER — HEPARIN SODIUM 5000 [USP'U]/ML
5000 INJECTION, SOLUTION INTRAVENOUS; SUBCUTANEOUS EVERY 12 HOURS SCHEDULED
Status: DISCONTINUED | OUTPATIENT
Start: 2019-03-28 | End: 2019-03-30

## 2019-03-28 RX ORDER — SODIUM CHLORIDE 0.9 % (FLUSH) 0.9 %
10 SYRINGE (ML) INJECTION AS NEEDED
Status: DISCONTINUED | OUTPATIENT
Start: 2019-03-28 | End: 2019-04-23 | Stop reason: HOSPADM

## 2019-03-28 RX ORDER — DEXTROSE MONOHYDRATE 25 G/50ML
25 INJECTION, SOLUTION INTRAVENOUS
Status: DISCONTINUED | OUTPATIENT
Start: 2019-03-28 | End: 2019-03-29

## 2019-03-28 RX ORDER — CLONIDINE 0.3 MG/24H
1 PATCH, EXTENDED RELEASE TRANSDERMAL WEEKLY
Status: CANCELLED | OUTPATIENT
Start: 2019-03-29

## 2019-03-28 RX ORDER — CEPHALEXIN 500 MG/1
500 CAPSULE ORAL 3 TIMES DAILY
Status: CANCELLED | OUTPATIENT
Start: 2019-03-29 | End: 2019-03-31

## 2019-03-28 RX ORDER — NALOXONE HYDROCHLORIDE 1 MG/ML
2 INJECTION INTRAMUSCULAR; INTRAVENOUS; SUBCUTANEOUS ONCE
Status: DISCONTINUED | OUTPATIENT
Start: 2019-03-28 | End: 2019-03-28

## 2019-03-28 RX ORDER — CEPHALEXIN 500 MG/1
500 CAPSULE ORAL 3 TIMES DAILY
COMMUNITY
Start: 2019-03-20 | End: 2019-04-23 | Stop reason: HOSPADM

## 2019-03-28 RX ORDER — ONDANSETRON 2 MG/ML
4 INJECTION INTRAMUSCULAR; INTRAVENOUS ONCE
Status: COMPLETED | OUTPATIENT
Start: 2019-03-28 | End: 2019-03-28

## 2019-03-28 RX ADMIN — MIDAZOLAM HYDROCHLORIDE 1 MG: 1 INJECTION, SOLUTION INTRAMUSCULAR; INTRAVENOUS at 18:26

## 2019-03-28 RX ADMIN — SODIUM CHLORIDE 1836 ML: 9 INJECTION, SOLUTION INTRAVENOUS at 13:42

## 2019-03-28 RX ADMIN — NALOXONE HYDROCHLORIDE 2 MG: 1 INJECTION PARENTERAL at 13:48

## 2019-03-28 RX ADMIN — NOREPINEPHRINE BITARTRATE 0.02 MCG/KG/MIN: 1 INJECTION INTRAVENOUS at 14:51

## 2019-03-28 RX ADMIN — ASPIRIN 325 MG: 325 TABLET ORAL at 13:51

## 2019-03-28 RX ADMIN — NOREPINEPHRINE BITARTRATE 0.3 MCG/KG/MIN: 1 INJECTION INTRAVENOUS at 23:09

## 2019-03-28 RX ADMIN — FENTANYL CITRATE 50 MCG: 50 INJECTION INTRAMUSCULAR; INTRAVENOUS at 20:07

## 2019-03-28 RX ADMIN — SODIUM CHLORIDE 1000 ML: 9 INJECTION, SOLUTION INTRAVENOUS at 20:00

## 2019-03-28 RX ADMIN — DEXAMETHASONE SODIUM PHOSPHATE 10 MG: 4 INJECTION, SOLUTION INTRAMUSCULAR; INTRAVENOUS at 18:20

## 2019-03-28 RX ADMIN — CALCIUM GLUCONATE 1 G: 94 INJECTION, SOLUTION INTRAVENOUS at 21:53

## 2019-03-28 RX ADMIN — VASOPRESSIN 0.03 UNITS/MIN: 20 INJECTION INTRAVENOUS at 18:09

## 2019-03-28 RX ADMIN — VASOPRESSIN 0.06 UNITS/MIN: 20 INJECTION INTRAVENOUS at 23:20

## 2019-03-28 RX ADMIN — NALOXONE HYDROCHLORIDE 2 MG: 1 INJECTION INTRAMUSCULAR; INTRAVENOUS; SUBCUTANEOUS at 13:48

## 2019-03-28 RX ADMIN — GLUCAGON HYDROCHLORIDE 2 MG: 1 INJECTION, POWDER, FOR SOLUTION INTRAMUSCULAR; INTRAVENOUS; SUBCUTANEOUS at 21:54

## 2019-03-28 RX ADMIN — VANCOMYCIN HYDROCHLORIDE 1250 MG: 5 INJECTION, POWDER, LYOPHILIZED, FOR SOLUTION INTRAVENOUS at 15:00

## 2019-03-28 RX ADMIN — DOBUTAMINE HYDROCHLORIDE 2 MCG/KG/MIN: 200 INJECTION INTRAVENOUS at 21:38

## 2019-03-28 RX ADMIN — SODIUM CHLORIDE 1836 ML/HR: 9 INJECTION, SOLUTION INTRAVENOUS at 13:57

## 2019-03-28 RX ADMIN — ONDANSETRON 4 MG: 2 INJECTION INTRAMUSCULAR; INTRAVENOUS at 16:52

## 2019-03-28 RX ADMIN — Medication: at 20:35

## 2019-03-28 RX ADMIN — METHYLENE BLUE 91.8 MG: 10 INJECTION INTRAVENOUS at 22:14

## 2019-03-28 RX ADMIN — MIDAZOLAM HYDROCHLORIDE 2 MG: 1 INJECTION, SOLUTION INTRAMUSCULAR; INTRAVENOUS at 18:55

## 2019-03-28 RX ADMIN — PIPERACILLIN SODIUM,TAZOBACTAM SODIUM 3.38 G: 3; .375 INJECTION, POWDER, FOR SOLUTION INTRAVENOUS at 14:29

## 2019-03-28 RX ADMIN — DOPAMINE HYDROCHLORIDE 5 MCG/KG/MIN: 160 INJECTION, SOLUTION INTRAVENOUS at 17:04

## 2019-03-28 RX ADMIN — ATROPINE SULFATE 0.5 MG: 0.1 INJECTION PARENTERAL at 17:00

## 2019-03-29 ENCOUNTER — APPOINTMENT (OUTPATIENT)
Dept: ULTRASOUND IMAGING | Facility: HOSPITAL | Age: 59
End: 2019-03-29

## 2019-03-29 ENCOUNTER — APPOINTMENT (OUTPATIENT)
Dept: GENERAL RADIOLOGY | Facility: HOSPITAL | Age: 59
End: 2019-03-29

## 2019-03-29 LAB
6-ACETYL MORPHINE: NEGATIVE
A-A DO2: 110.8 MMHG (ref 0–300)
A-A DO2: 85.1 MMHG (ref 0–300)
ACETONE BLD QL: NEGATIVE
ALBUMIN SERPL-MCNC: 2.84 G/DL (ref 3.5–5.2)
ALBUMIN/GLOB SERPL: 0.8 G/DL
ALP SERPL-CCNC: 77 U/L (ref 39–117)
ALT SERPL W P-5'-P-CCNC: 23 U/L (ref 1–41)
AMPHET+METHAMPHET UR QL: NEGATIVE
AMYLASE SERPL-CCNC: 115 U/L (ref 28–100)
ANION GAP SERPL CALCULATED.3IONS-SCNC: 21.1 MMOL/L
APTT PPP: 32.8 SECONDS (ref 23.8–36.1)
APTT PPP: 67.9 SECONDS (ref 23.8–36.1)
APTT PPP: 81 SECONDS (ref 23.8–36.1)
ARTERIAL PATENCY WRIST A: POSITIVE
AST SERPL-CCNC: 44 U/L (ref 1–40)
ATMOSPHERIC PRESS: 731 MMHG
ATMOSPHERIC PRESS: 735 MMHG
B PARAPERT DNA SPEC QL NAA+PROBE: NOT DETECTED
B PERT DNA SPEC QL NAA+PROBE: NOT DETECTED
BACTERIA SPEC AEROBE CULT: NO GROWTH
BARBITURATES UR QL SCN: NEGATIVE
BASE EXCESS BLDA CALC-SCNC: -13.9 MMOL/L
BASE EXCESS BLDV CALC-SCNC: -10.2 MMOL/L
BASOPHILS # BLD AUTO: 0 10*3/MM3 (ref 0–0.2)
BASOPHILS NFR BLD AUTO: 0 % (ref 0–1.5)
BDY SITE: ABNORMAL
BDY SITE: ABNORMAL
BENZODIAZ UR QL SCN: NEGATIVE
BILIRUB SERPL-MCNC: 0.2 MG/DL (ref 0.2–1.2)
BODY TEMPERATURE: 98.6 C
BODY TEMPERATURE: 98.6 C
BUN BLD-MCNC: 68 MG/DL (ref 8–23)
BUN/CREAT SERPL: 18.5 (ref 7–25)
BUPRENORPHINE SERPL-MCNC: POSITIVE NG/ML
C PNEUM DNA NPH QL NAA+NON-PROBE: NOT DETECTED
CALCIUM SPEC-SCNC: 7.4 MG/DL (ref 8.6–10.5)
CANNABINOIDS SERPL QL: POSITIVE
CHLORIDE SERPL-SCNC: 98 MMOL/L (ref 98–107)
CK SERPL-CCNC: 104 U/L (ref 20–200)
CO2 SERPL-SCNC: 12.9 MMOL/L (ref 22–29)
COCAINE UR QL: NEGATIVE
COHGB MFR BLD: 1.5 % (ref 0–5)
CORTIS SERPL-MCNC: 56.46 MCG/DL
CREAT BLD-MCNC: 3.67 MG/DL (ref 0.76–1.27)
CREAT UR-MCNC: 84.7 MG/DL
CRP SERPL-MCNC: 17.03 MG/DL (ref 0–0.5)
D-LACTATE SERPL-SCNC: 0.7 MMOL/L (ref 0.5–2)
D-LACTATE SERPL-SCNC: 0.7 MMOL/L (ref 0.5–2)
D-LACTATE SERPL-SCNC: 1.8 MMOL/L (ref 0.5–2)
DEPRECATED RDW RBC AUTO: 49 FL (ref 37–54)
EOSINOPHIL # BLD AUTO: 0 10*3/MM3 (ref 0–0.4)
EOSINOPHIL NFR BLD AUTO: 0 % (ref 0.3–6.2)
ERYTHROCYTE [DISTWIDTH] IN BLOOD BY AUTOMATED COUNT: 15.7 % (ref 12.3–15.4)
ETHANOL BLD-MCNC: <10 MG/DL (ref 0–10)
ETHANOL UR QL: <0.01 %
FLUAV H1 2009 PAND RNA NPH QL NAA+PROBE: NOT DETECTED
FLUAV H1 HA GENE NPH QL NAA+PROBE: NOT DETECTED
FLUAV H3 RNA NPH QL NAA+PROBE: NOT DETECTED
FLUAV SUBTYP SPEC NAA+PROBE: NOT DETECTED
FLUBV RNA ISLT QL NAA+PROBE: NOT DETECTED
FOLATE SERPL-MCNC: 3.14 NG/ML (ref 4.78–24.2)
GFR SERPL CREATININE-BSD FRML MDRD: 17 ML/MIN/1.73
GLOBULIN UR ELPH-MCNC: 3.5 GM/DL
GLUCOSE BLD-MCNC: 321 MG/DL (ref 65–99)
GLUCOSE BLDC GLUCOMTR-MCNC: 216 MG/DL (ref 70–130)
GLUCOSE BLDC GLUCOMTR-MCNC: 217 MG/DL (ref 70–130)
GLUCOSE BLDC GLUCOMTR-MCNC: 223 MG/DL (ref 70–130)
GLUCOSE BLDC GLUCOMTR-MCNC: 274 MG/DL (ref 70–130)
GLUCOSE BLDC GLUCOMTR-MCNC: 290 MG/DL (ref 70–130)
GLUCOSE BLDC GLUCOMTR-MCNC: 296 MG/DL (ref 70–130)
GLUCOSE BLDC GLUCOMTR-MCNC: 298 MG/DL (ref 70–130)
GLUCOSE BLDC GLUCOMTR-MCNC: 327 MG/DL (ref 70–130)
GLUCOSE BLDC GLUCOMTR-MCNC: 340 MG/DL (ref 70–130)
GLUCOSE BLDC GLUCOMTR-MCNC: 42 MG/DL (ref 70–130)
GLUCOSE BLDC GLUCOMTR-MCNC: 54 MG/DL (ref 70–130)
GLUCOSE BLDC GLUCOMTR-MCNC: 66 MG/DL (ref 70–130)
GLUCOSE BLDC GLUCOMTR-MCNC: 79 MG/DL (ref 70–130)
GLUCOSE BLDC GLUCOMTR-MCNC: 97 MG/DL (ref 70–130)
HADV DNA SPEC NAA+PROBE: NOT DETECTED
HAV IGM SERPL QL IA: ABNORMAL
HBA1C MFR BLD: 6.4 % (ref 4.8–5.6)
HBV CORE IGM SERPL QL IA: ABNORMAL
HBV SURFACE AG SERPL QL IA: ABNORMAL
HCO3 BLDA-SCNC: 12.5 MMOL/L (ref 22–26)
HCO3 BLDV-SCNC: 15.7 MMOL/L
HCOV 229E RNA SPEC QL NAA+PROBE: NOT DETECTED
HCOV HKU1 RNA SPEC QL NAA+PROBE: NOT DETECTED
HCOV NL63 RNA SPEC QL NAA+PROBE: NOT DETECTED
HCOV OC43 RNA SPEC QL NAA+PROBE: NOT DETECTED
HCT VFR BLD AUTO: 25.6 % (ref 37.5–51)
HCT VFR BLD CALC: 25 % (ref 42–52)
HCV AB SER DONR QL: REACTIVE
HGB BLD-MCNC: 8.1 G/DL (ref 13–17.7)
HGB BLDA-MCNC: 8.4 G/DL (ref 12–16)
HGB BLDA-MCNC: 9.2 G/DL (ref 12–16)
HIV1+2 AB SER QL: NORMAL
HMPV RNA NPH QL NAA+NON-PROBE: NOT DETECTED
HOROWITZ INDEX BLD+IHG-RTO: 30 %
HOROWITZ INDEX BLD+IHG-RTO: 30 %
HPIV1 RNA SPEC QL NAA+PROBE: NOT DETECTED
HPIV2 RNA SPEC QL NAA+PROBE: NOT DETECTED
HPIV3 RNA NPH QL NAA+PROBE: NOT DETECTED
HPIV4 P GENE NPH QL NAA+PROBE: NOT DETECTED
IMM GRANULOCYTES # BLD AUTO: 0.08 10*3/MM3 (ref 0–0.05)
IMM GRANULOCYTES NFR BLD AUTO: 0.7 % (ref 0–0.5)
INR PPP: 1.31 (ref 0.9–1.1)
INR PPP: 1.33 (ref 0.9–1.1)
L PNEUMO1 AG UR QL IA: NEGATIVE
LIPASE SERPL-CCNC: 173 U/L (ref 13–60)
LYMPHOCYTES # BLD AUTO: 0.38 10*3/MM3 (ref 0.7–3.1)
LYMPHOCYTES NFR BLD AUTO: 3.2 % (ref 19.6–45.3)
M PNEUMO IGG SER IA-ACNC: NOT DETECTED
M PNEUMO IGM SER QL: NEGATIVE
MAGNESIUM SERPL-MCNC: 1.7 MG/DL (ref 1.6–2.4)
MCH RBC QN AUTO: 27.6 PG (ref 26.6–33)
MCHC RBC AUTO-ENTMCNC: 31.6 G/DL (ref 31.5–35.7)
MCV RBC AUTO: 87.4 FL (ref 79–97)
METHADONE UR QL SCN: NEGATIVE
METHGB BLD QL: 0.5 % (ref 0–3)
MODALITY: ABNORMAL
MODALITY: ABNORMAL
MONOCYTES # BLD AUTO: 0.46 10*3/MM3 (ref 0.1–0.9)
MONOCYTES NFR BLD AUTO: 3.9 % (ref 5–12)
NEUTROPHILS # BLD AUTO: 10.92 10*3/MM3 (ref 1.4–7)
NEUTROPHILS NFR BLD AUTO: 92.2 % (ref 42.7–76)
NT-PROBNP SERPL-MCNC: ABNORMAL PG/ML (ref 5–900)
OPIATES UR QL: POSITIVE
OSMOLALITY SERPL: 313 MOSM/KG (ref 280–301)
OXYCODONE UR QL SCN: POSITIVE
OXYHGB MFR BLDV: 91.9 % (ref 85–100)
PCO2 BLDA: 30.6 MM HG (ref 35–45)
PCO2 BLDV: 34.4 MM HG
PCP UR QL SCN: NEGATIVE
PEEP RESPIRATORY: 5 CM[H2O]
PEEP RESPIRATORY: 5 CM[H2O]
PH BLDA: 7.23 PH UNITS (ref 7.35–7.45)
PH BLDV: 7.28 PH UNITS
PHOSPHATE SERPL-MCNC: 7.8 MG/DL (ref 2.5–4.5)
PLATELET # BLD AUTO: 255 10*3/MM3 (ref 140–450)
PMV BLD AUTO: 11.1 FL (ref 6–12)
PO2 BLDA: 85.3 MM HG (ref 80–100)
PO2 BLDV: 54 MM HG
POTASSIUM BLD-SCNC: 3.8 MMOL/L (ref 3.5–5.2)
PROCALCITONIN SERPL-MCNC: 0.26 NG/ML (ref 0.1–0.25)
PROCALCITONIN SERPL-MCNC: 1.8 NG/ML (ref 0.1–0.25)
PROT SERPL-MCNC: 6.3 G/DL (ref 6–8.5)
PROT UR-MCNC: 198 MG/DL
PROT/CREAT UR: 2337.7 MG/G CREA (ref 0–200)
PROTHROMBIN TIME: 16.5 SECONDS (ref 11–15.4)
PROTHROMBIN TIME: 16.8 SECONDS (ref 11–15.4)
RBC # BLD AUTO: 2.93 10*6/MM3 (ref 4.14–5.8)
RHINOVIRUS RNA SPEC NAA+PROBE: NOT DETECTED
RSV RNA NPH QL NAA+NON-PROBE: NOT DETECTED
SAO2 % BLDCOA: 93.8 % (ref 90–100)
SAO2 % BLDCOV: 82.6 %
SET MECH RESP RATE: 20
SET MECH RESP RATE: 20
SODIUM BLD-SCNC: 132 MMOL/L (ref 136–145)
T3FREE SERPL-MCNC: 0.98 PG/ML (ref 2–4.4)
T4 FREE SERPL-MCNC: 0.49 NG/DL (ref 0.93–1.7)
TROPONIN T SERPL-MCNC: <0.01 NG/ML (ref 0–0.03)
VANCOMYCIN SERPL-MCNC: 10.8 MCG/ML (ref 5–40)
VENTILATOR MODE: ABNORMAL
VENTILATOR MODE: AC
VIT B12 BLD-MCNC: 1485 PG/ML (ref 211–946)
VT ON VENT VENT: 500 ML
VT ON VENT VENT: 500 ML
WBC NRBC COR # BLD: 11.84 10*3/MM3 (ref 3.4–10.8)

## 2019-03-29 PROCEDURE — 83735 ASSAY OF MAGNESIUM: CPT | Performed by: INTERNAL MEDICINE

## 2019-03-29 PROCEDURE — 63710000001 INSULIN ASPART PER 5 UNITS: Performed by: INTERNAL MEDICINE

## 2019-03-29 PROCEDURE — 99291 CRITICAL CARE FIRST HOUR: CPT | Performed by: INTERNAL MEDICINE

## 2019-03-29 PROCEDURE — G0432 EIA HIV-1/HIV-2 SCREEN: HCPCS | Performed by: INTERNAL MEDICINE

## 2019-03-29 PROCEDURE — 99292 CRITICAL CARE ADDL 30 MIN: CPT | Performed by: INTERNAL MEDICINE

## 2019-03-29 PROCEDURE — 82805 BLOOD GASES W/O2 SATURATION: CPT | Performed by: INTERNAL MEDICINE

## 2019-03-29 PROCEDURE — 25010000002 HYDROCORTISONE SODIUM SUCCINATE 100 MG RECONSTITUTED SOLUTION: Performed by: INTERNAL MEDICINE

## 2019-03-29 PROCEDURE — 80307 DRUG TEST PRSMV CHEM ANLYZR: CPT | Performed by: INTERNAL MEDICINE

## 2019-03-29 PROCEDURE — 80320 DRUG SCREEN QUANTALCOHOLS: CPT | Performed by: INTERNAL MEDICINE

## 2019-03-29 PROCEDURE — 83880 ASSAY OF NATRIURETIC PEPTIDE: CPT | Performed by: INTERNAL MEDICINE

## 2019-03-29 PROCEDURE — 25010000002 DOPAMINE PER 40 MG: Performed by: EMERGENCY MEDICINE

## 2019-03-29 PROCEDURE — 94003 VENT MGMT INPAT SUBQ DAY: CPT

## 2019-03-29 PROCEDURE — 25010000002 MIDAZOLAM PER 1 MG: Performed by: INTERNAL MEDICINE

## 2019-03-29 PROCEDURE — 82533 TOTAL CORTISOL: CPT | Performed by: NURSE PRACTITIONER

## 2019-03-29 PROCEDURE — 84145 PROCALCITONIN (PCT): CPT | Performed by: NURSE PRACTITIONER

## 2019-03-29 PROCEDURE — 25010000002 THIAMINE PER 100 MG: Performed by: INTERNAL MEDICINE

## 2019-03-29 PROCEDURE — 87798 DETECT AGENT NOS DNA AMP: CPT | Performed by: INTERNAL MEDICINE

## 2019-03-29 PROCEDURE — 82150 ASSAY OF AMYLASE: CPT | Performed by: NURSE PRACTITIONER

## 2019-03-29 PROCEDURE — 87633 RESP VIRUS 12-25 TARGETS: CPT | Performed by: INTERNAL MEDICINE

## 2019-03-29 PROCEDURE — 87486 CHLMYD PNEUM DNA AMP PROBE: CPT | Performed by: INTERNAL MEDICINE

## 2019-03-29 PROCEDURE — 76775 US EXAM ABDO BACK WALL LIM: CPT

## 2019-03-29 PROCEDURE — 87899 AGENT NOS ASSAY W/OPTIC: CPT | Performed by: INTERNAL MEDICINE

## 2019-03-29 PROCEDURE — 82962 GLUCOSE BLOOD TEST: CPT

## 2019-03-29 PROCEDURE — 83605 ASSAY OF LACTIC ACID: CPT | Performed by: NURSE PRACTITIONER

## 2019-03-29 PROCEDURE — 83930 ASSAY OF BLOOD OSMOLALITY: CPT | Performed by: INTERNAL MEDICINE

## 2019-03-29 PROCEDURE — 85610 PROTHROMBIN TIME: CPT | Performed by: INTERNAL MEDICINE

## 2019-03-29 PROCEDURE — 84439 ASSAY OF FREE THYROXINE: CPT | Performed by: NURSE PRACTITIONER

## 2019-03-29 PROCEDURE — 94799 UNLISTED PULMONARY SVC/PX: CPT

## 2019-03-29 PROCEDURE — 25010000002 PIPERACILLIN-TAZOBACTAM: Performed by: NURSE PRACTITIONER

## 2019-03-29 PROCEDURE — 84156 ASSAY OF PROTEIN URINE: CPT | Performed by: INTERNAL MEDICINE

## 2019-03-29 PROCEDURE — 25010000002 MIDAZOLAM 0.5 MG/ML 100 ML NS: Performed by: INTERNAL MEDICINE

## 2019-03-29 PROCEDURE — 82693 ASSAY OF ETHYLENE GLYCOL: CPT | Performed by: INTERNAL MEDICINE

## 2019-03-29 PROCEDURE — 85025 COMPLETE CBC W/AUTO DIFF WBC: CPT | Performed by: INTERNAL MEDICINE

## 2019-03-29 PROCEDURE — 76775 US EXAM ABDO BACK WALL LIM: CPT | Performed by: RADIOLOGY

## 2019-03-29 PROCEDURE — 36600 WITHDRAWAL OF ARTERIAL BLOOD: CPT | Performed by: INTERNAL MEDICINE

## 2019-03-29 PROCEDURE — 84145 PROCALCITONIN (PCT): CPT | Performed by: INTERNAL MEDICINE

## 2019-03-29 PROCEDURE — 87581 M.PNEUMON DNA AMP PROBE: CPT | Performed by: INTERNAL MEDICINE

## 2019-03-29 PROCEDURE — 76705 ECHO EXAM OF ABDOMEN: CPT | Performed by: RADIOLOGY

## 2019-03-29 PROCEDURE — 82607 VITAMIN B-12: CPT | Performed by: INTERNAL MEDICINE

## 2019-03-29 PROCEDURE — 84600 ASSAY OF VOLATILES: CPT | Performed by: INTERNAL MEDICINE

## 2019-03-29 PROCEDURE — 82550 ASSAY OF CK (CPK): CPT | Performed by: INTERNAL MEDICINE

## 2019-03-29 PROCEDURE — 71045 X-RAY EXAM CHEST 1 VIEW: CPT

## 2019-03-29 PROCEDURE — 86738 MYCOPLASMA ANTIBODY: CPT | Performed by: INTERNAL MEDICINE

## 2019-03-29 PROCEDURE — 85730 THROMBOPLASTIN TIME PARTIAL: CPT | Performed by: INTERNAL MEDICINE

## 2019-03-29 PROCEDURE — 71045 X-RAY EXAM CHEST 1 VIEW: CPT | Performed by: RADIOLOGY

## 2019-03-29 PROCEDURE — 84484 ASSAY OF TROPONIN QUANT: CPT | Performed by: INTERNAL MEDICINE

## 2019-03-29 PROCEDURE — 25010000002 HEPARIN (PORCINE) PER 1000 UNITS: Performed by: INTERNAL MEDICINE

## 2019-03-29 PROCEDURE — 82375 ASSAY CARBOXYHB QUANT: CPT | Performed by: INTERNAL MEDICINE

## 2019-03-29 PROCEDURE — 86140 C-REACTIVE PROTEIN: CPT | Performed by: NURSE PRACTITIONER

## 2019-03-29 PROCEDURE — 82009 KETONE BODYS QUAL: CPT | Performed by: INTERNAL MEDICINE

## 2019-03-29 PROCEDURE — 83605 ASSAY OF LACTIC ACID: CPT | Performed by: INTERNAL MEDICINE

## 2019-03-29 PROCEDURE — 80202 ASSAY OF VANCOMYCIN: CPT | Performed by: INTERNAL MEDICINE

## 2019-03-29 PROCEDURE — 94640 AIRWAY INHALATION TREATMENT: CPT

## 2019-03-29 PROCEDURE — 83050 HGB METHEMOGLOBIN QUAN: CPT | Performed by: INTERNAL MEDICINE

## 2019-03-29 PROCEDURE — 84100 ASSAY OF PHOSPHORUS: CPT | Performed by: INTERNAL MEDICINE

## 2019-03-29 PROCEDURE — 84481 FREE ASSAY (FT-3): CPT | Performed by: NURSE PRACTITIONER

## 2019-03-29 PROCEDURE — 82570 ASSAY OF URINE CREATININE: CPT | Performed by: INTERNAL MEDICINE

## 2019-03-29 PROCEDURE — 83036 HEMOGLOBIN GLYCOSYLATED A1C: CPT | Performed by: INTERNAL MEDICINE

## 2019-03-29 PROCEDURE — 25010000003 EPINEPHRINE 30 MG/30ML SOLUTION 30 ML VIAL: Performed by: INTERNAL MEDICINE

## 2019-03-29 PROCEDURE — 80053 COMPREHEN METABOLIC PANEL: CPT | Performed by: INTERNAL MEDICINE

## 2019-03-29 PROCEDURE — 25010000002 DOBUTAMINE PER 250 MG: Performed by: EMERGENCY MEDICINE

## 2019-03-29 PROCEDURE — 82746 ASSAY OF FOLIC ACID SERUM: CPT | Performed by: INTERNAL MEDICINE

## 2019-03-29 PROCEDURE — 80074 ACUTE HEPATITIS PANEL: CPT | Performed by: INTERNAL MEDICINE

## 2019-03-29 PROCEDURE — 99232 SBSQ HOSP IP/OBS MODERATE 35: CPT | Performed by: INTERNAL MEDICINE

## 2019-03-29 PROCEDURE — 83690 ASSAY OF LIPASE: CPT | Performed by: NURSE PRACTITIONER

## 2019-03-29 PROCEDURE — 76705 ECHO EXAM OF ABDOMEN: CPT

## 2019-03-29 RX ORDER — MIDAZOLAM HYDROCHLORIDE 1 MG/ML
2 INJECTION INTRAMUSCULAR; INTRAVENOUS
Status: DISCONTINUED | OUTPATIENT
Start: 2019-03-29 | End: 2019-04-12

## 2019-03-29 RX ORDER — FAMOTIDINE 10 MG/ML
20 INJECTION, SOLUTION INTRAVENOUS DAILY
Status: DISCONTINUED | OUTPATIENT
Start: 2019-03-29 | End: 2019-04-03

## 2019-03-29 RX ORDER — CHLORHEXIDINE GLUCONATE 0.12 MG/ML
15 RINSE ORAL EVERY 12 HOURS SCHEDULED
Status: DISCONTINUED | OUTPATIENT
Start: 2019-03-29 | End: 2019-04-23 | Stop reason: HOSPADM

## 2019-03-29 RX ORDER — THIAMINE HYDROCHLORIDE 100 MG/ML
500 INJECTION, SOLUTION INTRAMUSCULAR; INTRAVENOUS DAILY
Status: DISCONTINUED | OUTPATIENT
Start: 2019-03-29 | End: 2019-03-29

## 2019-03-29 RX ORDER — SODIUM CHLORIDE 9 MG/ML
INJECTION, SOLUTION INTRAVENOUS
Status: COMPLETED
Start: 2019-03-29 | End: 2019-03-29

## 2019-03-29 RX ORDER — NICOTINE POLACRILEX 4 MG
15 LOZENGE BUCCAL
Status: DISCONTINUED | OUTPATIENT
Start: 2019-03-29 | End: 2019-03-29

## 2019-03-29 RX ORDER — OSELTAMIVIR PHOSPHATE 30 MG/1
30 CAPSULE ORAL
Status: DISCONTINUED | OUTPATIENT
Start: 2019-03-29 | End: 2019-04-01

## 2019-03-29 RX ORDER — L.ACID,PARA/B.BIFIDUM/S.THERM 8B CELL
1 CAPSULE ORAL DAILY
Status: DISCONTINUED | OUTPATIENT
Start: 2019-03-29 | End: 2019-04-09

## 2019-03-29 RX ORDER — SODIUM CHLORIDE 0.9 % (FLUSH) 0.9 %
10 SYRINGE (ML) INJECTION EVERY 12 HOURS SCHEDULED
Status: DISCONTINUED | OUTPATIENT
Start: 2019-03-29 | End: 2019-04-23 | Stop reason: HOSPADM

## 2019-03-29 RX ORDER — SODIUM CHLORIDE 0.9 % (FLUSH) 0.9 %
10 SYRINGE (ML) INJECTION AS NEEDED
Status: DISCONTINUED | OUTPATIENT
Start: 2019-03-29 | End: 2019-04-23 | Stop reason: HOSPADM

## 2019-03-29 RX ORDER — DEXTROSE MONOHYDRATE 25 G/50ML
25-50 INJECTION, SOLUTION INTRAVENOUS
Status: DISCONTINUED | OUTPATIENT
Start: 2019-03-29 | End: 2019-04-21

## 2019-03-29 RX ORDER — DEXTROSE MONOHYDRATE 25 G/50ML
25 INJECTION, SOLUTION INTRAVENOUS
Status: DISCONTINUED | OUTPATIENT
Start: 2019-03-29 | End: 2019-03-29

## 2019-03-29 RX ORDER — MIDAZOLAM IN 0.9 % SOD.CHLORID 1 MG/ML
1-10 PLASTIC BAG, INJECTION (ML) INTRAVENOUS
Status: DISCONTINUED | OUTPATIENT
Start: 2019-03-29 | End: 2019-03-31

## 2019-03-29 RX ORDER — SODIUM CHLORIDE 0.9 % (FLUSH) 0.9 %
20 SYRINGE (ML) INJECTION AS NEEDED
Status: DISCONTINUED | OUTPATIENT
Start: 2019-03-29 | End: 2019-04-23 | Stop reason: HOSPADM

## 2019-03-29 RX ADMIN — MIDAZOLAM HYDROCHLORIDE 2 MG: 1 INJECTION, SOLUTION INTRAMUSCULAR; INTRAVENOUS at 13:23

## 2019-03-29 RX ADMIN — IPRATROPIUM BROMIDE AND ALBUTEROL SULFATE 3 ML: .5; 3 SOLUTION RESPIRATORY (INHALATION) at 06:37

## 2019-03-29 RX ADMIN — IPRATROPIUM BROMIDE AND ALBUTEROL SULFATE 3 ML: .5; 3 SOLUTION RESPIRATORY (INHALATION) at 18:24

## 2019-03-29 RX ADMIN — DOPAMINE HYDROCHLORIDE 20 MCG/KG/MIN: 160 INJECTION, SOLUTION INTRAVENOUS at 07:36

## 2019-03-29 RX ADMIN — CHLORHEXIDINE GLUCONATE 15 ML: 1.2 RINSE ORAL at 08:44

## 2019-03-29 RX ADMIN — MIDAZOLAM HYDROCHLORIDE 2 MG: 1 INJECTION, SOLUTION INTRAMUSCULAR; INTRAVENOUS at 08:54

## 2019-03-29 RX ADMIN — DOPAMINE HYDROCHLORIDE 10 MCG/KG/MIN: 160 INJECTION, SOLUTION INTRAVENOUS at 14:08

## 2019-03-29 RX ADMIN — MIDAZOLAM HYDROCHLORIDE 2 MG: 1 INJECTION, SOLUTION INTRAMUSCULAR; INTRAVENOUS at 02:14

## 2019-03-29 RX ADMIN — HYDROCORTISONE SODIUM SUCCINATE 100 MG: 100 INJECTION, POWDER, FOR SOLUTION INTRAMUSCULAR; INTRAVENOUS at 23:19

## 2019-03-29 RX ADMIN — DEXTROSE MONOHYDRATE 25 ML: 25 INJECTION, SOLUTION INTRAVENOUS at 21:15

## 2019-03-29 RX ADMIN — Medication: at 18:42

## 2019-03-29 RX ADMIN — SODIUM CHLORIDE, PRESERVATIVE FREE 3 ML: 5 INJECTION INTRAVENOUS at 08:46

## 2019-03-29 RX ADMIN — ASPIRIN 81 MG: 81 TABLET, CHEWABLE ORAL at 08:44

## 2019-03-29 RX ADMIN — VASOPRESSIN 0.06 UNITS/MIN: 20 INJECTION INTRAVENOUS at 11:32

## 2019-03-29 RX ADMIN — HYDROCORTISONE SODIUM SUCCINATE 100 MG: 100 INJECTION, POWDER, FOR SOLUTION INTRAMUSCULAR; INTRAVENOUS at 17:46

## 2019-03-29 RX ADMIN — SODIUM CHLORIDE, PRESERVATIVE FREE 10 ML: 5 INJECTION INTRAVENOUS at 20:37

## 2019-03-29 RX ADMIN — HEPARIN SODIUM 12 UNITS/KG/HR: 10000 INJECTION, SOLUTION INTRAVENOUS at 00:36

## 2019-03-29 RX ADMIN — MIDAZOLAM HYDROCHLORIDE 2 MG: 1 INJECTION, SOLUTION INTRAMUSCULAR; INTRAVENOUS at 11:27

## 2019-03-29 RX ADMIN — SODIUM CHLORIDE, PRESERVATIVE FREE 10 ML: 5 INJECTION INTRAVENOUS at 08:45

## 2019-03-29 RX ADMIN — HYDROCORTISONE SODIUM SUCCINATE 50 MG: 100 INJECTION, POWDER, FOR SOLUTION INTRAMUSCULAR; INTRAVENOUS at 06:01

## 2019-03-29 RX ADMIN — DOBUTAMINE HYDROCHLORIDE 10 MCG/KG/MIN: 200 INJECTION INTRAVENOUS at 11:27

## 2019-03-29 RX ADMIN — IPRATROPIUM BROMIDE AND ALBUTEROL SULFATE 3 ML: .5; 3 SOLUTION RESPIRATORY (INHALATION) at 12:50

## 2019-03-29 RX ADMIN — MIDAZOLAM HYDROCHLORIDE 2 MG: 1 INJECTION, SOLUTION INTRAMUSCULAR; INTRAVENOUS at 00:21

## 2019-03-29 RX ADMIN — INSULIN ASPART 5 UNITS: 100 INJECTION, SOLUTION INTRAVENOUS; SUBCUTANEOUS at 10:57

## 2019-03-29 RX ADMIN — DEXTROSE MONOHYDRATE 25 ML: 25 INJECTION, SOLUTION INTRAVENOUS at 21:45

## 2019-03-29 RX ADMIN — INSULIN ASPART 4 UNITS: 100 INJECTION, SOLUTION INTRAVENOUS; SUBCUTANEOUS at 06:40

## 2019-03-29 RX ADMIN — SODIUM CHLORIDE 23 UNITS/HR: 9 INJECTION, SOLUTION INTRAVENOUS at 18:51

## 2019-03-29 RX ADMIN — VASOPRESSIN 0.06 UNITS/MIN: 20 INJECTION INTRAVENOUS at 18:23

## 2019-03-29 RX ADMIN — MIDAZOLAM HYDROCHLORIDE 2 MG: 1 INJECTION, SOLUTION INTRAMUSCULAR; INTRAVENOUS at 10:23

## 2019-03-29 RX ADMIN — SODIUM CHLORIDE, PRESERVATIVE FREE 3 ML: 5 INJECTION INTRAVENOUS at 20:37

## 2019-03-29 RX ADMIN — SODIUM CHLORIDE, PRESERVATIVE FREE 10 ML: 5 INJECTION INTRAVENOUS at 20:36

## 2019-03-29 RX ADMIN — THIAMINE HYDROCHLORIDE 500 MG: 100 INJECTION, SOLUTION INTRAMUSCULAR; INTRAVENOUS at 15:25

## 2019-03-29 RX ADMIN — VASOPRESSIN 0.06 UNITS/MIN: 20 INJECTION INTRAVENOUS at 05:42

## 2019-03-29 RX ADMIN — SODIUM CHLORIDE 2000 ML: 9 INJECTION, SOLUTION INTRAVENOUS at 12:33

## 2019-03-29 RX ADMIN — OSELTAMIVIR PHOSPHATE 30 MG: 30 CAPSULE ORAL at 15:23

## 2019-03-29 RX ADMIN — FAMOTIDINE 20 MG: 10 INJECTION, SOLUTION INTRAVENOUS at 10:30

## 2019-03-29 RX ADMIN — IPRATROPIUM BROMIDE AND ALBUTEROL SULFATE 3 ML: .5; 3 SOLUTION RESPIRATORY (INHALATION) at 00:11

## 2019-03-29 RX ADMIN — HYDROCORTISONE SODIUM SUCCINATE 100 MG: 100 INJECTION, POWDER, FOR SOLUTION INTRAMUSCULAR; INTRAVENOUS at 00:36

## 2019-03-29 RX ADMIN — EPINEPHRINE 0.02 MCG/KG/MIN: 1 INJECTION PARENTERAL at 13:07

## 2019-03-29 RX ADMIN — DEXTROSE MONOHYDRATE 25 ML: 25 INJECTION, SOLUTION INTRAVENOUS at 22:16

## 2019-03-29 RX ADMIN — HYDROCORTISONE SODIUM SUCCINATE 100 MG: 100 INJECTION, POWDER, FOR SOLUTION INTRAMUSCULAR; INTRAVENOUS at 12:51

## 2019-03-29 RX ADMIN — NOREPINEPHRINE BITARTRATE 0.3 MCG/KG/MIN: 1 INJECTION INTRAVENOUS at 07:42

## 2019-03-29 RX ADMIN — NOREPINEPHRINE BITARTRATE 0.3 MCG/KG/MIN: 1 INJECTION INTRAVENOUS at 15:26

## 2019-03-29 RX ADMIN — MIDAZOLAM HYDROCHLORIDE 2 MG: 1 INJECTION, SOLUTION INTRAMUSCULAR; INTRAVENOUS at 04:06

## 2019-03-29 RX ADMIN — Medication 1 MG/HR: at 14:24

## 2019-03-29 RX ADMIN — SODIUM CHLORIDE 9 UNITS/HR: 9 INJECTION, SOLUTION INTRAVENOUS at 12:55

## 2019-03-29 RX ADMIN — CHLORHEXIDINE GLUCONATE 15 ML: 1.2 RINSE ORAL at 20:37

## 2019-03-29 RX ADMIN — PIPERACILLIN SODIUM,TAZOBACTAM SODIUM 3.38 G: 3; .375 INJECTION, POWDER, FOR SOLUTION INTRAVENOUS at 15:23

## 2019-03-29 RX ADMIN — MIDAZOLAM HYDROCHLORIDE 2 MG: 1 INJECTION, SOLUTION INTRAMUSCULAR; INTRAVENOUS at 06:46

## 2019-03-29 RX ADMIN — SODIUM CHLORIDE 1000 ML: 9 INJECTION, SOLUTION INTRAVENOUS at 12:32

## 2019-03-29 RX ADMIN — ATORVASTATIN CALCIUM 80 MG: 40 TABLET, FILM COATED ORAL at 20:39

## 2019-03-29 RX ADMIN — NOREPINEPHRINE BITARTRATE 0.26 MCG/KG/MIN: 1 INJECTION INTRAVENOUS at 23:50

## 2019-03-29 RX ADMIN — DOXYCYCLINE 100 MG: 100 INJECTION, POWDER, LYOPHILIZED, FOR SOLUTION INTRAVENOUS at 17:44

## 2019-03-29 RX ADMIN — MIDAZOLAM HYDROCHLORIDE 2 MG: 1 INJECTION, SOLUTION INTRAMUSCULAR; INTRAVENOUS at 12:27

## 2019-03-29 RX ADMIN — Medication 1 CAPSULE: at 17:45

## 2019-03-29 RX ADMIN — MIDAZOLAM HYDROCHLORIDE 2 MG: 1 INJECTION, SOLUTION INTRAMUSCULAR; INTRAVENOUS at 05:15

## 2019-03-29 RX ADMIN — DOPAMINE HYDROCHLORIDE 20 MCG/KG/MIN: 160 INJECTION, SOLUTION INTRAVENOUS at 00:35

## 2019-03-29 RX ADMIN — SODIUM BICARBONATE 75 ML/HR: 84 INJECTION, SOLUTION INTRAVENOUS at 06:50

## 2019-03-29 RX ADMIN — Medication: at 12:45

## 2019-03-29 RX ADMIN — Medication: at 05:15

## 2019-03-30 LAB
A-A DO2: 79.5 MMHG (ref 0–300)
ALBUMIN SERPL-MCNC: 2.31 G/DL (ref 3.5–5.2)
ALBUMIN/GLOB SERPL: 0.7 G/DL
ALP SERPL-CCNC: 59 U/L (ref 39–117)
ALT SERPL W P-5'-P-CCNC: 17 U/L (ref 1–41)
ANION GAP SERPL CALCULATED.3IONS-SCNC: 19.6 MMOL/L
APTT PPP: 52.6 SECONDS (ref 23.8–36.1)
APTT PPP: 78.6 SECONDS (ref 23.8–36.1)
APTT PPP: 85.6 SECONDS (ref 23.8–36.1)
APTT PPP: >100 SECONDS (ref 23.8–36.1)
ARTERIAL PATENCY WRIST A: POSITIVE
AST SERPL-CCNC: 27 U/L (ref 1–40)
ATMOSPHERIC PRESS: 731 MMHG
BASE EXCESS BLDA CALC-SCNC: -10.5 MMOL/L
BASOPHILS # BLD AUTO: 0.01 10*3/MM3 (ref 0–0.2)
BASOPHILS NFR BLD AUTO: 0.1 % (ref 0–1.5)
BDY SITE: ABNORMAL
BILIRUB SERPL-MCNC: 0.2 MG/DL (ref 0.2–1.2)
BODY TEMPERATURE: 98.6 C
BUN BLD-MCNC: 69 MG/DL (ref 8–23)
BUN/CREAT SERPL: 18.1 (ref 7–25)
CALCIUM SPEC-SCNC: 6.9 MG/DL (ref 8.6–10.5)
CHLORIDE SERPL-SCNC: 98 MMOL/L (ref 98–107)
CO2 SERPL-SCNC: 13.4 MMOL/L (ref 22–29)
COHGB MFR BLD: 1.8 % (ref 0–5)
CREAT BLD-MCNC: 3.81 MG/DL (ref 0.76–1.27)
D-LACTATE SERPL-SCNC: 0.6 MMOL/L (ref 0.5–2)
D-LACTATE SERPL-SCNC: 0.7 MMOL/L (ref 0.5–2)
D-LACTATE SERPL-SCNC: 1 MMOL/L (ref 0.5–2)
DEPRECATED RDW RBC AUTO: 50.5 FL (ref 37–54)
EOSINOPHIL # BLD AUTO: 0 10*3/MM3 (ref 0–0.4)
EOSINOPHIL NFR BLD AUTO: 0 % (ref 0.3–6.2)
ERYTHROCYTE [DISTWIDTH] IN BLOOD BY AUTOMATED COUNT: 16.1 % (ref 12.3–15.4)
GFR SERPL CREATININE-BSD FRML MDRD: 16 ML/MIN/1.73
GLOBULIN UR ELPH-MCNC: 3.2 GM/DL
GLUCOSE BLD-MCNC: 89 MG/DL (ref 65–99)
GLUCOSE BLDC GLUCOMTR-MCNC: 103 MG/DL (ref 70–130)
GLUCOSE BLDC GLUCOMTR-MCNC: 106 MG/DL (ref 70–130)
GLUCOSE BLDC GLUCOMTR-MCNC: 114 MG/DL (ref 70–130)
GLUCOSE BLDC GLUCOMTR-MCNC: 116 MG/DL (ref 70–130)
GLUCOSE BLDC GLUCOMTR-MCNC: 117 MG/DL (ref 70–130)
GLUCOSE BLDC GLUCOMTR-MCNC: 121 MG/DL (ref 70–130)
GLUCOSE BLDC GLUCOMTR-MCNC: 126 MG/DL (ref 70–130)
GLUCOSE BLDC GLUCOMTR-MCNC: 129 MG/DL (ref 70–130)
GLUCOSE BLDC GLUCOMTR-MCNC: 129 MG/DL (ref 70–130)
GLUCOSE BLDC GLUCOMTR-MCNC: 132 MG/DL (ref 70–130)
GLUCOSE BLDC GLUCOMTR-MCNC: 61 MG/DL (ref 70–130)
GLUCOSE BLDC GLUCOMTR-MCNC: 65 MG/DL (ref 70–130)
GLUCOSE BLDC GLUCOMTR-MCNC: 85 MG/DL (ref 70–130)
GLUCOSE BLDC GLUCOMTR-MCNC: 85 MG/DL (ref 70–130)
GLUCOSE BLDC GLUCOMTR-MCNC: 86 MG/DL (ref 70–130)
GLUCOSE BLDC GLUCOMTR-MCNC: 88 MG/DL (ref 70–130)
HCO3 BLDA-SCNC: 14.8 MMOL/L (ref 22–26)
HCT VFR BLD AUTO: 22.9 % (ref 37.5–51)
HCT VFR BLD CALC: 21 % (ref 42–52)
HGB BLD-MCNC: 7.3 G/DL (ref 13–17.7)
HGB BLDA-MCNC: 7.3 G/DL (ref 12–16)
HOROWITZ INDEX BLD+IHG-RTO: 30 %
IMM GRANULOCYTES # BLD AUTO: 0.07 10*3/MM3 (ref 0–0.05)
IMM GRANULOCYTES NFR BLD AUTO: 0.5 % (ref 0–0.5)
INR PPP: 1.31 (ref 0.9–1.1)
LYMPHOCYTES # BLD AUTO: 0.56 10*3/MM3 (ref 0.7–3.1)
LYMPHOCYTES NFR BLD AUTO: 4.3 % (ref 19.6–45.3)
MAGNESIUM SERPL-MCNC: 1.6 MG/DL (ref 1.6–2.4)
MAGNESIUM SERPL-MCNC: 1.7 MG/DL (ref 1.6–2.4)
MCH RBC QN AUTO: 28 PG (ref 26.6–33)
MCHC RBC AUTO-ENTMCNC: 31.9 G/DL (ref 31.5–35.7)
MCV RBC AUTO: 87.7 FL (ref 79–97)
METHGB BLD QL: 0.5 % (ref 0–3)
MODALITY: ABNORMAL
MONOCYTES # BLD AUTO: 0.52 10*3/MM3 (ref 0.1–0.9)
MONOCYTES NFR BLD AUTO: 4 % (ref 5–12)
NEUTROPHILS # BLD AUTO: 11.94 10*3/MM3 (ref 1.4–7)
NEUTROPHILS NFR BLD AUTO: 91.1 % (ref 42.7–76)
NOTE: ABNORMAL
OXYHGB MFR BLDV: 94.3 % (ref 85–100)
PCO2 BLDA: 30.5 MM HG (ref 35–45)
PCO2 TEMP ADJ BLD: ABNORMAL MM HG (ref 35–48)
PEEP RESPIRATORY: 5 CM[H2O]
PH BLDA: 7.3 PH UNITS (ref 7.35–7.45)
PH, TEMP CORRECTED: ABNORMAL PH UNITS (ref 7.35–7.45)
PHOSPHATE SERPL-MCNC: 8.2 MG/DL (ref 2.5–4.5)
PLATELET # BLD AUTO: 259 10*3/MM3 (ref 140–450)
PMV BLD AUTO: 11 FL (ref 6–12)
PO2 BLDA: 89.9 MM HG (ref 80–100)
PO2 TEMP ADJ BLD: ABNORMAL MM HG (ref 83–108)
POTASSIUM BLD-SCNC: 3.9 MMOL/L (ref 3.5–5.2)
PROCALCITONIN SERPL-MCNC: 19.68 NG/ML (ref 0.1–0.25)
PROT SERPL-MCNC: 5.5 G/DL (ref 6–8.5)
PROTHROMBIN TIME: 16.5 SECONDS (ref 11–15.4)
RBC # BLD AUTO: 2.61 10*6/MM3 (ref 4.14–5.8)
SAO2 % BLDCOA: 96.5 % (ref 90–100)
SET MECH RESP RATE: 20
SODIUM BLD-SCNC: 131 MMOL/L (ref 136–145)
TROPONIN T SERPL-MCNC: <0.01 NG/ML (ref 0–0.03)
VENTILATOR MODE: ABNORMAL
VT ON VENT VENT: 500 ML
WBC NRBC COR # BLD: 13.1 10*3/MM3 (ref 3.4–10.8)

## 2019-03-30 PROCEDURE — 25010000002 PIPERACILLIN-TAZOBACTAM: Performed by: NURSE PRACTITIONER

## 2019-03-30 PROCEDURE — 82805 BLOOD GASES W/O2 SATURATION: CPT | Performed by: INTERNAL MEDICINE

## 2019-03-30 PROCEDURE — 25010000002 VANCOMYCIN 5 G RECONSTITUTED SOLUTION 5,000 MG VIAL: Performed by: INTERNAL MEDICINE

## 2019-03-30 PROCEDURE — 85025 COMPLETE CBC W/AUTO DIFF WBC: CPT | Performed by: INTERNAL MEDICINE

## 2019-03-30 PROCEDURE — 99292 CRITICAL CARE ADDL 30 MIN: CPT | Performed by: INTERNAL MEDICINE

## 2019-03-30 PROCEDURE — 85730 THROMBOPLASTIN TIME PARTIAL: CPT | Performed by: INTERNAL MEDICINE

## 2019-03-30 PROCEDURE — 93005 ELECTROCARDIOGRAM TRACING: CPT | Performed by: INTERNAL MEDICINE

## 2019-03-30 PROCEDURE — 83605 ASSAY OF LACTIC ACID: CPT | Performed by: NURSE PRACTITIONER

## 2019-03-30 PROCEDURE — 80053 COMPREHEN METABOLIC PANEL: CPT | Performed by: INTERNAL MEDICINE

## 2019-03-30 PROCEDURE — 83735 ASSAY OF MAGNESIUM: CPT | Performed by: NURSE PRACTITIONER

## 2019-03-30 PROCEDURE — 25010000002 MIDAZOLAM 0.5 MG/ML 100 ML NS: Performed by: INTERNAL MEDICINE

## 2019-03-30 PROCEDURE — 36600 WITHDRAWAL OF ARTERIAL BLOOD: CPT | Performed by: INTERNAL MEDICINE

## 2019-03-30 PROCEDURE — 99232 SBSQ HOSP IP/OBS MODERATE 35: CPT | Performed by: NURSE PRACTITIONER

## 2019-03-30 PROCEDURE — 84100 ASSAY OF PHOSPHORUS: CPT | Performed by: INTERNAL MEDICINE

## 2019-03-30 PROCEDURE — 83050 HGB METHEMOGLOBIN QUAN: CPT | Performed by: INTERNAL MEDICINE

## 2019-03-30 PROCEDURE — 25010000002 ALBUMIN HUMAN 25% PER 50 ML: Performed by: INTERNAL MEDICINE

## 2019-03-30 PROCEDURE — 94799 UNLISTED PULMONARY SVC/PX: CPT

## 2019-03-30 PROCEDURE — 25010000002 THIAMINE PER 100 MG: Performed by: INTERNAL MEDICINE

## 2019-03-30 PROCEDURE — 25010000002 HYDROCORTISONE SODIUM SUCCINATE 100 MG RECONSTITUTED SOLUTION: Performed by: INTERNAL MEDICINE

## 2019-03-30 PROCEDURE — 82962 GLUCOSE BLOOD TEST: CPT

## 2019-03-30 PROCEDURE — 25010000002 HEPARIN (PORCINE) PER 1000 UNITS: Performed by: INTERNAL MEDICINE

## 2019-03-30 PROCEDURE — P9047 ALBUMIN (HUMAN), 25%, 50ML: HCPCS | Performed by: INTERNAL MEDICINE

## 2019-03-30 PROCEDURE — 93010 ELECTROCARDIOGRAM REPORT: CPT | Performed by: INTERNAL MEDICINE

## 2019-03-30 PROCEDURE — 84484 ASSAY OF TROPONIN QUANT: CPT | Performed by: INTERNAL MEDICINE

## 2019-03-30 PROCEDURE — 82375 ASSAY CARBOXYHB QUANT: CPT | Performed by: INTERNAL MEDICINE

## 2019-03-30 PROCEDURE — 83735 ASSAY OF MAGNESIUM: CPT | Performed by: INTERNAL MEDICINE

## 2019-03-30 PROCEDURE — 94003 VENT MGMT INPAT SUBQ DAY: CPT

## 2019-03-30 PROCEDURE — 85610 PROTHROMBIN TIME: CPT | Performed by: INTERNAL MEDICINE

## 2019-03-30 PROCEDURE — 99291 CRITICAL CARE FIRST HOUR: CPT | Performed by: INTERNAL MEDICINE

## 2019-03-30 RX ORDER — ALBUMIN (HUMAN) 12.5 G/50ML
25 SOLUTION INTRAVENOUS ONCE
Status: COMPLETED | OUTPATIENT
Start: 2019-03-30 | End: 2019-03-30

## 2019-03-30 RX ORDER — SODIUM CHLORIDE 9 MG/ML
100 INJECTION, SOLUTION INTRAVENOUS CONTINUOUS
Status: DISCONTINUED | OUTPATIENT
Start: 2019-03-30 | End: 2019-03-30

## 2019-03-30 RX ADMIN — PIPERACILLIN SODIUM,TAZOBACTAM SODIUM 3.38 G: 3; .375 INJECTION, POWDER, FOR SOLUTION INTRAVENOUS at 02:57

## 2019-03-30 RX ADMIN — DEXTROSE MONOHYDRATE 25 ML: 25 INJECTION, SOLUTION INTRAVENOUS at 09:57

## 2019-03-30 RX ADMIN — DOXYCYCLINE 100 MG: 100 INJECTION, POWDER, LYOPHILIZED, FOR SOLUTION INTRAVENOUS at 03:20

## 2019-03-30 RX ADMIN — SODIUM CHLORIDE, PRESERVATIVE FREE 10 ML: 5 INJECTION INTRAVENOUS at 09:03

## 2019-03-30 RX ADMIN — ALBUMIN (HUMAN) 25 G: 0.25 INJECTION, SOLUTION INTRAVENOUS at 11:09

## 2019-03-30 RX ADMIN — Medication: at 19:04

## 2019-03-30 RX ADMIN — IPRATROPIUM BROMIDE AND ALBUTEROL SULFATE 3 ML: .5; 3 SOLUTION RESPIRATORY (INHALATION) at 00:24

## 2019-03-30 RX ADMIN — SODIUM BICARBONATE 75 ML/HR: 84 INJECTION, SOLUTION INTRAVENOUS at 12:17

## 2019-03-30 RX ADMIN — IPRATROPIUM BROMIDE AND ALBUTEROL SULFATE 3 ML: .5; 3 SOLUTION RESPIRATORY (INHALATION) at 06:30

## 2019-03-30 RX ADMIN — Medication: at 06:47

## 2019-03-30 RX ADMIN — SODIUM CHLORIDE, PRESERVATIVE FREE 10 ML: 5 INJECTION INTRAVENOUS at 21:13

## 2019-03-30 RX ADMIN — CHLORHEXIDINE GLUCONATE 15 ML: 1.2 RINSE ORAL at 08:52

## 2019-03-30 RX ADMIN — CHLORHEXIDINE GLUCONATE 15 ML: 1.2 RINSE ORAL at 21:12

## 2019-03-30 RX ADMIN — PIPERACILLIN SODIUM,TAZOBACTAM SODIUM 3.38 G: 3; .375 INJECTION, POWDER, FOR SOLUTION INTRAVENOUS at 15:01

## 2019-03-30 RX ADMIN — Medication: at 00:45

## 2019-03-30 RX ADMIN — Medication 1 CAPSULE: at 09:00

## 2019-03-30 RX ADMIN — SODIUM CHLORIDE, PRESERVATIVE FREE 10 ML: 5 INJECTION INTRAVENOUS at 21:12

## 2019-03-30 RX ADMIN — DEXTROSE MONOHYDRATE 25 ML: 25 INJECTION, SOLUTION INTRAVENOUS at 00:15

## 2019-03-30 RX ADMIN — FAMOTIDINE 20 MG: 10 INJECTION, SOLUTION INTRAVENOUS at 09:02

## 2019-03-30 RX ADMIN — HEPARIN SODIUM 9 UNITS/KG/HR: 10000 INJECTION, SOLUTION INTRAVENOUS at 13:04

## 2019-03-30 RX ADMIN — HYDROCORTISONE SODIUM SUCCINATE 100 MG: 100 INJECTION, POWDER, FOR SOLUTION INTRAMUSCULAR; INTRAVENOUS at 05:30

## 2019-03-30 RX ADMIN — THIAMINE HYDROCHLORIDE 500 MG: 100 INJECTION, SOLUTION INTRAMUSCULAR; INTRAVENOUS at 09:02

## 2019-03-30 RX ADMIN — VANCOMYCIN HYDROCHLORIDE 1250 MG: 5 INJECTION, POWDER, LYOPHILIZED, FOR SOLUTION INTRAVENOUS at 10:25

## 2019-03-30 RX ADMIN — Medication: at 13:11

## 2019-03-30 RX ADMIN — Medication 2 MG/HR: at 15:19

## 2019-03-30 RX ADMIN — SODIUM CHLORIDE, PRESERVATIVE FREE 3 ML: 5 INJECTION INTRAVENOUS at 09:03

## 2019-03-30 RX ADMIN — OSELTAMIVIR PHOSPHATE 30 MG: 30 CAPSULE ORAL at 09:00

## 2019-03-30 RX ADMIN — DOXYCYCLINE 100 MG: 100 INJECTION, POWDER, LYOPHILIZED, FOR SOLUTION INTRAVENOUS at 15:47

## 2019-03-30 RX ADMIN — HYDROCORTISONE SODIUM SUCCINATE 100 MG: 100 INJECTION, POWDER, FOR SOLUTION INTRAMUSCULAR; INTRAVENOUS at 11:10

## 2019-03-30 RX ADMIN — SODIUM CHLORIDE, PRESERVATIVE FREE 3 ML: 5 INJECTION INTRAVENOUS at 21:13

## 2019-03-30 RX ADMIN — ASPIRIN 81 MG: 81 TABLET, CHEWABLE ORAL at 09:00

## 2019-03-30 RX ADMIN — IPRATROPIUM BROMIDE AND ALBUTEROL SULFATE 3 ML: .5; 3 SOLUTION RESPIRATORY (INHALATION) at 18:36

## 2019-03-30 RX ADMIN — IPRATROPIUM BROMIDE AND ALBUTEROL SULFATE 3 ML: .5; 3 SOLUTION RESPIRATORY (INHALATION) at 12:16

## 2019-03-30 RX ADMIN — HYDROCORTISONE SODIUM SUCCINATE 100 MG: 100 INJECTION, POWDER, FOR SOLUTION INTRAMUSCULAR; INTRAVENOUS at 17:18

## 2019-03-30 RX ADMIN — ATORVASTATIN CALCIUM 80 MG: 40 TABLET, FILM COATED ORAL at 21:12

## 2019-03-31 LAB
A-A DO2: 93.4 MMHG (ref 0–300)
ALBUMIN SERPL-MCNC: 2.26 G/DL (ref 3.5–5.2)
ALBUMIN/GLOB SERPL: 0.8 G/DL
ALP SERPL-CCNC: 43 U/L (ref 39–117)
ALT SERPL W P-5'-P-CCNC: 15 U/L (ref 1–41)
ANION GAP SERPL CALCULATED.3IONS-SCNC: 19.1 MMOL/L
APTT PPP: 62.4 SECONDS (ref 23.8–36.1)
APTT PPP: 73.2 SECONDS (ref 23.8–36.1)
ARTERIAL PATENCY WRIST A: ABNORMAL
AST SERPL-CCNC: 25 U/L (ref 1–40)
ATMOSPHERIC PRESS: 726 MMHG
BASE EXCESS BLDA CALC-SCNC: -9.6 MMOL/L
BASOPHILS # BLD AUTO: 0 10*3/MM3 (ref 0–0.2)
BASOPHILS NFR BLD AUTO: 0 % (ref 0–1.5)
BDY SITE: ABNORMAL
BILIRUB SERPL-MCNC: 0.2 MG/DL (ref 0.2–1.2)
BODY TEMPERATURE: 98.6 C
BUN BLD-MCNC: 75 MG/DL (ref 8–23)
BUN/CREAT SERPL: 18.8 (ref 7–25)
CALCIUM SPEC-SCNC: 6.5 MG/DL (ref 8.6–10.5)
CHLORIDE SERPL-SCNC: 97 MMOL/L (ref 98–107)
CO2 SERPL-SCNC: 14.9 MMOL/L (ref 22–29)
COHGB MFR BLD: 1.7 % (ref 0–5)
CREAT BLD-MCNC: 3.99 MG/DL (ref 0.76–1.27)
DEPRECATED RDW RBC AUTO: 49.7 FL (ref 37–54)
EOSINOPHIL # BLD AUTO: 0 10*3/MM3 (ref 0–0.4)
EOSINOPHIL NFR BLD AUTO: 0 % (ref 0.3–6.2)
ERYTHROCYTE [DISTWIDTH] IN BLOOD BY AUTOMATED COUNT: 16.2 % (ref 12.3–15.4)
GFR SERPL CREATININE-BSD FRML MDRD: 15 ML/MIN/1.73
GLOBULIN UR ELPH-MCNC: 2.7 GM/DL
GLUCOSE BLD-MCNC: 146 MG/DL (ref 65–99)
GLUCOSE BLDC GLUCOMTR-MCNC: 125 MG/DL (ref 70–130)
GLUCOSE BLDC GLUCOMTR-MCNC: 170 MG/DL (ref 70–130)
GLUCOSE BLDC GLUCOMTR-MCNC: 202 MG/DL (ref 70–130)
GLUCOSE BLDC GLUCOMTR-MCNC: 217 MG/DL (ref 70–130)
GLUCOSE BLDC GLUCOMTR-MCNC: 231 MG/DL (ref 70–130)
HCO3 BLDA-SCNC: 15.2 MMOL/L (ref 22–26)
HCT VFR BLD AUTO: 22 % (ref 37.5–51)
HCT VFR BLD CALC: 22 % (ref 42–52)
HGB BLD-MCNC: 7 G/DL (ref 13–17.7)
HGB BLDA-MCNC: 7.4 G/DL (ref 12–16)
HOROWITZ INDEX BLD+IHG-RTO: 30 %
IMM GRANULOCYTES # BLD AUTO: 0.03 10*3/MM3 (ref 0–0.05)
IMM GRANULOCYTES NFR BLD AUTO: 0.3 % (ref 0–0.5)
INR PPP: 1.31 (ref 0.9–1.1)
LYMPHOCYTES # BLD AUTO: 0.43 10*3/MM3 (ref 0.7–3.1)
LYMPHOCYTES NFR BLD AUTO: 5 % (ref 19.6–45.3)
MCH RBC QN AUTO: 27.7 PG (ref 26.6–33)
MCHC RBC AUTO-ENTMCNC: 31.8 G/DL (ref 31.5–35.7)
MCV RBC AUTO: 87 FL (ref 79–97)
METHGB BLD QL: 0.4 % (ref 0–3)
MODALITY: ABNORMAL
MONOCYTES # BLD AUTO: 0.32 10*3/MM3 (ref 0.1–0.9)
MONOCYTES NFR BLD AUTO: 3.7 % (ref 5–12)
NEUTROPHILS # BLD AUTO: 7.89 10*3/MM3 (ref 1.4–7)
NEUTROPHILS NFR BLD AUTO: 91 % (ref 42.7–76)
OXYHGB MFR BLDV: 91.8 % (ref 85–100)
PCO2 BLDA: 28.9 MM HG (ref 35–45)
PEEP RESPIRATORY: 5 CM[H2O]
PH BLDA: 7.34 PH UNITS (ref 7.35–7.45)
PHOSPHATE SERPL-MCNC: 8.6 MG/DL (ref 2.5–4.5)
PLATELET # BLD AUTO: 218 10*3/MM3 (ref 140–450)
PMV BLD AUTO: 10.9 FL (ref 6–12)
PO2 BLDA: 76.3 MM HG (ref 80–100)
POTASSIUM BLD-SCNC: 3.4 MMOL/L (ref 3.5–5.2)
PROT SERPL-MCNC: 5 G/DL (ref 6–8.5)
PROTHROMBIN TIME: 16.6 SECONDS (ref 11–15.4)
RBC # BLD AUTO: 2.53 10*6/MM3 (ref 4.14–5.8)
SAO2 % BLDCOA: 93.8 % (ref 90–100)
SET MECH RESP RATE: 20
SODIUM BLD-SCNC: 131 MMOL/L (ref 136–145)
VANCOMYCIN SERPL-MCNC: 17.4 MCG/ML (ref 5–40)
VENTILATOR MODE: ABNORMAL
VT ON VENT VENT: 500 ML
WBC NRBC COR # BLD: 8.67 10*3/MM3 (ref 3.4–10.8)

## 2019-03-31 PROCEDURE — 25010000002 MIDAZOLAM 0.5 MG/ML 100 ML NS: Performed by: INTERNAL MEDICINE

## 2019-03-31 PROCEDURE — 25010000002 PIPERACILLIN-TAZOBACTAM: Performed by: NURSE PRACTITIONER

## 2019-03-31 PROCEDURE — 99291 CRITICAL CARE FIRST HOUR: CPT | Performed by: INTERNAL MEDICINE

## 2019-03-31 PROCEDURE — 94799 UNLISTED PULMONARY SVC/PX: CPT

## 2019-03-31 PROCEDURE — 80053 COMPREHEN METABOLIC PANEL: CPT | Performed by: INTERNAL MEDICINE

## 2019-03-31 PROCEDURE — 63710000001 INSULIN ASPART PER 5 UNITS: Performed by: NURSE PRACTITIONER

## 2019-03-31 PROCEDURE — 36600 WITHDRAWAL OF ARTERIAL BLOOD: CPT | Performed by: INTERNAL MEDICINE

## 2019-03-31 PROCEDURE — 99231 SBSQ HOSP IP/OBS SF/LOW 25: CPT | Performed by: NURSE PRACTITIONER

## 2019-03-31 PROCEDURE — 25010000002 HYDROCORTISONE SODIUM SUCCINATE 100 MG RECONSTITUTED SOLUTION: Performed by: INTERNAL MEDICINE

## 2019-03-31 PROCEDURE — 84100 ASSAY OF PHOSPHORUS: CPT | Performed by: INTERNAL MEDICINE

## 2019-03-31 PROCEDURE — 83050 HGB METHEMOGLOBIN QUAN: CPT | Performed by: INTERNAL MEDICINE

## 2019-03-31 PROCEDURE — 94003 VENT MGMT INPAT SUBQ DAY: CPT

## 2019-03-31 PROCEDURE — 85610 PROTHROMBIN TIME: CPT | Performed by: INTERNAL MEDICINE

## 2019-03-31 PROCEDURE — 25010000002 THIAMINE PER 100 MG: Performed by: INTERNAL MEDICINE

## 2019-03-31 PROCEDURE — 25010000002 VANCOMYCIN 5 G RECONSTITUTED SOLUTION 5,000 MG VIAL: Performed by: INTERNAL MEDICINE

## 2019-03-31 PROCEDURE — 85025 COMPLETE CBC W/AUTO DIFF WBC: CPT | Performed by: INTERNAL MEDICINE

## 2019-03-31 PROCEDURE — 82962 GLUCOSE BLOOD TEST: CPT

## 2019-03-31 PROCEDURE — 82805 BLOOD GASES W/O2 SATURATION: CPT | Performed by: INTERNAL MEDICINE

## 2019-03-31 PROCEDURE — 80202 ASSAY OF VANCOMYCIN: CPT | Performed by: INTERNAL MEDICINE

## 2019-03-31 PROCEDURE — 85730 THROMBOPLASTIN TIME PARTIAL: CPT | Performed by: INTERNAL MEDICINE

## 2019-03-31 PROCEDURE — 82375 ASSAY CARBOXYHB QUANT: CPT | Performed by: INTERNAL MEDICINE

## 2019-03-31 RX ORDER — SODIUM CHLORIDE 9 MG/ML
100 INJECTION, SOLUTION INTRAVENOUS CONTINUOUS
Status: DISCONTINUED | OUTPATIENT
Start: 2019-03-31 | End: 2019-04-01

## 2019-03-31 RX ORDER — DEXTROSE MONOHYDRATE 25 G/50ML
25 INJECTION, SOLUTION INTRAVENOUS
Status: DISCONTINUED | OUTPATIENT
Start: 2019-03-31 | End: 2019-04-02

## 2019-03-31 RX ORDER — SODIUM BICARBONATE 650 MG/1
650 TABLET ORAL 3 TIMES DAILY
Status: DISCONTINUED | OUTPATIENT
Start: 2019-03-31 | End: 2019-04-02

## 2019-03-31 RX ORDER — NICOTINE POLACRILEX 4 MG
15 LOZENGE BUCCAL
Status: DISCONTINUED | OUTPATIENT
Start: 2019-03-31 | End: 2019-04-02

## 2019-03-31 RX ADMIN — CHLORHEXIDINE GLUCONATE 15 ML: 1.2 RINSE ORAL at 21:16

## 2019-03-31 RX ADMIN — ASPIRIN 81 MG: 81 TABLET, CHEWABLE ORAL at 08:36

## 2019-03-31 RX ADMIN — Medication: at 07:07

## 2019-03-31 RX ADMIN — HYDROCORTISONE SODIUM SUCCINATE 50 MG: 100 INJECTION, POWDER, FOR SOLUTION INTRAMUSCULAR; INTRAVENOUS at 17:10

## 2019-03-31 RX ADMIN — VANCOMYCIN HYDROCHLORIDE 1250 MG: 5 INJECTION, POWDER, LYOPHILIZED, FOR SOLUTION INTRAVENOUS at 11:12

## 2019-03-31 RX ADMIN — IPRATROPIUM BROMIDE AND ALBUTEROL SULFATE 3 ML: .5; 3 SOLUTION RESPIRATORY (INHALATION) at 18:32

## 2019-03-31 RX ADMIN — Medication 1 CAPSULE: at 08:36

## 2019-03-31 RX ADMIN — IPRATROPIUM BROMIDE AND ALBUTEROL SULFATE 3 ML: .5; 3 SOLUTION RESPIRATORY (INHALATION) at 00:16

## 2019-03-31 RX ADMIN — THIAMINE HYDROCHLORIDE 500 MG: 100 INJECTION, SOLUTION INTRAMUSCULAR; INTRAVENOUS at 08:37

## 2019-03-31 RX ADMIN — PIPERACILLIN SODIUM,TAZOBACTAM SODIUM 3.38 G: 3; .375 INJECTION, POWDER, FOR SOLUTION INTRAVENOUS at 16:17

## 2019-03-31 RX ADMIN — HYDROCORTISONE SODIUM SUCCINATE 50 MG: 100 INJECTION, POWDER, FOR SOLUTION INTRAMUSCULAR; INTRAVENOUS at 11:12

## 2019-03-31 RX ADMIN — Medication: at 13:00

## 2019-03-31 RX ADMIN — SODIUM BICARBONATE TAB 650 MG 650 MG: 650 TAB at 16:18

## 2019-03-31 RX ADMIN — ATORVASTATIN CALCIUM 80 MG: 40 TABLET, FILM COATED ORAL at 21:16

## 2019-03-31 RX ADMIN — HYDROCORTISONE SODIUM SUCCINATE 50 MG: 100 INJECTION, POWDER, FOR SOLUTION INTRAMUSCULAR; INTRAVENOUS at 06:21

## 2019-03-31 RX ADMIN — SODIUM BICARBONATE TAB 650 MG 650 MG: 650 TAB at 21:16

## 2019-03-31 RX ADMIN — IPRATROPIUM BROMIDE AND ALBUTEROL SULFATE 3 ML: .5; 3 SOLUTION RESPIRATORY (INHALATION) at 12:28

## 2019-03-31 RX ADMIN — DOXYCYCLINE 100 MG: 100 INJECTION, POWDER, LYOPHILIZED, FOR SOLUTION INTRAVENOUS at 03:47

## 2019-03-31 RX ADMIN — CHLORHEXIDINE GLUCONATE 15 ML: 1.2 RINSE ORAL at 08:36

## 2019-03-31 RX ADMIN — SODIUM CHLORIDE, PRESERVATIVE FREE 10 ML: 5 INJECTION INTRAVENOUS at 08:38

## 2019-03-31 RX ADMIN — Medication 3 MG/HR: at 08:38

## 2019-03-31 RX ADMIN — DOXYCYCLINE 100 MG: 100 INJECTION, POWDER, LYOPHILIZED, FOR SOLUTION INTRAVENOUS at 16:18

## 2019-03-31 RX ADMIN — FAMOTIDINE 20 MG: 10 INJECTION, SOLUTION INTRAVENOUS at 08:36

## 2019-03-31 RX ADMIN — INSULIN ASPART 4 UNITS: 100 INJECTION, SOLUTION INTRAVENOUS; SUBCUTANEOUS at 21:16

## 2019-03-31 RX ADMIN — OSELTAMIVIR PHOSPHATE 30 MG: 30 CAPSULE ORAL at 08:36

## 2019-03-31 RX ADMIN — IPRATROPIUM BROMIDE AND ALBUTEROL SULFATE 3 ML: .5; 3 SOLUTION RESPIRATORY (INHALATION) at 06:30

## 2019-03-31 RX ADMIN — SODIUM CHLORIDE, PRESERVATIVE FREE 10 ML: 5 INJECTION INTRAVENOUS at 08:37

## 2019-03-31 RX ADMIN — Medication: at 01:22

## 2019-03-31 RX ADMIN — DEXMEDETOMIDINE HYDROCHLORIDE 0.2 MCG/KG/HR: 100 INJECTION, SOLUTION INTRAVENOUS at 16:18

## 2019-03-31 RX ADMIN — HYDROCORTISONE SODIUM SUCCINATE 50 MG: 100 INJECTION, POWDER, FOR SOLUTION INTRAMUSCULAR; INTRAVENOUS at 00:00

## 2019-03-31 RX ADMIN — SODIUM CHLORIDE, PRESERVATIVE FREE 3 ML: 5 INJECTION INTRAVENOUS at 08:36

## 2019-03-31 RX ADMIN — PIPERACILLIN SODIUM,TAZOBACTAM SODIUM 3.38 G: 3; .375 INJECTION, POWDER, FOR SOLUTION INTRAVENOUS at 03:47

## 2019-03-31 RX ADMIN — SODIUM CHLORIDE 100 ML/HR: 9 INJECTION, SOLUTION INTRAVENOUS at 16:16

## 2019-03-31 RX ADMIN — SODIUM CHLORIDE, PRESERVATIVE FREE 10 ML: 5 INJECTION INTRAVENOUS at 08:39

## 2019-04-01 ENCOUNTER — APPOINTMENT (OUTPATIENT)
Dept: ULTRASOUND IMAGING | Facility: HOSPITAL | Age: 59
End: 2019-04-01

## 2019-04-01 ENCOUNTER — APPOINTMENT (OUTPATIENT)
Dept: GENERAL RADIOLOGY | Facility: HOSPITAL | Age: 59
End: 2019-04-01

## 2019-04-01 LAB
A-A DO2: 102.8 MMHG (ref 0–300)
A-A DO2: 54.7 MMHG (ref 0–300)
A-A DO2: 89.2 MMHG (ref 0–300)
ALBUMIN SERPL-MCNC: 2.27 G/DL (ref 3.5–5.2)
ALBUMIN/GLOB SERPL: 0.8 G/DL
ALP SERPL-CCNC: 43 U/L (ref 39–117)
ALT SERPL W P-5'-P-CCNC: 13 U/L (ref 1–41)
AMMONIA BLD-SCNC: 27 UMOL/L (ref 16–60)
AMYLASE SERPL-CCNC: 157 U/L (ref 28–100)
ANION GAP SERPL CALCULATED.3IONS-SCNC: 17.3 MMOL/L
APTT PPP: 66.6 SECONDS (ref 23.8–36.1)
ARTERIAL PATENCY WRIST A: ABNORMAL
ARTERIAL PATENCY WRIST A: POSITIVE
ARTERIAL PATENCY WRIST A: POSITIVE
AST SERPL-CCNC: 23 U/L (ref 1–40)
ATMOSPHERIC PRESS: 729 MMHG
ATMOSPHERIC PRESS: 735 MMHG
ATMOSPHERIC PRESS: 735 MMHG
BASE EXCESS BLDA CALC-SCNC: -8 MMOL/L
BASE EXCESS BLDA CALC-SCNC: -8.8 MMOL/L
BASE EXCESS BLDA CALC-SCNC: -8.8 MMOL/L
BASOPHILS # BLD AUTO: 0 10*3/MM3 (ref 0–0.2)
BASOPHILS NFR BLD AUTO: 0 % (ref 0–1.5)
BDY SITE: ABNORMAL
BILIRUB SERPL-MCNC: 0.2 MG/DL (ref 0.2–1.2)
BODY TEMPERATURE: 98.6 C
BUN BLD-MCNC: 77 MG/DL (ref 8–23)
BUN/CREAT SERPL: 19.1 (ref 7–25)
CALCIUM SPEC-SCNC: 6.5 MG/DL (ref 8.6–10.5)
CHLORIDE SERPL-SCNC: 101 MMOL/L (ref 98–107)
CO2 SERPL-SCNC: 15.7 MMOL/L (ref 22–29)
COHGB MFR BLD: 0.8 % (ref 0–5)
COHGB MFR BLD: 1 % (ref 0–5)
COHGB MFR BLD: 1 % (ref 0–5)
CREAT BLD-MCNC: 4.04 MG/DL (ref 0.76–1.27)
CRP SERPL-MCNC: 5.49 MG/DL (ref 0–0.5)
DEPRECATED RDW RBC AUTO: 50.3 FL (ref 37–54)
EOSINOPHIL # BLD AUTO: 0 10*3/MM3 (ref 0–0.4)
EOSINOPHIL NFR BLD AUTO: 0 % (ref 0.3–6.2)
EPAP: 6
ERYTHROCYTE [DISTWIDTH] IN BLOOD BY AUTOMATED COUNT: 16.3 % (ref 12.3–15.4)
ETHYLENE GLYCOL SERPLBLD-MCNC: NORMAL MG/DL
GFR SERPL CREATININE-BSD FRML MDRD: 15 ML/MIN/1.73
GLOBULIN UR ELPH-MCNC: 2.9 GM/DL
GLUCOSE BLD-MCNC: 154 MG/DL (ref 65–99)
GLUCOSE BLDC GLUCOMTR-MCNC: 117 MG/DL (ref 70–130)
GLUCOSE BLDC GLUCOMTR-MCNC: 147 MG/DL (ref 70–130)
GLUCOSE BLDC GLUCOMTR-MCNC: 151 MG/DL (ref 70–130)
GLUCOSE BLDC GLUCOMTR-MCNC: 160 MG/DL (ref 70–130)
GLUCOSE BLDC GLUCOMTR-MCNC: 191 MG/DL (ref 70–130)
GLUCOSE BLDC GLUCOMTR-MCNC: 53 MG/DL (ref 70–130)
GLUCOSE BLDC GLUCOMTR-MCNC: 54 MG/DL (ref 70–130)
GLUCOSE BLDC GLUCOMTR-MCNC: 81 MG/DL (ref 70–130)
HCO3 BLDA-SCNC: 15.2 MMOL/L (ref 22–26)
HCO3 BLDA-SCNC: 15.3 MMOL/L (ref 22–26)
HCO3 BLDA-SCNC: 15.8 MMOL/L (ref 22–26)
HCT VFR BLD AUTO: 23.7 % (ref 37.5–51)
HCT VFR BLD CALC: 25 % (ref 42–52)
HCT VFR BLD CALC: 25 % (ref 42–52)
HCT VFR BLD CALC: 31 % (ref 42–52)
HGB BLD-MCNC: 7.4 G/DL (ref 13–17.7)
HGB BLDA-MCNC: 10.5 G/DL (ref 12–16)
HGB BLDA-MCNC: 8.4 G/DL (ref 12–16)
HGB BLDA-MCNC: 8.5 G/DL (ref 12–16)
HOROWITZ INDEX BLD+IHG-RTO: 25 %
HOROWITZ INDEX BLD+IHG-RTO: 30 %
HOROWITZ INDEX BLD+IHG-RTO: 30 %
IMM GRANULOCYTES # BLD AUTO: 0.02 10*3/MM3 (ref 0–0.05)
IMM GRANULOCYTES NFR BLD AUTO: 0.3 % (ref 0–0.5)
IPAP: 12
LIPASE SERPL-CCNC: 172 U/L (ref 13–60)
LYMPHOCYTES # BLD AUTO: 0.44 10*3/MM3 (ref 0.7–3.1)
LYMPHOCYTES NFR BLD AUTO: 5.9 % (ref 19.6–45.3)
MAGNESIUM SERPL-MCNC: 1.7 MG/DL (ref 1.6–2.4)
MCH RBC QN AUTO: 27.1 PG (ref 26.6–33)
MCHC RBC AUTO-ENTMCNC: 31.2 G/DL (ref 31.5–35.7)
MCV RBC AUTO: 86.8 FL (ref 79–97)
METHANOL SERPL-MCNC: NEGATIVE % (ref 0–0.01)
METHGB BLD QL: 0.2 % (ref 0–3)
METHGB BLD QL: 0.3 % (ref 0–3)
METHGB BLD QL: 0.3 % (ref 0–3)
MODALITY: ABNORMAL
MONOCYTES # BLD AUTO: 0.38 10*3/MM3 (ref 0.1–0.9)
MONOCYTES NFR BLD AUTO: 5.1 % (ref 5–12)
NEUTROPHILS # BLD AUTO: 6.58 10*3/MM3 (ref 1.4–7)
NEUTROPHILS NFR BLD AUTO: 88.7 % (ref 42.7–76)
OXYHGB MFR BLDV: 91.8 % (ref 85–100)
OXYHGB MFR BLDV: 94.4 % (ref 85–100)
OXYHGB MFR BLDV: 94.6 % (ref 85–100)
PCO2 BLDA: 26.1 MM HG (ref 35–45)
PCO2 BLDA: 26.6 MM HG (ref 35–45)
PCO2 BLDA: 27.1 MM HG (ref 35–45)
PEEP RESPIRATORY: 5 CM[H2O]
PEEP RESPIRATORY: 5 CM[H2O]
PH BLDA: 7.38 PH UNITS (ref 7.35–7.45)
PHOSPHATE SERPL-MCNC: 8.2 MG/DL (ref 2.5–4.5)
PLATELET # BLD AUTO: 228 10*3/MM3 (ref 140–450)
PMV BLD AUTO: 11.5 FL (ref 6–12)
PO2 BLDA: 72.9 MM HG (ref 80–100)
PO2 BLDA: 83.6 MM HG (ref 80–100)
PO2 BLDA: 85.7 MM HG (ref 80–100)
POTASSIUM BLD-SCNC: 3.2 MMOL/L (ref 3.5–5.2)
PROT SERPL-MCNC: 5.2 G/DL (ref 6–8.5)
PSV: 5 CMH2O
RBC # BLD AUTO: 2.73 10*6/MM3 (ref 4.14–5.8)
SAO2 % BLDCOA: 93 % (ref 90–100)
SAO2 % BLDCOA: 95.4 % (ref 90–100)
SAO2 % BLDCOA: 95.7 % (ref 90–100)
SET MECH RESP RATE: 12
SET MECH RESP RATE: 20
SODIUM BLD-SCNC: 134 MMOL/L (ref 136–145)
VANCOMYCIN SERPL-MCNC: 27.6 MCG/ML (ref 5–40)
VENTILATOR MODE: ABNORMAL
VENTILATOR MODE: ABNORMAL
VT ON VENT VENT: 500 ML
WBC NRBC COR # BLD: 7.42 10*3/MM3 (ref 3.4–10.8)

## 2019-04-01 PROCEDURE — 83050 HGB METHEMOGLOBIN QUAN: CPT | Performed by: INTERNAL MEDICINE

## 2019-04-01 PROCEDURE — 93970 EXTREMITY STUDY: CPT | Performed by: RADIOLOGY

## 2019-04-01 PROCEDURE — 99233 SBSQ HOSP IP/OBS HIGH 50: CPT | Performed by: INTERNAL MEDICINE

## 2019-04-01 PROCEDURE — 25010000002 THIAMINE PER 100 MG: Performed by: INTERNAL MEDICINE

## 2019-04-01 PROCEDURE — 94799 UNLISTED PULMONARY SVC/PX: CPT

## 2019-04-01 PROCEDURE — 94003 VENT MGMT INPAT SUBQ DAY: CPT

## 2019-04-01 PROCEDURE — 25010000002 MIDAZOLAM PER 1 MG: Performed by: INTERNAL MEDICINE

## 2019-04-01 PROCEDURE — 84100 ASSAY OF PHOSPHORUS: CPT | Performed by: INTERNAL MEDICINE

## 2019-04-01 PROCEDURE — 25010000002 CEFEPIME 2 G/NS 100 ML SOLUTION: Performed by: INTERNAL MEDICINE

## 2019-04-01 PROCEDURE — 82150 ASSAY OF AMYLASE: CPT | Performed by: PHYSICIAN ASSISTANT

## 2019-04-01 PROCEDURE — 84145 PROCALCITONIN (PCT): CPT | Performed by: PHYSICIAN ASSISTANT

## 2019-04-01 PROCEDURE — 82805 BLOOD GASES W/O2 SATURATION: CPT | Performed by: INTERNAL MEDICINE

## 2019-04-01 PROCEDURE — 25010000002 HYDROCORTISONE SODIUM SUCCINATE 100 MG RECONSTITUTED SOLUTION: Performed by: INTERNAL MEDICINE

## 2019-04-01 PROCEDURE — P9047 ALBUMIN (HUMAN), 25%, 50ML: HCPCS | Performed by: PHYSICIAN ASSISTANT

## 2019-04-01 PROCEDURE — 80053 COMPREHEN METABOLIC PANEL: CPT | Performed by: INTERNAL MEDICINE

## 2019-04-01 PROCEDURE — 25010000002 ALBUMIN HUMAN 25% PER 50 ML: Performed by: PHYSICIAN ASSISTANT

## 2019-04-01 PROCEDURE — 86140 C-REACTIVE PROTEIN: CPT | Performed by: PHYSICIAN ASSISTANT

## 2019-04-01 PROCEDURE — 82805 BLOOD GASES W/O2 SATURATION: CPT | Performed by: HOSPITALIST

## 2019-04-01 PROCEDURE — 82375 ASSAY CARBOXYHB QUANT: CPT | Performed by: HOSPITALIST

## 2019-04-01 PROCEDURE — 25010000003 POTASSIUM CHLORIDE 10 MEQ/100ML SOLUTION: Performed by: INTERNAL MEDICINE

## 2019-04-01 PROCEDURE — 83735 ASSAY OF MAGNESIUM: CPT | Performed by: INTERNAL MEDICINE

## 2019-04-01 PROCEDURE — 83050 HGB METHEMOGLOBIN QUAN: CPT | Performed by: HOSPITALIST

## 2019-04-01 PROCEDURE — 82962 GLUCOSE BLOOD TEST: CPT

## 2019-04-01 PROCEDURE — 99291 CRITICAL CARE FIRST HOUR: CPT | Performed by: INTERNAL MEDICINE

## 2019-04-01 PROCEDURE — 36600 WITHDRAWAL OF ARTERIAL BLOOD: CPT | Performed by: INTERNAL MEDICINE

## 2019-04-01 PROCEDURE — 25010000002 HEPARIN (PORCINE) PER 1000 UNITS: Performed by: INTERNAL MEDICINE

## 2019-04-01 PROCEDURE — 25010000002 PIPERACILLIN-TAZOBACTAM: Performed by: NURSE PRACTITIONER

## 2019-04-01 PROCEDURE — 94660 CPAP INITIATION&MGMT: CPT

## 2019-04-01 PROCEDURE — 25010000002 ALBUMIN HUMAN 25% PER 50 ML: Performed by: INTERNAL MEDICINE

## 2019-04-01 PROCEDURE — 82375 ASSAY CARBOXYHB QUANT: CPT | Performed by: INTERNAL MEDICINE

## 2019-04-01 PROCEDURE — 80202 ASSAY OF VANCOMYCIN: CPT | Performed by: INTERNAL MEDICINE

## 2019-04-01 PROCEDURE — P9047 ALBUMIN (HUMAN), 25%, 50ML: HCPCS | Performed by: INTERNAL MEDICINE

## 2019-04-01 PROCEDURE — 85025 COMPLETE CBC W/AUTO DIFF WBC: CPT | Performed by: INTERNAL MEDICINE

## 2019-04-01 PROCEDURE — 25010000002 HYDRALAZINE PER 20 MG: Performed by: INTERNAL MEDICINE

## 2019-04-01 PROCEDURE — 71045 X-RAY EXAM CHEST 1 VIEW: CPT

## 2019-04-01 PROCEDURE — 82140 ASSAY OF AMMONIA: CPT | Performed by: INTERNAL MEDICINE

## 2019-04-01 PROCEDURE — 83690 ASSAY OF LIPASE: CPT | Performed by: PHYSICIAN ASSISTANT

## 2019-04-01 PROCEDURE — 63710000001 INSULIN ASPART PER 5 UNITS: Performed by: NURSE PRACTITIONER

## 2019-04-01 PROCEDURE — 93970 EXTREMITY STUDY: CPT

## 2019-04-01 PROCEDURE — 71045 X-RAY EXAM CHEST 1 VIEW: CPT | Performed by: RADIOLOGY

## 2019-04-01 PROCEDURE — 85730 THROMBOPLASTIN TIME PARTIAL: CPT | Performed by: INTERNAL MEDICINE

## 2019-04-01 PROCEDURE — 36600 WITHDRAWAL OF ARTERIAL BLOOD: CPT | Performed by: HOSPITALIST

## 2019-04-01 RX ORDER — ALBUMIN (HUMAN) 12.5 G/50ML
25 SOLUTION INTRAVENOUS ONCE
Status: COMPLETED | OUTPATIENT
Start: 2019-04-01 | End: 2019-04-01

## 2019-04-01 RX ORDER — HYDRALAZINE HYDROCHLORIDE 20 MG/ML
10 INJECTION INTRAMUSCULAR; INTRAVENOUS EVERY 6 HOURS PRN
Status: DISCONTINUED | OUTPATIENT
Start: 2019-04-01 | End: 2019-04-23 | Stop reason: HOSPADM

## 2019-04-01 RX ORDER — DEXTROSE AND SODIUM CHLORIDE 5; .9 G/100ML; G/100ML
100 INJECTION, SOLUTION INTRAVENOUS CONTINUOUS
Status: DISCONTINUED | OUTPATIENT
Start: 2019-04-01 | End: 2019-04-02

## 2019-04-01 RX ORDER — POTASSIUM CHLORIDE 7.45 MG/ML
10 INJECTION INTRAVENOUS ONCE
Status: COMPLETED | OUTPATIENT
Start: 2019-04-01 | End: 2019-04-01

## 2019-04-01 RX ORDER — LACTULOSE 10 G/15ML
20 SOLUTION ORAL 2 TIMES DAILY
Status: DISCONTINUED | OUTPATIENT
Start: 2019-04-01 | End: 2019-04-04

## 2019-04-01 RX ORDER — LEVOTHYROXINE SODIUM 0.03 MG/1
25 TABLET ORAL
Status: DISCONTINUED | OUTPATIENT
Start: 2019-04-02 | End: 2019-04-03

## 2019-04-01 RX ORDER — THIAMINE MONONITRATE (VIT B1) 100 MG
100 TABLET ORAL DAILY
Status: DISCONTINUED | OUTPATIENT
Start: 2019-04-01 | End: 2019-04-15

## 2019-04-01 RX ORDER — MIDODRINE HYDROCHLORIDE 2.5 MG/1
10 TABLET ORAL
Status: DISCONTINUED | OUTPATIENT
Start: 2019-04-01 | End: 2019-04-03

## 2019-04-01 RX ADMIN — Medication 1 CAPSULE: at 08:45

## 2019-04-01 RX ADMIN — DEXMEDETOMIDINE HYDROCHLORIDE 1.5 MCG/KG/HR: 100 INJECTION, SOLUTION INTRAVENOUS at 12:25

## 2019-04-01 RX ADMIN — METRONIDAZOLE 500 MG: 500 INJECTION, SOLUTION INTRAVENOUS at 18:28

## 2019-04-01 RX ADMIN — ALBUMIN (HUMAN) 25 G: 0.25 INJECTION, SOLUTION INTRAVENOUS at 08:45

## 2019-04-01 RX ADMIN — PIPERACILLIN SODIUM,TAZOBACTAM SODIUM 3.38 G: 3; .375 INJECTION, POWDER, FOR SOLUTION INTRAVENOUS at 04:08

## 2019-04-01 RX ADMIN — INSULIN ASPART 2 UNITS: 100 INJECTION, SOLUTION INTRAVENOUS; SUBCUTANEOUS at 12:25

## 2019-04-01 RX ADMIN — SODIUM CHLORIDE 100 ML/HR: 9 INJECTION, SOLUTION INTRAVENOUS at 11:49

## 2019-04-01 RX ADMIN — DEXTROSE MONOHYDRATE 25 G: 25 INJECTION, SOLUTION INTRAVENOUS at 16:48

## 2019-04-01 RX ADMIN — THIAMINE HYDROCHLORIDE 500 MG: 100 INJECTION, SOLUTION INTRAMUSCULAR; INTRAVENOUS at 08:46

## 2019-04-01 RX ADMIN — SODIUM CHLORIDE, PRESERVATIVE FREE 10 ML: 5 INJECTION INTRAVENOUS at 08:44

## 2019-04-01 RX ADMIN — DEXTROSE AND SODIUM CHLORIDE 100 ML/HR: 5; 900 INJECTION, SOLUTION INTRAVENOUS at 21:34

## 2019-04-01 RX ADMIN — IPRATROPIUM BROMIDE AND ALBUTEROL SULFATE 3 ML: .5; 3 SOLUTION RESPIRATORY (INHALATION) at 00:23

## 2019-04-01 RX ADMIN — SODIUM CHLORIDE, PRESERVATIVE FREE 3 ML: 5 INJECTION INTRAVENOUS at 08:43

## 2019-04-01 RX ADMIN — DEXMEDETOMIDINE HYDROCHLORIDE 0.5 MCG/KG/HR: 100 INJECTION, SOLUTION INTRAVENOUS at 02:23

## 2019-04-01 RX ADMIN — INSULIN ASPART 2 UNITS: 100 INJECTION, SOLUTION INTRAVENOUS; SUBCUTANEOUS at 09:55

## 2019-04-01 RX ADMIN — DEXMEDETOMIDINE HYDROCHLORIDE 1 MCG/KG/HR: 100 INJECTION, SOLUTION INTRAVENOUS at 17:49

## 2019-04-01 RX ADMIN — METRONIDAZOLE 500 MG: 500 INJECTION, SOLUTION INTRAVENOUS at 11:40

## 2019-04-01 RX ADMIN — SODIUM CHLORIDE 100 ML/HR: 9 INJECTION, SOLUTION INTRAVENOUS at 02:23

## 2019-04-01 RX ADMIN — DOXYCYCLINE 100 MG: 100 INJECTION, POWDER, LYOPHILIZED, FOR SOLUTION INTRAVENOUS at 16:07

## 2019-04-01 RX ADMIN — FAMOTIDINE 20 MG: 10 INJECTION, SOLUTION INTRAVENOUS at 08:45

## 2019-04-01 RX ADMIN — IPRATROPIUM BROMIDE AND ALBUTEROL SULFATE 3 ML: .5; 3 SOLUTION RESPIRATORY (INHALATION) at 18:25

## 2019-04-01 RX ADMIN — DEXMEDETOMIDINE HYDROCHLORIDE 1.5 MCG/KG/HR: 100 INJECTION, SOLUTION INTRAVENOUS at 07:48

## 2019-04-01 RX ADMIN — MIDAZOLAM HYDROCHLORIDE 2 MG: 1 INJECTION, SOLUTION INTRAMUSCULAR; INTRAVENOUS at 03:52

## 2019-04-01 RX ADMIN — HYDRALAZINE HYDROCHLORIDE 10 MG: 20 INJECTION INTRAMUSCULAR; INTRAVENOUS at 21:34

## 2019-04-01 RX ADMIN — DOXYCYCLINE 100 MG: 100 INJECTION, POWDER, LYOPHILIZED, FOR SOLUTION INTRAVENOUS at 04:08

## 2019-04-01 RX ADMIN — DEXTROSE MONOHYDRATE 25 G: 25 INJECTION, SOLUTION INTRAVENOUS at 20:18

## 2019-04-01 RX ADMIN — IPRATROPIUM BROMIDE AND ALBUTEROL SULFATE 3 ML: .5; 3 SOLUTION RESPIRATORY (INHALATION) at 13:05

## 2019-04-01 RX ADMIN — POTASSIUM CHLORIDE 10 MEQ: 10 INJECTION, SOLUTION INTRAVENOUS at 09:55

## 2019-04-01 RX ADMIN — CHLORHEXIDINE GLUCONATE 15 ML: 1.2 RINSE ORAL at 08:43

## 2019-04-01 RX ADMIN — CEFEPIME 2 G: 2 INJECTION, POWDER, FOR SOLUTION INTRAVENOUS at 11:40

## 2019-04-01 RX ADMIN — ALBUMIN (HUMAN) 25 G: 0.25 INJECTION, SOLUTION INTRAVENOUS at 17:50

## 2019-04-01 RX ADMIN — FOLIC ACID 1 MG: 5 INJECTION, SOLUTION INTRAMUSCULAR; INTRAVENOUS; SUBCUTANEOUS at 11:38

## 2019-04-01 RX ADMIN — HEPARIN SODIUM 7 UNITS/KG/HR: 10000 INJECTION, SOLUTION INTRAVENOUS at 11:48

## 2019-04-01 RX ADMIN — INSULIN ASPART 2 UNITS: 100 INJECTION, SOLUTION INTRAVENOUS; SUBCUTANEOUS at 00:57

## 2019-04-01 RX ADMIN — HYDRALAZINE HYDROCHLORIDE 10 MG: 20 INJECTION INTRAMUSCULAR; INTRAVENOUS at 05:25

## 2019-04-01 RX ADMIN — METOPROLOL TARTRATE 25 MG: 25 TABLET, FILM COATED ORAL at 11:40

## 2019-04-01 RX ADMIN — HYDROCORTISONE SODIUM SUCCINATE 50 MG: 100 INJECTION, POWDER, FOR SOLUTION INTRAMUSCULAR; INTRAVENOUS at 05:25

## 2019-04-01 RX ADMIN — SODIUM BICARBONATE TAB 650 MG 650 MG: 650 TAB at 08:45

## 2019-04-01 RX ADMIN — ASPIRIN 81 MG: 81 TABLET, CHEWABLE ORAL at 08:45

## 2019-04-01 RX ADMIN — IPRATROPIUM BROMIDE AND ALBUTEROL SULFATE 3 ML: .5; 3 SOLUTION RESPIRATORY (INHALATION) at 06:37

## 2019-04-01 RX ADMIN — HYDRALAZINE HYDROCHLORIDE 10 MG: 20 INJECTION INTRAMUSCULAR; INTRAVENOUS at 16:12

## 2019-04-02 ENCOUNTER — ANCILLARY PROCEDURE (OUTPATIENT)
Dept: SPEECH THERAPY | Facility: HOSPITAL | Age: 59
End: 2019-04-02

## 2019-04-02 ENCOUNTER — APPOINTMENT (OUTPATIENT)
Dept: GENERAL RADIOLOGY | Facility: HOSPITAL | Age: 59
End: 2019-04-02

## 2019-04-02 LAB
A-A DO2: 165.9 MMHG (ref 0–300)
ACANTHOCYTES BLD QL SMEAR: ABNORMAL
ALBUMIN SERPL-MCNC: 2.81 G/DL (ref 3.5–5.2)
ALBUMIN/GLOB SERPL: 1 G/DL
ALP SERPL-CCNC: 48 U/L (ref 39–117)
ALT SERPL W P-5'-P-CCNC: 12 U/L (ref 1–41)
AMMONIA BLD-SCNC: 22 UMOL/L (ref 16–60)
ANION GAP SERPL CALCULATED.3IONS-SCNC: 18.6 MMOL/L
ANISOCYTOSIS BLD QL: ABNORMAL
APTT PPP: 36.4 SECONDS (ref 23.8–36.1)
APTT PPP: 54.1 SECONDS (ref 23.8–36.1)
ARTERIAL PATENCY WRIST A: POSITIVE
AST SERPL-CCNC: 34 U/L (ref 1–40)
ATMOSPHERIC PRESS: 735 MMHG
BACTERIA SPEC AEROBE CULT: NORMAL
BACTERIA SPEC AEROBE CULT: NORMAL
BASE EXCESS BLDA CALC-SCNC: -8.7 MMOL/L
BASOPHILS # BLD AUTO: 0 10*3/MM3 (ref 0–0.2)
BASOPHILS NFR BLD AUTO: 0 % (ref 0–1.5)
BDY SITE: ABNORMAL
BILIRUB SERPL-MCNC: 0.3 MG/DL (ref 0.2–1.2)
BILIRUB UR QL STRIP: NEGATIVE
BODY TEMPERATURE: 98.6 C
BUN BLD-MCNC: 72 MG/DL (ref 8–23)
BUN/CREAT SERPL: 18.3 (ref 7–25)
CALCIUM SPEC-SCNC: 6.8 MG/DL (ref 8.6–10.5)
CHLORIDE SERPL-SCNC: 107 MMOL/L (ref 98–107)
CHLORIDE UR-SCNC: 35 MMOL/L
CLARITY UR: CLEAR
CO2 SERPL-SCNC: 15.4 MMOL/L (ref 22–29)
COHGB MFR BLD: 1.1 % (ref 0–5)
COLOR UR: YELLOW
CREAT BLD-MCNC: 3.94 MG/DL (ref 0.76–1.27)
CREAT UR-MCNC: 44.3 MG/DL
CRP SERPL-MCNC: 5.65 MG/DL (ref 0–0.5)
DEPRECATED RDW RBC AUTO: 48.3 FL (ref 37–54)
DEPRECATED RDW RBC AUTO: 51.8 FL (ref 37–54)
EOSINOPHIL # BLD AUTO: 0.01 10*3/MM3 (ref 0–0.4)
EOSINOPHIL NFR BLD AUTO: 0.1 % (ref 0.3–6.2)
EPAP: 6
ERYTHROCYTE [DISTWIDTH] IN BLOOD BY AUTOMATED COUNT: 16.1 % (ref 12.3–15.4)
ERYTHROCYTE [DISTWIDTH] IN BLOOD BY AUTOMATED COUNT: 16.5 % (ref 12.3–15.4)
GFR SERPL CREATININE-BSD FRML MDRD: 16 ML/MIN/1.73
GLOBULIN UR ELPH-MCNC: 2.7 GM/DL
GLUCOSE BLD-MCNC: 101 MG/DL (ref 65–99)
GLUCOSE BLDC GLUCOMTR-MCNC: 111 MG/DL (ref 70–130)
GLUCOSE BLDC GLUCOMTR-MCNC: 88 MG/DL (ref 70–130)
GLUCOSE BLDC GLUCOMTR-MCNC: 90 MG/DL (ref 70–130)
GLUCOSE BLDC GLUCOMTR-MCNC: 93 MG/DL (ref 70–130)
GLUCOSE BLDC GLUCOMTR-MCNC: 98 MG/DL (ref 70–130)
GLUCOSE UR STRIP-MCNC: NEGATIVE MG/DL
HCO3 BLDA-SCNC: 15.1 MMOL/L (ref 22–26)
HCT VFR BLD AUTO: 22.8 % (ref 37.5–51)
HCT VFR BLD AUTO: 25.4 % (ref 37.5–51)
HCT VFR BLD CALC: 25 % (ref 42–52)
HGB BLD-MCNC: 7.4 G/DL (ref 13–17.7)
HGB BLD-MCNC: 8.3 G/DL (ref 13–17.7)
HGB BLDA-MCNC: 8.4 G/DL (ref 12–16)
HGB UR QL STRIP.AUTO: ABNORMAL
HOROWITZ INDEX BLD+IHG-RTO: 40 %
HYPOCHROMIA BLD QL: ABNORMAL
IMM GRANULOCYTES # BLD AUTO: 0.05 10*3/MM3 (ref 0–0.05)
IMM GRANULOCYTES NFR BLD AUTO: 0.5 % (ref 0–0.5)
INR PPP: 1.43 (ref 0.9–1.1)
IPAP: 12
KETONES UR QL STRIP: NEGATIVE
LEUKOCYTE ESTERASE UR QL STRIP.AUTO: ABNORMAL
LYMPHOCYTES # BLD AUTO: 0.45 10*3/MM3 (ref 0.7–3.1)
LYMPHOCYTES # BLD MANUAL: 0.43 10*3/MM3 (ref 0.7–3.1)
LYMPHOCYTES NFR BLD AUTO: 4.8 % (ref 19.6–45.3)
LYMPHOCYTES NFR BLD MANUAL: 4 % (ref 19.6–45.3)
LYMPHOCYTES NFR BLD MANUAL: 5 % (ref 5–12)
MAGNESIUM SERPL-MCNC: 1.8 MG/DL (ref 1.6–2.4)
MCH RBC QN AUTO: 27.9 PG (ref 26.6–33)
MCH RBC QN AUTO: 28 PG (ref 26.6–33)
MCHC RBC AUTO-ENTMCNC: 32.5 G/DL (ref 31.5–35.7)
MCHC RBC AUTO-ENTMCNC: 32.7 G/DL (ref 31.5–35.7)
MCV RBC AUTO: 85.8 FL (ref 79–97)
MCV RBC AUTO: 86 FL (ref 79–97)
METAMYELOCYTES NFR BLD MANUAL: 1 % (ref 0–0)
METHGB BLD QL: 0.4 % (ref 0–3)
MODALITY: ABNORMAL
MONOCYTES # BLD AUTO: 0.38 10*3/MM3 (ref 0.1–0.9)
MONOCYTES # BLD AUTO: 0.54 10*3/MM3 (ref 0.1–0.9)
MONOCYTES NFR BLD AUTO: 4 % (ref 5–12)
NEUTROPHILS # BLD AUTO: 8.51 10*3/MM3 (ref 1.4–7)
NEUTROPHILS # BLD AUTO: 9.7 10*3/MM3 (ref 1.4–7)
NEUTROPHILS NFR BLD AUTO: 90.6 % (ref 42.7–76)
NEUTROPHILS NFR BLD MANUAL: 89 % (ref 42.7–76)
NEUTS BAND NFR BLD MANUAL: 1 % (ref 0–5)
NITRITE UR QL STRIP: NEGATIVE
NRBC SPEC MANUAL: 2 /100 WBC (ref 0–0)
OXYHGB MFR BLDV: 93.7 % (ref 85–100)
PCO2 BLDA: 25.2 MM HG (ref 35–45)
PH BLDA: 7.39 PH UNITS (ref 7.35–7.45)
PH UR STRIP.AUTO: <=5 [PH] (ref 5–8)
PLAT MORPH BLD: NORMAL
PLATELET # BLD AUTO: 186 10*3/MM3 (ref 140–450)
PLATELET # BLD AUTO: 228 10*3/MM3 (ref 140–450)
PMV BLD AUTO: 11.1 FL (ref 6–12)
PMV BLD AUTO: 11.5 FL (ref 6–12)
PO2 BLDA: 80.3 MM HG (ref 80–100)
POTASSIUM BLD-SCNC: 3.1 MMOL/L (ref 3.5–5.2)
POTASSIUM UR-SCNC: 15 MMOL/L
PROCALCITONIN SERPL-MCNC: 13.35 NG/ML (ref 0.1–0.25)
PROT SERPL-MCNC: 5.5 G/DL (ref 6–8.5)
PROT UR QL STRIP: ABNORMAL
PROT UR-MCNC: 74 MG/DL
PROT/CREAT UR: 1670.4 MG/G CREA (ref 0–200)
PROTHROMBIN TIME: 17.7 SECONDS (ref 11–15.4)
RBC # BLD AUTO: 2.65 10*6/MM3 (ref 4.14–5.8)
RBC # BLD AUTO: 2.96 10*6/MM3 (ref 4.14–5.8)
SAO2 % BLDCOA: 95.1 % (ref 90–100)
SCHISTOCYTES BLD QL SMEAR: ABNORMAL
SET MECH RESP RATE: 12
SODIUM BLD-SCNC: 141 MMOL/L (ref 136–145)
SODIUM UR-SCNC: 55 MMOL/L
SP GR UR STRIP: 1.02 (ref 1–1.03)
T4 FREE SERPL-MCNC: 0.49 NG/DL (ref 0.93–1.7)
TROPONIN T SERPL-MCNC: 0.07 NG/ML (ref 0–0.03)
TSH SERPL DL<=0.05 MIU/L-ACNC: 3.59 MIU/ML (ref 0.27–4.2)
UROBILINOGEN UR QL STRIP: ABNORMAL
VANCOMYCIN SERPL-MCNC: 22.6 MCG/ML (ref 5–40)
WBC NRBC COR # BLD: 10.78 10*3/MM3 (ref 3.4–10.8)
WBC NRBC COR # BLD: 9.4 10*3/MM3 (ref 3.4–10.8)

## 2019-04-02 PROCEDURE — 71045 X-RAY EXAM CHEST 1 VIEW: CPT | Performed by: RADIOLOGY

## 2019-04-02 PROCEDURE — 25010000002 CEFEPIME 2 G/NS 100 ML SOLUTION: Performed by: INTERNAL MEDICINE

## 2019-04-02 PROCEDURE — 94799 UNLISTED PULMONARY SVC/PX: CPT

## 2019-04-02 PROCEDURE — 86140 C-REACTIVE PROTEIN: CPT | Performed by: INTERNAL MEDICINE

## 2019-04-02 PROCEDURE — 85007 BL SMEAR W/DIFF WBC COUNT: CPT | Performed by: HOSPITALIST

## 2019-04-02 PROCEDURE — 84133 ASSAY OF URINE POTASSIUM: CPT | Performed by: INTERNAL MEDICINE

## 2019-04-02 PROCEDURE — 85025 COMPLETE CBC W/AUTO DIFF WBC: CPT | Performed by: INTERNAL MEDICINE

## 2019-04-02 PROCEDURE — 82962 GLUCOSE BLOOD TEST: CPT

## 2019-04-02 PROCEDURE — 84443 ASSAY THYROID STIM HORMONE: CPT | Performed by: INTERNAL MEDICINE

## 2019-04-02 PROCEDURE — 25010000002 HEPARIN (PORCINE) PER 1000 UNITS: Performed by: HOSPITALIST

## 2019-04-02 PROCEDURE — 81003 URINALYSIS AUTO W/O SCOPE: CPT | Performed by: INTERNAL MEDICINE

## 2019-04-02 PROCEDURE — 82140 ASSAY OF AMMONIA: CPT | Performed by: PHYSICIAN ASSISTANT

## 2019-04-02 PROCEDURE — 99233 SBSQ HOSP IP/OBS HIGH 50: CPT | Performed by: INTERNAL MEDICINE

## 2019-04-02 PROCEDURE — 94660 CPAP INITIATION&MGMT: CPT

## 2019-04-02 PROCEDURE — 82375 ASSAY CARBOXYHB QUANT: CPT | Performed by: INTERNAL MEDICINE

## 2019-04-02 PROCEDURE — 25010000002 FUROSEMIDE PER 20 MG: Performed by: INTERNAL MEDICINE

## 2019-04-02 PROCEDURE — 25010000002 MIDAZOLAM PER 1 MG: Performed by: INTERNAL MEDICINE

## 2019-04-02 PROCEDURE — 80053 COMPREHEN METABOLIC PANEL: CPT | Performed by: INTERNAL MEDICINE

## 2019-04-02 PROCEDURE — 99291 CRITICAL CARE FIRST HOUR: CPT | Performed by: INTERNAL MEDICINE

## 2019-04-02 PROCEDURE — 93010 ELECTROCARDIOGRAM REPORT: CPT | Performed by: INTERNAL MEDICINE

## 2019-04-02 PROCEDURE — 83735 ASSAY OF MAGNESIUM: CPT | Performed by: INTERNAL MEDICINE

## 2019-04-02 PROCEDURE — 85730 THROMBOPLASTIN TIME PARTIAL: CPT | Performed by: HOSPITALIST

## 2019-04-02 PROCEDURE — 83050 HGB METHEMOGLOBIN QUAN: CPT | Performed by: INTERNAL MEDICINE

## 2019-04-02 PROCEDURE — 84439 ASSAY OF FREE THYROXINE: CPT | Performed by: INTERNAL MEDICINE

## 2019-04-02 PROCEDURE — 25010000003 POTASSIUM CHLORIDE 10 MEQ/100ML SOLUTION: Performed by: HOSPITALIST

## 2019-04-02 PROCEDURE — 82570 ASSAY OF URINE CREATININE: CPT | Performed by: INTERNAL MEDICINE

## 2019-04-02 PROCEDURE — 85025 COMPLETE CBC W/AUTO DIFF WBC: CPT | Performed by: HOSPITALIST

## 2019-04-02 PROCEDURE — 84156 ASSAY OF PROTEIN URINE: CPT | Performed by: INTERNAL MEDICINE

## 2019-04-02 PROCEDURE — 82805 BLOOD GASES W/O2 SATURATION: CPT | Performed by: INTERNAL MEDICINE

## 2019-04-02 PROCEDURE — 85610 PROTHROMBIN TIME: CPT | Performed by: HOSPITALIST

## 2019-04-02 PROCEDURE — 25010000002 HYDRALAZINE PER 20 MG: Performed by: INTERNAL MEDICINE

## 2019-04-02 PROCEDURE — 85730 THROMBOPLASTIN TIME PARTIAL: CPT | Performed by: INTERNAL MEDICINE

## 2019-04-02 PROCEDURE — 36600 WITHDRAWAL OF ARTERIAL BLOOD: CPT | Performed by: INTERNAL MEDICINE

## 2019-04-02 PROCEDURE — 84300 ASSAY OF URINE SODIUM: CPT | Performed by: INTERNAL MEDICINE

## 2019-04-02 PROCEDURE — 80202 ASSAY OF VANCOMYCIN: CPT | Performed by: INTERNAL MEDICINE

## 2019-04-02 PROCEDURE — 82436 ASSAY OF URINE CHLORIDE: CPT | Performed by: INTERNAL MEDICINE

## 2019-04-02 PROCEDURE — 71045 X-RAY EXAM CHEST 1 VIEW: CPT

## 2019-04-02 PROCEDURE — 93005 ELECTROCARDIOGRAM TRACING: CPT | Performed by: INTERNAL MEDICINE

## 2019-04-02 PROCEDURE — 84484 ASSAY OF TROPONIN QUANT: CPT | Performed by: HOSPITALIST

## 2019-04-02 RX ORDER — POTASSIUM CHLORIDE 7.45 MG/ML
10 INJECTION INTRAVENOUS
Status: COMPLETED | OUTPATIENT
Start: 2019-04-02 | End: 2019-04-02

## 2019-04-02 RX ORDER — HEPARIN SODIUM 5000 [USP'U]/ML
5000 INJECTION, SOLUTION INTRAVENOUS; SUBCUTANEOUS EVERY 12 HOURS SCHEDULED
Status: DISCONTINUED | OUTPATIENT
Start: 2019-04-02 | End: 2019-04-02

## 2019-04-02 RX ORDER — MAGNESIUM SULFATE HEPTAHYDRATE 40 MG/ML
2 INJECTION, SOLUTION INTRAVENOUS AS NEEDED
Status: DISCONTINUED | OUTPATIENT
Start: 2019-04-02 | End: 2019-04-17

## 2019-04-02 RX ORDER — NICOTINE POLACRILEX 4 MG
15 LOZENGE BUCCAL
Status: DISCONTINUED | OUTPATIENT
Start: 2019-04-02 | End: 2019-04-05

## 2019-04-02 RX ORDER — METOPROLOL TARTRATE 50 MG/1
50 TABLET, FILM COATED ORAL EVERY 12 HOURS SCHEDULED
Status: DISCONTINUED | OUTPATIENT
Start: 2019-04-02 | End: 2019-04-08

## 2019-04-02 RX ORDER — HEPARIN SODIUM 10000 [USP'U]/100ML
12 INJECTION, SOLUTION INTRAVENOUS
Status: DISCONTINUED | OUTPATIENT
Start: 2019-04-02 | End: 2019-04-05

## 2019-04-02 RX ORDER — HEPARIN SODIUM 5000 [USP'U]/ML
60 INJECTION, SOLUTION INTRAVENOUS; SUBCUTANEOUS AS NEEDED
Status: DISCONTINUED | OUTPATIENT
Start: 2019-04-02 | End: 2019-04-05

## 2019-04-02 RX ORDER — HEPARIN SODIUM 5000 [USP'U]/ML
30 INJECTION, SOLUTION INTRAVENOUS; SUBCUTANEOUS AS NEEDED
Status: DISCONTINUED | OUTPATIENT
Start: 2019-04-02 | End: 2019-04-05

## 2019-04-02 RX ORDER — LABETALOL HYDROCHLORIDE 5 MG/ML
10 INJECTION, SOLUTION INTRAVENOUS EVERY 4 HOURS PRN
Status: DISCONTINUED | OUTPATIENT
Start: 2019-04-02 | End: 2019-04-13

## 2019-04-02 RX ORDER — MAGNESIUM SULFATE 1 G/100ML
1 INJECTION INTRAVENOUS AS NEEDED
Status: DISCONTINUED | OUTPATIENT
Start: 2019-04-02 | End: 2019-04-17

## 2019-04-02 RX ORDER — DILTIAZEM HYDROCHLORIDE 5 MG/ML
20 INJECTION INTRAVENOUS ONCE
Status: COMPLETED | OUTPATIENT
Start: 2019-04-02 | End: 2019-04-02

## 2019-04-02 RX ORDER — FUROSEMIDE 10 MG/ML
40 INJECTION INTRAMUSCULAR; INTRAVENOUS EVERY 12 HOURS
Status: DISCONTINUED | OUTPATIENT
Start: 2019-04-02 | End: 2019-04-03

## 2019-04-02 RX ORDER — DEXTROSE MONOHYDRATE 25 G/50ML
25 INJECTION, SOLUTION INTRAVENOUS
Status: DISCONTINUED | OUTPATIENT
Start: 2019-04-02 | End: 2019-04-05

## 2019-04-02 RX ORDER — HEPARIN SODIUM 5000 [USP'U]/ML
5000 INJECTION, SOLUTION INTRAVENOUS; SUBCUTANEOUS EVERY 8 HOURS SCHEDULED
Status: DISCONTINUED | OUTPATIENT
Start: 2019-04-02 | End: 2019-04-02

## 2019-04-02 RX ADMIN — LABETALOL HYDROCHLORIDE 10 MG: 5 INJECTION, SOLUTION INTRAVENOUS at 16:35

## 2019-04-02 RX ADMIN — IPRATROPIUM BROMIDE AND ALBUTEROL SULFATE 3 ML: .5; 3 SOLUTION RESPIRATORY (INHALATION) at 13:55

## 2019-04-02 RX ADMIN — IPRATROPIUM BROMIDE AND ALBUTEROL SULFATE 3 ML: .5; 3 SOLUTION RESPIRATORY (INHALATION) at 07:40

## 2019-04-02 RX ADMIN — CHLORHEXIDINE GLUCONATE 15 ML: 1.2 RINSE ORAL at 20:10

## 2019-04-02 RX ADMIN — POTASSIUM CHLORIDE 10 MEQ: 10 INJECTION, SOLUTION INTRAVENOUS at 21:24

## 2019-04-02 RX ADMIN — SODIUM CHLORIDE, PRESERVATIVE FREE 10 ML: 5 INJECTION INTRAVENOUS at 09:31

## 2019-04-02 RX ADMIN — SODIUM CHLORIDE, PRESERVATIVE FREE 10 ML: 5 INJECTION INTRAVENOUS at 09:30

## 2019-04-02 RX ADMIN — IPRATROPIUM BROMIDE AND ALBUTEROL SULFATE 3 ML: .5; 3 SOLUTION RESPIRATORY (INHALATION) at 00:29

## 2019-04-02 RX ADMIN — SODIUM CHLORIDE, PRESERVATIVE FREE 10 ML: 5 INJECTION INTRAVENOUS at 20:11

## 2019-04-02 RX ADMIN — HYDRALAZINE HYDROCHLORIDE 10 MG: 20 INJECTION INTRAMUSCULAR; INTRAVENOUS at 13:35

## 2019-04-02 RX ADMIN — CHLORHEXIDINE GLUCONATE 15 ML: 1.2 RINSE ORAL at 09:30

## 2019-04-02 RX ADMIN — LABETALOL HYDROCHLORIDE 10 MG: 5 INJECTION, SOLUTION INTRAVENOUS at 11:06

## 2019-04-02 RX ADMIN — FOLIC ACID 1 MG: 5 INJECTION, SOLUTION INTRAMUSCULAR; INTRAVENOUS; SUBCUTANEOUS at 09:32

## 2019-04-02 RX ADMIN — FAMOTIDINE 20 MG: 10 INJECTION, SOLUTION INTRAVENOUS at 09:32

## 2019-04-02 RX ADMIN — FUROSEMIDE 40 MG: 10 INJECTION, SOLUTION INTRAMUSCULAR; INTRAVENOUS at 17:57

## 2019-04-02 RX ADMIN — SODIUM CHLORIDE, PRESERVATIVE FREE 3 ML: 5 INJECTION INTRAVENOUS at 20:10

## 2019-04-02 RX ADMIN — SODIUM CHLORIDE, PRESERVATIVE FREE 3 ML: 5 INJECTION INTRAVENOUS at 09:32

## 2019-04-02 RX ADMIN — HEPARIN SODIUM 12 UNITS/KG/HR: 10000 INJECTION, SOLUTION INTRAVENOUS at 19:40

## 2019-04-02 RX ADMIN — METRONIDAZOLE 500 MG: 500 INJECTION, SOLUTION INTRAVENOUS at 04:48

## 2019-04-02 RX ADMIN — MIDAZOLAM HYDROCHLORIDE 2 MG: 1 INJECTION, SOLUTION INTRAMUSCULAR; INTRAVENOUS at 21:03

## 2019-04-02 RX ADMIN — METRONIDAZOLE 500 MG: 500 INJECTION, SOLUTION INTRAVENOUS at 10:57

## 2019-04-02 RX ADMIN — LABETALOL HYDROCHLORIDE 10 MG: 5 INJECTION, SOLUTION INTRAVENOUS at 21:22

## 2019-04-02 RX ADMIN — SODIUM CHLORIDE 5 MG/HR: 9 INJECTION, SOLUTION INTRAVENOUS at 19:00

## 2019-04-02 RX ADMIN — POTASSIUM CHLORIDE 10 MEQ: 10 INJECTION, SOLUTION INTRAVENOUS at 20:25

## 2019-04-02 RX ADMIN — HYDRALAZINE HYDROCHLORIDE 10 MG: 20 INJECTION INTRAMUSCULAR; INTRAVENOUS at 05:13

## 2019-04-02 RX ADMIN — METRONIDAZOLE 500 MG: 500 INJECTION, SOLUTION INTRAVENOUS at 18:02

## 2019-04-02 RX ADMIN — DILTIAZEM HYDROCHLORIDE 5 MG/HR: 5 INJECTION INTRAVENOUS at 19:45

## 2019-04-02 RX ADMIN — DILTIAZEM HYDROCHLORIDE 20 MG: 5 INJECTION INTRAVENOUS at 19:43

## 2019-04-02 RX ADMIN — METOPROLOL TARTRATE 5 MG: 5 INJECTION, SOLUTION INTRAVENOUS at 14:53

## 2019-04-02 RX ADMIN — CEFEPIME 2 G: 2 INJECTION, POWDER, FOR SOLUTION INTRAVENOUS at 10:57

## 2019-04-03 ENCOUNTER — APPOINTMENT (OUTPATIENT)
Dept: GENERAL RADIOLOGY | Facility: HOSPITAL | Age: 59
End: 2019-04-03

## 2019-04-03 ENCOUNTER — ANCILLARY PROCEDURE (OUTPATIENT)
Dept: SPEECH THERAPY | Facility: HOSPITAL | Age: 59
End: 2019-04-03

## 2019-04-03 LAB
A-A DO2: 235.8 MMHG (ref 0–300)
ALBUMIN SERPL-MCNC: 2.78 G/DL (ref 3.5–5.2)
ALBUMIN/GLOB SERPL: 0.8 G/DL
ALP SERPL-CCNC: 58 U/L (ref 39–117)
ALT SERPL W P-5'-P-CCNC: 17 U/L (ref 1–41)
ANION GAP SERPL CALCULATED.3IONS-SCNC: 22.3 MMOL/L
APTT PPP: 63.7 SECONDS (ref 23.8–36.1)
APTT PPP: 89 SECONDS (ref 23.8–36.1)
APTT PPP: >100 SECONDS (ref 23.8–36.1)
ARTERIAL PATENCY WRIST A: POSITIVE
AST SERPL-CCNC: 47 U/L (ref 1–40)
ATMOSPHERIC PRESS: 734 MMHG
BACTERIA UR QL AUTO: ABNORMAL /HPF
BASE EXCESS BLDA CALC-SCNC: -10.6 MMOL/L
BASOPHILS # BLD AUTO: 0 10*3/MM3 (ref 0–0.2)
BASOPHILS NFR BLD AUTO: 0 % (ref 0–1.5)
BDY SITE: ABNORMAL
BILIRUB SERPL-MCNC: 0.5 MG/DL (ref 0.2–1.2)
BILIRUB UR QL STRIP: NEGATIVE
BODY TEMPERATURE: 98.6 C
BUN BLD-MCNC: 80 MG/DL (ref 8–23)
BUN/CREAT SERPL: 18.6 (ref 7–25)
CALCIUM SPEC-SCNC: 7.8 MG/DL (ref 8.6–10.5)
CHLORIDE SERPL-SCNC: 111 MMOL/L (ref 98–107)
CLARITY UR: CLEAR
CO2 SERPL-SCNC: 12.7 MMOL/L (ref 22–29)
COHGB MFR BLD: 1.9 % (ref 0–5)
COLOR UR: YELLOW
CREAT BLD-MCNC: 4.29 MG/DL (ref 0.76–1.27)
CRP SERPL-MCNC: 18.79 MG/DL (ref 0–0.5)
D-LACTATE SERPL-SCNC: 1.4 MMOL/L (ref 0.5–2)
DEPRECATED RDW RBC AUTO: 51.4 FL (ref 37–54)
EOSINOPHIL # BLD AUTO: 0.01 10*3/MM3 (ref 0–0.4)
EOSINOPHIL NFR BLD AUTO: 0.1 % (ref 0.3–6.2)
ERYTHROCYTE [DISTWIDTH] IN BLOOD BY AUTOMATED COUNT: 16.6 % (ref 12.3–15.4)
GAS FLOW AIRWAY: 50 LPM
GFR SERPL CREATININE-BSD FRML MDRD: 14 ML/MIN/1.73
GFR SERPL CREATININE-BSD FRML MDRD: ABNORMAL ML/MIN/1.73
GLOBULIN UR ELPH-MCNC: 3.3 GM/DL
GLUCOSE BLD-MCNC: 113 MG/DL (ref 65–99)
GLUCOSE BLDC GLUCOMTR-MCNC: 180 MG/DL (ref 70–130)
GLUCOSE BLDC GLUCOMTR-MCNC: 195 MG/DL (ref 70–130)
GLUCOSE UR STRIP-MCNC: ABNORMAL MG/DL
HCO3 BLDA-SCNC: 12.3 MMOL/L (ref 22–26)
HCT VFR BLD AUTO: 26.5 % (ref 37.5–51)
HCT VFR BLD CALC: 30 % (ref 42–52)
HGB BLD-MCNC: 8.5 G/DL (ref 13–17.7)
HGB BLDA-MCNC: 10.3 G/DL (ref 12–16)
HGB UR QL STRIP.AUTO: ABNORMAL
HOROWITZ INDEX BLD+IHG-RTO: 45 %
HYALINE CASTS UR QL AUTO: ABNORMAL /LPF
IMM GRANULOCYTES # BLD AUTO: 0.07 10*3/MM3 (ref 0–0.05)
IMM GRANULOCYTES NFR BLD AUTO: 0.6 % (ref 0–0.5)
KETONES UR QL STRIP: NEGATIVE
LEUKOCYTE ESTERASE UR QL STRIP.AUTO: NEGATIVE
LYMPHOCYTES # BLD AUTO: 0.58 10*3/MM3 (ref 0.7–3.1)
LYMPHOCYTES NFR BLD AUTO: 5.3 % (ref 19.6–45.3)
MAGNESIUM SERPL-MCNC: 1.8 MG/DL (ref 1.6–2.4)
MCH RBC QN AUTO: 27.9 PG (ref 26.6–33)
MCHC RBC AUTO-ENTMCNC: 32.1 G/DL (ref 31.5–35.7)
MCV RBC AUTO: 86.9 FL (ref 79–97)
METHGB BLD QL: 0.4 % (ref 0–3)
MODALITY: ABNORMAL
MONOCYTES # BLD AUTO: 0.44 10*3/MM3 (ref 0.1–0.9)
MONOCYTES NFR BLD AUTO: 4 % (ref 5–12)
NEUTROPHILS # BLD AUTO: 9.93 10*3/MM3 (ref 1.4–7)
NEUTROPHILS NFR BLD AUTO: 90 % (ref 42.7–76)
NITRITE UR QL STRIP: NEGATIVE
OXYHGB MFR BLDV: 83 % (ref 85–100)
PCO2 BLDA: 20.1 MM HG (ref 35–45)
PH BLDA: 7.41 PH UNITS (ref 7.35–7.45)
PH UR STRIP.AUTO: <=5 [PH] (ref 5–8)
PLATELET # BLD AUTO: 189 10*3/MM3 (ref 140–450)
PMV BLD AUTO: 11.4 FL (ref 6–12)
PO2 BLDA: 50.5 MM HG (ref 80–100)
POTASSIUM BLD-SCNC: 2.9 MMOL/L (ref 3.5–5.2)
POTASSIUM BLD-SCNC: 3.1 MMOL/L (ref 3.5–5.2)
PROT SERPL-MCNC: 6.1 G/DL (ref 6–8.5)
PROT UR QL STRIP: ABNORMAL
RBC # BLD AUTO: 3.05 10*6/MM3 (ref 4.14–5.8)
RBC # UR: ABNORMAL /HPF
REF LAB TEST METHOD: ABNORMAL
SAO2 % BLDCOA: 85 % (ref 90–100)
SODIUM BLD-SCNC: 146 MMOL/L (ref 136–145)
SP GR UR STRIP: 1.02 (ref 1–1.03)
SQUAMOUS #/AREA URNS HPF: ABNORMAL /HPF
TROPONIN T SERPL-MCNC: 0.06 NG/ML (ref 0–0.03)
UROBILINOGEN UR QL STRIP: ABNORMAL
VANCOMYCIN SERPL-MCNC: 20 MCG/ML (ref 5–40)
WBC NRBC COR # BLD: 11.03 10*3/MM3 (ref 3.4–10.8)
WBC UR QL AUTO: ABNORMAL /HPF

## 2019-04-03 PROCEDURE — 81001 URINALYSIS AUTO W/SCOPE: CPT | Performed by: NURSE PRACTITIONER

## 2019-04-03 PROCEDURE — 80202 ASSAY OF VANCOMYCIN: CPT

## 2019-04-03 PROCEDURE — 85730 THROMBOPLASTIN TIME PARTIAL: CPT | Performed by: INTERNAL MEDICINE

## 2019-04-03 PROCEDURE — 86140 C-REACTIVE PROTEIN: CPT | Performed by: INTERNAL MEDICINE

## 2019-04-03 PROCEDURE — 94799 UNLISTED PULMONARY SVC/PX: CPT

## 2019-04-03 PROCEDURE — 25010000002 CEFEPIME 2 G/NS 100 ML SOLUTION: Performed by: INTERNAL MEDICINE

## 2019-04-03 PROCEDURE — 84132 ASSAY OF SERUM POTASSIUM: CPT | Performed by: HOSPITALIST

## 2019-04-03 PROCEDURE — 82805 BLOOD GASES W/O2 SATURATION: CPT | Performed by: INTERNAL MEDICINE

## 2019-04-03 PROCEDURE — 25010000002 MIDAZOLAM PER 1 MG: Performed by: INTERNAL MEDICINE

## 2019-04-03 PROCEDURE — 36600 WITHDRAWAL OF ARTERIAL BLOOD: CPT | Performed by: INTERNAL MEDICINE

## 2019-04-03 PROCEDURE — 82575 CREATININE CLEARANCE TEST: CPT | Performed by: INTERNAL MEDICINE

## 2019-04-03 PROCEDURE — 87040 BLOOD CULTURE FOR BACTERIA: CPT | Performed by: NURSE PRACTITIONER

## 2019-04-03 PROCEDURE — 82962 GLUCOSE BLOOD TEST: CPT

## 2019-04-03 PROCEDURE — 63710000001 INSULIN ASPART PER 5 UNITS: Performed by: INTERNAL MEDICINE

## 2019-04-03 PROCEDURE — 25010000002 FUROSEMIDE PER 20 MG: Performed by: INTERNAL MEDICINE

## 2019-04-03 PROCEDURE — 71045 X-RAY EXAM CHEST 1 VIEW: CPT | Performed by: RADIOLOGY

## 2019-04-03 PROCEDURE — 25010000003 POTASSIUM CHLORIDE 10 MEQ/100ML SOLUTION: Performed by: HOSPITALIST

## 2019-04-03 PROCEDURE — 25010000002 MAGNESIUM SULFATE IN D5W 1G/100ML (PREMIX) 1-5 GM/100ML-% SOLUTION: Performed by: HOSPITALIST

## 2019-04-03 PROCEDURE — 85025 COMPLETE CBC W/AUTO DIFF WBC: CPT | Performed by: INTERNAL MEDICINE

## 2019-04-03 PROCEDURE — 81050 URINALYSIS VOLUME MEASURE: CPT | Performed by: INTERNAL MEDICINE

## 2019-04-03 PROCEDURE — 83050 HGB METHEMOGLOBIN QUAN: CPT | Performed by: INTERNAL MEDICINE

## 2019-04-03 PROCEDURE — 83605 ASSAY OF LACTIC ACID: CPT | Performed by: NURSE PRACTITIONER

## 2019-04-03 PROCEDURE — 82375 ASSAY CARBOXYHB QUANT: CPT | Performed by: INTERNAL MEDICINE

## 2019-04-03 PROCEDURE — 99291 CRITICAL CARE FIRST HOUR: CPT | Performed by: INTERNAL MEDICINE

## 2019-04-03 PROCEDURE — 99233 SBSQ HOSP IP/OBS HIGH 50: CPT | Performed by: INTERNAL MEDICINE

## 2019-04-03 PROCEDURE — 83735 ASSAY OF MAGNESIUM: CPT | Performed by: INTERNAL MEDICINE

## 2019-04-03 PROCEDURE — 92612 ENDOSCOPY SWALLOW (FEES) VID: CPT

## 2019-04-03 PROCEDURE — 84484 ASSAY OF TROPONIN QUANT: CPT | Performed by: HOSPITALIST

## 2019-04-03 PROCEDURE — 80053 COMPREHEN METABOLIC PANEL: CPT | Performed by: INTERNAL MEDICINE

## 2019-04-03 PROCEDURE — 71045 X-RAY EXAM CHEST 1 VIEW: CPT

## 2019-04-03 RX ORDER — LEVOTHYROXINE SODIUM 0.03 MG/1
12.5 TABLET ORAL
Status: DISCONTINUED | OUTPATIENT
Start: 2019-04-04 | End: 2019-04-23 | Stop reason: HOSPADM

## 2019-04-03 RX ORDER — POTASSIUM CHLORIDE 7.45 MG/ML
10 INJECTION INTRAVENOUS
Status: COMPLETED | OUTPATIENT
Start: 2019-04-03 | End: 2019-04-03

## 2019-04-03 RX ORDER — POTASSIUM CHLORIDE 20 MEQ/1
20 TABLET, EXTENDED RELEASE ORAL ONCE
Status: COMPLETED | OUTPATIENT
Start: 2019-04-03 | End: 2019-04-03

## 2019-04-03 RX ORDER — PANTOPRAZOLE SODIUM 40 MG/10ML
40 INJECTION, POWDER, LYOPHILIZED, FOR SOLUTION INTRAVENOUS
Status: DISCONTINUED | OUTPATIENT
Start: 2019-04-03 | End: 2019-04-04

## 2019-04-03 RX ORDER — MAGNESIUM SULFATE 1 G/100ML
1 INJECTION INTRAVENOUS ONCE
Status: COMPLETED | OUTPATIENT
Start: 2019-04-03 | End: 2019-04-03

## 2019-04-03 RX ADMIN — POTASSIUM CHLORIDE 10 MEQ: 10 INJECTION, SOLUTION INTRAVENOUS at 04:59

## 2019-04-03 RX ADMIN — FOLIC ACID 1 MG: 5 INJECTION, SOLUTION INTRAMUSCULAR; INTRAVENOUS; SUBCUTANEOUS at 08:34

## 2019-04-03 RX ADMIN — DILTIAZEM HYDROCHLORIDE 15 MG/HR: 5 INJECTION INTRAVENOUS at 20:26

## 2019-04-03 RX ADMIN — LABETALOL HYDROCHLORIDE 10 MG: 5 INJECTION, SOLUTION INTRAVENOUS at 05:01

## 2019-04-03 RX ADMIN — SODIUM CHLORIDE, PRESERVATIVE FREE 10 ML: 5 INJECTION INTRAVENOUS at 08:35

## 2019-04-03 RX ADMIN — IPRATROPIUM BROMIDE AND ALBUTEROL SULFATE 3 ML: .5; 3 SOLUTION RESPIRATORY (INHALATION) at 19:00

## 2019-04-03 RX ADMIN — POTASSIUM CHLORIDE 20 MEQ: 1500 TABLET, EXTENDED RELEASE ORAL at 18:21

## 2019-04-03 RX ADMIN — POTASSIUM CHLORIDE 10 MEQ: 10 INJECTION, SOLUTION INTRAVENOUS at 06:21

## 2019-04-03 RX ADMIN — INSULIN ASPART 2 UNITS: 100 INJECTION, SOLUTION INTRAVENOUS; SUBCUTANEOUS at 20:51

## 2019-04-03 RX ADMIN — METRONIDAZOLE 500 MG: 500 INJECTION, SOLUTION INTRAVENOUS at 18:21

## 2019-04-03 RX ADMIN — DILTIAZEM HYDROCHLORIDE 15 MG/HR: 5 INJECTION INTRAVENOUS at 11:44

## 2019-04-03 RX ADMIN — POTASSIUM CHLORIDE 10 MEQ: 10 INJECTION, SOLUTION INTRAVENOUS at 06:48

## 2019-04-03 RX ADMIN — RIFAXIMIN 550 MG: 550 TABLET ORAL at 20:25

## 2019-04-03 RX ADMIN — ATORVASTATIN CALCIUM 80 MG: 40 TABLET, FILM COATED ORAL at 20:25

## 2019-04-03 RX ADMIN — IPRATROPIUM BROMIDE AND ALBUTEROL SULFATE 3 ML: .5; 3 SOLUTION RESPIRATORY (INHALATION) at 06:35

## 2019-04-03 RX ADMIN — METOPROLOL TARTRATE 50 MG: 50 TABLET, FILM COATED ORAL at 20:25

## 2019-04-03 RX ADMIN — MAGNESIUM SULFATE IN DEXTROSE 1 G: 10 INJECTION, SOLUTION INTRAVENOUS at 04:59

## 2019-04-03 RX ADMIN — SODIUM CHLORIDE, PRESERVATIVE FREE 3 ML: 5 INJECTION INTRAVENOUS at 20:26

## 2019-04-03 RX ADMIN — METRONIDAZOLE 500 MG: 500 INJECTION, SOLUTION INTRAVENOUS at 11:44

## 2019-04-03 RX ADMIN — METRONIDAZOLE 500 MG: 500 INJECTION, SOLUTION INTRAVENOUS at 03:20

## 2019-04-03 RX ADMIN — FUROSEMIDE 40 MG: 10 INJECTION, SOLUTION INTRAMUSCULAR; INTRAVENOUS at 03:20

## 2019-04-03 RX ADMIN — MIDAZOLAM HYDROCHLORIDE 2 MG: 1 INJECTION, SOLUTION INTRAMUSCULAR; INTRAVENOUS at 03:26

## 2019-04-03 RX ADMIN — SODIUM CHLORIDE, PRESERVATIVE FREE 10 ML: 5 INJECTION INTRAVENOUS at 20:34

## 2019-04-03 RX ADMIN — INSULIN ASPART 2 UNITS: 100 INJECTION, SOLUTION INTRAVENOUS; SUBCUTANEOUS at 18:21

## 2019-04-03 RX ADMIN — ZINC SULFATE CAP 220 MG (50 MG ELEMENTAL ZN) 50 MG: 220 (50 ZN) CAP at 20:24

## 2019-04-03 RX ADMIN — SODIUM CHLORIDE, PRESERVATIVE FREE 3 ML: 5 INJECTION INTRAVENOUS at 08:36

## 2019-04-03 RX ADMIN — CEFEPIME 2 G: 2 INJECTION, POWDER, FOR SOLUTION INTRAVENOUS at 11:44

## 2019-04-03 RX ADMIN — LABETALOL HYDROCHLORIDE 10 MG: 5 INJECTION, SOLUTION INTRAVENOUS at 20:44

## 2019-04-03 RX ADMIN — FAMOTIDINE 20 MG: 10 INJECTION, SOLUTION INTRAVENOUS at 08:34

## 2019-04-03 RX ADMIN — Medication: at 11:45

## 2019-04-03 RX ADMIN — Medication: at 18:21

## 2019-04-03 RX ADMIN — LACTULOSE 20 G: 10 SOLUTION ORAL at 20:25

## 2019-04-03 RX ADMIN — Medication: at 20:25

## 2019-04-03 RX ADMIN — CHLORHEXIDINE GLUCONATE 15 ML: 1.2 RINSE ORAL at 08:35

## 2019-04-03 RX ADMIN — PANTOPRAZOLE SODIUM 40 MG: 40 INJECTION, POWDER, FOR SOLUTION INTRAVENOUS at 11:44

## 2019-04-04 ENCOUNTER — APPOINTMENT (OUTPATIENT)
Dept: CT IMAGING | Facility: HOSPITAL | Age: 59
End: 2019-04-04

## 2019-04-04 ENCOUNTER — APPOINTMENT (OUTPATIENT)
Dept: GENERAL RADIOLOGY | Facility: HOSPITAL | Age: 59
End: 2019-04-04

## 2019-04-04 LAB
A-A DO2: 258.7 MMHG (ref 0–300)
A-A DO2: 262.6 MMHG (ref 0–300)
A-A DO2: >300 MMHG (ref 0–300)
ALBUMIN SERPL-MCNC: 2.81 G/DL (ref 3.5–5.2)
ALBUMIN/GLOB SERPL: 0.8 G/DL
ALP SERPL-CCNC: 57 U/L (ref 39–117)
ALT SERPL W P-5'-P-CCNC: 19 U/L (ref 1–41)
ANION GAP SERPL CALCULATED.3IONS-SCNC: 20.4 MMOL/L
APTT PPP: 65.3 SECONDS (ref 23.8–36.1)
ARTERIAL PATENCY WRIST A: ABNORMAL
ARTERIAL PATENCY WRIST A: POSITIVE
ARTERIAL PATENCY WRIST A: POSITIVE
AST SERPL-CCNC: 37 U/L (ref 1–40)
ATMOSPHERIC PRESS: 734 MMHG
ATMOSPHERIC PRESS: 735 MMHG
ATMOSPHERIC PRESS: 735 MMHG
BASE EXCESS BLDA CALC-SCNC: -12 MMOL/L
BASE EXCESS BLDA CALC-SCNC: -9 MMOL/L
BASE EXCESS BLDA CALC-SCNC: -9.2 MMOL/L
BASOPHILS # BLD AUTO: 0.01 10*3/MM3 (ref 0–0.2)
BASOPHILS NFR BLD AUTO: 0.1 % (ref 0–1.5)
BDY SITE: ABNORMAL
BILIRUB SERPL-MCNC: 0.5 MG/DL (ref 0.2–1.2)
BODY TEMPERATURE: 98.6 C
BUN BLD-MCNC: 80 MG/DL (ref 8–23)
BUN/CREAT SERPL: 20.6 (ref 7–25)
CALCIUM SPEC-SCNC: 7.9 MG/DL (ref 8.6–10.5)
CHLORIDE SERPL-SCNC: 104 MMOL/L (ref 98–107)
CO2 SERPL-SCNC: 12.6 MMOL/L (ref 22–29)
COHGB MFR BLD: 1.3 % (ref 0–5)
COHGB MFR BLD: 1.3 % (ref 0–5)
COHGB MFR BLD: 1.8 % (ref 0–5)
COLLECT DURATION TIME UR: 24 HRS
CREAT BLD-MCNC: 3.88 MG/DL (ref 0.76–1.27)
CREAT CL 24H UR+SERPL-VRATE: 13.1 L/24 HR (ref 139.7–197.3)
CREAT CL 24H UR+SERPL-VRATE: 9.1 ML/MIN (ref 97–137)
CREAT UR-MCNC: 18.4 MG/DL
CREATINE 24H UR-MRATE: 0.63 G/24 HR (ref 1–2.4)
CRP SERPL-MCNC: 22.35 MG/DL (ref 0–0.5)
DEPRECATED RDW RBC AUTO: 49.1 FL (ref 37–54)
EOSINOPHIL # BLD AUTO: 0.1 10*3/MM3 (ref 0–0.4)
EOSINOPHIL NFR BLD AUTO: 0.8 % (ref 0.3–6.2)
ERYTHROCYTE [DISTWIDTH] IN BLOOD BY AUTOMATED COUNT: 16.4 % (ref 12.3–15.4)
GAS FLOW AIRWAY: 45 LPM
GFR SERPL CREATININE-BSD FRML MDRD: 16 ML/MIN/1.73
GLOBULIN UR ELPH-MCNC: 3.4 GM/DL
GLUCOSE BLD-MCNC: 100 MG/DL (ref 65–99)
GLUCOSE BLDC GLUCOMTR-MCNC: 169 MG/DL (ref 70–130)
GLUCOSE BLDC GLUCOMTR-MCNC: 178 MG/DL (ref 70–130)
GLUCOSE BLDC GLUCOMTR-MCNC: 196 MG/DL (ref 70–130)
GLUCOSE BLDC GLUCOMTR-MCNC: 242 MG/DL (ref 70–130)
GLUCOSE BLDC GLUCOMTR-MCNC: 96 MG/DL (ref 70–130)
HCO3 BLDA-SCNC: 13.5 MMOL/L (ref 22–26)
HCO3 BLDA-SCNC: 14.3 MMOL/L (ref 22–26)
HCO3 BLDA-SCNC: 14.8 MMOL/L (ref 22–26)
HCT VFR BLD AUTO: 24.2 % (ref 37.5–51)
HCT VFR BLD CALC: 23 % (ref 42–52)
HCT VFR BLD CALC: 24 % (ref 42–52)
HCT VFR BLD CALC: 25 % (ref 42–52)
HGB BLD-MCNC: 7.9 G/DL (ref 13–17.7)
HGB BLDA-MCNC: 7.9 G/DL (ref 12–16)
HGB BLDA-MCNC: 8.3 G/DL (ref 12–16)
HGB BLDA-MCNC: 8.4 G/DL (ref 12–16)
HOROWITZ INDEX BLD+IHG-RTO: 100 %
HOROWITZ INDEX BLD+IHG-RTO: 50 %
HOROWITZ INDEX BLD+IHG-RTO: 52 %
IMM GRANULOCYTES # BLD AUTO: 0.07 10*3/MM3 (ref 0–0.05)
IMM GRANULOCYTES NFR BLD AUTO: 0.5 % (ref 0–0.5)
LYMPHOCYTES # BLD AUTO: 0.79 10*3/MM3 (ref 0.7–3.1)
LYMPHOCYTES NFR BLD AUTO: 6.1 % (ref 19.6–45.3)
Lab: NORMAL MCG/ML
MAGNESIUM SERPL-MCNC: 1.8 MG/DL (ref 1.6–2.4)
MCH RBC QN AUTO: 27.7 PG (ref 26.6–33)
MCHC RBC AUTO-ENTMCNC: 32.6 G/DL (ref 31.5–35.7)
MCV RBC AUTO: 84.9 FL (ref 79–97)
METHGB BLD QL: 0.3 % (ref 0–3)
METHGB BLD QL: 0.3 % (ref 0–3)
METHGB BLD QL: 0.4 % (ref 0–3)
MODALITY: ABNORMAL
MONOCYTES # BLD AUTO: 0.48 10*3/MM3 (ref 0.1–0.9)
MONOCYTES NFR BLD AUTO: 3.7 % (ref 5–12)
NEUTROPHILS # BLD AUTO: 11.43 10*3/MM3 (ref 1.4–7)
NEUTROPHILS NFR BLD AUTO: 88.8 % (ref 42.7–76)
OXYHGB MFR BLDV: 87.3 % (ref 85–100)
OXYHGB MFR BLDV: 89.6 % (ref 85–100)
OXYHGB MFR BLDV: 98.2 % (ref 85–100)
PCO2 BLDA: 22.9 MM HG (ref 35–45)
PCO2 BLDA: 25.7 MM HG (ref 35–45)
PCO2 BLDA: 29.2 MM HG (ref 35–45)
PEEP RESPIRATORY: 5 CM[H2O]
PH BLDA: 7.28 PH UNITS (ref 7.35–7.45)
PH BLDA: 7.38 PH UNITS (ref 7.35–7.45)
PH BLDA: 7.41 PH UNITS (ref 7.35–7.45)
PLATELET # BLD AUTO: 193 10*3/MM3 (ref 140–450)
PMV BLD AUTO: 12.2 FL (ref 6–12)
PO2 BLDA: 338.2 MM HG (ref 80–100)
PO2 BLDA: 59 MM HG (ref 80–100)
PO2 BLDA: 66.4 MM HG (ref 80–100)
POTASSIUM BLD-SCNC: 2.9 MMOL/L (ref 3.5–5.2)
POTASSIUM BLD-SCNC: 3.1 MMOL/L (ref 3.5–5.2)
PROT SERPL-MCNC: 6.2 G/DL (ref 6–8.5)
RBC # BLD AUTO: 2.85 10*6/MM3 (ref 4.14–5.8)
SAO2 % BLDCOA: 89.2 % (ref 90–100)
SAO2 % BLDCOA: 91.1 % (ref 90–100)
SAO2 % BLDCOA: 99.9 % (ref 90–100)
SET MECH RESP RATE: 20
SODIUM BLD-SCNC: 137 MMOL/L (ref 136–145)
VANCOMYCIN SERPL-MCNC: 16.8 MCG/ML (ref 5–40)
VENTILATOR MODE: ABNORMAL
VT ON VENT VENT: 450 ML
WBC NRBC COR # BLD: 12.88 10*3/MM3 (ref 3.4–10.8)

## 2019-04-04 PROCEDURE — 86140 C-REACTIVE PROTEIN: CPT | Performed by: INTERNAL MEDICINE

## 2019-04-04 PROCEDURE — 82805 BLOOD GASES W/O2 SATURATION: CPT | Performed by: INTERNAL MEDICINE

## 2019-04-04 PROCEDURE — 25010000002 METHYLPREDNISOLONE PER 125 MG: Performed by: INTERNAL MEDICINE

## 2019-04-04 PROCEDURE — 83050 HGB METHEMOGLOBIN QUAN: CPT | Performed by: INTERNAL MEDICINE

## 2019-04-04 PROCEDURE — 84132 ASSAY OF SERUM POTASSIUM: CPT | Performed by: HOSPITALIST

## 2019-04-04 PROCEDURE — 99232 SBSQ HOSP IP/OBS MODERATE 35: CPT | Performed by: INTERNAL MEDICINE

## 2019-04-04 PROCEDURE — 25010000002 PROPOFOL 1000 MG/ML EMULSION: Performed by: INTERNAL MEDICINE

## 2019-04-04 PROCEDURE — 31500 INSERT EMERGENCY AIRWAY: CPT | Performed by: INTERNAL MEDICINE

## 2019-04-04 PROCEDURE — 94799 UNLISTED PULMONARY SVC/PX: CPT

## 2019-04-04 PROCEDURE — 25010000002 VANCOMYCIN 5 G RECONSTITUTED SOLUTION 5,000 MG VIAL: Performed by: INTERNAL MEDICINE

## 2019-04-04 PROCEDURE — 63710000001 INSULIN ASPART PER 5 UNITS: Performed by: INTERNAL MEDICINE

## 2019-04-04 PROCEDURE — 71045 X-RAY EXAM CHEST 1 VIEW: CPT | Performed by: RADIOLOGY

## 2019-04-04 PROCEDURE — 80202 ASSAY OF VANCOMYCIN: CPT

## 2019-04-04 PROCEDURE — 99233 SBSQ HOSP IP/OBS HIGH 50: CPT | Performed by: INTERNAL MEDICINE

## 2019-04-04 PROCEDURE — 71045 X-RAY EXAM CHEST 1 VIEW: CPT

## 2019-04-04 PROCEDURE — 36600 WITHDRAWAL OF ARTERIAL BLOOD: CPT | Performed by: INTERNAL MEDICINE

## 2019-04-04 PROCEDURE — 25010000002 HEPARIN (PORCINE) PER 1000 UNITS: Performed by: HOSPITALIST

## 2019-04-04 PROCEDURE — 82962 GLUCOSE BLOOD TEST: CPT

## 2019-04-04 PROCEDURE — 94660 CPAP INITIATION&MGMT: CPT

## 2019-04-04 PROCEDURE — 80053 COMPREHEN METABOLIC PANEL: CPT | Performed by: INTERNAL MEDICINE

## 2019-04-04 PROCEDURE — 31500 INSERT EMERGENCY AIRWAY: CPT

## 2019-04-04 PROCEDURE — 83050 HGB METHEMOGLOBIN QUAN: CPT | Performed by: NURSE PRACTITIONER

## 2019-04-04 PROCEDURE — 85025 COMPLETE CBC W/AUTO DIFF WBC: CPT | Performed by: INTERNAL MEDICINE

## 2019-04-04 PROCEDURE — 82375 ASSAY CARBOXYHB QUANT: CPT | Performed by: INTERNAL MEDICINE

## 2019-04-04 PROCEDURE — 70450 CT HEAD/BRAIN W/O DYE: CPT

## 2019-04-04 PROCEDURE — 25010000002 CEFEPIME 2 G/NS 100 ML SOLUTION: Performed by: INTERNAL MEDICINE

## 2019-04-04 PROCEDURE — 25010000002 MAGNESIUM SULFATE IN D5W 1G/100ML (PREMIX) 1-5 GM/100ML-% SOLUTION: Performed by: HOSPITALIST

## 2019-04-04 PROCEDURE — 85730 THROMBOPLASTIN TIME PARTIAL: CPT | Performed by: HOSPITALIST

## 2019-04-04 PROCEDURE — 25010000002 HYDRALAZINE PER 20 MG: Performed by: INTERNAL MEDICINE

## 2019-04-04 PROCEDURE — 25010000002 FENTANYL CITRATE (PF) 100 MCG/2ML SOLUTION

## 2019-04-04 PROCEDURE — 82375 ASSAY CARBOXYHB QUANT: CPT | Performed by: NURSE PRACTITIONER

## 2019-04-04 PROCEDURE — 25010000002 FUROSEMIDE PER 20 MG: Performed by: INTERNAL MEDICINE

## 2019-04-04 PROCEDURE — 70450 CT HEAD/BRAIN W/O DYE: CPT | Performed by: RADIOLOGY

## 2019-04-04 PROCEDURE — 94002 VENT MGMT INPAT INIT DAY: CPT

## 2019-04-04 PROCEDURE — 36600 WITHDRAWAL OF ARTERIAL BLOOD: CPT | Performed by: NURSE PRACTITIONER

## 2019-04-04 PROCEDURE — 0BH17EZ INSERTION OF ENDOTRACHEAL AIRWAY INTO TRACHEA, VIA NATURAL OR ARTIFICIAL OPENING: ICD-10-PCS | Performed by: INTERNAL MEDICINE

## 2019-04-04 PROCEDURE — 99292 CRITICAL CARE ADDL 30 MIN: CPT | Performed by: INTERNAL MEDICINE

## 2019-04-04 PROCEDURE — 5A1955Z RESPIRATORY VENTILATION, GREATER THAN 96 CONSECUTIVE HOURS: ICD-10-PCS | Performed by: INTERNAL MEDICINE

## 2019-04-04 PROCEDURE — 82805 BLOOD GASES W/O2 SATURATION: CPT | Performed by: NURSE PRACTITIONER

## 2019-04-04 PROCEDURE — 25010000003 POTASSIUM CHLORIDE 10 MEQ/100ML SOLUTION: Performed by: HOSPITALIST

## 2019-04-04 PROCEDURE — 25010000002 PROPOFOL 200 MG/20ML EMULSION

## 2019-04-04 PROCEDURE — 99291 CRITICAL CARE FIRST HOUR: CPT | Performed by: INTERNAL MEDICINE

## 2019-04-04 PROCEDURE — 83735 ASSAY OF MAGNESIUM: CPT | Performed by: INTERNAL MEDICINE

## 2019-04-04 RX ORDER — METHYLPREDNISOLONE SODIUM SUCCINATE 125 MG/2ML
125 INJECTION, POWDER, LYOPHILIZED, FOR SOLUTION INTRAMUSCULAR; INTRAVENOUS EVERY 6 HOURS
Status: DISCONTINUED | OUTPATIENT
Start: 2019-04-04 | End: 2019-04-05

## 2019-04-04 RX ORDER — IPRATROPIUM BROMIDE AND ALBUTEROL SULFATE 2.5; .5 MG/3ML; MG/3ML
3 SOLUTION RESPIRATORY (INHALATION) EVERY 4 HOURS PRN
Status: COMPLETED | OUTPATIENT
Start: 2019-04-04 | End: 2019-04-04

## 2019-04-04 RX ORDER — PANTOPRAZOLE SODIUM 40 MG/1
40 TABLET, DELAYED RELEASE ORAL
Status: DISCONTINUED | OUTPATIENT
Start: 2019-04-05 | End: 2019-04-05

## 2019-04-04 RX ORDER — POTASSIUM CHLORIDE 7.45 MG/ML
10 INJECTION INTRAVENOUS
Status: COMPLETED | OUTPATIENT
Start: 2019-04-04 | End: 2019-04-04

## 2019-04-04 RX ORDER — LACTULOSE 10 G/15ML
20 SOLUTION ORAL DAILY
Status: DISCONTINUED | OUTPATIENT
Start: 2019-04-04 | End: 2019-04-15

## 2019-04-04 RX ORDER — MAGNESIUM SULFATE 1 G/100ML
1 INJECTION INTRAVENOUS ONCE
Status: COMPLETED | OUTPATIENT
Start: 2019-04-04 | End: 2019-04-04

## 2019-04-04 RX ORDER — FENTANYL CITRATE 50 UG/ML
INJECTION, SOLUTION INTRAMUSCULAR; INTRAVENOUS
Status: COMPLETED
Start: 2019-04-04 | End: 2019-04-04

## 2019-04-04 RX ORDER — POTASSIUM CHLORIDE 20 MEQ/1
20 TABLET, EXTENDED RELEASE ORAL ONCE
Status: COMPLETED | OUTPATIENT
Start: 2019-04-04 | End: 2019-04-04

## 2019-04-04 RX ADMIN — FUROSEMIDE 60 MG: 10 INJECTION, SOLUTION INTRAMUSCULAR; INTRAVENOUS at 11:00

## 2019-04-04 RX ADMIN — HYDRALAZINE HYDROCHLORIDE 10 MG: 20 INJECTION INTRAMUSCULAR; INTRAVENOUS at 00:29

## 2019-04-04 RX ADMIN — HEPARIN SODIUM 7 UNITS/KG/HR: 10000 INJECTION, SOLUTION INTRAVENOUS at 05:01

## 2019-04-04 RX ADMIN — LABETALOL HYDROCHLORIDE 10 MG: 5 INJECTION, SOLUTION INTRAVENOUS at 09:52

## 2019-04-04 RX ADMIN — IPRATROPIUM BROMIDE AND ALBUTEROL SULFATE 3 ML: .5; 3 SOLUTION RESPIRATORY (INHALATION) at 00:26

## 2019-04-04 RX ADMIN — HYDRALAZINE HYDROCHLORIDE 10 MG: 20 INJECTION INTRAMUSCULAR; INTRAVENOUS at 20:34

## 2019-04-04 RX ADMIN — POTASSIUM CHLORIDE 10 MEQ: 10 INJECTION, SOLUTION INTRAVENOUS at 08:20

## 2019-04-04 RX ADMIN — METRONIDAZOLE 500 MG: 500 INJECTION, SOLUTION INTRAVENOUS at 03:19

## 2019-04-04 RX ADMIN — Medication: at 11:02

## 2019-04-04 RX ADMIN — INSULIN ASPART 2 UNITS: 100 INJECTION, SOLUTION INTRAVENOUS; SUBCUTANEOUS at 23:59

## 2019-04-04 RX ADMIN — PROPOFOL 40 MCG/KG/MIN: 10 INJECTION, EMULSION INTRAVENOUS at 22:21

## 2019-04-04 RX ADMIN — METHYLPREDNISOLONE SODIUM SUCCINATE 125 MG: 125 INJECTION, POWDER, FOR SOLUTION INTRAMUSCULAR; INTRAVENOUS at 22:35

## 2019-04-04 RX ADMIN — FOLIC ACID 1 MG: 5 INJECTION, SOLUTION INTRAMUSCULAR; INTRAVENOUS; SUBCUTANEOUS at 09:20

## 2019-04-04 RX ADMIN — POTASSIUM CHLORIDE 10 MEQ: 10 INJECTION, SOLUTION INTRAVENOUS at 06:39

## 2019-04-04 RX ADMIN — SODIUM BICARBONATE 150 ML/HR: 84 INJECTION, SOLUTION INTRAVENOUS at 23:59

## 2019-04-04 RX ADMIN — IPRATROPIUM BROMIDE AND ALBUTEROL SULFATE 3 ML: .5; 3 SOLUTION RESPIRATORY (INHALATION) at 07:16

## 2019-04-04 RX ADMIN — PANTOPRAZOLE SODIUM 40 MG: 40 INJECTION, POWDER, FOR SOLUTION INTRAVENOUS at 05:00

## 2019-04-04 RX ADMIN — IPRATROPIUM BROMIDE AND ALBUTEROL SULFATE 3 ML: .5; 3 SOLUTION RESPIRATORY (INHALATION) at 20:48

## 2019-04-04 RX ADMIN — IPRATROPIUM BROMIDE AND ALBUTEROL SULFATE 3 ML: .5; 3 SOLUTION RESPIRATORY (INHALATION) at 20:28

## 2019-04-04 RX ADMIN — SODIUM CHLORIDE, PRESERVATIVE FREE 10 ML: 5 INJECTION INTRAVENOUS at 22:18

## 2019-04-04 RX ADMIN — POTASSIUM CHLORIDE 10 MEQ: 10 INJECTION, SOLUTION INTRAVENOUS at 07:41

## 2019-04-04 RX ADMIN — ZINC SULFATE CAP 220 MG (50 MG ELEMENTAL ZN) 50 MG: 220 (50 ZN) CAP at 09:14

## 2019-04-04 RX ADMIN — SODIUM CHLORIDE, PRESERVATIVE FREE 10 ML: 5 INJECTION INTRAVENOUS at 22:21

## 2019-04-04 RX ADMIN — Medication: at 22:21

## 2019-04-04 RX ADMIN — IPRATROPIUM BROMIDE AND ALBUTEROL SULFATE 3 ML: .5; 3 SOLUTION RESPIRATORY (INHALATION) at 18:34

## 2019-04-04 RX ADMIN — METOPROLOL TARTRATE 50 MG: 50 TABLET, FILM COATED ORAL at 22:35

## 2019-04-04 RX ADMIN — ASPIRIN 81 MG: 81 TABLET, CHEWABLE ORAL at 09:15

## 2019-04-04 RX ADMIN — LEVOTHYROXINE SODIUM 12.5 MCG: 25 TABLET ORAL at 05:00

## 2019-04-04 RX ADMIN — ATORVASTATIN CALCIUM 80 MG: 40 TABLET, FILM COATED ORAL at 22:36

## 2019-04-04 RX ADMIN — MAGNESIUM SULFATE IN DEXTROSE 1 G: 10 INJECTION, SOLUTION INTRAVENOUS at 06:39

## 2019-04-04 RX ADMIN — ZINC SULFATE CAP 220 MG (50 MG ELEMENTAL ZN) 50 MG: 220 (50 ZN) CAP at 22:40

## 2019-04-04 RX ADMIN — VANCOMYCIN HYDROCHLORIDE 1000 MG: 5 INJECTION, POWDER, LYOPHILIZED, FOR SOLUTION INTRAVENOUS at 16:04

## 2019-04-04 RX ADMIN — IPRATROPIUM BROMIDE AND ALBUTEROL SULFATE 3 ML: .5; 3 SOLUTION RESPIRATORY (INHALATION) at 20:38

## 2019-04-04 RX ADMIN — RIFAXIMIN 550 MG: 550 TABLET ORAL at 22:43

## 2019-04-04 RX ADMIN — INSULIN ASPART 3 UNITS: 100 INJECTION, SOLUTION INTRAVENOUS; SUBCUTANEOUS at 11:03

## 2019-04-04 RX ADMIN — SODIUM BICARBONATE 50 MEQ: 84 INJECTION, SOLUTION INTRAVENOUS at 23:59

## 2019-04-04 RX ADMIN — POTASSIUM CHLORIDE 20 MEQ: 1500 TABLET, EXTENDED RELEASE ORAL at 17:26

## 2019-04-04 RX ADMIN — IPRATROPIUM BROMIDE AND ALBUTEROL SULFATE 3 ML: .5; 3 SOLUTION RESPIRATORY (INHALATION) at 13:25

## 2019-04-04 RX ADMIN — Medication: at 09:11

## 2019-04-04 RX ADMIN — INSULIN ASPART 2 UNITS: 100 INJECTION, SOLUTION INTRAVENOUS; SUBCUTANEOUS at 17:45

## 2019-04-04 RX ADMIN — FENTANYL CITRATE 100 MCG: 50 INJECTION INTRAMUSCULAR; INTRAVENOUS at 21:04

## 2019-04-04 RX ADMIN — Medication 100 MG: at 09:14

## 2019-04-04 RX ADMIN — Medication: at 22:23

## 2019-04-04 RX ADMIN — DILTIAZEM HYDROCHLORIDE 10 MG/HR: 5 INJECTION INTRAVENOUS at 05:01

## 2019-04-04 RX ADMIN — RIFAXIMIN 550 MG: 550 TABLET ORAL at 09:14

## 2019-04-04 RX ADMIN — POTASSIUM CHLORIDE 10 MEQ: 10 INJECTION, SOLUTION INTRAVENOUS at 09:15

## 2019-04-04 RX ADMIN — METOPROLOL TARTRATE 50 MG: 50 TABLET, FILM COATED ORAL at 09:14

## 2019-04-04 RX ADMIN — CHLORHEXIDINE GLUCONATE 15 ML: 1.2 RINSE ORAL at 22:18

## 2019-04-04 RX ADMIN — CHLORHEXIDINE GLUCONATE 15 ML: 1.2 RINSE ORAL at 09:10

## 2019-04-04 RX ADMIN — METRONIDAZOLE 500 MG: 500 INJECTION, SOLUTION INTRAVENOUS at 11:01

## 2019-04-04 RX ADMIN — METRONIDAZOLE 500 MG: 500 INJECTION, SOLUTION INTRAVENOUS at 20:47

## 2019-04-04 RX ADMIN — CEFEPIME 2 G: 2 INJECTION, POWDER, FOR SOLUTION INTRAVENOUS at 11:01

## 2019-04-04 RX ADMIN — SODIUM CHLORIDE, PRESERVATIVE FREE 10 ML: 5 INJECTION INTRAVENOUS at 22:20

## 2019-04-04 RX ADMIN — Medication: at 17:26

## 2019-04-04 RX ADMIN — Medication 1 CAPSULE: at 09:15

## 2019-04-05 ENCOUNTER — APPOINTMENT (OUTPATIENT)
Dept: CT IMAGING | Facility: HOSPITAL | Age: 59
End: 2019-04-05

## 2019-04-05 ENCOUNTER — APPOINTMENT (OUTPATIENT)
Dept: GENERAL RADIOLOGY | Facility: HOSPITAL | Age: 59
End: 2019-04-05

## 2019-04-05 LAB
A-A DO2: 107.3 MMHG (ref 0–300)
A-A DO2: 115.7 MMHG (ref 0–300)
A-A DO2: 95.4 MMHG (ref 0–300)
ABO GROUP BLD: NORMAL
ABO GROUP BLD: NORMAL
ALBUMIN SERPL-MCNC: 2.24 G/DL (ref 3.5–5.2)
ALBUMIN/GLOB SERPL: 0.7 G/DL
ALP SERPL-CCNC: 57 U/L (ref 39–117)
ALT SERPL W P-5'-P-CCNC: 15 U/L (ref 1–41)
AMMONIA BLD-SCNC: 47 UMOL/L (ref 16–60)
ANION GAP SERPL CALCULATED.3IONS-SCNC: 19.4 MMOL/L
APTT PPP: 41.1 SECONDS (ref 23.8–36.1)
APTT PPP: >100 SECONDS (ref 23.8–36.1)
ARTERIAL PATENCY WRIST A: ABNORMAL
ARTERIAL PATENCY WRIST A: POSITIVE
ARTERIAL PATENCY WRIST A: POSITIVE
AST SERPL-CCNC: 29 U/L (ref 1–40)
ATMOSPHERIC PRESS: 729 MMHG
ATMOSPHERIC PRESS: 735 MMHG
ATMOSPHERIC PRESS: 735 MMHG
BASE EXCESS BLDA CALC-SCNC: -11.9 MMOL/L
BASE EXCESS BLDA CALC-SCNC: -7.8 MMOL/L
BASE EXCESS BLDA CALC-SCNC: -8.4 MMOL/L
BASOPHILS # BLD AUTO: 0 10*3/MM3 (ref 0–0.2)
BASOPHILS NFR BLD AUTO: 0 % (ref 0–1.5)
BDY SITE: ABNORMAL
BILIRUB SERPL-MCNC: 0.3 MG/DL (ref 0.2–1.2)
BLD GP AB SCN SERPL QL: NEGATIVE
BODY TEMPERATURE: 98.6 C
BUN BLD-MCNC: 83 MG/DL (ref 8–23)
BUN/CREAT SERPL: 21.5 (ref 7–25)
CALCIUM SPEC-SCNC: 7.3 MG/DL (ref 8.6–10.5)
CHLORIDE SERPL-SCNC: 104 MMOL/L (ref 98–107)
CO2 SERPL-SCNC: 13.6 MMOL/L (ref 22–29)
COHGB MFR BLD: 1.9 % (ref 0–5)
COHGB MFR BLD: 2.2 % (ref 0–5)
COHGB MFR BLD: 2.3 % (ref 0–5)
CREAT BLD-MCNC: 3.86 MG/DL (ref 0.76–1.27)
CRP SERPL-MCNC: 13.47 MG/DL (ref 0–0.5)
DEPRECATED RDW RBC AUTO: 53.3 FL (ref 37–54)
EOSINOPHIL # BLD AUTO: 0.01 10*3/MM3 (ref 0–0.4)
EOSINOPHIL NFR BLD AUTO: 0.1 % (ref 0.3–6.2)
ERYTHROCYTE [DISTWIDTH] IN BLOOD BY AUTOMATED COUNT: 17 % (ref 12.3–15.4)
GFR SERPL CREATININE-BSD FRML MDRD: 16 ML/MIN/1.73
GLOBULIN UR ELPH-MCNC: 3.1 GM/DL
GLUCOSE BLD-MCNC: 233 MG/DL (ref 65–99)
GLUCOSE BLDC GLUCOMTR-MCNC: 179 MG/DL (ref 70–130)
GLUCOSE BLDC GLUCOMTR-MCNC: 184 MG/DL (ref 70–130)
GLUCOSE BLDC GLUCOMTR-MCNC: 215 MG/DL (ref 70–130)
GLUCOSE BLDC GLUCOMTR-MCNC: 265 MG/DL (ref 70–130)
HCO3 BLDA-SCNC: 13.1 MMOL/L (ref 22–26)
HCO3 BLDA-SCNC: 15.7 MMOL/L (ref 22–26)
HCO3 BLDA-SCNC: 16.3 MMOL/L (ref 22–26)
HCT VFR BLD AUTO: 19.7 % (ref 37.5–51)
HCT VFR BLD AUTO: 22 % (ref 37.5–51)
HCT VFR BLD AUTO: 23.7 % (ref 37.5–51)
HCT VFR BLD CALC: 20 % (ref 42–52)
HCT VFR BLD CALC: 20 % (ref 42–52)
HCT VFR BLD CALC: 22 % (ref 42–52)
HGB BLD-MCNC: 6.3 G/DL (ref 13–17.7)
HGB BLD-MCNC: 7.1 G/DL (ref 13–17.7)
HGB BLD-MCNC: 7.7 G/DL (ref 13–17.7)
HGB BLDA-MCNC: 6.7 G/DL (ref 12–16)
HGB BLDA-MCNC: 6.8 G/DL (ref 12–16)
HGB BLDA-MCNC: 7.5 G/DL (ref 12–16)
HOROWITZ INDEX BLD+IHG-RTO: 30 %
HOROWITZ INDEX BLD+IHG-RTO: 40 %
HOROWITZ INDEX BLD+IHG-RTO: 40 %
IMM GRANULOCYTES # BLD AUTO: 0.04 10*3/MM3 (ref 0–0.05)
IMM GRANULOCYTES NFR BLD AUTO: 0.6 % (ref 0–0.5)
LYMPHOCYTES # BLD AUTO: 0.21 10*3/MM3 (ref 0.7–3.1)
LYMPHOCYTES NFR BLD AUTO: 3 % (ref 19.6–45.3)
MAGNESIUM SERPL-MCNC: 1.9 MG/DL (ref 1.6–2.4)
MCH RBC QN AUTO: 28.4 PG (ref 26.6–33)
MCHC RBC AUTO-ENTMCNC: 32.3 G/DL (ref 31.5–35.7)
MCV RBC AUTO: 88 FL (ref 79–97)
METHGB BLD QL: 0.3 % (ref 0–3)
METHGB BLD QL: 0.5 % (ref 0–3)
METHGB BLD QL: 0.6 % (ref 0–3)
MODALITY: ABNORMAL
MONOCYTES # BLD AUTO: 0.07 10*3/MM3 (ref 0.1–0.9)
MONOCYTES NFR BLD AUTO: 1 % (ref 5–12)
NEUTROPHILS # BLD AUTO: 6.59 10*3/MM3 (ref 1.4–7)
NEUTROPHILS NFR BLD AUTO: 95.3 % (ref 42.7–76)
OXYHGB MFR BLDV: 92 % (ref 85–100)
OXYHGB MFR BLDV: 95.5 % (ref 85–100)
OXYHGB MFR BLDV: 96.7 % (ref 85–100)
PCO2 BLDA: 26 MM HG (ref 35–45)
PCO2 BLDA: 26.6 MM HG (ref 35–45)
PCO2 BLDA: 27.9 MM HG (ref 35–45)
PEEP RESPIRATORY: 5 CM[H2O]
PH BLDA: 7.32 PH UNITS (ref 7.35–7.45)
PH BLDA: 7.38 PH UNITS (ref 7.35–7.45)
PH BLDA: 7.39 PH UNITS (ref 7.35–7.45)
PLATELET # BLD AUTO: 150 10*3/MM3 (ref 140–450)
PMV BLD AUTO: 11.5 FL (ref 6–12)
PO2 BLDA: 129.6 MM HG (ref 80–100)
PO2 BLDA: 135.8 MM HG (ref 80–100)
PO2 BLDA: 77.9 MM HG (ref 80–100)
POTASSIUM BLD-SCNC: 3.1 MMOL/L (ref 3.5–5.2)
POTASSIUM BLD-SCNC: 3.2 MMOL/L (ref 3.5–5.2)
PROT SERPL-MCNC: 5.3 G/DL (ref 6–8.5)
RBC # BLD AUTO: 2.5 10*6/MM3 (ref 4.14–5.8)
RH BLD: NEGATIVE
RH BLD: NEGATIVE
SAO2 % BLDCOA: 94.6 % (ref 90–100)
SAO2 % BLDCOA: 98.4 % (ref 90–100)
SAO2 % BLDCOA: 98.9 % (ref 90–100)
SET MECH RESP RATE: 20
SET MECH RESP RATE: 20
SET MECH RESP RATE: 24
SODIUM BLD-SCNC: 137 MMOL/L (ref 136–145)
T&S EXPIRATION DATE: NORMAL
VANCOMYCIN SERPL-MCNC: 20.6 MCG/ML (ref 5–40)
VENTILATOR MODE: ABNORMAL
VT ON VENT VENT: 450 ML
VT ON VENT VENT: 450 ML
VT ON VENT VENT: 480 ML
WBC NRBC COR # BLD: 6.92 10*3/MM3 (ref 3.4–10.8)

## 2019-04-05 PROCEDURE — 80053 COMPREHEN METABOLIC PANEL: CPT | Performed by: INTERNAL MEDICINE

## 2019-04-05 PROCEDURE — 99291 CRITICAL CARE FIRST HOUR: CPT | Performed by: INTERNAL MEDICINE

## 2019-04-05 PROCEDURE — 85014 HEMATOCRIT: CPT | Performed by: INTERNAL MEDICINE

## 2019-04-05 PROCEDURE — 80202 ASSAY OF VANCOMYCIN: CPT | Performed by: INTERNAL MEDICINE

## 2019-04-05 PROCEDURE — 82805 BLOOD GASES W/O2 SATURATION: CPT | Performed by: NURSE PRACTITIONER

## 2019-04-05 PROCEDURE — 82962 GLUCOSE BLOOD TEST: CPT

## 2019-04-05 PROCEDURE — 36430 TRANSFUSION BLD/BLD COMPNT: CPT

## 2019-04-05 PROCEDURE — 94799 UNLISTED PULMONARY SVC/PX: CPT

## 2019-04-05 PROCEDURE — 86900 BLOOD TYPING SEROLOGIC ABO: CPT

## 2019-04-05 PROCEDURE — 83050 HGB METHEMOGLOBIN QUAN: CPT | Performed by: INTERNAL MEDICINE

## 2019-04-05 PROCEDURE — 63710000001 INSULIN ASPART PER 5 UNITS: Performed by: NURSE PRACTITIONER

## 2019-04-05 PROCEDURE — 25010000002 METHYLPREDNISOLONE PER 40 MG: Performed by: INTERNAL MEDICINE

## 2019-04-05 PROCEDURE — P9016 RBC LEUKOCYTES REDUCED: HCPCS

## 2019-04-05 PROCEDURE — 25010000003 POTASSIUM CHLORIDE 10 MEQ/100ML SOLUTION: Performed by: INTERNAL MEDICINE

## 2019-04-05 PROCEDURE — 82140 ASSAY OF AMMONIA: CPT | Performed by: INTERNAL MEDICINE

## 2019-04-05 PROCEDURE — 71045 X-RAY EXAM CHEST 1 VIEW: CPT

## 2019-04-05 PROCEDURE — 86140 C-REACTIVE PROTEIN: CPT | Performed by: INTERNAL MEDICINE

## 2019-04-05 PROCEDURE — 71045 X-RAY EXAM CHEST 1 VIEW: CPT | Performed by: RADIOLOGY

## 2019-04-05 PROCEDURE — 86901 BLOOD TYPING SEROLOGIC RH(D): CPT

## 2019-04-05 PROCEDURE — 25010000002 METHYLPREDNISOLONE PER 125 MG: Performed by: INTERNAL MEDICINE

## 2019-04-05 PROCEDURE — 25010000002 CEFEPIME 2 G/NS 100 ML SOLUTION: Performed by: INTERNAL MEDICINE

## 2019-04-05 PROCEDURE — 86923 COMPATIBILITY TEST ELECTRIC: CPT

## 2019-04-05 PROCEDURE — 82805 BLOOD GASES W/O2 SATURATION: CPT | Performed by: INTERNAL MEDICINE

## 2019-04-05 PROCEDURE — 82375 ASSAY CARBOXYHB QUANT: CPT | Performed by: INTERNAL MEDICINE

## 2019-04-05 PROCEDURE — 36600 WITHDRAWAL OF ARTERIAL BLOOD: CPT | Performed by: INTERNAL MEDICINE

## 2019-04-05 PROCEDURE — 87205 SMEAR GRAM STAIN: CPT | Performed by: INTERNAL MEDICINE

## 2019-04-05 PROCEDURE — 63710000001 INSULIN ASPART PER 5 UNITS: Performed by: INTERNAL MEDICINE

## 2019-04-05 PROCEDURE — 84132 ASSAY OF SERUM POTASSIUM: CPT | Performed by: INTERNAL MEDICINE

## 2019-04-05 PROCEDURE — 82375 ASSAY CARBOXYHB QUANT: CPT | Performed by: NURSE PRACTITIONER

## 2019-04-05 PROCEDURE — 86901 BLOOD TYPING SEROLOGIC RH(D): CPT | Performed by: INTERNAL MEDICINE

## 2019-04-05 PROCEDURE — 83735 ASSAY OF MAGNESIUM: CPT | Performed by: INTERNAL MEDICINE

## 2019-04-05 PROCEDURE — 87070 CULTURE OTHR SPECIMN AEROBIC: CPT | Performed by: INTERNAL MEDICINE

## 2019-04-05 PROCEDURE — 74176 CT ABD & PELVIS W/O CONTRAST: CPT

## 2019-04-05 PROCEDURE — 85730 THROMBOPLASTIN TIME PARTIAL: CPT | Performed by: INTERNAL MEDICINE

## 2019-04-05 PROCEDURE — 83050 HGB METHEMOGLOBIN QUAN: CPT | Performed by: NURSE PRACTITIONER

## 2019-04-05 PROCEDURE — 85018 HEMOGLOBIN: CPT | Performed by: INTERNAL MEDICINE

## 2019-04-05 PROCEDURE — 25010000002 PROPOFOL 1000 MG/ML EMULSION: Performed by: INTERNAL MEDICINE

## 2019-04-05 PROCEDURE — 74176 CT ABD & PELVIS W/O CONTRAST: CPT | Performed by: RADIOLOGY

## 2019-04-05 PROCEDURE — 94003 VENT MGMT INPAT SUBQ DAY: CPT

## 2019-04-05 PROCEDURE — 86850 RBC ANTIBODY SCREEN: CPT | Performed by: INTERNAL MEDICINE

## 2019-04-05 PROCEDURE — 86900 BLOOD TYPING SEROLOGIC ABO: CPT | Performed by: INTERNAL MEDICINE

## 2019-04-05 PROCEDURE — 25010000002 FUROSEMIDE PER 20 MG: Performed by: INTERNAL MEDICINE

## 2019-04-05 PROCEDURE — 99231 SBSQ HOSP IP/OBS SF/LOW 25: CPT | Performed by: INTERNAL MEDICINE

## 2019-04-05 PROCEDURE — 99233 SBSQ HOSP IP/OBS HIGH 50: CPT | Performed by: INTERNAL MEDICINE

## 2019-04-05 PROCEDURE — 85025 COMPLETE CBC W/AUTO DIFF WBC: CPT | Performed by: INTERNAL MEDICINE

## 2019-04-05 PROCEDURE — 36600 WITHDRAWAL OF ARTERIAL BLOOD: CPT | Performed by: NURSE PRACTITIONER

## 2019-04-05 RX ORDER — METHYLPREDNISOLONE SODIUM SUCCINATE 40 MG/ML
40 INJECTION, POWDER, LYOPHILIZED, FOR SOLUTION INTRAMUSCULAR; INTRAVENOUS EVERY 6 HOURS
Status: DISCONTINUED | OUTPATIENT
Start: 2019-04-05 | End: 2019-04-06

## 2019-04-05 RX ORDER — PANTOPRAZOLE SODIUM 40 MG/10ML
40 INJECTION, POWDER, LYOPHILIZED, FOR SOLUTION INTRAVENOUS
Status: DISCONTINUED | OUTPATIENT
Start: 2019-04-05 | End: 2019-04-12

## 2019-04-05 RX ORDER — POTASSIUM CHLORIDE 7.45 MG/ML
10 INJECTION INTRAVENOUS
Status: COMPLETED | OUTPATIENT
Start: 2019-04-05 | End: 2019-04-05

## 2019-04-05 RX ORDER — PANTOPRAZOLE SODIUM 40 MG/10ML
40 INJECTION, POWDER, LYOPHILIZED, FOR SOLUTION INTRAVENOUS
Status: DISCONTINUED | OUTPATIENT
Start: 2019-04-05 | End: 2019-04-05

## 2019-04-05 RX ORDER — DEXTROSE MONOHYDRATE 25 G/50ML
25 INJECTION, SOLUTION INTRAVENOUS
Status: DISCONTINUED | OUTPATIENT
Start: 2019-04-05 | End: 2019-04-23 | Stop reason: HOSPADM

## 2019-04-05 RX ORDER — NICOTINE POLACRILEX 4 MG
15 LOZENGE BUCCAL
Status: DISCONTINUED | OUTPATIENT
Start: 2019-04-05 | End: 2019-04-23 | Stop reason: HOSPADM

## 2019-04-05 RX ADMIN — SODIUM CHLORIDE, PRESERVATIVE FREE 10 ML: 5 INJECTION INTRAVENOUS at 08:03

## 2019-04-05 RX ADMIN — INSULIN ASPART 4 UNITS: 100 INJECTION, SOLUTION INTRAVENOUS; SUBCUTANEOUS at 06:13

## 2019-04-05 RX ADMIN — Medication 1 CAPSULE: at 08:04

## 2019-04-05 RX ADMIN — LACTULOSE 20 G: 10 SOLUTION ORAL at 08:03

## 2019-04-05 RX ADMIN — METRONIDAZOLE 500 MG: 500 INJECTION, SOLUTION INTRAVENOUS at 20:59

## 2019-04-05 RX ADMIN — METHYLPREDNISOLONE SODIUM SUCCINATE 125 MG: 125 INJECTION, POWDER, FOR SOLUTION INTRAMUSCULAR; INTRAVENOUS at 04:02

## 2019-04-05 RX ADMIN — SODIUM CHLORIDE, PRESERVATIVE FREE 10 ML: 5 INJECTION INTRAVENOUS at 21:06

## 2019-04-05 RX ADMIN — DILTIAZEM HYDROCHLORIDE 5 MG/HR: 5 INJECTION INTRAVENOUS at 07:15

## 2019-04-05 RX ADMIN — CHLORHEXIDINE GLUCONATE 15 ML: 1.2 RINSE ORAL at 08:04

## 2019-04-05 RX ADMIN — ASPIRIN 81 MG: 81 TABLET, CHEWABLE ORAL at 08:03

## 2019-04-05 RX ADMIN — RIFAXIMIN 550 MG: 550 TABLET ORAL at 21:06

## 2019-04-05 RX ADMIN — IPRATROPIUM BROMIDE AND ALBUTEROL SULFATE 3 ML: .5; 3 SOLUTION RESPIRATORY (INHALATION) at 00:17

## 2019-04-05 RX ADMIN — INSULIN ASPART 4 UNITS: 100 INJECTION, SOLUTION INTRAVENOUS; SUBCUTANEOUS at 12:24

## 2019-04-05 RX ADMIN — IPRATROPIUM BROMIDE AND ALBUTEROL SULFATE 3 ML: .5; 3 SOLUTION RESPIRATORY (INHALATION) at 18:38

## 2019-04-05 RX ADMIN — METHYLPREDNISOLONE SODIUM SUCCINATE 40 MG: 125 INJECTION, POWDER, FOR SOLUTION INTRAMUSCULAR; INTRAVENOUS at 21:03

## 2019-04-05 RX ADMIN — PROPOFOL 50 MCG/KG/MIN: 10 INJECTION, EMULSION INTRAVENOUS at 16:12

## 2019-04-05 RX ADMIN — IPRATROPIUM BROMIDE AND ALBUTEROL SULFATE 3 ML: .5; 3 SOLUTION RESPIRATORY (INHALATION) at 06:15

## 2019-04-05 RX ADMIN — METOPROLOL TARTRATE 50 MG: 50 TABLET, FILM COATED ORAL at 08:03

## 2019-04-05 RX ADMIN — PROPOFOL 50 MCG/KG/MIN: 10 INJECTION, EMULSION INTRAVENOUS at 04:02

## 2019-04-05 RX ADMIN — CEFEPIME 2 G: 2 INJECTION, POWDER, FOR SOLUTION INTRAVENOUS at 12:22

## 2019-04-05 RX ADMIN — IPRATROPIUM BROMIDE AND ALBUTEROL SULFATE 3 ML: .5; 3 SOLUTION RESPIRATORY (INHALATION) at 13:16

## 2019-04-05 RX ADMIN — Medication: at 12:22

## 2019-04-05 RX ADMIN — LEVOTHYROXINE SODIUM 12.5 MCG: 25 TABLET ORAL at 06:13

## 2019-04-05 RX ADMIN — POTASSIUM CHLORIDE 10 MEQ: 10 INJECTION, SOLUTION INTRAVENOUS at 09:30

## 2019-04-05 RX ADMIN — PANTOPRAZOLE SODIUM 40 MG: 40 INJECTION, POWDER, FOR SOLUTION INTRAVENOUS at 08:04

## 2019-04-05 RX ADMIN — METOPROLOL TARTRATE 50 MG: 50 TABLET, FILM COATED ORAL at 21:06

## 2019-04-05 RX ADMIN — METRONIDAZOLE 500 MG: 500 INJECTION, SOLUTION INTRAVENOUS at 04:02

## 2019-04-05 RX ADMIN — PROPOFOL 50 MCG/KG/MIN: 10 INJECTION, EMULSION INTRAVENOUS at 21:06

## 2019-04-05 RX ADMIN — SODIUM CHLORIDE, PRESERVATIVE FREE 10 ML: 5 INJECTION INTRAVENOUS at 21:07

## 2019-04-05 RX ADMIN — RIFAXIMIN 550 MG: 550 TABLET ORAL at 08:04

## 2019-04-05 RX ADMIN — METRONIDAZOLE 500 MG: 500 INJECTION, SOLUTION INTRAVENOUS at 12:21

## 2019-04-05 RX ADMIN — INSULIN ASPART 2 UNITS: 100 INJECTION, SOLUTION INTRAVENOUS; SUBCUTANEOUS at 18:03

## 2019-04-05 RX ADMIN — INSULIN ASPART 2 UNITS: 100 INJECTION, SOLUTION INTRAVENOUS; SUBCUTANEOUS at 21:04

## 2019-04-05 RX ADMIN — SODIUM CHLORIDE, PRESERVATIVE FREE 3 ML: 5 INJECTION INTRAVENOUS at 21:07

## 2019-04-05 RX ADMIN — METHYLPREDNISOLONE SODIUM SUCCINATE 125 MG: 125 INJECTION, POWDER, FOR SOLUTION INTRAMUSCULAR; INTRAVENOUS at 09:30

## 2019-04-05 RX ADMIN — ZINC SULFATE CAP 220 MG (50 MG ELEMENTAL ZN) 50 MG: 220 (50 ZN) CAP at 21:05

## 2019-04-05 RX ADMIN — Medication 100 MG: at 08:04

## 2019-04-05 RX ADMIN — POTASSIUM CHLORIDE 10 MEQ: 7.46 INJECTION, SOLUTION INTRAVENOUS at 18:04

## 2019-04-05 RX ADMIN — FOLIC ACID 1 MG: 5 INJECTION, SOLUTION INTRAMUSCULAR; INTRAVENOUS; SUBCUTANEOUS at 09:30

## 2019-04-05 RX ADMIN — ZINC SULFATE CAP 220 MG (50 MG ELEMENTAL ZN) 50 MG: 220 (50 ZN) CAP at 08:03

## 2019-04-05 RX ADMIN — Medication: at 07:15

## 2019-04-05 RX ADMIN — Medication: at 21:06

## 2019-04-05 RX ADMIN — PROPOFOL 50 MCG/KG/MIN: 10 INJECTION, EMULSION INTRAVENOUS at 08:27

## 2019-04-05 RX ADMIN — Medication: at 17:42

## 2019-04-05 RX ADMIN — CHLORHEXIDINE GLUCONATE 15 ML: 1.2 RINSE ORAL at 21:06

## 2019-04-05 RX ADMIN — METHYLPREDNISOLONE SODIUM SUCCINATE 40 MG: 125 INJECTION, POWDER, FOR SOLUTION INTRAMUSCULAR; INTRAVENOUS at 16:12

## 2019-04-05 RX ADMIN — FUROSEMIDE 60 MG: 10 INJECTION, SOLUTION INTRAMUSCULAR; INTRAVENOUS at 10:20

## 2019-04-05 RX ADMIN — ATORVASTATIN CALCIUM 80 MG: 40 TABLET, FILM COATED ORAL at 21:05

## 2019-04-05 RX ADMIN — POTASSIUM CHLORIDE 10 MEQ: 10 INJECTION, SOLUTION INTRAVENOUS at 10:20

## 2019-04-05 RX ADMIN — PROPOFOL 50 MCG/KG/MIN: 10 INJECTION, EMULSION INTRAVENOUS at 12:57

## 2019-04-05 RX ADMIN — POTASSIUM CHLORIDE 10 MEQ: 7.46 INJECTION, SOLUTION INTRAVENOUS at 17:42

## 2019-04-05 RX ADMIN — POTASSIUM CHLORIDE 10 MEQ: 7.46 INJECTION, SOLUTION INTRAVENOUS at 19:55

## 2019-04-05 RX ADMIN — PROPOFOL 50 MCG/KG/MIN: 10 INJECTION, EMULSION INTRAVENOUS at 02:00

## 2019-04-06 LAB
A-A DO2: 90.3 MMHG (ref 0–300)
ABO + RH BLD: NORMAL
ALBUMIN SERPL-MCNC: 2.55 G/DL (ref 3.5–5.2)
ALBUMIN/GLOB SERPL: 0.9 G/DL
ALP SERPL-CCNC: 58 U/L (ref 39–117)
ALT SERPL W P-5'-P-CCNC: 13 U/L (ref 1–41)
ANION GAP SERPL CALCULATED.3IONS-SCNC: 18.7 MMOL/L
ARTERIAL PATENCY WRIST A: ABNORMAL
AST SERPL-CCNC: 23 U/L (ref 1–40)
ATMOSPHERIC PRESS: 729 MMHG
BASE EXCESS BLDA CALC-SCNC: -9.1 MMOL/L
BASOPHILS # BLD AUTO: 0 10*3/MM3 (ref 0–0.2)
BASOPHILS NFR BLD AUTO: 0 % (ref 0–1.5)
BDY SITE: ABNORMAL
BH BB BLOOD EXPIRATION DATE: NORMAL
BH BB BLOOD TYPE BARCODE: 600
BH BB DISPENSE STATUS: NORMAL
BH BB PRODUCT CODE: NORMAL
BH BB UNIT NUMBER: NORMAL
BILIRUB SERPL-MCNC: 0.3 MG/DL (ref 0.2–1.2)
BODY TEMPERATURE: 98.6 C
BUN BLD-MCNC: 78 MG/DL (ref 8–23)
BUN/CREAT SERPL: 20.3 (ref 7–25)
CALCIUM SPEC-SCNC: 7.8 MG/DL (ref 8.6–10.5)
CHLORIDE SERPL-SCNC: 105 MMOL/L (ref 98–107)
CO2 SERPL-SCNC: 15.3 MMOL/L (ref 22–29)
COHGB MFR BLD: 1 % (ref 0–5)
CREAT BLD-MCNC: 3.85 MG/DL (ref 0.76–1.27)
CRP SERPL-MCNC: 9.59 MG/DL (ref 0–0.5)
DEPRECATED RDW RBC AUTO: 49.4 FL (ref 37–54)
EOSINOPHIL # BLD AUTO: 0 10*3/MM3 (ref 0–0.4)
EOSINOPHIL NFR BLD AUTO: 0 % (ref 0.3–6.2)
ERYTHROCYTE [DISTWIDTH] IN BLOOD BY AUTOMATED COUNT: 16.3 % (ref 12.3–15.4)
GFR SERPL CREATININE-BSD FRML MDRD: 16 ML/MIN/1.73
GLOBULIN UR ELPH-MCNC: 2.9 GM/DL
GLUCOSE BLD-MCNC: 206 MG/DL (ref 65–99)
GLUCOSE BLDC GLUCOMTR-MCNC: 188 MG/DL (ref 70–130)
GLUCOSE BLDC GLUCOMTR-MCNC: 210 MG/DL (ref 70–130)
GLUCOSE BLDC GLUCOMTR-MCNC: 231 MG/DL (ref 70–130)
GLUCOSE BLDC GLUCOMTR-MCNC: 247 MG/DL (ref 70–130)
HCO3 BLDA-SCNC: 15.3 MMOL/L (ref 22–26)
HCT VFR BLD AUTO: 24.4 % (ref 37.5–51)
HCT VFR BLD CALC: 30 % (ref 42–52)
HGB BLD-MCNC: 7.8 G/DL (ref 13–17.7)
HGB BLDA-MCNC: 10.3 G/DL (ref 12–16)
HOROWITZ INDEX BLD+IHG-RTO: 30 %
IMM GRANULOCYTES # BLD AUTO: 0.04 10*3/MM3 (ref 0–0.05)
IMM GRANULOCYTES NFR BLD AUTO: 0.5 % (ref 0–0.5)
LYMPHOCYTES # BLD AUTO: 0.4 10*3/MM3 (ref 0.7–3.1)
LYMPHOCYTES NFR BLD AUTO: 5.4 % (ref 19.6–45.3)
MAGNESIUM SERPL-MCNC: 2 MG/DL (ref 1.6–2.4)
MCH RBC QN AUTO: 27.6 PG (ref 26.6–33)
MCHC RBC AUTO-ENTMCNC: 32 G/DL (ref 31.5–35.7)
MCV RBC AUTO: 86.2 FL (ref 79–97)
METHGB BLD QL: 0.2 % (ref 0–3)
MODALITY: ABNORMAL
MONOCYTES # BLD AUTO: 0.25 10*3/MM3 (ref 0.1–0.9)
MONOCYTES NFR BLD AUTO: 3.4 % (ref 5–12)
NEUTROPHILS # BLD AUTO: 6.66 10*3/MM3 (ref 1.4–7)
NEUTROPHILS NFR BLD AUTO: 90.7 % (ref 42.7–76)
OXYHGB MFR BLDV: 93.5 % (ref 85–100)
PCO2 BLDA: 28.1 MM HG (ref 35–45)
PEEP RESPIRATORY: 5 CM[H2O]
PH BLDA: 7.35 PH UNITS (ref 7.35–7.45)
PLATELET # BLD AUTO: 159 10*3/MM3 (ref 140–450)
PMV BLD AUTO: 13.1 FL (ref 6–12)
PO2 BLDA: 81.3 MM HG (ref 80–100)
POTASSIUM BLD-SCNC: 3.8 MMOL/L (ref 3.5–5.2)
PROT SERPL-MCNC: 5.4 G/DL (ref 6–8.5)
RBC # BLD AUTO: 2.83 10*6/MM3 (ref 4.14–5.8)
SAO2 % BLDCOA: 94.6 % (ref 90–100)
SET MECH RESP RATE: 20
SODIUM BLD-SCNC: 139 MMOL/L (ref 136–145)
UNIT  ABO: NORMAL
UNIT  RH: NORMAL
VANCOMYCIN SERPL-MCNC: 18.4 MCG/ML (ref 5–40)
VENTILATOR MODE: ABNORMAL
VT ON VENT VENT: 450 ML
WBC NRBC COR # BLD: 7.35 10*3/MM3 (ref 3.4–10.8)

## 2019-04-06 PROCEDURE — 82375 ASSAY CARBOXYHB QUANT: CPT | Performed by: NURSE PRACTITIONER

## 2019-04-06 PROCEDURE — 25010000002 ALBUMIN HUMAN 25% PER 50 ML: Performed by: INTERNAL MEDICINE

## 2019-04-06 PROCEDURE — 25010000002 METHYLPREDNISOLONE PER 40 MG: Performed by: INTERNAL MEDICINE

## 2019-04-06 PROCEDURE — 82962 GLUCOSE BLOOD TEST: CPT

## 2019-04-06 PROCEDURE — 85025 COMPLETE CBC W/AUTO DIFF WBC: CPT | Performed by: INTERNAL MEDICINE

## 2019-04-06 PROCEDURE — 25010000002 FUROSEMIDE PER 20 MG: Performed by: INTERNAL MEDICINE

## 2019-04-06 PROCEDURE — 63710000001 INSULIN ASPART PER 5 UNITS: Performed by: INTERNAL MEDICINE

## 2019-04-06 PROCEDURE — 99232 SBSQ HOSP IP/OBS MODERATE 35: CPT | Performed by: INTERNAL MEDICINE

## 2019-04-06 PROCEDURE — 83050 HGB METHEMOGLOBIN QUAN: CPT | Performed by: NURSE PRACTITIONER

## 2019-04-06 PROCEDURE — 94003 VENT MGMT INPAT SUBQ DAY: CPT

## 2019-04-06 PROCEDURE — 80202 ASSAY OF VANCOMYCIN: CPT | Performed by: INTERNAL MEDICINE

## 2019-04-06 PROCEDURE — 94799 UNLISTED PULMONARY SVC/PX: CPT

## 2019-04-06 PROCEDURE — 83735 ASSAY OF MAGNESIUM: CPT | Performed by: INTERNAL MEDICINE

## 2019-04-06 PROCEDURE — 25010000002 VANCOMYCIN 5 G RECONSTITUTED SOLUTION 5,000 MG VIAL: Performed by: INTERNAL MEDICINE

## 2019-04-06 PROCEDURE — 80053 COMPREHEN METABOLIC PANEL: CPT | Performed by: INTERNAL MEDICINE

## 2019-04-06 PROCEDURE — 36600 WITHDRAWAL OF ARTERIAL BLOOD: CPT | Performed by: NURSE PRACTITIONER

## 2019-04-06 PROCEDURE — 25010000002 HYDRALAZINE PER 20 MG: Performed by: INTERNAL MEDICINE

## 2019-04-06 PROCEDURE — 25010000002 PROPOFOL 1000 MG/ML EMULSION: Performed by: INTERNAL MEDICINE

## 2019-04-06 PROCEDURE — P9047 ALBUMIN (HUMAN), 25%, 50ML: HCPCS | Performed by: INTERNAL MEDICINE

## 2019-04-06 PROCEDURE — 86140 C-REACTIVE PROTEIN: CPT | Performed by: INTERNAL MEDICINE

## 2019-04-06 PROCEDURE — 99233 SBSQ HOSP IP/OBS HIGH 50: CPT | Performed by: INTERNAL MEDICINE

## 2019-04-06 PROCEDURE — 82805 BLOOD GASES W/O2 SATURATION: CPT | Performed by: NURSE PRACTITIONER

## 2019-04-06 PROCEDURE — 25010000002 CEFEPIME 2 G/NS 100 ML SOLUTION: Performed by: INTERNAL MEDICINE

## 2019-04-06 PROCEDURE — 63710000001 INSULIN DETEMIR PER 5 UNITS: Performed by: INTERNAL MEDICINE

## 2019-04-06 RX ORDER — ALBUMIN (HUMAN) 12.5 G/50ML
25 SOLUTION INTRAVENOUS EVERY 12 HOURS
Status: COMPLETED | OUTPATIENT
Start: 2019-04-06 | End: 2019-04-08

## 2019-04-06 RX ORDER — METHYLPREDNISOLONE SODIUM SUCCINATE 40 MG/ML
40 INJECTION, POWDER, LYOPHILIZED, FOR SOLUTION INTRAMUSCULAR; INTRAVENOUS EVERY 8 HOURS SCHEDULED
Status: DISCONTINUED | OUTPATIENT
Start: 2019-04-06 | End: 2019-04-08

## 2019-04-06 RX ORDER — SODIUM BICARBONATE 650 MG/1
1300 TABLET ORAL 3 TIMES DAILY
Status: DISCONTINUED | OUTPATIENT
Start: 2019-04-06 | End: 2019-04-13

## 2019-04-06 RX ADMIN — PANTOPRAZOLE SODIUM 40 MG: 40 INJECTION, POWDER, FOR SOLUTION INTRAVENOUS at 05:42

## 2019-04-06 RX ADMIN — SODIUM CHLORIDE, PRESERVATIVE FREE 10 ML: 5 INJECTION INTRAVENOUS at 21:11

## 2019-04-06 RX ADMIN — INSULIN DETEMIR 10 UNITS: 100 INJECTION, SOLUTION SUBCUTANEOUS at 11:53

## 2019-04-06 RX ADMIN — PROPOFOL 50 MCG/KG/MIN: 10 INJECTION, EMULSION INTRAVENOUS at 21:09

## 2019-04-06 RX ADMIN — INSULIN ASPART 4 UNITS: 100 INJECTION, SOLUTION INTRAVENOUS; SUBCUTANEOUS at 06:42

## 2019-04-06 RX ADMIN — Medication 100 MG: at 08:58

## 2019-04-06 RX ADMIN — METHYLPREDNISOLONE SODIUM SUCCINATE 40 MG: 40 INJECTION, POWDER, FOR SOLUTION INTRAMUSCULAR; INTRAVENOUS at 12:05

## 2019-04-06 RX ADMIN — INSULIN ASPART 4 UNITS: 100 INJECTION, SOLUTION INTRAVENOUS; SUBCUTANEOUS at 11:58

## 2019-04-06 RX ADMIN — METHYLPREDNISOLONE SODIUM SUCCINATE 40 MG: 40 INJECTION, POWDER, FOR SOLUTION INTRAMUSCULAR; INTRAVENOUS at 21:09

## 2019-04-06 RX ADMIN — Medication 1 CAPSULE: at 08:58

## 2019-04-06 RX ADMIN — Medication: at 00:19

## 2019-04-06 RX ADMIN — SODIUM CHLORIDE, PRESERVATIVE FREE 10 ML: 5 INJECTION INTRAVENOUS at 21:10

## 2019-04-06 RX ADMIN — PROPOFOL 50 MCG/KG/MIN: 10 INJECTION, EMULSION INTRAVENOUS at 11:54

## 2019-04-06 RX ADMIN — SODIUM BICARBONATE TAB 650 MG 1300 MG: 650 TAB at 16:31

## 2019-04-06 RX ADMIN — METOPROLOL TARTRATE 50 MG: 50 TABLET, FILM COATED ORAL at 08:58

## 2019-04-06 RX ADMIN — LABETALOL HYDROCHLORIDE 10 MG: 5 INJECTION, SOLUTION INTRAVENOUS at 13:07

## 2019-04-06 RX ADMIN — INSULIN ASPART 2 UNITS: 100 INJECTION, SOLUTION INTRAVENOUS; SUBCUTANEOUS at 21:09

## 2019-04-06 RX ADMIN — LACTULOSE 20 G: 10 SOLUTION ORAL at 08:59

## 2019-04-06 RX ADMIN — ZINC SULFATE CAP 220 MG (50 MG ELEMENTAL ZN) 50 MG: 220 (50 ZN) CAP at 08:58

## 2019-04-06 RX ADMIN — RIFAXIMIN 550 MG: 550 TABLET ORAL at 21:11

## 2019-04-06 RX ADMIN — ALBUMIN (HUMAN) 25 G: 0.25 INJECTION, SOLUTION INTRAVENOUS at 18:03

## 2019-04-06 RX ADMIN — Medication: at 13:07

## 2019-04-06 RX ADMIN — HYDRALAZINE HYDROCHLORIDE 10 MG: 20 INJECTION INTRAMUSCULAR; INTRAVENOUS at 12:05

## 2019-04-06 RX ADMIN — CEFEPIME 2 G: 2 INJECTION, POWDER, FOR SOLUTION INTRAVENOUS at 11:54

## 2019-04-06 RX ADMIN — METRONIDAZOLE 500 MG: 500 INJECTION, SOLUTION INTRAVENOUS at 11:55

## 2019-04-06 RX ADMIN — LEVOTHYROXINE SODIUM 12.5 MCG: 25 TABLET ORAL at 05:42

## 2019-04-06 RX ADMIN — CHLORHEXIDINE GLUCONATE 15 ML: 1.2 RINSE ORAL at 21:10

## 2019-04-06 RX ADMIN — Medication: at 09:00

## 2019-04-06 RX ADMIN — METRONIDAZOLE 500 MG: 500 INJECTION, SOLUTION INTRAVENOUS at 18:06

## 2019-04-06 RX ADMIN — INSULIN ASPART 4 UNITS: 100 INJECTION, SOLUTION INTRAVENOUS; SUBCUTANEOUS at 16:39

## 2019-04-06 RX ADMIN — METRONIDAZOLE 500 MG: 500 INJECTION, SOLUTION INTRAVENOUS at 04:15

## 2019-04-06 RX ADMIN — SODIUM CHLORIDE, PRESERVATIVE FREE 10 ML: 5 INJECTION INTRAVENOUS at 08:59

## 2019-04-06 RX ADMIN — FUROSEMIDE 60 MG: 10 INJECTION, SOLUTION INTRAMUSCULAR; INTRAVENOUS at 16:31

## 2019-04-06 RX ADMIN — SODIUM CHLORIDE, PRESERVATIVE FREE 10 ML: 5 INJECTION INTRAVENOUS at 09:00

## 2019-04-06 RX ADMIN — IPRATROPIUM BROMIDE AND ALBUTEROL SULFATE 3 ML: .5; 3 SOLUTION RESPIRATORY (INHALATION) at 06:39

## 2019-04-06 RX ADMIN — ZINC SULFATE CAP 220 MG (50 MG ELEMENTAL ZN) 50 MG: 220 (50 ZN) CAP at 21:11

## 2019-04-06 RX ADMIN — METHYLPREDNISOLONE SODIUM SUCCINATE 40 MG: 125 INJECTION, POWDER, FOR SOLUTION INTRAMUSCULAR; INTRAVENOUS at 04:16

## 2019-04-06 RX ADMIN — PROPOFOL 50 MCG/KG/MIN: 10 INJECTION, EMULSION INTRAVENOUS at 15:35

## 2019-04-06 RX ADMIN — PROPOFOL 50 MCG/KG/MIN: 10 INJECTION, EMULSION INTRAVENOUS at 01:46

## 2019-04-06 RX ADMIN — ATORVASTATIN CALCIUM 80 MG: 40 TABLET, FILM COATED ORAL at 22:49

## 2019-04-06 RX ADMIN — IPRATROPIUM BROMIDE AND ALBUTEROL SULFATE 3 ML: .5; 3 SOLUTION RESPIRATORY (INHALATION) at 18:28

## 2019-04-06 RX ADMIN — ASPIRIN 81 MG: 81 TABLET, CHEWABLE ORAL at 08:58

## 2019-04-06 RX ADMIN — Medication: at 18:03

## 2019-04-06 RX ADMIN — PROPOFOL 50 MCG/KG/MIN: 10 INJECTION, EMULSION INTRAVENOUS at 06:42

## 2019-04-06 RX ADMIN — METOPROLOL TARTRATE 50 MG: 50 TABLET, FILM COATED ORAL at 22:49

## 2019-04-06 RX ADMIN — FOLIC ACID 1 MG: 5 INJECTION, SOLUTION INTRAMUSCULAR; INTRAVENOUS; SUBCUTANEOUS at 09:11

## 2019-04-06 RX ADMIN — CHLORHEXIDINE GLUCONATE 15 ML: 1.2 RINSE ORAL at 08:59

## 2019-04-06 RX ADMIN — IPRATROPIUM BROMIDE AND ALBUTEROL SULFATE 3 ML: .5; 3 SOLUTION RESPIRATORY (INHALATION) at 00:42

## 2019-04-06 RX ADMIN — IPRATROPIUM BROMIDE AND ALBUTEROL SULFATE 3 ML: .5; 3 SOLUTION RESPIRATORY (INHALATION) at 13:06

## 2019-04-06 RX ADMIN — VANCOMYCIN HYDROCHLORIDE 1000 MG: 5 INJECTION, POWDER, LYOPHILIZED, FOR SOLUTION INTRAVENOUS at 11:55

## 2019-04-06 RX ADMIN — Medication: at 21:09

## 2019-04-06 RX ADMIN — RIFAXIMIN 550 MG: 550 TABLET ORAL at 08:58

## 2019-04-06 RX ADMIN — SODIUM BICARBONATE TAB 650 MG 1300 MG: 650 TAB at 21:11

## 2019-04-07 ENCOUNTER — APPOINTMENT (OUTPATIENT)
Dept: GENERAL RADIOLOGY | Facility: HOSPITAL | Age: 59
End: 2019-04-07

## 2019-04-07 LAB
A-A DO2: 50.1 MMHG (ref 0–300)
ALBUMIN SERPL-MCNC: 2.55 G/DL (ref 3.5–5.2)
ALBUMIN/GLOB SERPL: 1 G/DL
ALP SERPL-CCNC: 51 U/L (ref 39–117)
ALT SERPL W P-5'-P-CCNC: 11 U/L (ref 1–41)
ANION GAP SERPL CALCULATED.3IONS-SCNC: 16.4 MMOL/L
ARTERIAL PATENCY WRIST A: POSITIVE
AST SERPL-CCNC: 18 U/L (ref 1–40)
ATMOSPHERIC PRESS: 731 MMHG
BACTERIA SPEC RESP CULT: ABNORMAL
BASE EXCESS BLDA CALC-SCNC: -9 MMOL/L
BASOPHILS # BLD AUTO: 0 10*3/MM3 (ref 0–0.2)
BASOPHILS NFR BLD AUTO: 0 % (ref 0–1.5)
BDY SITE: ABNORMAL
BILIRUB SERPL-MCNC: 0.2 MG/DL (ref 0.2–1.2)
BODY TEMPERATURE: 98.6 C
BUN BLD-MCNC: 83 MG/DL (ref 8–23)
BUN/CREAT SERPL: 22.1 (ref 7–25)
CALCIUM SPEC-SCNC: 7.7 MG/DL (ref 8.6–10.5)
CHLORIDE SERPL-SCNC: 107 MMOL/L (ref 98–107)
CO2 SERPL-SCNC: 15.6 MMOL/L (ref 22–29)
COHGB MFR BLD: 1.7 % (ref 0–5)
CREAT BLD-MCNC: 3.75 MG/DL (ref 0.76–1.27)
CRP SERPL-MCNC: 5.63 MG/DL (ref 0–0.5)
DEPRECATED RDW RBC AUTO: 52.2 FL (ref 37–54)
EOSINOPHIL # BLD AUTO: 0 10*3/MM3 (ref 0–0.4)
EOSINOPHIL NFR BLD AUTO: 0 % (ref 0.3–6.2)
ERYTHROCYTE [DISTWIDTH] IN BLOOD BY AUTOMATED COUNT: 16.9 % (ref 12.3–15.4)
GFR SERPL CREATININE-BSD FRML MDRD: 17 ML/MIN/1.73
GLOBULIN UR ELPH-MCNC: 2.6 GM/DL
GLUCOSE BLD-MCNC: 173 MG/DL (ref 65–99)
GLUCOSE BLDC GLUCOMTR-MCNC: 151 MG/DL (ref 70–130)
GLUCOSE BLDC GLUCOMTR-MCNC: 154 MG/DL (ref 70–130)
GLUCOSE BLDC GLUCOMTR-MCNC: 178 MG/DL (ref 70–130)
GLUCOSE BLDC GLUCOMTR-MCNC: 179 MG/DL (ref 70–130)
GRAM STN SPEC: ABNORMAL
HCO3 BLDA-SCNC: 16.5 MMOL/L (ref 22–26)
HCT VFR BLD AUTO: 23.9 % (ref 37.5–51)
HCT VFR BLD CALC: 23 % (ref 42–52)
HGB BLD-MCNC: 7.5 G/DL (ref 13–17.7)
HGB BLDA-MCNC: 7.9 G/DL (ref 12–16)
HOROWITZ INDEX BLD+IHG-RTO: 30 %
IMM GRANULOCYTES # BLD AUTO: 0.06 10*3/MM3 (ref 0–0.05)
IMM GRANULOCYTES NFR BLD AUTO: 0.6 % (ref 0–0.5)
LYMPHOCYTES # BLD AUTO: 0.41 10*3/MM3 (ref 0.7–3.1)
LYMPHOCYTES NFR BLD AUTO: 4.3 % (ref 19.6–45.3)
MAGNESIUM SERPL-MCNC: 1.9 MG/DL (ref 1.6–2.4)
MCH RBC QN AUTO: 27.7 PG (ref 26.6–33)
MCHC RBC AUTO-ENTMCNC: 31.4 G/DL (ref 31.5–35.7)
MCV RBC AUTO: 88.2 FL (ref 79–97)
METHGB BLD QL: 0.2 % (ref 0–3)
MODALITY: ABNORMAL
MONOCYTES # BLD AUTO: 0.48 10*3/MM3 (ref 0.1–0.9)
MONOCYTES NFR BLD AUTO: 5 % (ref 5–12)
NEUTROPHILS # BLD AUTO: 8.61 10*3/MM3 (ref 1.4–7)
NEUTROPHILS NFR BLD AUTO: 90.1 % (ref 42.7–76)
NT-PROBNP SERPL-MCNC: ABNORMAL PG/ML (ref 5–900)
OXYHGB MFR BLDV: 95.9 % (ref 85–100)
PCO2 BLDA: 33.8 MM HG (ref 35–45)
PEEP RESPIRATORY: 5 CM[H2O]
PH BLDA: 7.31 PH UNITS (ref 7.35–7.45)
PHOSPHATE SERPL-MCNC: 7.2 MG/DL (ref 2.5–4.5)
PLATELET # BLD AUTO: 157 10*3/MM3 (ref 140–450)
PMV BLD AUTO: 12.6 FL (ref 6–12)
PO2 BLDA: 115.4 MM HG (ref 80–100)
POTASSIUM BLD-SCNC: 3.5 MMOL/L (ref 3.5–5.2)
PROT SERPL-MCNC: 5.1 G/DL (ref 6–8.5)
RBC # BLD AUTO: 2.71 10*6/MM3 (ref 4.14–5.8)
SAO2 % BLDCOA: 97.8 % (ref 90–100)
SET MECH RESP RATE: 20
SODIUM BLD-SCNC: 139 MMOL/L (ref 136–145)
VANCOMYCIN SERPL-MCNC: 22.6 MCG/ML (ref 5–40)
VENTILATOR MODE: ABNORMAL
VT ON VENT VENT: 450 ML
WBC NRBC COR # BLD: 9.56 10*3/MM3 (ref 3.4–10.8)

## 2019-04-07 PROCEDURE — 94799 UNLISTED PULMONARY SVC/PX: CPT

## 2019-04-07 PROCEDURE — 82805 BLOOD GASES W/O2 SATURATION: CPT | Performed by: NURSE PRACTITIONER

## 2019-04-07 PROCEDURE — 94003 VENT MGMT INPAT SUBQ DAY: CPT

## 2019-04-07 PROCEDURE — 63710000001 INSULIN ASPART PER 5 UNITS: Performed by: INTERNAL MEDICINE

## 2019-04-07 PROCEDURE — 25010000002 FUROSEMIDE PER 20 MG: Performed by: INTERNAL MEDICINE

## 2019-04-07 PROCEDURE — P9047 ALBUMIN (HUMAN), 25%, 50ML: HCPCS | Performed by: INTERNAL MEDICINE

## 2019-04-07 PROCEDURE — 85025 COMPLETE CBC W/AUTO DIFF WBC: CPT | Performed by: INTERNAL MEDICINE

## 2019-04-07 PROCEDURE — 71045 X-RAY EXAM CHEST 1 VIEW: CPT | Performed by: RADIOLOGY

## 2019-04-07 PROCEDURE — 80053 COMPREHEN METABOLIC PANEL: CPT | Performed by: INTERNAL MEDICINE

## 2019-04-07 PROCEDURE — 25010000002 HEPARIN (PORCINE) PER 1000 UNITS: Performed by: INTERNAL MEDICINE

## 2019-04-07 PROCEDURE — 25010000002 METHYLPREDNISOLONE PER 40 MG: Performed by: INTERNAL MEDICINE

## 2019-04-07 PROCEDURE — 84100 ASSAY OF PHOSPHORUS: CPT | Performed by: INTERNAL MEDICINE

## 2019-04-07 PROCEDURE — 83735 ASSAY OF MAGNESIUM: CPT | Performed by: INTERNAL MEDICINE

## 2019-04-07 PROCEDURE — 63710000001 INSULIN DETEMIR PER 5 UNITS: Performed by: INTERNAL MEDICINE

## 2019-04-07 PROCEDURE — 86140 C-REACTIVE PROTEIN: CPT | Performed by: INTERNAL MEDICINE

## 2019-04-07 PROCEDURE — 82962 GLUCOSE BLOOD TEST: CPT

## 2019-04-07 PROCEDURE — 80202 ASSAY OF VANCOMYCIN: CPT

## 2019-04-07 PROCEDURE — 99233 SBSQ HOSP IP/OBS HIGH 50: CPT | Performed by: INTERNAL MEDICINE

## 2019-04-07 PROCEDURE — 82375 ASSAY CARBOXYHB QUANT: CPT | Performed by: NURSE PRACTITIONER

## 2019-04-07 PROCEDURE — 25010000002 CHLOROTHIAZIDE PER 500 MG: Performed by: INTERNAL MEDICINE

## 2019-04-07 PROCEDURE — 25010000002 PROPOFOL 1000 MG/ML EMULSION: Performed by: INTERNAL MEDICINE

## 2019-04-07 PROCEDURE — 71045 X-RAY EXAM CHEST 1 VIEW: CPT

## 2019-04-07 PROCEDURE — 36600 WITHDRAWAL OF ARTERIAL BLOOD: CPT | Performed by: NURSE PRACTITIONER

## 2019-04-07 PROCEDURE — 25010000002 CEFEPIME 2 G/NS 100 ML SOLUTION: Performed by: INTERNAL MEDICINE

## 2019-04-07 PROCEDURE — 25010000002 HYDRALAZINE PER 20 MG: Performed by: INTERNAL MEDICINE

## 2019-04-07 PROCEDURE — 83880 ASSAY OF NATRIURETIC PEPTIDE: CPT | Performed by: INTERNAL MEDICINE

## 2019-04-07 PROCEDURE — 83050 HGB METHEMOGLOBIN QUAN: CPT | Performed by: NURSE PRACTITIONER

## 2019-04-07 PROCEDURE — 99232 SBSQ HOSP IP/OBS MODERATE 35: CPT | Performed by: INTERNAL MEDICINE

## 2019-04-07 PROCEDURE — 25010000002 ALBUMIN HUMAN 25% PER 50 ML: Performed by: INTERNAL MEDICINE

## 2019-04-07 RX ORDER — BUMETANIDE 0.25 MG/ML
3 INJECTION INTRAMUSCULAR; INTRAVENOUS ONCE
Status: DISCONTINUED | OUTPATIENT
Start: 2019-04-07 | End: 2019-04-07

## 2019-04-07 RX ORDER — BUMETANIDE 0.25 MG/ML
3 INJECTION INTRAMUSCULAR; INTRAVENOUS ONCE
Status: COMPLETED | OUTPATIENT
Start: 2019-04-07 | End: 2019-04-07

## 2019-04-07 RX ORDER — HEPARIN SODIUM 5000 [USP'U]/ML
5000 INJECTION, SOLUTION INTRAVENOUS; SUBCUTANEOUS EVERY 12 HOURS SCHEDULED
Status: DISCONTINUED | OUTPATIENT
Start: 2019-04-07 | End: 2019-04-09

## 2019-04-07 RX ORDER — POTASSIUM CHLORIDE 20MEQ/15ML
10 LIQUID (ML) ORAL ONCE
Status: COMPLETED | OUTPATIENT
Start: 2019-04-07 | End: 2019-04-07

## 2019-04-07 RX ADMIN — FUROSEMIDE 60 MG: 10 INJECTION, SOLUTION INTRAMUSCULAR; INTRAVENOUS at 03:58

## 2019-04-07 RX ADMIN — PROPOFOL 40 MCG/KG/MIN: 10 INJECTION, EMULSION INTRAVENOUS at 15:48

## 2019-04-07 RX ADMIN — SODIUM BICARBONATE TAB 650 MG 1300 MG: 650 TAB at 17:26

## 2019-04-07 RX ADMIN — SODIUM CHLORIDE, PRESERVATIVE FREE 10 ML: 5 INJECTION INTRAVENOUS at 20:40

## 2019-04-07 RX ADMIN — HYDRALAZINE HYDROCHLORIDE 10 MG: 20 INJECTION INTRAMUSCULAR; INTRAVENOUS at 11:07

## 2019-04-07 RX ADMIN — ALBUMIN (HUMAN) 25 G: 0.25 INJECTION, SOLUTION INTRAVENOUS at 06:26

## 2019-04-07 RX ADMIN — METOPROLOL TARTRATE 50 MG: 50 TABLET, FILM COATED ORAL at 20:40

## 2019-04-07 RX ADMIN — PROPOFOL 50 MCG/KG/MIN: 10 INJECTION, EMULSION INTRAVENOUS at 07:33

## 2019-04-07 RX ADMIN — IPRATROPIUM BROMIDE AND ALBUTEROL SULFATE 3 ML: .5; 3 SOLUTION RESPIRATORY (INHALATION) at 00:46

## 2019-04-07 RX ADMIN — METOPROLOL TARTRATE 50 MG: 50 TABLET, FILM COATED ORAL at 08:10

## 2019-04-07 RX ADMIN — Medication 1 CAPSULE: at 08:09

## 2019-04-07 RX ADMIN — SODIUM CHLORIDE, PRESERVATIVE FREE 10 ML: 5 INJECTION INTRAVENOUS at 20:42

## 2019-04-07 RX ADMIN — LABETALOL HYDROCHLORIDE 10 MG: 5 INJECTION, SOLUTION INTRAVENOUS at 16:03

## 2019-04-07 RX ADMIN — SODIUM CHLORIDE, PRESERVATIVE FREE 10 ML: 5 INJECTION INTRAVENOUS at 08:13

## 2019-04-07 RX ADMIN — ATORVASTATIN CALCIUM 80 MG: 40 TABLET, FILM COATED ORAL at 20:40

## 2019-04-07 RX ADMIN — CHLOROTHIAZIDE SODIUM 500 MG: 500 INJECTION, POWDER, LYOPHILIZED, FOR SOLUTION INTRAVENOUS at 15:14

## 2019-04-07 RX ADMIN — PROPOFOL 50 MCG/KG/MIN: 10 INJECTION, EMULSION INTRAVENOUS at 00:54

## 2019-04-07 RX ADMIN — IPRATROPIUM BROMIDE AND ALBUTEROL SULFATE 3 ML: .5; 3 SOLUTION RESPIRATORY (INHALATION) at 18:37

## 2019-04-07 RX ADMIN — Medication: at 07:37

## 2019-04-07 RX ADMIN — SODIUM BICARBONATE TAB 650 MG 1300 MG: 650 TAB at 08:09

## 2019-04-07 RX ADMIN — INSULIN DETEMIR 10 UNITS: 100 INJECTION, SOLUTION SUBCUTANEOUS at 06:26

## 2019-04-07 RX ADMIN — HEPARIN SODIUM 5000 UNITS: 5000 INJECTION INTRAVENOUS; SUBCUTANEOUS at 10:21

## 2019-04-07 RX ADMIN — IPRATROPIUM BROMIDE AND ALBUTEROL SULFATE 3 ML: .5; 3 SOLUTION RESPIRATORY (INHALATION) at 13:08

## 2019-04-07 RX ADMIN — ZINC SULFATE CAP 220 MG (50 MG ELEMENTAL ZN) 50 MG: 220 (50 ZN) CAP at 20:47

## 2019-04-07 RX ADMIN — PROPOFOL 40 MCG/KG/MIN: 10 INJECTION, EMULSION INTRAVENOUS at 20:41

## 2019-04-07 RX ADMIN — ALBUMIN (HUMAN) 25 G: 0.25 INJECTION, SOLUTION INTRAVENOUS at 17:26

## 2019-04-07 RX ADMIN — LACTULOSE 20 G: 10 SOLUTION ORAL at 08:10

## 2019-04-07 RX ADMIN — INSULIN ASPART 2 UNITS: 100 INJECTION, SOLUTION INTRAVENOUS; SUBCUTANEOUS at 17:29

## 2019-04-07 RX ADMIN — IPRATROPIUM BROMIDE AND ALBUTEROL SULFATE 3 ML: .5; 3 SOLUTION RESPIRATORY (INHALATION) at 06:13

## 2019-04-07 RX ADMIN — METHYLPREDNISOLONE SODIUM SUCCINATE 40 MG: 40 INJECTION, POWDER, FOR SOLUTION INTRAMUSCULAR; INTRAVENOUS at 06:28

## 2019-04-07 RX ADMIN — INSULIN ASPART 2 UNITS: 100 INJECTION, SOLUTION INTRAVENOUS; SUBCUTANEOUS at 11:10

## 2019-04-07 RX ADMIN — CEFEPIME 2 G: 2 INJECTION, POWDER, FOR SOLUTION INTRAVENOUS at 10:21

## 2019-04-07 RX ADMIN — SODIUM CHLORIDE, PRESERVATIVE FREE 3 ML: 5 INJECTION INTRAVENOUS at 08:10

## 2019-04-07 RX ADMIN — METRONIDAZOLE 500 MG: 500 INJECTION, SOLUTION INTRAVENOUS at 19:27

## 2019-04-07 RX ADMIN — METRONIDAZOLE 500 MG: 500 INJECTION, SOLUTION INTRAVENOUS at 10:21

## 2019-04-07 RX ADMIN — LEVOTHYROXINE SODIUM 12.5 MCG: 25 TABLET ORAL at 07:34

## 2019-04-07 RX ADMIN — BUMETANIDE 3 MG: 0.25 INJECTION INTRAMUSCULAR; INTRAVENOUS at 14:06

## 2019-04-07 RX ADMIN — RIFAXIMIN 550 MG: 550 TABLET ORAL at 20:40

## 2019-04-07 RX ADMIN — Medication 100 MG: at 08:10

## 2019-04-07 RX ADMIN — Medication: at 20:40

## 2019-04-07 RX ADMIN — Medication: at 07:33

## 2019-04-07 RX ADMIN — RIFAXIMIN 550 MG: 550 TABLET ORAL at 08:12

## 2019-04-07 RX ADMIN — CHLORHEXIDINE GLUCONATE 15 ML: 1.2 RINSE ORAL at 08:11

## 2019-04-07 RX ADMIN — CHLORHEXIDINE GLUCONATE 15 ML: 1.2 RINSE ORAL at 20:40

## 2019-04-07 RX ADMIN — PANTOPRAZOLE SODIUM 40 MG: 40 INJECTION, POWDER, FOR SOLUTION INTRAVENOUS at 07:33

## 2019-04-07 RX ADMIN — SODIUM BICARBONATE TAB 650 MG 1300 MG: 650 TAB at 20:41

## 2019-04-07 RX ADMIN — FOLIC ACID 1 MG: 5 INJECTION, SOLUTION INTRAMUSCULAR; INTRAVENOUS; SUBCUTANEOUS at 08:14

## 2019-04-07 RX ADMIN — Medication: at 17:26

## 2019-04-07 RX ADMIN — HEPARIN SODIUM 5000 UNITS: 5000 INJECTION INTRAVENOUS; SUBCUTANEOUS at 20:40

## 2019-04-07 RX ADMIN — ASPIRIN 81 MG: 81 TABLET, CHEWABLE ORAL at 08:09

## 2019-04-07 RX ADMIN — INSULIN ASPART 2 UNITS: 100 INJECTION, SOLUTION INTRAVENOUS; SUBCUTANEOUS at 20:40

## 2019-04-07 RX ADMIN — POTASSIUM CHLORIDE 10 MEQ: 20 SOLUTION ORAL at 10:21

## 2019-04-07 RX ADMIN — SODIUM CHLORIDE, PRESERVATIVE FREE 10 ML: 5 INJECTION INTRAVENOUS at 08:11

## 2019-04-07 RX ADMIN — ZINC SULFATE CAP 220 MG (50 MG ELEMENTAL ZN) 50 MG: 220 (50 ZN) CAP at 08:10

## 2019-04-07 RX ADMIN — METHYLPREDNISOLONE SODIUM SUCCINATE 40 MG: 40 INJECTION, POWDER, FOR SOLUTION INTRAMUSCULAR; INTRAVENOUS at 21:01

## 2019-04-07 RX ADMIN — METRONIDAZOLE 500 MG: 500 INJECTION, SOLUTION INTRAVENOUS at 03:58

## 2019-04-07 RX ADMIN — PROPOFOL 50 MCG/KG/MIN: 10 INJECTION, EMULSION INTRAVENOUS at 11:08

## 2019-04-07 RX ADMIN — INSULIN ASPART 2 UNITS: 100 INJECTION, SOLUTION INTRAVENOUS; SUBCUTANEOUS at 06:35

## 2019-04-07 RX ADMIN — METHYLPREDNISOLONE SODIUM SUCCINATE 40 MG: 40 INJECTION, POWDER, FOR SOLUTION INTRAMUSCULAR; INTRAVENOUS at 15:15

## 2019-04-07 RX ADMIN — SODIUM CHLORIDE, PRESERVATIVE FREE 3 ML: 5 INJECTION INTRAVENOUS at 20:42

## 2019-04-07 RX ADMIN — Medication: at 11:08

## 2019-04-08 ENCOUNTER — APPOINTMENT (OUTPATIENT)
Dept: GENERAL RADIOLOGY | Facility: HOSPITAL | Age: 59
End: 2019-04-08

## 2019-04-08 LAB
A-A DO2: 71.8 MMHG (ref 0–300)
A-A DO2: 95.6 MMHG (ref 0–300)
A-A DO2: 98.3 MMHG (ref 0–300)
ALBUMIN SERPL-MCNC: 3.05 G/DL (ref 3.5–5.2)
ALBUMIN/GLOB SERPL: 1.2 G/DL
ALP SERPL-CCNC: 55 U/L (ref 39–117)
ALT SERPL W P-5'-P-CCNC: 10 U/L (ref 1–41)
ANION GAP SERPL CALCULATED.3IONS-SCNC: 19.4 MMOL/L
ARTERIAL PATENCY WRIST A: ABNORMAL
ARTERIAL PATENCY WRIST A: POSITIVE
ARTERIAL PATENCY WRIST A: POSITIVE
AST SERPL-CCNC: 24 U/L (ref 1–40)
ATMOSPHERIC PRESS: 727 MMHG
ATMOSPHERIC PRESS: 727 MMHG
ATMOSPHERIC PRESS: 730 MMHG
BACTERIA SPEC AEROBE CULT: NORMAL
BACTERIA SPEC AEROBE CULT: NORMAL
BASE EXCESS BLDA CALC-SCNC: -7.7 MMOL/L
BASE EXCESS BLDA CALC-SCNC: -9.2 MMOL/L
BASE EXCESS BLDA CALC-SCNC: -9.3 MMOL/L
BASOPHILS # BLD AUTO: 0 10*3/MM3 (ref 0–0.2)
BASOPHILS NFR BLD AUTO: 0 % (ref 0–1.5)
BDY SITE: ABNORMAL
BILIRUB SERPL-MCNC: 0.2 MG/DL (ref 0.2–1.2)
BODY TEMPERATURE: 98.6 C
BUN BLD-MCNC: 86 MG/DL (ref 8–23)
BUN/CREAT SERPL: 22.2 (ref 7–25)
CALCIUM SPEC-SCNC: 8.1 MG/DL (ref 8.6–10.5)
CHLORIDE SERPL-SCNC: 107 MMOL/L (ref 98–107)
CO2 SERPL-SCNC: 15.6 MMOL/L (ref 22–29)
COHGB MFR BLD: 0.8 % (ref 0–5)
COHGB MFR BLD: 0.9 % (ref 0–5)
COHGB MFR BLD: 1.2 % (ref 0–5)
CREAT BLD-MCNC: 3.87 MG/DL (ref 0.76–1.27)
CRP SERPL-MCNC: 4.14 MG/DL (ref 0–0.5)
DEPRECATED RDW RBC AUTO: 52.7 FL (ref 37–54)
EOSINOPHIL # BLD AUTO: 0 10*3/MM3 (ref 0–0.4)
EOSINOPHIL NFR BLD AUTO: 0 % (ref 0.3–6.2)
ERYTHROCYTE [DISTWIDTH] IN BLOOD BY AUTOMATED COUNT: 17.5 % (ref 12.3–15.4)
GFR SERPL CREATININE-BSD FRML MDRD: 16 ML/MIN/1.73
GLOBULIN UR ELPH-MCNC: 2.5 GM/DL
GLUCOSE BLD-MCNC: 150 MG/DL (ref 65–99)
GLUCOSE BLDC GLUCOMTR-MCNC: 139 MG/DL (ref 70–130)
GLUCOSE BLDC GLUCOMTR-MCNC: 141 MG/DL (ref 70–130)
GLUCOSE BLDC GLUCOMTR-MCNC: 146 MG/DL (ref 70–130)
GLUCOSE BLDC GLUCOMTR-MCNC: 155 MG/DL (ref 70–130)
HCO3 BLDA-SCNC: 15.3 MMOL/L (ref 22–26)
HCO3 BLDA-SCNC: 15.6 MMOL/L (ref 22–26)
HCO3 BLDA-SCNC: 16.9 MMOL/L (ref 22–26)
HCT VFR BLD AUTO: 27.1 % (ref 37.5–51)
HCT VFR BLD CALC: 29 % (ref 42–52)
HGB BLD-MCNC: 8.7 G/DL (ref 13–17.7)
HGB BLDA-MCNC: 9.7 G/DL (ref 12–16)
HGB BLDA-MCNC: 9.8 G/DL (ref 12–16)
HGB BLDA-MCNC: 9.9 G/DL (ref 12–16)
HOROWITZ INDEX BLD+IHG-RTO: 30 %
IMM GRANULOCYTES # BLD AUTO: 0.17 10*3/MM3 (ref 0–0.05)
IMM GRANULOCYTES NFR BLD AUTO: 1.9 % (ref 0–0.5)
LYMPHOCYTES # BLD AUTO: 0.47 10*3/MM3 (ref 0.7–3.1)
LYMPHOCYTES NFR BLD AUTO: 5.3 % (ref 19.6–45.3)
MAGNESIUM SERPL-MCNC: 2.6 MG/DL (ref 1.6–2.4)
MCH RBC QN AUTO: 28 PG (ref 26.6–33)
MCHC RBC AUTO-ENTMCNC: 32.1 G/DL (ref 31.5–35.7)
MCV RBC AUTO: 87.1 FL (ref 79–97)
METHGB BLD QL: 0.1 % (ref 0–3)
METHGB BLD QL: 0.3 % (ref 0–3)
METHGB BLD QL: 0.3 % (ref 0–3)
MODALITY: ABNORMAL
MONOCYTES # BLD AUTO: 0.49 10*3/MM3 (ref 0.1–0.9)
MONOCYTES NFR BLD AUTO: 5.5 % (ref 5–12)
NEUTROPHILS # BLD AUTO: 7.7 10*3/MM3 (ref 1.4–7)
NEUTROPHILS NFR BLD AUTO: 87.3 % (ref 42.7–76)
NOTE: ABNORMAL
OXYHGB MFR BLDV: 91.6 % (ref 85–100)
OXYHGB MFR BLDV: 92.4 % (ref 85–100)
OXYHGB MFR BLDV: 95.7 % (ref 85–100)
PCO2 BLDA: 29.1 MM HG (ref 35–45)
PCO2 BLDA: 30.1 MM HG (ref 35–45)
PCO2 BLDA: 31.2 MM HG (ref 35–45)
PCO2 TEMP ADJ BLD: ABNORMAL MM HG (ref 35–48)
PEEP RESPIRATORY: 5 CM[H2O]
PH BLDA: 7.33 PH UNITS (ref 7.35–7.45)
PH BLDA: 7.34 PH UNITS (ref 7.35–7.45)
PH BLDA: 7.35 PH UNITS (ref 7.35–7.45)
PH, TEMP CORRECTED: ABNORMAL PH UNITS (ref 7.35–7.45)
PLATELET # BLD AUTO: 165 10*3/MM3 (ref 140–450)
PMV BLD AUTO: 13.1 FL (ref 6–12)
PO2 BLDA: 70.3 MM HG (ref 80–100)
PO2 BLDA: 74.2 MM HG (ref 80–100)
PO2 BLDA: 96.4 MM HG (ref 80–100)
PO2 TEMP ADJ BLD: ABNORMAL MM HG (ref 83–108)
POTASSIUM BLD-SCNC: 3.6 MMOL/L (ref 3.5–5.2)
PROT SERPL-MCNC: 5.5 G/DL (ref 6–8.5)
PSV: 8 CMH2O
RBC # BLD AUTO: 3.11 10*6/MM3 (ref 4.14–5.8)
SAO2 % BLDCOA: 92.4 % (ref 90–100)
SAO2 % BLDCOA: 93.8 % (ref 90–100)
SAO2 % BLDCOA: 96.9 % (ref 90–100)
SET MECH RESP RATE: 18
SET MECH RESP RATE: 20
SODIUM BLD-SCNC: 142 MMOL/L (ref 136–145)
VANCOMYCIN SERPL-MCNC: 23.6 MCG/ML (ref 5–40)
VENTILATOR MODE: ABNORMAL
VENTILATOR MODE: ABNORMAL
VENTILATOR MODE: AC
VT ON VENT VENT: 450 ML
VT ON VENT VENT: 450 ML
WBC NRBC COR # BLD: 8.83 10*3/MM3 (ref 3.4–10.8)

## 2019-04-08 PROCEDURE — 82805 BLOOD GASES W/O2 SATURATION: CPT | Performed by: INTERNAL MEDICINE

## 2019-04-08 PROCEDURE — 82962 GLUCOSE BLOOD TEST: CPT

## 2019-04-08 PROCEDURE — 82375 ASSAY CARBOXYHB QUANT: CPT | Performed by: PHYSICIAN ASSISTANT

## 2019-04-08 PROCEDURE — 94799 UNLISTED PULMONARY SVC/PX: CPT

## 2019-04-08 PROCEDURE — 36600 WITHDRAWAL OF ARTERIAL BLOOD: CPT | Performed by: PHYSICIAN ASSISTANT

## 2019-04-08 PROCEDURE — 99232 SBSQ HOSP IP/OBS MODERATE 35: CPT | Performed by: INTERNAL MEDICINE

## 2019-04-08 PROCEDURE — 94003 VENT MGMT INPAT SUBQ DAY: CPT

## 2019-04-08 PROCEDURE — 83050 HGB METHEMOGLOBIN QUAN: CPT | Performed by: INTERNAL MEDICINE

## 2019-04-08 PROCEDURE — 71045 X-RAY EXAM CHEST 1 VIEW: CPT | Performed by: RADIOLOGY

## 2019-04-08 PROCEDURE — 25010000002 HEPARIN (PORCINE) PER 1000 UNITS: Performed by: INTERNAL MEDICINE

## 2019-04-08 PROCEDURE — 36600 WITHDRAWAL OF ARTERIAL BLOOD: CPT | Performed by: INTERNAL MEDICINE

## 2019-04-08 PROCEDURE — 25010000002 HYDRALAZINE PER 20 MG: Performed by: INTERNAL MEDICINE

## 2019-04-08 PROCEDURE — 83050 HGB METHEMOGLOBIN QUAN: CPT | Performed by: PHYSICIAN ASSISTANT

## 2019-04-08 PROCEDURE — 25010000002 ALBUMIN HUMAN 25% PER 50 ML: Performed by: INTERNAL MEDICINE

## 2019-04-08 PROCEDURE — 25010000002 METHYLPREDNISOLONE PER 40 MG: Performed by: INTERNAL MEDICINE

## 2019-04-08 PROCEDURE — 82805 BLOOD GASES W/O2 SATURATION: CPT | Performed by: PHYSICIAN ASSISTANT

## 2019-04-08 PROCEDURE — 99233 SBSQ HOSP IP/OBS HIGH 50: CPT | Performed by: INTERNAL MEDICINE

## 2019-04-08 PROCEDURE — 82375 ASSAY CARBOXYHB QUANT: CPT | Performed by: INTERNAL MEDICINE

## 2019-04-08 PROCEDURE — 80053 COMPREHEN METABOLIC PANEL: CPT | Performed by: INTERNAL MEDICINE

## 2019-04-08 PROCEDURE — 99291 CRITICAL CARE FIRST HOUR: CPT | Performed by: INTERNAL MEDICINE

## 2019-04-08 PROCEDURE — 85025 COMPLETE CBC W/AUTO DIFF WBC: CPT | Performed by: INTERNAL MEDICINE

## 2019-04-08 PROCEDURE — 93010 ELECTROCARDIOGRAM REPORT: CPT | Performed by: INTERNAL MEDICINE

## 2019-04-08 PROCEDURE — 93005 ELECTROCARDIOGRAM TRACING: CPT | Performed by: INTERNAL MEDICINE

## 2019-04-08 PROCEDURE — 25010000002 CHLOROTHIAZIDE PER 500 MG: Performed by: INTERNAL MEDICINE

## 2019-04-08 PROCEDURE — 71045 X-RAY EXAM CHEST 1 VIEW: CPT

## 2019-04-08 PROCEDURE — 86140 C-REACTIVE PROTEIN: CPT | Performed by: INTERNAL MEDICINE

## 2019-04-08 PROCEDURE — 83735 ASSAY OF MAGNESIUM: CPT | Performed by: INTERNAL MEDICINE

## 2019-04-08 PROCEDURE — 80202 ASSAY OF VANCOMYCIN: CPT

## 2019-04-08 PROCEDURE — 25010000002 PROPOFOL 1000 MG/ML EMULSION: Performed by: INTERNAL MEDICINE

## 2019-04-08 PROCEDURE — P9047 ALBUMIN (HUMAN), 25%, 50ML: HCPCS | Performed by: INTERNAL MEDICINE

## 2019-04-08 RX ORDER — METHYLPREDNISOLONE SODIUM SUCCINATE 40 MG/ML
40 INJECTION, POWDER, LYOPHILIZED, FOR SOLUTION INTRAMUSCULAR; INTRAVENOUS EVERY 24 HOURS
Status: DISCONTINUED | OUTPATIENT
Start: 2019-04-09 | End: 2019-04-11

## 2019-04-08 RX ORDER — BUMETANIDE 0.25 MG/ML
3 INJECTION INTRAMUSCULAR; INTRAVENOUS ONCE
Status: COMPLETED | OUTPATIENT
Start: 2019-04-08 | End: 2019-04-08

## 2019-04-08 RX ORDER — CALCIUM ACETATE 667 MG/1
667 CAPSULE ORAL EVERY 6 HOURS
Status: DISCONTINUED | OUTPATIENT
Start: 2019-04-08 | End: 2019-04-23 | Stop reason: HOSPADM

## 2019-04-08 RX ADMIN — Medication 1 CAPSULE: at 08:25

## 2019-04-08 RX ADMIN — PROPOFOL 40 MCG/KG/MIN: 10 INJECTION, EMULSION INTRAVENOUS at 02:31

## 2019-04-08 RX ADMIN — CEFEPIME HYDROCHLORIDE 1 G: 1 INJECTION, POWDER, FOR SOLUTION INTRAMUSCULAR; INTRAVENOUS at 10:37

## 2019-04-08 RX ADMIN — SODIUM BICARBONATE TAB 650 MG 1300 MG: 650 TAB at 08:24

## 2019-04-08 RX ADMIN — Medication: at 12:56

## 2019-04-08 RX ADMIN — RIFAXIMIN 550 MG: 550 TABLET ORAL at 09:26

## 2019-04-08 RX ADMIN — RIFAXIMIN 550 MG: 550 TABLET ORAL at 20:41

## 2019-04-08 RX ADMIN — METOPROLOL TARTRATE 50 MG: 50 TABLET, FILM COATED ORAL at 08:23

## 2019-04-08 RX ADMIN — ATORVASTATIN CALCIUM 80 MG: 40 TABLET, FILM COATED ORAL at 20:41

## 2019-04-08 RX ADMIN — BUMETANIDE 3 MG: 0.25 INJECTION INTRAMUSCULAR; INTRAVENOUS at 14:36

## 2019-04-08 RX ADMIN — LACTULOSE 20 G: 10 SOLUTION ORAL at 08:23

## 2019-04-08 RX ADMIN — HYDRALAZINE HYDROCHLORIDE 10 MG: 20 INJECTION INTRAMUSCULAR; INTRAVENOUS at 10:55

## 2019-04-08 RX ADMIN — LABETALOL HYDROCHLORIDE 10 MG: 5 INJECTION, SOLUTION INTRAVENOUS at 23:27

## 2019-04-08 RX ADMIN — SODIUM CHLORIDE, PRESERVATIVE FREE 10 ML: 5 INJECTION INTRAVENOUS at 20:44

## 2019-04-08 RX ADMIN — IPRATROPIUM BROMIDE AND ALBUTEROL SULFATE 3 ML: .5; 3 SOLUTION RESPIRATORY (INHALATION) at 06:17

## 2019-04-08 RX ADMIN — SODIUM BICARBONATE TAB 650 MG 1300 MG: 650 TAB at 20:41

## 2019-04-08 RX ADMIN — IPRATROPIUM BROMIDE AND ALBUTEROL SULFATE 3 ML: .5; 3 SOLUTION RESPIRATORY (INHALATION) at 18:11

## 2019-04-08 RX ADMIN — Medication: at 17:18

## 2019-04-08 RX ADMIN — PANTOPRAZOLE SODIUM 40 MG: 40 INJECTION, POWDER, FOR SOLUTION INTRAVENOUS at 05:38

## 2019-04-08 RX ADMIN — HEPARIN SODIUM 5000 UNITS: 5000 INJECTION INTRAVENOUS; SUBCUTANEOUS at 08:25

## 2019-04-08 RX ADMIN — ZINC SULFATE CAP 220 MG (50 MG ELEMENTAL ZN) 50 MG: 220 (50 ZN) CAP at 08:24

## 2019-04-08 RX ADMIN — CALCIUM ACETATE 667 MG: 667 CAPSULE ORAL at 15:25

## 2019-04-08 RX ADMIN — SODIUM CHLORIDE, PRESERVATIVE FREE 10 ML: 5 INJECTION INTRAVENOUS at 08:11

## 2019-04-08 RX ADMIN — SODIUM CHLORIDE, PRESERVATIVE FREE 3 ML: 5 INJECTION INTRAVENOUS at 20:45

## 2019-04-08 RX ADMIN — CALCIUM ACETATE 667 MG: 667 CAPSULE ORAL at 21:14

## 2019-04-08 RX ADMIN — Medication: at 16:37

## 2019-04-08 RX ADMIN — ZINC SULFATE CAP 220 MG (50 MG ELEMENTAL ZN) 50 MG: 220 (50 ZN) CAP at 20:41

## 2019-04-08 RX ADMIN — LABETALOL HYDROCHLORIDE 10 MG: 5 INJECTION, SOLUTION INTRAVENOUS at 00:21

## 2019-04-08 RX ADMIN — PROPOFOL 30 MCG/KG/MIN: 10 INJECTION, EMULSION INTRAVENOUS at 20:42

## 2019-04-08 RX ADMIN — FOLIC ACID 1 MG: 5 INJECTION, SOLUTION INTRAMUSCULAR; INTRAVENOUS; SUBCUTANEOUS at 08:26

## 2019-04-08 RX ADMIN — Medication: at 20:42

## 2019-04-08 RX ADMIN — CHLOROTHIAZIDE SODIUM 500 MG: 500 INJECTION, POWDER, LYOPHILIZED, FOR SOLUTION INTRAVENOUS at 15:26

## 2019-04-08 RX ADMIN — CHLORHEXIDINE GLUCONATE 15 ML: 1.2 RINSE ORAL at 20:42

## 2019-04-08 RX ADMIN — METRONIDAZOLE 500 MG: 500 INJECTION, SOLUTION INTRAVENOUS at 18:14

## 2019-04-08 RX ADMIN — PROPOFOL 25 MCG/KG/MIN: 10 INJECTION, EMULSION INTRAVENOUS at 14:36

## 2019-04-08 RX ADMIN — IPRATROPIUM BROMIDE AND ALBUTEROL SULFATE 3 ML: .5; 3 SOLUTION RESPIRATORY (INHALATION) at 12:23

## 2019-04-08 RX ADMIN — METHYLPREDNISOLONE SODIUM SUCCINATE 40 MG: 40 INJECTION, POWDER, FOR SOLUTION INTRAMUSCULAR; INTRAVENOUS at 05:39

## 2019-04-08 RX ADMIN — IPRATROPIUM BROMIDE AND ALBUTEROL SULFATE 3 ML: .5; 3 SOLUTION RESPIRATORY (INHALATION) at 01:05

## 2019-04-08 RX ADMIN — CHLORHEXIDINE GLUCONATE 15 ML: 1.2 RINSE ORAL at 08:26

## 2019-04-08 RX ADMIN — ASPIRIN 81 MG: 81 TABLET, CHEWABLE ORAL at 08:24

## 2019-04-08 RX ADMIN — HEPARIN SODIUM 5000 UNITS: 5000 INJECTION INTRAVENOUS; SUBCUTANEOUS at 20:40

## 2019-04-08 RX ADMIN — Medication 100 MG: at 08:24

## 2019-04-08 RX ADMIN — METOPROLOL TARTRATE 75 MG: 50 TABLET, FILM COATED ORAL at 20:41

## 2019-04-08 RX ADMIN — ALBUMIN (HUMAN) 25 G: 0.25 INJECTION, SOLUTION INTRAVENOUS at 05:39

## 2019-04-08 RX ADMIN — LEVOTHYROXINE SODIUM 12.5 MCG: 25 TABLET ORAL at 05:39

## 2019-04-08 RX ADMIN — METRONIDAZOLE 500 MG: 500 INJECTION, SOLUTION INTRAVENOUS at 10:38

## 2019-04-08 RX ADMIN — METRONIDAZOLE 500 MG: 500 INJECTION, SOLUTION INTRAVENOUS at 02:31

## 2019-04-08 RX ADMIN — HYDRALAZINE HYDROCHLORIDE 10 MG: 20 INJECTION INTRAMUSCULAR; INTRAVENOUS at 02:31

## 2019-04-08 RX ADMIN — Medication: at 08:10

## 2019-04-08 RX ADMIN — SODIUM BICARBONATE TAB 650 MG 1300 MG: 650 TAB at 15:25

## 2019-04-09 ENCOUNTER — APPOINTMENT (OUTPATIENT)
Dept: GENERAL RADIOLOGY | Facility: HOSPITAL | Age: 59
End: 2019-04-09

## 2019-04-09 LAB
A-A DO2: 68.5 MMHG (ref 0–300)
ACANTHOCYTES BLD QL SMEAR: NORMAL
ALBUMIN SERPL-MCNC: 2.83 G/DL (ref 3.5–5.2)
ALBUMIN/GLOB SERPL: 1.2 G/DL
ALP SERPL-CCNC: 47 U/L (ref 39–117)
ALT SERPL W P-5'-P-CCNC: 10 U/L (ref 1–41)
AMMONIA BLD-SCNC: 49 UMOL/L (ref 16–60)
ANION GAP SERPL CALCULATED.3IONS-SCNC: 18.8 MMOL/L
ANISOCYTOSIS BLD QL: NORMAL
APTT PPP: 32.6 SECONDS (ref 23.8–36.1)
APTT PPP: >100 SECONDS (ref 23.8–36.1)
ARTERIAL PATENCY WRIST A: POSITIVE
AST SERPL-CCNC: 22 U/L (ref 1–40)
ATMOSPHERIC PRESS: 727 MMHG
BASE EXCESS BLDA CALC-SCNC: -8.4 MMOL/L
BASO STIPL COARSE BLD QL SMEAR: NORMAL
BASOPHILS # BLD AUTO: 0 10*3/MM3 (ref 0–0.2)
BASOPHILS # BLD AUTO: 0.01 10*3/MM3 (ref 0–0.2)
BASOPHILS NFR BLD AUTO: 0 % (ref 0–1.5)
BASOPHILS NFR BLD AUTO: 0.1 % (ref 0–1.5)
BDY SITE: ABNORMAL
BILIRUB SERPL-MCNC: 0.3 MG/DL (ref 0.2–1.2)
BODY TEMPERATURE: 98.6 C
BUN BLD-MCNC: 97 MG/DL (ref 8–23)
BUN/CREAT SERPL: 24.6 (ref 7–25)
CALCIUM SPEC-SCNC: 7.9 MG/DL (ref 8.6–10.5)
CHLORIDE SERPL-SCNC: 106 MMOL/L (ref 98–107)
CO2 SERPL-SCNC: 17.2 MMOL/L (ref 22–29)
COHGB MFR BLD: 1.7 % (ref 0–5)
CREAT BLD-MCNC: 3.95 MG/DL (ref 0.76–1.27)
DEPRECATED RDW RBC AUTO: 52.9 FL (ref 37–54)
DEPRECATED RDW RBC AUTO: 53.3 FL (ref 37–54)
EOSINOPHIL # BLD AUTO: 0.01 10*3/MM3 (ref 0–0.4)
EOSINOPHIL # BLD AUTO: 0.05 10*3/MM3 (ref 0–0.4)
EOSINOPHIL NFR BLD AUTO: 0.1 % (ref 0.3–6.2)
EOSINOPHIL NFR BLD AUTO: 0.6 % (ref 0.3–6.2)
ERYTHROCYTE [DISTWIDTH] IN BLOOD BY AUTOMATED COUNT: 17.6 % (ref 12.3–15.4)
ERYTHROCYTE [DISTWIDTH] IN BLOOD BY AUTOMATED COUNT: 17.7 % (ref 12.3–15.4)
GFR SERPL CREATININE-BSD FRML MDRD: 16 ML/MIN/1.73
GLOBULIN UR ELPH-MCNC: 2.4 GM/DL
GLUCOSE BLD-MCNC: 135 MG/DL (ref 65–99)
GLUCOSE BLDC GLUCOMTR-MCNC: 136 MG/DL (ref 70–130)
GLUCOSE BLDC GLUCOMTR-MCNC: 167 MG/DL (ref 70–130)
GLUCOSE BLDC GLUCOMTR-MCNC: 83 MG/DL (ref 70–130)
GLUCOSE BLDC GLUCOMTR-MCNC: 92 MG/DL (ref 70–130)
HCO3 BLDA-SCNC: 16.4 MMOL/L (ref 22–26)
HCT VFR BLD AUTO: 24.2 % (ref 37.5–51)
HCT VFR BLD AUTO: 24.6 % (ref 37.5–51)
HCT VFR BLD AUTO: 27.9 % (ref 37.5–51)
HCT VFR BLD CALC: 27 % (ref 42–52)
HEMOCCULT STL QL: POSITIVE
HGB BLD-MCNC: 7.7 G/DL (ref 13–17.7)
HGB BLD-MCNC: 7.8 G/DL (ref 13–17.7)
HGB BLD-MCNC: 8.9 G/DL (ref 13–17.7)
HGB BLDA-MCNC: 9.2 G/DL (ref 12–16)
HOROWITZ INDEX BLD+IHG-RTO: 30 %
HYPOCHROMIA BLD QL: NORMAL
IMM GRANULOCYTES # BLD AUTO: 0.07 10*3/MM3 (ref 0–0.05)
IMM GRANULOCYTES # BLD AUTO: 0.09 10*3/MM3 (ref 0–0.05)
IMM GRANULOCYTES NFR BLD AUTO: 0.6 % (ref 0–0.5)
IMM GRANULOCYTES NFR BLD AUTO: 1 % (ref 0–0.5)
INR PPP: 1.26 (ref 0.9–1.1)
LARGE PLATELETS: NORMAL
LYMPHOCYTES # BLD AUTO: 0.87 10*3/MM3 (ref 0.7–3.1)
LYMPHOCYTES # BLD AUTO: 1.12 10*3/MM3 (ref 0.7–3.1)
LYMPHOCYTES NFR BLD AUTO: 12.7 % (ref 19.6–45.3)
LYMPHOCYTES NFR BLD AUTO: 8 % (ref 19.6–45.3)
MAGNESIUM SERPL-MCNC: 2 MG/DL (ref 1.6–2.4)
MCH RBC QN AUTO: 28.2 PG (ref 26.6–33)
MCH RBC QN AUTO: 28.2 PG (ref 26.6–33)
MCHC RBC AUTO-ENTMCNC: 31.7 G/DL (ref 31.5–35.7)
MCHC RBC AUTO-ENTMCNC: 31.9 G/DL (ref 31.5–35.7)
MCV RBC AUTO: 88.3 FL (ref 79–97)
MCV RBC AUTO: 88.8 FL (ref 79–97)
METHGB BLD QL: 0.5 % (ref 0–3)
MODALITY: ABNORMAL
MONOCYTES # BLD AUTO: 0.6 10*3/MM3 (ref 0.1–0.9)
MONOCYTES # BLD AUTO: 0.74 10*3/MM3 (ref 0.1–0.9)
MONOCYTES NFR BLD AUTO: 5.5 % (ref 5–12)
MONOCYTES NFR BLD AUTO: 8.4 % (ref 5–12)
NEUTROPHILS # BLD AUTO: 6.82 10*3/MM3 (ref 1.4–7)
NEUTROPHILS # BLD AUTO: 9.34 10*3/MM3 (ref 1.4–7)
NEUTROPHILS NFR BLD AUTO: 77.3 % (ref 42.7–76)
NEUTROPHILS NFR BLD AUTO: 85.7 % (ref 42.7–76)
OXYHGB MFR BLDV: 94.8 % (ref 85–100)
PCO2 BLDA: 31 MM HG (ref 35–45)
PEEP RESPIRATORY: 5 CM[H2O]
PH BLDA: 7.34 PH UNITS (ref 7.35–7.45)
PHOSPHATE SERPL-MCNC: 6.1 MG/DL (ref 2.5–4.5)
PLATELET # BLD AUTO: 154 10*3/MM3 (ref 140–450)
PLATELET # BLD AUTO: 171 10*3/MM3 (ref 140–450)
PMV BLD AUTO: 13.3 FL (ref 6–12)
PMV BLD AUTO: ABNORMAL FL (ref 6–12)
PO2 BLDA: 99.1 MM HG (ref 80–100)
POTASSIUM BLD-SCNC: 2.9 MMOL/L (ref 3.5–5.2)
PROT SERPL-MCNC: 5.2 G/DL (ref 6–8.5)
PROTHROMBIN TIME: 16 SECONDS (ref 11–15.4)
RBC # BLD AUTO: 2.77 10*6/MM3 (ref 4.14–5.8)
RBC # BLD AUTO: 3.16 10*6/MM3 (ref 4.14–5.8)
SAO2 % BLDCOA: 96.9 % (ref 90–100)
SCHISTOCYTES BLD QL SMEAR: NORMAL
SET MECH RESP RATE: 18
SODIUM BLD-SCNC: 142 MMOL/L (ref 136–145)
VANCOMYCIN SERPL-MCNC: 21 MCG/ML (ref 5–40)
VENTILATOR MODE: ABNORMAL
VT ON VENT VENT: 450 ML
WBC NRBC COR # BLD: 10.9 10*3/MM3 (ref 3.4–10.8)
WBC NRBC COR # BLD: 8.82 10*3/MM3 (ref 3.4–10.8)

## 2019-04-09 PROCEDURE — 25010000002 HEPARIN (PORCINE) PER 1000 UNITS: Performed by: INTERNAL MEDICINE

## 2019-04-09 PROCEDURE — 25010000002 METHYLPREDNISOLONE PER 40 MG: Performed by: INTERNAL MEDICINE

## 2019-04-09 PROCEDURE — 99233 SBSQ HOSP IP/OBS HIGH 50: CPT | Performed by: INTERNAL MEDICINE

## 2019-04-09 PROCEDURE — 82272 OCCULT BLD FECES 1-3 TESTS: CPT | Performed by: INTERNAL MEDICINE

## 2019-04-09 PROCEDURE — 85025 COMPLETE CBC W/AUTO DIFF WBC: CPT | Performed by: INTERNAL MEDICINE

## 2019-04-09 PROCEDURE — 85014 HEMATOCRIT: CPT | Performed by: PHYSICIAN ASSISTANT

## 2019-04-09 PROCEDURE — 63710000001 INSULIN ASPART PER 5 UNITS: Performed by: INTERNAL MEDICINE

## 2019-04-09 PROCEDURE — 63710000001 INSULIN DETEMIR PER 5 UNITS: Performed by: INTERNAL MEDICINE

## 2019-04-09 PROCEDURE — 85007 BL SMEAR W/DIFF WBC COUNT: CPT | Performed by: INTERNAL MEDICINE

## 2019-04-09 PROCEDURE — 71045 X-RAY EXAM CHEST 1 VIEW: CPT

## 2019-04-09 PROCEDURE — 82805 BLOOD GASES W/O2 SATURATION: CPT | Performed by: INTERNAL MEDICINE

## 2019-04-09 PROCEDURE — 80202 ASSAY OF VANCOMYCIN: CPT

## 2019-04-09 PROCEDURE — 71045 X-RAY EXAM CHEST 1 VIEW: CPT | Performed by: RADIOLOGY

## 2019-04-09 PROCEDURE — 94799 UNLISTED PULMONARY SVC/PX: CPT

## 2019-04-09 PROCEDURE — 85018 HEMOGLOBIN: CPT | Performed by: PHYSICIAN ASSISTANT

## 2019-04-09 PROCEDURE — 82962 GLUCOSE BLOOD TEST: CPT

## 2019-04-09 PROCEDURE — 25010000002 PROPOFOL 1000 MG/ML EMULSION: Performed by: INTERNAL MEDICINE

## 2019-04-09 PROCEDURE — 83735 ASSAY OF MAGNESIUM: CPT | Performed by: INTERNAL MEDICINE

## 2019-04-09 PROCEDURE — 80053 COMPREHEN METABOLIC PANEL: CPT | Performed by: INTERNAL MEDICINE

## 2019-04-09 PROCEDURE — 85730 THROMBOPLASTIN TIME PARTIAL: CPT | Performed by: INTERNAL MEDICINE

## 2019-04-09 PROCEDURE — 94003 VENT MGMT INPAT SUBQ DAY: CPT

## 2019-04-09 PROCEDURE — 82375 ASSAY CARBOXYHB QUANT: CPT | Performed by: INTERNAL MEDICINE

## 2019-04-09 PROCEDURE — 82140 ASSAY OF AMMONIA: CPT | Performed by: INTERNAL MEDICINE

## 2019-04-09 PROCEDURE — 83050 HGB METHEMOGLOBIN QUAN: CPT | Performed by: INTERNAL MEDICINE

## 2019-04-09 PROCEDURE — 25010000003 POTASSIUM CHLORIDE 10 MEQ/100ML SOLUTION: Performed by: INTERNAL MEDICINE

## 2019-04-09 PROCEDURE — 99291 CRITICAL CARE FIRST HOUR: CPT | Performed by: INTERNAL MEDICINE

## 2019-04-09 PROCEDURE — 36600 WITHDRAWAL OF ARTERIAL BLOOD: CPT | Performed by: INTERNAL MEDICINE

## 2019-04-09 PROCEDURE — 84100 ASSAY OF PHOSPHORUS: CPT | Performed by: INTERNAL MEDICINE

## 2019-04-09 PROCEDURE — 85610 PROTHROMBIN TIME: CPT | Performed by: INTERNAL MEDICINE

## 2019-04-09 RX ORDER — HEPARIN SODIUM 5000 [USP'U]/ML
60 INJECTION, SOLUTION INTRAVENOUS; SUBCUTANEOUS ONCE
Status: COMPLETED | OUTPATIENT
Start: 2019-04-09 | End: 2019-04-09

## 2019-04-09 RX ORDER — HEPARIN SODIUM 5000 [USP'U]/ML
30 INJECTION, SOLUTION INTRAVENOUS; SUBCUTANEOUS AS NEEDED
Status: DISCONTINUED | OUTPATIENT
Start: 2019-04-09 | End: 2019-04-09

## 2019-04-09 RX ORDER — DILTIAZEM HYDROCHLORIDE 5 MG/ML
10 INJECTION INTRAVENOUS ONCE
Status: COMPLETED | OUTPATIENT
Start: 2019-04-09 | End: 2019-04-09

## 2019-04-09 RX ORDER — HEPARIN SODIUM 5000 [USP'U]/ML
60 INJECTION, SOLUTION INTRAVENOUS; SUBCUTANEOUS AS NEEDED
Status: DISCONTINUED | OUTPATIENT
Start: 2019-04-09 | End: 2019-04-09

## 2019-04-09 RX ORDER — HEPARIN SODIUM 10000 [USP'U]/100ML
12 INJECTION, SOLUTION INTRAVENOUS
Status: DISCONTINUED | OUTPATIENT
Start: 2019-04-09 | End: 2019-04-09

## 2019-04-09 RX ORDER — POTASSIUM CHLORIDE 1.5 G/1.77G
40 POWDER, FOR SOLUTION ORAL AS NEEDED
Status: DISCONTINUED | OUTPATIENT
Start: 2019-04-09 | End: 2019-04-17

## 2019-04-09 RX ORDER — POTASSIUM CHLORIDE 750 MG/1
40 TABLET, FILM COATED, EXTENDED RELEASE ORAL AS NEEDED
Status: DISCONTINUED | OUTPATIENT
Start: 2019-04-09 | End: 2019-04-17

## 2019-04-09 RX ORDER — POTASSIUM CHLORIDE 7.45 MG/ML
10 INJECTION INTRAVENOUS
Status: COMPLETED | OUTPATIENT
Start: 2019-04-09 | End: 2019-04-09

## 2019-04-09 RX ORDER — POTASSIUM CHLORIDE 7.45 MG/ML
10 INJECTION INTRAVENOUS
Status: DISCONTINUED | OUTPATIENT
Start: 2019-04-09 | End: 2019-04-17

## 2019-04-09 RX ADMIN — ZINC SULFATE CAP 220 MG (50 MG ELEMENTAL ZN) 50 MG: 220 (50 ZN) CAP at 08:19

## 2019-04-09 RX ADMIN — SODIUM CHLORIDE, PRESERVATIVE FREE 10 ML: 5 INJECTION INTRAVENOUS at 08:27

## 2019-04-09 RX ADMIN — Medication 100 MG: at 08:19

## 2019-04-09 RX ADMIN — SODIUM BICARBONATE TAB 650 MG 1300 MG: 650 TAB at 21:51

## 2019-04-09 RX ADMIN — PROPOFOL 30 MCG/KG/MIN: 10 INJECTION, EMULSION INTRAVENOUS at 12:34

## 2019-04-09 RX ADMIN — FOLIC ACID 1 MG: 5 INJECTION, SOLUTION INTRAMUSCULAR; INTRAVENOUS; SUBCUTANEOUS at 08:20

## 2019-04-09 RX ADMIN — INSULIN ASPART 2 UNITS: 100 INJECTION, SOLUTION INTRAVENOUS; SUBCUTANEOUS at 10:36

## 2019-04-09 RX ADMIN — IPRATROPIUM BROMIDE 0.5 MG: 0.5 SOLUTION RESPIRATORY (INHALATION) at 06:33

## 2019-04-09 RX ADMIN — INSULIN DETEMIR 15 UNITS: 100 INJECTION, SOLUTION SUBCUTANEOUS at 06:01

## 2019-04-09 RX ADMIN — POTASSIUM CHLORIDE 10 MEQ: 10 INJECTION, SOLUTION INTRAVENOUS at 19:54

## 2019-04-09 RX ADMIN — METRONIDAZOLE 500 MG: 500 INJECTION, SOLUTION INTRAVENOUS at 18:07

## 2019-04-09 RX ADMIN — METOPROLOL TARTRATE 75 MG: 50 TABLET, FILM COATED ORAL at 08:18

## 2019-04-09 RX ADMIN — IPRATROPIUM BROMIDE 0.5 MG: 0.5 SOLUTION RESPIRATORY (INHALATION) at 18:21

## 2019-04-09 RX ADMIN — LEVOTHYROXINE SODIUM 12.5 MCG: 25 TABLET ORAL at 05:11

## 2019-04-09 RX ADMIN — Medication 1 CAPSULE: at 08:19

## 2019-04-09 RX ADMIN — CEFEPIME HYDROCHLORIDE 1 G: 1 INJECTION, POWDER, FOR SOLUTION INTRAMUSCULAR; INTRAVENOUS at 10:10

## 2019-04-09 RX ADMIN — METOPROLOL TARTRATE 75 MG: 50 TABLET, FILM COATED ORAL at 21:51

## 2019-04-09 RX ADMIN — RIFAXIMIN 550 MG: 550 TABLET ORAL at 21:51

## 2019-04-09 RX ADMIN — ZINC SULFATE CAP 220 MG (50 MG ELEMENTAL ZN) 50 MG: 220 (50 ZN) CAP at 21:51

## 2019-04-09 RX ADMIN — PROPOFOL 30 MCG/KG/MIN: 10 INJECTION, EMULSION INTRAVENOUS at 18:39

## 2019-04-09 RX ADMIN — CHLORHEXIDINE GLUCONATE 15 ML: 1.2 RINSE ORAL at 08:20

## 2019-04-09 RX ADMIN — Medication: at 08:20

## 2019-04-09 RX ADMIN — POTASSIUM CHLORIDE 10 MEQ: 10 INJECTION, SOLUTION INTRAVENOUS at 17:49

## 2019-04-09 RX ADMIN — METHYLPREDNISOLONE SODIUM SUCCINATE 40 MG: 40 INJECTION, POWDER, FOR SOLUTION INTRAMUSCULAR; INTRAVENOUS at 05:11

## 2019-04-09 RX ADMIN — SODIUM BICARBONATE TAB 650 MG 1300 MG: 650 TAB at 08:19

## 2019-04-09 RX ADMIN — CALCIUM ACETATE 667 MG: 667 CAPSULE ORAL at 10:10

## 2019-04-09 RX ADMIN — HEPARIN SODIUM 4500 UNITS: 5000 INJECTION INTRAVENOUS; SUBCUTANEOUS at 12:27

## 2019-04-09 RX ADMIN — METRONIDAZOLE 500 MG: 500 INJECTION, SOLUTION INTRAVENOUS at 02:11

## 2019-04-09 RX ADMIN — DILTIAZEM HYDROCHLORIDE 10 MG: 5 INJECTION INTRAVENOUS at 00:38

## 2019-04-09 RX ADMIN — POTASSIUM CHLORIDE 10 MEQ: 10 INJECTION, SOLUTION INTRAVENOUS at 21:03

## 2019-04-09 RX ADMIN — SODIUM BICARBONATE TAB 650 MG 1300 MG: 650 TAB at 16:40

## 2019-04-09 RX ADMIN — POTASSIUM CHLORIDE 10 MEQ: 10 INJECTION, SOLUTION INTRAVENOUS at 21:59

## 2019-04-09 RX ADMIN — HEPARIN SODIUM 12 UNITS/KG/HR: 10000 INJECTION, SOLUTION INTRAVENOUS at 12:27

## 2019-04-09 RX ADMIN — METRONIDAZOLE 500 MG: 500 INJECTION, SOLUTION INTRAVENOUS at 10:10

## 2019-04-09 RX ADMIN — Medication: at 17:49

## 2019-04-09 RX ADMIN — PANTOPRAZOLE SODIUM 40 MG: 40 INJECTION, POWDER, FOR SOLUTION INTRAVENOUS at 05:11

## 2019-04-09 RX ADMIN — IPRATROPIUM BROMIDE 0.5 MG: 0.5 SOLUTION RESPIRATORY (INHALATION) at 00:06

## 2019-04-09 RX ADMIN — SODIUM CHLORIDE, PRESERVATIVE FREE 10 ML: 5 INJECTION INTRAVENOUS at 08:20

## 2019-04-09 RX ADMIN — Medication 1 APPLICATION: at 21:52

## 2019-04-09 RX ADMIN — ASPIRIN 81 MG: 81 TABLET, CHEWABLE ORAL at 08:19

## 2019-04-09 RX ADMIN — HEPARIN SODIUM 5000 UNITS: 5000 INJECTION INTRAVENOUS; SUBCUTANEOUS at 08:18

## 2019-04-09 RX ADMIN — SODIUM CHLORIDE, PRESERVATIVE FREE 3 ML: 5 INJECTION INTRAVENOUS at 21:53

## 2019-04-09 RX ADMIN — SODIUM CHLORIDE, PRESERVATIVE FREE 10 ML: 5 INJECTION INTRAVENOUS at 21:53

## 2019-04-09 RX ADMIN — SODIUM CHLORIDE, PRESERVATIVE FREE 10 ML: 5 INJECTION INTRAVENOUS at 21:52

## 2019-04-09 RX ADMIN — Medication: at 11:18

## 2019-04-09 RX ADMIN — PROPOFOL 30 MCG/KG/MIN: 10 INJECTION, EMULSION INTRAVENOUS at 04:22

## 2019-04-09 RX ADMIN — CHLORHEXIDINE GLUCONATE 15 ML: 1.2 RINSE ORAL at 21:52

## 2019-04-09 RX ADMIN — LACTULOSE 20 G: 10 SOLUTION ORAL at 08:19

## 2019-04-09 RX ADMIN — CALCIUM ACETATE 667 MG: 667 CAPSULE ORAL at 21:51

## 2019-04-09 RX ADMIN — POTASSIUM CHLORIDE 10 MEQ: 10 INJECTION, SOLUTION INTRAVENOUS at 16:41

## 2019-04-09 RX ADMIN — POTASSIUM CHLORIDE 10 MEQ: 10 INJECTION, SOLUTION INTRAVENOUS at 18:40

## 2019-04-09 RX ADMIN — CALCIUM ACETATE 667 MG: 667 CAPSULE ORAL at 16:41

## 2019-04-09 RX ADMIN — ATORVASTATIN CALCIUM 80 MG: 40 TABLET, FILM COATED ORAL at 21:51

## 2019-04-09 RX ADMIN — IPRATROPIUM BROMIDE 0.5 MG: 0.5 SOLUTION RESPIRATORY (INHALATION) at 12:30

## 2019-04-09 RX ADMIN — RIFAXIMIN 550 MG: 550 TABLET ORAL at 08:19

## 2019-04-09 RX ADMIN — CALCIUM ACETATE 667 MG: 667 CAPSULE ORAL at 04:22

## 2019-04-10 ENCOUNTER — APPOINTMENT (OUTPATIENT)
Dept: GENERAL RADIOLOGY | Facility: HOSPITAL | Age: 59
End: 2019-04-10

## 2019-04-10 LAB
A-A DO2: 74.3 MMHG (ref 0–300)
ALBUMIN SERPL-MCNC: 2.51 G/DL (ref 3.5–5.2)
ALBUMIN/GLOB SERPL: 1.1 G/DL
ALP SERPL-CCNC: 51 U/L (ref 39–117)
ALT SERPL W P-5'-P-CCNC: 10 U/L (ref 1–41)
ANION GAP SERPL CALCULATED.3IONS-SCNC: 18.4 MMOL/L
ARTERIAL PATENCY WRIST A: ABNORMAL
AST SERPL-CCNC: 24 U/L (ref 1–40)
ATMOSPHERIC PRESS: 723 MMHG
BASE EXCESS BLDA CALC-SCNC: -6.3 MMOL/L
BDY SITE: ABNORMAL
BILIRUB SERPL-MCNC: 0.3 MG/DL (ref 0.2–1.2)
BODY TEMPERATURE: 98.6 C
BUN BLD-MCNC: 99 MG/DL (ref 8–23)
BUN/CREAT SERPL: 24.7 (ref 7–25)
CALCIUM SPEC-SCNC: 7.4 MG/DL (ref 8.6–10.5)
CHLORIDE SERPL-SCNC: 107 MMOL/L (ref 98–107)
CO2 SERPL-SCNC: 17.6 MMOL/L (ref 22–29)
COHGB MFR BLD: 1.3 % (ref 0–5)
CREAT BLD-MCNC: 4.01 MG/DL (ref 0.76–1.27)
GFR SERPL CREATININE-BSD FRML MDRD: 15 ML/MIN/1.73
GLOBULIN UR ELPH-MCNC: 2.4 GM/DL
GLUCOSE BLD-MCNC: 105 MG/DL (ref 65–99)
GLUCOSE BLDC GLUCOMTR-MCNC: 108 MG/DL (ref 70–130)
GLUCOSE BLDC GLUCOMTR-MCNC: 119 MG/DL (ref 70–130)
GLUCOSE BLDC GLUCOMTR-MCNC: 131 MG/DL (ref 70–130)
GLUCOSE BLDC GLUCOMTR-MCNC: 167 MG/DL (ref 70–130)
HAV IGM SERPL QL IA: ABNORMAL
HBV CORE IGM SERPL QL IA: ABNORMAL
HBV SURFACE AG SERPL QL IA: ABNORMAL
HBV SURFACE AG SERPL QL IA: NORMAL
HCO3 BLDA-SCNC: 18.4 MMOL/L (ref 22–26)
HCT VFR BLD AUTO: 24.7 % (ref 37.5–51)
HCT VFR BLD AUTO: 27.3 % (ref 37.5–51)
HCT VFR BLD CALC: 25 % (ref 42–52)
HCV AB SER DONR QL: REACTIVE
HGB BLD-MCNC: 7.8 G/DL (ref 13–17.7)
HGB BLD-MCNC: 8.2 G/DL (ref 13–17.7)
HGB BLDA-MCNC: 8.5 G/DL (ref 12–16)
HOROWITZ INDEX BLD+IHG-RTO: 30 %
INR PPP: 1.32 (ref 0.9–1.1)
MAGNESIUM SERPL-MCNC: 1.9 MG/DL (ref 1.6–2.4)
METHGB BLD QL: 0.4 % (ref 0–3)
MODALITY: ABNORMAL
OXYHGB MFR BLDV: 94.6 % (ref 85–100)
PCO2 BLDA: 33.1 MM HG (ref 35–45)
PEEP RESPIRATORY: 5 CM[H2O]
PH BLDA: 7.36 PH UNITS (ref 7.35–7.45)
PHOSPHATE SERPL-MCNC: 6 MG/DL (ref 2.5–4.5)
PO2 BLDA: 89.6 MM HG (ref 80–100)
POTASSIUM BLD-SCNC: 3.4 MMOL/L (ref 3.5–5.2)
PROT SERPL-MCNC: 4.9 G/DL (ref 6–8.5)
PROTHROMBIN TIME: 16.6 SECONDS (ref 11–15.4)
SAO2 % BLDCOA: 96.2 % (ref 90–100)
SET MECH RESP RATE: 18
SODIUM BLD-SCNC: 143 MMOL/L (ref 136–145)
VANCOMYCIN SERPL-MCNC: 21 MCG/ML (ref 5–40)
VENTILATOR MODE: ABNORMAL
VT ON VENT VENT: 450 ML

## 2019-04-10 PROCEDURE — 71045 X-RAY EXAM CHEST 1 VIEW: CPT

## 2019-04-10 PROCEDURE — 85610 PROTHROMBIN TIME: CPT | Performed by: INTERNAL MEDICINE

## 2019-04-10 PROCEDURE — 80202 ASSAY OF VANCOMYCIN: CPT

## 2019-04-10 PROCEDURE — 85018 HEMOGLOBIN: CPT | Performed by: PHYSICIAN ASSISTANT

## 2019-04-10 PROCEDURE — 99291 CRITICAL CARE FIRST HOUR: CPT | Performed by: INTERNAL MEDICINE

## 2019-04-10 PROCEDURE — 94799 UNLISTED PULMONARY SVC/PX: CPT

## 2019-04-10 PROCEDURE — 5A1D70Z PERFORMANCE OF URINARY FILTRATION, INTERMITTENT, LESS THAN 6 HOURS PER DAY: ICD-10-PCS | Performed by: INTERNAL MEDICINE

## 2019-04-10 PROCEDURE — 71045 X-RAY EXAM CHEST 1 VIEW: CPT | Performed by: RADIOLOGY

## 2019-04-10 PROCEDURE — 25010000002 ALBUMIN HUMAN 25% PER 50 ML: Performed by: INTERNAL MEDICINE

## 2019-04-10 PROCEDURE — 85014 HEMATOCRIT: CPT | Performed by: PHYSICIAN ASSISTANT

## 2019-04-10 PROCEDURE — 36600 WITHDRAWAL OF ARTERIAL BLOOD: CPT | Performed by: PHYSICIAN ASSISTANT

## 2019-04-10 PROCEDURE — C1750 CATH, HEMODIALYSIS,LONG-TERM: HCPCS

## 2019-04-10 PROCEDURE — 36556 INSERT NON-TUNNEL CV CATH: CPT | Performed by: INTERNAL MEDICINE

## 2019-04-10 PROCEDURE — 63710000001 INSULIN ASPART PER 5 UNITS: Performed by: INTERNAL MEDICINE

## 2019-04-10 PROCEDURE — 76937 US GUIDE VASCULAR ACCESS: CPT | Performed by: INTERNAL MEDICINE

## 2019-04-10 PROCEDURE — 99232 SBSQ HOSP IP/OBS MODERATE 35: CPT | Performed by: INTERNAL MEDICINE

## 2019-04-10 PROCEDURE — 05HY33Z INSERTION OF INFUSION DEVICE INTO UPPER VEIN, PERCUTANEOUS APPROACH: ICD-10-PCS | Performed by: INTERNAL MEDICINE

## 2019-04-10 PROCEDURE — 94003 VENT MGMT INPAT SUBQ DAY: CPT

## 2019-04-10 PROCEDURE — 25010000002 VANCOMYCIN 5 G RECONSTITUTED SOLUTION 5,000 MG VIAL: Performed by: INTERNAL MEDICINE

## 2019-04-10 PROCEDURE — 84100 ASSAY OF PHOSPHORUS: CPT | Performed by: INTERNAL MEDICINE

## 2019-04-10 PROCEDURE — 80053 COMPREHEN METABOLIC PANEL: CPT | Performed by: INTERNAL MEDICINE

## 2019-04-10 PROCEDURE — P9047 ALBUMIN (HUMAN), 25%, 50ML: HCPCS | Performed by: INTERNAL MEDICINE

## 2019-04-10 PROCEDURE — 83735 ASSAY OF MAGNESIUM: CPT | Performed by: INTERNAL MEDICINE

## 2019-04-10 PROCEDURE — 25010000002 METHYLPREDNISOLONE PER 40 MG: Performed by: INTERNAL MEDICINE

## 2019-04-10 PROCEDURE — 83050 HGB METHEMOGLOBIN QUAN: CPT | Performed by: PHYSICIAN ASSISTANT

## 2019-04-10 PROCEDURE — 80074 ACUTE HEPATITIS PANEL: CPT | Performed by: INTERNAL MEDICINE

## 2019-04-10 PROCEDURE — 82375 ASSAY CARBOXYHB QUANT: CPT | Performed by: PHYSICIAN ASSISTANT

## 2019-04-10 PROCEDURE — 99233 SBSQ HOSP IP/OBS HIGH 50: CPT | Performed by: INTERNAL MEDICINE

## 2019-04-10 PROCEDURE — 25010000002 DESMOPRESSIN PER 1 MCG: Performed by: INTERNAL MEDICINE

## 2019-04-10 PROCEDURE — 82962 GLUCOSE BLOOD TEST: CPT

## 2019-04-10 PROCEDURE — 82805 BLOOD GASES W/O2 SATURATION: CPT | Performed by: PHYSICIAN ASSISTANT

## 2019-04-10 PROCEDURE — 25010000002 PROPOFOL 1000 MG/ML EMULSION: Performed by: INTERNAL MEDICINE

## 2019-04-10 PROCEDURE — 25010000002 MAGNESIUM SULFATE IN D5W 1G/100ML (PREMIX) 1-5 GM/100ML-% SOLUTION: Performed by: HOSPITALIST

## 2019-04-10 RX ORDER — ALBUMIN (HUMAN) 12.5 G/50ML
25 SOLUTION INTRAVENOUS AS NEEDED
Status: DISPENSED | OUTPATIENT
Start: 2019-04-10 | End: 2019-04-11

## 2019-04-10 RX ORDER — DILTIAZEM HYDROCHLORIDE 60 MG/1
60 TABLET, FILM COATED ORAL EVERY 8 HOURS SCHEDULED
Status: DISCONTINUED | OUTPATIENT
Start: 2019-04-10 | End: 2019-04-23 | Stop reason: HOSPADM

## 2019-04-10 RX ORDER — MAGNESIUM SULFATE 1 G/100ML
1 INJECTION INTRAVENOUS ONCE
Status: COMPLETED | OUTPATIENT
Start: 2019-04-10 | End: 2019-04-10

## 2019-04-10 RX ADMIN — PANTOPRAZOLE SODIUM 40 MG: 40 INJECTION, POWDER, FOR SOLUTION INTRAVENOUS at 08:21

## 2019-04-10 RX ADMIN — INSULIN ASPART 2 UNITS: 100 INJECTION, SOLUTION INTRAVENOUS; SUBCUTANEOUS at 10:33

## 2019-04-10 RX ADMIN — PROPOFOL 50 MCG/KG/MIN: 10 INJECTION, EMULSION INTRAVENOUS at 14:48

## 2019-04-10 RX ADMIN — Medication: at 18:57

## 2019-04-10 RX ADMIN — SODIUM CHLORIDE, PRESERVATIVE FREE 10 ML: 5 INJECTION INTRAVENOUS at 08:39

## 2019-04-10 RX ADMIN — IPRATROPIUM BROMIDE 0.5 MG: 0.5 SOLUTION RESPIRATORY (INHALATION) at 06:39

## 2019-04-10 RX ADMIN — RIFAXIMIN 550 MG: 550 TABLET ORAL at 21:39

## 2019-04-10 RX ADMIN — PROPOFOL 30 MCG/KG/MIN: 10 INJECTION, EMULSION INTRAVENOUS at 03:39

## 2019-04-10 RX ADMIN — METHYLPREDNISOLONE SODIUM SUCCINATE 40 MG: 40 INJECTION, POWDER, FOR SOLUTION INTRAMUSCULAR; INTRAVENOUS at 08:21

## 2019-04-10 RX ADMIN — ZINC SULFATE CAP 220 MG (50 MG ELEMENTAL ZN) 50 MG: 220 (50 ZN) CAP at 21:39

## 2019-04-10 RX ADMIN — METRONIDAZOLE 500 MG: 500 INJECTION, SOLUTION INTRAVENOUS at 10:09

## 2019-04-10 RX ADMIN — SODIUM BICARBONATE TAB 650 MG 1300 MG: 650 TAB at 21:39

## 2019-04-10 RX ADMIN — Medication 1 APPLICATION: at 12:40

## 2019-04-10 RX ADMIN — PROPOFOL 30 MCG/KG/MIN: 10 INJECTION, EMULSION INTRAVENOUS at 21:01

## 2019-04-10 RX ADMIN — CEFEPIME HYDROCHLORIDE 1 G: 1 INJECTION, POWDER, FOR SOLUTION INTRAMUSCULAR; INTRAVENOUS at 10:37

## 2019-04-10 RX ADMIN — ATORVASTATIN CALCIUM 80 MG: 40 TABLET, FILM COATED ORAL at 21:39

## 2019-04-10 RX ADMIN — Medication: at 08:25

## 2019-04-10 RX ADMIN — CALCIUM ACETATE 667 MG: 667 CAPSULE ORAL at 10:09

## 2019-04-10 RX ADMIN — ALBUMIN (HUMAN) 25 G: 0.25 INJECTION, SOLUTION INTRAVENOUS at 16:24

## 2019-04-10 RX ADMIN — METRONIDAZOLE 500 MG: 500 INJECTION, SOLUTION INTRAVENOUS at 18:05

## 2019-04-10 RX ADMIN — Medication 1 APPLICATION: at 21:41

## 2019-04-10 RX ADMIN — MAGNESIUM SULFATE IN DEXTROSE 1 G: 10 INJECTION, SOLUTION INTRAVENOUS at 08:22

## 2019-04-10 RX ADMIN — IPRATROPIUM BROMIDE 0.5 MG: 0.5 SOLUTION RESPIRATORY (INHALATION) at 12:39

## 2019-04-10 RX ADMIN — IPRATROPIUM BROMIDE 0.5 MG: 0.5 SOLUTION RESPIRATORY (INHALATION) at 00:21

## 2019-04-10 RX ADMIN — METOPROLOL TARTRATE 75 MG: 50 TABLET, FILM COATED ORAL at 21:40

## 2019-04-10 RX ADMIN — ASPIRIN 81 MG: 81 TABLET, CHEWABLE ORAL at 08:50

## 2019-04-10 RX ADMIN — SODIUM CHLORIDE, PRESERVATIVE FREE 10 ML: 5 INJECTION INTRAVENOUS at 21:41

## 2019-04-10 RX ADMIN — DILTIAZEM HYDROCHLORIDE 60 MG: 60 TABLET, FILM COATED ORAL at 21:40

## 2019-04-10 RX ADMIN — Medication 100 MG: at 08:50

## 2019-04-10 RX ADMIN — DESMOPRESSIN ACETATE 22.32 MCG: 4 INJECTION INTRAVENOUS at 14:27

## 2019-04-10 RX ADMIN — SODIUM BICARBONATE TAB 650 MG 1300 MG: 650 TAB at 08:51

## 2019-04-10 RX ADMIN — SODIUM BICARBONATE TAB 650 MG 1300 MG: 650 TAB at 17:48

## 2019-04-10 RX ADMIN — Medication: at 02:26

## 2019-04-10 RX ADMIN — LACTULOSE 20 G: 10 SOLUTION ORAL at 08:49

## 2019-04-10 RX ADMIN — RIFAXIMIN 550 MG: 550 TABLET ORAL at 08:50

## 2019-04-10 RX ADMIN — SODIUM CHLORIDE, PRESERVATIVE FREE 10 ML: 5 INJECTION INTRAVENOUS at 21:42

## 2019-04-10 RX ADMIN — CALCIUM ACETATE 667 MG: 667 CAPSULE ORAL at 17:49

## 2019-04-10 RX ADMIN — CALCIUM ACETATE 667 MG: 667 CAPSULE ORAL at 21:39

## 2019-04-10 RX ADMIN — PROPOFOL 30 MCG/KG/MIN: 10 INJECTION, EMULSION INTRAVENOUS at 08:18

## 2019-04-10 RX ADMIN — DILTIAZEM HYDROCHLORIDE 60 MG: 60 TABLET, FILM COATED ORAL at 17:50

## 2019-04-10 RX ADMIN — CALCIUM ACETATE 667 MG: 667 CAPSULE ORAL at 03:57

## 2019-04-10 RX ADMIN — METRONIDAZOLE 500 MG: 500 INJECTION, SOLUTION INTRAVENOUS at 03:56

## 2019-04-10 RX ADMIN — FOLIC ACID 1 MG: 5 INJECTION, SOLUTION INTRAMUSCULAR; INTRAVENOUS; SUBCUTANEOUS at 08:51

## 2019-04-10 RX ADMIN — VANCOMYCIN HYDROCHLORIDE 1000 MG: 5 INJECTION, POWDER, LYOPHILIZED, FOR SOLUTION INTRAVENOUS at 21:42

## 2019-04-10 RX ADMIN — LEVOTHYROXINE SODIUM 12.5 MCG: 25 TABLET ORAL at 08:22

## 2019-04-10 RX ADMIN — CHLORHEXIDINE GLUCONATE 15 ML: 1.2 RINSE ORAL at 21:39

## 2019-04-10 RX ADMIN — CHLORHEXIDINE GLUCONATE 15 ML: 1.2 RINSE ORAL at 08:37

## 2019-04-10 RX ADMIN — IPRATROPIUM BROMIDE 0.5 MG: 0.5 SOLUTION RESPIRATORY (INHALATION) at 18:48

## 2019-04-10 RX ADMIN — METOPROLOL TARTRATE 75 MG: 50 TABLET, FILM COATED ORAL at 08:50

## 2019-04-10 RX ADMIN — ZINC SULFATE CAP 220 MG (50 MG ELEMENTAL ZN) 50 MG: 220 (50 ZN) CAP at 08:50

## 2019-04-10 RX ADMIN — DILTIAZEM HYDROCHLORIDE 60 MG: 60 TABLET, FILM COATED ORAL at 10:33

## 2019-04-10 RX ADMIN — Medication: at 17:51

## 2019-04-10 RX ADMIN — SODIUM CHLORIDE 1000 ML: 9 INJECTION, SOLUTION INTRAVENOUS at 14:53

## 2019-04-11 ENCOUNTER — APPOINTMENT (OUTPATIENT)
Dept: GENERAL RADIOLOGY | Facility: HOSPITAL | Age: 59
End: 2019-04-11

## 2019-04-11 LAB
A-A DO2: 44.8 MMHG (ref 0–300)
A-A DO2: 52.3 MMHG (ref 0–300)
ABO GROUP BLD: NORMAL
ACANTHOCYTES BLD QL SMEAR: NORMAL
ALBUMIN SERPL-MCNC: 2.7 G/DL (ref 3.5–5.2)
ALBUMIN/GLOB SERPL: 1.4 G/DL
ALP SERPL-CCNC: 43 U/L (ref 39–117)
ALT SERPL W P-5'-P-CCNC: 8 U/L (ref 1–41)
ANION GAP SERPL CALCULATED.3IONS-SCNC: 15.1 MMOL/L
ANISOCYTOSIS BLD QL: NORMAL
ARTERIAL PATENCY WRIST A: ABNORMAL
ARTERIAL PATENCY WRIST A: POSITIVE
AST SERPL-CCNC: 21 U/L (ref 1–40)
ATMOSPHERIC PRESS: 723 MMHG
ATMOSPHERIC PRESS: 725 MMHG
ATMOSPHERIC PRESS: 725 MMHG
BASE EXCESS BLDA CALC-SCNC: -1.9 MMOL/L
BASE EXCESS BLDA CALC-SCNC: -3.5 MMOL/L
BASE EXCESS BLDV CALC-SCNC: -1 MMOL/L
BASOPHILS # BLD AUTO: 0 10*3/MM3 (ref 0–0.2)
BASOPHILS # BLD AUTO: 0 10*3/MM3 (ref 0–0.2)
BASOPHILS NFR BLD AUTO: 0 % (ref 0–1.5)
BASOPHILS NFR BLD AUTO: 0 % (ref 0–1.5)
BDY SITE: ABNORMAL
BILIRUB SERPL-MCNC: 0.3 MG/DL (ref 0.2–1.2)
BLD GP AB SCN SERPL QL: NEGATIVE
BODY TEMPERATURE: 98.6 C
BUN BLD-MCNC: 76 MG/DL (ref 8–23)
BUN/CREAT SERPL: 23.5 (ref 7–25)
CALCIUM SPEC-SCNC: 7.5 MG/DL (ref 8.6–10.5)
CHLORIDE SERPL-SCNC: 103 MMOL/L (ref 98–107)
CO2 SERPL-SCNC: 21.9 MMOL/L (ref 22–29)
COHGB MFR BLD: 1.6 % (ref 0–5)
COHGB MFR BLD: 1.8 % (ref 0–5)
CREAT BLD-MCNC: 3.24 MG/DL (ref 0.76–1.27)
CRP SERPL-MCNC: 1.9 MG/DL (ref 0–0.5)
DEPRECATED RDW RBC AUTO: 46.1 FL (ref 37–54)
DEPRECATED RDW RBC AUTO: 52.6 FL (ref 37–54)
EOSINOPHIL # BLD AUTO: 0.01 10*3/MM3 (ref 0–0.4)
EOSINOPHIL # BLD AUTO: 0.12 10*3/MM3 (ref 0–0.4)
EOSINOPHIL NFR BLD AUTO: 0.2 % (ref 0.3–6.2)
EOSINOPHIL NFR BLD AUTO: 1.8 % (ref 0.3–6.2)
ERYTHROCYTE [DISTWIDTH] IN BLOOD BY AUTOMATED COUNT: 15.8 % (ref 12.3–15.4)
ERYTHROCYTE [DISTWIDTH] IN BLOOD BY AUTOMATED COUNT: 17.3 % (ref 12.3–15.4)
GFR SERPL CREATININE-BSD FRML MDRD: 20 ML/MIN/1.73
GLOBULIN UR ELPH-MCNC: 2 GM/DL
GLUCOSE BLD-MCNC: 147 MG/DL (ref 65–99)
GLUCOSE BLDC GLUCOMTR-MCNC: 127 MG/DL (ref 70–130)
GLUCOSE BLDC GLUCOMTR-MCNC: 148 MG/DL (ref 70–130)
GLUCOSE BLDC GLUCOMTR-MCNC: 168 MG/DL (ref 70–130)
GLUCOSE BLDC GLUCOMTR-MCNC: 91 MG/DL (ref 70–130)
HCO3 BLDA-SCNC: 20.1 MMOL/L (ref 22–26)
HCO3 BLDA-SCNC: 21.4 MMOL/L (ref 22–26)
HCO3 BLDV-SCNC: 22.7 MMOL/L
HCT VFR BLD AUTO: 22 % (ref 37.5–51)
HCT VFR BLD AUTO: 22.9 % (ref 37.5–51)
HCT VFR BLD CALC: 23 % (ref 42–52)
HCT VFR BLD CALC: 28 % (ref 42–52)
HGB BLD-MCNC: 7 G/DL (ref 13–17.7)
HGB BLD-MCNC: 7.7 G/DL (ref 13–17.7)
HGB BLDA-MCNC: 7.2 G/DL (ref 12–16)
HGB BLDA-MCNC: 7.9 G/DL (ref 12–16)
HGB BLDA-MCNC: 9.4 G/DL (ref 12–16)
HOROWITZ INDEX BLD+IHG-RTO: 25 %
HOROWITZ INDEX BLD+IHG-RTO: 25 %
HOROWITZ INDEX BLD+IHG-RTO: 30 %
IMM GRANULOCYTES # BLD AUTO: 0.04 10*3/MM3 (ref 0–0.05)
IMM GRANULOCYTES # BLD AUTO: 0.04 10*3/MM3 (ref 0–0.05)
IMM GRANULOCYTES NFR BLD AUTO: 0.6 % (ref 0–0.5)
IMM GRANULOCYTES NFR BLD AUTO: 0.6 % (ref 0–0.5)
INR PPP: 1.27 (ref 0.9–1.1)
LARGE PLATELETS: NORMAL
LYMPHOCYTES # BLD AUTO: 0.63 10*3/MM3 (ref 0.7–3.1)
LYMPHOCYTES # BLD AUTO: 0.88 10*3/MM3 (ref 0.7–3.1)
LYMPHOCYTES NFR BLD AUTO: 13.3 % (ref 19.6–45.3)
LYMPHOCYTES NFR BLD AUTO: 9.9 % (ref 19.6–45.3)
MAGNESIUM SERPL-MCNC: 2 MG/DL (ref 1.6–2.4)
MCH RBC QN AUTO: 28.1 PG (ref 26.6–33)
MCH RBC QN AUTO: 28.8 PG (ref 26.6–33)
MCHC RBC AUTO-ENTMCNC: 31.8 G/DL (ref 31.5–35.7)
MCHC RBC AUTO-ENTMCNC: 33.6 G/DL (ref 31.5–35.7)
MCV RBC AUTO: 85.8 FL (ref 79–97)
MCV RBC AUTO: 88.4 FL (ref 79–97)
METHGB BLD QL: 0.5 % (ref 0–3)
METHGB BLD QL: 0.5 % (ref 0–3)
MODALITY: ABNORMAL
MONOCYTES # BLD AUTO: 0.38 10*3/MM3 (ref 0.1–0.9)
MONOCYTES # BLD AUTO: 0.45 10*3/MM3 (ref 0.1–0.9)
MONOCYTES NFR BLD AUTO: 6 % (ref 5–12)
MONOCYTES NFR BLD AUTO: 6.8 % (ref 5–12)
NEUTROPHILS # BLD AUTO: 5.14 10*3/MM3 (ref 1.4–7)
NEUTROPHILS # BLD AUTO: 5.28 10*3/MM3 (ref 1.4–7)
NEUTROPHILS NFR BLD AUTO: 77.5 % (ref 42.7–76)
NEUTROPHILS NFR BLD AUTO: 83.3 % (ref 42.7–76)
OXYHGB MFR BLDV: 93.3 % (ref 85–100)
OXYHGB MFR BLDV: 95.8 % (ref 85–100)
PCO2 BLDA: 30.2 MM HG (ref 35–45)
PCO2 BLDA: 30.9 MM HG (ref 35–45)
PCO2 BLDV: 33.4 MM HG
PEEP RESPIRATORY: 5 CM[H2O]
PH BLDA: 7.43 PH UNITS (ref 7.35–7.45)
PH BLDA: 7.47 PH UNITS (ref 7.35–7.45)
PH BLDV: 7.45 PH UNITS
PHOSPHATE SERPL-MCNC: 4.9 MG/DL (ref 2.5–4.5)
PLATELET # BLD AUTO: 107 10*3/MM3 (ref 140–450)
PLATELET # BLD AUTO: 117 10*3/MM3 (ref 140–450)
PMV BLD AUTO: 13.2 FL (ref 6–12)
PMV BLD AUTO: ABNORMAL FL (ref 6–12)
PO2 BLDA: 121.7 MM HG (ref 80–100)
PO2 BLDA: 81.3 MM HG (ref 80–100)
PO2 BLDV: 32.2 MM HG
POTASSIUM BLD-SCNC: 3 MMOL/L (ref 3.5–5.2)
PROT SERPL-MCNC: 4.7 G/DL (ref 6–8.5)
PROTHROMBIN TIME: 16.1 SECONDS (ref 11–15.4)
RBC # BLD AUTO: 2.49 10*6/MM3 (ref 4.14–5.8)
RBC # BLD AUTO: 2.67 10*6/MM3 (ref 4.14–5.8)
RH BLD: NEGATIVE
SAO2 % BLDCOA: 95.3 % (ref 90–100)
SAO2 % BLDCOA: 98.1 % (ref 90–100)
SAO2 % BLDCOV: 61.7 %
SCHISTOCYTES BLD QL SMEAR: NORMAL
SET MECH RESP RATE: 18
SODIUM BLD-SCNC: 140 MMOL/L (ref 136–145)
T&S EXPIRATION DATE: NORMAL
VENTILATOR MODE: ABNORMAL
VENTILATOR MODE: ABNORMAL
VENTILATOR MODE: AC
VT ON VENT VENT: 450 ML
WBC NRBC COR # BLD: 6.34 10*3/MM3 (ref 3.4–10.8)
WBC NRBC COR # BLD: 6.63 10*3/MM3 (ref 3.4–10.8)

## 2019-04-11 PROCEDURE — P9047 ALBUMIN (HUMAN), 25%, 50ML: HCPCS | Performed by: INTERNAL MEDICINE

## 2019-04-11 PROCEDURE — 83735 ASSAY OF MAGNESIUM: CPT | Performed by: INTERNAL MEDICINE

## 2019-04-11 PROCEDURE — 85007 BL SMEAR W/DIFF WBC COUNT: CPT | Performed by: PHYSICIAN ASSISTANT

## 2019-04-11 PROCEDURE — 99291 CRITICAL CARE FIRST HOUR: CPT | Performed by: INTERNAL MEDICINE

## 2019-04-11 PROCEDURE — 25010000002 ALBUMIN HUMAN 25% PER 50 ML: Performed by: INTERNAL MEDICINE

## 2019-04-11 PROCEDURE — 86923 COMPATIBILITY TEST ELECTRIC: CPT

## 2019-04-11 PROCEDURE — P9016 RBC LEUKOCYTES REDUCED: HCPCS

## 2019-04-11 PROCEDURE — 85025 COMPLETE CBC W/AUTO DIFF WBC: CPT | Performed by: PHYSICIAN ASSISTANT

## 2019-04-11 PROCEDURE — 71045 X-RAY EXAM CHEST 1 VIEW: CPT

## 2019-04-11 PROCEDURE — 94799 UNLISTED PULMONARY SVC/PX: CPT

## 2019-04-11 PROCEDURE — 85025 COMPLETE CBC W/AUTO DIFF WBC: CPT | Performed by: INTERNAL MEDICINE

## 2019-04-11 PROCEDURE — 06HY33Z INSERTION OF INFUSION DEVICE INTO LOWER VEIN, PERCUTANEOUS APPROACH: ICD-10-PCS | Performed by: INTERNAL MEDICINE

## 2019-04-11 PROCEDURE — 36600 WITHDRAWAL OF ARTERIAL BLOOD: CPT | Performed by: PHYSICIAN ASSISTANT

## 2019-04-11 PROCEDURE — C1750 CATH, HEMODIALYSIS,LONG-TERM: HCPCS

## 2019-04-11 PROCEDURE — 83050 HGB METHEMOGLOBIN QUAN: CPT | Performed by: INTERNAL MEDICINE

## 2019-04-11 PROCEDURE — 71045 X-RAY EXAM CHEST 1 VIEW: CPT | Performed by: RADIOLOGY

## 2019-04-11 PROCEDURE — 80053 COMPREHEN METABOLIC PANEL: CPT | Performed by: INTERNAL MEDICINE

## 2019-04-11 PROCEDURE — 82805 BLOOD GASES W/O2 SATURATION: CPT | Performed by: PHYSICIAN ASSISTANT

## 2019-04-11 PROCEDURE — 86140 C-REACTIVE PROTEIN: CPT | Performed by: INTERNAL MEDICINE

## 2019-04-11 PROCEDURE — 82962 GLUCOSE BLOOD TEST: CPT

## 2019-04-11 PROCEDURE — 82805 BLOOD GASES W/O2 SATURATION: CPT | Performed by: INTERNAL MEDICINE

## 2019-04-11 PROCEDURE — 82375 ASSAY CARBOXYHB QUANT: CPT | Performed by: INTERNAL MEDICINE

## 2019-04-11 PROCEDURE — 36600 WITHDRAWAL OF ARTERIAL BLOOD: CPT | Performed by: INTERNAL MEDICINE

## 2019-04-11 PROCEDURE — 36556 INSERT NON-TUNNEL CV CATH: CPT | Performed by: INTERNAL MEDICINE

## 2019-04-11 PROCEDURE — 86900 BLOOD TYPING SEROLOGIC ABO: CPT | Performed by: INTERNAL MEDICINE

## 2019-04-11 PROCEDURE — 86901 BLOOD TYPING SEROLOGIC RH(D): CPT | Performed by: INTERNAL MEDICINE

## 2019-04-11 PROCEDURE — 86900 BLOOD TYPING SEROLOGIC ABO: CPT

## 2019-04-11 PROCEDURE — 85610 PROTHROMBIN TIME: CPT | Performed by: INTERNAL MEDICINE

## 2019-04-11 PROCEDURE — 94003 VENT MGMT INPAT SUBQ DAY: CPT

## 2019-04-11 PROCEDURE — 82375 ASSAY CARBOXYHB QUANT: CPT | Performed by: PHYSICIAN ASSISTANT

## 2019-04-11 PROCEDURE — 25010000002 METHYLPREDNISOLONE PER 40 MG: Performed by: INTERNAL MEDICINE

## 2019-04-11 PROCEDURE — 84100 ASSAY OF PHOSPHORUS: CPT | Performed by: INTERNAL MEDICINE

## 2019-04-11 PROCEDURE — 99231 SBSQ HOSP IP/OBS SF/LOW 25: CPT | Performed by: INTERNAL MEDICINE

## 2019-04-11 PROCEDURE — 63710000001 INSULIN DETEMIR PER 5 UNITS: Performed by: INTERNAL MEDICINE

## 2019-04-11 PROCEDURE — 76937 US GUIDE VASCULAR ACCESS: CPT | Performed by: INTERNAL MEDICINE

## 2019-04-11 PROCEDURE — 83050 HGB METHEMOGLOBIN QUAN: CPT | Performed by: PHYSICIAN ASSISTANT

## 2019-04-11 PROCEDURE — 63710000001 INSULIN ASPART PER 5 UNITS: Performed by: INTERNAL MEDICINE

## 2019-04-11 PROCEDURE — 86850 RBC ANTIBODY SCREEN: CPT | Performed by: INTERNAL MEDICINE

## 2019-04-11 PROCEDURE — 25010000002 PROPOFOL 1000 MG/ML EMULSION: Performed by: INTERNAL MEDICINE

## 2019-04-11 RX ORDER — SODIUM CHLORIDE 9 MG/ML
INJECTION, SOLUTION INTRAVENOUS
Status: DISPENSED
Start: 2019-04-11 | End: 2019-04-12

## 2019-04-11 RX ORDER — POTASSIUM CHLORIDE 1.5 G/1.77G
40 POWDER, FOR SOLUTION ORAL EVERY 4 HOURS
Status: COMPLETED | OUTPATIENT
Start: 2019-04-11 | End: 2019-04-12

## 2019-04-11 RX ADMIN — RIFAXIMIN 550 MG: 550 TABLET ORAL at 09:18

## 2019-04-11 RX ADMIN — SODIUM BICARBONATE TAB 650 MG 1300 MG: 650 TAB at 16:47

## 2019-04-11 RX ADMIN — SODIUM CHLORIDE, PRESERVATIVE FREE 10 ML: 5 INJECTION INTRAVENOUS at 23:16

## 2019-04-11 RX ADMIN — METOPROLOL TARTRATE 75 MG: 50 TABLET, FILM COATED ORAL at 09:18

## 2019-04-11 RX ADMIN — METRONIDAZOLE 500 MG: 500 INJECTION, SOLUTION INTRAVENOUS at 10:20

## 2019-04-11 RX ADMIN — Medication 100 MG: at 09:18

## 2019-04-11 RX ADMIN — METHYLPREDNISOLONE SODIUM SUCCINATE 40 MG: 40 INJECTION, POWDER, FOR SOLUTION INTRAMUSCULAR; INTRAVENOUS at 06:22

## 2019-04-11 RX ADMIN — METRONIDAZOLE 500 MG: 500 INJECTION, SOLUTION INTRAVENOUS at 02:41

## 2019-04-11 RX ADMIN — Medication: at 09:17

## 2019-04-11 RX ADMIN — SODIUM CHLORIDE, PRESERVATIVE FREE 3 ML: 5 INJECTION INTRAVENOUS at 09:20

## 2019-04-11 RX ADMIN — INSULIN ASPART 2 UNITS: 100 INJECTION, SOLUTION INTRAVENOUS; SUBCUTANEOUS at 11:50

## 2019-04-11 RX ADMIN — ATORVASTATIN CALCIUM 80 MG: 40 TABLET, FILM COATED ORAL at 23:11

## 2019-04-11 RX ADMIN — Medication: at 17:05

## 2019-04-11 RX ADMIN — NOREPINEPHRINE BITARTRATE 0.02 MCG/KG/MIN: 1 INJECTION INTRAVENOUS at 20:28

## 2019-04-11 RX ADMIN — CALCIUM ACETATE 667 MG: 667 CAPSULE ORAL at 09:17

## 2019-04-11 RX ADMIN — CALCIUM ACETATE 667 MG: 667 CAPSULE ORAL at 23:12

## 2019-04-11 RX ADMIN — LEVOTHYROXINE SODIUM 12.5 MCG: 25 TABLET ORAL at 06:21

## 2019-04-11 RX ADMIN — SODIUM BICARBONATE TAB 650 MG 1300 MG: 650 TAB at 09:17

## 2019-04-11 RX ADMIN — IPRATROPIUM BROMIDE 0.5 MG: 0.5 SOLUTION RESPIRATORY (INHALATION) at 13:13

## 2019-04-11 RX ADMIN — CALCIUM ACETATE 667 MG: 667 CAPSULE ORAL at 16:47

## 2019-04-11 RX ADMIN — CHLORHEXIDINE GLUCONATE 15 ML: 1.2 RINSE ORAL at 23:15

## 2019-04-11 RX ADMIN — PROPOFOL 30 MCG/KG/MIN: 10 INJECTION, EMULSION INTRAVENOUS at 12:50

## 2019-04-11 RX ADMIN — ALBUMIN (HUMAN) 25 G: 0.25 INJECTION, SOLUTION INTRAVENOUS at 20:29

## 2019-04-11 RX ADMIN — IPRATROPIUM BROMIDE 0.5 MG: 0.5 SOLUTION RESPIRATORY (INHALATION) at 00:49

## 2019-04-11 RX ADMIN — CEFEPIME HYDROCHLORIDE 1 G: 1 INJECTION, POWDER, FOR SOLUTION INTRAMUSCULAR; INTRAVENOUS at 10:21

## 2019-04-11 RX ADMIN — Medication: at 11:50

## 2019-04-11 RX ADMIN — INSULIN DETEMIR 15 UNITS: 100 INJECTION, SOLUTION SUBCUTANEOUS at 06:32

## 2019-04-11 RX ADMIN — PROPOFOL 30 MCG/KG/MIN: 10 INJECTION, EMULSION INTRAVENOUS at 03:37

## 2019-04-11 RX ADMIN — IPRATROPIUM BROMIDE 0.5 MG: 0.5 SOLUTION RESPIRATORY (INHALATION) at 06:44

## 2019-04-11 RX ADMIN — Medication: at 10:20

## 2019-04-11 RX ADMIN — DILTIAZEM HYDROCHLORIDE 60 MG: 60 TABLET, FILM COATED ORAL at 16:47

## 2019-04-11 RX ADMIN — SODIUM CHLORIDE, PRESERVATIVE FREE 10 ML: 5 INJECTION INTRAVENOUS at 09:19

## 2019-04-11 RX ADMIN — PANTOPRAZOLE SODIUM 40 MG: 40 INJECTION, POWDER, FOR SOLUTION INTRAVENOUS at 06:22

## 2019-04-11 RX ADMIN — CHLORHEXIDINE GLUCONATE 15 ML: 1.2 RINSE ORAL at 09:18

## 2019-04-11 RX ADMIN — ALBUMIN (HUMAN) 25 G: 0.25 INJECTION, SOLUTION INTRAVENOUS at 20:00

## 2019-04-11 RX ADMIN — Medication: at 23:15

## 2019-04-11 RX ADMIN — PROPOFOL 30 MCG/KG/MIN: 10 INJECTION, EMULSION INTRAVENOUS at 19:02

## 2019-04-11 RX ADMIN — RIFAXIMIN 550 MG: 550 TABLET ORAL at 23:12

## 2019-04-11 RX ADMIN — POTASSIUM CHLORIDE 40 MEQ: 1.5 POWDER, FOR SOLUTION ORAL at 23:11

## 2019-04-11 RX ADMIN — FOLIC ACID 1 MG: 5 INJECTION, SOLUTION INTRAMUSCULAR; INTRAVENOUS; SUBCUTANEOUS at 09:17

## 2019-04-11 RX ADMIN — ZINC SULFATE CAP 220 MG (50 MG ELEMENTAL ZN) 50 MG: 220 (50 ZN) CAP at 09:18

## 2019-04-11 RX ADMIN — DILTIAZEM HYDROCHLORIDE 60 MG: 60 TABLET, FILM COATED ORAL at 06:21

## 2019-04-11 RX ADMIN — DILTIAZEM HYDROCHLORIDE 60 MG: 60 TABLET, FILM COATED ORAL at 23:12

## 2019-04-11 RX ADMIN — CALCIUM ACETATE 667 MG: 667 CAPSULE ORAL at 04:43

## 2019-04-11 RX ADMIN — ASPIRIN 81 MG: 81 TABLET, CHEWABLE ORAL at 09:18

## 2019-04-11 RX ADMIN — SODIUM BICARBONATE TAB 650 MG 1300 MG: 650 TAB at 23:14

## 2019-04-11 RX ADMIN — IPRATROPIUM BROMIDE 0.5 MG: 0.5 SOLUTION RESPIRATORY (INHALATION) at 18:14

## 2019-04-11 RX ADMIN — METOPROLOL TARTRATE 75 MG: 50 TABLET, FILM COATED ORAL at 23:13

## 2019-04-11 RX ADMIN — ZINC SULFATE CAP 220 MG (50 MG ELEMENTAL ZN) 50 MG: 220 (50 ZN) CAP at 23:12

## 2019-04-12 ENCOUNTER — APPOINTMENT (OUTPATIENT)
Dept: GENERAL RADIOLOGY | Facility: HOSPITAL | Age: 59
End: 2019-04-12

## 2019-04-12 LAB
A-A DO2: 53.2 MMHG (ref 0–300)
A-A DO2: 58.9 MMHG (ref 0–300)
ABO + RH BLD: NORMAL
ALBUMIN SERPL-MCNC: 3.17 G/DL (ref 3.5–5.2)
ALBUMIN/GLOB SERPL: 1.7 G/DL
ALP SERPL-CCNC: 42 U/L (ref 39–117)
ALT SERPL W P-5'-P-CCNC: 8 U/L (ref 1–41)
ANION GAP SERPL CALCULATED.3IONS-SCNC: 13.4 MMOL/L
ARTERIAL PATENCY WRIST A: ABNORMAL
ARTERIAL PATENCY WRIST A: POSITIVE
AST SERPL-CCNC: 20 U/L (ref 1–40)
ATMOSPHERIC PRESS: 724 MMHG
ATMOSPHERIC PRESS: 724 MMHG
BASE EXCESS BLDA CALC-SCNC: -0.9 MMOL/L
BASE EXCESS BLDA CALC-SCNC: 0.8 MMOL/L
BASOPHILS # BLD AUTO: 0 10*3/MM3 (ref 0–0.2)
BASOPHILS NFR BLD AUTO: 0 % (ref 0–1.5)
BDY SITE: ABNORMAL
BDY SITE: ABNORMAL
BH BB BLOOD EXPIRATION DATE: NORMAL
BH BB BLOOD TYPE BARCODE: 600
BH BB DISPENSE STATUS: NORMAL
BH BB PRODUCT CODE: NORMAL
BH BB UNIT NUMBER: NORMAL
BILIRUB SERPL-MCNC: 0.6 MG/DL (ref 0.2–1.2)
BODY TEMPERATURE: 98.6 C
BODY TEMPERATURE: 98.6 C
BUN BLD-MCNC: 50 MG/DL (ref 8–23)
BUN/CREAT SERPL: 21.9 (ref 7–25)
CALCIUM SPEC-SCNC: 7.6 MG/DL (ref 8.6–10.5)
CHLORIDE SERPL-SCNC: 104 MMOL/L (ref 98–107)
CO2 SERPL-SCNC: 22.6 MMOL/L (ref 22–29)
COHGB MFR BLD: 1.7 % (ref 0–5)
COHGB MFR BLD: 2.7 % (ref 0–5)
CREAT BLD-MCNC: 2.28 MG/DL (ref 0.76–1.27)
DEPRECATED RDW RBC AUTO: 47.4 FL (ref 37–54)
DEPRECATED RDW RBC AUTO: 51.8 FL (ref 37–54)
EOSINOPHIL # BLD AUTO: 0.03 10*3/MM3 (ref 0–0.4)
EOSINOPHIL NFR BLD AUTO: 0.5 % (ref 0.3–6.2)
ERYTHROCYTE [DISTWIDTH] IN BLOOD BY AUTOMATED COUNT: 16.5 % (ref 12.3–15.4)
ERYTHROCYTE [DISTWIDTH] IN BLOOD BY AUTOMATED COUNT: 17.2 % (ref 12.3–15.4)
GFR SERPL CREATININE-BSD FRML MDRD: 29 ML/MIN/1.73
GLOBULIN UR ELPH-MCNC: 1.8 GM/DL
GLUCOSE BLD-MCNC: 99 MG/DL (ref 65–99)
GLUCOSE BLDC GLUCOMTR-MCNC: 113 MG/DL (ref 70–130)
GLUCOSE BLDC GLUCOMTR-MCNC: 120 MG/DL (ref 70–130)
GLUCOSE BLDC GLUCOMTR-MCNC: 126 MG/DL (ref 70–130)
GLUCOSE BLDC GLUCOMTR-MCNC: 155 MG/DL (ref 70–130)
GLUCOSE BLDC GLUCOMTR-MCNC: 53 MG/DL (ref 70–130)
GLUCOSE BLDC GLUCOMTR-MCNC: 67 MG/DL (ref 70–130)
HCO3 BLDA-SCNC: 22.7 MMOL/L (ref 22–26)
HCO3 BLDA-SCNC: 23.8 MMOL/L (ref 22–26)
HCT VFR BLD AUTO: 21.4 % (ref 37.5–51)
HCT VFR BLD AUTO: 26.7 % (ref 37.5–51)
HCT VFR BLD CALC: 23 % (ref 42–52)
HCT VFR BLD CALC: 27 % (ref 42–52)
HGB BLD-MCNC: 7 G/DL (ref 13–17.7)
HGB BLD-MCNC: 8.8 G/DL (ref 13–17.7)
HGB BLDA-MCNC: 7.8 G/DL (ref 12–16)
HGB BLDA-MCNC: 9.2 G/DL (ref 12–16)
HOROWITZ INDEX BLD+IHG-RTO: 25 %
HOROWITZ INDEX BLD+IHG-RTO: 25 %
IMM GRANULOCYTES # BLD AUTO: 0.04 10*3/MM3 (ref 0–0.05)
IMM GRANULOCYTES NFR BLD AUTO: 0.7 % (ref 0–0.5)
LYMPHOCYTES # BLD AUTO: 0.88 10*3/MM3 (ref 0.7–3.1)
LYMPHOCYTES NFR BLD AUTO: 14.5 % (ref 19.6–45.3)
MAGNESIUM SERPL-MCNC: 2 MG/DL (ref 1.6–2.4)
MCH RBC QN AUTO: 28.5 PG (ref 26.6–33)
MCH RBC QN AUTO: 28.5 PG (ref 26.6–33)
MCHC RBC AUTO-ENTMCNC: 32.7 G/DL (ref 31.5–35.7)
MCHC RBC AUTO-ENTMCNC: 33 G/DL (ref 31.5–35.7)
MCV RBC AUTO: 86.4 FL (ref 79–97)
MCV RBC AUTO: 87 FL (ref 79–97)
METHGB BLD QL: 0.4 % (ref 0–3)
METHGB BLD QL: 0.5 % (ref 0–3)
MODALITY: ABNORMAL
MODALITY: ABNORMAL
MONOCYTES # BLD AUTO: 0.44 10*3/MM3 (ref 0.1–0.9)
MONOCYTES NFR BLD AUTO: 7.2 % (ref 5–12)
NEUTROPHILS # BLD AUTO: 4.69 10*3/MM3 (ref 1.4–7)
NEUTROPHILS NFR BLD AUTO: 77.1 % (ref 42.7–76)
OXYHGB MFR BLDV: 91.9 % (ref 85–100)
OXYHGB MFR BLDV: 92.7 % (ref 85–100)
PCO2 BLDA: 31.1 MM HG (ref 35–45)
PCO2 BLDA: 33.3 MM HG (ref 35–45)
PEEP RESPIRATORY: 5 CM[H2O]
PEEP RESPIRATORY: 5 CM[H2O]
PH BLDA: 7.45 PH UNITS (ref 7.35–7.45)
PH BLDA: 7.5 PH UNITS (ref 7.35–7.45)
PHOSPHATE SERPL-MCNC: 3.4 MG/DL (ref 2.5–4.5)
PLATELET # BLD AUTO: 88 10*3/MM3 (ref 140–450)
PLATELET # BLD AUTO: 99 10*3/MM3 (ref 140–450)
PMV BLD AUTO: 13.4 FL (ref 6–12)
PMV BLD AUTO: ABNORMAL FL (ref 6–12)
PO2 BLDA: 70.8 MM HG (ref 80–100)
PO2 BLDA: 79.1 MM HG (ref 80–100)
POTASSIUM BLD-SCNC: 4.3 MMOL/L (ref 3.5–5.2)
PROT SERPL-MCNC: 5 G/DL (ref 6–8.5)
PSV: 10 CMH2O
RBC # BLD AUTO: 2.46 10*6/MM3 (ref 4.14–5.8)
RBC # BLD AUTO: 3.09 10*6/MM3 (ref 4.14–5.8)
SAO2 % BLDCOA: 93.9 % (ref 90–100)
SAO2 % BLDCOA: 95.8 % (ref 90–100)
SET MECH RESP RATE: 18
SODIUM BLD-SCNC: 140 MMOL/L (ref 136–145)
UNIT  ABO: NORMAL
UNIT  RH: NORMAL
VENTILATOR MODE: ABNORMAL
VENTILATOR MODE: ABNORMAL
VT ON VENT VENT: 450 ML
WBC NRBC COR # BLD: 11.56 10*3/MM3 (ref 3.4–10.8)
WBC NRBC COR # BLD: 6.08 10*3/MM3 (ref 3.4–10.8)

## 2019-04-12 PROCEDURE — 71045 X-RAY EXAM CHEST 1 VIEW: CPT

## 2019-04-12 PROCEDURE — 85027 COMPLETE CBC AUTOMATED: CPT | Performed by: INTERNAL MEDICINE

## 2019-04-12 PROCEDURE — 85025 COMPLETE CBC W/AUTO DIFF WBC: CPT | Performed by: INTERNAL MEDICINE

## 2019-04-12 PROCEDURE — 83050 HGB METHEMOGLOBIN QUAN: CPT | Performed by: PHYSICIAN ASSISTANT

## 2019-04-12 PROCEDURE — 94799 UNLISTED PULMONARY SVC/PX: CPT

## 2019-04-12 PROCEDURE — 99233 SBSQ HOSP IP/OBS HIGH 50: CPT | Performed by: INTERNAL MEDICINE

## 2019-04-12 PROCEDURE — 99231 SBSQ HOSP IP/OBS SF/LOW 25: CPT | Performed by: INTERNAL MEDICINE

## 2019-04-12 PROCEDURE — 83050 HGB METHEMOGLOBIN QUAN: CPT | Performed by: INTERNAL MEDICINE

## 2019-04-12 PROCEDURE — 82962 GLUCOSE BLOOD TEST: CPT

## 2019-04-12 PROCEDURE — 71045 X-RAY EXAM CHEST 1 VIEW: CPT | Performed by: RADIOLOGY

## 2019-04-12 PROCEDURE — 80053 COMPREHEN METABOLIC PANEL: CPT | Performed by: INTERNAL MEDICINE

## 2019-04-12 PROCEDURE — 25010000002 PROPOFOL 1000 MG/ML EMULSION: Performed by: INTERNAL MEDICINE

## 2019-04-12 PROCEDURE — 36600 WITHDRAWAL OF ARTERIAL BLOOD: CPT | Performed by: PHYSICIAN ASSISTANT

## 2019-04-12 PROCEDURE — 36600 WITHDRAWAL OF ARTERIAL BLOOD: CPT | Performed by: INTERNAL MEDICINE

## 2019-04-12 PROCEDURE — 82375 ASSAY CARBOXYHB QUANT: CPT | Performed by: PHYSICIAN ASSISTANT

## 2019-04-12 PROCEDURE — 82805 BLOOD GASES W/O2 SATURATION: CPT | Performed by: PHYSICIAN ASSISTANT

## 2019-04-12 PROCEDURE — 83735 ASSAY OF MAGNESIUM: CPT | Performed by: INTERNAL MEDICINE

## 2019-04-12 PROCEDURE — 84100 ASSAY OF PHOSPHORUS: CPT | Performed by: INTERNAL MEDICINE

## 2019-04-12 PROCEDURE — 99291 CRITICAL CARE FIRST HOUR: CPT | Performed by: INTERNAL MEDICINE

## 2019-04-12 PROCEDURE — 82375 ASSAY CARBOXYHB QUANT: CPT | Performed by: INTERNAL MEDICINE

## 2019-04-12 PROCEDURE — 82805 BLOOD GASES W/O2 SATURATION: CPT | Performed by: INTERNAL MEDICINE

## 2019-04-12 PROCEDURE — 63710000001 INSULIN DETEMIR PER 5 UNITS: Performed by: INTERNAL MEDICINE

## 2019-04-12 PROCEDURE — 94003 VENT MGMT INPAT SUBQ DAY: CPT

## 2019-04-12 RX ORDER — FAMOTIDINE 20 MG/1
20 TABLET, FILM COATED ORAL DAILY
Status: DISCONTINUED | OUTPATIENT
Start: 2019-04-12 | End: 2019-04-16

## 2019-04-12 RX ADMIN — CALCIUM ACETATE 667 MG: 667 CAPSULE ORAL at 21:40

## 2019-04-12 RX ADMIN — POTASSIUM CHLORIDE 40 MEQ: 1.5 POWDER, FOR SOLUTION ORAL at 06:08

## 2019-04-12 RX ADMIN — SODIUM CHLORIDE, PRESERVATIVE FREE 10 ML: 5 INJECTION INTRAVENOUS at 20:44

## 2019-04-12 RX ADMIN — METOPROLOL TARTRATE 75 MG: 50 TABLET, FILM COATED ORAL at 20:42

## 2019-04-12 RX ADMIN — PROPOFOL 30 MCG/KG/MIN: 10 INJECTION, EMULSION INTRAVENOUS at 02:40

## 2019-04-12 RX ADMIN — SODIUM CHLORIDE, PRESERVATIVE FREE 3 ML: 5 INJECTION INTRAVENOUS at 20:44

## 2019-04-12 RX ADMIN — METRONIDAZOLE 500 MG: 500 INJECTION, SOLUTION INTRAVENOUS at 11:16

## 2019-04-12 RX ADMIN — ZINC SULFATE CAP 220 MG (50 MG ELEMENTAL ZN) 50 MG: 220 (50 ZN) CAP at 08:30

## 2019-04-12 RX ADMIN — FOLIC ACID 1 MG: 5 INJECTION, SOLUTION INTRAMUSCULAR; INTRAVENOUS; SUBCUTANEOUS at 08:24

## 2019-04-12 RX ADMIN — SODIUM CHLORIDE, PRESERVATIVE FREE 10 ML: 5 INJECTION INTRAVENOUS at 08:32

## 2019-04-12 RX ADMIN — CALCIUM ACETATE 667 MG: 667 CAPSULE ORAL at 03:44

## 2019-04-12 RX ADMIN — SODIUM BICARBONATE TAB 650 MG 1300 MG: 650 TAB at 08:29

## 2019-04-12 RX ADMIN — IPRATROPIUM BROMIDE 0.5 MG: 0.5 SOLUTION RESPIRATORY (INHALATION) at 01:03

## 2019-04-12 RX ADMIN — Medication: at 11:16

## 2019-04-12 RX ADMIN — IPRATROPIUM BROMIDE 0.5 MG: 0.5 SOLUTION RESPIRATORY (INHALATION) at 12:46

## 2019-04-12 RX ADMIN — METOPROLOL TARTRATE 75 MG: 50 TABLET, FILM COATED ORAL at 08:30

## 2019-04-12 RX ADMIN — Medication: at 08:30

## 2019-04-12 RX ADMIN — ZINC SULFATE CAP 220 MG (50 MG ELEMENTAL ZN) 50 MG: 220 (50 ZN) CAP at 20:43

## 2019-04-12 RX ADMIN — LEVOTHYROXINE SODIUM 12.5 MCG: 25 TABLET ORAL at 06:08

## 2019-04-12 RX ADMIN — RIFAXIMIN 550 MG: 550 TABLET ORAL at 08:30

## 2019-04-12 RX ADMIN — PANTOPRAZOLE SODIUM 40 MG: 40 INJECTION, POWDER, FOR SOLUTION INTRAVENOUS at 06:09

## 2019-04-12 RX ADMIN — RIFAXIMIN 550 MG: 550 TABLET ORAL at 20:43

## 2019-04-12 RX ADMIN — INSULIN DETEMIR 15 UNITS: 100 INJECTION, SOLUTION SUBCUTANEOUS at 06:14

## 2019-04-12 RX ADMIN — CHLORHEXIDINE GLUCONATE 15 ML: 1.2 RINSE ORAL at 20:36

## 2019-04-12 RX ADMIN — SODIUM CHLORIDE, PRESERVATIVE FREE 10 ML: 5 INJECTION INTRAVENOUS at 20:43

## 2019-04-12 RX ADMIN — IPRATROPIUM BROMIDE 0.5 MG: 0.5 SOLUTION RESPIRATORY (INHALATION) at 06:28

## 2019-04-12 RX ADMIN — DILTIAZEM HYDROCHLORIDE 60 MG: 60 TABLET, FILM COATED ORAL at 21:39

## 2019-04-12 RX ADMIN — POTASSIUM CHLORIDE 40 MEQ: 1.5 POWDER, FOR SOLUTION ORAL at 01:51

## 2019-04-12 RX ADMIN — ATORVASTATIN CALCIUM 80 MG: 40 TABLET, FILM COATED ORAL at 20:43

## 2019-04-12 RX ADMIN — DEXTROSE MONOHYDRATE 25 G: 25 INJECTION, SOLUTION INTRAVENOUS at 20:35

## 2019-04-12 RX ADMIN — FAMOTIDINE 20 MG: 20 TABLET, FILM COATED ORAL at 21:39

## 2019-04-12 RX ADMIN — SODIUM BICARBONATE TAB 650 MG 1300 MG: 650 TAB at 20:42

## 2019-04-12 RX ADMIN — Medication 100 MG: at 08:30

## 2019-04-12 RX ADMIN — CEFEPIME HYDROCHLORIDE 1 G: 1 INJECTION, POWDER, FOR SOLUTION INTRAMUSCULAR; INTRAVENOUS at 11:15

## 2019-04-12 RX ADMIN — CHLORHEXIDINE GLUCONATE 15 ML: 1.2 RINSE ORAL at 08:30

## 2019-04-12 RX ADMIN — IPRATROPIUM BROMIDE 0.5 MG: 0.5 SOLUTION RESPIRATORY (INHALATION) at 18:52

## 2019-04-12 RX ADMIN — DEXTROSE MONOHYDRATE 50 ML: 25 INJECTION, SOLUTION INTRAVENOUS at 17:08

## 2019-04-12 RX ADMIN — METRONIDAZOLE 500 MG: 500 INJECTION, SOLUTION INTRAVENOUS at 18:11

## 2019-04-12 RX ADMIN — LABETALOL HYDROCHLORIDE 10 MG: 5 INJECTION, SOLUTION INTRAVENOUS at 20:36

## 2019-04-12 RX ADMIN — Medication: at 20:44

## 2019-04-12 RX ADMIN — DILTIAZEM HYDROCHLORIDE 60 MG: 60 TABLET, FILM COATED ORAL at 06:08

## 2019-04-12 RX ADMIN — Medication: at 17:05

## 2019-04-12 RX ADMIN — ASPIRIN 81 MG: 81 TABLET, CHEWABLE ORAL at 08:29

## 2019-04-12 RX ADMIN — Medication: at 00:52

## 2019-04-12 RX ADMIN — LACTULOSE 20 G: 10 SOLUTION ORAL at 08:30

## 2019-04-12 RX ADMIN — METRONIDAZOLE 500 MG: 500 INJECTION, SOLUTION INTRAVENOUS at 03:44

## 2019-04-13 LAB
A-A DO2: 151.1 MMHG (ref 0–300)
ALBUMIN SERPL-MCNC: 3.56 G/DL (ref 3.5–5.2)
ALBUMIN/GLOB SERPL: 1.7 G/DL
ALP SERPL-CCNC: 45 U/L (ref 39–117)
ALT SERPL W P-5'-P-CCNC: 14 U/L (ref 1–41)
ANION GAP SERPL CALCULATED.3IONS-SCNC: 16 MMOL/L
AST SERPL-CCNC: 35 U/L (ref 1–40)
ATMOSPHERIC PRESS: 725 MMHG
BASE EXCESS BLDV CALC-SCNC: -0.9 MMOL/L
BASOPHILS # BLD AUTO: 0.01 10*3/MM3 (ref 0–0.2)
BASOPHILS NFR BLD AUTO: 0.1 % (ref 0–1.5)
BDY SITE: ABNORMAL
BILIRUB SERPL-MCNC: 0.4 MG/DL (ref 0.2–1.2)
BODY TEMPERATURE: 98.6 C
BUN BLD-MCNC: 60 MG/DL (ref 8–23)
BUN/CREAT SERPL: 23.2 (ref 7–25)
CALCIUM SPEC-SCNC: 8.4 MG/DL (ref 8.6–10.5)
CHLORIDE SERPL-SCNC: 105 MMOL/L (ref 98–107)
CO2 SERPL-SCNC: 21 MMOL/L (ref 22–29)
CREAT BLD-MCNC: 2.59 MG/DL (ref 0.76–1.27)
DEPRECATED RDW RBC AUTO: 51.1 FL (ref 37–54)
EOSINOPHIL # BLD AUTO: 0.03 10*3/MM3 (ref 0–0.4)
EOSINOPHIL NFR BLD AUTO: 0.3 % (ref 0.3–6.2)
ERYTHROCYTE [DISTWIDTH] IN BLOOD BY AUTOMATED COUNT: 17.5 % (ref 12.3–15.4)
GAS FLOW AIRWAY: 3 LPM
GFR SERPL CREATININE-BSD FRML MDRD: 25 ML/MIN/1.73
GLOBULIN UR ELPH-MCNC: 2 GM/DL
GLUCOSE BLD-MCNC: 70 MG/DL (ref 65–99)
GLUCOSE BLDC GLUCOMTR-MCNC: 118 MG/DL (ref 70–130)
GLUCOSE BLDC GLUCOMTR-MCNC: 127 MG/DL (ref 70–130)
GLUCOSE BLDC GLUCOMTR-MCNC: 68 MG/DL (ref 70–130)
GLUCOSE BLDC GLUCOMTR-MCNC: 82 MG/DL (ref 70–130)
GLUCOSE BLDC GLUCOMTR-MCNC: 88 MG/DL (ref 70–130)
GLUCOSE BLDC GLUCOMTR-MCNC: 96 MG/DL (ref 70–130)
HCO3 BLDV-SCNC: 23.4 MMOL/L
HCT VFR BLD AUTO: 25.4 % (ref 37.5–51)
HGB BLD-MCNC: 8.2 G/DL (ref 13–17.7)
HGB BLDA-MCNC: 8.7 G/DL (ref 12–16)
HOROWITZ INDEX BLD+IHG-RTO: 32 %
IMM GRANULOCYTES # BLD AUTO: 0.07 10*3/MM3 (ref 0–0.05)
IMM GRANULOCYTES NFR BLD AUTO: 0.6 % (ref 0–0.5)
LYMPHOCYTES # BLD AUTO: 0.96 10*3/MM3 (ref 0.7–3.1)
LYMPHOCYTES NFR BLD AUTO: 8.5 % (ref 19.6–45.3)
MAGNESIUM SERPL-MCNC: 2 MG/DL (ref 1.6–2.4)
MCH RBC QN AUTO: 28.7 PG (ref 26.6–33)
MCHC RBC AUTO-ENTMCNC: 32.3 G/DL (ref 31.5–35.7)
MCV RBC AUTO: 88.8 FL (ref 79–97)
MODALITY: ABNORMAL
MONOCYTES # BLD AUTO: 0.77 10*3/MM3 (ref 0.1–0.9)
MONOCYTES NFR BLD AUTO: 6.8 % (ref 5–12)
NEUTROPHILS # BLD AUTO: 9.47 10*3/MM3 (ref 1.4–7)
NEUTROPHILS NFR BLD AUTO: 83.7 % (ref 42.7–76)
PCO2 BLDV: 36.8 MM HG
PH BLDV: 7.42 PH UNITS
PHOSPHATE SERPL-MCNC: 3.4 MG/DL (ref 2.5–4.5)
PLATELET # BLD AUTO: 101 10*3/MM3 (ref 140–450)
PMV BLD AUTO: 13.1 FL (ref 6–12)
PO2 BLDV: 22.8 MM HG
POTASSIUM BLD-SCNC: 4.1 MMOL/L (ref 3.5–5.2)
PROT SERPL-MCNC: 5.6 G/DL (ref 6–8.5)
RBC # BLD AUTO: 2.86 10*6/MM3 (ref 4.14–5.8)
SAO2 % BLDCOV: 37.6 %
SODIUM BLD-SCNC: 142 MMOL/L (ref 136–145)
WBC NRBC COR # BLD: 11.31 10*3/MM3 (ref 3.4–10.8)

## 2019-04-13 PROCEDURE — 80053 COMPREHEN METABOLIC PANEL: CPT | Performed by: INTERNAL MEDICINE

## 2019-04-13 PROCEDURE — 94799 UNLISTED PULMONARY SVC/PX: CPT

## 2019-04-13 PROCEDURE — 82805 BLOOD GASES W/O2 SATURATION: CPT | Performed by: INTERNAL MEDICINE

## 2019-04-13 PROCEDURE — 25010000002 LORAZEPAM PER 2 MG

## 2019-04-13 PROCEDURE — 84100 ASSAY OF PHOSPHORUS: CPT | Performed by: INTERNAL MEDICINE

## 2019-04-13 PROCEDURE — 83735 ASSAY OF MAGNESIUM: CPT | Performed by: INTERNAL MEDICINE

## 2019-04-13 PROCEDURE — 85025 COMPLETE CBC W/AUTO DIFF WBC: CPT | Performed by: INTERNAL MEDICINE

## 2019-04-13 PROCEDURE — 82962 GLUCOSE BLOOD TEST: CPT

## 2019-04-13 PROCEDURE — 99233 SBSQ HOSP IP/OBS HIGH 50: CPT | Performed by: INTERNAL MEDICINE

## 2019-04-13 RX ORDER — CHLORDIAZEPOXIDE HYDROCHLORIDE 25 MG/1
50 CAPSULE, GELATIN COATED ORAL EVERY 8 HOURS SCHEDULED
Status: DISCONTINUED | OUTPATIENT
Start: 2019-04-13 | End: 2019-04-13

## 2019-04-13 RX ORDER — METOPROLOL TARTRATE 100 MG/1
100 TABLET ORAL EVERY 12 HOURS SCHEDULED
Status: DISCONTINUED | OUTPATIENT
Start: 2019-04-13 | End: 2019-04-23 | Stop reason: HOSPADM

## 2019-04-13 RX ORDER — LORAZEPAM 1 MG/1
1 TABLET ORAL EVERY 6 HOURS PRN
Status: DISPENSED | OUTPATIENT
Start: 2019-04-13 | End: 2019-04-23

## 2019-04-13 RX ORDER — LORAZEPAM 2 MG/ML
INJECTION INTRAMUSCULAR
Status: COMPLETED
Start: 2019-04-13 | End: 2019-04-13

## 2019-04-13 RX ORDER — CHLORDIAZEPOXIDE HYDROCHLORIDE 25 MG/1
50 CAPSULE, GELATIN COATED ORAL EVERY 8 HOURS
Status: DISCONTINUED | OUTPATIENT
Start: 2019-04-13 | End: 2019-04-13

## 2019-04-13 RX ORDER — LABETALOL HYDROCHLORIDE 5 MG/ML
20 INJECTION, SOLUTION INTRAVENOUS EVERY 4 HOURS PRN
Status: DISCONTINUED | OUTPATIENT
Start: 2019-04-13 | End: 2019-04-23 | Stop reason: HOSPADM

## 2019-04-13 RX ORDER — CHLORDIAZEPOXIDE HYDROCHLORIDE 25 MG/1
50 CAPSULE, GELATIN COATED ORAL EVERY 6 HOURS SCHEDULED
Status: DISCONTINUED | OUTPATIENT
Start: 2019-04-13 | End: 2019-04-14

## 2019-04-13 RX ORDER — LORAZEPAM 2 MG/ML
0.25 INJECTION INTRAMUSCULAR ONCE
Status: COMPLETED | OUTPATIENT
Start: 2019-04-13 | End: 2019-04-13

## 2019-04-13 RX ADMIN — SODIUM CHLORIDE 1000 ML: 9 INJECTION, SOLUTION INTRAVENOUS at 08:20

## 2019-04-13 RX ADMIN — IPRATROPIUM BROMIDE 0.5 MG: 0.5 SOLUTION RESPIRATORY (INHALATION) at 18:36

## 2019-04-13 RX ADMIN — CHLORHEXIDINE GLUCONATE 15 ML: 1.2 RINSE ORAL at 20:59

## 2019-04-13 RX ADMIN — DEXTROSE MONOHYDRATE 25 G: 25 INJECTION, SOLUTION INTRAVENOUS at 04:06

## 2019-04-13 RX ADMIN — LACTULOSE 20 G: 10 SOLUTION ORAL at 14:02

## 2019-04-13 RX ADMIN — LORAZEPAM 1 MG: 1 TABLET ORAL at 16:20

## 2019-04-13 RX ADMIN — SODIUM CHLORIDE, PRESERVATIVE FREE 10 ML: 5 INJECTION INTRAVENOUS at 08:45

## 2019-04-13 RX ADMIN — LORAZEPAM 0.25 MG: 2 INJECTION INTRAMUSCULAR at 08:19

## 2019-04-13 RX ADMIN — RIFAXIMIN 550 MG: 550 TABLET ORAL at 14:06

## 2019-04-13 RX ADMIN — SODIUM CHLORIDE, PRESERVATIVE FREE 10 ML: 5 INJECTION INTRAVENOUS at 20:59

## 2019-04-13 RX ADMIN — CEFEPIME HYDROCHLORIDE 1 G: 1 INJECTION, POWDER, FOR SOLUTION INTRAMUSCULAR; INTRAVENOUS at 13:59

## 2019-04-13 RX ADMIN — CALCIUM ACETATE 667 MG: 667 CAPSULE ORAL at 21:03

## 2019-04-13 RX ADMIN — CHLORDIAZEPOXIDE HYDROCHLORIDE 50 MG: 25 CAPSULE ORAL at 23:31

## 2019-04-13 RX ADMIN — METOPROLOL TARTRATE 75 MG: 50 TABLET, FILM COATED ORAL at 14:00

## 2019-04-13 RX ADMIN — SODIUM CHLORIDE, PRESERVATIVE FREE 10 ML: 5 INJECTION INTRAVENOUS at 08:44

## 2019-04-13 RX ADMIN — Medication 100 MG: at 14:00

## 2019-04-13 RX ADMIN — METRONIDAZOLE 500 MG: 500 INJECTION, SOLUTION INTRAVENOUS at 04:07

## 2019-04-13 RX ADMIN — CHLORHEXIDINE GLUCONATE 15 ML: 1.2 RINSE ORAL at 08:43

## 2019-04-13 RX ADMIN — IPRATROPIUM BROMIDE 0.5 MG: 0.5 SOLUTION RESPIRATORY (INHALATION) at 00:52

## 2019-04-13 RX ADMIN — CALCIUM ACETATE 667 MG: 667 CAPSULE ORAL at 04:07

## 2019-04-13 RX ADMIN — SODIUM CHLORIDE, PRESERVATIVE FREE 10 ML: 5 INJECTION INTRAVENOUS at 21:00

## 2019-04-13 RX ADMIN — Medication: at 14:03

## 2019-04-13 RX ADMIN — Medication 1 APPLICATION: at 08:44

## 2019-04-13 RX ADMIN — IPRATROPIUM BROMIDE 0.5 MG: 0.5 SOLUTION RESPIRATORY (INHALATION) at 13:04

## 2019-04-13 RX ADMIN — METOPROLOL TARTRATE 100 MG: 100 TABLET, FILM COATED ORAL at 21:03

## 2019-04-13 RX ADMIN — LORAZEPAM 0.25 MG: 2 INJECTION, SOLUTION INTRAMUSCULAR; INTRAVENOUS at 08:19

## 2019-04-13 RX ADMIN — DILTIAZEM HYDROCHLORIDE 60 MG: 60 TABLET, FILM COATED ORAL at 14:07

## 2019-04-13 RX ADMIN — SODIUM CHLORIDE, PRESERVATIVE FREE 3 ML: 5 INJECTION INTRAVENOUS at 21:04

## 2019-04-13 RX ADMIN — LORAZEPAM 1 MG: 1 TABLET ORAL at 10:08

## 2019-04-13 RX ADMIN — SODIUM BICARBONATE TAB 650 MG 1300 MG: 650 TAB at 14:00

## 2019-04-13 RX ADMIN — CALCIUM ACETATE 667 MG: 667 CAPSULE ORAL at 16:09

## 2019-04-13 RX ADMIN — ASPIRIN 81 MG: 81 TABLET, CHEWABLE ORAL at 14:06

## 2019-04-13 RX ADMIN — DILTIAZEM HYDROCHLORIDE 60 MG: 60 TABLET, FILM COATED ORAL at 21:04

## 2019-04-13 RX ADMIN — LABETALOL HYDROCHLORIDE 20 MG: 5 INJECTION, SOLUTION INTRAVENOUS at 04:06

## 2019-04-13 RX ADMIN — ZINC SULFATE CAP 220 MG (50 MG ELEMENTAL ZN) 50 MG: 220 (50 ZN) CAP at 20:59

## 2019-04-13 RX ADMIN — RIFAXIMIN 550 MG: 550 TABLET ORAL at 20:59

## 2019-04-13 RX ADMIN — ZINC SULFATE CAP 220 MG (50 MG ELEMENTAL ZN) 50 MG: 220 (50 ZN) CAP at 14:02

## 2019-04-13 RX ADMIN — Medication: at 17:42

## 2019-04-13 RX ADMIN — IPRATROPIUM BROMIDE 0.5 MG: 0.5 SOLUTION RESPIRATORY (INHALATION) at 07:04

## 2019-04-13 RX ADMIN — LABETALOL HYDROCHLORIDE 20 MG: 5 INJECTION, SOLUTION INTRAVENOUS at 16:08

## 2019-04-13 RX ADMIN — Medication: at 21:05

## 2019-04-13 RX ADMIN — SODIUM BICARBONATE TAB 650 MG 1300 MG: 650 TAB at 16:08

## 2019-04-13 RX ADMIN — CHLORDIAZEPOXIDE HYDROCHLORIDE 50 MG: 25 CAPSULE ORAL at 10:08

## 2019-04-13 RX ADMIN — CHLORDIAZEPOXIDE HYDROCHLORIDE 50 MG: 25 CAPSULE ORAL at 16:20

## 2019-04-13 RX ADMIN — ATORVASTATIN CALCIUM 80 MG: 40 TABLET, FILM COATED ORAL at 20:59

## 2019-04-13 RX ADMIN — FOLIC ACID 1 MG: 5 INJECTION, SOLUTION INTRAMUSCULAR; INTRAVENOUS; SUBCUTANEOUS at 13:59

## 2019-04-13 RX ADMIN — CALCIUM ACETATE 667 MG: 667 CAPSULE ORAL at 14:06

## 2019-04-13 RX ADMIN — LABETALOL HYDROCHLORIDE 10 MG: 5 INJECTION, SOLUTION INTRAVENOUS at 01:53

## 2019-04-13 RX ADMIN — FAMOTIDINE 20 MG: 20 TABLET, FILM COATED ORAL at 14:02

## 2019-04-13 RX ADMIN — LEVOTHYROXINE SODIUM 12.5 MCG: 25 TABLET ORAL at 06:03

## 2019-04-14 ENCOUNTER — APPOINTMENT (OUTPATIENT)
Dept: GENERAL RADIOLOGY | Facility: HOSPITAL | Age: 59
End: 2019-04-14

## 2019-04-14 LAB
A-A DO2: 124.5 MMHG (ref 0–300)
A-A DO2: 157.7 MMHG (ref 0–300)
ALBUMIN SERPL-MCNC: 3.36 G/DL (ref 3.5–5.2)
ALBUMIN/GLOB SERPL: 1.6 G/DL
ALP SERPL-CCNC: 48 U/L (ref 39–117)
ALT SERPL W P-5'-P-CCNC: 17 U/L (ref 1–41)
ANION GAP SERPL CALCULATED.3IONS-SCNC: 15 MMOL/L
ANISOCYTOSIS BLD QL: NORMAL
ARTERIAL PATENCY WRIST A: POSITIVE
AST SERPL-CCNC: 42 U/L (ref 1–40)
ATMOSPHERIC PRESS: 719 MMHG
ATMOSPHERIC PRESS: 724 MMHG
BASE EXCESS BLDA CALC-SCNC: 1.8 MMOL/L
BASE EXCESS BLDV CALC-SCNC: 1.1 MMOL/L
BASOPHILS # BLD AUTO: 0.01 10*3/MM3 (ref 0–0.2)
BASOPHILS NFR BLD AUTO: 0.1 % (ref 0–1.5)
BDY SITE: ABNORMAL
BDY SITE: ABNORMAL
BILIRUB SERPL-MCNC: 0.6 MG/DL (ref 0.2–1.2)
BODY TEMPERATURE: 98.6 C
BODY TEMPERATURE: 98.6 C
BUN BLD-MCNC: 37 MG/DL (ref 8–23)
BUN/CREAT SERPL: 15.3 (ref 7–25)
CALCIUM SPEC-SCNC: 7.9 MG/DL (ref 8.6–10.5)
CHLORIDE SERPL-SCNC: 99 MMOL/L (ref 98–107)
CO2 SERPL-SCNC: 25 MMOL/L (ref 22–29)
COHGB MFR BLD: 1.5 % (ref 0–5)
CREAT BLD-MCNC: 2.42 MG/DL (ref 0.76–1.27)
DEPRECATED RDW RBC AUTO: 51 FL (ref 37–54)
EOSINOPHIL # BLD AUTO: 0.07 10*3/MM3 (ref 0–0.4)
EOSINOPHIL NFR BLD AUTO: 0.9 % (ref 0.3–6.2)
ERYTHROCYTE [DISTWIDTH] IN BLOOD BY AUTOMATED COUNT: 17.7 % (ref 12.3–15.4)
GAS FLOW AIRWAY: 3 LPM
GFR SERPL CREATININE-BSD FRML MDRD: 27 ML/MIN/1.73
GLOBULIN UR ELPH-MCNC: 2.1 GM/DL
GLUCOSE BLD-MCNC: 173 MG/DL (ref 65–99)
GLUCOSE BLDC GLUCOMTR-MCNC: 163 MG/DL (ref 70–130)
GLUCOSE BLDC GLUCOMTR-MCNC: 219 MG/DL (ref 70–130)
GLUCOSE BLDC GLUCOMTR-MCNC: 222 MG/DL (ref 70–130)
HCO3 BLDA-SCNC: 23.6 MMOL/L (ref 22–26)
HCO3 BLDV-SCNC: 24 MMOL/L
HCT VFR BLD AUTO: 24.3 % (ref 37.5–51)
HCT VFR BLD CALC: 28 % (ref 42–52)
HGB BLD-MCNC: 7.7 G/DL (ref 13–17.7)
HGB BLDA-MCNC: 8.3 G/DL (ref 12–16)
HGB BLDA-MCNC: 9.6 G/DL (ref 12–16)
HOROWITZ INDEX BLD+IHG-RTO: 32 %
HOROWITZ INDEX BLD+IHG-RTO: 32 %
HYPOCHROMIA BLD QL: NORMAL
IMM GRANULOCYTES # BLD AUTO: 0.04 10*3/MM3 (ref 0–0.05)
IMM GRANULOCYTES NFR BLD AUTO: 0.5 % (ref 0–0.5)
INR PPP: 1.39 (ref 0.9–1.1)
LARGE PLATELETS: NORMAL
LYMPHOCYTES # BLD AUTO: 0.96 10*3/MM3 (ref 0.7–3.1)
LYMPHOCYTES NFR BLD AUTO: 11.9 % (ref 19.6–45.3)
MAGNESIUM SERPL-MCNC: 2 MG/DL (ref 1.6–2.4)
MCH RBC QN AUTO: 28.6 PG (ref 26.6–33)
MCHC RBC AUTO-ENTMCNC: 31.7 G/DL (ref 31.5–35.7)
MCV RBC AUTO: 90.3 FL (ref 79–97)
METHGB BLD QL: 0.4 % (ref 0–3)
MODALITY: ABNORMAL
MODALITY: ABNORMAL
MONOCYTES # BLD AUTO: 0.67 10*3/MM3 (ref 0.1–0.9)
MONOCYTES NFR BLD AUTO: 8.3 % (ref 5–12)
NEUTROPHILS # BLD AUTO: 6.33 10*3/MM3 (ref 1.4–7)
NEUTROPHILS NFR BLD AUTO: 78.3 % (ref 42.7–76)
OXYHGB MFR BLDV: 89.6 % (ref 85–100)
PCO2 BLDA: 27.1 MM HG (ref 35–45)
PCO2 BLDV: 31.3 MM HG
PH BLDA: 7.56 PH UNITS (ref 7.35–7.45)
PH BLDV: 7.5 PH UNITS
PHOSPHATE SERPL-MCNC: 2.9 MG/DL (ref 2.5–4.5)
PLATELET # BLD AUTO: 75 10*3/MM3 (ref 140–450)
PMV BLD AUTO: ABNORMAL FL (ref 6–12)
PO2 BLDA: 58.8 MM HG (ref 80–100)
PO2 BLDV: 22.3 MM HG
POTASSIUM BLD-SCNC: 3.7 MMOL/L (ref 3.5–5.2)
PROT SERPL-MCNC: 5.5 G/DL (ref 6–8.5)
PROTHROMBIN TIME: 17.3 SECONDS (ref 11–15.4)
RBC # BLD AUTO: 2.69 10*6/MM3 (ref 4.14–5.8)
SAO2 % BLDCOA: 91.3 % (ref 90–100)
SAO2 % BLDCOV: 37 %
SODIUM BLD-SCNC: 139 MMOL/L (ref 136–145)
WBC NRBC COR # BLD: 8.08 10*3/MM3 (ref 3.4–10.8)

## 2019-04-14 PROCEDURE — 94799 UNLISTED PULMONARY SVC/PX: CPT

## 2019-04-14 PROCEDURE — 85025 COMPLETE CBC W/AUTO DIFF WBC: CPT | Performed by: INTERNAL MEDICINE

## 2019-04-14 PROCEDURE — 80053 COMPREHEN METABOLIC PANEL: CPT | Performed by: INTERNAL MEDICINE

## 2019-04-14 PROCEDURE — 99233 SBSQ HOSP IP/OBS HIGH 50: CPT | Performed by: INTERNAL MEDICINE

## 2019-04-14 PROCEDURE — 82805 BLOOD GASES W/O2 SATURATION: CPT | Performed by: INTERNAL MEDICINE

## 2019-04-14 PROCEDURE — 85610 PROTHROMBIN TIME: CPT | Performed by: INTERNAL MEDICINE

## 2019-04-14 PROCEDURE — 84100 ASSAY OF PHOSPHORUS: CPT | Performed by: INTERNAL MEDICINE

## 2019-04-14 PROCEDURE — 71045 X-RAY EXAM CHEST 1 VIEW: CPT | Performed by: RADIOLOGY

## 2019-04-14 PROCEDURE — 71045 X-RAY EXAM CHEST 1 VIEW: CPT

## 2019-04-14 PROCEDURE — 82962 GLUCOSE BLOOD TEST: CPT

## 2019-04-14 PROCEDURE — 85007 BL SMEAR W/DIFF WBC COUNT: CPT | Performed by: INTERNAL MEDICINE

## 2019-04-14 PROCEDURE — 83735 ASSAY OF MAGNESIUM: CPT | Performed by: INTERNAL MEDICINE

## 2019-04-14 PROCEDURE — 82375 ASSAY CARBOXYHB QUANT: CPT | Performed by: INTERNAL MEDICINE

## 2019-04-14 PROCEDURE — 83050 HGB METHEMOGLOBIN QUAN: CPT | Performed by: INTERNAL MEDICINE

## 2019-04-14 PROCEDURE — 36600 WITHDRAWAL OF ARTERIAL BLOOD: CPT | Performed by: INTERNAL MEDICINE

## 2019-04-14 RX ORDER — ALBUMIN (HUMAN) 12.5 G/50ML
25 SOLUTION INTRAVENOUS AS NEEDED
Status: DISPENSED | OUTPATIENT
Start: 2019-04-14 | End: 2019-04-15

## 2019-04-14 RX ORDER — CHLORDIAZEPOXIDE HYDROCHLORIDE 25 MG/1
25 CAPSULE, GELATIN COATED ORAL EVERY 6 HOURS SCHEDULED
Status: DISCONTINUED | OUTPATIENT
Start: 2019-04-14 | End: 2019-04-15

## 2019-04-14 RX ADMIN — IPRATROPIUM BROMIDE 0.5 MG: 0.5 SOLUTION RESPIRATORY (INHALATION) at 07:18

## 2019-04-14 RX ADMIN — SODIUM CHLORIDE, PRESERVATIVE FREE 10 ML: 5 INJECTION INTRAVENOUS at 09:24

## 2019-04-14 RX ADMIN — CALCIUM ACETATE 667 MG: 667 CAPSULE ORAL at 15:52

## 2019-04-14 RX ADMIN — CHLORHEXIDINE GLUCONATE 15 ML: 1.2 RINSE ORAL at 09:25

## 2019-04-14 RX ADMIN — CHLORHEXIDINE GLUCONATE 15 ML: 1.2 RINSE ORAL at 21:27

## 2019-04-14 RX ADMIN — IPRATROPIUM BROMIDE 0.5 MG: 0.5 SOLUTION RESPIRATORY (INHALATION) at 13:08

## 2019-04-14 RX ADMIN — Medication: at 17:24

## 2019-04-14 RX ADMIN — DILTIAZEM HYDROCHLORIDE 60 MG: 60 TABLET, FILM COATED ORAL at 06:11

## 2019-04-14 RX ADMIN — Medication: at 21:27

## 2019-04-14 RX ADMIN — CALCIUM ACETATE 667 MG: 667 CAPSULE ORAL at 22:56

## 2019-04-14 RX ADMIN — ZINC SULFATE CAP 220 MG (50 MG ELEMENTAL ZN) 50 MG: 220 (50 ZN) CAP at 09:21

## 2019-04-14 RX ADMIN — ASPIRIN 81 MG: 81 TABLET, CHEWABLE ORAL at 09:21

## 2019-04-14 RX ADMIN — ZINC SULFATE CAP 220 MG (50 MG ELEMENTAL ZN) 50 MG: 220 (50 ZN) CAP at 22:54

## 2019-04-14 RX ADMIN — SODIUM CHLORIDE 1000 ML: 9 INJECTION, SOLUTION INTRAVENOUS at 19:03

## 2019-04-14 RX ADMIN — CEFEPIME HYDROCHLORIDE 1 G: 1 INJECTION, POWDER, FOR SOLUTION INTRAMUSCULAR; INTRAVENOUS at 10:17

## 2019-04-14 RX ADMIN — SODIUM CHLORIDE, PRESERVATIVE FREE 10 ML: 5 INJECTION INTRAVENOUS at 09:23

## 2019-04-14 RX ADMIN — LORAZEPAM 1 MG: 1 TABLET ORAL at 11:27

## 2019-04-14 RX ADMIN — SODIUM CHLORIDE, PRESERVATIVE FREE 3 ML: 5 INJECTION INTRAVENOUS at 09:25

## 2019-04-14 RX ADMIN — METOPROLOL TARTRATE 100 MG: 100 TABLET, FILM COATED ORAL at 09:21

## 2019-04-14 RX ADMIN — RIFAXIMIN 550 MG: 550 TABLET ORAL at 09:21

## 2019-04-14 RX ADMIN — RIFAXIMIN 550 MG: 550 TABLET ORAL at 22:54

## 2019-04-14 RX ADMIN — CHLORDIAZEPOXIDE HYDROCHLORIDE 25 MG: 25 CAPSULE ORAL at 17:19

## 2019-04-14 RX ADMIN — LACTULOSE 20 G: 10 SOLUTION ORAL at 09:21

## 2019-04-14 RX ADMIN — DILTIAZEM HYDROCHLORIDE 60 MG: 60 TABLET, FILM COATED ORAL at 22:54

## 2019-04-14 RX ADMIN — SODIUM CHLORIDE, PRESERVATIVE FREE 10 ML: 5 INJECTION INTRAVENOUS at 21:27

## 2019-04-14 RX ADMIN — LEVOTHYROXINE SODIUM 12.5 MCG: 25 TABLET ORAL at 06:11

## 2019-04-14 RX ADMIN — IPRATROPIUM BROMIDE 0.5 MG: 0.5 SOLUTION RESPIRATORY (INHALATION) at 18:19

## 2019-04-14 RX ADMIN — SODIUM CHLORIDE, PRESERVATIVE FREE 10 ML: 5 INJECTION INTRAVENOUS at 21:28

## 2019-04-14 RX ADMIN — DILTIAZEM HYDROCHLORIDE 60 MG: 60 TABLET, FILM COATED ORAL at 15:52

## 2019-04-14 RX ADMIN — CALCIUM ACETATE 667 MG: 667 CAPSULE ORAL at 04:07

## 2019-04-14 RX ADMIN — METOPROLOL TARTRATE 100 MG: 100 TABLET, FILM COATED ORAL at 22:53

## 2019-04-14 RX ADMIN — CALCIUM ACETATE 667 MG: 667 CAPSULE ORAL at 10:18

## 2019-04-14 RX ADMIN — Medication 1 APPLICATION: at 11:27

## 2019-04-14 RX ADMIN — CHLORDIAZEPOXIDE HYDROCHLORIDE 50 MG: 25 CAPSULE ORAL at 06:12

## 2019-04-14 RX ADMIN — ATORVASTATIN CALCIUM 80 MG: 40 TABLET, FILM COATED ORAL at 22:54

## 2019-04-14 RX ADMIN — Medication: at 09:27

## 2019-04-14 RX ADMIN — IPRATROPIUM BROMIDE 0.5 MG: 0.5 SOLUTION RESPIRATORY (INHALATION) at 00:11

## 2019-04-14 RX ADMIN — Medication 100 MG: at 09:21

## 2019-04-14 RX ADMIN — LORAZEPAM 1 MG: 1 TABLET ORAL at 19:23

## 2019-04-14 RX ADMIN — FOLIC ACID 1 MG: 5 INJECTION, SOLUTION INTRAMUSCULAR; INTRAVENOUS; SUBCUTANEOUS at 09:22

## 2019-04-14 RX ADMIN — CHLORDIAZEPOXIDE HYDROCHLORIDE 50 MG: 25 CAPSULE ORAL at 11:27

## 2019-04-14 RX ADMIN — SODIUM CHLORIDE, PRESERVATIVE FREE 3 ML: 5 INJECTION INTRAVENOUS at 21:28

## 2019-04-14 RX ADMIN — FAMOTIDINE 20 MG: 20 TABLET, FILM COATED ORAL at 09:21

## 2019-04-15 ENCOUNTER — APPOINTMENT (OUTPATIENT)
Dept: GENERAL RADIOLOGY | Facility: HOSPITAL | Age: 59
End: 2019-04-15

## 2019-04-15 ENCOUNTER — ANESTHESIA (OUTPATIENT)
Dept: PERIOP | Facility: HOSPITAL | Age: 59
End: 2019-04-15

## 2019-04-15 ENCOUNTER — ANESTHESIA EVENT (OUTPATIENT)
Dept: PERIOP | Facility: HOSPITAL | Age: 59
End: 2019-04-15

## 2019-04-15 PROBLEM — N17.9 ACUTE KIDNEY INJURY SUPERIMPOSED ON CHRONIC KIDNEY DISEASE (HCC): Status: ACTIVE | Noted: 2019-03-28

## 2019-04-15 PROBLEM — N18.9 ACUTE KIDNEY INJURY SUPERIMPOSED ON CHRONIC KIDNEY DISEASE: Status: ACTIVE | Noted: 2019-03-28

## 2019-04-15 LAB
A-A DO2: 113.7 MMHG (ref 0–300)
A-A DO2: 138 MMHG (ref 0–300)
ALBUMIN SERPL-MCNC: 3.69 G/DL (ref 3.5–5.2)
ALBUMIN/GLOB SERPL: 1.5 G/DL
ALP SERPL-CCNC: 64 U/L (ref 39–117)
ALT SERPL W P-5'-P-CCNC: 33 U/L (ref 1–41)
AMMONIA BLD-SCNC: 25 UMOL/L (ref 16–60)
ANION GAP SERPL CALCULATED.3IONS-SCNC: 15.4 MMOL/L
ANISOCYTOSIS BLD QL: NORMAL
ANISOCYTOSIS BLD QL: NORMAL
APTT PPP: 28.4 SECONDS (ref 23.8–36.1)
ARTERIAL PATENCY WRIST A: POSITIVE
ARTERIAL PATENCY WRIST A: POSITIVE
AST SERPL-CCNC: 69 U/L (ref 1–40)
ATMOSPHERIC PRESS: 719 MMHG
ATMOSPHERIC PRESS: 725 MMHG
BASE EXCESS BLDA CALC-SCNC: 0.6 MMOL/L
BASE EXCESS BLDA CALC-SCNC: 2.2 MMOL/L
BASOPHILS # BLD AUTO: 0.01 10*3/MM3 (ref 0–0.2)
BASOPHILS # BLD AUTO: 0.02 10*3/MM3 (ref 0–0.2)
BASOPHILS NFR BLD AUTO: 0.1 % (ref 0–1.5)
BASOPHILS NFR BLD AUTO: 0.2 % (ref 0–1.5)
BDY SITE: ABNORMAL
BDY SITE: ABNORMAL
BILIRUB SERPL-MCNC: 0.7 MG/DL (ref 0.2–1.2)
BODY TEMPERATURE: 98.6 C
BODY TEMPERATURE: 98.6 C
BUN BLD-MCNC: 33 MG/DL (ref 8–23)
BUN/CREAT SERPL: 15.9 (ref 7–25)
CALCIUM SPEC-SCNC: 8.7 MG/DL (ref 8.6–10.5)
CHLORIDE SERPL-SCNC: 97 MMOL/L (ref 98–107)
CO2 SERPL-SCNC: 26.6 MMOL/L (ref 22–29)
COHGB MFR BLD: 1.2 % (ref 0–5)
COHGB MFR BLD: 1.3 % (ref 0–5)
CREAT BLD-MCNC: 2.08 MG/DL (ref 0.76–1.27)
DEPRECATED RDW RBC AUTO: 51.7 FL (ref 37–54)
DEPRECATED RDW RBC AUTO: 53.8 FL (ref 37–54)
EOSINOPHIL # BLD AUTO: 0.02 10*3/MM3 (ref 0–0.4)
EOSINOPHIL # BLD AUTO: 0.04 10*3/MM3 (ref 0–0.4)
EOSINOPHIL NFR BLD AUTO: 0.2 % (ref 0.3–6.2)
EOSINOPHIL NFR BLD AUTO: 0.4 % (ref 0.3–6.2)
ERYTHROCYTE [DISTWIDTH] IN BLOOD BY AUTOMATED COUNT: 18 % (ref 12.3–15.4)
ERYTHROCYTE [DISTWIDTH] IN BLOOD BY AUTOMATED COUNT: 18.4 % (ref 12.3–15.4)
GAS FLOW AIRWAY: 40 LPM
GAS FLOW AIRWAY: 45 LPM
GFR SERPL CREATININE-BSD FRML MDRD: 33 ML/MIN/1.73
GLOBULIN UR ELPH-MCNC: 2.4 GM/DL
GLUCOSE BLD-MCNC: 201 MG/DL (ref 65–99)
GLUCOSE BLDC GLUCOMTR-MCNC: 159 MG/DL (ref 70–130)
GLUCOSE BLDC GLUCOMTR-MCNC: 161 MG/DL (ref 70–130)
GLUCOSE BLDC GLUCOMTR-MCNC: 174 MG/DL (ref 70–130)
HCO3 BLDA-SCNC: 23.4 MMOL/L (ref 22–26)
HCO3 BLDA-SCNC: 24 MMOL/L (ref 22–26)
HCT VFR BLD AUTO: 25.7 % (ref 37.5–51)
HCT VFR BLD AUTO: 26.4 % (ref 37.5–51)
HCT VFR BLD CALC: 27 % (ref 42–52)
HCT VFR BLD CALC: 27 % (ref 42–52)
HGB BLD-MCNC: 8 G/DL (ref 13–17.7)
HGB BLD-MCNC: 8.3 G/DL (ref 13–17.7)
HGB BLDA-MCNC: 9.1 G/DL (ref 12–16)
HGB BLDA-MCNC: 9.3 G/DL (ref 12–16)
HOROWITZ INDEX BLD+IHG-RTO: 35 %
HOROWITZ INDEX BLD+IHG-RTO: 49 %
HYPOCHROMIA BLD QL: NORMAL
HYPOCHROMIA BLD QL: NORMAL
IMM GRANULOCYTES # BLD AUTO: 0.03 10*3/MM3 (ref 0–0.05)
IMM GRANULOCYTES # BLD AUTO: 0.04 10*3/MM3 (ref 0–0.05)
IMM GRANULOCYTES NFR BLD AUTO: 0.3 % (ref 0–0.5)
IMM GRANULOCYTES NFR BLD AUTO: 0.4 % (ref 0–0.5)
INR PPP: 1.31 (ref 0.9–1.1)
LARGE PLATELETS: NORMAL
LARGE PLATELETS: NORMAL
LYMPHOCYTES # BLD AUTO: 1.09 10*3/MM3 (ref 0.7–3.1)
LYMPHOCYTES # BLD AUTO: 1.21 10*3/MM3 (ref 0.7–3.1)
LYMPHOCYTES NFR BLD AUTO: 10 % (ref 19.6–45.3)
LYMPHOCYTES NFR BLD AUTO: 12 % (ref 19.6–45.3)
MAGNESIUM SERPL-MCNC: 2 MG/DL (ref 1.6–2.4)
MCH RBC QN AUTO: 28.5 PG (ref 26.6–33)
MCH RBC QN AUTO: 28.6 PG (ref 26.6–33)
MCHC RBC AUTO-ENTMCNC: 31.1 G/DL (ref 31.5–35.7)
MCHC RBC AUTO-ENTMCNC: 31.4 G/DL (ref 31.5–35.7)
MCV RBC AUTO: 90.7 FL (ref 79–97)
MCV RBC AUTO: 91.8 FL (ref 79–97)
METHGB BLD QL: 0 % (ref 0–3)
METHGB BLD QL: 0.2 % (ref 0–3)
MODALITY: ABNORMAL
MODALITY: ABNORMAL
MONOCYTES # BLD AUTO: 0.9 10*3/MM3 (ref 0.1–0.9)
MONOCYTES # BLD AUTO: 1 10*3/MM3 (ref 0.1–0.9)
MONOCYTES NFR BLD AUTO: 8.9 % (ref 5–12)
MONOCYTES NFR BLD AUTO: 9.2 % (ref 5–12)
NEUTROPHILS # BLD AUTO: 7.88 10*3/MM3 (ref 1.4–7)
NEUTROPHILS # BLD AUTO: 8.72 10*3/MM3 (ref 1.4–7)
NEUTROPHILS NFR BLD AUTO: 78.4 % (ref 42.7–76)
NEUTROPHILS NFR BLD AUTO: 79.9 % (ref 42.7–76)
NT-PROBNP SERPL-MCNC: ABNORMAL PG/ML (ref 5–900)
OXYHGB MFR BLDV: 95.3 % (ref 85–100)
OXYHGB MFR BLDV: 97.4 % (ref 85–100)
PCO2 BLDA: 27.6 MM HG (ref 35–45)
PCO2 BLDA: 30.8 MM HG (ref 35–45)
PH BLDA: 7.5 PH UNITS (ref 7.35–7.45)
PH BLDA: 7.56 PH UNITS (ref 7.35–7.45)
PHOSPHATE SERPL-MCNC: 2.4 MG/DL (ref 2.5–4.5)
PLATELET # BLD AUTO: 75 10*3/MM3 (ref 140–450)
PLATELET # BLD AUTO: 87 10*3/MM3 (ref 140–450)
PMV BLD AUTO: ABNORMAL FL (ref 6–12)
PMV BLD AUTO: ABNORMAL FL (ref 6–12)
PO2 BLDA: 160.2 MM HG (ref 80–100)
PO2 BLDA: 87.8 MM HG (ref 80–100)
POTASSIUM BLD-SCNC: 3.6 MMOL/L (ref 3.5–5.2)
PROT SERPL-MCNC: 6.1 G/DL (ref 6–8.5)
PROTHROMBIN TIME: 16.5 SECONDS (ref 11–15.4)
RBC # BLD AUTO: 2.8 10*6/MM3 (ref 4.14–5.8)
RBC # BLD AUTO: 2.91 10*6/MM3 (ref 4.14–5.8)
SAO2 % BLDCOA: 96.5 % (ref 90–100)
SAO2 % BLDCOA: 98.9 % (ref 90–100)
SMALL PLATELETS BLD QL SMEAR: NORMAL
SODIUM BLD-SCNC: 139 MMOL/L (ref 136–145)
WBC NRBC COR # BLD: 10.06 10*3/MM3 (ref 3.4–10.8)
WBC NRBC COR # BLD: 10.9 10*3/MM3 (ref 3.4–10.8)

## 2019-04-15 PROCEDURE — 80053 COMPREHEN METABOLIC PANEL: CPT | Performed by: INTERNAL MEDICINE

## 2019-04-15 PROCEDURE — 02HV33Z INSERTION OF INFUSION DEVICE INTO SUPERIOR VENA CAVA, PERCUTANEOUS APPROACH: ICD-10-PCS | Performed by: SURGERY

## 2019-04-15 PROCEDURE — 36600 WITHDRAWAL OF ARTERIAL BLOOD: CPT | Performed by: NURSE PRACTITIONER

## 2019-04-15 PROCEDURE — 82375 ASSAY CARBOXYHB QUANT: CPT | Performed by: NURSE PRACTITIONER

## 2019-04-15 PROCEDURE — 71045 X-RAY EXAM CHEST 1 VIEW: CPT | Performed by: RADIOLOGY

## 2019-04-15 PROCEDURE — 99233 SBSQ HOSP IP/OBS HIGH 50: CPT | Performed by: INTERNAL MEDICINE

## 2019-04-15 PROCEDURE — 83050 HGB METHEMOGLOBIN QUAN: CPT | Performed by: NURSE PRACTITIONER

## 2019-04-15 PROCEDURE — 85610 PROTHROMBIN TIME: CPT | Performed by: INTERNAL MEDICINE

## 2019-04-15 PROCEDURE — C1894 INTRO/SHEATH, NON-LASER: HCPCS | Performed by: SURGERY

## 2019-04-15 PROCEDURE — 99221 1ST HOSP IP/OBS SF/LOW 40: CPT | Performed by: SURGERY

## 2019-04-15 PROCEDURE — 85025 COMPLETE CBC W/AUTO DIFF WBC: CPT | Performed by: INTERNAL MEDICINE

## 2019-04-15 PROCEDURE — 99291 CRITICAL CARE FIRST HOUR: CPT | Performed by: INTERNAL MEDICINE

## 2019-04-15 PROCEDURE — 71045 X-RAY EXAM CHEST 1 VIEW: CPT

## 2019-04-15 PROCEDURE — 77001 FLUOROGUIDE FOR VEIN DEVICE: CPT | Performed by: SURGERY

## 2019-04-15 PROCEDURE — 94799 UNLISTED PULMONARY SVC/PX: CPT

## 2019-04-15 PROCEDURE — 25010000002 HEPARIN (PORCINE) PER 1000 UNITS: Performed by: SURGERY

## 2019-04-15 PROCEDURE — 84100 ASSAY OF PHOSPHORUS: CPT | Performed by: INTERNAL MEDICINE

## 2019-04-15 PROCEDURE — 83880 ASSAY OF NATRIURETIC PEPTIDE: CPT | Performed by: INTERNAL MEDICINE

## 2019-04-15 PROCEDURE — 76000 FLUOROSCOPY <1 HR PHYS/QHP: CPT

## 2019-04-15 PROCEDURE — 25010000002 THIAMINE PER 100 MG: Performed by: INTERNAL MEDICINE

## 2019-04-15 PROCEDURE — 82805 BLOOD GASES W/O2 SATURATION: CPT | Performed by: NURSE PRACTITIONER

## 2019-04-15 PROCEDURE — 85007 BL SMEAR W/DIFF WBC COUNT: CPT | Performed by: INTERNAL MEDICINE

## 2019-04-15 PROCEDURE — 82140 ASSAY OF AMMONIA: CPT | Performed by: INTERNAL MEDICINE

## 2019-04-15 PROCEDURE — 36558 INSERT TUNNELED CV CATH: CPT | Performed by: SURGERY

## 2019-04-15 PROCEDURE — 0JH63XZ INSERTION OF TUNNELED VASCULAR ACCESS DEVICE INTO CHEST SUBCUTANEOUS TISSUE AND FASCIA, PERCUTANEOUS APPROACH: ICD-10-PCS | Performed by: SURGERY

## 2019-04-15 PROCEDURE — C1750 CATH, HEMODIALYSIS,LONG-TERM: HCPCS | Performed by: SURGERY

## 2019-04-15 PROCEDURE — 85730 THROMBOPLASTIN TIME PARTIAL: CPT | Performed by: INTERNAL MEDICINE

## 2019-04-15 PROCEDURE — 83735 ASSAY OF MAGNESIUM: CPT | Performed by: INTERNAL MEDICINE

## 2019-04-15 PROCEDURE — 82962 GLUCOSE BLOOD TEST: CPT

## 2019-04-15 PROCEDURE — 76000 FLUOROSCOPY <1 HR PHYS/QHP: CPT | Performed by: RADIOLOGY

## 2019-04-15 RX ORDER — CHLORDIAZEPOXIDE HYDROCHLORIDE 5 MG/1
5 CAPSULE, GELATIN COATED ORAL EVERY 6 HOURS SCHEDULED
Status: DISCONTINUED | OUTPATIENT
Start: 2019-04-15 | End: 2019-04-17

## 2019-04-15 RX ORDER — PANTOPRAZOLE SODIUM 40 MG/10ML
40 INJECTION, POWDER, LYOPHILIZED, FOR SOLUTION INTRAVENOUS
Status: DISCONTINUED | OUTPATIENT
Start: 2019-04-16 | End: 2019-04-23 | Stop reason: HOSPADM

## 2019-04-15 RX ORDER — SODIUM CHLORIDE 9 MG/ML
INJECTION, SOLUTION INTRAVENOUS AS NEEDED
Status: DISCONTINUED | OUTPATIENT
Start: 2019-04-15 | End: 2019-04-15 | Stop reason: HOSPADM

## 2019-04-15 RX ORDER — ETOMIDATE 2 MG/ML
INJECTION INTRAVENOUS AS NEEDED
Status: DISCONTINUED | OUTPATIENT
Start: 2019-04-15 | End: 2019-04-15 | Stop reason: SURG

## 2019-04-15 RX ORDER — MAGNESIUM HYDROXIDE 1200 MG/15ML
LIQUID ORAL AS NEEDED
Status: DISCONTINUED | OUTPATIENT
Start: 2019-04-15 | End: 2019-04-15 | Stop reason: HOSPADM

## 2019-04-15 RX ORDER — HYDRALAZINE HYDROCHLORIDE 10 MG/1
10 TABLET, FILM COATED ORAL EVERY 8 HOURS SCHEDULED
Status: DISCONTINUED | OUTPATIENT
Start: 2019-04-15 | End: 2019-04-17

## 2019-04-15 RX ORDER — LACTULOSE 10 G/15ML
20 SOLUTION ORAL 3 TIMES DAILY
Status: DISCONTINUED | OUTPATIENT
Start: 2019-04-15 | End: 2019-04-21

## 2019-04-15 RX ORDER — LORAZEPAM 2 MG/ML
1 INJECTION INTRAMUSCULAR EVERY 6 HOURS PRN
Status: DISCONTINUED | OUTPATIENT
Start: 2019-04-15 | End: 2019-04-23 | Stop reason: HOSPADM

## 2019-04-15 RX ORDER — HEPARIN SODIUM 5000 [USP'U]/ML
INJECTION, SOLUTION INTRAVENOUS; SUBCUTANEOUS AS NEEDED
Status: DISCONTINUED | OUTPATIENT
Start: 2019-04-15 | End: 2019-04-15 | Stop reason: HOSPADM

## 2019-04-15 RX ORDER — LIDOCAINE HYDROCHLORIDE 10 MG/ML
INJECTION, SOLUTION INFILTRATION; PERINEURAL AS NEEDED
Status: DISCONTINUED | OUTPATIENT
Start: 2019-04-15 | End: 2019-04-15 | Stop reason: HOSPADM

## 2019-04-15 RX ADMIN — IPRATROPIUM BROMIDE 0.5 MG: 0.5 SOLUTION RESPIRATORY (INHALATION) at 06:42

## 2019-04-15 RX ADMIN — SODIUM CHLORIDE, PRESERVATIVE FREE 10 ML: 5 INJECTION INTRAVENOUS at 08:37

## 2019-04-15 RX ADMIN — Medication: at 08:39

## 2019-04-15 RX ADMIN — THIAMINE HYDROCHLORIDE 500 MG: 100 INJECTION, SOLUTION INTRAMUSCULAR; INTRAVENOUS at 22:08

## 2019-04-15 RX ADMIN — HYDRALAZINE HYDROCHLORIDE 10 MG: 10 TABLET ORAL at 13:01

## 2019-04-15 RX ADMIN — Medication: at 18:15

## 2019-04-15 RX ADMIN — CALCIUM ACETATE 667 MG: 667 CAPSULE ORAL at 15:57

## 2019-04-15 RX ADMIN — SODIUM CHLORIDE, PRESERVATIVE FREE 10 ML: 5 INJECTION INTRAVENOUS at 22:11

## 2019-04-15 RX ADMIN — Medication: at 22:08

## 2019-04-15 RX ADMIN — METOPROLOL TARTRATE 100 MG: 100 TABLET, FILM COATED ORAL at 22:09

## 2019-04-15 RX ADMIN — CALCIUM ACETATE 667 MG: 667 CAPSULE ORAL at 22:09

## 2019-04-15 RX ADMIN — CHLORHEXIDINE GLUCONATE 15 ML: 1.2 RINSE ORAL at 22:12

## 2019-04-15 RX ADMIN — THIAMINE HYDROCHLORIDE 500 MG: 100 INJECTION, SOLUTION INTRAMUSCULAR; INTRAVENOUS at 13:02

## 2019-04-15 RX ADMIN — IPRATROPIUM BROMIDE 0.5 MG: 0.5 SOLUTION RESPIRATORY (INHALATION) at 00:05

## 2019-04-15 RX ADMIN — SODIUM CHLORIDE, PRESERVATIVE FREE 3 ML: 5 INJECTION INTRAVENOUS at 22:12

## 2019-04-15 RX ADMIN — LACTULOSE 20 G: 10 SOLUTION ORAL at 22:07

## 2019-04-15 RX ADMIN — SODIUM CHLORIDE, PRESERVATIVE FREE 3 ML: 5 INJECTION INTRAVENOUS at 08:39

## 2019-04-15 RX ADMIN — LACTULOSE 20 G: 10 SOLUTION ORAL at 18:16

## 2019-04-15 RX ADMIN — IPRATROPIUM BROMIDE 0.5 MG: 0.5 SOLUTION RESPIRATORY (INHALATION) at 12:29

## 2019-04-15 RX ADMIN — RIFAXIMIN 550 MG: 550 TABLET ORAL at 22:09

## 2019-04-15 RX ADMIN — SODIUM CHLORIDE, PRESERVATIVE FREE 10 ML: 5 INJECTION INTRAVENOUS at 08:38

## 2019-04-15 RX ADMIN — Medication: at 13:06

## 2019-04-15 RX ADMIN — IPRATROPIUM BROMIDE 0.5 MG: 0.5 SOLUTION RESPIRATORY (INHALATION) at 18:25

## 2019-04-15 RX ADMIN — ATORVASTATIN CALCIUM 80 MG: 40 TABLET, FILM COATED ORAL at 22:08

## 2019-04-15 RX ADMIN — ZINC SULFATE CAP 220 MG (50 MG ELEMENTAL ZN) 50 MG: 220 (50 ZN) CAP at 22:10

## 2019-04-15 RX ADMIN — ETOMIDATE 6 MG: 2 INJECTION, SOLUTION INTRAVENOUS at 11:22

## 2019-04-15 RX ADMIN — HYDRALAZINE HYDROCHLORIDE 10 MG: 10 TABLET ORAL at 22:09

## 2019-04-15 RX ADMIN — CHLORHEXIDINE GLUCONATE 15 ML: 1.2 RINSE ORAL at 08:39

## 2019-04-15 RX ADMIN — DILTIAZEM HYDROCHLORIDE 60 MG: 60 TABLET, FILM COATED ORAL at 15:57

## 2019-04-15 RX ADMIN — METOPROLOL TARTRATE 100 MG: 100 TABLET, FILM COATED ORAL at 08:37

## 2019-04-15 RX ADMIN — DILTIAZEM HYDROCHLORIDE 60 MG: 60 TABLET, FILM COATED ORAL at 05:53

## 2019-04-15 RX ADMIN — DILTIAZEM HYDROCHLORIDE 60 MG: 60 TABLET, FILM COATED ORAL at 22:09

## 2019-04-15 RX ADMIN — THIAMINE HYDROCHLORIDE 500 MG: 100 INJECTION, SOLUTION INTRAMUSCULAR; INTRAVENOUS at 15:58

## 2019-04-15 RX ADMIN — FOLIC ACID 1 MG: 5 INJECTION, SOLUTION INTRAMUSCULAR; INTRAVENOUS; SUBCUTANEOUS at 08:37

## 2019-04-15 NOTE — ANESTHESIA PREPROCEDURE EVALUATION
Anesthesia Evaluation     Patient summary reviewed and Nursing notes reviewed   NPO Solid Status: > 8 hours  NPO Liquid Status: > 8 hours           Airway   Mallampati: II  TM distance: >3 FB  Neck ROM: full  No difficulty expected  Dental - normal exam     Pulmonary - normal exam   (+) a smoker, shortness of breath, rales,     ROS comment: Recent respiratory failure requiring two periods of intubation/mechanical ventilation, extubated 3 days ago  PE comment: tachypneic  Cardiovascular     Rhythm: irregular  Rate: abnormal    (+) hypertension (malignant), CAD, CABG >6 Months, dysrhythmias Atrial Fib, hyperlipidemia,     PE comment: HR  irregular    Neuro/Psych  (+) psychiatric history, poor historian.,       ROS Comment: Somnolent and confused  GI/Hepatic/Renal/Endo    (+)   hepatitis, liver disease, diabetes mellitus,     Musculoskeletal     (+) back pain, chronic pain,   Abdominal  - normal exam    Bowel sounds: normal.   Substance History - negative use     OB/GYN negative ob/gyn ROS         Other   (+) arthritis     ROS/Med Hx Other: Hx polysubstance abuse                  Anesthesia Plan    ASA 4 - emergent     MAC   (Plan local/MAC, patient at very high risk for post-op mechanical ventilation should intubation be required. Diastolic blood pressures elevated to 120-140 range but procedure necessary to alleviate fluid overload.)  intravenous induction   Plan/risks discussed with: consent signed.    Plan discussed with CRNA.

## 2019-04-15 NOTE — ANESTHESIA POSTPROCEDURE EVALUATION
Patient: Axel Desir    Procedure Summary     Date:  04/15/19 Room / Location:   [facility] 02 /  [facility]    Anesthesia Start:  1117 Anesthesia Stop:  1207    Procedure:  HEMODIALYSIS CATHETER INSERTION (N/A ) Diagnosis:       Acute kidney injury superimposed on chronic kidney disease (CMS/HCC)      (Acute kidney injury superimposed on chronic kidney disease (CMS/HCC) [N17.9, N18.9])    Surgeon:  Nelly Lemus MD Provider:  Simeon Winchester MD    Anesthesia Type:  MAC ASA Status:  4 - Emergent          Anesthesia Type: MAC  Last vitals  BP   (!) 134/110 (04/15/19 1220)   Temp   97.2 °F (36.2 °C) (04/15/19 1220)   Pulse   89 (04/15/19 1229)   Resp   (!) 32 (04/15/19 1229)     SpO2   96 % (04/15/19 1229)     Post Anesthesia Care and Evaluation    Patient location during evaluation: bedside  Patient participation: complete - patient cannot participate  Level of consciousness: obtunded/minimal responses  Pain score: 1  Pain management: adequate  Airway patency: patent  Anesthetic complications: No anesthetic complications  PONV Status: controlled  Cardiovascular status: acceptable  Respiratory status: acceptable  Hydration status: acceptable

## 2019-04-16 ENCOUNTER — APPOINTMENT (OUTPATIENT)
Dept: GENERAL RADIOLOGY | Facility: HOSPITAL | Age: 59
End: 2019-04-16

## 2019-04-16 LAB
A-A DO2: 186.5 MMHG (ref 0–300)
A-A DO2: 84 MMHG (ref 0–300)
A-A DO2: 87.4 MMHG (ref 0–300)
ALBUMIN SERPL-MCNC: 3.28 G/DL (ref 3.5–5.2)
ALBUMIN/GLOB SERPL: 1.5 G/DL
ALP SERPL-CCNC: 97 U/L (ref 39–117)
ALT SERPL W P-5'-P-CCNC: 133 U/L (ref 1–41)
AMMONIA BLD-SCNC: 23 UMOL/L (ref 16–60)
ANION GAP SERPL CALCULATED.3IONS-SCNC: 16.5 MMOL/L
ANISOCYTOSIS BLD QL: NORMAL
ARTERIAL PATENCY WRIST A: ABNORMAL
ARTERIAL PATENCY WRIST A: POSITIVE
ARTERIAL PATENCY WRIST A: POSITIVE
AST SERPL-CCNC: 275 U/L (ref 1–40)
ATMOSPHERIC PRESS: 725 MMHG
ATMOSPHERIC PRESS: 725 MMHG
ATMOSPHERIC PRESS: 732 MMHG
BASE EXCESS BLDA CALC-SCNC: -0.5 MMOL/L
BASE EXCESS BLDA CALC-SCNC: 1.5 MMOL/L
BASE EXCESS BLDA CALC-SCNC: 2.3 MMOL/L
BASOPHILS # BLD AUTO: 0.01 10*3/MM3 (ref 0–0.2)
BASOPHILS NFR BLD AUTO: 0.1 % (ref 0–1.5)
BDY SITE: ABNORMAL
BILIRUB SERPL-MCNC: 0.6 MG/DL (ref 0.2–1.2)
BODY TEMPERATURE: 98.6 C
BODY TEMPERATURE: 98.6 C
BODY TEMPERATURE: 98.8 C
BUN BLD-MCNC: 54 MG/DL (ref 8–23)
BUN/CREAT SERPL: 18.2 (ref 7–25)
CALCIUM SPEC-SCNC: 8.3 MG/DL (ref 8.6–10.5)
CHLORIDE SERPL-SCNC: 101 MMOL/L (ref 98–107)
CO2 SERPL-SCNC: 24.5 MMOL/L (ref 22–29)
COHGB MFR BLD: 0.6 % (ref 0–5)
COHGB MFR BLD: 1.5 % (ref 0–5)
COHGB MFR BLD: 2.1 % (ref 0–5)
CREAT BLD-MCNC: 2.96 MG/DL (ref 0.76–1.27)
CRP SERPL-MCNC: 2.31 MG/DL (ref 0–0.5)
DEPRECATED RDW RBC AUTO: 53.8 FL (ref 37–54)
EOSINOPHIL # BLD AUTO: 0.01 10*3/MM3 (ref 0–0.4)
EOSINOPHIL NFR BLD AUTO: 0.1 % (ref 0.3–6.2)
ERYTHROCYTE [DISTWIDTH] IN BLOOD BY AUTOMATED COUNT: 18.5 % (ref 12.3–15.4)
GFR SERPL CREATININE-BSD FRML MDRD: 22 ML/MIN/1.73
GLOBULIN UR ELPH-MCNC: 2.2 GM/DL
GLUCOSE BLD-MCNC: 207 MG/DL (ref 65–99)
GLUCOSE BLDC GLUCOMTR-MCNC: 140 MG/DL (ref 70–130)
GLUCOSE BLDC GLUCOMTR-MCNC: 181 MG/DL (ref 70–130)
GLUCOSE BLDC GLUCOMTR-MCNC: 229 MG/DL (ref 70–130)
HCO3 BLDA-SCNC: 22.6 MMOL/L (ref 22–26)
HCO3 BLDA-SCNC: 24.5 MMOL/L (ref 22–26)
HCO3 BLDA-SCNC: 25.5 MMOL/L (ref 22–26)
HCT VFR BLD AUTO: 24.7 % (ref 37.5–51)
HCT VFR BLD CALC: 24 % (ref 42–52)
HCT VFR BLD CALC: 25 % (ref 42–52)
HCT VFR BLD CALC: 26 % (ref 42–52)
HGB BLD-MCNC: 7.7 G/DL (ref 13–17.7)
HGB BLDA-MCNC: 8.2 G/DL (ref 12–16)
HGB BLDA-MCNC: 8.5 G/DL (ref 12–16)
HGB BLDA-MCNC: 8.7 G/DL (ref 12–16)
HOROWITZ INDEX BLD+IHG-RTO: 30 %
HOROWITZ INDEX BLD+IHG-RTO: 50 %
HOROWITZ INDEX BLD+IHG-RTO: 90 %
HYPOCHROMIA BLD QL: NORMAL
IMM GRANULOCYTES # BLD AUTO: 0.04 10*3/MM3 (ref 0–0.05)
IMM GRANULOCYTES NFR BLD AUTO: 0.5 % (ref 0–0.5)
INR PPP: 1.42 (ref 0.9–1.1)
LARGE PLATELETS: NORMAL
LYMPHOCYTES # BLD AUTO: 0.76 10*3/MM3 (ref 0.7–3.1)
LYMPHOCYTES NFR BLD AUTO: 9.2 % (ref 19.6–45.3)
MAGNESIUM SERPL-MCNC: 2.2 MG/DL (ref 1.6–2.4)
MCH RBC QN AUTO: 28.9 PG (ref 26.6–33)
MCHC RBC AUTO-ENTMCNC: 31.2 G/DL (ref 31.5–35.7)
MCV RBC AUTO: 92.9 FL (ref 79–97)
METHGB BLD QL: 0.5 % (ref 0–3)
METHGB BLD QL: 0.6 % (ref 0–3)
METHGB BLD QL: 0.7 % (ref 0–3)
MODALITY: ABNORMAL
MONOCYTES # BLD AUTO: 0.63 10*3/MM3 (ref 0.1–0.9)
MONOCYTES NFR BLD AUTO: 7.6 % (ref 5–12)
NEUTROPHILS # BLD AUTO: 6.81 10*3/MM3 (ref 1.4–7)
NEUTROPHILS NFR BLD AUTO: 82.5 % (ref 42.7–76)
OXYHGB MFR BLDV: 93.6 % (ref 85–100)
OXYHGB MFR BLDV: 96.8 % (ref 85–100)
OXYHGB MFR BLDV: 98.4 % (ref 85–100)
PCO2 BLDA: 30.7 MM HG (ref 35–45)
PCO2 BLDA: 32.2 MM HG (ref 35–45)
PCO2 BLDA: 33.4 MM HG (ref 35–45)
PEEP RESPIRATORY: 6 CM[H2O]
PH BLDA: 7.48 PH UNITS (ref 7.35–7.45)
PH BLDA: 7.5 PH UNITS (ref 7.35–7.45)
PH BLDA: 7.5 PH UNITS (ref 7.35–7.45)
PHOSPHATE SERPL-MCNC: 4.4 MG/DL (ref 2.5–4.5)
PLATELET # BLD AUTO: 74 10*3/MM3 (ref 140–450)
PMV BLD AUTO: ABNORMAL FL (ref 6–12)
PO2 BLDA: 220.5 MM HG (ref 80–100)
PO2 BLDA: 389.5 MM HG (ref 80–100)
PO2 BLDA: 79.5 MM HG (ref 80–100)
POTASSIUM BLD-SCNC: 3.5 MMOL/L (ref 3.5–5.2)
PROT SERPL-MCNC: 5.5 G/DL (ref 6–8.5)
PROTHROMBIN TIME: 17.6 SECONDS (ref 11–15.4)
RBC # BLD AUTO: 2.66 10*6/MM3 (ref 4.14–5.8)
SAO2 % BLDCOA: 95.6 % (ref 90–100)
SAO2 % BLDCOA: 99.5 % (ref 90–100)
SAO2 % BLDCOA: 99.6 % (ref 90–100)
SET MECH RESP RATE: 12
SET MECH RESP RATE: 12
SET MECH RESP RATE: 18
SMALL PLATELETS BLD QL SMEAR: NORMAL
SODIUM BLD-SCNC: 142 MMOL/L (ref 136–145)
VENTILATOR MODE: ABNORMAL
VT ON VENT VENT: 400 ML
VT ON VENT VENT: 450 ML
VT ON VENT VENT: 450 ML
WBC NRBC COR # BLD: 8.26 10*3/MM3 (ref 3.4–10.8)

## 2019-04-16 PROCEDURE — 94799 UNLISTED PULMONARY SVC/PX: CPT

## 2019-04-16 PROCEDURE — 85610 PROTHROMBIN TIME: CPT | Performed by: INTERNAL MEDICINE

## 2019-04-16 PROCEDURE — 82962 GLUCOSE BLOOD TEST: CPT

## 2019-04-16 PROCEDURE — 94003 VENT MGMT INPAT SUBQ DAY: CPT

## 2019-04-16 PROCEDURE — 71045 X-RAY EXAM CHEST 1 VIEW: CPT | Performed by: RADIOLOGY

## 2019-04-16 PROCEDURE — 99291 CRITICAL CARE FIRST HOUR: CPT | Performed by: INTERNAL MEDICINE

## 2019-04-16 PROCEDURE — 85025 COMPLETE CBC W/AUTO DIFF WBC: CPT | Performed by: INTERNAL MEDICINE

## 2019-04-16 PROCEDURE — 25010000002 PROPOFOL 1000 MG/ML EMULSION: Performed by: HOSPITALIST

## 2019-04-16 PROCEDURE — 84100 ASSAY OF PHOSPHORUS: CPT | Performed by: INTERNAL MEDICINE

## 2019-04-16 PROCEDURE — 25010000002 THIAMINE PER 100 MG: Performed by: INTERNAL MEDICINE

## 2019-04-16 PROCEDURE — 5A1955Z RESPIRATORY VENTILATION, GREATER THAN 96 CONSECUTIVE HOURS: ICD-10-PCS | Performed by: FAMILY MEDICINE

## 2019-04-16 PROCEDURE — 0BH17EZ INSERTION OF ENDOTRACHEAL AIRWAY INTO TRACHEA, VIA NATURAL OR ARTIFICIAL OPENING: ICD-10-PCS | Performed by: FAMILY MEDICINE

## 2019-04-16 PROCEDURE — 86140 C-REACTIVE PROTEIN: CPT | Performed by: INTERNAL MEDICINE

## 2019-04-16 PROCEDURE — 36600 WITHDRAWAL OF ARTERIAL BLOOD: CPT | Performed by: PHYSICIAN ASSISTANT

## 2019-04-16 PROCEDURE — 85007 BL SMEAR W/DIFF WBC COUNT: CPT | Performed by: INTERNAL MEDICINE

## 2019-04-16 PROCEDURE — 82140 ASSAY OF AMMONIA: CPT | Performed by: INTERNAL MEDICINE

## 2019-04-16 PROCEDURE — 94002 VENT MGMT INPAT INIT DAY: CPT

## 2019-04-16 PROCEDURE — 83050 HGB METHEMOGLOBIN QUAN: CPT | Performed by: PHYSICIAN ASSISTANT

## 2019-04-16 PROCEDURE — 82375 ASSAY CARBOXYHB QUANT: CPT | Performed by: PHYSICIAN ASSISTANT

## 2019-04-16 PROCEDURE — 82805 BLOOD GASES W/O2 SATURATION: CPT | Performed by: PHYSICIAN ASSISTANT

## 2019-04-16 PROCEDURE — 36600 WITHDRAWAL OF ARTERIAL BLOOD: CPT | Performed by: NURSE PRACTITIONER

## 2019-04-16 PROCEDURE — 31500 INSERT EMERGENCY AIRWAY: CPT

## 2019-04-16 PROCEDURE — 83735 ASSAY OF MAGNESIUM: CPT | Performed by: INTERNAL MEDICINE

## 2019-04-16 PROCEDURE — P9047 ALBUMIN (HUMAN), 25%, 50ML: HCPCS | Performed by: INTERNAL MEDICINE

## 2019-04-16 PROCEDURE — 80053 COMPREHEN METABOLIC PANEL: CPT | Performed by: INTERNAL MEDICINE

## 2019-04-16 PROCEDURE — 99233 SBSQ HOSP IP/OBS HIGH 50: CPT | Performed by: INTERNAL MEDICINE

## 2019-04-16 PROCEDURE — 71045 X-RAY EXAM CHEST 1 VIEW: CPT

## 2019-04-16 PROCEDURE — 82375 ASSAY CARBOXYHB QUANT: CPT | Performed by: NURSE PRACTITIONER

## 2019-04-16 PROCEDURE — 82805 BLOOD GASES W/O2 SATURATION: CPT | Performed by: NURSE PRACTITIONER

## 2019-04-16 PROCEDURE — 25010000002 ALBUMIN HUMAN 25% PER 50 ML: Performed by: INTERNAL MEDICINE

## 2019-04-16 PROCEDURE — 83050 HGB METHEMOGLOBIN QUAN: CPT | Performed by: NURSE PRACTITIONER

## 2019-04-16 RX ORDER — ALBUMIN (HUMAN) 12.5 G/50ML
25 SOLUTION INTRAVENOUS AS NEEDED
Status: DISPENSED | OUTPATIENT
Start: 2019-04-16 | End: 2019-04-17

## 2019-04-16 RX ADMIN — LACTULOSE 20 G: 10 SOLUTION ORAL at 11:35

## 2019-04-16 RX ADMIN — ZINC SULFATE CAP 220 MG (50 MG ELEMENTAL ZN) 50 MG: 220 (50 ZN) CAP at 20:00

## 2019-04-16 RX ADMIN — Medication: at 19:26

## 2019-04-16 RX ADMIN — SODIUM CHLORIDE, PRESERVATIVE FREE 10 ML: 5 INJECTION INTRAVENOUS at 20:01

## 2019-04-16 RX ADMIN — SODIUM CHLORIDE, PRESERVATIVE FREE 10 ML: 5 INJECTION INTRAVENOUS at 11:37

## 2019-04-16 RX ADMIN — IPRATROPIUM BROMIDE 0.5 MG: 0.5 SOLUTION RESPIRATORY (INHALATION) at 00:46

## 2019-04-16 RX ADMIN — PROPOFOL 5 MCG/KG/MIN: 10 INJECTION, EMULSION INTRAVENOUS at 00:37

## 2019-04-16 RX ADMIN — PROPOFOL 40 MCG/KG/MIN: 10 INJECTION, EMULSION INTRAVENOUS at 18:00

## 2019-04-16 RX ADMIN — HYDRALAZINE HYDROCHLORIDE 10 MG: 10 TABLET ORAL at 21:06

## 2019-04-16 RX ADMIN — CHLORHEXIDINE GLUCONATE 15 ML: 1.2 RINSE ORAL at 20:01

## 2019-04-16 RX ADMIN — PROPOFOL 40 MCG/KG/MIN: 10 INJECTION, EMULSION INTRAVENOUS at 22:33

## 2019-04-16 RX ADMIN — DILTIAZEM HYDROCHLORIDE 60 MG: 60 TABLET, FILM COATED ORAL at 21:06

## 2019-04-16 RX ADMIN — Medication: at 11:36

## 2019-04-16 RX ADMIN — CALCIUM ACETATE 667 MG: 667 CAPSULE ORAL at 11:35

## 2019-04-16 RX ADMIN — DILTIAZEM HYDROCHLORIDE 60 MG: 60 TABLET, FILM COATED ORAL at 14:11

## 2019-04-16 RX ADMIN — ZINC SULFATE CAP 220 MG (50 MG ELEMENTAL ZN) 50 MG: 220 (50 ZN) CAP at 11:35

## 2019-04-16 RX ADMIN — PROPOFOL 40 MCG/KG/MIN: 10 INJECTION, EMULSION INTRAVENOUS at 10:49

## 2019-04-16 RX ADMIN — LACTULOSE 20 G: 10 SOLUTION ORAL at 20:00

## 2019-04-16 RX ADMIN — PANTOPRAZOLE SODIUM 40 MG: 40 INJECTION, POWDER, FOR SOLUTION INTRAVENOUS at 11:36

## 2019-04-16 RX ADMIN — IPRATROPIUM BROMIDE 0.5 MG: 0.5 SOLUTION RESPIRATORY (INHALATION) at 13:27

## 2019-04-16 RX ADMIN — THIAMINE HYDROCHLORIDE 500 MG: 100 INJECTION, SOLUTION INTRAMUSCULAR; INTRAVENOUS at 11:44

## 2019-04-16 RX ADMIN — ASPIRIN 81 MG: 81 TABLET, CHEWABLE ORAL at 11:35

## 2019-04-16 RX ADMIN — PROPOFOL 40 MCG/KG/MIN: 10 INJECTION, EMULSION INTRAVENOUS at 05:50

## 2019-04-16 RX ADMIN — SODIUM CHLORIDE 1000 ML: 9 INJECTION, SOLUTION INTRAVENOUS at 08:06

## 2019-04-16 RX ADMIN — SODIUM CHLORIDE, PRESERVATIVE FREE 3 ML: 5 INJECTION INTRAVENOUS at 11:37

## 2019-04-16 RX ADMIN — SODIUM CHLORIDE, PRESERVATIVE FREE 10 ML: 5 INJECTION INTRAVENOUS at 11:36

## 2019-04-16 RX ADMIN — CHLORHEXIDINE GLUCONATE 15 ML: 1.2 RINSE ORAL at 08:35

## 2019-04-16 RX ADMIN — METOPROLOL TARTRATE 100 MG: 100 TABLET, FILM COATED ORAL at 11:35

## 2019-04-16 RX ADMIN — ALBUMIN (HUMAN) 25 G: 0.25 INJECTION, SOLUTION INTRAVENOUS at 10:13

## 2019-04-16 RX ADMIN — SODIUM CHLORIDE, PRESERVATIVE FREE 10 ML: 5 INJECTION INTRAVENOUS at 20:02

## 2019-04-16 RX ADMIN — LACTULOSE 20 G: 10 SOLUTION ORAL at 16:02

## 2019-04-16 RX ADMIN — Medication: at 08:35

## 2019-04-16 RX ADMIN — SODIUM CHLORIDE, PRESERVATIVE FREE 3 ML: 5 INJECTION INTRAVENOUS at 20:01

## 2019-04-16 RX ADMIN — DILTIAZEM HYDROCHLORIDE 60 MG: 60 TABLET, FILM COATED ORAL at 05:50

## 2019-04-16 RX ADMIN — RIFAXIMIN 550 MG: 550 TABLET ORAL at 11:36

## 2019-04-16 RX ADMIN — THIAMINE HYDROCHLORIDE 500 MG: 100 INJECTION, SOLUTION INTRAMUSCULAR; INTRAVENOUS at 16:02

## 2019-04-16 RX ADMIN — HYDRALAZINE HYDROCHLORIDE 10 MG: 10 TABLET ORAL at 14:11

## 2019-04-16 RX ADMIN — CHLORDIAZEPOXIDE HYDROCHLORIDE 5 MG: 5 CAPSULE ORAL at 11:35

## 2019-04-16 RX ADMIN — CALCIUM ACETATE 667 MG: 667 CAPSULE ORAL at 05:50

## 2019-04-16 RX ADMIN — RIFAXIMIN 550 MG: 550 TABLET ORAL at 20:00

## 2019-04-16 RX ADMIN — LEVOTHYROXINE SODIUM 12.5 MCG: 25 TABLET ORAL at 05:50

## 2019-04-16 RX ADMIN — CALCIUM ACETATE 667 MG: 667 CAPSULE ORAL at 16:02

## 2019-04-16 RX ADMIN — IPRATROPIUM BROMIDE 0.5 MG: 0.5 SOLUTION RESPIRATORY (INHALATION) at 18:34

## 2019-04-16 RX ADMIN — METOPROLOL TARTRATE 100 MG: 100 TABLET, FILM COATED ORAL at 20:02

## 2019-04-16 RX ADMIN — THIAMINE HYDROCHLORIDE 500 MG: 100 INJECTION, SOLUTION INTRAMUSCULAR; INTRAVENOUS at 20:00

## 2019-04-16 RX ADMIN — CHLORDIAZEPOXIDE HYDROCHLORIDE 5 MG: 5 CAPSULE ORAL at 23:54

## 2019-04-16 RX ADMIN — CALCIUM ACETATE 667 MG: 667 CAPSULE ORAL at 21:06

## 2019-04-16 RX ADMIN — FOLIC ACID 1 MG: 5 INJECTION, SOLUTION INTRAMUSCULAR; INTRAVENOUS; SUBCUTANEOUS at 11:35

## 2019-04-16 RX ADMIN — IPRATROPIUM BROMIDE 0.5 MG: 0.5 SOLUTION RESPIRATORY (INHALATION) at 06:21

## 2019-04-16 RX ADMIN — Medication: at 20:00

## 2019-04-17 ENCOUNTER — APPOINTMENT (OUTPATIENT)
Dept: GENERAL RADIOLOGY | Facility: HOSPITAL | Age: 59
End: 2019-04-17

## 2019-04-17 LAB
A-A DO2: 34.9 MMHG (ref 0–300)
A-A DO2: 54.9 MMHG (ref 0–300)
ALBUMIN SERPL-MCNC: 3.45 G/DL (ref 3.5–5.2)
ALBUMIN/GLOB SERPL: 1.5 G/DL
ALP SERPL-CCNC: 93 U/L (ref 39–117)
ALT SERPL W P-5'-P-CCNC: 134 U/L (ref 1–41)
ANION GAP SERPL CALCULATED.3IONS-SCNC: 17.9 MMOL/L
ANISOCYTOSIS BLD QL: NORMAL
ARTERIAL PATENCY WRIST A: ABNORMAL
ARTERIAL PATENCY WRIST A: POSITIVE
AST SERPL-CCNC: 190 U/L (ref 1–40)
ATMOSPHERIC PRESS: 730 MMHG
ATMOSPHERIC PRESS: 732 MMHG
BASE EXCESS BLDA CALC-SCNC: 1 MMOL/L
BASE EXCESS BLDA CALC-SCNC: 1.7 MMOL/L
BASOPHILS # BLD AUTO: 0.02 10*3/MM3 (ref 0–0.2)
BASOPHILS NFR BLD AUTO: 0.2 % (ref 0–1.5)
BDY SITE: ABNORMAL
BDY SITE: ABNORMAL
BILIRUB SERPL-MCNC: 0.4 MG/DL (ref 0.2–1.2)
BODY TEMPERATURE: 98.6 C
BODY TEMPERATURE: 98.6 C
BUN BLD-MCNC: 39 MG/DL (ref 8–23)
BUN/CREAT SERPL: 16.2 (ref 7–25)
CALCIUM SPEC-SCNC: 8.4 MG/DL (ref 8.6–10.5)
CHLORIDE SERPL-SCNC: 97 MMOL/L (ref 98–107)
CO2 SERPL-SCNC: 25.1 MMOL/L (ref 22–29)
COHGB MFR BLD: 1.5 % (ref 0–5)
COHGB MFR BLD: 1.9 % (ref 0–5)
CREAT BLD-MCNC: 2.41 MG/DL (ref 0.76–1.27)
CRP SERPL-MCNC: 2.3 MG/DL (ref 0–0.5)
DEPRECATED RDW RBC AUTO: 56.8 FL (ref 37–54)
EOSINOPHIL # BLD AUTO: 0.09 10*3/MM3 (ref 0–0.4)
EOSINOPHIL NFR BLD AUTO: 1 % (ref 0.3–6.2)
ERYTHROCYTE [DISTWIDTH] IN BLOOD BY AUTOMATED COUNT: 18.9 % (ref 12.3–15.4)
GFR SERPL CREATININE-BSD FRML MDRD: 28 ML/MIN/1.73
GLOBULIN UR ELPH-MCNC: 2.3 GM/DL
GLUCOSE BLD-MCNC: 210 MG/DL (ref 65–99)
GLUCOSE BLDC GLUCOMTR-MCNC: 175 MG/DL (ref 70–130)
GLUCOSE BLDC GLUCOMTR-MCNC: 199 MG/DL (ref 70–130)
GLUCOSE BLDC GLUCOMTR-MCNC: 208 MG/DL (ref 70–130)
GLUCOSE BLDC GLUCOMTR-MCNC: 233 MG/DL (ref 70–130)
HCO3 BLDA-SCNC: 24.4 MMOL/L (ref 22–26)
HCO3 BLDA-SCNC: 25.5 MMOL/L (ref 22–26)
HCT VFR BLD AUTO: 25.9 % (ref 37.5–51)
HCT VFR BLD CALC: 25 % (ref 42–52)
HCT VFR BLD CALC: 28 % (ref 42–52)
HGB BLD-MCNC: 8.1 G/DL (ref 13–17.7)
HGB BLDA-MCNC: 8.5 G/DL (ref 12–16)
HGB BLDA-MCNC: 9.6 G/DL (ref 12–16)
HOROWITZ INDEX BLD+IHG-RTO: 25 %
HOROWITZ INDEX BLD+IHG-RTO: 30 %
IMM GRANULOCYTES # BLD AUTO: 0.02 10*3/MM3 (ref 0–0.05)
IMM GRANULOCYTES NFR BLD AUTO: 0.2 % (ref 0–0.5)
LARGE PLATELETS: NORMAL
LYMPHOCYTES # BLD AUTO: 0.88 10*3/MM3 (ref 0.7–3.1)
LYMPHOCYTES NFR BLD AUTO: 9.5 % (ref 19.6–45.3)
MAGNESIUM SERPL-MCNC: 2 MG/DL (ref 1.6–2.4)
MCH RBC QN AUTO: 29.8 PG (ref 26.6–33)
MCHC RBC AUTO-ENTMCNC: 31.3 G/DL (ref 31.5–35.7)
MCV RBC AUTO: 95.2 FL (ref 79–97)
METHGB BLD QL: 0.2 % (ref 0–3)
METHGB BLD QL: 0.4 % (ref 0–3)
MODALITY: ABNORMAL
MODALITY: ABNORMAL
MONOCYTES # BLD AUTO: 0.77 10*3/MM3 (ref 0.1–0.9)
MONOCYTES NFR BLD AUTO: 8.3 % (ref 5–12)
NEUTROPHILS # BLD AUTO: 7.46 10*3/MM3 (ref 1.4–7)
NEUTROPHILS NFR BLD AUTO: 80.8 % (ref 42.7–76)
OXYHGB MFR BLDV: 95.2 % (ref 85–100)
OXYHGB MFR BLDV: 96.2 % (ref 85–100)
PCO2 BLDA: 34 MM HG (ref 35–45)
PCO2 BLDA: 37 MM HG (ref 35–45)
PEEP RESPIRATORY: 5 CM[H2O]
PEEP RESPIRATORY: 6 CM[H2O]
PH BLDA: 7.46 PH UNITS (ref 7.35–7.45)
PH BLDA: 7.47 PH UNITS (ref 7.35–7.45)
PHOSPHATE SERPL-MCNC: 2.7 MG/DL (ref 2.5–4.5)
PLATELET # BLD AUTO: 77 10*3/MM3 (ref 140–450)
PMV BLD AUTO: ABNORMAL FL (ref 6–12)
PO2 BLDA: 107.1 MM HG (ref 80–100)
PO2 BLDA: 95.5 MM HG (ref 80–100)
POTASSIUM BLD-SCNC: 3.4 MMOL/L (ref 3.5–5.2)
PROT SERPL-MCNC: 5.7 G/DL (ref 6–8.5)
RBC # BLD AUTO: 2.72 10*6/MM3 (ref 4.14–5.8)
SAO2 % BLDCOA: 97.4 % (ref 90–100)
SAO2 % BLDCOA: 97.9 % (ref 90–100)
SET MECH RESP RATE: 16
SET MECH RESP RATE: 18
SMALL PLATELETS BLD QL SMEAR: NORMAL
SODIUM BLD-SCNC: 140 MMOL/L (ref 136–145)
VENTILATOR MODE: ABNORMAL
VENTILATOR MODE: AC
VT ON VENT VENT: 400 ML
VT ON VENT VENT: 400 ML
WBC NRBC COR # BLD: 9.24 10*3/MM3 (ref 3.4–10.8)

## 2019-04-17 PROCEDURE — 94003 VENT MGMT INPAT SUBQ DAY: CPT

## 2019-04-17 PROCEDURE — 82805 BLOOD GASES W/O2 SATURATION: CPT | Performed by: PHYSICIAN ASSISTANT

## 2019-04-17 PROCEDURE — 86140 C-REACTIVE PROTEIN: CPT | Performed by: INTERNAL MEDICINE

## 2019-04-17 PROCEDURE — 83050 HGB METHEMOGLOBIN QUAN: CPT | Performed by: INTERNAL MEDICINE

## 2019-04-17 PROCEDURE — 85007 BL SMEAR W/DIFF WBC COUNT: CPT | Performed by: INTERNAL MEDICINE

## 2019-04-17 PROCEDURE — 94799 UNLISTED PULMONARY SVC/PX: CPT

## 2019-04-17 PROCEDURE — 99233 SBSQ HOSP IP/OBS HIGH 50: CPT | Performed by: INTERNAL MEDICINE

## 2019-04-17 PROCEDURE — 82375 ASSAY CARBOXYHB QUANT: CPT | Performed by: INTERNAL MEDICINE

## 2019-04-17 PROCEDURE — 82962 GLUCOSE BLOOD TEST: CPT

## 2019-04-17 PROCEDURE — 36600 WITHDRAWAL OF ARTERIAL BLOOD: CPT | Performed by: PHYSICIAN ASSISTANT

## 2019-04-17 PROCEDURE — 80053 COMPREHEN METABOLIC PANEL: CPT | Performed by: INTERNAL MEDICINE

## 2019-04-17 PROCEDURE — 36600 WITHDRAWAL OF ARTERIAL BLOOD: CPT | Performed by: INTERNAL MEDICINE

## 2019-04-17 PROCEDURE — 83735 ASSAY OF MAGNESIUM: CPT | Performed by: INTERNAL MEDICINE

## 2019-04-17 PROCEDURE — 63710000001 INSULIN ASPART PER 5 UNITS: Performed by: INTERNAL MEDICINE

## 2019-04-17 PROCEDURE — 85025 COMPLETE CBC W/AUTO DIFF WBC: CPT | Performed by: INTERNAL MEDICINE

## 2019-04-17 PROCEDURE — 82805 BLOOD GASES W/O2 SATURATION: CPT | Performed by: INTERNAL MEDICINE

## 2019-04-17 PROCEDURE — 84100 ASSAY OF PHOSPHORUS: CPT | Performed by: INTERNAL MEDICINE

## 2019-04-17 PROCEDURE — 99291 CRITICAL CARE FIRST HOUR: CPT | Performed by: INTERNAL MEDICINE

## 2019-04-17 PROCEDURE — 82375 ASSAY CARBOXYHB QUANT: CPT | Performed by: PHYSICIAN ASSISTANT

## 2019-04-17 PROCEDURE — 83050 HGB METHEMOGLOBIN QUAN: CPT | Performed by: PHYSICIAN ASSISTANT

## 2019-04-17 PROCEDURE — 71045 X-RAY EXAM CHEST 1 VIEW: CPT | Performed by: RADIOLOGY

## 2019-04-17 PROCEDURE — 25010000002 THIAMINE PER 100 MG: Performed by: INTERNAL MEDICINE

## 2019-04-17 PROCEDURE — 71045 X-RAY EXAM CHEST 1 VIEW: CPT

## 2019-04-17 PROCEDURE — 25010000002 VITAMIN K1 PER 1 MG: Performed by: PHYSICIAN ASSISTANT

## 2019-04-17 PROCEDURE — 25010000002 PROPOFOL 1000 MG/ML EMULSION: Performed by: HOSPITALIST

## 2019-04-17 RX ORDER — HYDRALAZINE HYDROCHLORIDE 25 MG/1
25 TABLET, FILM COATED ORAL EVERY 8 HOURS SCHEDULED
Status: DISCONTINUED | OUTPATIENT
Start: 2019-04-17 | End: 2019-04-23 | Stop reason: HOSPADM

## 2019-04-17 RX ORDER — DEXTROSE MONOHYDRATE 25 G/50ML
25 INJECTION, SOLUTION INTRAVENOUS
Status: DISCONTINUED | OUTPATIENT
Start: 2019-04-17 | End: 2019-04-19

## 2019-04-17 RX ORDER — NICOTINE POLACRILEX 4 MG
15 LOZENGE BUCCAL
Status: DISCONTINUED | OUTPATIENT
Start: 2019-04-17 | End: 2019-04-19

## 2019-04-17 RX ORDER — POTASSIUM CHLORIDE 1.5 G/1.77G
40 POWDER, FOR SOLUTION ORAL EVERY 4 HOURS
Status: DISCONTINUED | OUTPATIENT
Start: 2019-04-17 | End: 2019-04-17

## 2019-04-17 RX ADMIN — DILTIAZEM HYDROCHLORIDE 60 MG: 60 TABLET, FILM COATED ORAL at 15:04

## 2019-04-17 RX ADMIN — SODIUM CHLORIDE, PRESERVATIVE FREE 10 ML: 5 INJECTION INTRAVENOUS at 08:02

## 2019-04-17 RX ADMIN — LACTULOSE 20 G: 10 SOLUTION ORAL at 08:00

## 2019-04-17 RX ADMIN — PHYTONADIONE 10 MG: 10 INJECTION, EMULSION INTRAMUSCULAR; INTRAVENOUS; SUBCUTANEOUS at 12:41

## 2019-04-17 RX ADMIN — IPRATROPIUM BROMIDE 0.5 MG: 0.5 SOLUTION RESPIRATORY (INHALATION) at 18:42

## 2019-04-17 RX ADMIN — FOLIC ACID 1 MG: 5 INJECTION, SOLUTION INTRAMUSCULAR; INTRAVENOUS; SUBCUTANEOUS at 08:00

## 2019-04-17 RX ADMIN — Medication: at 08:02

## 2019-04-17 RX ADMIN — CALCIUM ACETATE 667 MG: 667 CAPSULE ORAL at 15:04

## 2019-04-17 RX ADMIN — RIFAXIMIN 550 MG: 550 TABLET ORAL at 20:12

## 2019-04-17 RX ADMIN — SODIUM CHLORIDE, PRESERVATIVE FREE 10 ML: 5 INJECTION INTRAVENOUS at 20:13

## 2019-04-17 RX ADMIN — INSULIN ASPART 2 UNITS: 100 INJECTION, SOLUTION INTRAVENOUS; SUBCUTANEOUS at 21:14

## 2019-04-17 RX ADMIN — LACTULOSE 20 G: 10 SOLUTION ORAL at 15:04

## 2019-04-17 RX ADMIN — LACTULOSE 20 G: 10 SOLUTION ORAL at 20:12

## 2019-04-17 RX ADMIN — ZINC SULFATE CAP 220 MG (50 MG ELEMENTAL ZN) 50 MG: 220 (50 ZN) CAP at 20:12

## 2019-04-17 RX ADMIN — Medication: at 18:30

## 2019-04-17 RX ADMIN — ASPIRIN 81 MG: 81 TABLET, CHEWABLE ORAL at 08:01

## 2019-04-17 RX ADMIN — CHLORHEXIDINE GLUCONATE 15 ML: 1.2 RINSE ORAL at 20:12

## 2019-04-17 RX ADMIN — THIAMINE HYDROCHLORIDE 250 MG: 100 INJECTION, SOLUTION INTRAMUSCULAR; INTRAVENOUS at 12:41

## 2019-04-17 RX ADMIN — SODIUM CHLORIDE, PRESERVATIVE FREE 10 ML: 5 INJECTION INTRAVENOUS at 08:01

## 2019-04-17 RX ADMIN — IPRATROPIUM BROMIDE 0.5 MG: 0.5 SOLUTION RESPIRATORY (INHALATION) at 12:56

## 2019-04-17 RX ADMIN — Medication: at 12:42

## 2019-04-17 RX ADMIN — METOPROLOL TARTRATE 100 MG: 100 TABLET, FILM COATED ORAL at 08:01

## 2019-04-17 RX ADMIN — PANTOPRAZOLE SODIUM 40 MG: 40 INJECTION, POWDER, FOR SOLUTION INTRAVENOUS at 08:00

## 2019-04-17 RX ADMIN — PROPOFOL 40 MCG/KG/MIN: 10 INJECTION, EMULSION INTRAVENOUS at 10:53

## 2019-04-17 RX ADMIN — ZINC SULFATE CAP 220 MG (50 MG ELEMENTAL ZN) 50 MG: 220 (50 ZN) CAP at 08:00

## 2019-04-17 RX ADMIN — PROPOFOL 40 MCG/KG/MIN: 10 INJECTION, EMULSION INTRAVENOUS at 05:24

## 2019-04-17 RX ADMIN — POTASSIUM CHLORIDE 40 MEQ: 1.5 POWDER, FOR SOLUTION ORAL at 06:12

## 2019-04-17 RX ADMIN — DILTIAZEM HYDROCHLORIDE 60 MG: 60 TABLET, FILM COATED ORAL at 05:24

## 2019-04-17 RX ADMIN — SODIUM CHLORIDE, PRESERVATIVE FREE 10 ML: 5 INJECTION INTRAVENOUS at 20:12

## 2019-04-17 RX ADMIN — DILTIAZEM HYDROCHLORIDE 60 MG: 60 TABLET, FILM COATED ORAL at 21:14

## 2019-04-17 RX ADMIN — IPRATROPIUM BROMIDE 0.5 MG: 0.5 SOLUTION RESPIRATORY (INHALATION) at 06:58

## 2019-04-17 RX ADMIN — METOPROLOL TARTRATE 100 MG: 100 TABLET, FILM COATED ORAL at 20:12

## 2019-04-17 RX ADMIN — PROPOFOL 30 MCG/KG/MIN: 10 INJECTION, EMULSION INTRAVENOUS at 18:36

## 2019-04-17 RX ADMIN — HYDRALAZINE HYDROCHLORIDE 10 MG: 10 TABLET ORAL at 05:24

## 2019-04-17 RX ADMIN — CHLORDIAZEPOXIDE HYDROCHLORIDE 5 MG: 5 CAPSULE ORAL at 05:24

## 2019-04-17 RX ADMIN — Medication: at 20:12

## 2019-04-17 RX ADMIN — RIFAXIMIN 550 MG: 550 TABLET ORAL at 08:00

## 2019-04-17 RX ADMIN — HYDRALAZINE HYDROCHLORIDE 25 MG: 10 TABLET ORAL at 21:14

## 2019-04-17 RX ADMIN — CALCIUM ACETATE 667 MG: 667 CAPSULE ORAL at 21:14

## 2019-04-17 RX ADMIN — SODIUM CHLORIDE, PRESERVATIVE FREE 3 ML: 5 INJECTION INTRAVENOUS at 08:02

## 2019-04-17 RX ADMIN — CALCIUM ACETATE 667 MG: 667 CAPSULE ORAL at 04:32

## 2019-04-17 RX ADMIN — LABETALOL HYDROCHLORIDE 20 MG: 5 INJECTION, SOLUTION INTRAVENOUS at 04:32

## 2019-04-17 RX ADMIN — IPRATROPIUM BROMIDE 0.5 MG: 0.5 SOLUTION RESPIRATORY (INHALATION) at 00:35

## 2019-04-17 RX ADMIN — INSULIN ASPART 3 UNITS: 100 INJECTION, SOLUTION INTRAVENOUS; SUBCUTANEOUS at 18:30

## 2019-04-17 RX ADMIN — INSULIN ASPART 3 UNITS: 100 INJECTION, SOLUTION INTRAVENOUS; SUBCUTANEOUS at 15:06

## 2019-04-17 RX ADMIN — LEVOTHYROXINE SODIUM 12.5 MCG: 25 TABLET ORAL at 05:24

## 2019-04-17 RX ADMIN — HYDRALAZINE HYDROCHLORIDE 25 MG: 10 TABLET ORAL at 15:04

## 2019-04-17 RX ADMIN — CHLORHEXIDINE GLUCONATE 15 ML: 1.2 RINSE ORAL at 08:01

## 2019-04-17 RX ADMIN — CALCIUM ACETATE 667 MG: 667 CAPSULE ORAL at 12:41

## 2019-04-18 ENCOUNTER — APPOINTMENT (OUTPATIENT)
Dept: GENERAL RADIOLOGY | Facility: HOSPITAL | Age: 59
End: 2019-04-18

## 2019-04-18 LAB
A-A DO2: 33.5 MMHG (ref 0–300)
ALBUMIN SERPL-MCNC: 3.21 G/DL (ref 3.5–5.2)
ALBUMIN/GLOB SERPL: 1.3 G/DL
ALP SERPL-CCNC: 85 U/L (ref 39–117)
ALT SERPL W P-5'-P-CCNC: 113 U/L (ref 1–41)
ANION GAP SERPL CALCULATED.3IONS-SCNC: 15.1 MMOL/L
ANISOCYTOSIS BLD QL: NORMAL
ARTERIAL PATENCY WRIST A: POSITIVE
AST SERPL-CCNC: 127 U/L (ref 1–40)
ATMOSPHERIC PRESS: 725 MMHG
BASE EXCESS BLDA CALC-SCNC: 0.4 MMOL/L
BASOPHILS # BLD AUTO: 0.01 10*3/MM3 (ref 0–0.2)
BASOPHILS NFR BLD AUTO: 0.1 % (ref 0–1.5)
BDY SITE: ABNORMAL
BILIRUB SERPL-MCNC: 0.4 MG/DL (ref 0.2–1.2)
BODY TEMPERATURE: 98.6 C
BUN BLD-MCNC: 57 MG/DL (ref 8–23)
BUN/CREAT SERPL: 16.2 (ref 7–25)
CALCIUM SPEC-SCNC: 7.8 MG/DL (ref 8.6–10.5)
CHLORIDE SERPL-SCNC: 100 MMOL/L (ref 98–107)
CO2 SERPL-SCNC: 25.9 MMOL/L (ref 22–29)
COHGB MFR BLD: 1.4 % (ref 0–5)
CREAT BLD-MCNC: 3.52 MG/DL (ref 0.76–1.27)
CRP SERPL-MCNC: 1.35 MG/DL (ref 0–0.5)
DEPRECATED RDW RBC AUTO: 59.8 FL (ref 37–54)
EOSINOPHIL # BLD AUTO: 0.09 10*3/MM3 (ref 0–0.4)
EOSINOPHIL NFR BLD AUTO: 1.1 % (ref 0.3–6.2)
ERYTHROCYTE [DISTWIDTH] IN BLOOD BY AUTOMATED COUNT: 20.1 % (ref 12.3–15.4)
GFR SERPL CREATININE-BSD FRML MDRD: 18 ML/MIN/1.73
GLOBULIN UR ELPH-MCNC: 2.4 GM/DL
GLUCOSE BLD-MCNC: 143 MG/DL (ref 65–99)
GLUCOSE BLDC GLUCOMTR-MCNC: 126 MG/DL (ref 70–130)
GLUCOSE BLDC GLUCOMTR-MCNC: 149 MG/DL (ref 70–130)
GLUCOSE BLDC GLUCOMTR-MCNC: 158 MG/DL (ref 70–130)
GLUCOSE BLDC GLUCOMTR-MCNC: 209 MG/DL (ref 70–130)
HCO3 BLDA-SCNC: 23 MMOL/L (ref 22–26)
HCT VFR BLD AUTO: 25.7 % (ref 37.5–51)
HCT VFR BLD CALC: 26 % (ref 42–52)
HGB BLD-MCNC: 8 G/DL (ref 13–17.7)
HGB BLDA-MCNC: 9 G/DL (ref 12–16)
HOROWITZ INDEX BLD+IHG-RTO: 25 %
HYPOCHROMIA BLD QL: NORMAL
IMM GRANULOCYTES # BLD AUTO: 0.03 10*3/MM3 (ref 0–0.05)
IMM GRANULOCYTES NFR BLD AUTO: 0.4 % (ref 0–0.5)
LYMPHOCYTES # BLD AUTO: 0.93 10*3/MM3 (ref 0.7–3.1)
LYMPHOCYTES NFR BLD AUTO: 11.3 % (ref 19.6–45.3)
MACROCYTES BLD QL SMEAR: NORMAL
MAGNESIUM SERPL-MCNC: 2.2 MG/DL (ref 1.6–2.4)
MCH RBC QN AUTO: 29.9 PG (ref 26.6–33)
MCHC RBC AUTO-ENTMCNC: 31.1 G/DL (ref 31.5–35.7)
MCV RBC AUTO: 95.9 FL (ref 79–97)
METHGB BLD QL: 0.2 % (ref 0–3)
MODALITY: ABNORMAL
MONOCYTES # BLD AUTO: 0.47 10*3/MM3 (ref 0.1–0.9)
MONOCYTES NFR BLD AUTO: 5.7 % (ref 5–12)
NEUTROPHILS # BLD AUTO: 6.7 10*3/MM3 (ref 1.4–7)
NEUTROPHILS NFR BLD AUTO: 81.4 % (ref 42.7–76)
NOTE: ABNORMAL
OXYHGB MFR BLDV: 96.7 % (ref 85–100)
PCO2 BLDA: 29.5 MM HG (ref 35–45)
PCO2 TEMP ADJ BLD: ABNORMAL MM HG (ref 35–48)
PEEP RESPIRATORY: 5 CM[H2O]
PH BLDA: 7.51 PH UNITS (ref 7.35–7.45)
PH, TEMP CORRECTED: ABNORMAL PH UNITS (ref 7.35–7.45)
PLATELET # BLD AUTO: 85 10*3/MM3 (ref 140–450)
PMV BLD AUTO: ABNORMAL FL (ref 6–12)
PO2 BLDA: 101 MM HG (ref 80–100)
PO2 TEMP ADJ BLD: ABNORMAL MM HG (ref 83–108)
POTASSIUM BLD-SCNC: 3.6 MMOL/L (ref 3.5–5.2)
PROT SERPL-MCNC: 5.6 G/DL (ref 6–8.5)
RBC # BLD AUTO: 2.68 10*6/MM3 (ref 4.14–5.8)
SAO2 % BLDCOA: 98.3 % (ref 90–100)
SET MECH RESP RATE: 16
SMALL PLATELETS BLD QL SMEAR: NORMAL
SODIUM BLD-SCNC: 141 MMOL/L (ref 136–145)
VENTILATOR MODE: ABNORMAL
VT ON VENT VENT: 400 ML
WBC NRBC COR # BLD: 8.23 10*3/MM3 (ref 3.4–10.8)

## 2019-04-18 PROCEDURE — 25010000002 THIAMINE PER 100 MG: Performed by: INTERNAL MEDICINE

## 2019-04-18 PROCEDURE — 82962 GLUCOSE BLOOD TEST: CPT

## 2019-04-18 PROCEDURE — 71045 X-RAY EXAM CHEST 1 VIEW: CPT | Performed by: RADIOLOGY

## 2019-04-18 PROCEDURE — 63710000001 INSULIN ASPART PER 5 UNITS: Performed by: INTERNAL MEDICINE

## 2019-04-18 PROCEDURE — 94799 UNLISTED PULMONARY SVC/PX: CPT

## 2019-04-18 PROCEDURE — 94003 VENT MGMT INPAT SUBQ DAY: CPT

## 2019-04-18 PROCEDURE — 36600 WITHDRAWAL OF ARTERIAL BLOOD: CPT | Performed by: INTERNAL MEDICINE

## 2019-04-18 PROCEDURE — 82375 ASSAY CARBOXYHB QUANT: CPT | Performed by: INTERNAL MEDICINE

## 2019-04-18 PROCEDURE — 71045 X-RAY EXAM CHEST 1 VIEW: CPT

## 2019-04-18 PROCEDURE — 25010000002 HYDRALAZINE PER 20 MG: Performed by: INTERNAL MEDICINE

## 2019-04-18 PROCEDURE — 82805 BLOOD GASES W/O2 SATURATION: CPT | Performed by: INTERNAL MEDICINE

## 2019-04-18 PROCEDURE — 25010000002 PROPOFOL 1000 MG/ML EMULSION: Performed by: HOSPITALIST

## 2019-04-18 PROCEDURE — 83735 ASSAY OF MAGNESIUM: CPT | Performed by: INTERNAL MEDICINE

## 2019-04-18 PROCEDURE — 25010000002 VITAMIN K1 PER 1 MG: Performed by: PHYSICIAN ASSISTANT

## 2019-04-18 PROCEDURE — 85007 BL SMEAR W/DIFF WBC COUNT: CPT | Performed by: INTERNAL MEDICINE

## 2019-04-18 PROCEDURE — 86140 C-REACTIVE PROTEIN: CPT | Performed by: INTERNAL MEDICINE

## 2019-04-18 PROCEDURE — 80053 COMPREHEN METABOLIC PANEL: CPT | Performed by: INTERNAL MEDICINE

## 2019-04-18 PROCEDURE — 85025 COMPLETE CBC W/AUTO DIFF WBC: CPT | Performed by: INTERNAL MEDICINE

## 2019-04-18 PROCEDURE — 99233 SBSQ HOSP IP/OBS HIGH 50: CPT | Performed by: INTERNAL MEDICINE

## 2019-04-18 PROCEDURE — 83050 HGB METHEMOGLOBIN QUAN: CPT | Performed by: INTERNAL MEDICINE

## 2019-04-18 PROCEDURE — 99291 CRITICAL CARE FIRST HOUR: CPT | Performed by: INTERNAL MEDICINE

## 2019-04-18 RX ORDER — SODIUM CHLORIDE 9 MG/ML
INJECTION, SOLUTION INTRAVENOUS
Status: DISPENSED
Start: 2019-04-18 | End: 2019-04-19

## 2019-04-18 RX ORDER — ALBUMIN (HUMAN) 12.5 G/50ML
25 SOLUTION INTRAVENOUS AS NEEDED
Status: DISPENSED | OUTPATIENT
Start: 2019-04-18 | End: 2019-04-19

## 2019-04-18 RX ADMIN — PHYTONADIONE 10 MG: 10 INJECTION, EMULSION INTRAMUSCULAR; INTRAVENOUS; SUBCUTANEOUS at 14:16

## 2019-04-18 RX ADMIN — SODIUM CHLORIDE, PRESERVATIVE FREE 10 ML: 5 INJECTION INTRAVENOUS at 21:15

## 2019-04-18 RX ADMIN — Medication: at 21:13

## 2019-04-18 RX ADMIN — CALCIUM ACETATE 667 MG: 667 CAPSULE ORAL at 12:01

## 2019-04-18 RX ADMIN — FOLIC ACID 1 MG: 5 INJECTION, SOLUTION INTRAMUSCULAR; INTRAVENOUS; SUBCUTANEOUS at 12:01

## 2019-04-18 RX ADMIN — CALCIUM ACETATE 667 MG: 667 CAPSULE ORAL at 16:17

## 2019-04-18 RX ADMIN — CALCIUM ACETATE 667 MG: 667 CAPSULE ORAL at 21:14

## 2019-04-18 RX ADMIN — DILTIAZEM HYDROCHLORIDE 60 MG: 60 TABLET, FILM COATED ORAL at 05:02

## 2019-04-18 RX ADMIN — LACTULOSE 20 G: 10 SOLUTION ORAL at 16:17

## 2019-04-18 RX ADMIN — IPRATROPIUM BROMIDE 0.5 MG: 0.5 SOLUTION RESPIRATORY (INHALATION) at 00:42

## 2019-04-18 RX ADMIN — SODIUM CHLORIDE, PRESERVATIVE FREE 3 ML: 5 INJECTION INTRAVENOUS at 09:19

## 2019-04-18 RX ADMIN — SODIUM CHLORIDE, PRESERVATIVE FREE 3 ML: 5 INJECTION INTRAVENOUS at 21:16

## 2019-04-18 RX ADMIN — IPRATROPIUM BROMIDE 0.5 MG: 0.5 SOLUTION RESPIRATORY (INHALATION) at 19:04

## 2019-04-18 RX ADMIN — HYDRALAZINE HYDROCHLORIDE 10 MG: 20 INJECTION INTRAMUSCULAR; INTRAVENOUS at 04:26

## 2019-04-18 RX ADMIN — SODIUM CHLORIDE 250 ML: 9 INJECTION, SOLUTION INTRAVENOUS at 13:21

## 2019-04-18 RX ADMIN — ASPIRIN 81 MG: 81 TABLET, CHEWABLE ORAL at 12:01

## 2019-04-18 RX ADMIN — Medication: at 12:02

## 2019-04-18 RX ADMIN — INSULIN ASPART 3 UNITS: 100 INJECTION, SOLUTION INTRAVENOUS; SUBCUTANEOUS at 17:55

## 2019-04-18 RX ADMIN — RIFAXIMIN 550 MG: 550 TABLET ORAL at 21:14

## 2019-04-18 RX ADMIN — PROPOFOL 30 MCG/KG/MIN: 10 INJECTION, EMULSION INTRAVENOUS at 17:54

## 2019-04-18 RX ADMIN — LACTULOSE 20 G: 10 SOLUTION ORAL at 12:00

## 2019-04-18 RX ADMIN — PROPOFOL 30 MCG/KG/MIN: 10 INJECTION, EMULSION INTRAVENOUS at 02:10

## 2019-04-18 RX ADMIN — LEVOTHYROXINE SODIUM 12.5 MCG: 25 TABLET ORAL at 05:01

## 2019-04-18 RX ADMIN — RIFAXIMIN 550 MG: 550 TABLET ORAL at 12:00

## 2019-04-18 RX ADMIN — Medication: at 09:18

## 2019-04-18 RX ADMIN — INSULIN ASPART 2 UNITS: 100 INJECTION, SOLUTION INTRAVENOUS; SUBCUTANEOUS at 06:24

## 2019-04-18 RX ADMIN — SODIUM CHLORIDE, PRESERVATIVE FREE 10 ML: 5 INJECTION INTRAVENOUS at 09:18

## 2019-04-18 RX ADMIN — Medication: at 17:57

## 2019-04-18 RX ADMIN — ZINC SULFATE CAP 220 MG (50 MG ELEMENTAL ZN) 50 MG: 220 (50 ZN) CAP at 12:01

## 2019-04-18 RX ADMIN — HYDRALAZINE HYDROCHLORIDE 25 MG: 10 TABLET ORAL at 21:14

## 2019-04-18 RX ADMIN — LABETALOL HYDROCHLORIDE 20 MG: 5 INJECTION, SOLUTION INTRAVENOUS at 01:21

## 2019-04-18 RX ADMIN — IPRATROPIUM BROMIDE 0.5 MG: 0.5 SOLUTION RESPIRATORY (INHALATION) at 06:37

## 2019-04-18 RX ADMIN — THIAMINE HYDROCHLORIDE 250 MG: 100 INJECTION, SOLUTION INTRAMUSCULAR; INTRAVENOUS at 12:02

## 2019-04-18 RX ADMIN — ZINC SULFATE CAP 220 MG (50 MG ELEMENTAL ZN) 50 MG: 220 (50 ZN) CAP at 21:14

## 2019-04-18 RX ADMIN — PANTOPRAZOLE SODIUM 40 MG: 40 INJECTION, POWDER, FOR SOLUTION INTRAVENOUS at 12:01

## 2019-04-18 RX ADMIN — SODIUM CHLORIDE, PRESERVATIVE FREE 10 ML: 5 INJECTION INTRAVENOUS at 21:16

## 2019-04-18 RX ADMIN — CALCIUM ACETATE 667 MG: 667 CAPSULE ORAL at 04:52

## 2019-04-18 RX ADMIN — PROPOFOL 30 MCG/KG/MIN: 10 INJECTION, EMULSION INTRAVENOUS at 10:27

## 2019-04-18 RX ADMIN — IPRATROPIUM BROMIDE 0.5 MG: 0.5 SOLUTION RESPIRATORY (INHALATION) at 12:38

## 2019-04-18 RX ADMIN — CHLORHEXIDINE GLUCONATE 15 ML: 1.2 RINSE ORAL at 09:18

## 2019-04-18 RX ADMIN — CHLORHEXIDINE GLUCONATE 15 ML: 1.2 RINSE ORAL at 21:14

## 2019-04-18 RX ADMIN — METOPROLOL TARTRATE 100 MG: 100 TABLET, FILM COATED ORAL at 12:01

## 2019-04-18 RX ADMIN — METOPROLOL TARTRATE 100 MG: 100 TABLET, FILM COATED ORAL at 21:14

## 2019-04-18 RX ADMIN — LACTULOSE 20 G: 10 SOLUTION ORAL at 21:14

## 2019-04-18 RX ADMIN — SODIUM CHLORIDE, PRESERVATIVE FREE 10 ML: 5 INJECTION INTRAVENOUS at 09:19

## 2019-04-18 RX ADMIN — DILTIAZEM HYDROCHLORIDE 60 MG: 60 TABLET, FILM COATED ORAL at 21:14

## 2019-04-19 ENCOUNTER — APPOINTMENT (OUTPATIENT)
Dept: GENERAL RADIOLOGY | Facility: HOSPITAL | Age: 59
End: 2019-04-19

## 2019-04-19 LAB
A-A DO2: 54.8 MMHG (ref 0–300)
ALBUMIN SERPL-MCNC: 3.2 G/DL (ref 3.5–5.2)
ALBUMIN/GLOB SERPL: 1.3 G/DL
ALP SERPL-CCNC: 81 U/L (ref 39–117)
ALT SERPL W P-5'-P-CCNC: 98 U/L (ref 1–41)
ANION GAP SERPL CALCULATED.3IONS-SCNC: 15 MMOL/L
ARTERIAL PATENCY WRIST A: ABNORMAL
AST SERPL-CCNC: 93 U/L (ref 1–40)
ATMOSPHERIC PRESS: 725 MMHG
BASE EXCESS BLDA CALC-SCNC: 1.6 MMOL/L
BASOPHILS # BLD AUTO: 0.02 10*3/MM3 (ref 0–0.2)
BASOPHILS NFR BLD AUTO: 0.2 % (ref 0–1.5)
BDY SITE: ABNORMAL
BILIRUB SERPL-MCNC: 0.4 MG/DL (ref 0.2–1.2)
BODY TEMPERATURE: 98.6 C
BUN BLD-MCNC: 39 MG/DL (ref 8–23)
BUN/CREAT SERPL: 14.3 (ref 7–25)
CALCIUM SPEC-SCNC: 8 MG/DL (ref 8.6–10.5)
CHLORIDE SERPL-SCNC: 98 MMOL/L (ref 98–107)
CO2 SERPL-SCNC: 27 MMOL/L (ref 22–29)
COHGB MFR BLD: 1.3 % (ref 0–5)
CREAT BLD-MCNC: 2.73 MG/DL (ref 0.76–1.27)
CRP SERPL-MCNC: 1.76 MG/DL (ref 0–0.5)
DEPRECATED RDW RBC AUTO: 62 FL (ref 37–54)
EOSINOPHIL # BLD AUTO: 0.09 10*3/MM3 (ref 0–0.4)
EOSINOPHIL NFR BLD AUTO: 1 % (ref 0.3–6.2)
ERYTHROCYTE [DISTWIDTH] IN BLOOD BY AUTOMATED COUNT: 20.7 % (ref 12.3–15.4)
GFR SERPL CREATININE-BSD FRML MDRD: 24 ML/MIN/1.73
GLOBULIN UR ELPH-MCNC: 2.5 GM/DL
GLUCOSE BLD-MCNC: 192 MG/DL (ref 65–99)
GLUCOSE BLDC GLUCOMTR-MCNC: 114 MG/DL (ref 70–130)
GLUCOSE BLDC GLUCOMTR-MCNC: 136 MG/DL (ref 70–130)
GLUCOSE BLDC GLUCOMTR-MCNC: 156 MG/DL (ref 70–130)
GLUCOSE BLDC GLUCOMTR-MCNC: 220 MG/DL (ref 70–130)
HCO3 BLDA-SCNC: 24.8 MMOL/L (ref 22–26)
HCT VFR BLD AUTO: 26.8 % (ref 37.5–51)
HCT VFR BLD CALC: 31 % (ref 42–52)
HGB BLD-MCNC: 8.3 G/DL (ref 13–17.7)
HGB BLDA-MCNC: 10.7 G/DL (ref 12–16)
HOROWITZ INDEX BLD+IHG-RTO: 25 %
IMM GRANULOCYTES # BLD AUTO: 0.03 10*3/MM3 (ref 0–0.05)
IMM GRANULOCYTES NFR BLD AUTO: 0.3 % (ref 0–0.5)
INR PPP: 1.17 (ref 0.9–1.1)
LYMPHOCYTES # BLD AUTO: 0.78 10*3/MM3 (ref 0.7–3.1)
LYMPHOCYTES NFR BLD AUTO: 8.3 % (ref 19.6–45.3)
MAGNESIUM SERPL-MCNC: 2.1 MG/DL (ref 1.6–2.4)
MCH RBC QN AUTO: 29.9 PG (ref 26.6–33)
MCHC RBC AUTO-ENTMCNC: 31 G/DL (ref 31.5–35.7)
MCV RBC AUTO: 96.4 FL (ref 79–97)
METHGB BLD QL: 0.1 % (ref 0–3)
MODALITY: ABNORMAL
MONOCYTES # BLD AUTO: 0.65 10*3/MM3 (ref 0.1–0.9)
MONOCYTES NFR BLD AUTO: 6.9 % (ref 5–12)
NEUTROPHILS # BLD AUTO: 7.87 10*3/MM3 (ref 1.4–7)
NEUTROPHILS NFR BLD AUTO: 83.3 % (ref 42.7–76)
NOTE: ABNORMAL
OXYHGB MFR BLDV: 93.9 % (ref 85–100)
PCO2 BLDA: 33.8 MM HG (ref 35–45)
PCO2 TEMP ADJ BLD: ABNORMAL MM HG (ref 35–48)
PEEP RESPIRATORY: 5 CM[H2O]
PH BLDA: 7.48 PH UNITS (ref 7.35–7.45)
PH, TEMP CORRECTED: ABNORMAL PH UNITS (ref 7.35–7.45)
PHOSPHATE SERPL-MCNC: 3 MG/DL (ref 2.5–4.5)
PLATELET # BLD AUTO: 87 10*3/MM3 (ref 140–450)
PMV BLD AUTO: 13.2 FL (ref 6–12)
PO2 BLDA: 74.6 MM HG (ref 80–100)
PO2 TEMP ADJ BLD: ABNORMAL MM HG (ref 83–108)
POTASSIUM BLD-SCNC: 4 MMOL/L (ref 3.5–5.2)
PROT SERPL-MCNC: 5.7 G/DL (ref 6–8.5)
PROTHROMBIN TIME: 15.2 SECONDS (ref 11–15.4)
RBC # BLD AUTO: 2.78 10*6/MM3 (ref 4.14–5.8)
SAO2 % BLDCOA: 95.2 % (ref 90–100)
SET MECH RESP RATE: 16
SODIUM BLD-SCNC: 140 MMOL/L (ref 136–145)
VENTILATOR MODE: AC
VT ON VENT VENT: 400 ML
WBC NRBC COR # BLD: 9.44 10*3/MM3 (ref 3.4–10.8)

## 2019-04-19 PROCEDURE — 80053 COMPREHEN METABOLIC PANEL: CPT | Performed by: INTERNAL MEDICINE

## 2019-04-19 PROCEDURE — 94003 VENT MGMT INPAT SUBQ DAY: CPT

## 2019-04-19 PROCEDURE — 99291 CRITICAL CARE FIRST HOUR: CPT | Performed by: INTERNAL MEDICINE

## 2019-04-19 PROCEDURE — 85610 PROTHROMBIN TIME: CPT | Performed by: INTERNAL MEDICINE

## 2019-04-19 PROCEDURE — 83050 HGB METHEMOGLOBIN QUAN: CPT | Performed by: INTERNAL MEDICINE

## 2019-04-19 PROCEDURE — 82805 BLOOD GASES W/O2 SATURATION: CPT | Performed by: INTERNAL MEDICINE

## 2019-04-19 PROCEDURE — 63710000001 INSULIN ASPART PER 5 UNITS: Performed by: INTERNAL MEDICINE

## 2019-04-19 PROCEDURE — 94799 UNLISTED PULMONARY SVC/PX: CPT

## 2019-04-19 PROCEDURE — 71045 X-RAY EXAM CHEST 1 VIEW: CPT | Performed by: RADIOLOGY

## 2019-04-19 PROCEDURE — 86140 C-REACTIVE PROTEIN: CPT | Performed by: INTERNAL MEDICINE

## 2019-04-19 PROCEDURE — 25010000002 PROPOFOL 1000 MG/ML EMULSION: Performed by: HOSPITALIST

## 2019-04-19 PROCEDURE — 99233 SBSQ HOSP IP/OBS HIGH 50: CPT | Performed by: INTERNAL MEDICINE

## 2019-04-19 PROCEDURE — 84100 ASSAY OF PHOSPHORUS: CPT | Performed by: INTERNAL MEDICINE

## 2019-04-19 PROCEDURE — 82375 ASSAY CARBOXYHB QUANT: CPT | Performed by: INTERNAL MEDICINE

## 2019-04-19 PROCEDURE — 83735 ASSAY OF MAGNESIUM: CPT | Performed by: INTERNAL MEDICINE

## 2019-04-19 PROCEDURE — 71045 X-RAY EXAM CHEST 1 VIEW: CPT

## 2019-04-19 PROCEDURE — 85025 COMPLETE CBC W/AUTO DIFF WBC: CPT | Performed by: INTERNAL MEDICINE

## 2019-04-19 PROCEDURE — 82962 GLUCOSE BLOOD TEST: CPT

## 2019-04-19 PROCEDURE — 25010000002 VITAMIN K1 PER 1 MG: Performed by: PHYSICIAN ASSISTANT

## 2019-04-19 PROCEDURE — 25010000002 THIAMINE PER 100 MG: Performed by: INTERNAL MEDICINE

## 2019-04-19 PROCEDURE — 36600 WITHDRAWAL OF ARTERIAL BLOOD: CPT | Performed by: INTERNAL MEDICINE

## 2019-04-19 RX ORDER — NICOTINE POLACRILEX 4 MG
15 LOZENGE BUCCAL
Status: DISCONTINUED | OUTPATIENT
Start: 2019-04-19 | End: 2019-04-23 | Stop reason: HOSPADM

## 2019-04-19 RX ORDER — DEXTROSE MONOHYDRATE 25 G/50ML
25 INJECTION, SOLUTION INTRAVENOUS
Status: DISCONTINUED | OUTPATIENT
Start: 2019-04-19 | End: 2019-04-23 | Stop reason: HOSPADM

## 2019-04-19 RX ADMIN — SODIUM CHLORIDE, PRESERVATIVE FREE 3 ML: 5 INJECTION INTRAVENOUS at 09:42

## 2019-04-19 RX ADMIN — INSULIN ASPART 2 UNITS: 100 INJECTION, SOLUTION INTRAVENOUS; SUBCUTANEOUS at 11:43

## 2019-04-19 RX ADMIN — CHLORHEXIDINE GLUCONATE 15 ML: 1.2 RINSE ORAL at 09:42

## 2019-04-19 RX ADMIN — CALCIUM ACETATE 667 MG: 667 CAPSULE ORAL at 05:16

## 2019-04-19 RX ADMIN — LACTULOSE 20 G: 10 SOLUTION ORAL at 20:49

## 2019-04-19 RX ADMIN — ZINC SULFATE CAP 220 MG (50 MG ELEMENTAL ZN) 50 MG: 220 (50 ZN) CAP at 09:42

## 2019-04-19 RX ADMIN — INSULIN ASPART 3 UNITS: 100 INJECTION, SOLUTION INTRAVENOUS; SUBCUTANEOUS at 06:30

## 2019-04-19 RX ADMIN — DILTIAZEM HYDROCHLORIDE 60 MG: 60 TABLET, FILM COATED ORAL at 21:03

## 2019-04-19 RX ADMIN — RIFAXIMIN 550 MG: 550 TABLET ORAL at 09:42

## 2019-04-19 RX ADMIN — HYDRALAZINE HYDROCHLORIDE 25 MG: 10 TABLET ORAL at 21:02

## 2019-04-19 RX ADMIN — ZINC SULFATE CAP 220 MG (50 MG ELEMENTAL ZN) 50 MG: 220 (50 ZN) CAP at 20:50

## 2019-04-19 RX ADMIN — PROPOFOL 30 MCG/KG/MIN: 10 INJECTION, EMULSION INTRAVENOUS at 22:22

## 2019-04-19 RX ADMIN — PANTOPRAZOLE SODIUM 40 MG: 40 INJECTION, POWDER, FOR SOLUTION INTRAVENOUS at 09:42

## 2019-04-19 RX ADMIN — THIAMINE HYDROCHLORIDE 250 MG: 100 INJECTION, SOLUTION INTRAMUSCULAR; INTRAVENOUS at 09:43

## 2019-04-19 RX ADMIN — SODIUM CHLORIDE, PRESERVATIVE FREE 3 ML: 5 INJECTION INTRAVENOUS at 20:51

## 2019-04-19 RX ADMIN — IPRATROPIUM BROMIDE 0.5 MG: 0.5 SOLUTION RESPIRATORY (INHALATION) at 13:20

## 2019-04-19 RX ADMIN — PROPOFOL 30 MCG/KG/MIN: 10 INJECTION, EMULSION INTRAVENOUS at 02:24

## 2019-04-19 RX ADMIN — LEVOTHYROXINE SODIUM 12.5 MCG: 25 TABLET ORAL at 06:30

## 2019-04-19 RX ADMIN — IPRATROPIUM BROMIDE 0.5 MG: 0.5 SOLUTION RESPIRATORY (INHALATION) at 00:30

## 2019-04-19 RX ADMIN — HYDRALAZINE HYDROCHLORIDE 25 MG: 10 TABLET ORAL at 14:55

## 2019-04-19 RX ADMIN — CALCIUM ACETATE 667 MG: 667 CAPSULE ORAL at 21:03

## 2019-04-19 RX ADMIN — SODIUM CHLORIDE, PRESERVATIVE FREE 10 ML: 5 INJECTION INTRAVENOUS at 20:50

## 2019-04-19 RX ADMIN — IPRATROPIUM BROMIDE 0.5 MG: 0.5 SOLUTION RESPIRATORY (INHALATION) at 07:04

## 2019-04-19 RX ADMIN — CALCIUM ACETATE 667 MG: 667 CAPSULE ORAL at 09:42

## 2019-04-19 RX ADMIN — Medication: at 20:49

## 2019-04-19 RX ADMIN — SODIUM CHLORIDE, PRESERVATIVE FREE 10 ML: 5 INJECTION INTRAVENOUS at 20:51

## 2019-04-19 RX ADMIN — FOLIC ACID 1 MG: 5 INJECTION, SOLUTION INTRAMUSCULAR; INTRAVENOUS; SUBCUTANEOUS at 09:43

## 2019-04-19 RX ADMIN — SODIUM CHLORIDE, PRESERVATIVE FREE 10 ML: 5 INJECTION INTRAVENOUS at 09:45

## 2019-04-19 RX ADMIN — PROPOFOL 30 MCG/KG/MIN: 10 INJECTION, EMULSION INTRAVENOUS at 09:42

## 2019-04-19 RX ADMIN — CHLORHEXIDINE GLUCONATE 15 ML: 1.2 RINSE ORAL at 20:49

## 2019-04-19 RX ADMIN — DILTIAZEM HYDROCHLORIDE 60 MG: 60 TABLET, FILM COATED ORAL at 14:55

## 2019-04-19 RX ADMIN — HYDRALAZINE HYDROCHLORIDE 25 MG: 10 TABLET ORAL at 05:16

## 2019-04-19 RX ADMIN — METOPROLOL TARTRATE 100 MG: 100 TABLET, FILM COATED ORAL at 09:42

## 2019-04-19 RX ADMIN — Medication: at 11:44

## 2019-04-19 RX ADMIN — DILTIAZEM HYDROCHLORIDE 60 MG: 60 TABLET, FILM COATED ORAL at 05:16

## 2019-04-19 RX ADMIN — Medication: at 17:30

## 2019-04-19 RX ADMIN — RIFAXIMIN 550 MG: 550 TABLET ORAL at 20:49

## 2019-04-19 RX ADMIN — PHYTONADIONE 10 MG: 10 INJECTION, EMULSION INTRAMUSCULAR; INTRAVENOUS; SUBCUTANEOUS at 11:03

## 2019-04-19 RX ADMIN — METOPROLOL TARTRATE 100 MG: 100 TABLET, FILM COATED ORAL at 20:50

## 2019-04-19 RX ADMIN — IPRATROPIUM BROMIDE 0.5 MG: 0.5 SOLUTION RESPIRATORY (INHALATION) at 18:26

## 2019-04-19 RX ADMIN — Medication: at 09:44

## 2019-04-19 RX ADMIN — ASPIRIN 81 MG: 81 TABLET, CHEWABLE ORAL at 09:42

## 2019-04-20 LAB
A-A DO2: 45.4 MMHG (ref 0–300)
ALBUMIN SERPL-MCNC: 3.1 G/DL (ref 3.5–5.2)
ALBUMIN/GLOB SERPL: 1.1 G/DL
ALP SERPL-CCNC: 81 U/L (ref 39–117)
ALT SERPL W P-5'-P-CCNC: 69 U/L (ref 1–41)
ANION GAP SERPL CALCULATED.3IONS-SCNC: 18.3 MMOL/L
ANISOCYTOSIS BLD QL: ABNORMAL
ARTERIAL PATENCY WRIST A: ABNORMAL
AST SERPL-CCNC: 58 U/L (ref 1–40)
ATMOSPHERIC PRESS: 716 MMHG
BASE EXCESS BLDA CALC-SCNC: 1.2 MMOL/L
BDY SITE: ABNORMAL
BILIRUB SERPL-MCNC: 0.4 MG/DL (ref 0.2–1.2)
BODY TEMPERATURE: 98.6 C
BUN BLD-MCNC: 60 MG/DL (ref 8–23)
BUN/CREAT SERPL: 14.8 (ref 7–25)
CALCIUM SPEC-SCNC: 8.1 MG/DL (ref 8.6–10.5)
CHLORIDE SERPL-SCNC: 96 MMOL/L (ref 98–107)
CO2 SERPL-SCNC: 24.7 MMOL/L (ref 22–29)
COHGB MFR BLD: 1.6 % (ref 0–5)
CREAT BLD-MCNC: 4.06 MG/DL (ref 0.76–1.27)
CRP SERPL-MCNC: 3.82 MG/DL (ref 0–0.5)
DEPRECATED RDW RBC AUTO: 69.2 FL (ref 37–54)
EOSINOPHIL # BLD MANUAL: 0.08 10*3/MM3 (ref 0–0.4)
EOSINOPHIL NFR BLD MANUAL: 1 % (ref 0.3–6.2)
ERYTHROCYTE [DISTWIDTH] IN BLOOD BY AUTOMATED COUNT: 20.8 % (ref 12.3–15.4)
GFR SERPL CREATININE-BSD FRML MDRD: 15 ML/MIN/1.73
GLOBULIN UR ELPH-MCNC: 2.7 GM/DL
GLUCOSE BLD-MCNC: 158 MG/DL (ref 65–99)
GLUCOSE BLDC GLUCOMTR-MCNC: 138 MG/DL (ref 70–130)
GLUCOSE BLDC GLUCOMTR-MCNC: 139 MG/DL (ref 70–130)
GLUCOSE BLDC GLUCOMTR-MCNC: 159 MG/DL (ref 70–130)
GLUCOSE BLDC GLUCOMTR-MCNC: 163 MG/DL (ref 70–130)
GLUCOSE BLDC GLUCOMTR-MCNC: 172 MG/DL (ref 70–130)
HCO3 BLDA-SCNC: 24.4 MMOL/L (ref 22–26)
HCT VFR BLD AUTO: 26.8 % (ref 37.5–51)
HCT VFR BLD CALC: 25 % (ref 42–52)
HGB BLD-MCNC: 8 G/DL (ref 13–17.7)
HGB BLDA-MCNC: 8.5 G/DL (ref 12–16)
HOROWITZ INDEX BLD+IHG-RTO: 25 %
HYPOCHROMIA BLD QL: ABNORMAL
LYMPHOCYTES # BLD MANUAL: 0.84 10*3/MM3 (ref 0.7–3.1)
LYMPHOCYTES NFR BLD MANUAL: 10 % (ref 19.6–45.3)
LYMPHOCYTES NFR BLD MANUAL: 3 % (ref 5–12)
MACROCYTES BLD QL SMEAR: ABNORMAL
MAGNESIUM SERPL-MCNC: 2.3 MG/DL (ref 1.6–2.4)
MCH RBC QN AUTO: 29.1 PG (ref 26.6–33)
MCHC RBC AUTO-ENTMCNC: 29.9 G/DL (ref 31.5–35.7)
MCV RBC AUTO: 97.5 FL (ref 79–97)
METHGB BLD QL: 0.1 % (ref 0–3)
MODALITY: ABNORMAL
MONOCYTES # BLD AUTO: 0.25 10*3/MM3 (ref 0.1–0.9)
NEUTROPHILS # BLD AUTO: 7.2 10*3/MM3 (ref 1.7–7)
NEUTROPHILS NFR BLD MANUAL: 81 % (ref 42.7–76)
NEUTS BAND NFR BLD MANUAL: 5 % (ref 0–5)
NOTE: ABNORMAL
OXYHGB MFR BLDV: 94.9 % (ref 85–100)
PCO2 BLDA: 32.8 MM HG (ref 35–45)
PCO2 TEMP ADJ BLD: ABNORMAL MM HG (ref 35–48)
PEEP RESPIRATORY: 5 CM[H2O]
PH BLDA: 7.49 PH UNITS (ref 7.35–7.45)
PH, TEMP CORRECTED: ABNORMAL PH UNITS (ref 7.35–7.45)
PHOSPHATE SERPL-MCNC: 4.4 MG/DL (ref 2.5–4.5)
PLATELET # BLD AUTO: 88 10*3/MM3 (ref 140–450)
PO2 BLDA: 82.9 MM HG (ref 80–100)
PO2 TEMP ADJ BLD: ABNORMAL MM HG (ref 83–108)
POTASSIUM BLD-SCNC: 4 MMOL/L (ref 3.5–5.2)
PROT SERPL-MCNC: 5.8 G/DL (ref 6–8.5)
PTH-INTACT SERPL-MCNC: 522.2 PG/ML (ref 15–65)
RBC # BLD AUTO: 2.75 10*6/MM3 (ref 4.14–5.8)
SAO2 % BLDCOA: 96.5 % (ref 90–100)
SCAN SLIDE: NORMAL
SCHISTOCYTES BLD QL SMEAR: ABNORMAL
SET MECH RESP RATE: 16
SMALL PLATELETS BLD QL SMEAR: ABNORMAL
SODIUM BLD-SCNC: 139 MMOL/L (ref 136–145)
VENTILATOR MODE: ABNORMAL
VT ON VENT VENT: 400 ML
WBC NRBC COR # BLD: 8.37 10*3/MM3 (ref 3.4–10.8)

## 2019-04-20 PROCEDURE — 83050 HGB METHEMOGLOBIN QUAN: CPT | Performed by: INTERNAL MEDICINE

## 2019-04-20 PROCEDURE — 83735 ASSAY OF MAGNESIUM: CPT | Performed by: INTERNAL MEDICINE

## 2019-04-20 PROCEDURE — 86140 C-REACTIVE PROTEIN: CPT | Performed by: INTERNAL MEDICINE

## 2019-04-20 PROCEDURE — 63710000001 INSULIN ASPART PER 5 UNITS: Performed by: INTERNAL MEDICINE

## 2019-04-20 PROCEDURE — 94799 UNLISTED PULMONARY SVC/PX: CPT

## 2019-04-20 PROCEDURE — 99233 SBSQ HOSP IP/OBS HIGH 50: CPT | Performed by: INTERNAL MEDICINE

## 2019-04-20 PROCEDURE — 80053 COMPREHEN METABOLIC PANEL: CPT | Performed by: INTERNAL MEDICINE

## 2019-04-20 PROCEDURE — 82805 BLOOD GASES W/O2 SATURATION: CPT | Performed by: INTERNAL MEDICINE

## 2019-04-20 PROCEDURE — 85007 BL SMEAR W/DIFF WBC COUNT: CPT | Performed by: INTERNAL MEDICINE

## 2019-04-20 PROCEDURE — 25010000002 THIAMINE PER 100 MG: Performed by: INTERNAL MEDICINE

## 2019-04-20 PROCEDURE — 25010000002 PROPOFOL 1000 MG/ML EMULSION: Performed by: HOSPITALIST

## 2019-04-20 PROCEDURE — 84100 ASSAY OF PHOSPHORUS: CPT | Performed by: INTERNAL MEDICINE

## 2019-04-20 PROCEDURE — 82962 GLUCOSE BLOOD TEST: CPT

## 2019-04-20 PROCEDURE — 82375 ASSAY CARBOXYHB QUANT: CPT | Performed by: INTERNAL MEDICINE

## 2019-04-20 PROCEDURE — 99291 CRITICAL CARE FIRST HOUR: CPT | Performed by: INTERNAL MEDICINE

## 2019-04-20 PROCEDURE — 83970 ASSAY OF PARATHORMONE: CPT | Performed by: INTERNAL MEDICINE

## 2019-04-20 PROCEDURE — 85025 COMPLETE CBC W/AUTO DIFF WBC: CPT | Performed by: INTERNAL MEDICINE

## 2019-04-20 PROCEDURE — 36600 WITHDRAWAL OF ARTERIAL BLOOD: CPT | Performed by: INTERNAL MEDICINE

## 2019-04-20 PROCEDURE — 25010000002 VITAMIN K1 PER 1 MG: Performed by: PHYSICIAN ASSISTANT

## 2019-04-20 PROCEDURE — 94003 VENT MGMT INPAT SUBQ DAY: CPT

## 2019-04-20 RX ADMIN — CALCIUM ACETATE 667 MG: 667 CAPSULE ORAL at 05:20

## 2019-04-20 RX ADMIN — INSULIN ASPART 2 UNITS: 100 INJECTION, SOLUTION INTRAVENOUS; SUBCUTANEOUS at 20:59

## 2019-04-20 RX ADMIN — HYDRALAZINE HYDROCHLORIDE 25 MG: 10 TABLET ORAL at 14:47

## 2019-04-20 RX ADMIN — PANTOPRAZOLE SODIUM 40 MG: 40 INJECTION, POWDER, FOR SOLUTION INTRAVENOUS at 11:07

## 2019-04-20 RX ADMIN — IPRATROPIUM BROMIDE 0.5 MG: 0.5 SOLUTION RESPIRATORY (INHALATION) at 12:24

## 2019-04-20 RX ADMIN — FOLIC ACID 1 MG: 5 INJECTION, SOLUTION INTRAMUSCULAR; INTRAVENOUS; SUBCUTANEOUS at 11:04

## 2019-04-20 RX ADMIN — PROPOFOL 30 MCG/KG/MIN: 10 INJECTION, EMULSION INTRAVENOUS at 00:39

## 2019-04-20 RX ADMIN — SODIUM CHLORIDE, PRESERVATIVE FREE 10 ML: 5 INJECTION INTRAVENOUS at 21:01

## 2019-04-20 RX ADMIN — THIAMINE HYDROCHLORIDE 250 MG: 100 INJECTION, SOLUTION INTRAMUSCULAR; INTRAVENOUS at 11:04

## 2019-04-20 RX ADMIN — PROPOFOL 30 MCG/KG/MIN: 10 INJECTION, EMULSION INTRAVENOUS at 13:11

## 2019-04-20 RX ADMIN — CALCIUM ACETATE 667 MG: 667 CAPSULE ORAL at 14:47

## 2019-04-20 RX ADMIN — RIFAXIMIN 550 MG: 550 TABLET ORAL at 11:03

## 2019-04-20 RX ADMIN — SODIUM CHLORIDE, PRESERVATIVE FREE 3 ML: 5 INJECTION INTRAVENOUS at 21:01

## 2019-04-20 RX ADMIN — CHLORHEXIDINE GLUCONATE 15 ML: 1.2 RINSE ORAL at 20:59

## 2019-04-20 RX ADMIN — CALCIUM ACETATE 667 MG: 667 CAPSULE ORAL at 21:00

## 2019-04-20 RX ADMIN — IPRATROPIUM BROMIDE 0.5 MG: 0.5 SOLUTION RESPIRATORY (INHALATION) at 07:04

## 2019-04-20 RX ADMIN — IPRATROPIUM BROMIDE 0.5 MG: 0.5 SOLUTION RESPIRATORY (INHALATION) at 18:28

## 2019-04-20 RX ADMIN — Medication: at 12:46

## 2019-04-20 RX ADMIN — PHYTONADIONE 10 MG: 10 INJECTION, EMULSION INTRAMUSCULAR; INTRAVENOUS; SUBCUTANEOUS at 11:04

## 2019-04-20 RX ADMIN — IPRATROPIUM BROMIDE 0.5 MG: 0.5 SOLUTION RESPIRATORY (INHALATION) at 00:39

## 2019-04-20 RX ADMIN — METOPROLOL TARTRATE 100 MG: 100 TABLET, FILM COATED ORAL at 11:03

## 2019-04-20 RX ADMIN — INSULIN ASPART 2 UNITS: 100 INJECTION, SOLUTION INTRAVENOUS; SUBCUTANEOUS at 05:45

## 2019-04-20 RX ADMIN — SODIUM CHLORIDE, PRESERVATIVE FREE 10 ML: 5 INJECTION INTRAVENOUS at 11:05

## 2019-04-20 RX ADMIN — Medication: at 11:06

## 2019-04-20 RX ADMIN — LACTULOSE 20 G: 10 SOLUTION ORAL at 20:59

## 2019-04-20 RX ADMIN — DILTIAZEM HYDROCHLORIDE 60 MG: 60 TABLET, FILM COATED ORAL at 14:47

## 2019-04-20 RX ADMIN — LEVOTHYROXINE SODIUM 12.5 MCG: 25 TABLET ORAL at 05:20

## 2019-04-20 RX ADMIN — DILTIAZEM HYDROCHLORIDE 60 MG: 60 TABLET, FILM COATED ORAL at 21:00

## 2019-04-20 RX ADMIN — CHLORHEXIDINE GLUCONATE 15 ML: 1.2 RINSE ORAL at 11:04

## 2019-04-20 RX ADMIN — HYDRALAZINE HYDROCHLORIDE 25 MG: 10 TABLET ORAL at 21:00

## 2019-04-20 RX ADMIN — ZINC SULFATE CAP 220 MG (50 MG ELEMENTAL ZN) 50 MG: 220 (50 ZN) CAP at 20:59

## 2019-04-20 RX ADMIN — Medication: at 17:16

## 2019-04-20 RX ADMIN — ZINC SULFATE CAP 220 MG (50 MG ELEMENTAL ZN) 50 MG: 220 (50 ZN) CAP at 11:03

## 2019-04-20 RX ADMIN — INSULIN ASPART 2 UNITS: 100 INJECTION, SOLUTION INTRAVENOUS; SUBCUTANEOUS at 17:16

## 2019-04-20 RX ADMIN — RIFAXIMIN 550 MG: 550 TABLET ORAL at 20:59

## 2019-04-20 RX ADMIN — Medication: at 21:00

## 2019-04-20 RX ADMIN — SODIUM CHLORIDE 1000 ML: 9 INJECTION, SOLUTION INTRAVENOUS at 08:00

## 2019-04-20 RX ADMIN — PROPOFOL 30 MCG/KG/MIN: 10 INJECTION, EMULSION INTRAVENOUS at 20:59

## 2019-04-20 RX ADMIN — ASPIRIN 81 MG: 81 TABLET, CHEWABLE ORAL at 11:03

## 2019-04-20 RX ADMIN — METOPROLOL TARTRATE 100 MG: 100 TABLET, FILM COATED ORAL at 20:59

## 2019-04-20 RX ADMIN — HYDRALAZINE HYDROCHLORIDE 25 MG: 10 TABLET ORAL at 05:20

## 2019-04-20 RX ADMIN — DILTIAZEM HYDROCHLORIDE 60 MG: 60 TABLET, FILM COATED ORAL at 05:20

## 2019-04-21 ENCOUNTER — APPOINTMENT (OUTPATIENT)
Dept: GENERAL RADIOLOGY | Facility: HOSPITAL | Age: 59
End: 2019-04-21

## 2019-04-21 LAB
A-A DO2: 24.1 MMHG (ref 0–300)
A-A DO2: 39.2 MMHG (ref 0–300)
ALBUMIN SERPL-MCNC: 3.1 G/DL (ref 3.5–5.2)
ALBUMIN/GLOB SERPL: 1.1 G/DL
ALP SERPL-CCNC: 69 U/L (ref 39–117)
ALT SERPL W P-5'-P-CCNC: 61 U/L (ref 1–41)
ANION GAP SERPL CALCULATED.3IONS-SCNC: 15.6 MMOL/L
ARTERIAL PATENCY WRIST A: POSITIVE
ARTERIAL PATENCY WRIST A: POSITIVE
AST SERPL-CCNC: 55 U/L (ref 1–40)
ATMOSPHERIC PRESS: 716 MMHG
ATMOSPHERIC PRESS: 727 MMHG
BASE EXCESS BLDA CALC-SCNC: -0.4 MMOL/L
BASE EXCESS BLDA CALC-SCNC: 0.6 MMOL/L
BASOPHILS # BLD AUTO: 0.04 10*3/MM3 (ref 0–0.2)
BASOPHILS NFR BLD AUTO: 0.6 % (ref 0–1.5)
BDY SITE: ABNORMAL
BDY SITE: ABNORMAL
BILIRUB SERPL-MCNC: 0.4 MG/DL (ref 0.2–1.2)
BODY TEMPERATURE: 98.6 C
BODY TEMPERATURE: 98.6 C
BUN BLD-MCNC: 47 MG/DL (ref 8–23)
BUN/CREAT SERPL: 14.4 (ref 7–25)
CALCIUM SPEC-SCNC: 8.3 MG/DL (ref 8.6–10.5)
CHLORIDE SERPL-SCNC: 98 MMOL/L (ref 98–107)
CO2 SERPL-SCNC: 23.4 MMOL/L (ref 22–29)
COHGB MFR BLD: 1.1 % (ref 0–5)
COHGB MFR BLD: 1.6 % (ref 0–5)
CREAT BLD-MCNC: 3.27 MG/DL (ref 0.76–1.27)
CRP SERPL-MCNC: 4.65 MG/DL (ref 0–0.5)
DEPRECATED RDW RBC AUTO: 69.5 FL (ref 37–54)
EOSINOPHIL # BLD AUTO: 0.15 10*3/MM3 (ref 0–0.4)
EOSINOPHIL NFR BLD AUTO: 2.2 % (ref 0.3–6.2)
ERYTHROCYTE [DISTWIDTH] IN BLOOD BY AUTOMATED COUNT: 20.5 % (ref 12.3–15.4)
GFR SERPL CREATININE-BSD FRML MDRD: 19 ML/MIN/1.73
GLOBULIN UR ELPH-MCNC: 2.7 GM/DL
GLUCOSE BLD-MCNC: 162 MG/DL (ref 65–99)
GLUCOSE BLDC GLUCOMTR-MCNC: 144 MG/DL (ref 70–130)
GLUCOSE BLDC GLUCOMTR-MCNC: 167 MG/DL (ref 70–130)
GLUCOSE BLDC GLUCOMTR-MCNC: 177 MG/DL (ref 70–130)
GLUCOSE BLDC GLUCOMTR-MCNC: 195 MG/DL (ref 70–130)
HCO3 BLDA-SCNC: 22.8 MMOL/L (ref 22–26)
HCO3 BLDA-SCNC: 23.8 MMOL/L (ref 22–26)
HCT VFR BLD AUTO: 27.7 % (ref 37.5–51)
HCT VFR BLD CALC: 27 % (ref 42–52)
HCT VFR BLD CALC: 29 % (ref 42–52)
HGB BLD-MCNC: 8.5 G/DL (ref 13–17.7)
HGB BLDA-MCNC: 9.2 G/DL (ref 12–16)
HGB BLDA-MCNC: 9.7 G/DL (ref 12–16)
HOROWITZ INDEX BLD+IHG-RTO: 25 %
HOROWITZ INDEX BLD+IHG-RTO: 25 %
IMM GRANULOCYTES # BLD AUTO: 0.02 10*3/MM3 (ref 0–0.05)
IMM GRANULOCYTES NFR BLD AUTO: 0.3 % (ref 0–0.5)
LYMPHOCYTES # BLD AUTO: 0.89 10*3/MM3 (ref 0.7–3.1)
LYMPHOCYTES NFR BLD AUTO: 13 % (ref 19.6–45.3)
MAGNESIUM SERPL-MCNC: 2.2 MG/DL (ref 1.6–2.4)
MCH RBC QN AUTO: 29.9 PG (ref 26.6–33)
MCHC RBC AUTO-ENTMCNC: 30.7 G/DL (ref 31.5–35.7)
MCV RBC AUTO: 97.5 FL (ref 79–97)
METHGB BLD QL: 0.1 % (ref 0–3)
METHGB BLD QL: 0.2 % (ref 0–3)
MODALITY: ABNORMAL
MODALITY: ABNORMAL
MONOCYTES # BLD AUTO: 0.65 10*3/MM3 (ref 0.1–0.9)
MONOCYTES NFR BLD AUTO: 9.5 % (ref 5–12)
NEUTROPHILS # BLD AUTO: 5.08 10*3/MM3 (ref 1.7–7)
NEUTROPHILS NFR BLD AUTO: 74.4 % (ref 42.7–76)
NOTE: ABNORMAL
OXYHGB MFR BLDV: 95.9 % (ref 85–100)
OXYHGB MFR BLDV: 96.7 % (ref 85–100)
PCO2 BLDA: 32 MM HG (ref 35–45)
PCO2 BLDA: 32.6 MM HG (ref 35–45)
PCO2 TEMP ADJ BLD: ABNORMAL MM HG (ref 35–48)
PEEP RESPIRATORY: 5 CM[H2O]
PEEP RESPIRATORY: 5 CM[H2O]
PH BLDA: 7.47 PH UNITS (ref 7.35–7.45)
PH BLDA: 7.48 PH UNITS (ref 7.35–7.45)
PH, TEMP CORRECTED: ABNORMAL PH UNITS (ref 7.35–7.45)
PHOSPHATE SERPL-MCNC: 3.7 MG/DL (ref 2.5–4.5)
PLATELET # BLD AUTO: 107 10*3/MM3 (ref 140–450)
PMV BLD AUTO: 12.8 FL (ref 6–12)
PO2 BLDA: 107.9 MM HG (ref 80–100)
PO2 BLDA: 89.3 MM HG (ref 80–100)
PO2 TEMP ADJ BLD: ABNORMAL MM HG (ref 83–108)
POTASSIUM BLD-SCNC: 4 MMOL/L (ref 3.5–5.2)
PROT SERPL-MCNC: 5.8 G/DL (ref 6–8.5)
RBC # BLD AUTO: 2.84 10*6/MM3 (ref 4.14–5.8)
SAO2 % BLDCOA: 97.1 % (ref 90–100)
SAO2 % BLDCOA: 98.5 % (ref 90–100)
SET MECH RESP RATE: 12
SET MECH RESP RATE: 16
SODIUM BLD-SCNC: 137 MMOL/L (ref 136–145)
VENTILATOR MODE: ABNORMAL
VENTILATOR MODE: AC
VT ON VENT VENT: 400 ML
VT ON VENT VENT: 400 ML
WBC NRBC COR # BLD: 6.83 10*3/MM3 (ref 3.4–10.8)

## 2019-04-21 PROCEDURE — 82375 ASSAY CARBOXYHB QUANT: CPT | Performed by: INTERNAL MEDICINE

## 2019-04-21 PROCEDURE — 83735 ASSAY OF MAGNESIUM: CPT | Performed by: INTERNAL MEDICINE

## 2019-04-21 PROCEDURE — 85025 COMPLETE CBC W/AUTO DIFF WBC: CPT | Performed by: INTERNAL MEDICINE

## 2019-04-21 PROCEDURE — 94003 VENT MGMT INPAT SUBQ DAY: CPT

## 2019-04-21 PROCEDURE — 94660 CPAP INITIATION&MGMT: CPT

## 2019-04-21 PROCEDURE — 83050 HGB METHEMOGLOBIN QUAN: CPT | Performed by: INTERNAL MEDICINE

## 2019-04-21 PROCEDURE — 86140 C-REACTIVE PROTEIN: CPT | Performed by: INTERNAL MEDICINE

## 2019-04-21 PROCEDURE — 25010000002 PROPOFOL 1000 MG/ML EMULSION: Performed by: HOSPITALIST

## 2019-04-21 PROCEDURE — 82962 GLUCOSE BLOOD TEST: CPT

## 2019-04-21 PROCEDURE — 94799 UNLISTED PULMONARY SVC/PX: CPT

## 2019-04-21 PROCEDURE — 99291 CRITICAL CARE FIRST HOUR: CPT | Performed by: INTERNAL MEDICINE

## 2019-04-21 PROCEDURE — 80053 COMPREHEN METABOLIC PANEL: CPT | Performed by: INTERNAL MEDICINE

## 2019-04-21 PROCEDURE — 36600 WITHDRAWAL OF ARTERIAL BLOOD: CPT | Performed by: INTERNAL MEDICINE

## 2019-04-21 PROCEDURE — 63710000001 INSULIN ASPART PER 5 UNITS: Performed by: INTERNAL MEDICINE

## 2019-04-21 PROCEDURE — 82805 BLOOD GASES W/O2 SATURATION: CPT | Performed by: INTERNAL MEDICINE

## 2019-04-21 PROCEDURE — 71045 X-RAY EXAM CHEST 1 VIEW: CPT

## 2019-04-21 PROCEDURE — 71045 X-RAY EXAM CHEST 1 VIEW: CPT | Performed by: RADIOLOGY

## 2019-04-21 PROCEDURE — 25010000002 VITAMIN K1 PER 1 MG: Performed by: PHYSICIAN ASSISTANT

## 2019-04-21 PROCEDURE — 84100 ASSAY OF PHOSPHORUS: CPT | Performed by: INTERNAL MEDICINE

## 2019-04-21 PROCEDURE — 25010000002 THIAMINE PER 100 MG: Performed by: INTERNAL MEDICINE

## 2019-04-21 RX ORDER — LACTULOSE 10 G/15ML
20 SOLUTION ORAL 2 TIMES DAILY
Status: DISCONTINUED | OUTPATIENT
Start: 2019-04-21 | End: 2019-04-22

## 2019-04-21 RX ADMIN — ZINC SULFATE CAP 220 MG (50 MG ELEMENTAL ZN) 50 MG: 220 (50 ZN) CAP at 21:15

## 2019-04-21 RX ADMIN — DILTIAZEM HYDROCHLORIDE 60 MG: 60 TABLET, FILM COATED ORAL at 21:14

## 2019-04-21 RX ADMIN — IPRATROPIUM BROMIDE 0.5 MG: 0.5 SOLUTION RESPIRATORY (INHALATION) at 00:48

## 2019-04-21 RX ADMIN — HYDRALAZINE HYDROCHLORIDE 25 MG: 10 TABLET ORAL at 13:11

## 2019-04-21 RX ADMIN — IPRATROPIUM BROMIDE 0.5 MG: 0.5 SOLUTION RESPIRATORY (INHALATION) at 13:55

## 2019-04-21 RX ADMIN — INSULIN ASPART 2 UNITS: 100 INJECTION, SOLUTION INTRAVENOUS; SUBCUTANEOUS at 21:21

## 2019-04-21 RX ADMIN — IPRATROPIUM BROMIDE 0.5 MG: 0.5 SOLUTION RESPIRATORY (INHALATION) at 06:49

## 2019-04-21 RX ADMIN — IPRATROPIUM BROMIDE 0.5 MG: 0.5 SOLUTION RESPIRATORY (INHALATION) at 18:31

## 2019-04-21 RX ADMIN — PROPOFOL 30 MCG/KG/MIN: 10 INJECTION, EMULSION INTRAVENOUS at 18:37

## 2019-04-21 RX ADMIN — PANTOPRAZOLE SODIUM 40 MG: 40 INJECTION, POWDER, FOR SOLUTION INTRAVENOUS at 08:20

## 2019-04-21 RX ADMIN — Medication: at 17:33

## 2019-04-21 RX ADMIN — LACTULOSE 20 G: 10 SOLUTION ORAL at 08:20

## 2019-04-21 RX ADMIN — INSULIN ASPART 2 UNITS: 100 INJECTION, SOLUTION INTRAVENOUS; SUBCUTANEOUS at 12:14

## 2019-04-21 RX ADMIN — DILTIAZEM HYDROCHLORIDE 60 MG: 60 TABLET, FILM COATED ORAL at 17:33

## 2019-04-21 RX ADMIN — HYDRALAZINE HYDROCHLORIDE 25 MG: 10 TABLET ORAL at 05:29

## 2019-04-21 RX ADMIN — SODIUM CHLORIDE, PRESERVATIVE FREE 10 ML: 5 INJECTION INTRAVENOUS at 08:21

## 2019-04-21 RX ADMIN — FOLIC ACID 1 MG: 5 INJECTION, SOLUTION INTRAMUSCULAR; INTRAVENOUS; SUBCUTANEOUS at 08:26

## 2019-04-21 RX ADMIN — PROPOFOL 30 MCG/KG/MIN: 10 INJECTION, EMULSION INTRAVENOUS at 04:37

## 2019-04-21 RX ADMIN — SODIUM CHLORIDE, PRESERVATIVE FREE 3 ML: 5 INJECTION INTRAVENOUS at 08:20

## 2019-04-21 RX ADMIN — SODIUM CHLORIDE, PRESERVATIVE FREE 10 ML: 5 INJECTION INTRAVENOUS at 21:16

## 2019-04-21 RX ADMIN — PHYTONADIONE 10 MG: 10 INJECTION, EMULSION INTRAMUSCULAR; INTRAVENOUS; SUBCUTANEOUS at 12:10

## 2019-04-21 RX ADMIN — INSULIN ASPART 2 UNITS: 100 INJECTION, SOLUTION INTRAVENOUS; SUBCUTANEOUS at 05:30

## 2019-04-21 RX ADMIN — Medication: at 08:20

## 2019-04-21 RX ADMIN — THIAMINE HYDROCHLORIDE 250 MG: 100 INJECTION, SOLUTION INTRAMUSCULAR; INTRAVENOUS at 08:20

## 2019-04-21 RX ADMIN — RIFAXIMIN 550 MG: 550 TABLET ORAL at 08:19

## 2019-04-21 RX ADMIN — DILTIAZEM HYDROCHLORIDE 60 MG: 60 TABLET, FILM COATED ORAL at 05:29

## 2019-04-21 RX ADMIN — SODIUM CHLORIDE, PRESERVATIVE FREE 3 ML: 5 INJECTION INTRAVENOUS at 21:16

## 2019-04-21 RX ADMIN — METOPROLOL TARTRATE 100 MG: 100 TABLET, FILM COATED ORAL at 08:19

## 2019-04-21 RX ADMIN — CALCIUM ACETATE 667 MG: 667 CAPSULE ORAL at 17:33

## 2019-04-21 RX ADMIN — CALCIUM ACETATE 667 MG: 667 CAPSULE ORAL at 05:29

## 2019-04-21 RX ADMIN — CALCIUM ACETATE 667 MG: 667 CAPSULE ORAL at 12:10

## 2019-04-21 RX ADMIN — ZINC SULFATE CAP 220 MG (50 MG ELEMENTAL ZN) 50 MG: 220 (50 ZN) CAP at 08:19

## 2019-04-21 RX ADMIN — HYDRALAZINE HYDROCHLORIDE 25 MG: 10 TABLET ORAL at 21:15

## 2019-04-21 RX ADMIN — PROPOFOL 30 MCG/KG/MIN: 10 INJECTION, EMULSION INTRAVENOUS at 12:10

## 2019-04-21 RX ADMIN — CALCIUM ACETATE 667 MG: 667 CAPSULE ORAL at 21:15

## 2019-04-21 RX ADMIN — CHLORHEXIDINE GLUCONATE 15 ML: 1.2 RINSE ORAL at 21:15

## 2019-04-21 RX ADMIN — ASPIRIN 81 MG: 81 TABLET, CHEWABLE ORAL at 08:26

## 2019-04-21 RX ADMIN — METOPROLOL TARTRATE 100 MG: 100 TABLET, FILM COATED ORAL at 21:14

## 2019-04-21 RX ADMIN — RIFAXIMIN 550 MG: 550 TABLET ORAL at 21:14

## 2019-04-21 RX ADMIN — Medication: at 12:11

## 2019-04-21 RX ADMIN — CHLORHEXIDINE GLUCONATE 15 ML: 1.2 RINSE ORAL at 08:20

## 2019-04-21 RX ADMIN — LEVOTHYROXINE SODIUM 12.5 MCG: 25 TABLET ORAL at 05:30

## 2019-04-21 RX ADMIN — Medication: at 21:15

## 2019-04-21 RX ADMIN — LACTULOSE 20 G: 10 SOLUTION ORAL at 21:14

## 2019-04-22 ENCOUNTER — APPOINTMENT (OUTPATIENT)
Dept: GENERAL RADIOLOGY | Facility: HOSPITAL | Age: 59
End: 2019-04-22

## 2019-04-22 LAB
A-A DO2: 21.8 MMHG (ref 0–300)
A-A DO2: 28.3 MMHG (ref 0–300)
A-A DO2: 30.8 MMHG (ref 0–300)
ALBUMIN SERPL-MCNC: 3.11 G/DL (ref 3.5–5.2)
ALBUMIN/GLOB SERPL: 1.1 G/DL
ALP SERPL-CCNC: 78 U/L (ref 39–117)
ALT SERPL W P-5'-P-CCNC: 60 U/L (ref 1–41)
AMMONIA BLD-SCNC: 17 UMOL/L (ref 16–60)
ANION GAP SERPL CALCULATED.3IONS-SCNC: 19.6 MMOL/L
ARTERIAL PATENCY WRIST A: ABNORMAL
ARTERIAL PATENCY WRIST A: POSITIVE
ARTERIAL PATENCY WRIST A: POSITIVE
AST SERPL-CCNC: 56 U/L (ref 1–40)
ATMOSPHERIC PRESS: 727 MMHG
ATMOSPHERIC PRESS: 732 MMHG
ATMOSPHERIC PRESS: 732 MMHG
BASE EXCESS BLDA CALC-SCNC: -1.1 MMOL/L
BASE EXCESS BLDA CALC-SCNC: -2 MMOL/L
BASE EXCESS BLDA CALC-SCNC: -2.1 MMOL/L
BASOPHILS # BLD AUTO: 0.03 10*3/MM3 (ref 0–0.2)
BASOPHILS NFR BLD AUTO: 0.5 % (ref 0–1.5)
BDY SITE: ABNORMAL
BILIRUB SERPL-MCNC: 0.3 MG/DL (ref 0.2–1.2)
BODY TEMPERATURE: 98.6 C
BUN BLD-MCNC: 64 MG/DL (ref 8–23)
BUN/CREAT SERPL: 15.1 (ref 7–25)
CALCIUM SPEC-SCNC: 8.2 MG/DL (ref 8.6–10.5)
CHLORIDE SERPL-SCNC: 94 MMOL/L (ref 98–107)
CO2 SERPL-SCNC: 24.4 MMOL/L (ref 22–29)
COHGB MFR BLD: 0.8 % (ref 0–5)
COHGB MFR BLD: 1.1 % (ref 0–5)
COHGB MFR BLD: 1.3 % (ref 0–5)
CREAT BLD-MCNC: 4.25 MG/DL (ref 0.76–1.27)
CRP SERPL-MCNC: 3.41 MG/DL (ref 0–0.5)
D-LACTATE SERPL-SCNC: 0.5 MMOL/L (ref 0.5–2)
DEPRECATED RDW RBC AUTO: 66.8 FL (ref 37–54)
EOSINOPHIL # BLD AUTO: 0.17 10*3/MM3 (ref 0–0.4)
EOSINOPHIL NFR BLD AUTO: 2.9 % (ref 0.3–6.2)
ERYTHROCYTE [DISTWIDTH] IN BLOOD BY AUTOMATED COUNT: 19.9 % (ref 12.3–15.4)
GFR SERPL CREATININE-BSD FRML MDRD: 14 ML/MIN/1.73
GFR SERPL CREATININE-BSD FRML MDRD: ABNORMAL ML/MIN/1.73
GLOBULIN UR ELPH-MCNC: 2.9 GM/DL
GLUCOSE BLD-MCNC: 149 MG/DL (ref 65–99)
GLUCOSE BLDC GLUCOMTR-MCNC: 129 MG/DL (ref 70–130)
GLUCOSE BLDC GLUCOMTR-MCNC: 135 MG/DL (ref 70–130)
GLUCOSE BLDC GLUCOMTR-MCNC: 154 MG/DL (ref 70–130)
GLUCOSE BLDC GLUCOMTR-MCNC: 157 MG/DL (ref 70–130)
HCO3 BLDA-SCNC: 21.9 MMOL/L (ref 22–26)
HCO3 BLDA-SCNC: 21.9 MMOL/L (ref 22–26)
HCO3 BLDA-SCNC: 22.8 MMOL/L (ref 22–26)
HCT VFR BLD AUTO: 28.6 % (ref 37.5–51)
HCT VFR BLD CALC: 30 % (ref 42–52)
HGB BLD-MCNC: 8.7 G/DL (ref 13–17.7)
HGB BLDA-MCNC: 10.2 G/DL (ref 12–16)
HGB BLDA-MCNC: 10.3 G/DL (ref 12–16)
HGB BLDA-MCNC: 10.3 G/DL (ref 12–16)
HOROWITZ INDEX BLD+IHG-RTO: 21 %
HOROWITZ INDEX BLD+IHG-RTO: 21 %
HOROWITZ INDEX BLD+IHG-RTO: 25 %
IMM GRANULOCYTES # BLD AUTO: 0.02 10*3/MM3 (ref 0–0.05)
IMM GRANULOCYTES NFR BLD AUTO: 0.3 % (ref 0–0.5)
INR PPP: 1.08 (ref 0.9–1.1)
LYMPHOCYTES # BLD AUTO: 0.8 10*3/MM3 (ref 0.7–3.1)
LYMPHOCYTES NFR BLD AUTO: 13.5 % (ref 19.6–45.3)
MAGNESIUM SERPL-MCNC: 2.3 MG/DL (ref 1.6–2.4)
MCH RBC QN AUTO: 29.3 PG (ref 26.6–33)
MCHC RBC AUTO-ENTMCNC: 30.4 G/DL (ref 31.5–35.7)
MCV RBC AUTO: 96.3 FL (ref 79–97)
METHGB BLD QL: 0.2 % (ref 0–3)
METHGB BLD QL: 0.3 % (ref 0–3)
METHGB BLD QL: 0.3 % (ref 0–3)
MODALITY: ABNORMAL
MONOCYTES # BLD AUTO: 0.56 10*3/MM3 (ref 0.1–0.9)
MONOCYTES NFR BLD AUTO: 9.5 % (ref 5–12)
NEUTROPHILS # BLD AUTO: 4.33 10*3/MM3 (ref 1.7–7)
NEUTROPHILS NFR BLD AUTO: 73.3 % (ref 42.7–76)
NOTE: ABNORMAL
OXYHGB MFR BLDV: 92.6 % (ref 85–100)
OXYHGB MFR BLDV: 94.4 % (ref 85–100)
OXYHGB MFR BLDV: 96.2 % (ref 85–100)
PCO2 BLDA: 34.4 MM HG (ref 35–45)
PCO2 BLDA: 34.4 MM HG (ref 35–45)
PCO2 BLDA: 35.2 MM HG (ref 35–45)
PCO2 TEMP ADJ BLD: ABNORMAL MM HG (ref 35–48)
PEEP RESPIRATORY: 5 CM[H2O]
PH BLDA: 7.42 PH UNITS (ref 7.35–7.45)
PH BLDA: 7.42 PH UNITS (ref 7.35–7.45)
PH BLDA: 7.43 PH UNITS (ref 7.35–7.45)
PH, TEMP CORRECTED: ABNORMAL PH UNITS (ref 7.35–7.45)
PHOSPHATE SERPL-MCNC: 4.7 MG/DL (ref 2.5–4.5)
PLATELET # BLD AUTO: 142 10*3/MM3 (ref 140–450)
PMV BLD AUTO: 12.7 FL (ref 6–12)
PO2 BLDA: 100.9 MM HG (ref 80–100)
PO2 BLDA: 70.9 MM HG (ref 80–100)
PO2 BLDA: 80.9 MM HG (ref 80–100)
PO2 TEMP ADJ BLD: ABNORMAL MM HG (ref 83–108)
POTASSIUM BLD-SCNC: 3.8 MMOL/L (ref 3.5–5.2)
PROT SERPL-MCNC: 6 G/DL (ref 6–8.5)
PROTHROMBIN TIME: 14.2 SECONDS (ref 11–15.4)
RBC # BLD AUTO: 2.97 10*6/MM3 (ref 4.14–5.8)
SAO2 % BLDCOA: 93.8 % (ref 90–100)
SAO2 % BLDCOA: 95.4 % (ref 90–100)
SAO2 % BLDCOA: 97.8 % (ref 90–100)
SET MECH RESP RATE: 12
SODIUM BLD-SCNC: 138 MMOL/L (ref 136–145)
VENTILATOR MODE: A C
VENTILATOR MODE: ABNORMAL
VENTILATOR MODE: ABNORMAL
VT ON VENT VENT: 400 ML
WBC NRBC COR # BLD: 5.91 10*3/MM3 (ref 3.4–10.8)

## 2019-04-22 PROCEDURE — 94799 UNLISTED PULMONARY SVC/PX: CPT

## 2019-04-22 PROCEDURE — 36600 WITHDRAWAL OF ARTERIAL BLOOD: CPT | Performed by: INTERNAL MEDICINE

## 2019-04-22 PROCEDURE — 86140 C-REACTIVE PROTEIN: CPT | Performed by: INTERNAL MEDICINE

## 2019-04-22 PROCEDURE — 80053 COMPREHEN METABOLIC PANEL: CPT | Performed by: INTERNAL MEDICINE

## 2019-04-22 PROCEDURE — 82962 GLUCOSE BLOOD TEST: CPT

## 2019-04-22 PROCEDURE — 85610 PROTHROMBIN TIME: CPT | Performed by: INTERNAL MEDICINE

## 2019-04-22 PROCEDURE — 83605 ASSAY OF LACTIC ACID: CPT | Performed by: INTERNAL MEDICINE

## 2019-04-22 PROCEDURE — 83050 HGB METHEMOGLOBIN QUAN: CPT | Performed by: INTERNAL MEDICINE

## 2019-04-22 PROCEDURE — 84100 ASSAY OF PHOSPHORUS: CPT | Performed by: INTERNAL MEDICINE

## 2019-04-22 PROCEDURE — 85025 COMPLETE CBC W/AUTO DIFF WBC: CPT | Performed by: NURSE PRACTITIONER

## 2019-04-22 PROCEDURE — 25010000002 PROPOFOL 1000 MG/ML EMULSION: Performed by: HOSPITALIST

## 2019-04-22 PROCEDURE — 99291 CRITICAL CARE FIRST HOUR: CPT | Performed by: INTERNAL MEDICINE

## 2019-04-22 PROCEDURE — 82375 ASSAY CARBOXYHB QUANT: CPT | Performed by: INTERNAL MEDICINE

## 2019-04-22 PROCEDURE — 71045 X-RAY EXAM CHEST 1 VIEW: CPT

## 2019-04-22 PROCEDURE — 83050 HGB METHEMOGLOBIN QUAN: CPT | Performed by: PHYSICIAN ASSISTANT

## 2019-04-22 PROCEDURE — 82375 ASSAY CARBOXYHB QUANT: CPT | Performed by: PHYSICIAN ASSISTANT

## 2019-04-22 PROCEDURE — 31500 INSERT EMERGENCY AIRWAY: CPT | Performed by: INTERNAL MEDICINE

## 2019-04-22 PROCEDURE — 63710000001 INSULIN ASPART PER 5 UNITS: Performed by: INTERNAL MEDICINE

## 2019-04-22 PROCEDURE — 94003 VENT MGMT INPAT SUBQ DAY: CPT

## 2019-04-22 PROCEDURE — 71045 X-RAY EXAM CHEST 1 VIEW: CPT | Performed by: RADIOLOGY

## 2019-04-22 PROCEDURE — 36600 WITHDRAWAL OF ARTERIAL BLOOD: CPT | Performed by: PHYSICIAN ASSISTANT

## 2019-04-22 PROCEDURE — 83735 ASSAY OF MAGNESIUM: CPT | Performed by: INTERNAL MEDICINE

## 2019-04-22 PROCEDURE — 99233 SBSQ HOSP IP/OBS HIGH 50: CPT | Performed by: INTERNAL MEDICINE

## 2019-04-22 PROCEDURE — 82805 BLOOD GASES W/O2 SATURATION: CPT | Performed by: PHYSICIAN ASSISTANT

## 2019-04-22 PROCEDURE — 82140 ASSAY OF AMMONIA: CPT | Performed by: INTERNAL MEDICINE

## 2019-04-22 PROCEDURE — 82805 BLOOD GASES W/O2 SATURATION: CPT | Performed by: INTERNAL MEDICINE

## 2019-04-22 RX ORDER — LACTULOSE 10 G/15ML
20 SOLUTION ORAL DAILY
Status: DISCONTINUED | OUTPATIENT
Start: 2019-04-23 | End: 2019-04-23 | Stop reason: HOSPADM

## 2019-04-22 RX ADMIN — SODIUM CHLORIDE, PRESERVATIVE FREE 10 ML: 5 INJECTION INTRAVENOUS at 09:49

## 2019-04-22 RX ADMIN — LEVOTHYROXINE SODIUM 12.5 MCG: 25 TABLET ORAL at 05:29

## 2019-04-22 RX ADMIN — PROPOFOL 50 MCG/KG/MIN: 10 INJECTION, EMULSION INTRAVENOUS at 07:30

## 2019-04-22 RX ADMIN — PROPOFOL 50 MCG/KG/MIN: 10 INJECTION, EMULSION INTRAVENOUS at 23:52

## 2019-04-22 RX ADMIN — METOPROLOL TARTRATE 100 MG: 100 TABLET, FILM COATED ORAL at 21:23

## 2019-04-22 RX ADMIN — DILTIAZEM HYDROCHLORIDE 60 MG: 60 TABLET, FILM COATED ORAL at 14:17

## 2019-04-22 RX ADMIN — METOPROLOL TARTRATE 100 MG: 100 TABLET, FILM COATED ORAL at 09:49

## 2019-04-22 RX ADMIN — HYDRALAZINE HYDROCHLORIDE 25 MG: 10 TABLET ORAL at 05:29

## 2019-04-22 RX ADMIN — PROPOFOL 50 MCG/KG/MIN: 10 INJECTION, EMULSION INTRAVENOUS at 14:20

## 2019-04-22 RX ADMIN — IPRATROPIUM BROMIDE 0.5 MG: 0.5 SOLUTION RESPIRATORY (INHALATION) at 07:06

## 2019-04-22 RX ADMIN — FOLIC ACID 1 MG: 5 INJECTION, SOLUTION INTRAMUSCULAR; INTRAVENOUS; SUBCUTANEOUS at 09:50

## 2019-04-22 RX ADMIN — CALCIUM ACETATE 667 MG: 667 CAPSULE ORAL at 05:30

## 2019-04-22 RX ADMIN — SODIUM CHLORIDE, PRESERVATIVE FREE 10 ML: 5 INJECTION INTRAVENOUS at 21:24

## 2019-04-22 RX ADMIN — DILTIAZEM HYDROCHLORIDE 60 MG: 60 TABLET, FILM COATED ORAL at 05:29

## 2019-04-22 RX ADMIN — IPRATROPIUM BROMIDE 0.5 MG: 0.5 SOLUTION RESPIRATORY (INHALATION) at 00:33

## 2019-04-22 RX ADMIN — PANTOPRAZOLE SODIUM 40 MG: 40 INJECTION, POWDER, FOR SOLUTION INTRAVENOUS at 09:49

## 2019-04-22 RX ADMIN — HYDRALAZINE HYDROCHLORIDE 25 MG: 10 TABLET ORAL at 21:23

## 2019-04-22 RX ADMIN — DILTIAZEM HYDROCHLORIDE 60 MG: 60 TABLET, FILM COATED ORAL at 21:22

## 2019-04-22 RX ADMIN — PROPOFOL 50 MCG/KG/MIN: 10 INJECTION, EMULSION INTRAVENOUS at 19:27

## 2019-04-22 RX ADMIN — Medication: at 21:23

## 2019-04-22 RX ADMIN — ASPIRIN 81 MG: 81 TABLET, CHEWABLE ORAL at 09:49

## 2019-04-22 RX ADMIN — CHLORHEXIDINE GLUCONATE 15 ML: 1.2 RINSE ORAL at 21:23

## 2019-04-22 RX ADMIN — Medication: at 17:07

## 2019-04-22 RX ADMIN — IPRATROPIUM BROMIDE 0.5 MG: 0.5 SOLUTION RESPIRATORY (INHALATION) at 18:36

## 2019-04-22 RX ADMIN — Medication: at 11:47

## 2019-04-22 RX ADMIN — SODIUM CHLORIDE, PRESERVATIVE FREE 10 ML: 5 INJECTION INTRAVENOUS at 21:23

## 2019-04-22 RX ADMIN — SODIUM CHLORIDE, PRESERVATIVE FREE 10 ML: 5 INJECTION INTRAVENOUS at 09:50

## 2019-04-22 RX ADMIN — ZINC SULFATE CAP 220 MG (50 MG ELEMENTAL ZN) 50 MG: 220 (50 ZN) CAP at 21:23

## 2019-04-22 RX ADMIN — PROPOFOL 50 MCG/KG/MIN: 10 INJECTION, EMULSION INTRAVENOUS at 02:48

## 2019-04-22 RX ADMIN — IPRATROPIUM BROMIDE 0.5 MG: 0.5 SOLUTION RESPIRATORY (INHALATION) at 12:57

## 2019-04-22 RX ADMIN — RIFAXIMIN 550 MG: 550 TABLET ORAL at 21:23

## 2019-04-22 RX ADMIN — CHLORHEXIDINE GLUCONATE 15 ML: 1.2 RINSE ORAL at 09:50

## 2019-04-22 RX ADMIN — HYDRALAZINE HYDROCHLORIDE 25 MG: 10 TABLET ORAL at 14:16

## 2019-04-22 RX ADMIN — Medication: at 07:30

## 2019-04-22 RX ADMIN — CALCIUM ACETATE 667 MG: 667 CAPSULE ORAL at 21:23

## 2019-04-22 RX ADMIN — INSULIN ASPART 2 UNITS: 100 INJECTION, SOLUTION INTRAVENOUS; SUBCUTANEOUS at 21:23

## 2019-04-23 ENCOUNTER — APPOINTMENT (OUTPATIENT)
Dept: GENERAL RADIOLOGY | Facility: HOSPITAL | Age: 59
End: 2019-04-23

## 2019-04-23 VITALS
HEIGHT: 70 IN | OXYGEN SATURATION: 97 % | BODY MASS INDEX: 17.32 KG/M2 | DIASTOLIC BLOOD PRESSURE: 101 MMHG | SYSTOLIC BLOOD PRESSURE: 113 MMHG | TEMPERATURE: 98.5 F | RESPIRATION RATE: 18 BRPM | HEART RATE: 140 BPM | WEIGHT: 121 LBS

## 2019-04-23 LAB
A-A DO2: 6.7 MMHG (ref 0–300)
ALBUMIN SERPL-MCNC: 2.97 G/DL (ref 3.5–5.2)
ALBUMIN/GLOB SERPL: 1 G/DL
ALP SERPL-CCNC: 73 U/L (ref 39–117)
ALT SERPL W P-5'-P-CCNC: 49 U/L (ref 1–41)
ANION GAP SERPL CALCULATED.3IONS-SCNC: 20.9 MMOL/L
ARTERIAL PATENCY WRIST A: POSITIVE
AST SERPL-CCNC: 41 U/L (ref 1–40)
ATMOSPHERIC PRESS: 732 MMHG
BASE EXCESS BLDA CALC-SCNC: -1.7 MMOL/L
BASOPHILS # BLD AUTO: 0.03 10*3/MM3 (ref 0–0.2)
BASOPHILS NFR BLD AUTO: 0.6 % (ref 0–1.5)
BDY SITE: ABNORMAL
BILIRUB SERPL-MCNC: 0.3 MG/DL (ref 0.2–1.2)
BODY TEMPERATURE: 98.6 C
BUN BLD-MCNC: 80 MG/DL (ref 8–23)
BUN/CREAT SERPL: 15.9 (ref 7–25)
CALCIUM SPEC-SCNC: 8.3 MG/DL (ref 8.6–10.5)
CHLORIDE SERPL-SCNC: 93 MMOL/L (ref 98–107)
CO2 SERPL-SCNC: 22.1 MMOL/L (ref 22–29)
COHGB MFR BLD: 1.7 % (ref 0–5)
CREAT BLD-MCNC: 5.04 MG/DL (ref 0.76–1.27)
DEPRECATED RDW RBC AUTO: 66.6 FL (ref 37–54)
EOSINOPHIL # BLD AUTO: 0.15 10*3/MM3 (ref 0–0.4)
EOSINOPHIL NFR BLD AUTO: 2.8 % (ref 0.3–6.2)
ERYTHROCYTE [DISTWIDTH] IN BLOOD BY AUTOMATED COUNT: 19.8 % (ref 12.3–15.4)
GFR SERPL CREATININE-BSD FRML MDRD: 12 ML/MIN/1.73
GFR SERPL CREATININE-BSD FRML MDRD: ABNORMAL ML/MIN/1.73
GLOBULIN UR ELPH-MCNC: 3 GM/DL
GLUCOSE BLD-MCNC: 157 MG/DL (ref 65–99)
GLUCOSE BLDC GLUCOMTR-MCNC: 124 MG/DL (ref 70–130)
GLUCOSE BLDC GLUCOMTR-MCNC: 159 MG/DL (ref 70–130)
HCO3 BLDA-SCNC: 21.7 MMOL/L (ref 22–26)
HCT VFR BLD AUTO: 27.7 % (ref 37.5–51)
HCT VFR BLD CALC: 28 % (ref 42–52)
HGB BLD-MCNC: 8.7 G/DL (ref 13–17.7)
HGB BLDA-MCNC: 9.4 G/DL (ref 12–16)
HOROWITZ INDEX BLD+IHG-RTO: 21 %
IMM GRANULOCYTES # BLD AUTO: 0.02 10*3/MM3 (ref 0–0.05)
IMM GRANULOCYTES NFR BLD AUTO: 0.4 % (ref 0–0.5)
LYMPHOCYTES # BLD AUTO: 0.87 10*3/MM3 (ref 0.7–3.1)
LYMPHOCYTES NFR BLD AUTO: 16.1 % (ref 19.6–45.3)
MAGNESIUM SERPL-MCNC: 2.4 MG/DL (ref 1.6–2.4)
MCH RBC QN AUTO: 30 PG (ref 26.6–33)
MCHC RBC AUTO-ENTMCNC: 31.4 G/DL (ref 31.5–35.7)
MCV RBC AUTO: 95.5 FL (ref 79–97)
METHGB BLD QL: 0.4 % (ref 0–3)
MODALITY: ABNORMAL
MONOCYTES # BLD AUTO: 0.47 10*3/MM3 (ref 0.1–0.9)
MONOCYTES NFR BLD AUTO: 8.7 % (ref 5–12)
NEUTROPHILS # BLD AUTO: 3.88 10*3/MM3 (ref 1.7–7)
NEUTROPHILS NFR BLD AUTO: 71.4 % (ref 42.7–76)
NOTE: ABNORMAL
OXYHGB MFR BLDV: 95.7 % (ref 85–100)
PCO2 BLDA: 32 MM HG (ref 35–45)
PCO2 TEMP ADJ BLD: ABNORMAL MM HG (ref 35–48)
PEEP RESPIRATORY: 5 CM[H2O]
PH BLDA: 7.45 PH UNITS (ref 7.35–7.45)
PH, TEMP CORRECTED: ABNORMAL PH UNITS (ref 7.35–7.45)
PLATELET # BLD AUTO: 159 10*3/MM3 (ref 140–450)
PMV BLD AUTO: 12.2 FL (ref 6–12)
PO2 BLDA: 98.8 MM HG (ref 80–100)
PO2 TEMP ADJ BLD: ABNORMAL MM HG (ref 83–108)
POTASSIUM BLD-SCNC: 4 MMOL/L (ref 3.5–5.2)
PROT SERPL-MCNC: 6 G/DL (ref 6–8.5)
RBC # BLD AUTO: 2.9 10*6/MM3 (ref 4.14–5.8)
SAO2 % BLDCOA: 97.8 % (ref 90–100)
SET MECH RESP RATE: 12
SODIUM BLD-SCNC: 136 MMOL/L (ref 136–145)
VENTILATOR MODE: ABNORMAL
VT ON VENT VENT: 400 ML
WBC NRBC COR # BLD: 5.42 10*3/MM3 (ref 3.4–10.8)

## 2019-04-23 PROCEDURE — 71045 X-RAY EXAM CHEST 1 VIEW: CPT | Performed by: RADIOLOGY

## 2019-04-23 PROCEDURE — 94799 UNLISTED PULMONARY SVC/PX: CPT

## 2019-04-23 PROCEDURE — P9047 ALBUMIN (HUMAN), 25%, 50ML: HCPCS | Performed by: INTERNAL MEDICINE

## 2019-04-23 PROCEDURE — 82805 BLOOD GASES W/O2 SATURATION: CPT | Performed by: INTERNAL MEDICINE

## 2019-04-23 PROCEDURE — 80053 COMPREHEN METABOLIC PANEL: CPT | Performed by: INTERNAL MEDICINE

## 2019-04-23 PROCEDURE — 71045 X-RAY EXAM CHEST 1 VIEW: CPT

## 2019-04-23 PROCEDURE — 25010000002 ALBUMIN HUMAN 25% PER 50 ML: Performed by: INTERNAL MEDICINE

## 2019-04-23 PROCEDURE — 85025 COMPLETE CBC W/AUTO DIFF WBC: CPT | Performed by: INTERNAL MEDICINE

## 2019-04-23 PROCEDURE — 83735 ASSAY OF MAGNESIUM: CPT | Performed by: INTERNAL MEDICINE

## 2019-04-23 PROCEDURE — 82962 GLUCOSE BLOOD TEST: CPT

## 2019-04-23 PROCEDURE — 99238 HOSP IP/OBS DSCHRG MGMT 30/<: CPT | Performed by: INTERNAL MEDICINE

## 2019-04-23 PROCEDURE — 83050 HGB METHEMOGLOBIN QUAN: CPT | Performed by: INTERNAL MEDICINE

## 2019-04-23 PROCEDURE — 82375 ASSAY CARBOXYHB QUANT: CPT | Performed by: INTERNAL MEDICINE

## 2019-04-23 PROCEDURE — 36600 WITHDRAWAL OF ARTERIAL BLOOD: CPT | Performed by: INTERNAL MEDICINE

## 2019-04-23 PROCEDURE — 25010000002 PROPOFOL 1000 MG/ML EMULSION: Performed by: HOSPITALIST

## 2019-04-23 PROCEDURE — 94003 VENT MGMT INPAT SUBQ DAY: CPT

## 2019-04-23 PROCEDURE — 99291 CRITICAL CARE FIRST HOUR: CPT | Performed by: INTERNAL MEDICINE

## 2019-04-23 RX ORDER — CHLORHEXIDINE GLUCONATE 0.12 MG/ML
15 RINSE ORAL EVERY 12 HOURS SCHEDULED
Status: ON HOLD
Start: 2019-04-23 | End: 2019-06-18

## 2019-04-23 RX ORDER — SODIUM CHLORIDE 9 MG/ML
INJECTION, SOLUTION INTRAVENOUS
Status: COMPLETED
Start: 2019-04-23 | End: 2019-04-23

## 2019-04-23 RX ORDER — LORAZEPAM 2 MG/ML
1 INJECTION INTRAMUSCULAR EVERY 6 HOURS PRN
Start: 2019-04-23 | End: 2019-04-25

## 2019-04-23 RX ORDER — DEXTROSE MONOHYDRATE 25 G/50ML
25 INJECTION, SOLUTION INTRAVENOUS
Status: ON HOLD
Start: 2019-04-23 | End: 2019-06-18

## 2019-04-23 RX ORDER — HYDRALAZINE HYDROCHLORIDE 20 MG/ML
10 INJECTION INTRAMUSCULAR; INTRAVENOUS EVERY 6 HOURS PRN
Status: ON HOLD
Start: 2019-04-23 | End: 2019-06-18

## 2019-04-23 RX ORDER — LACTULOSE 10 G/15ML
20 SOLUTION ORAL DAILY
Status: ON HOLD
Start: 2019-04-24 | End: 2019-06-18

## 2019-04-23 RX ORDER — ALBUMIN (HUMAN) 12.5 G/50ML
25 SOLUTION INTRAVENOUS AS NEEDED
Start: 2019-04-23 | End: 2019-04-24

## 2019-04-23 RX ORDER — DILTIAZEM HYDROCHLORIDE 60 MG/1
60 TABLET, FILM COATED ORAL EVERY 8 HOURS SCHEDULED
Status: ON HOLD
Start: 2019-04-23 | End: 2019-06-18

## 2019-04-23 RX ORDER — LABETALOL HYDROCHLORIDE 5 MG/ML
20 INJECTION, SOLUTION INTRAVENOUS EVERY 4 HOURS PRN
Status: ON HOLD
Start: 2019-04-23 | End: 2019-06-18

## 2019-04-23 RX ORDER — ALBUMIN (HUMAN) 12.5 G/50ML
25 SOLUTION INTRAVENOUS AS NEEDED
Status: DISCONTINUED | OUTPATIENT
Start: 2019-04-23 | End: 2019-04-23 | Stop reason: HOSPADM

## 2019-04-23 RX ORDER — PANTOPRAZOLE SODIUM 40 MG/10ML
40 INJECTION, POWDER, LYOPHILIZED, FOR SOLUTION INTRAVENOUS
Status: ON HOLD
Start: 2019-04-24 | End: 2019-06-18

## 2019-04-23 RX ORDER — NICOTINE POLACRILEX 4 MG
15 LOZENGE BUCCAL
Status: ON HOLD
Start: 2019-04-23 | End: 2019-06-18

## 2019-04-23 RX ORDER — METOPROLOL TARTRATE 100 MG/1
100 TABLET ORAL EVERY 12 HOURS SCHEDULED
Status: ON HOLD
Start: 2019-04-23 | End: 2019-06-18

## 2019-04-23 RX ORDER — ASPIRIN 81 MG/1
81 TABLET, CHEWABLE ORAL DAILY
Status: ON HOLD
Start: 2019-04-24 | End: 2019-06-18

## 2019-04-23 RX ORDER — CALCIUM ACETATE 667 MG/1
667 CAPSULE ORAL EVERY 6 HOURS
Qty: 180 CAPSULE | Status: ON HOLD
Start: 2019-04-23 | End: 2019-06-18

## 2019-04-23 RX ORDER — HYDRALAZINE HYDROCHLORIDE 25 MG/1
25 TABLET, FILM COATED ORAL EVERY 8 HOURS SCHEDULED
Status: ON HOLD
Start: 2019-04-23 | End: 2019-06-18

## 2019-04-23 RX ORDER — LEVOTHYROXINE SODIUM 0.03 MG/1
12.5 TABLET ORAL DAILY
Status: ON HOLD
Start: 2019-04-23 | End: 2019-06-18

## 2019-04-23 RX ADMIN — SODIUM CHLORIDE, PRESERVATIVE FREE 10 ML: 5 INJECTION INTRAVENOUS at 08:28

## 2019-04-23 RX ADMIN — SODIUM CHLORIDE, PRESERVATIVE FREE 10 ML: 5 INJECTION INTRAVENOUS at 08:29

## 2019-04-23 RX ADMIN — Medication: at 12:30

## 2019-04-23 RX ADMIN — LEVOTHYROXINE SODIUM 12.5 MCG: 25 TABLET ORAL at 05:19

## 2019-04-23 RX ADMIN — PROPOFOL 50 MCG/KG/MIN: 10 INJECTION, EMULSION INTRAVENOUS at 12:09

## 2019-04-23 RX ADMIN — ALBUMIN (HUMAN) 25 G: 0.25 INJECTION, SOLUTION INTRAVENOUS at 08:48

## 2019-04-23 RX ADMIN — ASPIRIN 81 MG: 81 TABLET, CHEWABLE ORAL at 09:50

## 2019-04-23 RX ADMIN — IPRATROPIUM BROMIDE 0.5 MG: 0.5 SOLUTION RESPIRATORY (INHALATION) at 00:31

## 2019-04-23 RX ADMIN — HYDRALAZINE HYDROCHLORIDE 25 MG: 10 TABLET ORAL at 05:19

## 2019-04-23 RX ADMIN — CALCIUM ACETATE 667 MG: 667 CAPSULE ORAL at 05:19

## 2019-04-23 RX ADMIN — SODIUM CHLORIDE 500 ML: 9 INJECTION, SOLUTION INTRAVENOUS at 10:22

## 2019-04-23 RX ADMIN — PANTOPRAZOLE SODIUM 40 MG: 40 INJECTION, POWDER, FOR SOLUTION INTRAVENOUS at 09:49

## 2019-04-23 RX ADMIN — CALCIUM ACETATE 667 MG: 667 CAPSULE ORAL at 09:50

## 2019-04-23 RX ADMIN — PROPOFOL 50 MCG/KG/MIN: 10 INJECTION, EMULSION INTRAVENOUS at 05:19

## 2019-04-23 RX ADMIN — SODIUM CHLORIDE 1000 ML: 9 INJECTION, SOLUTION INTRAVENOUS at 07:53

## 2019-04-23 RX ADMIN — FOLIC ACID 1 MG: 5 INJECTION, SOLUTION INTRAMUSCULAR; INTRAVENOUS; SUBCUTANEOUS at 09:50

## 2019-04-23 RX ADMIN — ZINC SULFATE CAP 220 MG (50 MG ELEMENTAL ZN) 50 MG: 220 (50 ZN) CAP at 09:50

## 2019-04-23 RX ADMIN — CHLORHEXIDINE GLUCONATE 15 ML: 1.2 RINSE ORAL at 08:29

## 2019-04-23 RX ADMIN — LACTULOSE 20 G: 10 SOLUTION ORAL at 09:49

## 2019-04-23 RX ADMIN — Medication: at 08:28

## 2019-04-23 RX ADMIN — IPRATROPIUM BROMIDE 0.5 MG: 0.5 SOLUTION RESPIRATORY (INHALATION) at 06:42

## 2019-04-23 RX ADMIN — DILTIAZEM HYDROCHLORIDE 60 MG: 60 TABLET, FILM COATED ORAL at 05:19

## 2019-04-23 RX ADMIN — RIFAXIMIN 550 MG: 550 TABLET ORAL at 09:50

## 2019-04-23 RX ADMIN — IPRATROPIUM BROMIDE 0.5 MG: 0.5 SOLUTION RESPIRATORY (INHALATION) at 12:34

## 2019-04-24 ENCOUNTER — OUTSIDE FACILITY SERVICE (OUTPATIENT)
Dept: PULMONOLOGY | Facility: CLINIC | Age: 59
End: 2019-04-24

## 2019-04-24 PROCEDURE — 99223 1ST HOSP IP/OBS HIGH 75: CPT | Performed by: INTERNAL MEDICINE

## 2019-04-25 ENCOUNTER — OUTSIDE FACILITY SERVICE (OUTPATIENT)
Dept: PULMONOLOGY | Facility: CLINIC | Age: 59
End: 2019-04-25

## 2019-04-25 PROCEDURE — 99233 SBSQ HOSP IP/OBS HIGH 50: CPT | Performed by: INTERNAL MEDICINE

## 2019-04-26 ENCOUNTER — OUTSIDE FACILITY SERVICE (OUTPATIENT)
Dept: PULMONOLOGY | Facility: CLINIC | Age: 59
End: 2019-04-26

## 2019-04-26 PROCEDURE — 99232 SBSQ HOSP IP/OBS MODERATE 35: CPT | Performed by: NURSE PRACTITIONER

## 2019-04-27 ENCOUNTER — OUTSIDE FACILITY SERVICE (OUTPATIENT)
Dept: PULMONOLOGY | Facility: CLINIC | Age: 59
End: 2019-04-27

## 2019-04-27 PROCEDURE — 99232 SBSQ HOSP IP/OBS MODERATE 35: CPT | Performed by: NURSE PRACTITIONER

## 2019-04-28 ENCOUNTER — OUTSIDE FACILITY SERVICE (OUTPATIENT)
Dept: PULMONOLOGY | Facility: CLINIC | Age: 59
End: 2019-04-28

## 2019-04-28 PROCEDURE — 99232 SBSQ HOSP IP/OBS MODERATE 35: CPT | Performed by: NURSE PRACTITIONER

## 2019-04-29 ENCOUNTER — OUTSIDE FACILITY SERVICE (OUTPATIENT)
Dept: PULMONOLOGY | Facility: CLINIC | Age: 59
End: 2019-04-29

## 2019-04-29 PROCEDURE — 99233 SBSQ HOSP IP/OBS HIGH 50: CPT | Performed by: INTERNAL MEDICINE

## 2019-04-30 ENCOUNTER — OUTSIDE FACILITY SERVICE (OUTPATIENT)
Dept: PULMONOLOGY | Facility: CLINIC | Age: 59
End: 2019-04-30

## 2019-04-30 PROCEDURE — 99233 SBSQ HOSP IP/OBS HIGH 50: CPT | Performed by: INTERNAL MEDICINE

## 2019-05-01 ENCOUNTER — OUTSIDE FACILITY SERVICE (OUTPATIENT)
Dept: PULMONOLOGY | Facility: CLINIC | Age: 59
End: 2019-05-01

## 2019-05-01 PROCEDURE — 99233 SBSQ HOSP IP/OBS HIGH 50: CPT | Performed by: INTERNAL MEDICINE

## 2019-05-02 ENCOUNTER — OUTSIDE FACILITY SERVICE (OUTPATIENT)
Dept: PULMONOLOGY | Facility: CLINIC | Age: 59
End: 2019-05-02

## 2019-05-02 PROCEDURE — 99233 SBSQ HOSP IP/OBS HIGH 50: CPT | Performed by: INTERNAL MEDICINE

## 2019-05-03 ENCOUNTER — OUTSIDE FACILITY SERVICE (OUTPATIENT)
Dept: PULMONOLOGY | Facility: CLINIC | Age: 59
End: 2019-05-03

## 2019-05-03 PROCEDURE — 99233 SBSQ HOSP IP/OBS HIGH 50: CPT | Performed by: INTERNAL MEDICINE

## 2019-05-04 ENCOUNTER — OUTSIDE FACILITY SERVICE (OUTPATIENT)
Dept: PULMONOLOGY | Facility: CLINIC | Age: 59
End: 2019-05-04

## 2019-05-04 PROCEDURE — 99232 SBSQ HOSP IP/OBS MODERATE 35: CPT | Performed by: NURSE PRACTITIONER

## 2019-05-05 ENCOUNTER — OUTSIDE FACILITY SERVICE (OUTPATIENT)
Dept: PULMONOLOGY | Facility: CLINIC | Age: 59
End: 2019-05-05

## 2019-05-05 PROCEDURE — 99232 SBSQ HOSP IP/OBS MODERATE 35: CPT | Performed by: NURSE PRACTITIONER

## 2019-05-06 ENCOUNTER — OUTSIDE FACILITY SERVICE (OUTPATIENT)
Dept: PULMONOLOGY | Facility: CLINIC | Age: 59
End: 2019-05-06

## 2019-05-06 PROCEDURE — 99233 SBSQ HOSP IP/OBS HIGH 50: CPT | Performed by: INTERNAL MEDICINE

## 2019-05-07 ENCOUNTER — OUTSIDE FACILITY SERVICE (OUTPATIENT)
Dept: PULMONOLOGY | Facility: CLINIC | Age: 59
End: 2019-05-07

## 2019-05-07 PROCEDURE — 99233 SBSQ HOSP IP/OBS HIGH 50: CPT | Performed by: INTERNAL MEDICINE

## 2019-05-08 ENCOUNTER — OUTSIDE FACILITY SERVICE (OUTPATIENT)
Dept: PULMONOLOGY | Facility: CLINIC | Age: 59
End: 2019-05-08

## 2019-05-08 PROCEDURE — 99233 SBSQ HOSP IP/OBS HIGH 50: CPT | Performed by: INTERNAL MEDICINE

## 2019-05-09 ENCOUNTER — OUTSIDE FACILITY SERVICE (OUTPATIENT)
Dept: PULMONOLOGY | Facility: CLINIC | Age: 59
End: 2019-05-09

## 2019-05-09 PROCEDURE — 99233 SBSQ HOSP IP/OBS HIGH 50: CPT | Performed by: INTERNAL MEDICINE

## 2019-05-10 ENCOUNTER — OUTSIDE FACILITY SERVICE (OUTPATIENT)
Dept: PULMONOLOGY | Facility: CLINIC | Age: 59
End: 2019-05-10

## 2019-05-10 PROCEDURE — 99233 SBSQ HOSP IP/OBS HIGH 50: CPT | Performed by: NURSE PRACTITIONER

## 2019-05-11 ENCOUNTER — OUTSIDE FACILITY SERVICE (OUTPATIENT)
Dept: PULMONOLOGY | Facility: CLINIC | Age: 59
End: 2019-05-11

## 2019-05-11 PROCEDURE — 99233 SBSQ HOSP IP/OBS HIGH 50: CPT | Performed by: NURSE PRACTITIONER

## 2019-05-30 ENCOUNTER — OUTSIDE FACILITY SERVICE (OUTPATIENT)
Dept: PULMONOLOGY | Facility: CLINIC | Age: 59
End: 2019-05-30

## 2019-05-30 PROCEDURE — 99233 SBSQ HOSP IP/OBS HIGH 50: CPT | Performed by: INTERNAL MEDICINE

## 2019-05-31 ENCOUNTER — OUTSIDE FACILITY SERVICE (OUTPATIENT)
Dept: PULMONOLOGY | Facility: CLINIC | Age: 59
End: 2019-05-31

## 2019-05-31 PROCEDURE — 99232 SBSQ HOSP IP/OBS MODERATE 35: CPT | Performed by: INTERNAL MEDICINE

## 2019-06-04 ENCOUNTER — OUTSIDE FACILITY SERVICE (OUTPATIENT)
Dept: PULMONOLOGY | Facility: CLINIC | Age: 59
End: 2019-06-04

## 2019-06-04 PROCEDURE — 99232 SBSQ HOSP IP/OBS MODERATE 35: CPT | Performed by: INTERNAL MEDICINE

## 2019-06-17 PROCEDURE — 87040 BLOOD CULTURE FOR BACTERIA: CPT | Performed by: PHYSICIAN ASSISTANT

## 2019-06-17 PROCEDURE — 85025 COMPLETE CBC W/AUTO DIFF WBC: CPT | Performed by: PHYSICIAN ASSISTANT

## 2019-06-18 ENCOUNTER — HOSPITAL ENCOUNTER (INPATIENT)
Facility: HOSPITAL | Age: 59
LOS: 13 days | Discharge: NURSING, REHAB OR LONG-TERM CARE | End: 2019-07-01
Attending: FAMILY MEDICINE

## 2019-06-18 ENCOUNTER — APPOINTMENT (OUTPATIENT)
Dept: GENERAL RADIOLOGY | Facility: HOSPITAL | Age: 59
End: 2019-06-18

## 2019-06-18 ENCOUNTER — APPOINTMENT (OUTPATIENT)
Dept: CT IMAGING | Facility: HOSPITAL | Age: 59
End: 2019-06-18

## 2019-06-18 DIAGNOSIS — D50.9 IRON DEFICIENCY ANEMIA, UNSPECIFIED IRON DEFICIENCY ANEMIA TYPE: ICD-10-CM

## 2019-06-18 DIAGNOSIS — J18.9 PNEUMONIA OF BOTH LOWER LOBES DUE TO INFECTIOUS ORGANISM: Primary | ICD-10-CM

## 2019-06-18 DIAGNOSIS — N18.4 STAGE 4 CHRONIC KIDNEY DISEASE (HCC): ICD-10-CM

## 2019-06-18 LAB
A-A DO2: 16.2 MMHG (ref 0–300)
ABO GROUP BLD: NORMAL
ALBUMIN SERPL-MCNC: 2.88 G/DL (ref 3.5–5.2)
ALBUMIN/GLOB SERPL: 0.9 G/DL
ALP SERPL-CCNC: 79 U/L (ref 39–117)
ALT SERPL W P-5'-P-CCNC: 30 U/L (ref 1–41)
ANION GAP SERPL CALCULATED.3IONS-SCNC: 12.5 MMOL/L
ANION GAP SERPL CALCULATED.3IONS-SCNC: 12.9 MMOL/L
ARTERIAL PATENCY WRIST A: POSITIVE
AST SERPL-CCNC: 56 U/L (ref 1–40)
ATMOSPHERIC PRESS: 727 MMHG
BASE EXCESS BLDA CALC-SCNC: -3.1 MMOL/L
BASOPHILS # BLD AUTO: 0.02 10*3/MM3 (ref 0–0.2)
BASOPHILS # BLD AUTO: 0.03 10*3/MM3 (ref 0–0.2)
BASOPHILS # BLD AUTO: 0.04 10*3/MM3 (ref 0–0.2)
BASOPHILS NFR BLD AUTO: 0.3 % (ref 0–1.5)
BASOPHILS NFR BLD AUTO: 0.4 % (ref 0–1.5)
BASOPHILS NFR BLD AUTO: 0.7 % (ref 0–1.5)
BDY SITE: ABNORMAL
BILIRUB SERPL-MCNC: 0.3 MG/DL (ref 0.2–1.2)
BLD GP AB SCN SERPL QL: NEGATIVE
BODY TEMPERATURE: 98.6 C
BUN BLD-MCNC: 33 MG/DL (ref 8–23)
BUN BLD-MCNC: 40 MG/DL (ref 8–23)
BUN/CREAT SERPL: 18 (ref 7–25)
BUN/CREAT SERPL: 19.9 (ref 7–25)
CALCIUM SPEC-SCNC: 7.7 MG/DL (ref 8.6–10.5)
CALCIUM SPEC-SCNC: 7.8 MG/DL (ref 8.6–10.5)
CHLORIDE SERPL-SCNC: 103 MMOL/L (ref 98–107)
CHLORIDE SERPL-SCNC: 106 MMOL/L (ref 98–107)
CO2 SERPL-SCNC: 19.5 MMOL/L (ref 22–29)
CO2 SERPL-SCNC: 21.1 MMOL/L (ref 22–29)
COHGB MFR BLD: 4.6 % (ref 0–5)
CREAT BLD-MCNC: 1.83 MG/DL (ref 0.76–1.27)
CREAT BLD-MCNC: 2.01 MG/DL (ref 0.76–1.27)
CRP SERPL-MCNC: 0.68 MG/DL (ref 0–0.5)
D-LACTATE SERPL-SCNC: 1 MMOL/L (ref 0.5–2)
DEPRECATED RDW RBC AUTO: 59.4 FL (ref 37–54)
DEPRECATED RDW RBC AUTO: 60.7 FL (ref 37–54)
DEPRECATED RDW RBC AUTO: 60.9 FL (ref 37–54)
DEVELOPER EXPIRATION DATE: ABNORMAL
DEVELOPER LOT NUMBER: ABNORMAL
EOSINOPHIL # BLD AUTO: 0.06 10*3/MM3 (ref 0–0.4)
EOSINOPHIL # BLD AUTO: 0.08 10*3/MM3 (ref 0–0.4)
EOSINOPHIL # BLD AUTO: 0.13 10*3/MM3 (ref 0–0.4)
EOSINOPHIL NFR BLD AUTO: 1 % (ref 0.3–6.2)
EOSINOPHIL NFR BLD AUTO: 1.2 % (ref 0.3–6.2)
EOSINOPHIL NFR BLD AUTO: 1.5 % (ref 0.3–6.2)
ERYTHROCYTE [DISTWIDTH] IN BLOOD BY AUTOMATED COUNT: 18.6 % (ref 12.3–15.4)
EXPIRATION DATE: ABNORMAL
FECAL OCCULT BLOOD SCREEN, POC: POSITIVE
GFR SERPL CREATININE-BSD FRML MDRD: 34 ML/MIN/1.73
GFR SERPL CREATININE-BSD FRML MDRD: 38 ML/MIN/1.73
GLOBULIN UR ELPH-MCNC: 3.3 GM/DL
GLUCOSE BLD-MCNC: 103 MG/DL (ref 65–99)
GLUCOSE BLD-MCNC: 79 MG/DL (ref 65–99)
GLUCOSE BLDC GLUCOMTR-MCNC: 127 MG/DL (ref 70–130)
GLUCOSE BLDC GLUCOMTR-MCNC: 99 MG/DL (ref 70–130)
HBA1C MFR BLD: 5.2 % (ref 4.8–5.6)
HCO3 BLDA-SCNC: 20.3 MMOL/L (ref 22–26)
HCT VFR BLD AUTO: 22.9 % (ref 37.5–51)
HCT VFR BLD AUTO: 23.4 % (ref 37.5–51)
HCT VFR BLD AUTO: 23.4 % (ref 37.5–51)
HCT VFR BLD AUTO: 23.5 % (ref 37.5–51)
HCT VFR BLD CALC: 24 % (ref 42–52)
HGB BLD-MCNC: 7 G/DL (ref 13–17.7)
HGB BLD-MCNC: 7.2 G/DL (ref 13–17.7)
HGB BLD-MCNC: 7.3 G/DL (ref 13–17.7)
HGB BLD-MCNC: 7.4 G/DL (ref 13–17.7)
HGB BLDA-MCNC: 8.1 G/DL (ref 12–16)
HOROWITZ INDEX BLD+IHG-RTO: 21 %
IMM GRANULOCYTES # BLD AUTO: 0.01 10*3/MM3 (ref 0–0.05)
IMM GRANULOCYTES # BLD AUTO: 0.01 10*3/MM3 (ref 0–0.05)
IMM GRANULOCYTES # BLD AUTO: 0.02 10*3/MM3 (ref 0–0.05)
IMM GRANULOCYTES NFR BLD AUTO: 0.1 % (ref 0–0.5)
IMM GRANULOCYTES NFR BLD AUTO: 0.2 % (ref 0–0.5)
IMM GRANULOCYTES NFR BLD AUTO: 0.3 % (ref 0–0.5)
LYMPHOCYTES # BLD AUTO: 0.9 10*3/MM3 (ref 0.7–3.1)
LYMPHOCYTES # BLD AUTO: 1.15 10*3/MM3 (ref 0.7–3.1)
LYMPHOCYTES # BLD AUTO: 1.23 10*3/MM3 (ref 0.7–3.1)
LYMPHOCYTES NFR BLD AUTO: 10.6 % (ref 19.6–45.3)
LYMPHOCYTES NFR BLD AUTO: 16.6 % (ref 19.6–45.3)
LYMPHOCYTES NFR BLD AUTO: 20.2 % (ref 19.6–45.3)
Lab: ABNORMAL
M PNEUMO IGM SER QL: NEGATIVE
MAGNESIUM SERPL-MCNC: 1.7 MG/DL (ref 1.6–2.4)
MCH RBC QN AUTO: 27.9 PG (ref 26.6–33)
MCH RBC QN AUTO: 28.2 PG (ref 26.6–33)
MCH RBC QN AUTO: 28.3 PG (ref 26.6–33)
MCHC RBC AUTO-ENTMCNC: 29.9 G/DL (ref 31.5–35.7)
MCHC RBC AUTO-ENTMCNC: 31.1 G/DL (ref 31.5–35.7)
MCHC RBC AUTO-ENTMCNC: 31.4 G/DL (ref 31.5–35.7)
MCV RBC AUTO: 90.2 FL (ref 79–97)
MCV RBC AUTO: 90.7 FL (ref 79–97)
MCV RBC AUTO: 93.2 FL (ref 79–97)
METHGB BLD QL: 0.1 % (ref 0–3)
MODALITY: ABNORMAL
MONOCYTES # BLD AUTO: 0.59 10*3/MM3 (ref 0.1–0.9)
MONOCYTES # BLD AUTO: 0.77 10*3/MM3 (ref 0.1–0.9)
MONOCYTES # BLD AUTO: 0.97 10*3/MM3 (ref 0.1–0.9)
MONOCYTES NFR BLD AUTO: 14 % (ref 5–12)
MONOCYTES NFR BLD AUTO: 9 % (ref 5–12)
MONOCYTES NFR BLD AUTO: 9.7 % (ref 5–12)
NEGATIVE CONTROL: NEGATIVE
NEUTROPHILS # BLD AUTO: 4.15 10*3/MM3 (ref 1.7–7)
NEUTROPHILS # BLD AUTO: 4.68 10*3/MM3 (ref 1.7–7)
NEUTROPHILS # BLD AUTO: 6.67 10*3/MM3 (ref 1.7–7)
NEUTROPHILS NFR BLD AUTO: 67.6 % (ref 42.7–76)
NEUTROPHILS NFR BLD AUTO: 68.2 % (ref 42.7–76)
NEUTROPHILS NFR BLD AUTO: 78.4 % (ref 42.7–76)
NT-PROBNP SERPL-MCNC: ABNORMAL PG/ML (ref 5–900)
OXYHGB MFR BLDV: 92.7 % (ref 85–100)
PCO2 BLDA: 29.8 MM HG (ref 35–45)
PH BLDA: 7.45 PH UNITS (ref 7.35–7.45)
PLATELET # BLD AUTO: 250 10*3/MM3 (ref 140–450)
PLATELET # BLD AUTO: 261 10*3/MM3 (ref 140–450)
PLATELET # BLD AUTO: 263 10*3/MM3 (ref 140–450)
PMV BLD AUTO: 9 FL (ref 6–12)
PMV BLD AUTO: 9.6 FL (ref 6–12)
PMV BLD AUTO: 9.6 FL (ref 6–12)
PO2 BLDA: 90.9 MM HG (ref 80–100)
POSITIVE CONTROL: POSITIVE
POTASSIUM BLD-SCNC: 5.1 MMOL/L (ref 3.5–5.2)
POTASSIUM BLD-SCNC: 5.4 MMOL/L (ref 3.5–5.2)
PROT SERPL-MCNC: 6.2 G/DL (ref 6–8.5)
RBC # BLD AUTO: 2.51 10*6/MM3 (ref 4.14–5.8)
RBC # BLD AUTO: 2.54 10*6/MM3 (ref 4.14–5.8)
RBC # BLD AUTO: 2.59 10*6/MM3 (ref 4.14–5.8)
RH BLD: NEGATIVE
SAO2 % BLDCOA: 97.3 % (ref 90–100)
SODIUM BLD-SCNC: 135 MMOL/L (ref 136–145)
SODIUM BLD-SCNC: 140 MMOL/L (ref 136–145)
T&S EXPIRATION DATE: NORMAL
TROPONIN T SERPL-MCNC: 0.03 NG/ML (ref 0–0.03)
TROPONIN T SERPL-MCNC: 0.04 NG/ML (ref 0–0.03)
TROPONIN T SERPL-MCNC: 0.04 NG/ML (ref 0–0.03)
TSH SERPL DL<=0.05 MIU/L-ACNC: 6.46 MIU/ML (ref 0.27–4.2)
WBC NRBC COR # BLD: 6.08 10*3/MM3 (ref 3.4–10.8)
WBC NRBC COR # BLD: 6.92 10*3/MM3 (ref 3.4–10.8)
WBC NRBC COR # BLD: 8.51 10*3/MM3 (ref 3.4–10.8)

## 2019-06-18 PROCEDURE — 85014 HEMATOCRIT: CPT | Performed by: PHYSICIAN ASSISTANT

## 2019-06-18 PROCEDURE — 86850 RBC ANTIBODY SCREEN: CPT | Performed by: PHYSICIAN ASSISTANT

## 2019-06-18 PROCEDURE — 93010 ELECTROCARDIOGRAM REPORT: CPT | Performed by: INTERNAL MEDICINE

## 2019-06-18 PROCEDURE — 71250 CT THORAX DX C-: CPT

## 2019-06-18 PROCEDURE — 83605 ASSAY OF LACTIC ACID: CPT | Performed by: PHYSICIAN ASSISTANT

## 2019-06-18 PROCEDURE — 36600 WITHDRAWAL OF ARTERIAL BLOOD: CPT | Performed by: PHYSICIAN ASSISTANT

## 2019-06-18 PROCEDURE — 86140 C-REACTIVE PROTEIN: CPT | Performed by: PHYSICIAN ASSISTANT

## 2019-06-18 PROCEDURE — 25010000002 VANCOMYCIN 5 G RECONSTITUTED SOLUTION 5,000 MG VIAL: Performed by: PHYSICIAN ASSISTANT

## 2019-06-18 PROCEDURE — 86923 COMPATIBILITY TEST ELECTRIC: CPT

## 2019-06-18 PROCEDURE — 74176 CT ABD & PELVIS W/O CONTRAST: CPT

## 2019-06-18 PROCEDURE — 71250 CT THORAX DX C-: CPT | Performed by: RADIOLOGY

## 2019-06-18 PROCEDURE — 71045 X-RAY EXAM CHEST 1 VIEW: CPT | Performed by: RADIOLOGY

## 2019-06-18 PROCEDURE — 25010000002 PIPERACILLIN-TAZOBACTAM: Performed by: NURSE PRACTITIONER

## 2019-06-18 PROCEDURE — 82375 ASSAY CARBOXYHB QUANT: CPT | Performed by: PHYSICIAN ASSISTANT

## 2019-06-18 PROCEDURE — 94799 UNLISTED PULMONARY SVC/PX: CPT

## 2019-06-18 PROCEDURE — 83050 HGB METHEMOGLOBIN QUAN: CPT | Performed by: PHYSICIAN ASSISTANT

## 2019-06-18 PROCEDURE — 25010000002 LORAZEPAM PER 2 MG: Performed by: EMERGENCY MEDICINE

## 2019-06-18 PROCEDURE — 86900 BLOOD TYPING SEROLOGIC ABO: CPT | Performed by: PHYSICIAN ASSISTANT

## 2019-06-18 PROCEDURE — 74176 CT ABD & PELVIS W/O CONTRAST: CPT | Performed by: RADIOLOGY

## 2019-06-18 PROCEDURE — 86901 BLOOD TYPING SEROLOGIC RH(D): CPT | Performed by: PHYSICIAN ASSISTANT

## 2019-06-18 PROCEDURE — 85018 HEMOGLOBIN: CPT | Performed by: PHYSICIAN ASSISTANT

## 2019-06-18 PROCEDURE — 82270 OCCULT BLOOD FECES: CPT | Performed by: FAMILY MEDICINE

## 2019-06-18 PROCEDURE — 83880 ASSAY OF NATRIURETIC PEPTIDE: CPT | Performed by: PHYSICIAN ASSISTANT

## 2019-06-18 PROCEDURE — 82962 GLUCOSE BLOOD TEST: CPT

## 2019-06-18 PROCEDURE — 99223 1ST HOSP IP/OBS HIGH 75: CPT | Performed by: INTERNAL MEDICINE

## 2019-06-18 PROCEDURE — 36415 COLL VENOUS BLD VENIPUNCTURE: CPT

## 2019-06-18 PROCEDURE — 84484 ASSAY OF TROPONIN QUANT: CPT | Performed by: PHYSICIAN ASSISTANT

## 2019-06-18 PROCEDURE — 92610 EVALUATE SWALLOWING FUNCTION: CPT

## 2019-06-18 PROCEDURE — 71045 X-RAY EXAM CHEST 1 VIEW: CPT

## 2019-06-18 PROCEDURE — 99285 EMERGENCY DEPT VISIT HI MDM: CPT

## 2019-06-18 PROCEDURE — 84145 PROCALCITONIN (PCT): CPT | Performed by: PHYSICIAN ASSISTANT

## 2019-06-18 PROCEDURE — 82805 BLOOD GASES W/O2 SATURATION: CPT | Performed by: PHYSICIAN ASSISTANT

## 2019-06-18 PROCEDURE — 84484 ASSAY OF TROPONIN QUANT: CPT | Performed by: NURSE PRACTITIONER

## 2019-06-18 PROCEDURE — 85025 COMPLETE CBC W/AUTO DIFF WBC: CPT | Performed by: NURSE PRACTITIONER

## 2019-06-18 PROCEDURE — 86738 MYCOPLASMA ANTIBODY: CPT | Performed by: NURSE PRACTITIONER

## 2019-06-18 PROCEDURE — 83735 ASSAY OF MAGNESIUM: CPT | Performed by: INTERNAL MEDICINE

## 2019-06-18 PROCEDURE — 84443 ASSAY THYROID STIM HORMONE: CPT | Performed by: NURSE PRACTITIONER

## 2019-06-18 PROCEDURE — 93005 ELECTROCARDIOGRAM TRACING: CPT | Performed by: FAMILY MEDICINE

## 2019-06-18 PROCEDURE — 85025 COMPLETE CBC W/AUTO DIFF WBC: CPT | Performed by: INTERNAL MEDICINE

## 2019-06-18 PROCEDURE — 25010000002 PIPERACILLIN-TAZOBACTAM: Performed by: PHYSICIAN ASSISTANT

## 2019-06-18 PROCEDURE — 83036 HEMOGLOBIN GLYCOSYLATED A1C: CPT | Performed by: NURSE PRACTITIONER

## 2019-06-18 PROCEDURE — 94640 AIRWAY INHALATION TREATMENT: CPT

## 2019-06-18 PROCEDURE — 80053 COMPREHEN METABOLIC PANEL: CPT | Performed by: PHYSICIAN ASSISTANT

## 2019-06-18 RX ORDER — GUAIFENESIN 200 MG/1
200 TABLET ORAL EVERY 6 HOURS
Status: DISCONTINUED | OUTPATIENT
Start: 2019-06-18 | End: 2019-07-01 | Stop reason: HOSPADM

## 2019-06-18 RX ORDER — GUAIFENESIN 200 MG/1
200 TABLET ORAL EVERY 6 HOURS
COMMUNITY
End: 2019-07-01 | Stop reason: HOSPADM

## 2019-06-18 RX ORDER — TERAZOSIN 1 MG/1
2 CAPSULE ORAL NIGHTLY
Status: CANCELLED | OUTPATIENT
Start: 2019-06-18

## 2019-06-18 RX ORDER — CALCIUM ACETATE 667 MG/1
1334 CAPSULE ORAL
Status: CANCELLED | OUTPATIENT
Start: 2019-06-18

## 2019-06-18 RX ORDER — DOXAZOSIN 2 MG/1
2 TABLET ORAL NIGHTLY
COMMUNITY
End: 2019-07-01 | Stop reason: HOSPADM

## 2019-06-18 RX ORDER — ATORVASTATIN CALCIUM 40 MG/1
40 TABLET, FILM COATED ORAL NIGHTLY
Status: DISCONTINUED | OUTPATIENT
Start: 2019-06-18 | End: 2019-07-01 | Stop reason: HOSPADM

## 2019-06-18 RX ORDER — GUAIFENESIN 600 MG/1
600 TABLET, EXTENDED RELEASE ORAL EVERY 12 HOURS SCHEDULED
Status: DISCONTINUED | OUTPATIENT
Start: 2019-06-18 | End: 2019-06-18

## 2019-06-18 RX ORDER — MELATONIN 200 MCG
3 TABLET ORAL NIGHTLY
COMMUNITY
End: 2019-08-15 | Stop reason: SDUPTHER

## 2019-06-18 RX ORDER — ASCORBIC ACID, THIAMINE MONONITRATE,RIBOFLAVIN, NIACINAMIDE, PYRIDOXINE HYDROCHLORIDE, FOLIC ACID, CYANOCOBALAMIN, BIOTIN, CALCIUM PANTOTHENATE, 100; 1.5; 1.7; 20; 10; 1; 6000; 150000; 5 MG/1; MG/1; MG/1; MG/1; MG/1; MG/1; UG/1; UG/1; MG/1
1 CAPSULE, LIQUID FILLED ORAL DAILY
Status: CANCELLED | OUTPATIENT
Start: 2019-06-18

## 2019-06-18 RX ORDER — ASPIRIN 81 MG/1
81 TABLET ORAL DAILY
COMMUNITY
End: 2019-08-15 | Stop reason: SDUPTHER

## 2019-06-18 RX ORDER — OXYCODONE HYDROCHLORIDE 5 MG/1
5 TABLET ORAL EVERY 8 HOURS PRN
COMMUNITY
End: 2019-07-01 | Stop reason: HOSPADM

## 2019-06-18 RX ORDER — BUSPIRONE HYDROCHLORIDE 5 MG/1
7.5 TABLET ORAL NIGHTLY
Status: CANCELLED | OUTPATIENT
Start: 2019-06-18

## 2019-06-18 RX ORDER — FAMOTIDINE 20 MG/1
20 TABLET, FILM COATED ORAL
Status: CANCELLED | OUTPATIENT
Start: 2019-06-18

## 2019-06-18 RX ORDER — ISOSORBIDE MONONITRATE 30 MG/1
30 TABLET, EXTENDED RELEASE ORAL DAILY
Status: DISCONTINUED | OUTPATIENT
Start: 2019-06-18 | End: 2019-07-01

## 2019-06-18 RX ORDER — QUETIAPINE FUMARATE 25 MG/1
50 TABLET, FILM COATED ORAL 3 TIMES DAILY PRN
Status: CANCELLED | OUTPATIENT
Start: 2019-06-18

## 2019-06-18 RX ORDER — THIAMINE MONONITRATE (VIT B1) 100 MG
100 TABLET ORAL DAILY
Status: DISCONTINUED | OUTPATIENT
Start: 2019-06-18 | End: 2019-07-01 | Stop reason: HOSPADM

## 2019-06-18 RX ORDER — CLONIDINE 0.3 MG/24H
1 PATCH, EXTENDED RELEASE TRANSDERMAL WEEKLY
COMMUNITY
End: 2019-07-01 | Stop reason: HOSPADM

## 2019-06-18 RX ORDER — FAMOTIDINE 20 MG/1
20 TABLET, FILM COATED ORAL AS NEEDED
Status: ON HOLD | COMMUNITY
End: 2022-09-21

## 2019-06-18 RX ORDER — FERROUS SULFATE 325(65) MG
325 TABLET ORAL
Status: DISCONTINUED | OUTPATIENT
Start: 2019-06-19 | End: 2019-06-19

## 2019-06-18 RX ORDER — FOLIC ACID 1 MG/1
1 TABLET ORAL DAILY
Status: ON HOLD | COMMUNITY
End: 2022-09-21

## 2019-06-18 RX ORDER — NITROGLYCERIN 0.4 MG/1
0.4 TABLET SUBLINGUAL
Status: CANCELLED | OUTPATIENT
Start: 2019-06-18

## 2019-06-18 RX ORDER — THIAMINE MONONITRATE (VIT B1) 100 MG
100 TABLET ORAL DAILY
Status: ON HOLD | COMMUNITY
End: 2022-09-21

## 2019-06-18 RX ORDER — FAMOTIDINE 20 MG/1
20 TABLET, FILM COATED ORAL DAILY
Status: DISCONTINUED | OUTPATIENT
Start: 2019-06-19 | End: 2019-06-18

## 2019-06-18 RX ORDER — FAMOTIDINE 20 MG/1
20 TABLET, FILM COATED ORAL
Status: DISCONTINUED | OUTPATIENT
Start: 2019-06-19 | End: 2019-06-19

## 2019-06-18 RX ORDER — ATORVASTATIN CALCIUM 80 MG/1
40 TABLET, FILM COATED ORAL NIGHTLY
COMMUNITY
End: 2019-08-15 | Stop reason: SDUPTHER

## 2019-06-18 RX ORDER — SODIUM CHLORIDE 0.9 % (FLUSH) 0.9 %
3 SYRINGE (ML) INJECTION EVERY 12 HOURS SCHEDULED
Status: DISCONTINUED | OUTPATIENT
Start: 2019-06-18 | End: 2019-07-01 | Stop reason: HOSPADM

## 2019-06-18 RX ORDER — CALCITRIOL 0.25 UG/1
0.5 CAPSULE, LIQUID FILLED ORAL 3 TIMES WEEKLY
Status: CANCELLED | OUTPATIENT
Start: 2019-06-19

## 2019-06-18 RX ORDER — CLONIDINE 0.3 MG/24H
1 PATCH, EXTENDED RELEASE TRANSDERMAL WEEKLY
Status: CANCELLED | OUTPATIENT
Start: 2019-06-19

## 2019-06-18 RX ORDER — OXYCODONE HYDROCHLORIDE 5 MG/1
5 TABLET ORAL EVERY 8 HOURS PRN
Status: DISCONTINUED | OUTPATIENT
Start: 2019-06-18 | End: 2019-07-01 | Stop reason: HOSPADM

## 2019-06-18 RX ORDER — CALCIUM ACETATE 667 MG/1
2668 CAPSULE ORAL
COMMUNITY

## 2019-06-18 RX ORDER — FERROUS SULFATE 325(65) MG
325 TABLET ORAL
Status: ON HOLD | COMMUNITY
End: 2022-09-21

## 2019-06-18 RX ORDER — SERTRALINE HYDROCHLORIDE 100 MG/1
100 TABLET, FILM COATED ORAL DAILY
Status: ON HOLD | COMMUNITY
End: 2022-09-21

## 2019-06-18 RX ORDER — GUAIFENESIN 200 MG/1
200 TABLET ORAL EVERY 6 HOURS
Status: CANCELLED | OUTPATIENT
Start: 2019-06-18

## 2019-06-18 RX ORDER — DILTIAZEM HYDROCHLORIDE 60 MG/1
60 TABLET, FILM COATED ORAL 4 TIMES DAILY
COMMUNITY
End: 2019-07-01 | Stop reason: HOSPADM

## 2019-06-18 RX ORDER — METOPROLOL TARTRATE 100 MG/1
100 TABLET ORAL EVERY 8 HOURS
Status: CANCELLED | OUTPATIENT
Start: 2019-06-18

## 2019-06-18 RX ORDER — NIFEDIPINE 90 MG/1
90 TABLET, FILM COATED, EXTENDED RELEASE ORAL DAILY
Status: DISCONTINUED | OUTPATIENT
Start: 2019-06-18 | End: 2019-06-24

## 2019-06-18 RX ORDER — ONDANSETRON 2 MG/ML
4 INJECTION INTRAMUSCULAR; INTRAVENOUS EVERY 6 HOURS PRN
Status: DISCONTINUED | OUTPATIENT
Start: 2019-06-18 | End: 2019-07-01 | Stop reason: HOSPADM

## 2019-06-18 RX ORDER — FOLIC ACID 1 MG/1
1 TABLET ORAL DAILY
Status: CANCELLED | OUTPATIENT
Start: 2019-06-18

## 2019-06-18 RX ORDER — ASPIRIN 81 MG/1
81 TABLET ORAL DAILY
Status: CANCELLED | OUTPATIENT
Start: 2019-06-18

## 2019-06-18 RX ORDER — QUETIAPINE FUMARATE 50 MG/1
50 TABLET, FILM COATED ORAL 3 TIMES DAILY PRN
COMMUNITY
End: 2019-10-15

## 2019-06-18 RX ORDER — TAMSULOSIN HYDROCHLORIDE 0.4 MG/1
0.4 CAPSULE ORAL NIGHTLY
Status: CANCELLED | OUTPATIENT
Start: 2019-06-18

## 2019-06-18 RX ORDER — ONDANSETRON 4 MG/1
4 TABLET, FILM COATED ORAL EVERY 6 HOURS PRN
Status: ON HOLD | COMMUNITY
End: 2022-09-21

## 2019-06-18 RX ORDER — ALUMINA, MAGNESIA, AND SIMETHICONE 2400; 2400; 240 MG/30ML; MG/30ML; MG/30ML
15 SUSPENSION ORAL EVERY 6 HOURS PRN
Status: DISCONTINUED | OUTPATIENT
Start: 2019-06-18 | End: 2019-07-01 | Stop reason: HOSPADM

## 2019-06-18 RX ORDER — CALCITRIOL 0.5 UG/1
0.5 CAPSULE, LIQUID FILLED ORAL 3 TIMES WEEKLY
COMMUNITY
End: 2019-10-15

## 2019-06-18 RX ORDER — NIFEDIPINE 90 MG/1
90 TABLET, EXTENDED RELEASE ORAL DAILY
COMMUNITY
End: 2019-07-01 | Stop reason: HOSPADM

## 2019-06-18 RX ORDER — FOLIC ACID 1 MG/1
1 TABLET ORAL DAILY
Status: DISCONTINUED | OUTPATIENT
Start: 2019-06-18 | End: 2019-07-01 | Stop reason: HOSPADM

## 2019-06-18 RX ORDER — FAMOTIDINE 20 MG/1
40 TABLET, FILM COATED ORAL DAILY
Status: DISCONTINUED | OUTPATIENT
Start: 2019-06-18 | End: 2019-06-18

## 2019-06-18 RX ORDER — TAMSULOSIN HYDROCHLORIDE 0.4 MG/1
0.4 CAPSULE ORAL NIGHTLY
Status: DISCONTINUED | OUTPATIENT
Start: 2019-06-18 | End: 2019-07-01 | Stop reason: HOSPADM

## 2019-06-18 RX ORDER — DILTIAZEM HYDROCHLORIDE 60 MG/1
60 TABLET, FILM COATED ORAL 4 TIMES DAILY
Status: CANCELLED | OUTPATIENT
Start: 2019-06-18

## 2019-06-18 RX ORDER — NICOTINE POLACRILEX 4 MG
15 LOZENGE BUCCAL
Status: DISCONTINUED | OUTPATIENT
Start: 2019-06-18 | End: 2019-07-01 | Stop reason: HOSPADM

## 2019-06-18 RX ORDER — ONDANSETRON 4 MG/1
4 TABLET, FILM COATED ORAL EVERY 6 HOURS PRN
Status: CANCELLED | OUTPATIENT
Start: 2019-06-18

## 2019-06-18 RX ORDER — DEXTROSE MONOHYDRATE 25 G/50ML
25 INJECTION, SOLUTION INTRAVENOUS
Status: DISCONTINUED | OUTPATIENT
Start: 2019-06-18 | End: 2019-07-01 | Stop reason: HOSPADM

## 2019-06-18 RX ORDER — DILTIAZEM HYDROCHLORIDE 60 MG/1
60 TABLET, FILM COATED ORAL 4 TIMES DAILY
Status: DISCONTINUED | OUTPATIENT
Start: 2019-06-18 | End: 2019-07-01

## 2019-06-18 RX ORDER — TERAZOSIN 1 MG/1
2 CAPSULE ORAL NIGHTLY
Status: DISCONTINUED | OUTPATIENT
Start: 2019-06-18 | End: 2019-06-19

## 2019-06-18 RX ORDER — CALCIUM ACETATE 667 MG/1
1334 CAPSULE ORAL EVERY 6 HOURS
Status: DISCONTINUED | OUTPATIENT
Start: 2019-06-18 | End: 2019-07-01 | Stop reason: HOSPADM

## 2019-06-18 RX ORDER — SODIUM CHLORIDE 0.9 % (FLUSH) 0.9 %
10 SYRINGE (ML) INJECTION AS NEEDED
Status: DISCONTINUED | OUTPATIENT
Start: 2019-06-18 | End: 2019-07-01 | Stop reason: HOSPADM

## 2019-06-18 RX ORDER — HYDRALAZINE HYDROCHLORIDE 50 MG/1
100 TABLET, FILM COATED ORAL 3 TIMES DAILY
Status: DISCONTINUED | OUTPATIENT
Start: 2019-06-18 | End: 2019-06-19

## 2019-06-18 RX ORDER — LEVOTHYROXINE SODIUM 0.05 MG/1
60 TABLET ORAL DAILY
Status: ON HOLD | COMMUNITY
End: 2022-09-21

## 2019-06-18 RX ORDER — NITROGLYCERIN 0.4 MG/1
0.4 TABLET SUBLINGUAL
COMMUNITY
End: 2019-10-15

## 2019-06-18 RX ORDER — NIFEDIPINE 90 MG/1
90 TABLET, FILM COATED, EXTENDED RELEASE ORAL DAILY
Status: CANCELLED | OUTPATIENT
Start: 2019-06-18

## 2019-06-18 RX ORDER — ONDANSETRON 4 MG/1
4 TABLET, FILM COATED ORAL EVERY 6 HOURS PRN
Status: DISCONTINUED | OUTPATIENT
Start: 2019-06-18 | End: 2019-07-01 | Stop reason: HOSPADM

## 2019-06-18 RX ORDER — HYDRALAZINE HYDROCHLORIDE 100 MG/1
100 TABLET, FILM COATED ORAL 3 TIMES DAILY
COMMUNITY
End: 2019-10-15

## 2019-06-18 RX ORDER — CHOLECALCIFEROL (VITAMIN D3) 125 MCG
2.5 CAPSULE ORAL NIGHTLY
Status: CANCELLED | OUTPATIENT
Start: 2019-06-18

## 2019-06-18 RX ORDER — LEVOTHYROXINE SODIUM 0.05 MG/1
50 TABLET ORAL DAILY
Status: DISCONTINUED | OUTPATIENT
Start: 2019-06-18 | End: 2019-06-25

## 2019-06-18 RX ORDER — NITROGLYCERIN 0.4 MG/1
0.4 TABLET SUBLINGUAL
Status: DISCONTINUED | OUTPATIENT
Start: 2019-06-18 | End: 2019-07-01 | Stop reason: HOSPADM

## 2019-06-18 RX ORDER — FERROUS SULFATE 325(65) MG
325 TABLET ORAL
Status: CANCELLED | OUTPATIENT
Start: 2019-06-19

## 2019-06-18 RX ORDER — IPRATROPIUM BROMIDE AND ALBUTEROL SULFATE 2.5; .5 MG/3ML; MG/3ML
3 SOLUTION RESPIRATORY (INHALATION)
Status: DISCONTINUED | OUTPATIENT
Start: 2019-06-18 | End: 2019-06-29

## 2019-06-18 RX ORDER — THIAMINE MONONITRATE (VIT B1) 100 MG
100 TABLET ORAL DAILY
Status: CANCELLED | OUTPATIENT
Start: 2019-06-18

## 2019-06-18 RX ORDER — TAMSULOSIN HYDROCHLORIDE 0.4 MG/1
1 CAPSULE ORAL NIGHTLY
Status: ON HOLD | COMMUNITY
End: 2022-09-21

## 2019-06-18 RX ORDER — CLONIDINE 0.3 MG/24H
1 PATCH, EXTENDED RELEASE TRANSDERMAL WEEKLY
Status: DISCONTINUED | OUTPATIENT
Start: 2019-06-18 | End: 2019-06-19

## 2019-06-18 RX ORDER — FOLIC ACID/VIT B COMPLEX AND C 0.8 MG
1 TABLET ORAL DAILY
Status: ON HOLD | COMMUNITY
End: 2022-09-21

## 2019-06-18 RX ORDER — LEVOTHYROXINE SODIUM 0.05 MG/1
50 TABLET ORAL
Status: CANCELLED | OUTPATIENT
Start: 2019-06-18

## 2019-06-18 RX ORDER — SENNA AND DOCUSATE SODIUM 50; 8.6 MG/1; MG/1
2 TABLET, FILM COATED ORAL NIGHTLY PRN
Status: DISCONTINUED | OUTPATIENT
Start: 2019-06-18 | End: 2019-07-01 | Stop reason: HOSPADM

## 2019-06-18 RX ORDER — LORAZEPAM 2 MG/ML
1 INJECTION INTRAMUSCULAR ONCE
Status: COMPLETED | OUTPATIENT
Start: 2019-06-18 | End: 2019-06-18

## 2019-06-18 RX ORDER — HYDRALAZINE HYDROCHLORIDE 50 MG/1
100 TABLET, FILM COATED ORAL 3 TIMES DAILY
Status: CANCELLED | OUTPATIENT
Start: 2019-06-18

## 2019-06-18 RX ORDER — ATORVASTATIN CALCIUM 40 MG/1
40 TABLET, FILM COATED ORAL NIGHTLY
Status: CANCELLED | OUTPATIENT
Start: 2019-06-18

## 2019-06-18 RX ORDER — BUSPIRONE HYDROCHLORIDE 7.5 MG/1
7.5 TABLET ORAL NIGHTLY
COMMUNITY
End: 2019-08-15

## 2019-06-18 RX ORDER — CLONIDINE 0.3 MG/24H
1 PATCH, EXTENDED RELEASE TRANSDERMAL WEEKLY
Status: DISCONTINUED | OUTPATIENT
Start: 2019-06-18 | End: 2019-06-18

## 2019-06-18 RX ORDER — ISOSORBIDE MONONITRATE 30 MG/1
30 TABLET, EXTENDED RELEASE ORAL DAILY
Status: ON HOLD | COMMUNITY
End: 2022-09-21

## 2019-06-18 RX ORDER — OXYCODONE HYDROCHLORIDE 5 MG/1
5 TABLET ORAL EVERY 8 HOURS PRN
Status: CANCELLED | OUTPATIENT
Start: 2019-06-18

## 2019-06-18 RX ORDER — ISOSORBIDE MONONITRATE 30 MG/1
30 TABLET, EXTENDED RELEASE ORAL DAILY
Status: CANCELLED | OUTPATIENT
Start: 2019-06-18

## 2019-06-18 RX ORDER — METOPROLOL TARTRATE 100 MG/1
100 TABLET ORAL EVERY 8 HOURS
COMMUNITY
End: 2019-07-01 | Stop reason: HOSPADM

## 2019-06-18 RX ORDER — ACETAMINOPHEN 325 MG/1
650 TABLET ORAL EVERY 4 HOURS PRN
Status: DISCONTINUED | OUTPATIENT
Start: 2019-06-18 | End: 2019-07-01 | Stop reason: HOSPADM

## 2019-06-18 RX ORDER — SODIUM CHLORIDE 0.9 % (FLUSH) 0.9 %
3-10 SYRINGE (ML) INJECTION AS NEEDED
Status: DISCONTINUED | OUTPATIENT
Start: 2019-06-18 | End: 2019-07-01 | Stop reason: HOSPADM

## 2019-06-18 RX ORDER — INSULIN GLARGINE 100 [IU]/ML
5 INJECTION, SOLUTION SUBCUTANEOUS NIGHTLY
COMMUNITY
End: 2019-07-01 | Stop reason: HOSPADM

## 2019-06-18 RX ORDER — CALCITRIOL 0.25 UG/1
0.5 CAPSULE, LIQUID FILLED ORAL 3 TIMES WEEKLY
Status: DISCONTINUED | OUTPATIENT
Start: 2019-06-19 | End: 2019-07-01 | Stop reason: HOSPADM

## 2019-06-18 RX ORDER — ZINC SULFATE 50(220)MG
220 CAPSULE ORAL 2 TIMES DAILY
COMMUNITY
End: 2019-07-01 | Stop reason: HOSPADM

## 2019-06-18 RX ORDER — BUSPIRONE HYDROCHLORIDE 5 MG/1
7.5 TABLET ORAL NIGHTLY
Status: DISCONTINUED | OUTPATIENT
Start: 2019-06-18 | End: 2019-07-01 | Stop reason: HOSPADM

## 2019-06-18 RX ADMIN — CALCIUM ACETATE 1334 MG: 667 CAPSULE ORAL at 23:18

## 2019-06-18 RX ADMIN — CLONIDINE 1 PATCH: 0.3 PATCH, EXTENDED RELEASE TRANSDERMAL at 21:07

## 2019-06-18 RX ADMIN — TAMSULOSIN HYDROCHLORIDE 0.4 MG: 0.4 CAPSULE ORAL at 21:06

## 2019-06-18 RX ADMIN — FOLIC ACID 1 MG: 1 TABLET ORAL at 21:07

## 2019-06-18 RX ADMIN — NIFEDIPINE 90 MG: 90 TABLET, EXTENDED RELEASE ORAL at 21:07

## 2019-06-18 RX ADMIN — PIPERACILLIN SODIUM,TAZOBACTAM SODIUM 3.38 G: 3; .375 INJECTION, POWDER, FOR SOLUTION INTRAVENOUS at 06:11

## 2019-06-18 RX ADMIN — SODIUM CHLORIDE, PRESERVATIVE FREE 3 ML: 5 INJECTION INTRAVENOUS at 21:12

## 2019-06-18 RX ADMIN — PIPERACILLIN SODIUM,TAZOBACTAM SODIUM 3.38 G: 3; .375 INJECTION, POWDER, FOR SOLUTION INTRAVENOUS at 17:44

## 2019-06-18 RX ADMIN — IPRATROPIUM BROMIDE AND ALBUTEROL SULFATE 3 ML: .5; 3 SOLUTION RESPIRATORY (INHALATION) at 19:11

## 2019-06-18 RX ADMIN — ATORVASTATIN CALCIUM 40 MG: 40 TABLET, FILM COATED ORAL at 21:07

## 2019-06-18 RX ADMIN — BUSPIRONE HYDROCHLORIDE 7.5 MG: 5 TABLET ORAL at 21:07

## 2019-06-18 RX ADMIN — SERTRALINE 100 MG: 50 TABLET, FILM COATED ORAL at 23:18

## 2019-06-18 RX ADMIN — LEVOTHYROXINE SODIUM 50 MCG: 50 TABLET ORAL at 21:07

## 2019-06-18 RX ADMIN — HYDRALAZINE HYDROCHLORIDE 100 MG: 50 TABLET ORAL at 21:06

## 2019-06-18 RX ADMIN — SODIUM CHLORIDE, PRESERVATIVE FREE 3 ML: 5 INJECTION INTRAVENOUS at 14:21

## 2019-06-18 RX ADMIN — DILTIAZEM HYDROCHLORIDE 60 MG: 60 TABLET, FILM COATED ORAL at 21:07

## 2019-06-18 RX ADMIN — TERAZOSIN HYDROCHLORIDE 2 MG: 1 CAPSULE ORAL at 21:06

## 2019-06-18 RX ADMIN — GUAIFENESIN 600 MG: 600 TABLET, EXTENDED RELEASE ORAL at 14:20

## 2019-06-18 RX ADMIN — GUAIFENESIN 200 MG: 200 TABLET ORAL at 21:07

## 2019-06-18 RX ADMIN — Medication 100 MG: at 23:18

## 2019-06-18 RX ADMIN — LORAZEPAM 1 MG: 2 INJECTION INTRAMUSCULAR; INTRAVENOUS at 08:34

## 2019-06-18 RX ADMIN — FAMOTIDINE 40 MG: 20 TABLET, FILM COATED ORAL at 14:20

## 2019-06-18 RX ADMIN — VANCOMYCIN HYDROCHLORIDE 1250 MG: 5 INJECTION, POWDER, LYOPHILIZED, FOR SOLUTION INTRAVENOUS at 06:59

## 2019-06-18 RX ADMIN — ISOSORBIDE MONONITRATE 30 MG: 30 TABLET, EXTENDED RELEASE ORAL at 21:07

## 2019-06-19 LAB
25(OH)D3 SERPL-MCNC: 17.3 NG/ML (ref 30–100)
ANION GAP SERPL CALCULATED.3IONS-SCNC: 16.2 MMOL/L
BASOPHILS # BLD AUTO: 0.03 10*3/MM3 (ref 0–0.2)
BASOPHILS NFR BLD AUTO: 0.4 % (ref 0–1.5)
BUN BLD-MCNC: 51 MG/DL (ref 8–23)
BUN/CREAT SERPL: 20.2 (ref 7–25)
CALCIUM SPEC-SCNC: 7.6 MG/DL (ref 8.6–10.5)
CHLORIDE SERPL-SCNC: 103 MMOL/L (ref 98–107)
CO2 SERPL-SCNC: 16.8 MMOL/L (ref 22–29)
CREAT BLD-MCNC: 2.53 MG/DL (ref 0.76–1.27)
DEPRECATED RDW RBC AUTO: 60.2 FL (ref 37–54)
EOSINOPHIL # BLD AUTO: 0.09 10*3/MM3 (ref 0–0.4)
EOSINOPHIL NFR BLD AUTO: 1.1 % (ref 0.3–6.2)
ERYTHROCYTE [DISTWIDTH] IN BLOOD BY AUTOMATED COUNT: 18.7 % (ref 12.3–15.4)
FERRITIN SERPL-MCNC: 156.6 NG/ML (ref 30–400)
GFR SERPL CREATININE-BSD FRML MDRD: 26 ML/MIN/1.73
GLUCOSE BLD-MCNC: 111 MG/DL (ref 65–99)
GLUCOSE BLDC GLUCOMTR-MCNC: 138 MG/DL (ref 70–130)
GLUCOSE BLDC GLUCOMTR-MCNC: 162 MG/DL (ref 70–130)
GLUCOSE BLDC GLUCOMTR-MCNC: 199 MG/DL (ref 70–130)
GLUCOSE BLDC GLUCOMTR-MCNC: 201 MG/DL (ref 70–130)
GLUCOSE BLDC GLUCOMTR-MCNC: 78 MG/DL (ref 70–130)
HAV IGM SERPL QL IA: ABNORMAL
HBV CORE IGM SERPL QL IA: ABNORMAL
HBV SURFACE AG SERPL QL IA: ABNORMAL
HCT VFR BLD AUTO: 22.5 % (ref 37.5–51)
HCV AB SER DONR QL: REACTIVE
HGB BLD-MCNC: 6.6 G/DL (ref 13–17.7)
IMM GRANULOCYTES # BLD AUTO: 0.02 10*3/MM3 (ref 0–0.05)
IMM GRANULOCYTES NFR BLD AUTO: 0.3 % (ref 0–0.5)
IRON 24H UR-MRATE: 39 MCG/DL (ref 59–158)
IRON SATN MFR SERPL: 13 % (ref 20–50)
LYMPHOCYTES # BLD AUTO: 1.57 10*3/MM3 (ref 0.7–3.1)
LYMPHOCYTES NFR BLD AUTO: 20 % (ref 19.6–45.3)
MAGNESIUM SERPL-MCNC: 1.7 MG/DL (ref 1.6–2.4)
MCH RBC QN AUTO: 27.3 PG (ref 26.6–33)
MCHC RBC AUTO-ENTMCNC: 29.3 G/DL (ref 31.5–35.7)
MCV RBC AUTO: 93 FL (ref 79–97)
MONOCYTES # BLD AUTO: 0.83 10*3/MM3 (ref 0.1–0.9)
MONOCYTES NFR BLD AUTO: 10.6 % (ref 5–12)
NEUTROPHILS # BLD AUTO: 5.3 10*3/MM3 (ref 1.7–7)
NEUTROPHILS NFR BLD AUTO: 67.6 % (ref 42.7–76)
NT-PROBNP SERPL-MCNC: ABNORMAL PG/ML (ref 5–900)
PHOSPHATE SERPL-MCNC: 5.2 MG/DL (ref 2.5–4.5)
PLATELET # BLD AUTO: 270 10*3/MM3 (ref 140–450)
PMV BLD AUTO: 9.4 FL (ref 6–12)
POTASSIUM BLD-SCNC: 5.5 MMOL/L (ref 3.5–5.2)
PROCALCITONIN SERPL-MCNC: 0.08 NG/ML (ref 0.1–0.25)
RBC # BLD AUTO: 2.42 10*6/MM3 (ref 4.14–5.8)
SODIUM BLD-SCNC: 136 MMOL/L (ref 136–145)
TIBC SERPL-MCNC: 289 MCG/DL (ref 298–536)
TRANSFERRIN SERPL-MCNC: 194 MG/DL (ref 200–360)
TROPONIN T SERPL-MCNC: 0.04 NG/ML (ref 0–0.03)
VANCOMYCIN SERPL-MCNC: 10.1 MCG/ML (ref 5–40)
WBC NRBC COR # BLD: 7.84 10*3/MM3 (ref 3.4–10.8)

## 2019-06-19 PROCEDURE — 86900 BLOOD TYPING SEROLOGIC ABO: CPT

## 2019-06-19 PROCEDURE — P9047 ALBUMIN (HUMAN), 25%, 50ML: HCPCS | Performed by: INTERNAL MEDICINE

## 2019-06-19 PROCEDURE — 80048 BASIC METABOLIC PNL TOTAL CA: CPT | Performed by: INTERNAL MEDICINE

## 2019-06-19 PROCEDURE — 80074 ACUTE HEPATITIS PANEL: CPT | Performed by: INTERNAL MEDICINE

## 2019-06-19 PROCEDURE — 84466 ASSAY OF TRANSFERRIN: CPT | Performed by: INTERNAL MEDICINE

## 2019-06-19 PROCEDURE — 97116 GAIT TRAINING THERAPY: CPT

## 2019-06-19 PROCEDURE — 63710000001 INSULIN ASPART PER 5 UNITS: Performed by: NURSE PRACTITIONER

## 2019-06-19 PROCEDURE — 82962 GLUCOSE BLOOD TEST: CPT

## 2019-06-19 PROCEDURE — 82306 VITAMIN D 25 HYDROXY: CPT | Performed by: INTERNAL MEDICINE

## 2019-06-19 PROCEDURE — 97162 PT EVAL MOD COMPLEX 30 MIN: CPT

## 2019-06-19 PROCEDURE — 80202 ASSAY OF VANCOMYCIN: CPT | Performed by: NURSE PRACTITIONER

## 2019-06-19 PROCEDURE — 5A1D70Z PERFORMANCE OF URINARY FILTRATION, INTERMITTENT, LESS THAN 6 HOURS PER DAY: ICD-10-PCS | Performed by: INTERNAL MEDICINE

## 2019-06-19 PROCEDURE — 94799 UNLISTED PULMONARY SVC/PX: CPT

## 2019-06-19 PROCEDURE — 25010000002 ALBUMIN HUMAN 25% PER 50 ML: Performed by: INTERNAL MEDICINE

## 2019-06-19 PROCEDURE — 82728 ASSAY OF FERRITIN: CPT | Performed by: INTERNAL MEDICINE

## 2019-06-19 PROCEDURE — 25010000002 VANCOMYCIN 5 G RECONSTITUTED SOLUTION 5,000 MG VIAL

## 2019-06-19 PROCEDURE — 99233 SBSQ HOSP IP/OBS HIGH 50: CPT | Performed by: INTERNAL MEDICINE

## 2019-06-19 PROCEDURE — 97110 THERAPEUTIC EXERCISES: CPT

## 2019-06-19 PROCEDURE — 84100 ASSAY OF PHOSPHORUS: CPT | Performed by: INTERNAL MEDICINE

## 2019-06-19 PROCEDURE — 83540 ASSAY OF IRON: CPT | Performed by: INTERNAL MEDICINE

## 2019-06-19 PROCEDURE — 85025 COMPLETE CBC W/AUTO DIFF WBC: CPT | Performed by: INTERNAL MEDICINE

## 2019-06-19 PROCEDURE — 25010000002 PIPERACILLIN-TAZOBACTAM: Performed by: NURSE PRACTITIONER

## 2019-06-19 PROCEDURE — 83735 ASSAY OF MAGNESIUM: CPT | Performed by: INTERNAL MEDICINE

## 2019-06-19 PROCEDURE — P9016 RBC LEUKOCYTES REDUCED: HCPCS

## 2019-06-19 PROCEDURE — 97530 THERAPEUTIC ACTIVITIES: CPT

## 2019-06-19 PROCEDURE — 25010000002 HALOPERIDOL LACTATE PER 5 MG: Performed by: INTERNAL MEDICINE

## 2019-06-19 PROCEDURE — 83880 ASSAY OF NATRIURETIC PEPTIDE: CPT | Performed by: NURSE PRACTITIONER

## 2019-06-19 PROCEDURE — 84484 ASSAY OF TROPONIN QUANT: CPT | Performed by: NURSE PRACTITIONER

## 2019-06-19 RX ORDER — SODIUM CHLORIDE 9 MG/ML
INJECTION, SOLUTION INTRAVENOUS
Status: DISCONTINUED
Start: 2019-06-19 | End: 2019-07-01 | Stop reason: HOSPADM

## 2019-06-19 RX ORDER — L.ACID,PARA/B.BIFIDUM/S.THERM 8B CELL
1 CAPSULE ORAL DAILY
Status: DISCONTINUED | OUTPATIENT
Start: 2019-06-19 | End: 2019-07-01 | Stop reason: HOSPADM

## 2019-06-19 RX ORDER — ALBUMIN (HUMAN) 12.5 G/50ML
25 SOLUTION INTRAVENOUS AS NEEDED
Status: DISPENSED | OUTPATIENT
Start: 2019-06-19 | End: 2019-06-20

## 2019-06-19 RX ORDER — PANTOPRAZOLE SODIUM 40 MG/10ML
40 INJECTION, POWDER, LYOPHILIZED, FOR SOLUTION INTRAVENOUS
Status: DISCONTINUED | OUTPATIENT
Start: 2019-06-19 | End: 2019-06-25

## 2019-06-19 RX ORDER — HALOPERIDOL 5 MG/ML
INJECTION INTRAMUSCULAR
Status: DISCONTINUED
Start: 2019-06-19 | End: 2019-06-26

## 2019-06-19 RX ORDER — HALOPERIDOL 5 MG/ML
2 INJECTION INTRAMUSCULAR ONCE
Status: COMPLETED | OUTPATIENT
Start: 2019-06-19 | End: 2019-06-19

## 2019-06-19 RX ADMIN — Medication 100 MG: at 12:42

## 2019-06-19 RX ADMIN — GUAIFENESIN 200 MG: 200 TABLET ORAL at 03:45

## 2019-06-19 RX ADMIN — CALCIUM ACETATE 1334 MG: 667 CAPSULE ORAL at 20:09

## 2019-06-19 RX ADMIN — FOLIC ACID 1 MG: 1 TABLET ORAL at 12:41

## 2019-06-19 RX ADMIN — IPRATROPIUM BROMIDE AND ALBUTEROL SULFATE 3 ML: .5; 3 SOLUTION RESPIRATORY (INHALATION) at 00:47

## 2019-06-19 RX ADMIN — INSULIN ASPART 3 UNITS: 100 INJECTION, SOLUTION INTRAVENOUS; SUBCUTANEOUS at 08:04

## 2019-06-19 RX ADMIN — PIPERACILLIN SODIUM,TAZOBACTAM SODIUM 3.38 G: 3; .375 INJECTION, POWDER, FOR SOLUTION INTRAVENOUS at 20:08

## 2019-06-19 RX ADMIN — IPRATROPIUM BROMIDE AND ALBUTEROL SULFATE 3 ML: .5; 3 SOLUTION RESPIRATORY (INHALATION) at 19:49

## 2019-06-19 RX ADMIN — DILTIAZEM HYDROCHLORIDE 60 MG: 60 TABLET, FILM COATED ORAL at 18:57

## 2019-06-19 RX ADMIN — Medication 1 CAPSULE: at 18:33

## 2019-06-19 RX ADMIN — PIPERACILLIN SODIUM,TAZOBACTAM SODIUM 3.38 G: 3; .375 INJECTION, POWDER, FOR SOLUTION INTRAVENOUS at 06:15

## 2019-06-19 RX ADMIN — INSULIN ASPART 2 UNITS: 100 INJECTION, SOLUTION INTRAVENOUS; SUBCUTANEOUS at 20:10

## 2019-06-19 RX ADMIN — CALCITRIOL 0.5 MCG: 0.25 CAPSULE ORAL at 12:40

## 2019-06-19 RX ADMIN — FAMOTIDINE 20 MG: 20 TABLET, FILM COATED ORAL at 12:33

## 2019-06-19 RX ADMIN — IPRATROPIUM BROMIDE AND ALBUTEROL SULFATE 3 ML: .5; 3 SOLUTION RESPIRATORY (INHALATION) at 07:12

## 2019-06-19 RX ADMIN — SODIUM CHLORIDE 1000 ML: 9 INJECTION, SOLUTION INTRAVENOUS at 09:31

## 2019-06-19 RX ADMIN — GUAIFENESIN 200 MG: 200 TABLET ORAL at 20:09

## 2019-06-19 RX ADMIN — ALBUMIN (HUMAN) 25 G: 0.25 INJECTION, SOLUTION INTRAVENOUS at 09:32

## 2019-06-19 RX ADMIN — LEVOTHYROXINE SODIUM 50 MCG: 50 TABLET ORAL at 12:42

## 2019-06-19 RX ADMIN — TAMSULOSIN HYDROCHLORIDE 0.4 MG: 0.4 CAPSULE ORAL at 20:09

## 2019-06-19 RX ADMIN — ISOSORBIDE MONONITRATE 30 MG: 30 TABLET, EXTENDED RELEASE ORAL at 12:42

## 2019-06-19 RX ADMIN — SERTRALINE 100 MG: 50 TABLET, FILM COATED ORAL at 12:41

## 2019-06-19 RX ADMIN — BUSPIRONE HYDROCHLORIDE 7.5 MG: 5 TABLET ORAL at 20:10

## 2019-06-19 RX ADMIN — FAMOTIDINE 20 MG: 20 TABLET, FILM COATED ORAL at 17:49

## 2019-06-19 RX ADMIN — ATORVASTATIN CALCIUM 40 MG: 40 TABLET, FILM COATED ORAL at 20:10

## 2019-06-19 RX ADMIN — INSULIN ASPART 2 UNITS: 100 INJECTION, SOLUTION INTRAVENOUS; SUBCUTANEOUS at 17:38

## 2019-06-19 RX ADMIN — PANTOPRAZOLE SODIUM 40 MG: 40 INJECTION, POWDER, FOR SOLUTION INTRAVENOUS at 20:11

## 2019-06-19 RX ADMIN — DILTIAZEM HYDROCHLORIDE 60 MG: 60 TABLET, FILM COATED ORAL at 20:10

## 2019-06-19 RX ADMIN — DILTIAZEM HYDROCHLORIDE 60 MG: 60 TABLET, FILM COATED ORAL at 12:34

## 2019-06-19 RX ADMIN — HALOPERIDOL LACTATE 2 MG: 5 INJECTION INTRAMUSCULAR at 11:49

## 2019-06-19 RX ADMIN — SODIUM CHLORIDE, PRESERVATIVE FREE 3 ML: 5 INJECTION INTRAVENOUS at 12:48

## 2019-06-19 RX ADMIN — CALCIUM ACETATE 1334 MG: 667 CAPSULE ORAL at 03:45

## 2019-06-19 RX ADMIN — VANCOMYCIN HYDROCHLORIDE 1250 MG: 5 INJECTION, POWDER, LYOPHILIZED, FOR SOLUTION INTRAVENOUS at 17:40

## 2019-06-20 PROBLEM — D50.9 IRON DEFICIENCY ANEMIA: Status: ACTIVE | Noted: 2019-06-18

## 2019-06-20 LAB
ABO + RH BLD: NORMAL
AMMONIA BLD-SCNC: 25 UMOL/L (ref 16–60)
ANION GAP SERPL CALCULATED.3IONS-SCNC: 16.6 MMOL/L
BASOPHILS # BLD AUTO: 0.03 10*3/MM3 (ref 0–0.2)
BASOPHILS NFR BLD AUTO: 0.5 % (ref 0–1.5)
BH BB BLOOD EXPIRATION DATE: NORMAL
BH BB BLOOD TYPE BARCODE: 600
BH BB DISPENSE STATUS: NORMAL
BH BB PRODUCT CODE: NORMAL
BH BB UNIT NUMBER: NORMAL
BUN BLD-MCNC: 27 MG/DL (ref 8–23)
BUN/CREAT SERPL: 13.8 (ref 7–25)
CALCIUM SPEC-SCNC: 8.2 MG/DL (ref 8.6–10.5)
CHLORIDE SERPL-SCNC: 100 MMOL/L (ref 98–107)
CO2 SERPL-SCNC: 21.4 MMOL/L (ref 22–29)
CREAT BLD-MCNC: 1.95 MG/DL (ref 0.76–1.27)
DEPRECATED RDW RBC AUTO: 58.4 FL (ref 37–54)
EOSINOPHIL # BLD AUTO: 0.05 10*3/MM3 (ref 0–0.4)
EOSINOPHIL NFR BLD AUTO: 0.9 % (ref 0.3–6.2)
ERYTHROCYTE [DISTWIDTH] IN BLOOD BY AUTOMATED COUNT: 18.6 % (ref 12.3–15.4)
GFR SERPL CREATININE-BSD FRML MDRD: 35 ML/MIN/1.73
GLUCOSE BLD-MCNC: 128 MG/DL (ref 65–99)
GLUCOSE BLDC GLUCOMTR-MCNC: 117 MG/DL (ref 70–130)
GLUCOSE BLDC GLUCOMTR-MCNC: 144 MG/DL (ref 70–130)
GLUCOSE BLDC GLUCOMTR-MCNC: 169 MG/DL (ref 70–130)
HCT VFR BLD AUTO: 23.7 % (ref 37.5–51)
HGB BLD-MCNC: 7.2 G/DL (ref 13–17.7)
IMM GRANULOCYTES # BLD AUTO: 0.01 10*3/MM3 (ref 0–0.05)
IMM GRANULOCYTES NFR BLD AUTO: 0.2 % (ref 0–0.5)
INR PPP: 1.21 (ref 0.9–1.1)
L PNEUMO1 AG UR QL IA: NEGATIVE
LYMPHOCYTES # BLD AUTO: 0.98 10*3/MM3 (ref 0.7–3.1)
LYMPHOCYTES NFR BLD AUTO: 17.6 % (ref 19.6–45.3)
MAGNESIUM SERPL-MCNC: 1.7 MG/DL (ref 1.6–2.4)
MCH RBC QN AUTO: 28.2 PG (ref 26.6–33)
MCHC RBC AUTO-ENTMCNC: 30.4 G/DL (ref 31.5–35.7)
MCV RBC AUTO: 92.9 FL (ref 79–97)
MONOCYTES # BLD AUTO: 0.61 10*3/MM3 (ref 0.1–0.9)
MONOCYTES NFR BLD AUTO: 11 % (ref 5–12)
NEUTROPHILS # BLD AUTO: 3.88 10*3/MM3 (ref 1.7–7)
NEUTROPHILS NFR BLD AUTO: 69.8 % (ref 42.7–76)
PLATELET # BLD AUTO: 231 10*3/MM3 (ref 140–450)
PMV BLD AUTO: 9.5 FL (ref 6–12)
POTASSIUM BLD-SCNC: 4.4 MMOL/L (ref 3.5–5.2)
PROTHROMBIN TIME: 15.9 SECONDS (ref 11–15.4)
RBC # BLD AUTO: 2.55 10*6/MM3 (ref 4.14–5.8)
SODIUM BLD-SCNC: 138 MMOL/L (ref 136–145)
UNIT  ABO: NORMAL
UNIT  RH: NORMAL
WBC NRBC COR # BLD: 5.56 10*3/MM3 (ref 3.4–10.8)

## 2019-06-20 PROCEDURE — 87899 AGENT NOS ASSAY W/OPTIC: CPT | Performed by: NURSE PRACTITIONER

## 2019-06-20 PROCEDURE — 85610 PROTHROMBIN TIME: CPT | Performed by: SURGERY

## 2019-06-20 PROCEDURE — 99221 1ST HOSP IP/OBS SF/LOW 40: CPT | Performed by: SURGERY

## 2019-06-20 PROCEDURE — 99232 SBSQ HOSP IP/OBS MODERATE 35: CPT | Performed by: INTERNAL MEDICINE

## 2019-06-20 PROCEDURE — 94799 UNLISTED PULMONARY SVC/PX: CPT

## 2019-06-20 PROCEDURE — 97167 OT EVAL HIGH COMPLEX 60 MIN: CPT

## 2019-06-20 PROCEDURE — 85025 COMPLETE CBC W/AUTO DIFF WBC: CPT | Performed by: INTERNAL MEDICINE

## 2019-06-20 PROCEDURE — 80048 BASIC METABOLIC PNL TOTAL CA: CPT | Performed by: INTERNAL MEDICINE

## 2019-06-20 PROCEDURE — 82140 ASSAY OF AMMONIA: CPT | Performed by: INTERNAL MEDICINE

## 2019-06-20 PROCEDURE — 25010000002 PIPERACILLIN-TAZOBACTAM: Performed by: NURSE PRACTITIONER

## 2019-06-20 PROCEDURE — 82962 GLUCOSE BLOOD TEST: CPT

## 2019-06-20 PROCEDURE — 63710000001 INSULIN ASPART PER 5 UNITS: Performed by: NURSE PRACTITIONER

## 2019-06-20 PROCEDURE — 83735 ASSAY OF MAGNESIUM: CPT | Performed by: INTERNAL MEDICINE

## 2019-06-20 RX ORDER — POLYETHYLENE GLYCOL 3350, SODIUM CHLORIDE, POTASSIUM CHLORIDE, SODIUM BICARBONATE, AND SODIUM SULFATE 240; 5.84; 2.98; 6.72; 22.72 G/4L; G/4L; G/4L; G/4L; G/4L
2000 POWDER, FOR SOLUTION ORAL 2 TIMES DAILY
Status: COMPLETED | OUTPATIENT
Start: 2019-06-20 | End: 2019-06-20

## 2019-06-20 RX ORDER — LORAZEPAM 2 MG/ML
2 INJECTION INTRAMUSCULAR
Status: DISCONTINUED | OUTPATIENT
Start: 2019-06-20 | End: 2019-06-25

## 2019-06-20 RX ORDER — LORAZEPAM 2 MG/1
2 TABLET ORAL
Status: DISCONTINUED | OUTPATIENT
Start: 2019-06-20 | End: 2019-06-25

## 2019-06-20 RX ORDER — LORAZEPAM 2 MG/ML
1 INJECTION INTRAMUSCULAR
Status: DISCONTINUED | OUTPATIENT
Start: 2019-06-20 | End: 2019-06-25

## 2019-06-20 RX ORDER — LORAZEPAM 1 MG/1
1 TABLET ORAL
Status: DISCONTINUED | OUTPATIENT
Start: 2019-06-20 | End: 2019-06-25

## 2019-06-20 RX ADMIN — GUAIFENESIN 200 MG: 200 TABLET ORAL at 15:37

## 2019-06-20 RX ADMIN — POLYETHYLENE GLYCOL 3350, SODIUM CHLORIDE, POTASSIUM CHLORIDE, SODIUM BICARBONATE, AND SODIUM SULFATE 2000 ML: 240; 5.84; 2.98; 6.72; 22.72 POWDER, FOR SOLUTION ORAL at 21:39

## 2019-06-20 RX ADMIN — POLYETHYLENE GLYCOL 3350, SODIUM CHLORIDE, POTASSIUM CHLORIDE, SODIUM BICARBONATE, AND SODIUM SULFATE 2000 ML: 240; 5.84; 2.98; 6.72; 22.72 POWDER, FOR SOLUTION ORAL at 15:00

## 2019-06-20 RX ADMIN — PIPERACILLIN SODIUM,TAZOBACTAM SODIUM 3.38 G: 3; .375 INJECTION, POWDER, FOR SOLUTION INTRAVENOUS at 07:25

## 2019-06-20 RX ADMIN — CALCIUM ACETATE 1334 MG: 667 CAPSULE ORAL at 21:37

## 2019-06-20 RX ADMIN — INSULIN ASPART 2 UNITS: 100 INJECTION, SOLUTION INTRAVENOUS; SUBCUTANEOUS at 12:22

## 2019-06-20 RX ADMIN — NIFEDIPINE 90 MG: 90 TABLET, EXTENDED RELEASE ORAL at 08:46

## 2019-06-20 RX ADMIN — GUAIFENESIN 200 MG: 200 TABLET ORAL at 08:43

## 2019-06-20 RX ADMIN — INSULIN ASPART 2 UNITS: 100 INJECTION, SOLUTION INTRAVENOUS; SUBCUTANEOUS at 21:35

## 2019-06-20 RX ADMIN — TAMSULOSIN HYDROCHLORIDE 0.4 MG: 0.4 CAPSULE ORAL at 21:39

## 2019-06-20 RX ADMIN — FOLIC ACID 1 MG: 1 TABLET ORAL at 08:44

## 2019-06-20 RX ADMIN — CALCIUM ACETATE 1334 MG: 667 CAPSULE ORAL at 15:37

## 2019-06-20 RX ADMIN — SERTRALINE 100 MG: 50 TABLET, FILM COATED ORAL at 08:44

## 2019-06-20 RX ADMIN — PANTOPRAZOLE SODIUM 40 MG: 40 INJECTION, POWDER, FOR SOLUTION INTRAVENOUS at 17:55

## 2019-06-20 RX ADMIN — Medication 100 MG: at 08:43

## 2019-06-20 RX ADMIN — IPRATROPIUM BROMIDE AND ALBUTEROL SULFATE 3 ML: .5; 3 SOLUTION RESPIRATORY (INHALATION) at 06:54

## 2019-06-20 RX ADMIN — BUSPIRONE HYDROCHLORIDE 7.5 MG: 5 TABLET ORAL at 21:37

## 2019-06-20 RX ADMIN — Medication 1 CAPSULE: at 08:44

## 2019-06-20 RX ADMIN — PIPERACILLIN SODIUM,TAZOBACTAM SODIUM 3.38 G: 3; .375 INJECTION, POWDER, FOR SOLUTION INTRAVENOUS at 17:55

## 2019-06-20 RX ADMIN — DILTIAZEM HYDROCHLORIDE 60 MG: 60 TABLET, FILM COATED ORAL at 08:47

## 2019-06-20 RX ADMIN — LEVOTHYROXINE SODIUM 50 MCG: 50 TABLET ORAL at 08:44

## 2019-06-20 RX ADMIN — ISOSORBIDE MONONITRATE 30 MG: 30 TABLET, EXTENDED RELEASE ORAL at 08:47

## 2019-06-20 RX ADMIN — LORAZEPAM 1 MG: 1 TABLET ORAL at 15:37

## 2019-06-20 RX ADMIN — SODIUM CHLORIDE, PRESERVATIVE FREE 3 ML: 5 INJECTION INTRAVENOUS at 08:45

## 2019-06-20 RX ADMIN — DILTIAZEM HYDROCHLORIDE 60 MG: 60 TABLET, FILM COATED ORAL at 17:53

## 2019-06-20 RX ADMIN — SODIUM CHLORIDE, PRESERVATIVE FREE 3 ML: 5 INJECTION INTRAVENOUS at 21:39

## 2019-06-20 RX ADMIN — DILTIAZEM HYDROCHLORIDE 60 MG: 60 TABLET, FILM COATED ORAL at 12:22

## 2019-06-20 RX ADMIN — DILTIAZEM HYDROCHLORIDE 60 MG: 60 TABLET, FILM COATED ORAL at 21:37

## 2019-06-20 RX ADMIN — IPRATROPIUM BROMIDE AND ALBUTEROL SULFATE 3 ML: .5; 3 SOLUTION RESPIRATORY (INHALATION) at 19:38

## 2019-06-20 RX ADMIN — PANTOPRAZOLE SODIUM 40 MG: 40 INJECTION, POWDER, FOR SOLUTION INTRAVENOUS at 07:26

## 2019-06-20 RX ADMIN — OXYCODONE HYDROCHLORIDE 5 MG: 5 TABLET ORAL at 15:37

## 2019-06-20 RX ADMIN — GUAIFENESIN 200 MG: 200 TABLET ORAL at 21:37

## 2019-06-20 RX ADMIN — ATORVASTATIN CALCIUM 40 MG: 40 TABLET, FILM COATED ORAL at 21:39

## 2019-06-20 RX ADMIN — CALCIUM ACETATE 1334 MG: 667 CAPSULE ORAL at 08:43

## 2019-06-21 ENCOUNTER — ANESTHESIA (OUTPATIENT)
Dept: PERIOP | Facility: HOSPITAL | Age: 59
End: 2019-06-21

## 2019-06-21 ENCOUNTER — ANESTHESIA EVENT (OUTPATIENT)
Dept: PERIOP | Facility: HOSPITAL | Age: 59
End: 2019-06-21

## 2019-06-21 LAB
ANION GAP SERPL CALCULATED.3IONS-SCNC: 18.6 MMOL/L
BASOPHILS # BLD AUTO: 0.01 10*3/MM3 (ref 0–0.2)
BASOPHILS NFR BLD AUTO: 0.2 % (ref 0–1.5)
BUN BLD-MCNC: 32 MG/DL (ref 8–23)
BUN/CREAT SERPL: 12.7 (ref 7–25)
CALCIUM SPEC-SCNC: 8.7 MG/DL (ref 8.6–10.5)
CHLORIDE SERPL-SCNC: 104 MMOL/L (ref 98–107)
CO2 SERPL-SCNC: 22.4 MMOL/L (ref 22–29)
CREAT BLD-MCNC: 2.52 MG/DL (ref 0.76–1.27)
DEPRECATED RDW RBC AUTO: 60.3 FL (ref 37–54)
EOSINOPHIL # BLD AUTO: 0.04 10*3/MM3 (ref 0–0.4)
EOSINOPHIL NFR BLD AUTO: 0.8 % (ref 0.3–6.2)
ERYTHROCYTE [DISTWIDTH] IN BLOOD BY AUTOMATED COUNT: 18.8 % (ref 12.3–15.4)
GFR SERPL CREATININE-BSD FRML MDRD: 26 ML/MIN/1.73
GLUCOSE BLD-MCNC: 104 MG/DL (ref 65–99)
GLUCOSE BLDC GLUCOMTR-MCNC: 127 MG/DL (ref 70–130)
GLUCOSE BLDC GLUCOMTR-MCNC: 178 MG/DL (ref 70–130)
GLUCOSE BLDC GLUCOMTR-MCNC: 192 MG/DL (ref 70–130)
GLUCOSE BLDC GLUCOMTR-MCNC: 89 MG/DL (ref 70–130)
GLUCOSE BLDC GLUCOMTR-MCNC: 99 MG/DL (ref 70–130)
HCT VFR BLD AUTO: 24.1 % (ref 37.5–51)
HGB BLD-MCNC: 7.2 G/DL (ref 13–17.7)
IMM GRANULOCYTES # BLD AUTO: 0 10*3/MM3 (ref 0–0.05)
IMM GRANULOCYTES NFR BLD AUTO: 0 % (ref 0–0.5)
LYMPHOCYTES # BLD AUTO: 1.05 10*3/MM3 (ref 0.7–3.1)
LYMPHOCYTES NFR BLD AUTO: 21.3 % (ref 19.6–45.3)
MAGNESIUM SERPL-MCNC: 1.7 MG/DL (ref 1.6–2.4)
MCH RBC QN AUTO: 27.7 PG (ref 26.6–33)
MCHC RBC AUTO-ENTMCNC: 29.9 G/DL (ref 31.5–35.7)
MCV RBC AUTO: 92.7 FL (ref 79–97)
MONOCYTES # BLD AUTO: 0.6 10*3/MM3 (ref 0.1–0.9)
MONOCYTES NFR BLD AUTO: 12.2 % (ref 5–12)
NEUTROPHILS # BLD AUTO: 3.22 10*3/MM3 (ref 1.7–7)
NEUTROPHILS NFR BLD AUTO: 65.5 % (ref 42.7–76)
PLATELET # BLD AUTO: 232 10*3/MM3 (ref 140–450)
PMV BLD AUTO: 9.7 FL (ref 6–12)
POTASSIUM BLD-SCNC: 4 MMOL/L (ref 3.5–5.2)
RBC # BLD AUTO: 2.6 10*6/MM3 (ref 4.14–5.8)
SODIUM BLD-SCNC: 145 MMOL/L (ref 136–145)
VANCOMYCIN SERPL-MCNC: 16.9 MCG/ML (ref 5–40)
WBC NRBC COR # BLD: 4.92 10*3/MM3 (ref 3.4–10.8)

## 2019-06-21 PROCEDURE — 87081 CULTURE SCREEN ONLY: CPT | Performed by: SURGERY

## 2019-06-21 PROCEDURE — 25010000002 PIPERACILLIN-TAZOBACTAM: Performed by: NURSE PRACTITIONER

## 2019-06-21 PROCEDURE — 80202 ASSAY OF VANCOMYCIN: CPT

## 2019-06-21 PROCEDURE — 82962 GLUCOSE BLOOD TEST: CPT

## 2019-06-21 PROCEDURE — 25010000002 PHENYLEPHRINE PER 1 ML: Performed by: NURSE ANESTHETIST, CERTIFIED REGISTERED

## 2019-06-21 PROCEDURE — 99232 SBSQ HOSP IP/OBS MODERATE 35: CPT | Performed by: INTERNAL MEDICINE

## 2019-06-21 PROCEDURE — 25010000002 VANCOMYCIN 5 G RECONSTITUTED SOLUTION 5,000 MG VIAL: Performed by: INTERNAL MEDICINE

## 2019-06-21 PROCEDURE — 0DJD8ZZ INSPECTION OF LOWER INTESTINAL TRACT, VIA NATURAL OR ARTIFICIAL OPENING ENDOSCOPIC: ICD-10-PCS | Performed by: SURGERY

## 2019-06-21 PROCEDURE — 25010000002 FENTANYL CITRATE (PF) 100 MCG/2ML SOLUTION: Performed by: NURSE ANESTHETIST, CERTIFIED REGISTERED

## 2019-06-21 PROCEDURE — 25010000002 PROPOFOL 10 MG/ML EMULSION: Performed by: NURSE ANESTHETIST, CERTIFIED REGISTERED

## 2019-06-21 PROCEDURE — 94799 UNLISTED PULMONARY SVC/PX: CPT

## 2019-06-21 PROCEDURE — 45378 DIAGNOSTIC COLONOSCOPY: CPT | Performed by: SURGERY

## 2019-06-21 PROCEDURE — 0DB68ZX EXCISION OF STOMACH, VIA NATURAL OR ARTIFICIAL OPENING ENDOSCOPIC, DIAGNOSTIC: ICD-10-PCS | Performed by: SURGERY

## 2019-06-21 PROCEDURE — 63710000001 INSULIN ASPART PER 5 UNITS: Performed by: NURSE PRACTITIONER

## 2019-06-21 PROCEDURE — 43239 EGD BIOPSY SINGLE/MULTIPLE: CPT | Performed by: SURGERY

## 2019-06-21 PROCEDURE — 85025 COMPLETE CBC W/AUTO DIFF WBC: CPT | Performed by: INTERNAL MEDICINE

## 2019-06-21 PROCEDURE — 83735 ASSAY OF MAGNESIUM: CPT | Performed by: INTERNAL MEDICINE

## 2019-06-21 PROCEDURE — 80048 BASIC METABOLIC PNL TOTAL CA: CPT | Performed by: INTERNAL MEDICINE

## 2019-06-21 RX ORDER — SODIUM CHLORIDE, SODIUM LACTATE, POTASSIUM CHLORIDE, CALCIUM CHLORIDE 600; 310; 30; 20 MG/100ML; MG/100ML; MG/100ML; MG/100ML
125 INJECTION, SOLUTION INTRAVENOUS CONTINUOUS
Status: DISCONTINUED | OUTPATIENT
Start: 2019-06-21 | End: 2019-06-22

## 2019-06-21 RX ORDER — PROPOFOL 10 MG/ML
VIAL (ML) INTRAVENOUS AS NEEDED
Status: DISCONTINUED | OUTPATIENT
Start: 2019-06-21 | End: 2019-06-21 | Stop reason: SURG

## 2019-06-21 RX ORDER — LIDOCAINE HYDROCHLORIDE 20 MG/ML
INJECTION, SOLUTION INFILTRATION; PERINEURAL AS NEEDED
Status: DISCONTINUED | OUTPATIENT
Start: 2019-06-21 | End: 2019-06-21 | Stop reason: SURG

## 2019-06-21 RX ORDER — SODIUM CHLORIDE 0.9 % (FLUSH) 0.9 %
3 SYRINGE (ML) INJECTION EVERY 12 HOURS SCHEDULED
Status: DISCONTINUED | OUTPATIENT
Start: 2019-06-21 | End: 2019-06-21 | Stop reason: HOSPADM

## 2019-06-21 RX ORDER — MEPERIDINE HYDROCHLORIDE 25 MG/ML
12.5 INJECTION INTRAMUSCULAR; INTRAVENOUS; SUBCUTANEOUS
Status: DISCONTINUED | OUTPATIENT
Start: 2019-06-21 | End: 2019-06-21 | Stop reason: HOSPADM

## 2019-06-21 RX ORDER — SODIUM CHLORIDE 9 MG/ML
INJECTION, SOLUTION INTRAVENOUS CONTINUOUS PRN
Status: DISCONTINUED | OUTPATIENT
Start: 2019-06-21 | End: 2019-06-21 | Stop reason: SURG

## 2019-06-21 RX ORDER — OXYCODONE HYDROCHLORIDE AND ACETAMINOPHEN 5; 325 MG/1; MG/1
1 TABLET ORAL ONCE AS NEEDED
Status: DISCONTINUED | OUTPATIENT
Start: 2019-06-21 | End: 2019-06-21 | Stop reason: HOSPADM

## 2019-06-21 RX ORDER — FENTANYL CITRATE 50 UG/ML
INJECTION, SOLUTION INTRAMUSCULAR; INTRAVENOUS AS NEEDED
Status: DISCONTINUED | OUTPATIENT
Start: 2019-06-21 | End: 2019-06-21 | Stop reason: SURG

## 2019-06-21 RX ORDER — FENTANYL CITRATE 50 UG/ML
50 INJECTION, SOLUTION INTRAMUSCULAR; INTRAVENOUS
Status: DISCONTINUED | OUTPATIENT
Start: 2019-06-21 | End: 2019-06-21 | Stop reason: HOSPADM

## 2019-06-21 RX ORDER — ONDANSETRON 2 MG/ML
4 INJECTION INTRAMUSCULAR; INTRAVENOUS ONCE AS NEEDED
Status: DISCONTINUED | OUTPATIENT
Start: 2019-06-21 | End: 2019-06-21 | Stop reason: HOSPADM

## 2019-06-21 RX ORDER — IPRATROPIUM BROMIDE AND ALBUTEROL SULFATE 2.5; .5 MG/3ML; MG/3ML
3 SOLUTION RESPIRATORY (INHALATION) ONCE AS NEEDED
Status: DISCONTINUED | OUTPATIENT
Start: 2019-06-21 | End: 2019-06-21 | Stop reason: HOSPADM

## 2019-06-21 RX ORDER — SODIUM CHLORIDE 0.9 % (FLUSH) 0.9 %
3-10 SYRINGE (ML) INJECTION AS NEEDED
Status: DISCONTINUED | OUTPATIENT
Start: 2019-06-21 | End: 2019-06-21 | Stop reason: HOSPADM

## 2019-06-21 RX ADMIN — PROPOFOL 50 MG: 10 INJECTION, EMULSION INTRAVENOUS at 15:44

## 2019-06-21 RX ADMIN — LIDOCAINE HYDROCHLORIDE 100 MG: 20 INJECTION, SOLUTION INFILTRATION; PERINEURAL at 15:28

## 2019-06-21 RX ADMIN — PIPERACILLIN SODIUM,TAZOBACTAM SODIUM 3.38 G: 3; .375 INJECTION, POWDER, FOR SOLUTION INTRAVENOUS at 05:31

## 2019-06-21 RX ADMIN — CALCIUM ACETATE 1334 MG: 667 CAPSULE ORAL at 17:29

## 2019-06-21 RX ADMIN — CALCITRIOL 0.5 MCG: 0.25 CAPSULE ORAL at 10:36

## 2019-06-21 RX ADMIN — SODIUM CHLORIDE, PRESERVATIVE FREE 10 ML: 5 INJECTION INTRAVENOUS at 05:31

## 2019-06-21 RX ADMIN — SERTRALINE 100 MG: 50 TABLET, FILM COATED ORAL at 10:37

## 2019-06-21 RX ADMIN — GUAIFENESIN 200 MG: 200 TABLET ORAL at 17:29

## 2019-06-21 RX ADMIN — BUSPIRONE HYDROCHLORIDE 7.5 MG: 5 TABLET ORAL at 20:43

## 2019-06-21 RX ADMIN — DILTIAZEM HYDROCHLORIDE 60 MG: 60 TABLET, FILM COATED ORAL at 17:30

## 2019-06-21 RX ADMIN — ATORVASTATIN CALCIUM 40 MG: 40 TABLET, FILM COATED ORAL at 20:44

## 2019-06-21 RX ADMIN — NIFEDIPINE 90 MG: 90 TABLET, EXTENDED RELEASE ORAL at 10:38

## 2019-06-21 RX ADMIN — SODIUM CHLORIDE, PRESERVATIVE FREE 10 ML: 5 INJECTION INTRAVENOUS at 20:41

## 2019-06-21 RX ADMIN — DILTIAZEM HYDROCHLORIDE 60 MG: 60 TABLET, FILM COATED ORAL at 20:40

## 2019-06-21 RX ADMIN — PROPOFOL 50 MG: 10 INJECTION, EMULSION INTRAVENOUS at 15:39

## 2019-06-21 RX ADMIN — VANCOMYCIN HYDROCHLORIDE 1250 MG: 5 INJECTION, POWDER, LYOPHILIZED, FOR SOLUTION INTRAVENOUS at 17:29

## 2019-06-21 RX ADMIN — FOLIC ACID 1 MG: 1 TABLET ORAL at 10:38

## 2019-06-21 RX ADMIN — SODIUM CHLORIDE: 9 INJECTION, SOLUTION INTRAVENOUS at 15:28

## 2019-06-21 RX ADMIN — PANTOPRAZOLE SODIUM 40 MG: 40 INJECTION, POWDER, FOR SOLUTION INTRAVENOUS at 10:37

## 2019-06-21 RX ADMIN — DILTIAZEM HYDROCHLORIDE 60 MG: 60 TABLET, FILM COATED ORAL at 12:39

## 2019-06-21 RX ADMIN — TAMSULOSIN HYDROCHLORIDE 0.4 MG: 0.4 CAPSULE ORAL at 20:44

## 2019-06-21 RX ADMIN — LORAZEPAM 2 MG: 2 TABLET ORAL at 20:39

## 2019-06-21 RX ADMIN — Medication 1 CAPSULE: at 10:37

## 2019-06-21 RX ADMIN — INSULIN ASPART 2 UNITS: 100 INJECTION, SOLUTION INTRAVENOUS; SUBCUTANEOUS at 23:07

## 2019-06-21 RX ADMIN — ISOSORBIDE MONONITRATE 30 MG: 30 TABLET, EXTENDED RELEASE ORAL at 10:36

## 2019-06-21 RX ADMIN — FENTANYL CITRATE 100 MCG: 50 INJECTION INTRAMUSCULAR; INTRAVENOUS at 15:28

## 2019-06-21 RX ADMIN — CALCIUM ACETATE 1334 MG: 667 CAPSULE ORAL at 20:40

## 2019-06-21 RX ADMIN — GUAIFENESIN 200 MG: 200 TABLET ORAL at 10:37

## 2019-06-21 RX ADMIN — PIPERACILLIN SODIUM,TAZOBACTAM SODIUM 3.38 G: 3; .375 INJECTION, POWDER, FOR SOLUTION INTRAVENOUS at 19:19

## 2019-06-21 RX ADMIN — SODIUM CHLORIDE, PRESERVATIVE FREE 3 ML: 5 INJECTION INTRAVENOUS at 20:42

## 2019-06-21 RX ADMIN — DILTIAZEM HYDROCHLORIDE 60 MG: 60 TABLET, FILM COATED ORAL at 10:36

## 2019-06-21 RX ADMIN — PROPOFOL 50 MG: 10 INJECTION, EMULSION INTRAVENOUS at 15:35

## 2019-06-21 RX ADMIN — IPRATROPIUM BROMIDE AND ALBUTEROL SULFATE 3 ML: .5; 3 SOLUTION RESPIRATORY (INHALATION) at 13:59

## 2019-06-21 RX ADMIN — GUAIFENESIN 200 MG: 200 TABLET ORAL at 20:40

## 2019-06-21 RX ADMIN — SODIUM CHLORIDE, PRESERVATIVE FREE 3 ML: 5 INJECTION INTRAVENOUS at 10:38

## 2019-06-21 RX ADMIN — PANTOPRAZOLE SODIUM 40 MG: 40 INJECTION, POWDER, FOR SOLUTION INTRAVENOUS at 17:29

## 2019-06-21 RX ADMIN — IPRATROPIUM BROMIDE AND ALBUTEROL SULFATE 3 ML: .5; 3 SOLUTION RESPIRATORY (INHALATION) at 19:59

## 2019-06-21 RX ADMIN — LEVOTHYROXINE SODIUM 50 MCG: 50 TABLET ORAL at 10:38

## 2019-06-21 RX ADMIN — PROPOFOL 80 MG: 10 INJECTION, EMULSION INTRAVENOUS at 15:31

## 2019-06-21 RX ADMIN — CALCIUM ACETATE 1334 MG: 667 CAPSULE ORAL at 10:37

## 2019-06-21 RX ADMIN — Medication 100 MG: at 10:37

## 2019-06-21 RX ADMIN — PHENYLEPHRINE HYDROCHLORIDE 100 MCG: 10 INJECTION, SOLUTION INTRAMUSCULAR; INTRAVENOUS; SUBCUTANEOUS at 15:44

## 2019-06-21 NOTE — ANESTHESIA PREPROCEDURE EVALUATION
Anesthesia Evaluation     Patient summary reviewed and Nursing notes reviewed   no history of anesthetic complications:  NPO Solid Status: > 8 hours  NPO Liquid Status: > 8 hours           Airway   Mallampati: II  TM distance: >3 FB  Neck ROM: full  No difficulty expected  Dental - normal exam   (+) edentulous, lower dentures and upper dentures    Pulmonary    (+) pneumonia , a smoker, shortness of breath, decreased breath sounds,     ROS comment: Recent respiratory failure requiring two periods of intubation/mechanical ventilation, extubated 3 days ago  PE comment: tachypneic  Cardiovascular   Exercise tolerance: good (4-7 METS)    NYHA Classification: II  PT is on anticoagulation therapy  Rhythm: irregular  Rate: abnormal    (+) hypertension (malignant), CAD, CABG >6 Months, dysrhythmias Atrial Fib, angina, hyperlipidemia,     PE comment: HR  irregular    Neuro/Psych  (+) psychiatric history Depression and Anxiety, poor historian.,       ROS Comment: Somnolent and confused  GI/Hepatic/Renal/Endo    (+)   hepatitis C, liver disease, renal disease CRI, diabetes mellitus using insulin, hypothyroidism,     Musculoskeletal     (+) back pain, chronic pain,   Abdominal  - normal exam    Bowel sounds: normal.   Substance History   (+) drug use     OB/GYN negative ob/gyn ROS         Other   (+) arthritis     ROS/Med Hx Other: Hx polysubstance abuse                  Anesthesia Plan    ASA 3     general     intravenous induction   Anesthetic plan, all risks, benefits, and alternatives have been provided, discussed and informed consent has been obtained with: patient (consent signed).  Use of blood products discussed with patient  Consented to blood products.

## 2019-06-22 LAB
ANION GAP SERPL CALCULATED.3IONS-SCNC: 17.5 MMOL/L
BASOPHILS # BLD AUTO: 0.03 10*3/MM3 (ref 0–0.2)
BASOPHILS NFR BLD AUTO: 0.7 % (ref 0–1.5)
BUN BLD-MCNC: 34 MG/DL (ref 8–23)
BUN/CREAT SERPL: 12 (ref 7–25)
CALCIUM SPEC-SCNC: 9 MG/DL (ref 8.6–10.5)
CHLORIDE SERPL-SCNC: 100 MMOL/L (ref 98–107)
CO2 SERPL-SCNC: 20.5 MMOL/L (ref 22–29)
CREAT BLD-MCNC: 2.83 MG/DL (ref 0.76–1.27)
DEPRECATED RDW RBC AUTO: 61.8 FL (ref 37–54)
EOSINOPHIL # BLD AUTO: 0.14 10*3/MM3 (ref 0–0.4)
EOSINOPHIL NFR BLD AUTO: 3.2 % (ref 0.3–6.2)
ERYTHROCYTE [DISTWIDTH] IN BLOOD BY AUTOMATED COUNT: 19.1 % (ref 12.3–15.4)
GFR SERPL CREATININE-BSD FRML MDRD: 23 ML/MIN/1.73
GLUCOSE BLD-MCNC: 84 MG/DL (ref 65–99)
GLUCOSE BLDC GLUCOMTR-MCNC: 130 MG/DL (ref 70–130)
GLUCOSE BLDC GLUCOMTR-MCNC: 224 MG/DL (ref 70–130)
GLUCOSE BLDC GLUCOMTR-MCNC: 90 MG/DL (ref 70–130)
GLUCOSE BLDC GLUCOMTR-MCNC: 95 MG/DL (ref 70–130)
HCT VFR BLD AUTO: 23.3 % (ref 37.5–51)
HGB BLD-MCNC: 7.1 G/DL (ref 13–17.7)
IMM GRANULOCYTES # BLD AUTO: 0.01 10*3/MM3 (ref 0–0.05)
IMM GRANULOCYTES NFR BLD AUTO: 0.2 % (ref 0–0.5)
LYMPHOCYTES # BLD AUTO: 1.13 10*3/MM3 (ref 0.7–3.1)
LYMPHOCYTES NFR BLD AUTO: 25.6 % (ref 19.6–45.3)
MAGNESIUM SERPL-MCNC: 1.6 MG/DL (ref 1.6–2.4)
MCH RBC QN AUTO: 28.4 PG (ref 26.6–33)
MCHC RBC AUTO-ENTMCNC: 30.5 G/DL (ref 31.5–35.7)
MCV RBC AUTO: 93.2 FL (ref 79–97)
MONOCYTES # BLD AUTO: 0.65 10*3/MM3 (ref 0.1–0.9)
MONOCYTES NFR BLD AUTO: 14.7 % (ref 5–12)
NEUTROPHILS # BLD AUTO: 2.45 10*3/MM3 (ref 1.7–7)
NEUTROPHILS NFR BLD AUTO: 55.6 % (ref 42.7–76)
PLATELET # BLD AUTO: 214 10*3/MM3 (ref 140–450)
PMV BLD AUTO: 9.3 FL (ref 6–12)
POTASSIUM BLD-SCNC: 4.1 MMOL/L (ref 3.5–5.2)
RBC # BLD AUTO: 2.5 10*6/MM3 (ref 4.14–5.8)
SODIUM BLD-SCNC: 138 MMOL/L (ref 136–145)
UREASE TISS QL: NEGATIVE
VANCOMYCIN SERPL-MCNC: 24.6 MCG/ML (ref 5–40)
WBC NRBC COR # BLD: 4.41 10*3/MM3 (ref 3.4–10.8)

## 2019-06-22 PROCEDURE — 25010000002 EPOETIN ALFA PER 1000 UNITS: Performed by: INTERNAL MEDICINE

## 2019-06-22 PROCEDURE — 83735 ASSAY OF MAGNESIUM: CPT | Performed by: INTERNAL MEDICINE

## 2019-06-22 PROCEDURE — 94799 UNLISTED PULMONARY SVC/PX: CPT

## 2019-06-22 PROCEDURE — 85025 COMPLETE CBC W/AUTO DIFF WBC: CPT | Performed by: INTERNAL MEDICINE

## 2019-06-22 PROCEDURE — 82962 GLUCOSE BLOOD TEST: CPT

## 2019-06-22 PROCEDURE — 99232 SBSQ HOSP IP/OBS MODERATE 35: CPT | Performed by: INTERNAL MEDICINE

## 2019-06-22 PROCEDURE — 25010000002 PIPERACILLIN-TAZOBACTAM: Performed by: NURSE PRACTITIONER

## 2019-06-22 PROCEDURE — 80048 BASIC METABOLIC PNL TOTAL CA: CPT | Performed by: INTERNAL MEDICINE

## 2019-06-22 PROCEDURE — 63710000001 INSULIN ASPART PER 5 UNITS: Performed by: NURSE PRACTITIONER

## 2019-06-22 PROCEDURE — 80202 ASSAY OF VANCOMYCIN: CPT

## 2019-06-22 PROCEDURE — 25010000002 VANCOMYCIN 5 G RECONSTITUTED SOLUTION 5,000 MG VIAL: Performed by: INTERNAL MEDICINE

## 2019-06-22 PROCEDURE — 93005 ELECTROCARDIOGRAM TRACING: CPT | Performed by: INTERNAL MEDICINE

## 2019-06-22 RX ORDER — ASPIRIN 81 MG/1
TABLET, CHEWABLE ORAL
Status: COMPLETED
Start: 2019-06-22 | End: 2019-06-22

## 2019-06-22 RX ORDER — METOPROLOL TARTRATE 5 MG/5ML
5 INJECTION INTRAVENOUS ONCE
Status: COMPLETED | OUTPATIENT
Start: 2019-06-22 | End: 2019-06-22

## 2019-06-22 RX ORDER — FLUCONAZOLE 100 MG/1
100 TABLET ORAL EVERY 24 HOURS
Status: COMPLETED | OUTPATIENT
Start: 2019-06-22 | End: 2019-06-28

## 2019-06-22 RX ORDER — ASPIRIN 81 MG/1
81 TABLET ORAL DAILY
Status: DISCONTINUED | OUTPATIENT
Start: 2019-06-22 | End: 2019-07-01 | Stop reason: HOSPADM

## 2019-06-22 RX ADMIN — CALCIUM ACETATE 1334 MG: 667 CAPSULE ORAL at 21:51

## 2019-06-22 RX ADMIN — ERYTHROPOIETIN 6000 UNITS: 20000 INJECTION, SOLUTION INTRAVENOUS; SUBCUTANEOUS at 14:14

## 2019-06-22 RX ADMIN — DILTIAZEM HYDROCHLORIDE 60 MG: 60 TABLET, FILM COATED ORAL at 17:39

## 2019-06-22 RX ADMIN — CALCIUM ACETATE 1334 MG: 667 CAPSULE ORAL at 14:14

## 2019-06-22 RX ADMIN — NIFEDIPINE 90 MG: 90 TABLET, EXTENDED RELEASE ORAL at 11:55

## 2019-06-22 RX ADMIN — ASPIRIN 81 MG: 81 TABLET ORAL at 18:42

## 2019-06-22 RX ADMIN — GUAIFENESIN 200 MG: 200 TABLET ORAL at 11:56

## 2019-06-22 RX ADMIN — APIXABAN 2.5 MG: 2.5 TABLET, FILM COATED ORAL at 21:50

## 2019-06-22 RX ADMIN — LORAZEPAM 2 MG: 2 TABLET ORAL at 23:51

## 2019-06-22 RX ADMIN — TAMSULOSIN HYDROCHLORIDE 0.4 MG: 0.4 CAPSULE ORAL at 21:50

## 2019-06-22 RX ADMIN — PANTOPRAZOLE SODIUM 40 MG: 40 INJECTION, POWDER, FOR SOLUTION INTRAVENOUS at 17:40

## 2019-06-22 RX ADMIN — FLUCONAZOLE 100 MG: 100 TABLET ORAL at 17:39

## 2019-06-22 RX ADMIN — ONDANSETRON 4 MG: 4 TABLET, FILM COATED ORAL at 23:50

## 2019-06-22 RX ADMIN — ATORVASTATIN CALCIUM 40 MG: 40 TABLET, FILM COATED ORAL at 21:50

## 2019-06-22 RX ADMIN — SODIUM CHLORIDE, PRESERVATIVE FREE 3 ML: 5 INJECTION INTRAVENOUS at 11:58

## 2019-06-22 RX ADMIN — CALCIUM ACETATE 1334 MG: 667 CAPSULE ORAL at 11:54

## 2019-06-22 RX ADMIN — ASPIRIN 81 MG: 81 TABLET, CHEWABLE ORAL at 18:42

## 2019-06-22 RX ADMIN — LORAZEPAM 1 MG: 1 TABLET ORAL at 14:22

## 2019-06-22 RX ADMIN — PIPERACILLIN SODIUM,TAZOBACTAM SODIUM 3.38 G: 3; .375 INJECTION, POWDER, FOR SOLUTION INTRAVENOUS at 17:38

## 2019-06-22 RX ADMIN — FOLIC ACID 1 MG: 1 TABLET ORAL at 11:56

## 2019-06-22 RX ADMIN — VANCOMYCIN HYDROCHLORIDE 1000 MG: 5 INJECTION, POWDER, LYOPHILIZED, FOR SOLUTION INTRAVENOUS at 14:15

## 2019-06-22 RX ADMIN — BUSPIRONE HYDROCHLORIDE 7.5 MG: 5 TABLET ORAL at 21:50

## 2019-06-22 RX ADMIN — PIPERACILLIN SODIUM,TAZOBACTAM SODIUM 3.38 G: 3; .375 INJECTION, POWDER, FOR SOLUTION INTRAVENOUS at 06:54

## 2019-06-22 RX ADMIN — SODIUM CHLORIDE 1000 ML: 9 INJECTION, SOLUTION INTRAVENOUS at 08:00

## 2019-06-22 RX ADMIN — SODIUM CHLORIDE, PRESERVATIVE FREE 10 ML: 5 INJECTION INTRAVENOUS at 06:55

## 2019-06-22 RX ADMIN — SERTRALINE 100 MG: 50 TABLET, FILM COATED ORAL at 11:54

## 2019-06-22 RX ADMIN — GUAIFENESIN 200 MG: 200 TABLET ORAL at 14:14

## 2019-06-22 RX ADMIN — GUAIFENESIN 200 MG: 200 TABLET ORAL at 21:50

## 2019-06-22 RX ADMIN — PANTOPRAZOLE SODIUM 40 MG: 40 INJECTION, POWDER, FOR SOLUTION INTRAVENOUS at 06:55

## 2019-06-22 RX ADMIN — Medication 1 CAPSULE: at 11:55

## 2019-06-22 RX ADMIN — METOPROLOL TARTRATE 5 MG: 5 INJECTION, SOLUTION INTRAVENOUS at 12:46

## 2019-06-22 RX ADMIN — SODIUM CHLORIDE, PRESERVATIVE FREE 10 ML: 5 INJECTION INTRAVENOUS at 06:56

## 2019-06-22 RX ADMIN — LEVOTHYROXINE SODIUM 50 MCG: 50 TABLET ORAL at 11:55

## 2019-06-22 RX ADMIN — IPRATROPIUM BROMIDE AND ALBUTEROL SULFATE 3 ML: .5; 3 SOLUTION RESPIRATORY (INHALATION) at 18:47

## 2019-06-22 RX ADMIN — DILTIAZEM HYDROCHLORIDE 60 MG: 60 TABLET, FILM COATED ORAL at 21:50

## 2019-06-22 RX ADMIN — INSULIN ASPART 3 UNITS: 100 INJECTION, SOLUTION INTRAVENOUS; SUBCUTANEOUS at 17:41

## 2019-06-22 RX ADMIN — ISOSORBIDE MONONITRATE 30 MG: 30 TABLET, EXTENDED RELEASE ORAL at 11:55

## 2019-06-22 RX ADMIN — Medication 100 MG: at 11:54

## 2019-06-22 RX ADMIN — DILTIAZEM HYDROCHLORIDE 60 MG: 60 TABLET, FILM COATED ORAL at 11:56

## 2019-06-23 LAB
ANION GAP SERPL CALCULATED.3IONS-SCNC: 13.2 MMOL/L
BACTERIA SPEC AEROBE CULT: NORMAL
BACTERIA SPEC AEROBE CULT: NORMAL
BASOPHILS # BLD AUTO: 0.04 10*3/MM3 (ref 0–0.2)
BASOPHILS NFR BLD AUTO: 0.9 % (ref 0–1.5)
BUN BLD-MCNC: 21 MG/DL (ref 8–23)
BUN/CREAT SERPL: 9.5 (ref 7–25)
CALCIUM SPEC-SCNC: 8.4 MG/DL (ref 8.6–10.5)
CHLORIDE SERPL-SCNC: 101 MMOL/L (ref 98–107)
CO2 SERPL-SCNC: 25.8 MMOL/L (ref 22–29)
CREAT BLD-MCNC: 2.2 MG/DL (ref 0.76–1.27)
DEPRECATED RDW RBC AUTO: 62 FL (ref 37–54)
EOSINOPHIL # BLD AUTO: 0.1 10*3/MM3 (ref 0–0.4)
EOSINOPHIL NFR BLD AUTO: 2.3 % (ref 0.3–6.2)
ERYTHROCYTE [DISTWIDTH] IN BLOOD BY AUTOMATED COUNT: 18.7 % (ref 12.3–15.4)
GFR SERPL CREATININE-BSD FRML MDRD: 31 ML/MIN/1.73
GLUCOSE BLD-MCNC: 123 MG/DL (ref 65–99)
GLUCOSE BLDC GLUCOMTR-MCNC: 110 MG/DL (ref 70–130)
GLUCOSE BLDC GLUCOMTR-MCNC: 119 MG/DL (ref 70–130)
GLUCOSE BLDC GLUCOMTR-MCNC: 125 MG/DL (ref 70–130)
GLUCOSE BLDC GLUCOMTR-MCNC: 197 MG/DL (ref 70–130)
HCT VFR BLD AUTO: 25.4 % (ref 37.5–51)
HGB BLD-MCNC: 7.6 G/DL (ref 13–17.7)
IMM GRANULOCYTES # BLD AUTO: 0.01 10*3/MM3 (ref 0–0.05)
IMM GRANULOCYTES NFR BLD AUTO: 0.2 % (ref 0–0.5)
LYMPHOCYTES # BLD AUTO: 1.25 10*3/MM3 (ref 0.7–3.1)
LYMPHOCYTES NFR BLD AUTO: 28.7 % (ref 19.6–45.3)
MAGNESIUM SERPL-MCNC: 1.7 MG/DL (ref 1.6–2.4)
MCH RBC QN AUTO: 28.5 PG (ref 26.6–33)
MCHC RBC AUTO-ENTMCNC: 29.9 G/DL (ref 31.5–35.7)
MCV RBC AUTO: 95.1 FL (ref 79–97)
MONOCYTES # BLD AUTO: 0.54 10*3/MM3 (ref 0.1–0.9)
MONOCYTES NFR BLD AUTO: 12.4 % (ref 5–12)
NEUTROPHILS # BLD AUTO: 2.42 10*3/MM3 (ref 1.7–7)
NEUTROPHILS NFR BLD AUTO: 55.5 % (ref 42.7–76)
PLATELET # BLD AUTO: 227 10*3/MM3 (ref 140–450)
PMV BLD AUTO: 9.4 FL (ref 6–12)
POTASSIUM BLD-SCNC: 4.2 MMOL/L (ref 3.5–5.2)
RBC # BLD AUTO: 2.67 10*6/MM3 (ref 4.14–5.8)
SODIUM BLD-SCNC: 140 MMOL/L (ref 136–145)
VANCOMYCIN TROUGH SERPL-MCNC: 25.1 MCG/ML (ref 5–20)
WBC NRBC COR # BLD: 4.36 10*3/MM3 (ref 3.4–10.8)

## 2019-06-23 PROCEDURE — 82962 GLUCOSE BLOOD TEST: CPT

## 2019-06-23 PROCEDURE — 80048 BASIC METABOLIC PNL TOTAL CA: CPT | Performed by: INTERNAL MEDICINE

## 2019-06-23 PROCEDURE — 85025 COMPLETE CBC W/AUTO DIFF WBC: CPT | Performed by: INTERNAL MEDICINE

## 2019-06-23 PROCEDURE — 63710000001 INSULIN ASPART PER 5 UNITS: Performed by: NURSE PRACTITIONER

## 2019-06-23 PROCEDURE — 94799 UNLISTED PULMONARY SVC/PX: CPT

## 2019-06-23 PROCEDURE — 83735 ASSAY OF MAGNESIUM: CPT | Performed by: INTERNAL MEDICINE

## 2019-06-23 PROCEDURE — 80202 ASSAY OF VANCOMYCIN: CPT | Performed by: INTERNAL MEDICINE

## 2019-06-23 PROCEDURE — 99232 SBSQ HOSP IP/OBS MODERATE 35: CPT | Performed by: INTERNAL MEDICINE

## 2019-06-23 PROCEDURE — 25010000002 PIPERACILLIN-TAZOBACTAM: Performed by: NURSE PRACTITIONER

## 2019-06-23 RX ORDER — SODIUM CHLORIDE 0.9 % (FLUSH) 0.9 %
3 SYRINGE (ML) INJECTION EVERY 12 HOURS SCHEDULED
Status: DISCONTINUED | OUTPATIENT
Start: 2019-06-23 | End: 2019-07-01 | Stop reason: HOSPADM

## 2019-06-23 RX ORDER — SODIUM CHLORIDE 0.9 % (FLUSH) 0.9 %
3-10 SYRINGE (ML) INJECTION AS NEEDED
Status: DISCONTINUED | OUTPATIENT
Start: 2019-06-23 | End: 2019-07-01 | Stop reason: HOSPADM

## 2019-06-23 RX ADMIN — IPRATROPIUM BROMIDE AND ALBUTEROL SULFATE 3 ML: .5; 3 SOLUTION RESPIRATORY (INHALATION) at 13:39

## 2019-06-23 RX ADMIN — FLUCONAZOLE 100 MG: 100 TABLET ORAL at 17:35

## 2019-06-23 RX ADMIN — PANTOPRAZOLE SODIUM 40 MG: 40 INJECTION, POWDER, FOR SOLUTION INTRAVENOUS at 06:13

## 2019-06-23 RX ADMIN — GUAIFENESIN 200 MG: 200 TABLET ORAL at 14:57

## 2019-06-23 RX ADMIN — SODIUM CHLORIDE, PRESERVATIVE FREE 3 ML: 5 INJECTION INTRAVENOUS at 21:45

## 2019-06-23 RX ADMIN — IPRATROPIUM BROMIDE AND ALBUTEROL SULFATE 3 ML: .5; 3 SOLUTION RESPIRATORY (INHALATION) at 19:11

## 2019-06-23 RX ADMIN — ASPIRIN 81 MG: 81 TABLET ORAL at 11:00

## 2019-06-23 RX ADMIN — APIXABAN 2.5 MG: 2.5 TABLET, FILM COATED ORAL at 10:59

## 2019-06-23 RX ADMIN — CALCIUM ACETATE 1334 MG: 667 CAPSULE ORAL at 14:57

## 2019-06-23 RX ADMIN — CALCIUM ACETATE 1334 MG: 667 CAPSULE ORAL at 10:58

## 2019-06-23 RX ADMIN — PIPERACILLIN SODIUM,TAZOBACTAM SODIUM 3.38 G: 3; .375 INJECTION, POWDER, FOR SOLUTION INTRAVENOUS at 06:14

## 2019-06-23 RX ADMIN — PANTOPRAZOLE SODIUM 40 MG: 40 INJECTION, POWDER, FOR SOLUTION INTRAVENOUS at 17:36

## 2019-06-23 RX ADMIN — TAMSULOSIN HYDROCHLORIDE 0.4 MG: 0.4 CAPSULE ORAL at 21:39

## 2019-06-23 RX ADMIN — SODIUM CHLORIDE, PRESERVATIVE FREE 3 ML: 5 INJECTION INTRAVENOUS at 11:00

## 2019-06-23 RX ADMIN — SERTRALINE 100 MG: 50 TABLET, FILM COATED ORAL at 10:59

## 2019-06-23 RX ADMIN — DILTIAZEM HYDROCHLORIDE 60 MG: 60 TABLET, FILM COATED ORAL at 21:40

## 2019-06-23 RX ADMIN — CALCIUM ACETATE 1334 MG: 667 CAPSULE ORAL at 21:39

## 2019-06-23 RX ADMIN — Medication 1 CAPSULE: at 10:59

## 2019-06-23 RX ADMIN — DILTIAZEM HYDROCHLORIDE 60 MG: 60 TABLET, FILM COATED ORAL at 10:58

## 2019-06-23 RX ADMIN — ISOSORBIDE MONONITRATE 30 MG: 30 TABLET, EXTENDED RELEASE ORAL at 11:00

## 2019-06-23 RX ADMIN — NIFEDIPINE 90 MG: 90 TABLET, EXTENDED RELEASE ORAL at 10:58

## 2019-06-23 RX ADMIN — ATORVASTATIN CALCIUM 40 MG: 40 TABLET, FILM COATED ORAL at 21:39

## 2019-06-23 RX ADMIN — CALCIUM ACETATE 1334 MG: 667 CAPSULE ORAL at 02:39

## 2019-06-23 RX ADMIN — INSULIN ASPART 2 UNITS: 100 INJECTION, SOLUTION INTRAVENOUS; SUBCUTANEOUS at 17:36

## 2019-06-23 RX ADMIN — GUAIFENESIN 200 MG: 200 TABLET ORAL at 02:39

## 2019-06-23 RX ADMIN — BUSPIRONE HYDROCHLORIDE 7.5 MG: 5 TABLET ORAL at 21:39

## 2019-06-23 RX ADMIN — FOLIC ACID 1 MG: 1 TABLET ORAL at 10:59

## 2019-06-23 RX ADMIN — APIXABAN 2.5 MG: 2.5 TABLET, FILM COATED ORAL at 21:40

## 2019-06-23 RX ADMIN — LEVOTHYROXINE SODIUM 50 MCG: 50 TABLET ORAL at 11:00

## 2019-06-23 RX ADMIN — GUAIFENESIN 200 MG: 200 TABLET ORAL at 10:59

## 2019-06-23 RX ADMIN — Medication 100 MG: at 10:59

## 2019-06-23 RX ADMIN — DILTIAZEM HYDROCHLORIDE 60 MG: 60 TABLET, FILM COATED ORAL at 17:35

## 2019-06-23 RX ADMIN — PIPERACILLIN SODIUM,TAZOBACTAM SODIUM 3.38 G: 3; .375 INJECTION, POWDER, FOR SOLUTION INTRAVENOUS at 17:35

## 2019-06-23 RX ADMIN — LORAZEPAM 1 MG: 1 TABLET ORAL at 21:40

## 2019-06-23 RX ADMIN — OXYCODONE HYDROCHLORIDE 5 MG: 5 TABLET ORAL at 17:35

## 2019-06-23 RX ADMIN — GUAIFENESIN 200 MG: 200 TABLET ORAL at 21:41

## 2019-06-23 RX ADMIN — DILTIAZEM HYDROCHLORIDE 60 MG: 60 TABLET, FILM COATED ORAL at 14:57

## 2019-06-24 LAB
ANION GAP SERPL CALCULATED.3IONS-SCNC: 16.5 MMOL/L
BASOPHILS # BLD AUTO: 0.03 10*3/MM3 (ref 0–0.2)
BASOPHILS NFR BLD AUTO: 0.6 % (ref 0–1.5)
BUN BLD-MCNC: 33 MG/DL (ref 8–23)
BUN/CREAT SERPL: 9.9 (ref 7–25)
CALCIUM SPEC-SCNC: 9.3 MG/DL (ref 8.6–10.5)
CHLORIDE SERPL-SCNC: 99 MMOL/L (ref 98–107)
CO2 SERPL-SCNC: 22.5 MMOL/L (ref 22–29)
CREAT BLD-MCNC: 3.33 MG/DL (ref 0.76–1.27)
DEPRECATED RDW RBC AUTO: 60.6 FL (ref 37–54)
EOSINOPHIL # BLD AUTO: 0.15 10*3/MM3 (ref 0–0.4)
EOSINOPHIL NFR BLD AUTO: 3 % (ref 0.3–6.2)
ERYTHROCYTE [DISTWIDTH] IN BLOOD BY AUTOMATED COUNT: 18.4 % (ref 12.3–15.4)
GFR SERPL CREATININE-BSD FRML MDRD: 19 ML/MIN/1.73
GLUCOSE BLD-MCNC: 111 MG/DL (ref 65–99)
GLUCOSE BLDC GLUCOMTR-MCNC: 102 MG/DL (ref 70–130)
GLUCOSE BLDC GLUCOMTR-MCNC: 153 MG/DL (ref 70–130)
GLUCOSE BLDC GLUCOMTR-MCNC: 153 MG/DL (ref 70–130)
GLUCOSE BLDC GLUCOMTR-MCNC: 159 MG/DL (ref 70–130)
HCT VFR BLD AUTO: 25.8 % (ref 37.5–51)
HGB BLD-MCNC: 7.7 G/DL (ref 13–17.7)
IMM GRANULOCYTES # BLD AUTO: 0.01 10*3/MM3 (ref 0–0.05)
IMM GRANULOCYTES NFR BLD AUTO: 0.2 % (ref 0–0.5)
LYMPHOCYTES # BLD AUTO: 1.25 10*3/MM3 (ref 0.7–3.1)
LYMPHOCYTES NFR BLD AUTO: 25.4 % (ref 19.6–45.3)
MAGNESIUM SERPL-MCNC: 1.7 MG/DL (ref 1.6–2.4)
MCH RBC QN AUTO: 28.3 PG (ref 26.6–33)
MCHC RBC AUTO-ENTMCNC: 29.8 G/DL (ref 31.5–35.7)
MCV RBC AUTO: 94.9 FL (ref 79–97)
MONOCYTES # BLD AUTO: 0.62 10*3/MM3 (ref 0.1–0.9)
MONOCYTES NFR BLD AUTO: 12.6 % (ref 5–12)
NEUTROPHILS # BLD AUTO: 2.87 10*3/MM3 (ref 1.7–7)
NEUTROPHILS NFR BLD AUTO: 58.2 % (ref 42.7–76)
PLATELET # BLD AUTO: 219 10*3/MM3 (ref 140–450)
PMV BLD AUTO: 9 FL (ref 6–12)
POTASSIUM BLD-SCNC: 5.1 MMOL/L (ref 3.5–5.2)
RBC # BLD AUTO: 2.72 10*6/MM3 (ref 4.14–5.8)
SODIUM BLD-SCNC: 138 MMOL/L (ref 136–145)
WBC NRBC COR # BLD: 4.93 10*3/MM3 (ref 3.4–10.8)

## 2019-06-24 PROCEDURE — 94799 UNLISTED PULMONARY SVC/PX: CPT

## 2019-06-24 PROCEDURE — 82962 GLUCOSE BLOOD TEST: CPT

## 2019-06-24 PROCEDURE — 25010000002 ONDANSETRON PER 1 MG: Performed by: INTERNAL MEDICINE

## 2019-06-24 PROCEDURE — 25010000002 EPOETIN ALFA PER 1000 UNITS: Performed by: INTERNAL MEDICINE

## 2019-06-24 PROCEDURE — 25010000002 PIPERACILLIN-TAZOBACTAM: Performed by: NURSE PRACTITIONER

## 2019-06-24 PROCEDURE — 25010000002 VANCOMYCIN 5 G RECONSTITUTED SOLUTION 5,000 MG VIAL: Performed by: INTERNAL MEDICINE

## 2019-06-24 PROCEDURE — 99222 1ST HOSP IP/OBS MODERATE 55: CPT | Performed by: INTERNAL MEDICINE

## 2019-06-24 PROCEDURE — 93005 ELECTROCARDIOGRAM TRACING: CPT | Performed by: INTERNAL MEDICINE

## 2019-06-24 PROCEDURE — 85025 COMPLETE CBC W/AUTO DIFF WBC: CPT | Performed by: INTERNAL MEDICINE

## 2019-06-24 PROCEDURE — 80048 BASIC METABOLIC PNL TOTAL CA: CPT | Performed by: INTERNAL MEDICINE

## 2019-06-24 PROCEDURE — 99232 SBSQ HOSP IP/OBS MODERATE 35: CPT | Performed by: INTERNAL MEDICINE

## 2019-06-24 PROCEDURE — 83735 ASSAY OF MAGNESIUM: CPT | Performed by: INTERNAL MEDICINE

## 2019-06-24 PROCEDURE — 93010 ELECTROCARDIOGRAM REPORT: CPT | Performed by: INTERNAL MEDICINE

## 2019-06-24 RX ORDER — CARVEDILOL 6.25 MG/1
6.25 TABLET ORAL 2 TIMES DAILY WITH MEALS
Status: DISCONTINUED | OUTPATIENT
Start: 2019-06-24 | End: 2019-06-26

## 2019-06-24 RX ADMIN — CALCIUM ACETATE 1334 MG: 667 CAPSULE ORAL at 09:27

## 2019-06-24 RX ADMIN — PIPERACILLIN SODIUM,TAZOBACTAM SODIUM 3.38 G: 3; .375 INJECTION, POWDER, FOR SOLUTION INTRAVENOUS at 07:40

## 2019-06-24 RX ADMIN — GUAIFENESIN 200 MG: 200 TABLET ORAL at 14:19

## 2019-06-24 RX ADMIN — SODIUM CHLORIDE, PRESERVATIVE FREE 3 ML: 5 INJECTION INTRAVENOUS at 09:33

## 2019-06-24 RX ADMIN — DILTIAZEM HYDROCHLORIDE 60 MG: 60 TABLET, FILM COATED ORAL at 11:02

## 2019-06-24 RX ADMIN — PANTOPRAZOLE SODIUM 40 MG: 40 INJECTION, POWDER, FOR SOLUTION INTRAVENOUS at 17:54

## 2019-06-24 RX ADMIN — SODIUM CHLORIDE 1000 ML: 9 INJECTION, SOLUTION INTRAVENOUS at 13:50

## 2019-06-24 RX ADMIN — GUAIFENESIN 200 MG: 200 TABLET ORAL at 21:41

## 2019-06-24 RX ADMIN — DILTIAZEM HYDROCHLORIDE 60 MG: 60 TABLET, FILM COATED ORAL at 09:17

## 2019-06-24 RX ADMIN — CALCIUM ACETATE 1334 MG: 667 CAPSULE ORAL at 14:19

## 2019-06-24 RX ADMIN — TAMSULOSIN HYDROCHLORIDE 0.4 MG: 0.4 CAPSULE ORAL at 21:41

## 2019-06-24 RX ADMIN — CALCIUM ACETATE 1334 MG: 667 CAPSULE ORAL at 21:40

## 2019-06-24 RX ADMIN — OXYCODONE HYDROCHLORIDE 5 MG: 5 TABLET ORAL at 23:36

## 2019-06-24 RX ADMIN — APIXABAN 2.5 MG: 2.5 TABLET, FILM COATED ORAL at 09:27

## 2019-06-24 RX ADMIN — CALCITRIOL 0.5 MCG: 0.25 CAPSULE ORAL at 09:28

## 2019-06-24 RX ADMIN — DILTIAZEM HYDROCHLORIDE 60 MG: 60 TABLET, FILM COATED ORAL at 17:54

## 2019-06-24 RX ADMIN — IPRATROPIUM BROMIDE AND ALBUTEROL SULFATE 3 ML: .5; 3 SOLUTION RESPIRATORY (INHALATION) at 07:41

## 2019-06-24 RX ADMIN — ERYTHROPOIETIN 6000 UNITS: 20000 INJECTION, SOLUTION INTRAVENOUS; SUBCUTANEOUS at 11:02

## 2019-06-24 RX ADMIN — VANCOMYCIN HYDROCHLORIDE 1000 MG: 5 INJECTION, POWDER, LYOPHILIZED, FOR SOLUTION INTRAVENOUS at 17:55

## 2019-06-24 RX ADMIN — SODIUM CHLORIDE, PRESERVATIVE FREE 3 ML: 5 INJECTION INTRAVENOUS at 21:46

## 2019-06-24 RX ADMIN — PIPERACILLIN SODIUM,TAZOBACTAM SODIUM 3.38 G: 3; .375 INJECTION, POWDER, FOR SOLUTION INTRAVENOUS at 19:17

## 2019-06-24 RX ADMIN — CARVEDILOL 6.25 MG: 6.25 TABLET, FILM COATED ORAL at 17:54

## 2019-06-24 RX ADMIN — Medication 1 CAPSULE: at 09:27

## 2019-06-24 RX ADMIN — FOLIC ACID 1 MG: 1 TABLET ORAL at 09:27

## 2019-06-24 RX ADMIN — GUAIFENESIN 200 MG: 200 TABLET ORAL at 09:27

## 2019-06-24 RX ADMIN — ONDANSETRON 4 MG: 2 INJECTION, SOLUTION INTRAMUSCULAR; INTRAVENOUS at 23:36

## 2019-06-24 RX ADMIN — LEVOTHYROXINE SODIUM 50 MCG: 50 TABLET ORAL at 11:02

## 2019-06-24 RX ADMIN — ASPIRIN 81 MG: 81 TABLET ORAL at 09:27

## 2019-06-24 RX ADMIN — BUSPIRONE HYDROCHLORIDE 7.5 MG: 5 TABLET ORAL at 21:42

## 2019-06-24 RX ADMIN — APIXABAN 2.5 MG: 2.5 TABLET, FILM COATED ORAL at 21:41

## 2019-06-24 RX ADMIN — FLUCONAZOLE 100 MG: 100 TABLET ORAL at 19:16

## 2019-06-24 RX ADMIN — DILTIAZEM HYDROCHLORIDE 60 MG: 60 TABLET, FILM COATED ORAL at 21:42

## 2019-06-24 RX ADMIN — SODIUM CHLORIDE, PRESERVATIVE FREE 3 ML: 5 INJECTION INTRAVENOUS at 09:32

## 2019-06-24 RX ADMIN — ATORVASTATIN CALCIUM 40 MG: 40 TABLET, FILM COATED ORAL at 21:41

## 2019-06-24 RX ADMIN — LORAZEPAM 1 MG: 1 TABLET ORAL at 14:19

## 2019-06-24 RX ADMIN — SERTRALINE 100 MG: 50 TABLET, FILM COATED ORAL at 09:28

## 2019-06-24 RX ADMIN — PANTOPRAZOLE SODIUM 40 MG: 40 INJECTION, POWDER, FOR SOLUTION INTRAVENOUS at 07:39

## 2019-06-24 RX ADMIN — NIFEDIPINE 90 MG: 90 TABLET, EXTENDED RELEASE ORAL at 09:28

## 2019-06-24 RX ADMIN — ISOSORBIDE MONONITRATE 30 MG: 30 TABLET, EXTENDED RELEASE ORAL at 09:29

## 2019-06-24 RX ADMIN — Medication 100 MG: at 09:27

## 2019-06-25 LAB
ANION GAP SERPL CALCULATED.3IONS-SCNC: 9.9 MMOL/L
BASOPHILS # BLD AUTO: 0.02 10*3/MM3 (ref 0–0.2)
BASOPHILS NFR BLD AUTO: 0.5 % (ref 0–1.5)
BUN BLD-MCNC: 15 MG/DL (ref 8–23)
BUN/CREAT SERPL: 7.3 (ref 7–25)
CALCIUM SPEC-SCNC: 9 MG/DL (ref 8.6–10.5)
CHLORIDE SERPL-SCNC: 99 MMOL/L (ref 98–107)
CO2 SERPL-SCNC: 27.1 MMOL/L (ref 22–29)
CREAT BLD-MCNC: 2.06 MG/DL (ref 0.76–1.27)
DEPRECATED RDW RBC AUTO: 57.8 FL (ref 37–54)
EOSINOPHIL # BLD AUTO: 0.14 10*3/MM3 (ref 0–0.4)
EOSINOPHIL NFR BLD AUTO: 3.3 % (ref 0.3–6.2)
ERYTHROCYTE [DISTWIDTH] IN BLOOD BY AUTOMATED COUNT: 17.8 % (ref 12.3–15.4)
GFR SERPL CREATININE-BSD FRML MDRD: 33 ML/MIN/1.73
GLUCOSE BLD-MCNC: 93 MG/DL (ref 65–99)
GLUCOSE BLDC GLUCOMTR-MCNC: 115 MG/DL (ref 70–130)
GLUCOSE BLDC GLUCOMTR-MCNC: 91 MG/DL (ref 70–130)
HCT VFR BLD AUTO: 25.4 % (ref 37.5–51)
HGB BLD-MCNC: 7.6 G/DL (ref 13–17.7)
IMM GRANULOCYTES # BLD AUTO: 0.01 10*3/MM3 (ref 0–0.05)
IMM GRANULOCYTES NFR BLD AUTO: 0.2 % (ref 0–0.5)
LYMPHOCYTES # BLD AUTO: 1.01 10*3/MM3 (ref 0.7–3.1)
LYMPHOCYTES NFR BLD AUTO: 23.9 % (ref 19.6–45.3)
MAGNESIUM SERPL-MCNC: 1.7 MG/DL (ref 1.6–2.4)
MCH RBC QN AUTO: 28.4 PG (ref 26.6–33)
MCHC RBC AUTO-ENTMCNC: 29.9 G/DL (ref 31.5–35.7)
MCV RBC AUTO: 94.8 FL (ref 79–97)
MONOCYTES # BLD AUTO: 0.6 10*3/MM3 (ref 0.1–0.9)
MONOCYTES NFR BLD AUTO: 14.2 % (ref 5–12)
NEUTROPHILS # BLD AUTO: 2.44 10*3/MM3 (ref 1.7–7)
NEUTROPHILS NFR BLD AUTO: 57.9 % (ref 42.7–76)
PLATELET # BLD AUTO: 194 10*3/MM3 (ref 140–450)
PMV BLD AUTO: 9.1 FL (ref 6–12)
POTASSIUM BLD-SCNC: 4 MMOL/L (ref 3.5–5.2)
RBC # BLD AUTO: 2.68 10*6/MM3 (ref 4.14–5.8)
SODIUM BLD-SCNC: 136 MMOL/L (ref 136–145)
T4 FREE SERPL-MCNC: 1.18 NG/DL (ref 0.93–1.7)
TSH SERPL DL<=0.05 MIU/L-ACNC: 6.47 MIU/ML (ref 0.27–4.2)
WBC NRBC COR # BLD: 4.22 10*3/MM3 (ref 3.4–10.8)

## 2019-06-25 PROCEDURE — 97163 PT EVAL HIGH COMPLEX 45 MIN: CPT | Performed by: PHYSICAL THERAPIST

## 2019-06-25 PROCEDURE — 94799 UNLISTED PULMONARY SVC/PX: CPT

## 2019-06-25 PROCEDURE — 99232 SBSQ HOSP IP/OBS MODERATE 35: CPT | Performed by: INTERNAL MEDICINE

## 2019-06-25 PROCEDURE — 85025 COMPLETE CBC W/AUTO DIFF WBC: CPT | Performed by: INTERNAL MEDICINE

## 2019-06-25 PROCEDURE — 83735 ASSAY OF MAGNESIUM: CPT | Performed by: INTERNAL MEDICINE

## 2019-06-25 PROCEDURE — 25010000002 PIPERACILLIN-TAZOBACTAM: Performed by: NURSE PRACTITIONER

## 2019-06-25 PROCEDURE — 82962 GLUCOSE BLOOD TEST: CPT

## 2019-06-25 PROCEDURE — 84443 ASSAY THYROID STIM HORMONE: CPT | Performed by: INTERNAL MEDICINE

## 2019-06-25 PROCEDURE — 84439 ASSAY OF FREE THYROXINE: CPT | Performed by: INTERNAL MEDICINE

## 2019-06-25 PROCEDURE — 80048 BASIC METABOLIC PNL TOTAL CA: CPT | Performed by: INTERNAL MEDICINE

## 2019-06-25 RX ORDER — METRONIDAZOLE 250 MG/1
500 TABLET ORAL EVERY 8 HOURS SCHEDULED
Status: COMPLETED | OUTPATIENT
Start: 2019-06-25 | End: 2019-07-01

## 2019-06-25 RX ORDER — PANTOPRAZOLE SODIUM 40 MG/1
40 TABLET, DELAYED RELEASE ORAL
Status: DISCONTINUED | OUTPATIENT
Start: 2019-06-25 | End: 2019-07-01 | Stop reason: HOSPADM

## 2019-06-25 RX ORDER — LEVOTHYROXINE SODIUM 0.07 MG/1
75 TABLET ORAL DAILY
Status: DISCONTINUED | OUTPATIENT
Start: 2019-06-26 | End: 2019-07-01 | Stop reason: HOSPADM

## 2019-06-25 RX ORDER — CEFDINIR 300 MG/1
300 CAPSULE ORAL EVERY OTHER DAY
Status: COMPLETED | OUTPATIENT
Start: 2019-06-25 | End: 2019-06-25

## 2019-06-25 RX ADMIN — BUSPIRONE HYDROCHLORIDE 7.5 MG: 5 TABLET ORAL at 20:38

## 2019-06-25 RX ADMIN — SODIUM CHLORIDE, PRESERVATIVE FREE 3 ML: 5 INJECTION INTRAVENOUS at 08:48

## 2019-06-25 RX ADMIN — GUAIFENESIN 200 MG: 200 TABLET ORAL at 14:42

## 2019-06-25 RX ADMIN — PIPERACILLIN SODIUM,TAZOBACTAM SODIUM 3.38 G: 3; .375 INJECTION, POWDER, FOR SOLUTION INTRAVENOUS at 05:59

## 2019-06-25 RX ADMIN — LEVOTHYROXINE SODIUM 50 MCG: 50 TABLET ORAL at 08:46

## 2019-06-25 RX ADMIN — APIXABAN 2.5 MG: 2.5 TABLET, FILM COATED ORAL at 20:38

## 2019-06-25 RX ADMIN — CARVEDILOL 6.25 MG: 6.25 TABLET, FILM COATED ORAL at 08:44

## 2019-06-25 RX ADMIN — CALCIUM ACETATE 1334 MG: 667 CAPSULE ORAL at 08:44

## 2019-06-25 RX ADMIN — SODIUM CHLORIDE, PRESERVATIVE FREE 3 ML: 5 INJECTION INTRAVENOUS at 20:41

## 2019-06-25 RX ADMIN — ISOSORBIDE MONONITRATE 30 MG: 30 TABLET, EXTENDED RELEASE ORAL at 08:47

## 2019-06-25 RX ADMIN — GUAIFENESIN 200 MG: 200 TABLET ORAL at 20:39

## 2019-06-25 RX ADMIN — ASPIRIN 81 MG: 81 TABLET ORAL at 08:45

## 2019-06-25 RX ADMIN — APIXABAN 2.5 MG: 2.5 TABLET, FILM COATED ORAL at 08:47

## 2019-06-25 RX ADMIN — Medication 100 MG: at 08:45

## 2019-06-25 RX ADMIN — LORAZEPAM 1 MG: 1 TABLET ORAL at 14:47

## 2019-06-25 RX ADMIN — Medication 1 CAPSULE: at 08:47

## 2019-06-25 RX ADMIN — ATORVASTATIN CALCIUM 40 MG: 40 TABLET, FILM COATED ORAL at 20:39

## 2019-06-25 RX ADMIN — CALCIUM ACETATE 1334 MG: 667 CAPSULE ORAL at 14:42

## 2019-06-25 RX ADMIN — PANTOPRAZOLE SODIUM 40 MG: 40 INJECTION, POWDER, FOR SOLUTION INTRAVENOUS at 08:40

## 2019-06-25 RX ADMIN — DILTIAZEM HYDROCHLORIDE 60 MG: 60 TABLET, FILM COATED ORAL at 20:38

## 2019-06-25 RX ADMIN — FOLIC ACID 1 MG: 1 TABLET ORAL at 08:45

## 2019-06-25 RX ADMIN — CARVEDILOL 6.25 MG: 6.25 TABLET, FILM COATED ORAL at 17:28

## 2019-06-25 RX ADMIN — CEFDINIR 300 MG: 300 CAPSULE ORAL at 13:04

## 2019-06-25 RX ADMIN — PANTOPRAZOLE SODIUM 40 MG: 40 TABLET, DELAYED RELEASE ORAL at 17:28

## 2019-06-25 RX ADMIN — SERTRALINE 100 MG: 50 TABLET, FILM COATED ORAL at 08:46

## 2019-06-25 RX ADMIN — IPRATROPIUM BROMIDE AND ALBUTEROL SULFATE 3 ML: .5; 3 SOLUTION RESPIRATORY (INHALATION) at 00:42

## 2019-06-25 RX ADMIN — CALCIUM ACETATE 1334 MG: 667 CAPSULE ORAL at 20:38

## 2019-06-25 RX ADMIN — METRONIDAZOLE 500 MG: 250 TABLET ORAL at 20:42

## 2019-06-25 RX ADMIN — METRONIDAZOLE 500 MG: 250 TABLET ORAL at 13:04

## 2019-06-25 RX ADMIN — TAMSULOSIN HYDROCHLORIDE 0.4 MG: 0.4 CAPSULE ORAL at 20:38

## 2019-06-25 RX ADMIN — DILTIAZEM HYDROCHLORIDE 60 MG: 60 TABLET, FILM COATED ORAL at 13:04

## 2019-06-25 RX ADMIN — FLUCONAZOLE 100 MG: 100 TABLET ORAL at 14:48

## 2019-06-25 RX ADMIN — OXYCODONE HYDROCHLORIDE 5 MG: 5 TABLET ORAL at 22:57

## 2019-06-25 RX ADMIN — DILTIAZEM HYDROCHLORIDE 60 MG: 60 TABLET, FILM COATED ORAL at 08:45

## 2019-06-25 RX ADMIN — DILTIAZEM HYDROCHLORIDE 60 MG: 60 TABLET, FILM COATED ORAL at 17:28

## 2019-06-25 RX ADMIN — GUAIFENESIN 200 MG: 200 TABLET ORAL at 08:44

## 2019-06-26 LAB
ANION GAP SERPL CALCULATED.3IONS-SCNC: 11.2 MMOL/L
BASOPHILS # BLD AUTO: 0.04 10*3/MM3 (ref 0–0.2)
BASOPHILS NFR BLD AUTO: 1.1 % (ref 0–1.5)
BUN BLD-MCNC: 30 MG/DL (ref 8–23)
BUN/CREAT SERPL: 9 (ref 7–25)
CALCIUM SPEC-SCNC: 9.1 MG/DL (ref 8.6–10.5)
CHLORIDE SERPL-SCNC: 102 MMOL/L (ref 98–107)
CO2 SERPL-SCNC: 24.8 MMOL/L (ref 22–29)
CREAT BLD-MCNC: 3.34 MG/DL (ref 0.76–1.27)
DEPRECATED RDW RBC AUTO: 59.2 FL (ref 37–54)
EOSINOPHIL # BLD AUTO: 0.14 10*3/MM3 (ref 0–0.4)
EOSINOPHIL NFR BLD AUTO: 3.7 % (ref 0.3–6.2)
ERYTHROCYTE [DISTWIDTH] IN BLOOD BY AUTOMATED COUNT: 17.5 % (ref 12.3–15.4)
GFR SERPL CREATININE-BSD FRML MDRD: 19 ML/MIN/1.73
GLUCOSE BLD-MCNC: 82 MG/DL (ref 65–99)
GLUCOSE BLDC GLUCOMTR-MCNC: 112 MG/DL (ref 70–130)
GLUCOSE BLDC GLUCOMTR-MCNC: 70 MG/DL (ref 70–130)
HCT VFR BLD AUTO: 27.9 % (ref 37.5–51)
HGB BLD-MCNC: 8.7 G/DL (ref 13–17.7)
IMM GRANULOCYTES # BLD AUTO: 0.01 10*3/MM3 (ref 0–0.05)
IMM GRANULOCYTES NFR BLD AUTO: 0.3 % (ref 0–0.5)
LYMPHOCYTES # BLD AUTO: 1.33 10*3/MM3 (ref 0.7–3.1)
LYMPHOCYTES NFR BLD AUTO: 35 % (ref 19.6–45.3)
MAGNESIUM SERPL-MCNC: 1.8 MG/DL (ref 1.6–2.4)
MCH RBC QN AUTO: 28.8 PG (ref 26.6–33)
MCHC RBC AUTO-ENTMCNC: 31.2 G/DL (ref 31.5–35.7)
MCV RBC AUTO: 92.4 FL (ref 79–97)
MONOCYTES # BLD AUTO: 0.43 10*3/MM3 (ref 0.1–0.9)
MONOCYTES NFR BLD AUTO: 11.3 % (ref 5–12)
NEUTROPHILS # BLD AUTO: 1.85 10*3/MM3 (ref 1.7–7)
NEUTROPHILS NFR BLD AUTO: 48.6 % (ref 42.7–76)
PLATELET # BLD AUTO: 180 10*3/MM3 (ref 140–450)
PMV BLD AUTO: 9.4 FL (ref 6–12)
POTASSIUM BLD-SCNC: 4.7 MMOL/L (ref 3.5–5.2)
RBC # BLD AUTO: 3.02 10*6/MM3 (ref 4.14–5.8)
SODIUM BLD-SCNC: 138 MMOL/L (ref 136–145)
WBC NRBC COR # BLD: 3.8 10*3/MM3 (ref 3.4–10.8)

## 2019-06-26 PROCEDURE — 99232 SBSQ HOSP IP/OBS MODERATE 35: CPT | Performed by: INTERNAL MEDICINE

## 2019-06-26 PROCEDURE — 85025 COMPLETE CBC W/AUTO DIFF WBC: CPT | Performed by: INTERNAL MEDICINE

## 2019-06-26 PROCEDURE — 80048 BASIC METABOLIC PNL TOTAL CA: CPT | Performed by: INTERNAL MEDICINE

## 2019-06-26 PROCEDURE — 82962 GLUCOSE BLOOD TEST: CPT

## 2019-06-26 PROCEDURE — 94799 UNLISTED PULMONARY SVC/PX: CPT

## 2019-06-26 PROCEDURE — 25010000002 EPOETIN ALFA PER 1000 UNITS: Performed by: INTERNAL MEDICINE

## 2019-06-26 PROCEDURE — 83735 ASSAY OF MAGNESIUM: CPT | Performed by: INTERNAL MEDICINE

## 2019-06-26 RX ORDER — CARVEDILOL 6.25 MG/1
12.5 TABLET ORAL 2 TIMES DAILY WITH MEALS
Status: DISCONTINUED | OUTPATIENT
Start: 2019-06-26 | End: 2019-07-01

## 2019-06-26 RX ORDER — AMIODARONE HYDROCHLORIDE 200 MG/1
400 TABLET ORAL
Status: DISCONTINUED | OUTPATIENT
Start: 2019-06-26 | End: 2019-07-01 | Stop reason: HOSPADM

## 2019-06-26 RX ORDER — AMIODARONE HYDROCHLORIDE 200 MG/1
200 TABLET ORAL
Status: DISCONTINUED | OUTPATIENT
Start: 2019-07-10 | End: 2019-07-01 | Stop reason: HOSPADM

## 2019-06-26 RX ADMIN — CALCIUM ACETATE 1334 MG: 667 CAPSULE ORAL at 08:23

## 2019-06-26 RX ADMIN — Medication 1 CAPSULE: at 08:23

## 2019-06-26 RX ADMIN — METRONIDAZOLE 500 MG: 250 TABLET ORAL at 05:00

## 2019-06-26 RX ADMIN — GUAIFENESIN 200 MG: 200 TABLET ORAL at 20:38

## 2019-06-26 RX ADMIN — ONDANSETRON 4 MG: 4 TABLET, FILM COATED ORAL at 11:55

## 2019-06-26 RX ADMIN — GUAIFENESIN 200 MG: 200 TABLET ORAL at 08:23

## 2019-06-26 RX ADMIN — SODIUM CHLORIDE, PRESERVATIVE FREE 3 ML: 5 INJECTION INTRAVENOUS at 08:25

## 2019-06-26 RX ADMIN — ISOSORBIDE MONONITRATE 30 MG: 30 TABLET, EXTENDED RELEASE ORAL at 08:24

## 2019-06-26 RX ADMIN — BUSPIRONE HYDROCHLORIDE 7.5 MG: 5 TABLET ORAL at 20:38

## 2019-06-26 RX ADMIN — GUAIFENESIN 200 MG: 200 TABLET ORAL at 17:02

## 2019-06-26 RX ADMIN — GUAIFENESIN 200 MG: 200 TABLET ORAL at 02:17

## 2019-06-26 RX ADMIN — CALCIUM ACETATE 1334 MG: 667 CAPSULE ORAL at 20:39

## 2019-06-26 RX ADMIN — OXYCODONE HYDROCHLORIDE 5 MG: 5 TABLET ORAL at 17:02

## 2019-06-26 RX ADMIN — SODIUM CHLORIDE, PRESERVATIVE FREE 3 ML: 5 INJECTION INTRAVENOUS at 20:39

## 2019-06-26 RX ADMIN — ATORVASTATIN CALCIUM 40 MG: 40 TABLET, FILM COATED ORAL at 20:38

## 2019-06-26 RX ADMIN — TAMSULOSIN HYDROCHLORIDE 0.4 MG: 0.4 CAPSULE ORAL at 20:38

## 2019-06-26 RX ADMIN — DILTIAZEM HYDROCHLORIDE 60 MG: 60 TABLET, FILM COATED ORAL at 11:26

## 2019-06-26 RX ADMIN — CARVEDILOL 6.25 MG: 6.25 TABLET, FILM COATED ORAL at 08:25

## 2019-06-26 RX ADMIN — DILTIAZEM HYDROCHLORIDE 60 MG: 60 TABLET, FILM COATED ORAL at 08:23

## 2019-06-26 RX ADMIN — PANTOPRAZOLE SODIUM 40 MG: 40 TABLET, DELAYED RELEASE ORAL at 17:02

## 2019-06-26 RX ADMIN — METRONIDAZOLE 500 MG: 250 TABLET ORAL at 21:46

## 2019-06-26 RX ADMIN — Medication 100 MG: at 08:24

## 2019-06-26 RX ADMIN — METRONIDAZOLE 500 MG: 250 TABLET ORAL at 17:02

## 2019-06-26 RX ADMIN — DILTIAZEM HYDROCHLORIDE 60 MG: 60 TABLET, FILM COATED ORAL at 17:02

## 2019-06-26 RX ADMIN — CARVEDILOL 12.5 MG: 6.25 TABLET, FILM COATED ORAL at 17:02

## 2019-06-26 RX ADMIN — SERTRALINE 100 MG: 50 TABLET, FILM COATED ORAL at 08:24

## 2019-06-26 RX ADMIN — APIXABAN 2.5 MG: 2.5 TABLET, FILM COATED ORAL at 20:38

## 2019-06-26 RX ADMIN — ERYTHROPOIETIN 6000 UNITS: 20000 INJECTION, SOLUTION INTRAVENOUS; SUBCUTANEOUS at 08:22

## 2019-06-26 RX ADMIN — APIXABAN 2.5 MG: 2.5 TABLET, FILM COATED ORAL at 08:23

## 2019-06-26 RX ADMIN — AMIODARONE HYDROCHLORIDE 400 MG: 200 TABLET ORAL at 11:26

## 2019-06-26 RX ADMIN — SODIUM CHLORIDE 1000 ML: 9 INJECTION, SOLUTION INTRAVENOUS at 16:28

## 2019-06-26 RX ADMIN — FOLIC ACID 1 MG: 1 TABLET ORAL at 08:23

## 2019-06-26 RX ADMIN — ASPIRIN 81 MG: 81 TABLET ORAL at 08:24

## 2019-06-26 RX ADMIN — CALCITRIOL 0.5 MCG: 0.25 CAPSULE ORAL at 08:24

## 2019-06-26 RX ADMIN — ONDANSETRON 4 MG: 4 TABLET, FILM COATED ORAL at 18:47

## 2019-06-26 RX ADMIN — PANTOPRAZOLE SODIUM 40 MG: 40 TABLET, DELAYED RELEASE ORAL at 05:01

## 2019-06-26 RX ADMIN — LEVOTHYROXINE SODIUM 75 MCG: 75 TABLET ORAL at 08:25

## 2019-06-26 RX ADMIN — CALCIUM ACETATE 1334 MG: 667 CAPSULE ORAL at 02:17

## 2019-06-26 RX ADMIN — FLUCONAZOLE 100 MG: 100 TABLET ORAL at 17:02

## 2019-06-26 RX ADMIN — CALCIUM ACETATE 1334 MG: 667 CAPSULE ORAL at 13:49

## 2019-06-26 RX ADMIN — DILTIAZEM HYDROCHLORIDE 60 MG: 60 TABLET, FILM COATED ORAL at 20:38

## 2019-06-27 LAB
ANION GAP SERPL CALCULATED.3IONS-SCNC: 14.6 MMOL/L (ref 5–15)
BASOPHILS # BLD AUTO: 0.02 10*3/MM3 (ref 0–0.2)
BASOPHILS NFR BLD AUTO: 0.5 % (ref 0–1.5)
BUN BLD-MCNC: 22 MG/DL (ref 8–23)
BUN/CREAT SERPL: 8.9 (ref 7–25)
CALCIUM SPEC-SCNC: 9.3 MG/DL (ref 8.6–10.5)
CHLORIDE SERPL-SCNC: 95 MMOL/L (ref 98–107)
CO2 SERPL-SCNC: 27.4 MMOL/L (ref 22–29)
CREAT BLD-MCNC: 2.47 MG/DL (ref 0.76–1.27)
DEPRECATED RDW RBC AUTO: 56.2 FL (ref 37–54)
EOSINOPHIL # BLD AUTO: 0.1 10*3/MM3 (ref 0–0.4)
EOSINOPHIL NFR BLD AUTO: 2.3 % (ref 0.3–6.2)
ERYTHROCYTE [DISTWIDTH] IN BLOOD BY AUTOMATED COUNT: 17.3 % (ref 12.3–15.4)
GFR SERPL CREATININE-BSD FRML MDRD: 27 ML/MIN/1.73
GLUCOSE BLD-MCNC: 83 MG/DL (ref 65–99)
GLUCOSE BLDC GLUCOMTR-MCNC: 130 MG/DL (ref 70–130)
GLUCOSE BLDC GLUCOMTR-MCNC: 154 MG/DL (ref 70–130)
GLUCOSE BLDC GLUCOMTR-MCNC: 158 MG/DL (ref 70–130)
GLUCOSE BLDC GLUCOMTR-MCNC: 182 MG/DL (ref 70–130)
GLUCOSE BLDC GLUCOMTR-MCNC: 83 MG/DL (ref 70–130)
GLUCOSE BLDC GLUCOMTR-MCNC: 93 MG/DL (ref 70–130)
HCT VFR BLD AUTO: 29.9 % (ref 37.5–51)
HGB BLD-MCNC: 9.1 G/DL (ref 13–17.7)
IMM GRANULOCYTES # BLD AUTO: 0.01 10*3/MM3 (ref 0–0.05)
IMM GRANULOCYTES NFR BLD AUTO: 0.2 % (ref 0–0.5)
LYMPHOCYTES # BLD AUTO: 1.63 10*3/MM3 (ref 0.7–3.1)
LYMPHOCYTES NFR BLD AUTO: 37.1 % (ref 19.6–45.3)
MAGNESIUM SERPL-MCNC: 1.8 MG/DL (ref 1.6–2.4)
MCH RBC QN AUTO: 28.6 PG (ref 26.6–33)
MCHC RBC AUTO-ENTMCNC: 30.4 G/DL (ref 31.5–35.7)
MCV RBC AUTO: 94 FL (ref 79–97)
MONOCYTES # BLD AUTO: 0.62 10*3/MM3 (ref 0.1–0.9)
MONOCYTES NFR BLD AUTO: 14.1 % (ref 5–12)
NEUTROPHILS # BLD AUTO: 2.01 10*3/MM3 (ref 1.7–7)
NEUTROPHILS NFR BLD AUTO: 45.8 % (ref 42.7–76)
PLATELET # BLD AUTO: 203 10*3/MM3 (ref 140–450)
PMV BLD AUTO: 9.2 FL (ref 6–12)
POTASSIUM BLD-SCNC: 3.8 MMOL/L (ref 3.5–5.2)
RBC # BLD AUTO: 3.18 10*6/MM3 (ref 4.14–5.8)
SODIUM BLD-SCNC: 137 MMOL/L (ref 136–145)
WBC NRBC COR # BLD: 4.39 10*3/MM3 (ref 3.4–10.8)

## 2019-06-27 PROCEDURE — 25010000002 HYDRALAZINE PER 20 MG: Performed by: HOSPITALIST

## 2019-06-27 PROCEDURE — 82962 GLUCOSE BLOOD TEST: CPT

## 2019-06-27 PROCEDURE — 99232 SBSQ HOSP IP/OBS MODERATE 35: CPT | Performed by: INTERNAL MEDICINE

## 2019-06-27 PROCEDURE — 85025 COMPLETE CBC W/AUTO DIFF WBC: CPT | Performed by: INTERNAL MEDICINE

## 2019-06-27 PROCEDURE — 94799 UNLISTED PULMONARY SVC/PX: CPT

## 2019-06-27 PROCEDURE — 80048 BASIC METABOLIC PNL TOTAL CA: CPT | Performed by: INTERNAL MEDICINE

## 2019-06-27 PROCEDURE — 83735 ASSAY OF MAGNESIUM: CPT | Performed by: INTERNAL MEDICINE

## 2019-06-27 RX ORDER — HYDRALAZINE HYDROCHLORIDE 20 MG/ML
10 INJECTION INTRAMUSCULAR; INTRAVENOUS EVERY 6 HOURS PRN
Status: DISCONTINUED | OUTPATIENT
Start: 2019-06-27 | End: 2019-07-01 | Stop reason: HOSPADM

## 2019-06-27 RX ADMIN — Medication 100 MG: at 08:49

## 2019-06-27 RX ADMIN — CARVEDILOL 12.5 MG: 6.25 TABLET, FILM COATED ORAL at 17:11

## 2019-06-27 RX ADMIN — GUAIFENESIN 200 MG: 200 TABLET ORAL at 08:48

## 2019-06-27 RX ADMIN — METRONIDAZOLE 500 MG: 250 TABLET ORAL at 14:16

## 2019-06-27 RX ADMIN — DILTIAZEM HYDROCHLORIDE 60 MG: 60 TABLET, FILM COATED ORAL at 07:20

## 2019-06-27 RX ADMIN — Medication 1 CAPSULE: at 08:50

## 2019-06-27 RX ADMIN — CALCIUM ACETATE 1334 MG: 667 CAPSULE ORAL at 08:48

## 2019-06-27 RX ADMIN — SODIUM CHLORIDE, PRESERVATIVE FREE 3 ML: 5 INJECTION INTRAVENOUS at 08:50

## 2019-06-27 RX ADMIN — GUAIFENESIN 200 MG: 200 TABLET ORAL at 20:36

## 2019-06-27 RX ADMIN — GUAIFENESIN 200 MG: 200 TABLET ORAL at 02:27

## 2019-06-27 RX ADMIN — FOLIC ACID 1 MG: 1 TABLET ORAL at 08:50

## 2019-06-27 RX ADMIN — APIXABAN 2.5 MG: 2.5 TABLET, FILM COATED ORAL at 08:49

## 2019-06-27 RX ADMIN — CALCIUM ACETATE 1334 MG: 667 CAPSULE ORAL at 14:16

## 2019-06-27 RX ADMIN — PANTOPRAZOLE SODIUM 40 MG: 40 TABLET, DELAYED RELEASE ORAL at 16:35

## 2019-06-27 RX ADMIN — ATORVASTATIN CALCIUM 40 MG: 40 TABLET, FILM COATED ORAL at 20:38

## 2019-06-27 RX ADMIN — APIXABAN 2.5 MG: 2.5 TABLET, FILM COATED ORAL at 20:37

## 2019-06-27 RX ADMIN — METRONIDAZOLE 500 MG: 250 TABLET ORAL at 22:00

## 2019-06-27 RX ADMIN — IPRATROPIUM BROMIDE AND ALBUTEROL SULFATE 3 ML: .5; 3 SOLUTION RESPIRATORY (INHALATION) at 18:47

## 2019-06-27 RX ADMIN — CARVEDILOL 12.5 MG: 6.25 TABLET, FILM COATED ORAL at 07:20

## 2019-06-27 RX ADMIN — METRONIDAZOLE 500 MG: 250 TABLET ORAL at 06:17

## 2019-06-27 RX ADMIN — LEVOTHYROXINE SODIUM 75 MCG: 75 TABLET ORAL at 08:49

## 2019-06-27 RX ADMIN — SODIUM CHLORIDE, PRESERVATIVE FREE 3 ML: 5 INJECTION INTRAVENOUS at 20:36

## 2019-06-27 RX ADMIN — AMIODARONE HYDROCHLORIDE 400 MG: 200 TABLET ORAL at 08:48

## 2019-06-27 RX ADMIN — BUSPIRONE HYDROCHLORIDE 7.5 MG: 5 TABLET ORAL at 20:37

## 2019-06-27 RX ADMIN — DILTIAZEM HYDROCHLORIDE 60 MG: 60 TABLET, FILM COATED ORAL at 11:22

## 2019-06-27 RX ADMIN — TAMSULOSIN HYDROCHLORIDE 0.4 MG: 0.4 CAPSULE ORAL at 20:38

## 2019-06-27 RX ADMIN — ONDANSETRON 4 MG: 4 TABLET, FILM COATED ORAL at 13:34

## 2019-06-27 RX ADMIN — FLUCONAZOLE 100 MG: 100 TABLET ORAL at 16:35

## 2019-06-27 RX ADMIN — CALCIUM ACETATE 1334 MG: 667 CAPSULE ORAL at 20:37

## 2019-06-27 RX ADMIN — GUAIFENESIN 200 MG: 200 TABLET ORAL at 14:16

## 2019-06-27 RX ADMIN — ASPIRIN 81 MG: 81 TABLET ORAL at 08:49

## 2019-06-27 RX ADMIN — DILTIAZEM HYDROCHLORIDE 60 MG: 60 TABLET, FILM COATED ORAL at 20:36

## 2019-06-27 RX ADMIN — OXYCODONE HYDROCHLORIDE 5 MG: 5 TABLET ORAL at 20:35

## 2019-06-27 RX ADMIN — ISOSORBIDE MONONITRATE 30 MG: 30 TABLET, EXTENDED RELEASE ORAL at 08:49

## 2019-06-27 RX ADMIN — PANTOPRAZOLE SODIUM 40 MG: 40 TABLET, DELAYED RELEASE ORAL at 07:20

## 2019-06-27 RX ADMIN — CALCIUM ACETATE 1334 MG: 667 CAPSULE ORAL at 02:27

## 2019-06-27 RX ADMIN — DILTIAZEM HYDROCHLORIDE 60 MG: 60 TABLET, FILM COATED ORAL at 17:11

## 2019-06-27 RX ADMIN — HYDRALAZINE HYDROCHLORIDE 10 MG: 20 INJECTION INTRAMUSCULAR; INTRAVENOUS at 02:27

## 2019-06-27 RX ADMIN — SODIUM CHLORIDE, PRESERVATIVE FREE 3 ML: 5 INJECTION INTRAVENOUS at 20:38

## 2019-06-27 RX ADMIN — OXYCODONE HYDROCHLORIDE 5 MG: 5 TABLET ORAL at 01:19

## 2019-06-27 RX ADMIN — SERTRALINE 100 MG: 50 TABLET, FILM COATED ORAL at 08:49

## 2019-06-28 LAB
ANION GAP SERPL CALCULATED.3IONS-SCNC: 9.5 MMOL/L (ref 5–15)
BASOPHILS # BLD AUTO: 0.03 10*3/MM3 (ref 0–0.2)
BASOPHILS NFR BLD AUTO: 0.7 % (ref 0–1.5)
BUN BLD-MCNC: 46 MG/DL (ref 8–23)
BUN/CREAT SERPL: 13.3 (ref 7–25)
CALCIUM SPEC-SCNC: 9 MG/DL (ref 8.6–10.5)
CHLORIDE SERPL-SCNC: 98 MMOL/L (ref 98–107)
CO2 SERPL-SCNC: 24.5 MMOL/L (ref 22–29)
CREAT BLD-MCNC: 3.46 MG/DL (ref 0.76–1.27)
DEPRECATED RDW RBC AUTO: 57 FL (ref 37–54)
EOSINOPHIL # BLD AUTO: 0.1 10*3/MM3 (ref 0–0.4)
EOSINOPHIL NFR BLD AUTO: 2.2 % (ref 0.3–6.2)
ERYTHROCYTE [DISTWIDTH] IN BLOOD BY AUTOMATED COUNT: 17.5 % (ref 12.3–15.4)
GFR SERPL CREATININE-BSD FRML MDRD: 18 ML/MIN/1.73
GLUCOSE BLD-MCNC: 130 MG/DL (ref 65–99)
GLUCOSE BLDC GLUCOMTR-MCNC: 128 MG/DL (ref 70–130)
GLUCOSE BLDC GLUCOMTR-MCNC: 165 MG/DL (ref 70–130)
GLUCOSE BLDC GLUCOMTR-MCNC: 97 MG/DL (ref 70–130)
GLUCOSE BLDC GLUCOMTR-MCNC: 99 MG/DL (ref 70–130)
HAV IGM SERPL QL IA: ABNORMAL
HBV CORE IGM SERPL QL IA: ABNORMAL
HBV SURFACE AG SERPL QL IA: ABNORMAL
HCT VFR BLD AUTO: 29 % (ref 37.5–51)
HCV AB SER DONR QL: REACTIVE
HGB BLD-MCNC: 8.7 G/DL (ref 13–17.7)
IMM GRANULOCYTES # BLD AUTO: 0.01 10*3/MM3 (ref 0–0.05)
IMM GRANULOCYTES NFR BLD AUTO: 0.2 % (ref 0–0.5)
LYMPHOCYTES # BLD AUTO: 1.45 10*3/MM3 (ref 0.7–3.1)
LYMPHOCYTES NFR BLD AUTO: 32.5 % (ref 19.6–45.3)
MAGNESIUM SERPL-MCNC: 2 MG/DL (ref 1.6–2.4)
MCH RBC QN AUTO: 28.2 PG (ref 26.6–33)
MCHC RBC AUTO-ENTMCNC: 30 G/DL (ref 31.5–35.7)
MCV RBC AUTO: 94.2 FL (ref 79–97)
MONOCYTES # BLD AUTO: 0.74 10*3/MM3 (ref 0.1–0.9)
MONOCYTES NFR BLD AUTO: 16.6 % (ref 5–12)
NEUTROPHILS # BLD AUTO: 2.13 10*3/MM3 (ref 1.7–7)
NEUTROPHILS NFR BLD AUTO: 47.8 % (ref 42.7–76)
PLATELET # BLD AUTO: 220 10*3/MM3 (ref 140–450)
PMV BLD AUTO: 9.8 FL (ref 6–12)
POTASSIUM BLD-SCNC: 4.8 MMOL/L (ref 3.5–5.2)
RBC # BLD AUTO: 3.08 10*6/MM3 (ref 4.14–5.8)
SODIUM BLD-SCNC: 132 MMOL/L (ref 136–145)
WBC NRBC COR # BLD: 4.46 10*3/MM3 (ref 3.4–10.8)

## 2019-06-28 PROCEDURE — 85025 COMPLETE CBC W/AUTO DIFF WBC: CPT | Performed by: INTERNAL MEDICINE

## 2019-06-28 PROCEDURE — 25010000002 EPOETIN ALFA PER 1000 UNITS: Performed by: INTERNAL MEDICINE

## 2019-06-28 PROCEDURE — 82962 GLUCOSE BLOOD TEST: CPT

## 2019-06-28 PROCEDURE — 99232 SBSQ HOSP IP/OBS MODERATE 35: CPT | Performed by: INTERNAL MEDICINE

## 2019-06-28 PROCEDURE — 83735 ASSAY OF MAGNESIUM: CPT | Performed by: INTERNAL MEDICINE

## 2019-06-28 PROCEDURE — 80074 ACUTE HEPATITIS PANEL: CPT | Performed by: INTERNAL MEDICINE

## 2019-06-28 PROCEDURE — 80048 BASIC METABOLIC PNL TOTAL CA: CPT | Performed by: INTERNAL MEDICINE

## 2019-06-28 PROCEDURE — 94799 UNLISTED PULMONARY SVC/PX: CPT

## 2019-06-28 RX ADMIN — BUSPIRONE HYDROCHLORIDE 7.5 MG: 5 TABLET ORAL at 21:31

## 2019-06-28 RX ADMIN — IPRATROPIUM BROMIDE AND ALBUTEROL SULFATE 3 ML: .5; 3 SOLUTION RESPIRATORY (INHALATION) at 19:36

## 2019-06-28 RX ADMIN — ASPIRIN 81 MG: 81 TABLET ORAL at 11:57

## 2019-06-28 RX ADMIN — CALCITRIOL 0.5 MCG: 0.25 CAPSULE ORAL at 11:55

## 2019-06-28 RX ADMIN — GUAIFENESIN 200 MG: 200 TABLET ORAL at 11:55

## 2019-06-28 RX ADMIN — AMIODARONE HYDROCHLORIDE 400 MG: 200 TABLET ORAL at 11:57

## 2019-06-28 RX ADMIN — METRONIDAZOLE 500 MG: 250 TABLET ORAL at 06:16

## 2019-06-28 RX ADMIN — CALCIUM ACETATE 1334 MG: 667 CAPSULE ORAL at 21:32

## 2019-06-28 RX ADMIN — APIXABAN 2.5 MG: 2.5 TABLET, FILM COATED ORAL at 21:31

## 2019-06-28 RX ADMIN — DILTIAZEM HYDROCHLORIDE 60 MG: 60 TABLET, FILM COATED ORAL at 21:31

## 2019-06-28 RX ADMIN — ISOSORBIDE MONONITRATE 30 MG: 30 TABLET, EXTENDED RELEASE ORAL at 11:55

## 2019-06-28 RX ADMIN — PANTOPRAZOLE SODIUM 40 MG: 40 TABLET, DELAYED RELEASE ORAL at 07:33

## 2019-06-28 RX ADMIN — GUAIFENESIN 200 MG: 200 TABLET ORAL at 21:31

## 2019-06-28 RX ADMIN — OXYCODONE HYDROCHLORIDE 5 MG: 5 TABLET ORAL at 14:29

## 2019-06-28 RX ADMIN — CALCIUM ACETATE 1334 MG: 667 CAPSULE ORAL at 14:25

## 2019-06-28 RX ADMIN — SODIUM CHLORIDE, PRESERVATIVE FREE 3 ML: 5 INJECTION INTRAVENOUS at 21:32

## 2019-06-28 RX ADMIN — DILTIAZEM HYDROCHLORIDE 60 MG: 60 TABLET, FILM COATED ORAL at 17:05

## 2019-06-28 RX ADMIN — LEVOTHYROXINE SODIUM 75 MCG: 75 TABLET ORAL at 11:57

## 2019-06-28 RX ADMIN — TAMSULOSIN HYDROCHLORIDE 0.4 MG: 0.4 CAPSULE ORAL at 21:31

## 2019-06-28 RX ADMIN — Medication 100 MG: at 11:56

## 2019-06-28 RX ADMIN — ERYTHROPOIETIN 6000 UNITS: 20000 INJECTION, SOLUTION INTRAVENOUS; SUBCUTANEOUS at 13:04

## 2019-06-28 RX ADMIN — DILTIAZEM HYDROCHLORIDE 60 MG: 60 TABLET, FILM COATED ORAL at 07:32

## 2019-06-28 RX ADMIN — SERTRALINE 100 MG: 50 TABLET, FILM COATED ORAL at 11:55

## 2019-06-28 RX ADMIN — ONDANSETRON 4 MG: 4 TABLET, FILM COATED ORAL at 14:29

## 2019-06-28 RX ADMIN — DILTIAZEM HYDROCHLORIDE 60 MG: 60 TABLET, FILM COATED ORAL at 11:55

## 2019-06-28 RX ADMIN — GUAIFENESIN 200 MG: 200 TABLET ORAL at 14:26

## 2019-06-28 RX ADMIN — ATORVASTATIN CALCIUM 40 MG: 40 TABLET, FILM COATED ORAL at 21:31

## 2019-06-28 RX ADMIN — Medication 1 CAPSULE: at 11:56

## 2019-06-28 RX ADMIN — CARVEDILOL 12.5 MG: 6.25 TABLET, FILM COATED ORAL at 07:33

## 2019-06-28 RX ADMIN — SODIUM CHLORIDE 1000 ML: 9 INJECTION, SOLUTION INTRAVENOUS at 08:00

## 2019-06-28 RX ADMIN — METRONIDAZOLE 500 MG: 250 TABLET ORAL at 21:32

## 2019-06-28 RX ADMIN — METRONIDAZOLE 500 MG: 250 TABLET ORAL at 14:25

## 2019-06-28 RX ADMIN — CARVEDILOL 12.5 MG: 6.25 TABLET, FILM COATED ORAL at 17:05

## 2019-06-28 RX ADMIN — FOLIC ACID 1 MG: 1 TABLET ORAL at 11:56

## 2019-06-28 RX ADMIN — APIXABAN 2.5 MG: 2.5 TABLET, FILM COATED ORAL at 11:56

## 2019-06-28 RX ADMIN — FLUCONAZOLE 100 MG: 100 TABLET ORAL at 17:05

## 2019-06-28 RX ADMIN — CALCIUM ACETATE 1334 MG: 667 CAPSULE ORAL at 07:48

## 2019-06-28 RX ADMIN — IPRATROPIUM BROMIDE AND ALBUTEROL SULFATE 3 ML: .5; 3 SOLUTION RESPIRATORY (INHALATION) at 07:09

## 2019-06-29 LAB
ANION GAP SERPL CALCULATED.3IONS-SCNC: 11 MMOL/L (ref 5–15)
BASOPHILS # BLD AUTO: 0.02 10*3/MM3 (ref 0–0.2)
BASOPHILS NFR BLD AUTO: 0.4 % (ref 0–1.5)
BUN BLD-MCNC: 32 MG/DL (ref 8–23)
BUN/CREAT SERPL: 12.4 (ref 7–25)
CALCIUM SPEC-SCNC: 8.5 MG/DL (ref 8.6–10.5)
CHLORIDE SERPL-SCNC: 98 MMOL/L (ref 98–107)
CO2 SERPL-SCNC: 26 MMOL/L (ref 22–29)
CREAT BLD-MCNC: 2.58 MG/DL (ref 0.76–1.27)
DEPRECATED RDW RBC AUTO: 56.3 FL (ref 37–54)
EOSINOPHIL # BLD AUTO: 0.11 10*3/MM3 (ref 0–0.4)
EOSINOPHIL NFR BLD AUTO: 2.4 % (ref 0.3–6.2)
ERYTHROCYTE [DISTWIDTH] IN BLOOD BY AUTOMATED COUNT: 17.5 % (ref 12.3–15.4)
GFR SERPL CREATININE-BSD FRML MDRD: 25 ML/MIN/1.73
GLUCOSE BLD-MCNC: 165 MG/DL (ref 65–99)
GLUCOSE BLDC GLUCOMTR-MCNC: 120 MG/DL (ref 70–130)
GLUCOSE BLDC GLUCOMTR-MCNC: 130 MG/DL (ref 70–130)
GLUCOSE BLDC GLUCOMTR-MCNC: 215 MG/DL (ref 70–130)
GLUCOSE BLDC GLUCOMTR-MCNC: 235 MG/DL (ref 70–130)
GLUCOSE BLDC GLUCOMTR-MCNC: 90 MG/DL (ref 70–130)
HCT VFR BLD AUTO: 30.3 % (ref 37.5–51)
HGB BLD-MCNC: 9.2 G/DL (ref 13–17.7)
IMM GRANULOCYTES # BLD AUTO: 0.01 10*3/MM3 (ref 0–0.05)
IMM GRANULOCYTES NFR BLD AUTO: 0.2 % (ref 0–0.5)
LYMPHOCYTES # BLD AUTO: 1.32 10*3/MM3 (ref 0.7–3.1)
LYMPHOCYTES NFR BLD AUTO: 28.4 % (ref 19.6–45.3)
MAGNESIUM SERPL-MCNC: 1.9 MG/DL (ref 1.6–2.4)
MCH RBC QN AUTO: 28.6 PG (ref 26.6–33)
MCHC RBC AUTO-ENTMCNC: 30.4 G/DL (ref 31.5–35.7)
MCV RBC AUTO: 94.1 FL (ref 79–97)
MONOCYTES # BLD AUTO: 0.65 10*3/MM3 (ref 0.1–0.9)
MONOCYTES NFR BLD AUTO: 14 % (ref 5–12)
NEUTROPHILS # BLD AUTO: 2.54 10*3/MM3 (ref 1.7–7)
NEUTROPHILS NFR BLD AUTO: 54.6 % (ref 42.7–76)
PLATELET # BLD AUTO: 229 10*3/MM3 (ref 140–450)
PMV BLD AUTO: 9.5 FL (ref 6–12)
POTASSIUM BLD-SCNC: 3.9 MMOL/L (ref 3.5–5.2)
RBC # BLD AUTO: 3.22 10*6/MM3 (ref 4.14–5.8)
SODIUM BLD-SCNC: 135 MMOL/L (ref 136–145)
WBC NRBC COR # BLD: 4.65 10*3/MM3 (ref 3.4–10.8)

## 2019-06-29 PROCEDURE — 99231 SBSQ HOSP IP/OBS SF/LOW 25: CPT | Performed by: SURGERY

## 2019-06-29 PROCEDURE — 99232 SBSQ HOSP IP/OBS MODERATE 35: CPT | Performed by: INTERNAL MEDICINE

## 2019-06-29 PROCEDURE — 85025 COMPLETE CBC W/AUTO DIFF WBC: CPT | Performed by: INTERNAL MEDICINE

## 2019-06-29 PROCEDURE — 83735 ASSAY OF MAGNESIUM: CPT | Performed by: INTERNAL MEDICINE

## 2019-06-29 PROCEDURE — 80048 BASIC METABOLIC PNL TOTAL CA: CPT | Performed by: INTERNAL MEDICINE

## 2019-06-29 PROCEDURE — 82962 GLUCOSE BLOOD TEST: CPT

## 2019-06-29 PROCEDURE — 94799 UNLISTED PULMONARY SVC/PX: CPT

## 2019-06-29 RX ORDER — IPRATROPIUM BROMIDE AND ALBUTEROL SULFATE 2.5; .5 MG/3ML; MG/3ML
3 SOLUTION RESPIRATORY (INHALATION) EVERY 6 HOURS PRN
Status: DISCONTINUED | OUTPATIENT
Start: 2019-06-29 | End: 2019-07-01 | Stop reason: HOSPADM

## 2019-06-29 RX ADMIN — LEVOTHYROXINE SODIUM 75 MCG: 75 TABLET ORAL at 10:02

## 2019-06-29 RX ADMIN — DILTIAZEM HYDROCHLORIDE 60 MG: 60 TABLET, FILM COATED ORAL at 10:00

## 2019-06-29 RX ADMIN — SODIUM CHLORIDE, PRESERVATIVE FREE 3 ML: 5 INJECTION INTRAVENOUS at 10:04

## 2019-06-29 RX ADMIN — CALCIUM ACETATE 1334 MG: 667 CAPSULE ORAL at 10:03

## 2019-06-29 RX ADMIN — PANTOPRAZOLE SODIUM 40 MG: 40 TABLET, DELAYED RELEASE ORAL at 16:41

## 2019-06-29 RX ADMIN — CARVEDILOL 12.5 MG: 6.25 TABLET, FILM COATED ORAL at 16:40

## 2019-06-29 RX ADMIN — GUAIFENESIN 200 MG: 200 TABLET ORAL at 13:56

## 2019-06-29 RX ADMIN — ATORVASTATIN CALCIUM 40 MG: 40 TABLET, FILM COATED ORAL at 21:08

## 2019-06-29 RX ADMIN — SERTRALINE 100 MG: 50 TABLET, FILM COATED ORAL at 10:04

## 2019-06-29 RX ADMIN — ACETAMINOPHEN 650 MG: 325 TABLET ORAL at 13:32

## 2019-06-29 RX ADMIN — TAMSULOSIN HYDROCHLORIDE 0.4 MG: 0.4 CAPSULE ORAL at 21:08

## 2019-06-29 RX ADMIN — DILTIAZEM HYDROCHLORIDE 60 MG: 60 TABLET, FILM COATED ORAL at 21:08

## 2019-06-29 RX ADMIN — CARVEDILOL 12.5 MG: 6.25 TABLET, FILM COATED ORAL at 10:01

## 2019-06-29 RX ADMIN — ISOSORBIDE MONONITRATE 30 MG: 30 TABLET, EXTENDED RELEASE ORAL at 10:02

## 2019-06-29 RX ADMIN — APIXABAN 2.5 MG: 2.5 TABLET, FILM COATED ORAL at 10:01

## 2019-06-29 RX ADMIN — SODIUM CHLORIDE, PRESERVATIVE FREE 3 ML: 5 INJECTION INTRAVENOUS at 10:03

## 2019-06-29 RX ADMIN — DILTIAZEM HYDROCHLORIDE 60 MG: 60 TABLET, FILM COATED ORAL at 12:14

## 2019-06-29 RX ADMIN — CALCIUM ACETATE 1334 MG: 667 CAPSULE ORAL at 13:56

## 2019-06-29 RX ADMIN — SODIUM CHLORIDE, PRESERVATIVE FREE 3 ML: 5 INJECTION INTRAVENOUS at 21:09

## 2019-06-29 RX ADMIN — GUAIFENESIN 200 MG: 200 TABLET ORAL at 10:02

## 2019-06-29 RX ADMIN — METRONIDAZOLE 500 MG: 250 TABLET ORAL at 13:56

## 2019-06-29 RX ADMIN — PANTOPRAZOLE SODIUM 40 MG: 40 TABLET, DELAYED RELEASE ORAL at 06:39

## 2019-06-29 RX ADMIN — METRONIDAZOLE 500 MG: 250 TABLET ORAL at 06:39

## 2019-06-29 RX ADMIN — APIXABAN 2.5 MG: 2.5 TABLET, FILM COATED ORAL at 21:08

## 2019-06-29 RX ADMIN — FOLIC ACID 1 MG: 1 TABLET ORAL at 10:04

## 2019-06-29 RX ADMIN — OXYCODONE HYDROCHLORIDE 5 MG: 5 TABLET ORAL at 23:10

## 2019-06-29 RX ADMIN — Medication 100 MG: at 10:02

## 2019-06-29 RX ADMIN — DILTIAZEM HYDROCHLORIDE 60 MG: 60 TABLET, FILM COATED ORAL at 16:40

## 2019-06-29 RX ADMIN — OXYCODONE HYDROCHLORIDE 5 MG: 5 TABLET ORAL at 15:04

## 2019-06-29 RX ADMIN — METRONIDAZOLE 500 MG: 250 TABLET ORAL at 21:08

## 2019-06-29 RX ADMIN — BUSPIRONE HYDROCHLORIDE 7.5 MG: 5 TABLET ORAL at 21:08

## 2019-06-29 RX ADMIN — ASPIRIN 81 MG: 81 TABLET ORAL at 10:04

## 2019-06-29 RX ADMIN — GUAIFENESIN 200 MG: 200 TABLET ORAL at 21:08

## 2019-06-29 RX ADMIN — AMIODARONE HYDROCHLORIDE 400 MG: 200 TABLET ORAL at 10:01

## 2019-06-29 RX ADMIN — Medication 1 CAPSULE: at 10:03

## 2019-06-29 RX ADMIN — CALCIUM ACETATE 1334 MG: 667 CAPSULE ORAL at 21:08

## 2019-06-30 LAB
ANION GAP SERPL CALCULATED.3IONS-SCNC: 12.2 MMOL/L (ref 5–15)
BASOPHILS # BLD AUTO: 0.03 10*3/MM3 (ref 0–0.2)
BASOPHILS NFR BLD AUTO: 0.6 % (ref 0–1.5)
BUN BLD-MCNC: 49 MG/DL (ref 8–23)
BUN/CREAT SERPL: 14.6 (ref 7–25)
CALCIUM SPEC-SCNC: 8.7 MG/DL (ref 8.6–10.5)
CHLORIDE SERPL-SCNC: 99 MMOL/L (ref 98–107)
CO2 SERPL-SCNC: 23.8 MMOL/L (ref 22–29)
CREAT BLD-MCNC: 3.36 MG/DL (ref 0.76–1.27)
DEPRECATED RDW RBC AUTO: 58.3 FL (ref 37–54)
EOSINOPHIL # BLD AUTO: 0.16 10*3/MM3 (ref 0–0.4)
EOSINOPHIL NFR BLD AUTO: 3 % (ref 0.3–6.2)
ERYTHROCYTE [DISTWIDTH] IN BLOOD BY AUTOMATED COUNT: 17.7 % (ref 12.3–15.4)
GFR SERPL CREATININE-BSD FRML MDRD: 19 ML/MIN/1.73
GLUCOSE BLD-MCNC: 115 MG/DL (ref 65–99)
GLUCOSE BLDC GLUCOMTR-MCNC: 274 MG/DL (ref 70–130)
HCT VFR BLD AUTO: 30.3 % (ref 37.5–51)
HGB BLD-MCNC: 9.2 G/DL (ref 13–17.7)
IMM GRANULOCYTES # BLD AUTO: 0.01 10*3/MM3 (ref 0–0.05)
IMM GRANULOCYTES NFR BLD AUTO: 0.2 % (ref 0–0.5)
LYMPHOCYTES # BLD AUTO: 1.47 10*3/MM3 (ref 0.7–3.1)
LYMPHOCYTES NFR BLD AUTO: 27.9 % (ref 19.6–45.3)
MAGNESIUM SERPL-MCNC: 2.1 MG/DL (ref 1.6–2.4)
MCH RBC QN AUTO: 28.9 PG (ref 26.6–33)
MCHC RBC AUTO-ENTMCNC: 30.4 G/DL (ref 31.5–35.7)
MCV RBC AUTO: 95.3 FL (ref 79–97)
MONOCYTES # BLD AUTO: 0.62 10*3/MM3 (ref 0.1–0.9)
MONOCYTES NFR BLD AUTO: 11.8 % (ref 5–12)
NEUTROPHILS # BLD AUTO: 2.98 10*3/MM3 (ref 1.7–7)
NEUTROPHILS NFR BLD AUTO: 56.5 % (ref 42.7–76)
PLATELET # BLD AUTO: 205 10*3/MM3 (ref 140–450)
PMV BLD AUTO: 9.4 FL (ref 6–12)
POTASSIUM BLD-SCNC: 4.9 MMOL/L (ref 3.5–5.2)
RBC # BLD AUTO: 3.18 10*6/MM3 (ref 4.14–5.8)
SODIUM BLD-SCNC: 135 MMOL/L (ref 136–145)
WBC NRBC COR # BLD: 5.27 10*3/MM3 (ref 3.4–10.8)

## 2019-06-30 PROCEDURE — 83735 ASSAY OF MAGNESIUM: CPT | Performed by: INTERNAL MEDICINE

## 2019-06-30 PROCEDURE — 25010000002 HYDRALAZINE PER 20 MG: Performed by: HOSPITALIST

## 2019-06-30 PROCEDURE — 82962 GLUCOSE BLOOD TEST: CPT

## 2019-06-30 PROCEDURE — 85025 COMPLETE CBC W/AUTO DIFF WBC: CPT | Performed by: INTERNAL MEDICINE

## 2019-06-30 PROCEDURE — 94799 UNLISTED PULMONARY SVC/PX: CPT

## 2019-06-30 PROCEDURE — 80048 BASIC METABOLIC PNL TOTAL CA: CPT | Performed by: INTERNAL MEDICINE

## 2019-06-30 PROCEDURE — 99232 SBSQ HOSP IP/OBS MODERATE 35: CPT | Performed by: INTERNAL MEDICINE

## 2019-06-30 RX ADMIN — APIXABAN 2.5 MG: 2.5 TABLET, FILM COATED ORAL at 08:31

## 2019-06-30 RX ADMIN — AMIODARONE HYDROCHLORIDE 400 MG: 200 TABLET ORAL at 08:30

## 2019-06-30 RX ADMIN — CALCIUM ACETATE 1334 MG: 667 CAPSULE ORAL at 13:55

## 2019-06-30 RX ADMIN — DILTIAZEM HYDROCHLORIDE 60 MG: 60 TABLET, FILM COATED ORAL at 08:31

## 2019-06-30 RX ADMIN — ISOSORBIDE MONONITRATE 30 MG: 30 TABLET, EXTENDED RELEASE ORAL at 08:31

## 2019-06-30 RX ADMIN — LEVOTHYROXINE SODIUM 75 MCG: 75 TABLET ORAL at 08:30

## 2019-06-30 RX ADMIN — HYDRALAZINE HYDROCHLORIDE 10 MG: 20 INJECTION INTRAMUSCULAR; INTRAVENOUS at 12:02

## 2019-06-30 RX ADMIN — DILTIAZEM HYDROCHLORIDE 60 MG: 60 TABLET, FILM COATED ORAL at 20:16

## 2019-06-30 RX ADMIN — GUAIFENESIN 200 MG: 200 TABLET ORAL at 02:50

## 2019-06-30 RX ADMIN — CALCIUM ACETATE 1334 MG: 667 CAPSULE ORAL at 08:31

## 2019-06-30 RX ADMIN — IPRATROPIUM BROMIDE AND ALBUTEROL SULFATE 3 ML: .5; 3 SOLUTION RESPIRATORY (INHALATION) at 07:33

## 2019-06-30 RX ADMIN — OXYCODONE HYDROCHLORIDE 5 MG: 5 TABLET ORAL at 06:35

## 2019-06-30 RX ADMIN — PANTOPRAZOLE SODIUM 40 MG: 40 TABLET, DELAYED RELEASE ORAL at 05:47

## 2019-06-30 RX ADMIN — METRONIDAZOLE 500 MG: 250 TABLET ORAL at 20:16

## 2019-06-30 RX ADMIN — CARVEDILOL 12.5 MG: 6.25 TABLET, FILM COATED ORAL at 08:31

## 2019-06-30 RX ADMIN — SODIUM CHLORIDE, PRESERVATIVE FREE 3 ML: 5 INJECTION INTRAVENOUS at 08:31

## 2019-06-30 RX ADMIN — SERTRALINE 100 MG: 50 TABLET, FILM COATED ORAL at 08:31

## 2019-06-30 RX ADMIN — CALCIUM ACETATE 1334 MG: 667 CAPSULE ORAL at 02:50

## 2019-06-30 RX ADMIN — ATORVASTATIN CALCIUM 40 MG: 40 TABLET, FILM COATED ORAL at 20:18

## 2019-06-30 RX ADMIN — TAMSULOSIN HYDROCHLORIDE 0.4 MG: 0.4 CAPSULE ORAL at 20:16

## 2019-06-30 RX ADMIN — ASPIRIN 81 MG: 81 TABLET ORAL at 08:30

## 2019-06-30 RX ADMIN — Medication 100 MG: at 08:31

## 2019-06-30 RX ADMIN — DILTIAZEM HYDROCHLORIDE 60 MG: 60 TABLET, FILM COATED ORAL at 11:39

## 2019-06-30 RX ADMIN — APIXABAN 2.5 MG: 2.5 TABLET, FILM COATED ORAL at 20:16

## 2019-06-30 RX ADMIN — CARVEDILOL 12.5 MG: 6.25 TABLET, FILM COATED ORAL at 16:55

## 2019-06-30 RX ADMIN — FOLIC ACID 1 MG: 1 TABLET ORAL at 08:31

## 2019-06-30 RX ADMIN — GUAIFENESIN 200 MG: 200 TABLET ORAL at 13:55

## 2019-06-30 RX ADMIN — Medication 1 CAPSULE: at 08:31

## 2019-06-30 RX ADMIN — OXYCODONE HYDROCHLORIDE 5 MG: 5 TABLET ORAL at 22:23

## 2019-06-30 RX ADMIN — OXYCODONE HYDROCHLORIDE 5 MG: 5 TABLET ORAL at 15:16

## 2019-06-30 RX ADMIN — DILTIAZEM HYDROCHLORIDE 60 MG: 60 TABLET, FILM COATED ORAL at 16:55

## 2019-06-30 RX ADMIN — GUAIFENESIN 200 MG: 200 TABLET ORAL at 08:31

## 2019-06-30 RX ADMIN — METRONIDAZOLE 500 MG: 250 TABLET ORAL at 05:47

## 2019-06-30 RX ADMIN — METRONIDAZOLE 500 MG: 250 TABLET ORAL at 13:55

## 2019-06-30 RX ADMIN — CALCIUM ACETATE 1334 MG: 667 CAPSULE ORAL at 20:18

## 2019-06-30 RX ADMIN — GUAIFENESIN 200 MG: 200 TABLET ORAL at 20:18

## 2019-06-30 RX ADMIN — PANTOPRAZOLE SODIUM 40 MG: 40 TABLET, DELAYED RELEASE ORAL at 16:55

## 2019-07-01 VITALS
SYSTOLIC BLOOD PRESSURE: 176 MMHG | RESPIRATION RATE: 16 BRPM | DIASTOLIC BLOOD PRESSURE: 107 MMHG | HEART RATE: 79 BPM | WEIGHT: 132 LBS | HEIGHT: 70 IN | OXYGEN SATURATION: 98 % | TEMPERATURE: 97.8 F | BODY MASS INDEX: 18.9 KG/M2

## 2019-07-01 LAB
ANION GAP SERPL CALCULATED.3IONS-SCNC: 13.3 MMOL/L (ref 5–15)
BASOPHILS # BLD AUTO: 0.03 10*3/MM3 (ref 0–0.2)
BASOPHILS NFR BLD AUTO: 0.6 % (ref 0–1.5)
BUN BLD-MCNC: 61 MG/DL (ref 8–23)
BUN/CREAT SERPL: 16.3 (ref 7–25)
CALCIUM SPEC-SCNC: 8.7 MG/DL (ref 8.6–10.5)
CHLORIDE SERPL-SCNC: 98 MMOL/L (ref 98–107)
CO2 SERPL-SCNC: 22.7 MMOL/L (ref 22–29)
CREAT BLD-MCNC: 3.74 MG/DL (ref 0.76–1.27)
DEPRECATED RDW RBC AUTO: 56.8 FL (ref 37–54)
EOSINOPHIL # BLD AUTO: 0.16 10*3/MM3 (ref 0–0.4)
EOSINOPHIL NFR BLD AUTO: 3.1 % (ref 0.3–6.2)
ERYTHROCYTE [DISTWIDTH] IN BLOOD BY AUTOMATED COUNT: 17.4 % (ref 12.3–15.4)
GFR SERPL CREATININE-BSD FRML MDRD: 17 ML/MIN/1.73
GLUCOSE BLD-MCNC: 121 MG/DL (ref 65–99)
GLUCOSE BLDC GLUCOMTR-MCNC: 106 MG/DL (ref 70–130)
GLUCOSE BLDC GLUCOMTR-MCNC: 133 MG/DL (ref 70–130)
HCT VFR BLD AUTO: 30.1 % (ref 37.5–51)
HGB BLD-MCNC: 9.1 G/DL (ref 13–17.7)
IMM GRANULOCYTES # BLD AUTO: 0.03 10*3/MM3 (ref 0–0.05)
IMM GRANULOCYTES NFR BLD AUTO: 0.6 % (ref 0–0.5)
LYMPHOCYTES # BLD AUTO: 1.56 10*3/MM3 (ref 0.7–3.1)
LYMPHOCYTES NFR BLD AUTO: 29.9 % (ref 19.6–45.3)
MAGNESIUM SERPL-MCNC: 2 MG/DL (ref 1.6–2.4)
MCH RBC QN AUTO: 28.4 PG (ref 26.6–33)
MCHC RBC AUTO-ENTMCNC: 30.2 G/DL (ref 31.5–35.7)
MCV RBC AUTO: 94.1 FL (ref 79–97)
MONOCYTES # BLD AUTO: 0.59 10*3/MM3 (ref 0.1–0.9)
MONOCYTES NFR BLD AUTO: 11.3 % (ref 5–12)
NEUTROPHILS # BLD AUTO: 2.84 10*3/MM3 (ref 1.7–7)
NEUTROPHILS NFR BLD AUTO: 54.5 % (ref 42.7–76)
PLATELET # BLD AUTO: 214 10*3/MM3 (ref 140–450)
PMV BLD AUTO: 9.2 FL (ref 6–12)
POTASSIUM BLD-SCNC: 4.9 MMOL/L (ref 3.5–5.2)
RBC # BLD AUTO: 3.2 10*6/MM3 (ref 4.14–5.8)
SODIUM BLD-SCNC: 134 MMOL/L (ref 136–145)
WBC NRBC COR # BLD: 5.21 10*3/MM3 (ref 3.4–10.8)

## 2019-07-01 PROCEDURE — 80048 BASIC METABOLIC PNL TOTAL CA: CPT | Performed by: INTERNAL MEDICINE

## 2019-07-01 PROCEDURE — 85025 COMPLETE CBC W/AUTO DIFF WBC: CPT | Performed by: INTERNAL MEDICINE

## 2019-07-01 PROCEDURE — 25010000002 HYDRALAZINE PER 20 MG: Performed by: HOSPITALIST

## 2019-07-01 PROCEDURE — 83735 ASSAY OF MAGNESIUM: CPT | Performed by: INTERNAL MEDICINE

## 2019-07-01 PROCEDURE — 82962 GLUCOSE BLOOD TEST: CPT

## 2019-07-01 PROCEDURE — 94799 UNLISTED PULMONARY SVC/PX: CPT

## 2019-07-01 PROCEDURE — 25010000002 EPOETIN ALFA PER 1000 UNITS: Performed by: INTERNAL MEDICINE

## 2019-07-01 PROCEDURE — 99239 HOSP IP/OBS DSCHRG MGMT >30: CPT | Performed by: HOSPITALIST

## 2019-07-01 RX ORDER — AMIODARONE HYDROCHLORIDE 200 MG/1
200 TABLET ORAL
Qty: 30 TABLET | Refills: 3 | Status: SHIPPED | OUTPATIENT
Start: 2019-07-10 | End: 2019-10-15

## 2019-07-01 RX ORDER — AMIODARONE HYDROCHLORIDE 400 MG/1
400 TABLET ORAL
Qty: 8 TABLET | Refills: 0 | Status: SHIPPED | OUTPATIENT
Start: 2019-07-02 | End: 2019-07-10

## 2019-07-01 RX ORDER — DILTIAZEM HYDROCHLORIDE 240 MG/1
240 CAPSULE, COATED, EXTENDED RELEASE ORAL
Status: DISCONTINUED | OUTPATIENT
Start: 2019-07-01 | End: 2019-07-01 | Stop reason: HOSPADM

## 2019-07-01 RX ORDER — DILTIAZEM HYDROCHLORIDE 240 MG/1
240 CAPSULE, COATED, EXTENDED RELEASE ORAL
Qty: 30 CAPSULE | Refills: 3 | Status: SHIPPED | OUTPATIENT
Start: 2019-07-02 | End: 2019-10-15

## 2019-07-01 RX ORDER — ISOSORBIDE MONONITRATE 60 MG/1
60 TABLET, EXTENDED RELEASE ORAL DAILY
Status: DISCONTINUED | OUTPATIENT
Start: 2019-07-01 | End: 2019-07-01 | Stop reason: HOSPADM

## 2019-07-01 RX ORDER — CARVEDILOL 25 MG/1
25 TABLET ORAL 2 TIMES DAILY WITH MEALS
Status: DISCONTINUED | OUTPATIENT
Start: 2019-07-01 | End: 2019-07-01 | Stop reason: HOSPADM

## 2019-07-01 RX ORDER — CARVEDILOL 25 MG/1
25 TABLET ORAL 2 TIMES DAILY WITH MEALS
Qty: 60 TABLET | Refills: 3 | Status: ON HOLD | OUTPATIENT
Start: 2019-07-01 | End: 2022-09-21

## 2019-07-01 RX ORDER — OXYCODONE HYDROCHLORIDE 5 MG/1
5 TABLET ORAL EVERY 8 HOURS PRN
Qty: 12 TABLET | Refills: 0 | Status: SHIPPED | OUTPATIENT
Start: 2019-07-01 | End: 2019-08-15

## 2019-07-01 RX ORDER — IPRATROPIUM BROMIDE AND ALBUTEROL SULFATE 2.5; .5 MG/3ML; MG/3ML
3 SOLUTION RESPIRATORY (INHALATION) EVERY 6 HOURS PRN
Qty: 360 ML | Refills: 3 | Status: SHIPPED | OUTPATIENT
Start: 2019-07-01 | End: 2019-08-15

## 2019-07-01 RX ADMIN — SODIUM CHLORIDE, PRESERVATIVE FREE 3 ML: 5 INJECTION INTRAVENOUS at 12:47

## 2019-07-01 RX ADMIN — LEVOTHYROXINE SODIUM 75 MCG: 75 TABLET ORAL at 12:27

## 2019-07-01 RX ADMIN — CARVEDILOL 25 MG: 6.25 TABLET, FILM COATED ORAL at 12:28

## 2019-07-01 RX ADMIN — AMIODARONE HYDROCHLORIDE 400 MG: 200 TABLET ORAL at 12:28

## 2019-07-01 RX ADMIN — APIXABAN 2.5 MG: 2.5 TABLET, FILM COATED ORAL at 12:29

## 2019-07-01 RX ADMIN — CALCIUM ACETATE 1334 MG: 667 CAPSULE ORAL at 12:31

## 2019-07-01 RX ADMIN — SODIUM CHLORIDE 1000 ML: 9 INJECTION, SOLUTION INTRAVENOUS at 08:12

## 2019-07-01 RX ADMIN — HYDRALAZINE HYDROCHLORIDE 10 MG: 20 INJECTION INTRAMUSCULAR; INTRAVENOUS at 14:41

## 2019-07-01 RX ADMIN — Medication 1 CAPSULE: at 12:29

## 2019-07-01 RX ADMIN — FOLIC ACID 1 MG: 1 TABLET ORAL at 12:30

## 2019-07-01 RX ADMIN — SERTRALINE 100 MG: 50 TABLET, FILM COATED ORAL at 12:29

## 2019-07-01 RX ADMIN — DILTIAZEM HYDROCHLORIDE 240 MG: 240 CAPSULE, COATED, EXTENDED RELEASE ORAL at 12:41

## 2019-07-01 RX ADMIN — ERYTHROPOIETIN 6000 UNITS: 20000 INJECTION, SOLUTION INTRAVENOUS; SUBCUTANEOUS at 12:37

## 2019-07-01 RX ADMIN — HYDRALAZINE HYDROCHLORIDE 10 MG: 20 INJECTION INTRAMUSCULAR; INTRAVENOUS at 06:57

## 2019-07-01 RX ADMIN — GUAIFENESIN 200 MG: 200 TABLET ORAL at 12:29

## 2019-07-01 RX ADMIN — METRONIDAZOLE 500 MG: 250 TABLET ORAL at 06:15

## 2019-07-01 RX ADMIN — CALCIUM ACETATE 1334 MG: 667 CAPSULE ORAL at 03:49

## 2019-07-01 RX ADMIN — ASPIRIN 81 MG: 81 TABLET ORAL at 12:29

## 2019-07-01 RX ADMIN — PANTOPRAZOLE SODIUM 40 MG: 40 TABLET, DELAYED RELEASE ORAL at 06:15

## 2019-07-01 RX ADMIN — GUAIFENESIN 200 MG: 200 TABLET ORAL at 03:49

## 2019-07-01 RX ADMIN — ISOSORBIDE MONONITRATE 60 MG: 30 TABLET, EXTENDED RELEASE ORAL at 12:29

## 2019-07-01 RX ADMIN — OXYCODONE HYDROCHLORIDE 5 MG: 5 TABLET ORAL at 07:55

## 2019-07-01 RX ADMIN — CALCITRIOL 0.5 MCG: 0.25 CAPSULE ORAL at 12:30

## 2019-07-01 RX ADMIN — Medication 100 MG: at 12:28

## 2019-07-01 NOTE — NURSING NOTE
Phoned pt's sister, Collette, per pt request to inform her of pt's move from room 315A to 305A.  Collette acknowledged room change and was thankful for call.

## 2019-07-01 NOTE — PLAN OF CARE
Problem: Mobility, Physical Impaired (Adult)  Goal: Enhanced Mobility Skills  Outcome: Ongoing (interventions implemented as appropriate)

## 2019-07-01 NOTE — PROGRESS NOTES
Interval History:     Patient Complaints: Patient is feeling well denies any shortness of breath is going to get dialysis and waiting for placement        Vital Signs  Temp:  [96.9 °F (36.1 °C)-98.1 °F (36.7 °C)] 97.4 °F (36.3 °C)  Heart Rate:  [56-88] 79  Resp:  [18] 18  BP: (120-186)/() 186/100    Physical Exam:    General:           No distress      HEENT:  [place]               Neck: No JVD       Lungs:    Rare posterior crackle   Heart:   Irregular,  no rub       Abdomen:   Normal bowel sounds, soft non-tender, non-distended, no guarding       Extremities:  No edema       Skin: No petechiae       Neurologic: Cranial nerves grossly intact,  moves all extremities.        Results Review:    I reviewed the patient's new clinical results.    Lab Results (last 24 hours)     Procedure Component Value Units Date/Time    Magnesium [[id number]]  (Normal) Collected:  07/01/19 0443    Specimen:  Blood Updated:  07/01/19 0530     Magnesium 2.0 mg/dL     Basic Metabolic Panel [[id number]]  (Abnormal) Collected:  07/01/19 0443    Specimen:  Blood Updated:  07/01/19 0530     Glucose 121 mg/dL      BUN 61 mg/dL      Creatinine 3.74 mg/dL      Sodium 134 mmol/L      Potassium 4.9 mmol/L      Chloride 98 mmol/L      CO2 22.7 mmol/L      Calcium 8.7 mg/dL      eGFR Non African Amer 17 mL/min/1.73      BUN/Creatinine Ratio 16.3     Anion Gap 13.3 mmol/L     Narrative:       GFR Normal >60  Chronic Kidney Disease <60  Kidney Failure <15    CBC & Differential [[id number]] Collected:  07/01/19 0443    Specimen:  Blood Updated:  07/01/19 0505    Narrative:       The following orders were created for panel order CBC & Differential.  Procedure                               Abnormality         Status                     ---------                               -----------         ------                     CBC Auto Differential[[id number]]        Abnormal            Final result                 Please view results for these tests on  the individual orders.    CBC Auto Differential [[id number]]  (Abnormal) Collected:  07/01/19 0443    Specimen:  Blood Updated:  07/01/19 0505     WBC 5.21 10*3/mm3      RBC 3.20 10*6/mm3      Hemoglobin 9.1 g/dL      Hematocrit 30.1 %      MCV 94.1 fL      MCH 28.4 pg      MCHC 30.2 g/dL      RDW 17.4 %      RDW-SD 56.8 fl      MPV 9.2 fL      Platelets 214 10*3/mm3      Neutrophil % 54.5 %      Lymphocyte % 29.9 %      Monocyte % 11.3 %      Eosinophil % 3.1 %      Basophil % 0.6 %      Immature Grans % 0.6 %      Neutrophils, Absolute 2.84 10*3/mm3      Lymphocytes, Absolute 1.56 10*3/mm3      Monocytes, Absolute 0.59 10*3/mm3      Eosinophils, Absolute 0.16 10*3/mm3      Basophils, Absolute 0.03 10*3/mm3      Immature Grans, Absolute 0.03 10*3/mm3     POC Glucose Once [[id number]]  (Abnormal) Collected:  06/30/19 1929    Specimen:  Blood Updated:  06/30/19 1940     Glucose 274 mg/dL           Imaging Results (last 24 hours)     ** No results found for the last 24 hours. **          Assessment and Plan:    1.  End-stage renal disease on dialysis: Continue dialysis Monday Wednesday Friday.  2.  Bibasilar pneumonia  3.  Anemia of chronic kidney disease  4.  Type 2 diabetes with nephropathy  5.  Hypertension  6.  Metabolic encephalopathy  7.  Anemia with iron deficiency with blood loss  8.  Candida esophagitis  9.  Atrial fibrillation with rapid ventricular rate with known paroxysmal A. Fib  10. Secondary hyperparathyroidism: Phosphorus was good earlier on the 19th    Patient has Monday Wednesday Friday dialysis, will dialyze the patient today patient has a G-tube has been removed patient have a spot at [facility] and the dialysis is being arranged at [facility] dialysis close by their , patient is feeling better wants to go home discussed with Dr. Carrasco, patient is followed by [facility] if there is a plan to transfer the patient to another facility we may have to inform  alethea    S Raafel Mendenhall MD  07/01/19  6:55 AM

## 2019-07-01 NOTE — DISCHARGE PLACEMENT REQUEST
"Axel Desir (59 y.o. Male)     Date of Birth Social Security Number Address Home Phone MRN    1960  [address] [facility] [address] [phone] [id number]    Tem[place] Marital Status          Rastafari Single       Admission Date Admission Type Admitting Provider Attending Provider Department, Room/Bed    6/18/19 Emergency  Kelsie Gonzalez MD [address], 3305/1S    Discharge Date Discharge Disposition Discharge Destination         Skilled Nursing Facility (DC - External)              Attending Provider:  Kelsie Gonzalez MD    Allergies:  No Known Allergies    Isolation:  None   Infection:  None   Code Status:  CPR    Ht:  177.8 cm (70\")   Wt:  59.9 kg (132 lb)    Admission Cmt:  None   Principal Problem:  Iron deficiency anemia [D50.9] More...                 Active Insurance as of 6/18/2019     Primary Coverage     Payor Plan Insurance Group Employer/Plan Group    MEDICARE MEDICARE A & B      Payor Plan Address Payor Plan Phone Number Payor Plan Fax Number Effective Dates    [address] [phone]  4/1/2002 - None Entered    [facility] [id number]       Subscriber Name Subscriber Birth Date Member ID       AXEL DESIR 1960 [id number]           Secondary Coverage     Payor Plan Insurance Group Employer/Plan Group    PASSPORT HEALTH PLAN PASSPORT MEDICAID     Payor Plan Address Payor Plan Phone Number Payor Plan Fax Number Effective Dates    [address] [phone]  11/1/1997 - None Entered    [facility] [address]       Subscriber Name Subscriber Birth Date Member ID       AXEL DESIR 1960 [id number]                 Emergency Contacts      (Rel.) Home Phone Work Phone Mobile Phone    Candace Mustafa (Surrogate) [phone] -- --    MayitoCollette (Sister) [phone] -- [phone]        After Visit Summary     Axel Desir MRN: [id number]      Iron deficiency anemia     6/18/2019 - 7/1/2019     [address] " "   Your Next Steps     Do     ·    these medications from [facility] - [place], [place] - [address] [facility] [phone] [facility] [phone] FX   ? amiodarone   ? carvedilol   ? diltiaZEM CD   ? ipratropium-albuterol   ·    these medications from any pharmacy with your printed prescription   ? oxyCODONE   Go     Nov 5 Follow Up 2:15 PM   Axel Eldridge MD   [facility] [place] [facility]   [address] [place] [address] [address]   [facility] [address] [phone]   Arrive 15 minutes prior to appointment.    [employer] Sign-Up     Send messages to your doctor, view your test results, renew your prescriptions, schedule appointments, and more.   Go to [email or web address], click \"Sign Up Now\", and enter your personal activation code: [email or web address]. Activation code expires 7/31/2019.   After Visit Summary   Instructions     ·   Your medications have changed     START taking:   ? amiodarone (PACERONE)   This medication has multiple start dates; refer to the medication list below for more details.   ? carvedilol (COREG)   ? diltiaZEM CD (CARDIZEM CD)   Start taking on: 7/2/2019   ? ipratropium-albuterol (DUO-NEB)   STOP taking:   ? cloNIDine 0.3 MG/24HR patch (CATAPRES-TTS)   ? diltiaZEM 60 MG tablet (CARDIZEM)   ? doxazosin 2 MG tablet (CARDURA)   ? guaiFENesin 200 MG tablet   ? insulin glargine 100 UNIT/ML injection (LANTUS)   ? metoprolol tartrate 100 MG tablet (LOPRESSOR)   ? NIFEdipine XL 90 MG 24 hr tablet (PROCARDIA XL)   ? zinc sulfate 220 (50 Zn) MG capsule (ZINCATE)   Review your updated medication list below.   Your Next Steps   Do   Go       If you have any questions about your recovery, please call the [facility] Nurse Call Center at [phone]. A registered nurse is available 24 hours a day 7 days a week to assist you.   ·   Other instructions     Discharge Follow-up with PCP   Currently Documented PCP:   Sammy Glass MD "   PCP Phone Number:   [phone]   What's next     What's next          Follow up with MD Dr. Sammy Patino MD  [address] [facility] [address]   [phone]     Follow Up with Axel Eldridge MD    2:15 PM   Arrive 15 minutes prior to appointment.  [facility] CARDIOLOGY   [facility] [facility] [address]  [facility] [id number] [phone]    Your Allergies   Date Reviewed: 2019   Your Allergies   No active allergies   Patient Belongings Returned     Document Return of Belongings Flowsheet     Were the patient bedside belongings sent home? --   Medications Retrieved from Pharmacy & Sent Home --   Belongings Sent to Safe --   Belongings sent with: --   Belongings Retrieved from Security & Sent Home --       MyChart Signup     [facility] [employer] allows you to send messages to your doctor, view your test results, renew your prescriptions, schedule appointments, and more. To sign up, go to [employer] and click on the Sign Up Now link in the New User? box. Enter your [employer] Activation Code exactly as it appears below along with the last four digits of your Social Security Number and your Date of Birth () to complete the sign-up process. If you do not sign up before the expiration date, you must request a new code.     [employer] Activation Code: [email or web address]  Expires: 2019  2:45 PM     If you have questions, you can email [email or web address] or call [phone] to talk to our [employer] staff. Remember, [employer] is NOT to be used for urgent needs. For medical emergencies, dial 911.     Medication List   Medication List     Morning Afternoon Evening Bedtime As Needed    * amiodarone 400 MG tablet   Commonly known as: PACERONE   Start taking on: 2019   Take 1 tablet by mouth Daily for 8 doses.   Last time this was given: Ask your nurse or doctor          * amiodarone 200 MG tablet   Commonly known as:  PACERONE   Start taking on: 7/10/2019   Take 1 tablet by mouth Daily.   Last time this was given: Ask your nurse or doctor          apixaban 2.5 MG tablet tablet   Commonly known as: ELIQUIS   Take 2.5 mg by mouth 2 (Two) Times a Day.   Last time this was given: 2.5 mg on 7/1/2019 12:29 PM          aspirin 81 MG EC tablet   Take 81 mg by mouth Daily.   Last time this was given: 81 mg on 7/1/2019 12:29 PM          atorvastatin 80 MG tablet   Commonly known as: LIPITOR   Take 40 mg by mouth Every Night. Prior to [facility] Admission, Patient was on: pt states he only takes 1/2 tablet at night   Last time this was given: 40 mg on 6/30/2019  8:18 PM          busPIRone 7.5 MG tablet   Commonly known as: BUSPAR   Take 7.5 mg by mouth Every Night.   Last time this was given: 7.5 mg on 6/29/2019  9:08 PM          calcitriol 0.5 MCG capsule   Commonly known as: ROCALTROL   Take 0.5 mcg by mouth 3 (Three) Times a Week. Prior to [facility] Admission, Patient was on: takes on mondays, wednesdays, and fridays   Last time this was given: 0.5 mcg on 7/1/2019 12:30 PM          calcium acetate 667 MG capsule capsule   Commonly known as: PHOS BINDER)   Take 667 mg by mouth Every 6 (Six) Hours.   Last time this was given: 1,334 mg on 7/1/2019 12:31 PM          carvedilol 25 MG tablet   Commonly known as: COREG   Take 1 tablet by mouth 2 (Two) Times a Day With Meals.   Last time this was given: 25 mg on 7/1/2019 12:28 PM          diltiaZEM  MG 24 hr capsule   Commonly known as: CARDIZEM CD   Start taking on: 7/2/2019   Take 1 capsule by mouth Daily.   Last time this was given: 240 mg on 7/1/2019 12:41 PM          famotidine 20 MG tablet   Commonly known as: PEPCID   Take 20 mg by mouth 2 (Two) Times a Day.          ferrous sulfate 325 (65 FE) MG tablet   Take 325 mg by mouth Daily With Breakfast.          folic acid 1 MG tablet   Commonly known as: FOLVITE   Take 1 mg by mouth Daily.   Last time this was given: 1 mg on 7/1/2019 12:30  PM          hydrALAZINE 100 MG tablet   Commonly known as: APRESOLINE   Take 100 mg by mouth 3 (Three) Times a Day.   Last time this was given: Ask your nurse or doctor          ipratropium-albuterol 0.5-2.5 mg/3 ml nebulizer   Commonly known as: DUO-NEB   Take 3 mL by nebulization Every 6 (Six) Hours As Needed for Shortness of Air.   Last time this was given: 3 mL on 6/30/2019  7:33 AM          isosorbide mononitrate 30 MG 24 hr tablet   Commonly known as: IMDUR   Take 30 mg by mouth Daily.   Last time this was given: 60 mg on 7/1/2019 12:29 PM          levothyroxine 50 MCG tablet   Commonly known as: SYNTHROID, LEVOTHROID   Take 50 mcg by mouth Daily.   Last time this was given: 75 mcg on 7/1/2019 12:27 PM          Melatonin 3 MG tablet   Take 3 mg by mouth Every Night.          nitroglycerin 0.4 MG SL tablet   Commonly known as: NITROSTAT   Place 0.4 mg under the tongue Every 5 (Five) Minutes As Needed for Chest Pain. Take no more than 3 doses in 15 minutes.          ondansetron 4 MG tablet   Commonly known as: ZOFRAN   Take 4 mg by mouth Every 6 (Six) Hours As Needed for Nausea or Vomiting.   Last time this was given: 4 mg on 6/28/2019  2:29 PM          oxyCODONE 5 MG immediate release tablet   Commonly known as: ROXICODONE   Take 1 tablet by mouth Every 8 (Eight) Hours As Needed for Moderate Pain .   Last time this was given: 5 mg on 7/1/2019  7:55 AM          QUEtiapine 50 MG tablet   Commonly known as: SEROquel   Take 50 mg by mouth 3 (Three) Times a Day As Needed.          TOMASA-GAB tablet   Take 1 tablet by mouth Daily.          sertraline 100 MG tablet   Commonly known as: ZOLOFT   Take 100 mg by mouth Daily.   Last time this was given: 100 mg on 7/1/2019 12:29 PM          tamsulosin 0.4 MG capsule 24 hr capsule   Commonly known as: FLOMAX   Take 1 capsule by mouth Every Night.   Last time this was given: 0.4 mg on 6/30/2019  8:16 PM          thiamine 100 MG tablet   Commonly known as: VITAMIN B-1   Take  100 mg by mouth Daily.   Last time this was given: 100 mg on 7/1/2019 12:28 PM         Very important * This list has 2 medication(s) that are the same as other medications prescribed for you. Read the directions carefully, and ask your doctor or other care provider to review them with you.   Where to  your medications     ·    these medications at [facility] - [place], [place] - [address] [place] [address] [phone]  - [phone] FX     ? amiodarone (2 prescriptions) • carvedilol • diltiaZEM CD • ipratropium-albuterol   Address: [address] [place] [address]   Phone: [phone]   ·    these medications from any pharmacy with your printed prescription     ? oxyCODONE   Always carry an updated list of your medications with you. If there is an emergency, a responder can quickly see what medications you are taking. Take this paperwork with you the next time you see your health care provider.        MyChart Sign-Up   PREVENTING SURGICAL SITE INFECTIONS     Surgical Site Infections FAQs  What is a Surgical Site Infection (SSI)?  A surgical site infection is an infection that occurs after surgery in the part of the body where the surgery took place. Most patients who have surgery do not develop an infection. However, infections develop in about 1 to 3 out of every 100 patients who have surgery.  Some of the common symptoms of a surgical site infection are:  · Redness and pain around the area where you had surgery  · Drainage of cloudy fluid from your surgical wound  · Fever     Can SSIs be treated?  Yes. Most surgical site infections can be treated with antibiotics. The antibiotic given to you depends on the bacteria (germs) causing the infection. Sometimes patients with SSIs also need another surgery to treat the infection.  What are some of the things that hospitals are doing to prevent SSIs?  To prevent SSIs, doctors, nurses, and other healthcare providers:  · Clean their  hands and arms up to their elbows with an antiseptic agent just before the surgery.  · Clean their hands with soap and water or an alcohol-based hand rub before and after caring for each patient.  · May remove some of your hair immediately before your surgery using electric clippers if the hair is in the same area where the procedure will occur. They should not shave you with a razor.  · Wear special hair covers, masks, gowns, and gloves during surgery to keep the surgery area clean.  · Give you antibiotics before your surgery starts. In most cases, you should get antibiotics within 60 minutes before the surgery starts and the antibiotics should be stopped within 24 hours after surgery.  · Clean the skin at the site of your surgery with a special soap that kills germs.     What can I do to help prevent SSIs?Before your surgery:  · Tell your doctor about other medical problems you may have. Health problems such as allergies, diabetes, and obesity could affect your surgery and your treatment.  · Quit smoking. Patients who smoke get more infections. Talk to your doctor about how you can quit before your surgery.  · Do not shave near where you will have surgery. Shaving with a razor can irritate your skin and make it easier to develop an infection.  At the time of your surgery:  · Speak up if someone tries to shave you with a razor before surgery. Ask why you need to be shaved and talk with your surgeon if you have any concerns.  · Ask if you will get antibiotics before surgery.  After your surgery:  · Make sure that your healthcare providers clean their hands before examining you, either with soap and water or an alcohol-based hand rub.  ? If you do not see your providers clean their hands, please ask them to do so.  · Family and friends who visit you should not touch the surgical wound or dressings.  · Family and friends should clean their hands with soap and water or an alcohol-based hand rub before and after  visiting you. If you do not see them clean their hands, ask them to clean their hands.  What do I need to do when I go home from the hospital?  · Before you go home, your doctor or nurse should explain everything you need to know about taking care of your wound. Make sure you understand how to care for your wound before you leave the hospital.  · Always clean your hands before and after caring for your wound.  · Before you go home, make sure you know who to contact if you have questions or problems after you get home.  · If you have any symptoms of an infection, such as redness and pain at the surgery site, drainage, or fever, call your doctor immediately.  If you have additional questions, please ask your doctor or nurse.  Developed and co-sponsored by The Society for Healthcare Epidemiology of [place] (SHEA); Infectious Diseases Society of [place] (IDSA); American Hospital Association; Association for Professionals in Infection Control and Epidemiology (APIC); Centers for Disease Control and Prevention (CDC); and The Joint Commission.  This information is not intended to replace advice given to you by your health care provider. Make sure you discuss any questions you have with your health care provider.  Document Released: 12/23/2014 Document Revised: 11/15/2017 Document Reviewed: 05/08/2017  [employer] Interactive Patient Education © 2019 [employer]           Opioid Resource     If you or someone you know needs information on substance abuse, please visit   https://www.findhelpnowky.org/ for listings of facilities and resources across [place].  Stroke Symptoms     · Call 911 or have someone take you to the Emergency Department if you have any of the following:  · Sudden numbness or weakness of your face, arm or leg especially on one side of the body  · Sudden confusion, difficulty speaking or trouble understanding   · Changes in your vision or loss of sight in one eye  · Sudden severe headache with no known  cause  · Sudden dizziness, trouble walking, loss of balance or coordination     It is important to seek emergency care right away if you have further stroke symptoms. If you get emergency help quickly, the powerful clot-dissolving medicines can reduce the disabilities caused by a stroke.      For more information:  American Stroke Association  9-733-8-STROKE  www.strokeassociation.org  Smoking Cessation     IF YOU SMOKE OR USE TOBACCO PLEASE READ THE FOLLOWING:  Why is smoking bad for me?  Smoking increases the risk of heart disease, lung disease, vascular disease, stroke, and cancer. If you smoke, STOP!     If you would like more information on quitting smoking, please visit the [facility] website: [email or web address]   This link will provide additional resources including the QUIT line and the Beat the Pack support groups.      For more information:  Quit Now [place]  1-800-QUIT-NOW  https://[facility].[employer]og[employer].org/en-US/  Suicidal Feelings     If you feel like life is too tough and are thinking of suicide or injuring yourself, get help right away!  · Call 911  · Call a suicide hotline to speak to a counselor. 6-696-912-TALK or 0-322-AJWIACB   Patient Experience     Thank you for choosing [facility]. You may receive a survey following your visit. Please take a moment to share what went well, where we need improvement, and which staff members deserve recognition. We value your input.           YOU ARE THE MOST IMPORTANT FACTOR IN YOUR RECOVERY.      Follow all instructions carefully.      I have reviewed my discharge instructions with my nurse, including the following information, if applicable:                 Information about my illness and diagnosis              Follow up appointments (including lab draws)              Wound Care              Equipment Needs              Medications (new and continuing) along with side effects              Preventative  information such as vaccines and smoking cessations              Diet              Pain              I know when to contact my Doctor's office or seek emergency care        I want my nurse to describe the side effects of my medications: YES NO   If the answer is no, I understand the side effects of my medications: YES NO   My nurse described the side effects of my medications in a way that I could understand: YES NO   I have taken my personal belongings and my own medications with me at discharge: YES NO       I have received this information and my questions have been answered. I have discussed any concerns I see with this plan with the nurse or physician. I understand these instructions.     Signature of Patient or Responsible Person: _____________________________________     Date: _________________  Time: __________________     Signature of Healthcare Provider: _______________________________________  Date: _________________  Time: __________________            Emergency Contact Information     Name Relation Home Work Mobile    Candace Mustafa [phone]      Collette Edmond [phone]          Insurance Information                MEDICARE/MEDICARE A & B Phone: [phone]    Subscriber: Axel Desir Subscriber#: [id number]    Group#:  Precert#:         PASSPORT HEALTH PLAN/PASSPORT Phone: [phone]    Subscriber: Axel Desir Subscriber#: [id number]    Group#: MEDICAID Precert#:           ICU Vital Signs     Row Name 07/01/19 1416 07/01/19 1304 07/01/19 1246 07/01/19 1100 07/01/19 0841       Vitals    Temp  97.8 °F (36.6 °C)  97.4 °F (36.3 °C)  98.6 °F (37 °C)  -- PT off the floor   97.7 °F (36.5 °C)    Temp src  Oral  Temporal  --  --  Temporal    Pulse  79  71  79  --  78    Heart Rate Source  Monitor  Monitor  Monitor  --  Monitor    Resp  16  12  16  --  12    Resp Rate Source  Visual  Visual  Visual  --  Visual    BP  176/107  (Abnormal)   190/100  (Abnormal)    173/103  (Abnormal)  nurse notified  --  177/104  (Abnormal)     Noninvasive MAP (mmHg)  133  --  133  --  --    BP Location  Right arm  Right arm  Right arm  --  Right arm    BP Method  Automatic  Automatic  Automatic  --  Automatic    Patient Position  Lying  Lying  --  --  Lying       Oxygen Therapy    SpO2  98 %  --  98 %  --  --    Row Name 07/01/19 0638 07/01/19 0620 07/01/19 0300 06/30/19 2015 06/30/19 1854       Height and Weight    Weight  --  --  59.9 kg (132 lb)  --  --    Weight Method  --  --  Bed scale  --  --       Vitals    Temp  97.4 °F (36.3 °C)  --  97.7 °F (36.5 °C)  --  --    Temp src  Oral  --  Oral  --  --    Pulse  79  76  56  --  56    Heart Rate Source  Monitor  --  Monitor  --  Monitor    Resp  18  20  18  --  18    Resp Rate Source  Visual  --  Visual  --  Visual    BP  186/100  (Abnormal)  nurse donte aware  --  153/83  --  --    Noninvasive MAP (mmHg)  --  --  121  --  --    BP Location  Right arm  --  Right arm  --  --    BP Method  Automatic  --  Automatic  --  --    Patient Position  Lying  --  Lying  --  --       Oxygen Therapy    SpO2  98 %  99 %  98 %  --  98 %    Pulse Oximetry Type  --  Continuous  --  --  Continuous    Device (Oxygen Therapy)  --  room air  --  room air  room air    Row Name 06/30/19 1824 06/30/19 1524                Vitals    Temp  98.1 °F (36.7 °C)  97.6 °F (36.4 °C)       Temp src  Oral  Oral       Pulse  58  61       Heart Rate Source  Monitor  Monitor       Resp  18  18       Resp Rate Source  Visual  Visual       BP  128/76  147/78       Noninvasive MAP (mmHg)  104  101       BP Location  Right arm  Right arm       BP Method  Automatic  Automatic       Patient Position  Lying  Lying          Oxygen Therapy    SpO2  99 %  99 %           Intake & Output (last day)       06/30 0701 - 07/01 0700 07/01 0701 - 07/02 0700    P.O. 1080     Total Intake(mL/kg) 1080 (18)     Urine (mL/kg/hr) 1650 (1.1)     Other  2000    Stool      Total Output 1650 2000    Net -570  -2000                Lab Results (last 24 hours)     Procedure Component Value Units Date/Time    POC Glucose Once [[id number]]  (Normal) Collected:  07/01/19 0730    Specimen:  Blood Updated:  07/01/19 0808     Glucose 106 mg/dL     Magnesium [[id number]]  (Normal) Collected:  07/01/19 0443    Specimen:  Blood Updated:  07/01/19 0530     Magnesium 2.0 mg/dL     Basic Metabolic Panel [[id number]]  (Abnormal) Collected:  07/01/19 0443    Specimen:  Blood Updated:  07/01/19 0530     Glucose 121 mg/dL      BUN 61 mg/dL      Creatinine 3.74 mg/dL      Sodium 134 mmol/L      Potassium 4.9 mmol/L      Chloride 98 mmol/L      CO2 22.7 mmol/L      Calcium 8.7 mg/dL      eGFR Non African Amer 17 mL/min/1.73      BUN/Creatinine Ratio 16.3     Anion Gap 13.3 mmol/L     Narrative:       GFR Normal >60  Chronic Kidney Disease <60  Kidney Failure <15    CBC & Differential [[id number]] Collected:  07/01/19 0443    Specimen:  Blood Updated:  07/01/19 0505    Narrative:       The following orders were created for panel order CBC & Differential.  Procedure                               Abnormality         Status                     ---------                               -----------         ------                     CBC Auto Differential[[id number]]        Abnormal            Final result                 Please view results for these tests on the individual orders.    CBC Auto Differential [[id number]]  (Abnormal) Collected:  07/01/19 0443    Specimen:  Blood Updated:  07/01/19 0505     WBC 5.21 10*3/mm3      RBC 3.20 10*6/mm3      Hemoglobin 9.1 g/dL      Hematocrit 30.1 %      MCV 94.1 fL      MCH 28.4 pg      MCHC 30.2 g/dL      RDW 17.4 %      RDW-SD 56.8 fl      MPV 9.2 fL      Platelets 214 10*3/mm3      Neutrophil % 54.5 %      Lymphocyte % 29.9 %      Monocyte % 11.3 %      Eosinophil % 3.1 %      Basophil % 0.6 %      Immature Grans % 0.6 %      Neutrophils, Absolute 2.84 10*3/mm3      Lymphocytes, Absolute 1.56 10*3/mm3       Monocytes, Absolute 0.59 10*3/mm3      Eosinophils, Absolute 0.16 10*3/mm3      Basophils, Absolute 0.03 10*3/mm3      Immature Grans, Absolute 0.03 10*3/mm3     POC Glucose Once [[id number]]  (Abnormal) Collected:  06/30/19 1929    Specimen:  Blood Updated:  06/30/19 1940     Glucose 274 mg/dL         Imaging Results (last 24 hours)     ** No results found for the last 24 hours. **        ECG/EMG Results (last 24 hours)     ** No results found for the last 24 hours. **           Physician Progress Notes (last 24 hours) (Notes from 6/30/2019  2:51 PM through 7/1/2019  2:51 PM)      Sathya Mendenhall MD at 7/1/2019  6:55 AM             Interval History:     Patient Complaints: Patient is feeling well denies any shortness of breath is going to get dialysis and waiting for placement        Vital Signs  Temp:  [96.9 °F (36.1 °C)-98.1 °F (36.7 °C)] 97.4 °F (36.3 °C)  Heart Rate:  [56-88] 79  Resp:  [18] 18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 wheelchair)?  3  -BC      Standing up from a chair using your arms (e.g., wheelchair, bedside chair)?  3  -BC      Climbing 3-5 steps with a railing?  2  -BC      To walk in hospital room?  3  -BC      AM-PAC 6 Clicks Score  17  -BC          Functional Assessment    Outcome Measure Options  AM-PAC 6 Clicks Basic Mobility (PT)  -BC        User Key  (r) = Recorded By, (t) = Taken By, (c) = Cosigned By    Initials Name Provider Type    BC Dilia Hurley PT Physical Therapist          Time Calculation:   Time Calculation- OT     Row Name 06/20/19 1540             Time Calculation- OT    Total Timed Code Minutes- OT  55 minute(s)  -LM      OT Non-Billable Time (min)  15 min  -LM        User Key  (r) = Recorded By, (t) = Taken By, (c) = Cosigned By    Initials Name Provider Type    LM Vani Jose OT Occupational Therapist        Therapy Charges for Today     Code Description Service Date Service Provider Modifiers Qty    69272049326 HC OT EVAL HIGH COMPLEXITY 4 6/20/2019 Vani Jose OT GO 1               Vani Jose OT  6/20/2019    Electronically signed by Vani Jose OT at 6/20/2019  3:42 PM       Discharge Summary     No notes of this type exist for this encounter.        Discharge Order (From admission, onward)    Start     Ordered    07/01/19 1436  Discharge patient  Once     Expected Discharge Date:  07/01/19    Discharge Disposition:  Skilled Nursing Facility (DC - External)    Physician of Record for Attribution - Please select from Treatment Team:  ALYSSA GONZALEZ [[id number]]    Review needed by CMO to determine Physician of Record:  No       Question Answer Comment   Physician of Record for Attribution - Please select from Treatment Team ALYSSA GONZALEZ    Review needed by CMO to determine Physician of Record No        07/01/19 2460

## 2019-07-01 NOTE — PROGRESS NOTES
Discharge Planning Assessment  SANA Reyes     Patient Name: Axel Desir  MRN: [id number]  Today's Date: 7/1/2019    Admit Date: 6/18/2019    Discharge Plan     Row Name 07/01/19 1452       Plan    Patient/Family in Agreement with Plan  yes    Final Discharge Disposition Code  03 - skilled nursing facility (SNF)    Final Note  Pt is being discharged to [facility] on this date. SS contacted [facility] per Faye and made them aware. SS faxed the AVS and pt's information to [facility] and provided RN with the number to call report. SS contacted [facility] and made them aware pt is being discharged on this date. Pt will be transported via private auto. No other needs identified.      Juana Garcia

## 2019-07-01 NOTE — DISCHARGE SUMMARY
[facility] MEDICINE DISCHARGE SUMMARY    Patient Identification:  Name:  Axel Desir  Age:  59 y.o.  Sex:  male  :  1960  MRN:  [id number]  Visit Number:  [id number]    Date of Admission: 2019  Date of Discharge:  2019   DISCHARGE DISPOSITION   Stable  PCP: Sammy Glass MD    DISCHARGE DIAGNOSIS :  Acute blood loss anemia superimposed on chronic anemia, folic acid deficiency as well as anemia of chronic disease, End-stage renal disease on chronic hemodialysis, Paroxysmal atrial fibrillation, Hypertension, Pneumonia, Acute diastolic heart failure on chronic, Chronic hepatitis C, Gallbladder thickening asymptomatic related to liver disease,  History of tobacco and alcohol use and he has agreed to quit, Minimal gastritis by EGD candidal esophagitis, Hypothyroidism, under replaced, Synthroid adjusted here recommend repeat TSH 4 weeks.        HOSPITAL COURSE  Patient was admitted with shortness of breath and found to be anemic as well as having element of pneumonia and heart failure.  He was dialyzed for the heart failure and treated for pneumonia.  Clinically he has improved, he was transfused 1 unit packed red blood cells and he was heme positive therefore work-up was undertaken for his anemia.  EGD and colonoscopy were done, findings as above,: Was normal.  Patient will complete a course of Protonix and Diflucan for his candidal esophagitis and this will also treat his gastritis.  He did have paroxysmal atrial fibrillation, his medication were adjusted by cardiology, he is on Eliquis for stroke prevention. Hemoglobin has remained stable on this.  It is noted that we were attempting to get him into a nursing facility which I have recommended to the patient.  Despite this recommendation and my concern for him going to live with friends he is alert, oriented, and he is adamant he is not going to a nursing facility.  Patient initially inform us that he would go home  with his relative but after discussing  this with his family, they prefer him to be at the nursing home for now until he recuperates and improved and hopefully they can arrange him a place to live.   was able to arrange for his scheduled 3 times a week dialysis.  His hypertension is also some of the controlled hence adjustments were made in his medication and further adjustments can be done while at the nursing home.    VITAL SIGNS:      06/29/19  0400 06/30/19  0258 07/01/19  0300   Weight: 63.5 kg (140 lb) 59.1 kg (130 lb 3.2 oz) (RN Sherri notified) 59.9 kg (132 lb)     Body mass index is 18.94 kg/m².  Vitals:    07/01/19 1416   BP: (!) 176/107   Pulse: 79   Resp: 16   Temp: 97.8 °F (36.6 °C)   SpO2: 98%     PHYSICAL EXAM:  General: Comfortable,awake, alert, oriented to self, place, and time, chronically ill-appearing.  No respiratory distress.    Skin:  Skin is warm and dry. No rash noted. No pallor.    HENT:  Head:  Normocephalic and atraumatic.  Mouth:  Moist mucous membranes.    Eyes:  Conjunctivae and EOM are normal.  Pupils are equal, round, and reactive to light.  No scleral icterus.    Neck:  Neck supple.  No JVD present.  No hepatojugular reflux  Pulmonary/Chest:  No respiratory distress, no wheezes, no crackles, with normal breath sounds and good air movement.  Left anterior chest wall hemodialysis catheter in place, no redness or drainage at the site.  Cardiovascular:  Normal rate, regular rhythm and normal heart sounds with no murmur.  Abdominal:  Soft.  Bowel sounds are normal.  No distension and no tenderness.   Extremities:  No edema, no tenderness, and no deformity.  No red or swollen joints anywhere.  Strong pulses in all 4 extremities with no clubbing, no cyanosis, no edema.  Neurological:  Motor strength equal no obvious deficit, sensory grossly intact.   No cranial nerve deficit.  No tongue deviation.  No facial droop.  No slurred speech.    ----------    DISCHARGE MEDICATIONS:      Discharge Medications      New Medications      Instructions Start Date   amiodarone 400 MG tablet  Commonly known as:  PACERONE   400 mg, Oral, Every 24 Hours Scheduled   Start Date:  7/2/2019     amiodarone 200 MG tablet  Commonly known as:  PACERONE   200 mg, Oral, Every 24 Hours Scheduled   Start Date:  7/10/2019     carvedilol 25 MG tablet  Commonly known as:  COREG   25 mg, Oral, 2 Times Daily With Meals      diltiaZEM  MG 24 hr capsule  Commonly known as:  CARDIZEM CD   240 mg, Oral, Every 24 Hours Scheduled   Start Date:  7/2/2019     ipratropium-albuterol 0.5-2.5 mg/3 ml nebulizer  Commonly known as:  DUO-NEB   3 mL, Nebulization, Every 6 Hours PRN         Continue These Medications      Instructions Start Date   apixaban 2.5 MG tablet tablet  Commonly known as:  ELIQUIS   2.5 mg, Oral, 2 Times Daily      aspirin 81 MG EC tablet   81 mg, Oral, Daily      atorvastatin 80 MG tablet  Commonly known as:  LIPITOR   40 mg, Oral, Nightly, Prior to [facility] Admission, Patient was on: pt states he only takes 1/2 tablet at night       busPIRone 7.5 MG tablet  Commonly known as:  BUSPAR   7.5 mg, Oral, Nightly      calcitriol 0.5 MCG capsule  Commonly known as:  ROCALTROL   0.5 mcg, Oral, 3 Times Weekly, Prior to [facility] Admission, Patient was on: takes on mondays, wednesdays, and fridays       calcium acetate 667 MG capsule capsule  Commonly known as:  PHOS BINDER)   667 mg, Oral, Every 6 Hours      famotidine 20 MG tablet  Commonly known as:  PEPCID   20 mg, Oral, 2 Times Daily      ferrous sulfate 325 (65 FE) MG tablet   325 mg, Oral, Daily With Breakfast      folic acid 1 MG tablet  Commonly known as:  FOLVITE   1 mg, Oral, Daily      hydrALAZINE 100 MG tablet  Commonly known as:  APRESOLINE   100 mg, Oral, 3 Times Daily      isosorbide mononitrate 30 MG 24 hr tablet  Commonly known as:  IMDUR   30 mg, Oral, Daily      levothyroxine 50 MCG tablet  Commonly known as:  SYNTHROID, LEVOTHROID   50 mcg, Oral,  Daily      Melatonin 3 MG tablet   3 mg, Oral, Nightly      nitroglycerin 0.4 MG SL tablet  Commonly known as:  NITROSTAT   0.4 mg, Sublingual, Every 5 Minutes PRN, Take no more than 3 doses in 15 minutes.       ondansetron 4 MG tablet  Commonly known as:  ZOFRAN   4 mg, Oral, Every 6 Hours PRN      oxyCODONE 5 MG immediate release tablet  Commonly known as:  ROXICODONE   5 mg, Oral, Every 8 Hours PRN      QUEtiapine 50 MG tablet  Commonly known as:  SEROquel   50 mg, Oral, 3 Times Daily PRN      TOMASA-GAB tablet   1 tablet, Oral, Daily      sertraline 100 MG tablet  Commonly known as:  ZOLOFT   100 mg, Oral, Daily      tamsulosin 0.4 MG capsule 24 hr capsule  Commonly known as:  FLOMAX   1 capsule, Oral, Nightly      thiamine 100 MG tablet  Commonly known as:  VITAMIN B-1   100 mg, Oral, Daily         Stop These Medications    cloNIDine 0.3 MG/24HR patch  Commonly known as:  CATAPRES-TTS     diltiaZEM 60 MG tablet  Commonly known as:  CARDIZEM     doxazosin 2 MG tablet  Commonly known as:  CARDURA     guaiFENesin 200 MG tablet     insulin glargine 100 UNIT/ML injection  Commonly known as:  LANTUS     metoprolol tartrate 100 MG tablet  Commonly known as:  LOPRESSOR     NIFEdipine XL 90 MG 24 hr tablet  Commonly known as:  PROCARDIA XL     zinc sulfate 220 (50 Zn) MG capsule  Commonly known as:  ZINCATE              Your Scheduled Appointments    Nov 05, 2019  2:15 PM EST  Follow Up with Axel Eldridge MD  [facility] CARDIOLOGY (--) [address] [place] [id number] [phone]   Arrive 15 minutes prior to appointment.          Additional Instructions for the Follow-ups that You Need to Schedule     Discharge Follow-up with PCP   As directed       Currently Documented PCP:    Sammy Glass MD    PCP Phone Number:    [phone]     Follow Up Details:  Dr. Sammy Glass MD            Contact information for follow-up providers     Sammy Glass MD .     Specialty:  Family Medicine  Why:  Dr. Sammy Glass MD  Contact information:  [address] [place] [address]  [phone]                   Contact information for after-discharge care     Destination     [facility] .    Service:  Skilled Nursing  Contact information:  [address] [place] [address]  [phone]                             Kelsie Gonzalez MD  07/01/19  4:31 PM    Please note that this discharge summary required more than 30 minutes to complete.

## 2019-07-01 NOTE — PLAN OF CARE
Problem: Fall Risk (Adult)  Goal: Identify Related Risk Factors and Signs and Symptoms  Outcome: Ongoing (interventions implemented as appropriate)    Goal: Absence of Fall   06/30/19 2123   Fall Risk (Adult)   Absence of Fall making progress toward outcome       Problem: Patient Care Overview  Goal: Plan of Care Review  Outcome: Ongoing (interventions implemented as appropriate)    Goal: Individualization and Mutuality  Outcome: Ongoing (interventions implemented as appropriate)    Goal: Discharge Needs Assessment  Outcome: Ongoing (interventions implemented as appropriate)    Goal: Interprofessional Rounds/Family Conf  Outcome: Ongoing (interventions implemented as appropriate)      Problem: Pneumonia (Adult)  Goal: Signs and Symptoms of Listed Potential Problems Will be Absent, Minimized or Managed (Pneumonia)  Outcome: Ongoing (interventions implemented as appropriate)      Problem: Skin Injury Risk (Adult)  Goal: Identify Related Risk Factors and Signs and Symptoms  Outcome: Ongoing (interventions implemented as appropriate)    Goal: Skin Health and Integrity   06/30/19 2123   Skin Injury Risk (Adult)   Skin Health and Integrity making progress toward outcome       Problem: Mobility, Physical Impaired (Adult)  Goal: Identify Related Risk Factors and Signs and Symptoms  Outcome: Ongoing (interventions implemented as appropriate)    Goal: Enhanced Mobility Skills   06/30/19 2123   Mobility, Physical Impaired (Adult)   Enhanced Mobility Skills making progress toward outcome     Goal: Enhanced Functional Ability   06/30/19 2123   Mobility, Physical Impaired (Adult)   Enhanced Functional Ability making progress toward outcome

## 2019-07-02 ENCOUNTER — EPISODE CHANGES (OUTPATIENT)
Dept: CASE MANAGEMENT | Facility: OTHER | Age: 59
End: 2019-07-02

## 2019-07-02 ENCOUNTER — PATIENT OUTREACH (OUTPATIENT)
Dept: CASE MANAGEMENT | Facility: OTHER | Age: 59
End: 2019-07-02

## 2019-07-02 NOTE — OUTREACH NOTE
SNF Follow-up Note    Skilled Nursing Facility Discharge Assessment 7/2/2019   Acute Facility Discharged From [facility]    Acute Discharge Date 7/1/2019   Name of the [facility]? [facility]   Purpose of SNF Admission PT;OT   Estimated length of stay for the patient? TBD   Who is the insurance provider or payor of patient stay? Medicare   Progression of Patient? New admission under Medicare       Rajni Dobbs RN    7/2/2019, 9:50 AM

## 2019-07-11 ENCOUNTER — PATIENT OUTREACH (OUTPATIENT)
Dept: CASE MANAGEMENT | Facility: OTHER | Age: 59
End: 2019-07-11

## 2019-07-11 NOTE — OUTREACH NOTE
SNF Follow-up Note    Skilled Nursing Facility Discharge Assessment 7/11/2019   Acute Facility Discharged From [facility]    Acute Discharge Date 7/1/2019   Name of the [facility]   Purpose of SNF Admission PT;OT   Estimated length of stay for the patient? TBD   Who is the insurance provider or payor of patient stay? Medicare   Progression of Patient? Patient still under Medicare; no dc date yet          Rajni Dobbs RN    7/11/2019, 2:44 PM

## 2019-07-18 ENCOUNTER — PATIENT OUTREACH (OUTPATIENT)
Dept: CASE MANAGEMENT | Facility: OTHER | Age: 59
End: 2019-07-18

## 2019-07-18 NOTE — OUTREACH NOTE
SNF Follow-up Note    Skilled Nursing Facility Discharge Assessment 7/18/2019   Acute Facility Discharged From [facility]    Acute Discharge Date 7/1/2019   Name of the [facility]illed [facility]   Purpose of SNF Admission PT;OT   Estimated length of stay for the patient? TBD   Who is the insurance provider or payor of patient stay? Medicare   Progression of Patient? Patient still under Medicare; no dc date yet          Rajni Dobbs RN    7/18/2019, 3:06 PM

## 2019-08-01 ENCOUNTER — PATIENT OUTREACH (OUTPATIENT)
Dept: CASE MANAGEMENT | Facility: OTHER | Age: 59
End: 2019-08-01

## 2019-08-01 NOTE — OUTREACH NOTE
SNF Follow-up Note    Skilled Nursing Facility Discharge Assessment 8/1/2019   Acute Facility Discharged From [facility]    Acute Discharge Date 7/1/2019   Name of the [facility]   Purpose of SNF Admission PT;OT   Estimated length of stay for the patient? TBD   Who is the insurance provider or payor of patient stay? Medicare   Progression of Patient? Spoke with Faye, pt dc'd to home   Skilled Nursing Discharge Date? 7/19/2019   Where was the patient discharged to? Home       Rajni Dobbs RN    8/1/2019, 3:53 PM

## 2019-08-02 ENCOUNTER — EPISODE CHANGES (OUTPATIENT)
Dept: CASE MANAGEMENT | Facility: OTHER | Age: 59
End: 2019-08-02

## 2019-08-03 ENCOUNTER — APPOINTMENT (OUTPATIENT)
Dept: CT IMAGING | Facility: HOSPITAL | Age: 59
End: 2019-08-03

## 2019-08-03 ENCOUNTER — APPOINTMENT (OUTPATIENT)
Dept: GENERAL RADIOLOGY | Facility: HOSPITAL | Age: 59
End: 2019-08-03

## 2019-08-03 ENCOUNTER — HOSPITAL ENCOUNTER (EMERGENCY)
Facility: HOSPITAL | Age: 59
Discharge: HOME | End: 2019-08-03
Attending: FAMILY MEDICINE | Admitting: FAMILY MEDICINE

## 2019-08-03 VITALS
SYSTOLIC BLOOD PRESSURE: 117 MMHG | BODY MASS INDEX: 18.61 KG/M2 | OXYGEN SATURATION: 98 % | TEMPERATURE: 98.8 F | WEIGHT: 130 LBS | DIASTOLIC BLOOD PRESSURE: 68 MMHG | HEIGHT: 70 IN | HEART RATE: 78 BPM | RESPIRATION RATE: 18 BRPM

## 2019-08-03 DIAGNOSIS — S80.00XA CONTUSION OF KNEE, UNSPECIFIED LATERALITY, INITIAL ENCOUNTER: ICD-10-CM

## 2019-08-03 DIAGNOSIS — S60.211A CONTUSION OF RIGHT WRIST, INITIAL ENCOUNTER: Primary | ICD-10-CM

## 2019-08-03 DIAGNOSIS — S30.0XXA CONTUSION OF SACRUM, INITIAL ENCOUNTER: ICD-10-CM

## 2019-08-03 DIAGNOSIS — R33.9 URINARY RETENTION: ICD-10-CM

## 2019-08-03 PROCEDURE — 73610 X-RAY EXAM OF ANKLE: CPT

## 2019-08-03 PROCEDURE — 73564 X-RAY EXAM KNEE 4 OR MORE: CPT

## 2019-08-03 PROCEDURE — 73562 X-RAY EXAM OF KNEE 3: CPT | Performed by: RADIOLOGY

## 2019-08-03 PROCEDURE — 51702 INSERT TEMP BLADDER CATH: CPT

## 2019-08-03 PROCEDURE — 72220 X-RAY EXAM SACRUM TAILBONE: CPT

## 2019-08-03 PROCEDURE — 73610 X-RAY EXAM OF ANKLE: CPT | Performed by: RADIOLOGY

## 2019-08-03 PROCEDURE — 99283 EMERGENCY DEPT VISIT LOW MDM: CPT

## 2019-08-03 PROCEDURE — 51798 US URINE CAPACITY MEASURE: CPT

## 2019-08-03 PROCEDURE — 73110 X-RAY EXAM OF WRIST: CPT | Performed by: RADIOLOGY

## 2019-08-03 PROCEDURE — 72220 X-RAY EXAM SACRUM TAILBONE: CPT | Performed by: RADIOLOGY

## 2019-08-03 PROCEDURE — 70450 CT HEAD/BRAIN W/O DYE: CPT

## 2019-08-03 PROCEDURE — 73110 X-RAY EXAM OF WRIST: CPT
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
## 2019-08-03 NOTE — ED NOTES
Patient reports that he fell last night injuring his right wrist, right ankle and left knee. He reports that he landed on his coccyx. Family member reports that he hit his head, denies any LOC. He has a small skin tear to his left forearm.      Yan Mcclain RN  08/03/19 1557

## 2019-08-03 NOTE — ED NOTES
IVETTE Araujo verbalized to discharge patient with ames catheter in place. He needs to keep ames until he follows up with PCP.      Yan Mcclain RN  08/03/19 4801

## 2019-08-03 NOTE — DISCHARGE INSTRUCTIONS
Follow up with your primary care provider.     Follow up for dialysis as soon as possble.    Return to the emergency room for worsening symptoms.    Do not remove catheter until seen by primary care provider, urologist, or nephrologist.

## 2019-08-05 ENCOUNTER — EPISODE CHANGES (OUTPATIENT)
Dept: CASE MANAGEMENT | Facility: OTHER | Age: 59
End: 2019-08-05

## 2019-08-15 ENCOUNTER — OFFICE VISIT (OUTPATIENT)
Dept: UROLOGY | Facility: CLINIC | Age: 59
End: 2019-08-15

## 2019-08-15 VITALS
TEMPERATURE: 97.5 F | DIASTOLIC BLOOD PRESSURE: 62 MMHG | HEIGHT: 70 IN | SYSTOLIC BLOOD PRESSURE: 122 MMHG | BODY MASS INDEX: 17.9 KG/M2 | WEIGHT: 125 LBS

## 2019-08-15 DIAGNOSIS — R33.9 URINARY RETENTION: Primary | ICD-10-CM

## 2019-08-15 PROCEDURE — 99203 OFFICE O/P NEW LOW 30 MIN: CPT | Performed by: UROLOGY

## 2019-08-15 RX ORDER — NIFEDIPINE 90 MG/1
1 TABLET, FILM COATED, EXTENDED RELEASE ORAL DAILY
Refills: 5 | COMMUNITY
Start: 2019-05-23 | End: 2019-08-15

## 2019-08-15 RX ORDER — LANOLIN ALCOHOL/MO/W.PET/CERES
CREAM (GRAM) TOPICAL DAILY
Refills: 0 | COMMUNITY
Start: 2019-06-12 | End: 2019-10-15

## 2019-08-15 RX ORDER — ATORVASTATIN CALCIUM 40 MG/1
1 TABLET, FILM COATED ORAL 2 TIMES DAILY
Refills: 0 | COMMUNITY
Start: 2019-08-02 | End: 2019-10-15

## 2019-08-15 RX ORDER — TRAMADOL HYDROCHLORIDE 50 MG/1
50 TABLET ORAL 2 TIMES DAILY
Refills: 0 | COMMUNITY
Start: 2019-07-23 | End: 2019-10-15

## 2019-08-15 RX ORDER — HYDROCODONE BITARTRATE AND ACETAMINOPHEN 5; 325 MG/1; MG/1
TABLET ORAL AS NEEDED
Refills: 0 | COMMUNITY
Start: 2019-08-08 | End: 2019-08-15

## 2019-08-15 RX ORDER — ASPIRIN 81 MG
1 TABLET,CHEWABLE ORAL DAILY
Refills: 0 | Status: ON HOLD | COMMUNITY
Start: 2019-06-12 | End: 2022-09-21

## 2019-08-15 RX ORDER — INSULIN GLARGINE 100 [IU]/ML
INJECTION, SOLUTION SUBCUTANEOUS DAILY
Refills: 0 | COMMUNITY
Start: 2019-06-12 | End: 2019-08-15

## 2019-08-15 RX ORDER — CLONIDINE 0.3 MG/24H
PATCH, EXTENDED RELEASE TRANSDERMAL AS NEEDED
Refills: 4 | COMMUNITY
Start: 2019-07-17 | End: 2019-08-15
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
## 2019-08-16 ENCOUNTER — OFFICE VISIT (OUTPATIENT)
Dept: UROLOGY | Facility: CLINIC | Age: 59
End: 2019-08-16

## 2019-08-16 VITALS
SYSTOLIC BLOOD PRESSURE: 138 MMHG | TEMPERATURE: 97.1 F | WEIGHT: 128 LBS | HEIGHT: 70 IN | DIASTOLIC BLOOD PRESSURE: 78 MMHG | BODY MASS INDEX: 18.32 KG/M2

## 2019-08-16 DIAGNOSIS — R33.9 INCOMPLETE BLADDER EMPTYING: Primary | ICD-10-CM

## 2019-08-16 DIAGNOSIS — N18.6 ESRD (END STAGE RENAL DISEASE) ON DIALYSIS (HCC): ICD-10-CM

## 2019-08-16 DIAGNOSIS — R33.9 URINARY RETENTION: ICD-10-CM

## 2019-08-16 DIAGNOSIS — Z99.2 ESRD (END STAGE RENAL DISEASE) ON DIALYSIS (HCC): ICD-10-CM

## 2019-08-16 PROCEDURE — 99213 OFFICE O/P EST LOW 20 MIN: CPT | Performed by: UROLOGY

## 2019-08-16 PROCEDURE — 51798 US URINE CAPACITY MEASURE: CPT | Performed by: UROLOGY

## 2019-08-16 RX ORDER — OXYCODONE HYDROCHLORIDE 5 MG/1
5 CAPSULE ORAL EVERY 8 HOURS PRN
Status: ON HOLD | COMMUNITY
End: 2022-09-21
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
## 2019-08-17 PROBLEM — N18.6 ESRD (END STAGE RENAL DISEASE) ON DIALYSIS (HCC): Status: ACTIVE | Noted: 2019-08-17

## 2019-08-17 PROBLEM — Z99.2 ESRD (END STAGE RENAL DISEASE) ON DIALYSIS (HCC): Status: ACTIVE | Noted: 2019-08-17

## 2019-10-15 ENCOUNTER — OFFICE VISIT (OUTPATIENT)
Dept: CARDIOLOGY | Facility: CLINIC | Age: 59
End: 2019-10-15

## 2019-10-15 VITALS
DIASTOLIC BLOOD PRESSURE: 80 MMHG | HEIGHT: 70 IN | WEIGHT: 130 LBS | BODY MASS INDEX: 18.61 KG/M2 | HEART RATE: 89 BPM | SYSTOLIC BLOOD PRESSURE: 135 MMHG

## 2019-10-15 DIAGNOSIS — I48.0 PAF (PAROXYSMAL ATRIAL FIBRILLATION) (HCC): ICD-10-CM

## 2019-10-15 DIAGNOSIS — I25.10 CORONARY ARTERY DISEASE INVOLVING NATIVE HEART WITHOUT ANGINA PECTORIS, UNSPECIFIED VESSEL OR LESION TYPE: Primary | ICD-10-CM

## 2019-10-15 DIAGNOSIS — I10 ESSENTIAL HYPERTENSION: ICD-10-CM

## 2019-10-15 PROCEDURE — 99214 OFFICE O/P EST MOD 30 MIN: CPT | Performed by: INTERNAL MEDICINE

## 2019-10-15 PROCEDURE — 93000 ELECTROCARDIOGRAM COMPLETE: CPT | Performed by: INTERNAL MEDICINE

## 2019-10-15 RX ORDER — ATORVASTATIN CALCIUM 20 MG/1
20 TABLET, FILM COATED ORAL NIGHTLY
Qty: 30 TABLET | Refills: 11 | Status: ON HOLD | OUTPATIENT
Start: 2019-10-15 | End: 2022-09-21

## 2020-06-18 ENCOUNTER — OFFICE VISIT (OUTPATIENT)
Dept: FAMILY MEDICINE CLINIC | Age: 60
End: 2020-06-18
Payer: MEDICARE

## 2020-06-18 VITALS
TEMPERATURE: 97.2 F | BODY MASS INDEX: 23.14 KG/M2 | HEART RATE: 75 BPM | DIASTOLIC BLOOD PRESSURE: 74 MMHG | OXYGEN SATURATION: 98 % | SYSTOLIC BLOOD PRESSURE: 102 MMHG | HEIGHT: 66 IN | WEIGHT: 144 LBS

## 2020-06-18 PROCEDURE — 99204 OFFICE O/P NEW MOD 45 MIN: CPT | Performed by: NURSE PRACTITIONER

## 2020-06-18 RX ORDER — LEVOTHYROXINE SODIUM 0.05 MG/1
50 TABLET ORAL DAILY
COMMUNITY
Start: 2020-03-11 | End: 2020-06-18 | Stop reason: SDUPTHER

## 2020-06-18 RX ORDER — LEVOTHYROXINE SODIUM 0.05 MG/1
50 TABLET ORAL DAILY
Qty: 90 TABLET | Refills: 0 | Status: SHIPPED | OUTPATIENT
Start: 2020-06-18 | End: 2020-09-24

## 2020-06-18 RX ORDER — BUSPIRONE HYDROCHLORIDE 7.5 MG/1
7.5 TABLET ORAL DAILY
COMMUNITY
Start: 2020-03-11 | End: 2020-06-18 | Stop reason: SDUPTHER

## 2020-06-18 RX ORDER — THIAMINE MONONITRATE (VIT B1) 100 MG
100 TABLET ORAL DAILY
COMMUNITY
Start: 2020-05-26 | End: 2020-08-11 | Stop reason: SDUPTHER

## 2020-06-18 RX ORDER — BUSPIRONE HYDROCHLORIDE 7.5 MG/1
7.5 TABLET ORAL DAILY
Qty: 90 TABLET | Refills: 0 | Status: SHIPPED | OUTPATIENT
Start: 2020-06-18 | End: 2020-10-07 | Stop reason: SDUPTHER

## 2020-06-18 RX ORDER — SERTRALINE HYDROCHLORIDE 100 MG/1
100 TABLET, FILM COATED ORAL DAILY
COMMUNITY
End: 2020-06-18

## 2020-06-18 RX ORDER — SERTRALINE HYDROCHLORIDE 100 MG/1
100 TABLET, FILM COATED ORAL DAILY
COMMUNITY
End: 2020-06-18 | Stop reason: SDUPTHER

## 2020-06-18 RX ORDER — TRAZODONE HYDROCHLORIDE 50 MG/1
50 TABLET ORAL DAILY
Qty: 90 TABLET | Refills: 0 | Status: SHIPPED | OUTPATIENT
Start: 2020-06-18 | End: 2020-09-24

## 2020-06-18 RX ORDER — ONDANSETRON 4 MG/1
4 TABLET, FILM COATED ORAL
COMMUNITY

## 2020-06-18 RX ORDER — SERTRALINE HYDROCHLORIDE 100 MG/1
100 TABLET, FILM COATED ORAL DAILY
Qty: 90 TABLET | Refills: 0 | Status: SHIPPED | OUTPATIENT
Start: 2020-06-18 | End: 2020-09-24

## 2020-06-18 RX ORDER — CALCIUM ACETATE 667 MG/1
667 CAPSULE ORAL
COMMUNITY
Start: 2020-06-12

## 2020-06-18 RX ORDER — ASPIRIN 81 MG/1
81 TABLET, COATED ORAL DAILY
COMMUNITY
Start: 2020-05-26 | End: 2020-08-11 | Stop reason: SDUPTHER

## 2020-06-18 RX ORDER — FOLIC ACID/VIT B COMPLEX AND C 0.8 MG
TABLET ORAL
COMMUNITY
Start: 2020-05-26 | End: 2020-08-11 | Stop reason: SDUPTHER

## 2020-06-18 RX ORDER — TRAZODONE HYDROCHLORIDE 50 MG/1
50 TABLET ORAL DAILY
COMMUNITY
Start: 2020-05-31 | End: 2020-06-18 | Stop reason: SDUPTHER

## 2020-06-18 RX ORDER — FOLIC ACID 1 MG/1
1 TABLET ORAL DAILY
COMMUNITY
End: 2020-08-11 | Stop reason: SDUPTHER

## 2020-06-18 RX ORDER — FERROUS SULFATE 325(65) MG
325 TABLET ORAL DAILY
COMMUNITY
Start: 2020-05-26 | End: 2020-08-11 | Stop reason: SDUPTHER

## 2020-06-18 ASSESSMENT — PATIENT HEALTH QUESTIONNAIRE - PHQ9
SUM OF ALL RESPONSES TO PHQ QUESTIONS 1-9: 2
1. LITTLE INTEREST OR PLEASURE IN DOING THINGS: 1
SUM OF ALL RESPONSES TO PHQ QUESTIONS 1-9: 2
2. FEELING DOWN, DEPRESSED OR HOPELESS: 1
SUM OF ALL RESPONSES TO PHQ9 QUESTIONS 1 & 2: 2
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
## 2020-06-25 ENCOUNTER — TELEPHONE (OUTPATIENT)
Dept: ORTHOPEDIC SURGERY | Age: 60
End: 2020-06-25

## 2020-07-08 ASSESSMENT — ENCOUNTER SYMPTOMS
NAUSEA: 0
COUGH: 0
SHORTNESS OF BREATH: 0
VOMITING: 0
DIARRHEA: 0

## 2020-08-09 ENCOUNTER — TELEPHONE (OUTPATIENT)
Dept: FAMILY MEDICINE CLINIC | Age: 60
End: 2020-08-09

## 2020-08-09 LAB
A/G RATIO: 1.5 (CALC) (ref 1–2.5)
ALBUMIN SERPL-MCNC: 4.4 G/DL (ref 3.6–5.1)
ALP BLD-CCNC: 56 U/L (ref 35–144)
ALT SERPL-CCNC: 11 U/L (ref 9–46)
AST SERPL-CCNC: 14 U/L (ref 10–35)
BASOPHILS ABSOLUTE: 62 CELLS/UL (ref 0–200)
BASOPHILS RELATIVE PERCENT: 0.8 %
BILIRUB SERPL-MCNC: 0.4 MG/DL (ref 0.2–1.2)
BUN / CREAT RATIO: 10 (CALC) (ref 6–22)
BUN BLDV-MCNC: 61 MG/DL (ref 7–25)
CALCIUM SERPL-MCNC: 9.8 MG/DL (ref 8.6–10.3)
CHLORIDE BLD-SCNC: 87 MMOL/L (ref 98–110)
CHOLESTEROL, TOTAL: 353 MG/DL
CHOLESTEROL/HDL RATIO: 6.1 (CALC)
CO2: 25 MMOL/L (ref 20–32)
CREAT SERPL-MCNC: 6.19 MG/DL (ref 0.7–1.25)
EOSINOPHILS ABSOLUTE: 270 CELLS/UL (ref 15–500)
EOSINOPHILS RELATIVE PERCENT: 3.5 %
GFR AFRICAN AMERICAN: 10 ML/MIN/1.73M2
GFR, ESTIMATED: 9 ML/MIN/1.73M2
GLOBULIN: 2.9 G/DL (CALC) (ref 1.9–3.7)
GLUCOSE BLD-MCNC: 129 MG/DL (ref 65–99)
HCT VFR BLD CALC: 34.4 % (ref 38.5–50)
HDLC SERPL-MCNC: 58 MG/DL
HEMOGLOBIN: 11.6 G/DL (ref 13.2–17.1)
LDL CHOLESTEROL CALCULATED: 266 MG/DL (CALC)
LYMPHOCYTES ABSOLUTE: 2680 CELLS/UL (ref 850–3900)
LYMPHOCYTES RELATIVE PERCENT: 34.8 %
MCH RBC QN AUTO: 33.1 PG (ref 27–33)
MCHC RBC AUTO-ENTMCNC: 33.7 G/DL (ref 32–36)
MCV RBC AUTO: 98.3 FL (ref 80–100)
MONOCYTES ABSOLUTE: 593 CELLS/UL (ref 200–950)
MONOCYTES RELATIVE PERCENT: 7.7 %
NEUTROPHILS ABSOLUTE: 4096 CELLS/UL (ref 1500–7800)
NONHDLC SERPL-MCNC: 295 MG/DL (CALC)
PDW BLD-RTO: 13.1 % (ref 11–15)
PLATELET # BLD: 269 THOUSAND/UL (ref 140–400)
PMV BLD AUTO: 9.5 FL (ref 7.5–12.5)
POTASSIUM SERPL-SCNC: 6.4 MMOL/L (ref 3.5–5.3)
RBC # BLD: 3.5 MILLION/UL (ref 4.2–5.8)
SEGMENTED NEUTROPHILS RELATIVE PERCENT: 53.2 %
SODIUM BLD-SCNC: 130 MMOL/L (ref 135–146)
T4 FREE: 0.9 NG/DL (ref 0.8–1.8)
TOTAL PROTEIN: 7.3 G/DL (ref 6.1–8.1)
TRIGL SERPL-MCNC: 139 MG/DL
TSH ULTRASENSITIVE: 1.9 MIU/L (ref 0.4–4.5)
WBC # BLD: 7.7 THOUSAND/UL (ref 3.8–10.8)

## 2020-08-09 NOTE — TELEPHONE ENCOUNTER
2 attempts to reach patient regarding critical lab. Pt with potassium 6.4. PT is ESRD on hemodialysis.

## 2020-08-10 ENCOUNTER — TELEPHONE (OUTPATIENT)
Dept: FAMILY MEDICINE CLINIC | Age: 60
End: 2020-08-10

## 2020-08-10 NOTE — TELEPHONE ENCOUNTER
Disp  Refills  Start  End     ASPIRIN LOW DOSE 81 MG EC tablet    5/26/2020      Sig - Route: Take 81 mg by mouth daily - Oral       Disp  Refills  Start  End     B Complex-C-Folic Acid (SHERRY-ALAN) TABS    5/26/2020      Class: Historical Med        Disp  Refills  Start  End     folic acid (FOLVITE) 1 MG tablet         Sig - Route: Take 1 mg by mouth daily - Oral       Disp  Refills  Start  End     ferrous sulfate (IRON 325) 325 (65 Fe) MG tablet    5/26/2020      Sig - Route:  Take 325 mg by mouth daily - Oral           [id number], [address] [place] [address] -  [phone]      Provider out of office      Please advise

## 2020-08-11 RX ORDER — THIAMINE MONONITRATE (VIT B1) 100 MG
100 TABLET ORAL DAILY
Qty: 90 TABLET | Refills: 0 | Status: SHIPPED | OUTPATIENT
Start: 2020-08-11 | End: 2020-11-18 | Stop reason: SDUPTHER

## 2020-08-11 RX ORDER — FOLIC ACID/VIT B COMPLEX AND C 0.8 MG
1 TABLET ORAL DAILY
Qty: 90 TABLET | Refills: 0 | Status: SHIPPED | OUTPATIENT
Start: 2020-08-11 | End: 2020-11-18 | Stop reason: SDUPTHER

## 2020-08-11 RX ORDER — FOLIC ACID 1 MG/1
1 TABLET ORAL DAILY
Qty: 90 TABLET | Refills: 0 | Status: SHIPPED | OUTPATIENT
Start: 2020-08-11 | End: 2020-11-18 | Stop reason: SDUPTHER

## 2020-08-11 RX ORDER — ASPIRIN 81 MG/1
81 TABLET, COATED ORAL DAILY
Qty: 90 TABLET | Refills: 0 | Status: SHIPPED | OUTPATIENT
Start: 2020-08-11 | End: 2020-11-18 | Stop reason: SDUPTHER

## 2020-08-11 RX ORDER — FERROUS SULFATE 325(65) MG
325 TABLET ORAL DAILY
Qty: 90 TABLET | Refills: 0 | Status: SHIPPED | OUTPATIENT
Start: 2020-08-11 | End: 2020-11-18 | Stop reason: SDUPTHER

## 2020-08-11 NOTE — TELEPHONE ENCOUNTER
Spoke with patient and Niecy Macias. Patient given verbal okay to talk with Niecy Macias. Clarification given on medications and refill sent to the pharmacy. Sent as 90 day supply. Per [facility] may only cover some at 30 day supply but will try to get filled at 90 day supply.

## 2020-09-24 RX ORDER — TRAZODONE HYDROCHLORIDE 50 MG/1
50 TABLET ORAL DAILY
Qty: 30 TABLET | Refills: 0 | Status: SHIPPED | OUTPATIENT
Start: 2020-09-24 | End: 2020-10-26 | Stop reason: SDUPTHER

## 2020-09-24 RX ORDER — LEVOTHYROXINE SODIUM 0.05 MG/1
50 TABLET ORAL DAILY
Qty: 30 TABLET | Refills: 0 | Status: SHIPPED | OUTPATIENT
Start: 2020-09-24 | End: 2020-11-04 | Stop reason: SDUPTHER

## 2020-09-24 RX ORDER — SERTRALINE HYDROCHLORIDE 100 MG/1
100 TABLET, FILM COATED ORAL DAILY
Qty: 30 TABLET | Refills: 0 | Status: SHIPPED | OUTPATIENT
Start: 2020-09-24 | End: 2020-11-18 | Stop reason: SDUPTHER

## 2020-10-07 RX ORDER — BUSPIRONE HYDROCHLORIDE 7.5 MG/1
7.5 TABLET ORAL DAILY
Qty: 90 TABLET | Refills: 0 | Status: SHIPPED | OUTPATIENT
Start: 2020-10-07 | End: 2020-12-17 | Stop reason: SDUPTHER

## 2020-10-15 ENCOUNTER — OFFICE VISIT (OUTPATIENT)
Dept: FAMILY MEDICINE CLINIC | Age: 60
End: 2020-10-15
Payer: MEDICARE

## 2020-10-15 VITALS
SYSTOLIC BLOOD PRESSURE: 110 MMHG | HEART RATE: 81 BPM | OXYGEN SATURATION: 98 % | TEMPERATURE: 97.7 F | BODY MASS INDEX: 24.35 KG/M2 | WEIGHT: 148.6 LBS | DIASTOLIC BLOOD PRESSURE: 70 MMHG

## 2020-10-15 PROBLEM — E78.2 MIXED HYPERLIPIDEMIA: Status: ACTIVE | Noted: 2020-10-15

## 2020-10-15 PROBLEM — I25.10 CORONARY ARTERY DISEASE: Status: ACTIVE | Noted: 2020-10-15

## 2020-10-15 PROBLEM — Z99.2 STAGE 5 CHRONIC KIDNEY DISEASE ON CHRONIC DIALYSIS (HCC): Status: ACTIVE | Noted: 2020-10-15

## 2020-10-15 PROBLEM — N18.6 STAGE 5 CHRONIC KIDNEY DISEASE ON CHRONIC DIALYSIS (HCC): Status: ACTIVE | Noted: 2020-10-15

## 2020-10-15 PROBLEM — F41.9 ANXIETY AND DEPRESSION: Status: ACTIVE | Noted: 2020-10-15

## 2020-10-15 PROBLEM — F32.A ANXIETY AND DEPRESSION: Status: ACTIVE | Noted: 2020-10-15

## 2020-10-15 PROCEDURE — 99214 OFFICE O/P EST MOD 30 MIN: CPT | Performed by: NURSE PRACTITIONER

## 2020-10-15 RX ORDER — ATORVASTATIN CALCIUM 40 MG/1
40 TABLET, FILM COATED ORAL DAILY
COMMUNITY
Start: 2020-08-06 | End: 2022-04-28 | Stop reason: SDUPTHER

## 2020-10-15 ASSESSMENT — ENCOUNTER SYMPTOMS
NAUSEA: 0
SHORTNESS OF BREATH: 0
VOMITING: 0
COUGH: 0
DIARRHEA: 0

## 2020-10-15 NOTE — PROGRESS NOTES
Joan Silvestre  : 1960  Encounter date: 10/15/2020    This byron 56 y.o. male who presents with  Chief Complaint   Patient presents with    Follow-up     Discuss depression. History of present illness:    HPI   1. Presents to clinic to today for 3 month follow up appointment on restarting medications. Is currently on dialysis MWF - does okay with tolerating - has been on dialysis for 2 years. Has recently explored possibility of kidney transplant - did see Cardiology through [facility] and placed on Lipitor for Cholosterol. Is still undecided as to whether or not he will stay in the area to be on the transplant list.  Wants to move back to [place], [place]. Did get an apartment - had a positive COVID so unable to move in just yet. Is still staying with cousin here in the [facility] for the time being. 2. Has been struggling recently with his depression due to not being able to move back home as quickly as he would have liked and also having second thoughts about being on transplant list.  Would like to see if there are alternatives to his current medication regimen. 3. Weight this am 145 - yesterday 144 - has been stable. Appetite has been okay, but continues to struggle with energy - especially on days for dialysis. Has been recently having low blood pressure readings. No Known Allergies  Current Outpatient Medications   Medication Sig Dispense Refill    atorvastatin (LIPITOR) 40 MG tablet Take 40 mg by mouth daily      MELATONIN PO Take 5 mg by mouth nightly      sertraline (ZOLOFT) 50 MG tablet Increase current dosage of 100mg to 150mg daily. May increase after 2 weeks to 200mg. Do not exceed 200mg in 24 hours.  60 tablet 0    busPIRone (BUSPAR) 7.5 MG tablet Take 1 tablet by mouth daily 90 tablet 0    traZODone (DESYREL) 50 MG tablet TAKE 1 TABLET BY MOUTH DAILY 30 tablet 0    levothyroxine (SYNTHROID) 50 MCG tablet TAKE 1 TABLET BY MOUTH DAILY 30 tablet 0    sertraline (ZOLOFT) 100 MG tablet TAKE 1 TABLET BY MOUTH DAILY 30 tablet 0    B Complex-C-Folic Acid (SHERRY-ALAN) TABS Take 1 tablet by mouth daily 90 tablet 0    ASPIRIN LOW DOSE 81 MG EC tablet Take 1 tablet by mouth daily 90 tablet 0    ferrous sulfate (IRON 325) 325 (65 Fe) MG tablet Take 1 tablet by mouth daily 90 tablet 0    folic acid (FOLVITE) 1 MG tablet Take 1 tablet by mouth daily 90 tablet 0    vitamin B-1 (THIAMINE) 100 MG tablet Take 1 tablet by mouth daily 90 tablet 0    Calcium Acetate, Phos Binder, 667 MG CAPS Take 667 mg by mouth Take 2 capsules by mouth 3 times a day with each meal      ondansetron (ZOFRAN) 4 MG tablet Take 4 mg by mouth      apixaban (ELIQUIS) 2.5 MG TABS tablet Take 1 tablet by mouth 2 times daily 60 tablet 0     No current facility-administered medications for this visit. Review of Systems   Constitutional: Negative for activity change, appetite change, chills, fatigue and fever. Respiratory: Negative for cough and shortness of breath. Cardiovascular: Negative for chest pain and palpitations. Gastrointestinal: Negative for diarrhea, nausea and vomiting. Psychiatric/Behavioral: Positive for dysphoric mood. Past medical, surgical, family and social history were reviewed and updated with the patient. Objective:    /70 (Site: Right Upper Arm, Position: Sitting, Cuff Size: Medium Adult)   Pulse 81   Temp 97.7 °F (36.5 °C)   Wt 148 lb 9.6 oz (67.4 kg)   SpO2 98%   BMI 24.35 kg/m²   Weight: 148 lb 9.6 oz (67.4 kg)     BP Readings from Last 3 Encounters:   10/15/20 110/70   06/18/20 102/74     Wt Readings from Last 3 Encounters:   10/15/20 148 lb 9.6 oz (67.4 kg)   06/18/20 144 lb (65.3 kg)     Physical Exam  Constitutional:       General: He is not in acute distress. Appearance: He is well-developed. HENT:      Head: Normocephalic and atraumatic. Cardiovascular:      Rate and Rhythm: Normal rate and regular rhythm.       Heart sounds: Normal heart sounds, S1 normal and S2 normal.      Comments: Fistula in place - left upper arm. Pulmonary:      Effort: Pulmonary effort is normal. No respiratory distress. Breath sounds: Normal breath sounds. Skin:     General: Skin is warm and dry. Neurological:      Mental Status: He is alert and oriented to person, place, and time. Psychiatric:         Thought Content: Thought content normal.         Judgment: Judgment normal.       Assessment/Plan    1. Anxiety and depression  Discussed alternatives - unfortunately limited to choices due to current kidney function. Will trial an increase in Zoloft as opposed to changing medication as this doesn't require renal dosing. If not reaching goal will discuss with Nephrology to see if they are comfortable with medication change. Patient to follow up in 4 weeks for continued monitoring.  - sertraline (ZOLOFT) 50 MG tablet; Increase current dosage of 100mg to 150mg daily. May increase after 2 weeks to 200mg. Do not exceed 200mg in 24 hours. Dispense: 60 tablet; Refill: 0    2. Stage 5 chronic kidney disease on chronic dialysis ([facility]  Continue follow up with [facility] if interesting in being placed on transplant list.     3. Coronary artery disease involving other coronary artery bypass graft without angina pectoris  Continue follow up with Cardiology. 4. Mixed hyperlipidemia  Continue follow up with Cardiology. Ophelia Desiree was counseled regarding symptoms of current diagnosis, course and complications of disease if inadequately treated. Discussed side effects of medications, diagnosis, treatment options, and prognosis along with risks, benefits, complications, and alternatives of treatment including labs, imaging and other studies/treatment targets and goals. He verbalized understanding of instructions and counseling. Return in about 4 weeks (around 11/12/2020) for medication monitoring - can be virtual. .

## 2020-10-15 NOTE — PATIENT INSTRUCTIONS
Patient Education        Recovering From Depression: Care Instructions  Your Care Instructions     Taking good care of yourself is important as you recover from depression. In time, your symptoms will fade as your treatment takes hold. Do not give up. Instead, focus your energy on getting better. Your mood will improve. It just takes some time. Focus on things that can help you feel better, such as being with friends and family, eating well, and getting enough rest. But take things slowly. Do not do too much too soon. You will begin to feel better gradually. Follow-up care is a key part of your treatment and safety. Be sure to make and go to all appointments, and call your doctor if you are having problems. It's also a good idea to know your test results and keep a list of the medicines you take. How can you care for yourself at home? Be realistic  · If you have a large task to do, break it up into smaller steps you can handle, and just do what you can. · You may want to put off important decisions until your depression has lifted. If you have plans that will have a major impact on your life, such as marriage, divorce, or a job change, try to wait a bit. Talk it over with friends and loved ones who can help you look at the overall picture first.  · Reaching out to people for help is important. Do not isolate yourself. Let your family and friends help you. Find someone you can trust and confide in, and talk to that person. · Be patient, and be kind to yourself. Remember that depression is not your fault and is not something you can overcome with willpower alone. Treatment is important for depression, just like for any other illness. Feeling better takes time, and your mood will improve little by little. Stay active  · Stay busy and get outside. Take a walk, or try some other light exercise. · Talk with your doctor about an exercise program. Exercise can help with mild depression.   · Go to a movie or concert. Take part in a Sikh activity or other social gathering. Go to a Iddiction game. · Ask a friend to have dinner with you. Take care of yourself  · Eat a balanced diet with plenty of fresh fruits and vegetables, whole grains, and lean protein. If you have lost your appetite, eat small snacks rather than large meals. · Avoid using illegal drugs or marijuana and drinking alcohol. Do not take medicines that have not been prescribed for you. They may interfere with medicines you may be taking for depression, or they may make your depression worse. · Take your medicines exactly as they are prescribed. You may start to feel better within 1 to 3 weeks of taking antidepressant medicine. But it can take as many as 6 to 8 weeks to see more improvement. If you have questions or concerns about your medicines, or if you do not notice any improvement by 3 weeks, talk to your doctor. · Continue to take your medicine after your symptoms improve. Taking your medicine for at least 6 months after you feel better can help keep you from getting depressed again. If this isn't the first time you have been depressed, your doctor may recommend you to take medicine even longer. · If you have any side effects from your medicine, tell your doctor. Many side effects are mild and will go away on their own after you have been taking the medicine for a few weeks. Some may last longer. Talk to your doctor if side effects are bothering you too much. You might be able to try a different medicine. · Continue counseling. It may help prevent depression from returning, especially if you've had multiple episodes of depression. Talk with your counselor if you are having a hard time attending your sessions or you think the sessions aren't working. Don't just stop going. · Get enough sleep. Talk to your doctor if you are having problems sleeping. · Avoid sleeping pills unless they are prescribed by the doctor treating your depression.  Sleeping pills may make you groggy during the day, and they may interact with other medicine you are taking. · If you have any other illnesses, such as diabetes, heart disease, or high blood pressure, make sure to continue with your treatment. Tell your doctor about all of the medicines you take, including those with or without a prescription. · If you or someone you know talks about suicide, self-harm, or feeling hopeless, get help right away. Call the [address] [place] [address] at 1-800-273-talk (6-230.818.8955) or text HOME to 279665 to access the Crisis Text Line. Consider saving these numbers in your phone. When should you call for help? Call 911 anytime you think you may need emergency care. For example, call if:    · You feel like hurting yourself or someone else.     · Someone you know has depression and is about to attempt or is attempting suicide. Call your doctor now or seek immediate medical care if:    · You hear voices.     · Someone you know has depression and:  ? Starts to give away his or her possessions. ? Uses illegal drugs or drinks alcohol heavily. ? Talks or writes about death, including writing suicide notes or talking about guns, knives, or pills. ? Starts to spend a lot of time alone. ? Acts very aggressively or suddenly appears calm. Watch closely for changes in your health, and be sure to contact your doctor if:    · You do not get better as expected. Where can you learn more? Go to https://[employer]ir[employer]atius.[employer]. org and sign in to your [employer] account. Enter L036 in the [facility] box to learn more about \"Recovering From Depression: Care Instructions. \"     If you do not have an account, please click on the \"Sign Up Now\" link. Current as of: January 31, 2020               Content Version: 12.6  © 1882-5524 [employer]. Care instructions adapted under license by [facility] ([facility]).  If you have questions about a medical condition or this instruction, always ask your healthcare professional. [place] [address] any warranty or liability for your use of this information.

## 2020-10-19 NOTE — TELEPHONE ENCOUNTER
apixaban (ELIQUIS) 2.5 MG TABS tablet         Sig - Route:  Take 2.5 mg by mouth 2 times daily - Oral      [facility] in chart

## 2020-10-26 RX ORDER — TRAZODONE HYDROCHLORIDE 50 MG/1
50 TABLET ORAL DAILY
Qty: 30 TABLET | Refills: 0 | Status: SHIPPED | OUTPATIENT
Start: 2020-10-26 | End: 2022-04-28

## 2020-10-26 NOTE — TELEPHONE ENCOUNTER
Medication:   Requested Prescriptions     Pending Prescriptions Disp Refills    traZODone (DESYREL) 50 MG tablet 30 tablet 0     Sig: Take 1 tablet by mouth daily      Last Filled:     Patient Phone Number: [phone] (home)     Last appt: 10/15/2020   Next appt: 11/12/2020    Last OARRS: No flowsheet data found.   PDMP Monitoring:    Last PDMP Rosa Contreras as Reviewed [facility]):  Review User Review Instant Review Result          Preferred Pharmacy:   Thao Riley [address] [facility] [address]  [facility] [address]  Phone: [phone] Fax: [phone]

## 2020-10-27 ENCOUNTER — TELEPHONE (OUTPATIENT)
Dept: FAMILY MEDICINE CLINIC | Age: 60
End: 2020-10-27

## 2020-10-27 NOTE — TELEPHONE ENCOUNTER
Patient was returning a call but there is note in the chart           Please advise       Provider out of the office

## 2020-10-28 RX ORDER — HYDROXYZINE PAMOATE 25 MG/1
25 CAPSULE ORAL 3 TIMES DAILY PRN
Qty: 60 CAPSULE | Refills: 0 | Status: SHIPPED | OUTPATIENT
Start: 2020-10-28 | End: 2020-11-27

## 2020-10-28 NOTE — TELEPHONE ENCOUNTER
It was for the refill request on the Trazodone. I believe he said he discontinued it - wasn't helping with sleep. Please confirm.

## 2020-10-28 NOTE — TELEPHONE ENCOUNTER
Patient has been advised. Patient states that he does feel like the increase in zoloft has been helpful.

## 2020-10-28 NOTE — TELEPHONE ENCOUNTER
Called and spoke with Fayette Kanner. Per Fayette Kanner believes that he is still taking it. Patient is currently at dialysis. When patient comes home she will discuss with patient and then will call to confirm with us.

## 2020-10-28 NOTE — TELEPHONE ENCOUNTER
Patient called back and states that he is no longer taking the Trazodone and is wondering if you could prescribe Restoril for him as he was on in the past. Advised patient that I am not sure if you will or not but I would send a message back. Please advise.

## 2020-11-04 RX ORDER — LEVOTHYROXINE SODIUM 0.05 MG/1
50 TABLET ORAL DAILY
Qty: 90 TABLET | Refills: 0 | Status: SHIPPED | OUTPATIENT
Start: 2020-11-04 | End: 2021-02-03 | Stop reason: SDUPTHER

## 2020-11-10 ENCOUNTER — TELEPHONE (OUTPATIENT)
Dept: FAMILY MEDICINE CLINIC | Age: 60
End: 2020-11-10

## 2020-11-10 NOTE — TELEPHONE ENCOUNTER
cassidy is calling about 11-12-20 VV apt. says she has issues when trying to access the VV. also states the meeting is to discuss the ZOLOFT at 150mg but the pt has only been taking 50mg due to issues with the pharmacy.  would like a phone call if possible to see if she should reschedule once he starts the 150 mg

## 2020-11-16 NOTE — TELEPHONE ENCOUNTER
Pt wife is calling to get a refill on Zoloft 100mg. She states that the pt has not been taking the 100mg Zoloft for almost 2 wks. He has needed a refill and the pharmacy stated that our office declined to refill. Pt wife states that he has been taking the 50mg. She also would like to know when the pt needs to come in for his f/u since he has not been taking the meds like he should have.         Zoloft 745YM, Folic Acid and B-1 Vitamin and Calcium Acetate     Pharmacy:  [facility] [address] [place] [address], [address] [place] [address]    Pharmacy Comments:

## 2020-11-18 RX ORDER — FOLIC ACID 1 MG/1
1 TABLET ORAL DAILY
Qty: 90 TABLET | Refills: 0 | Status: SHIPPED | OUTPATIENT
Start: 2020-11-18 | End: 2021-02-17 | Stop reason: SDUPTHER

## 2020-11-18 RX ORDER — THIAMINE MONONITRATE (VIT B1) 100 MG
100 TABLET ORAL DAILY
Qty: 90 TABLET | Refills: 0 | Status: SHIPPED | OUTPATIENT
Start: 2020-11-18 | End: 2021-05-06 | Stop reason: SDUPTHER

## 2020-11-18 RX ORDER — SERTRALINE HYDROCHLORIDE 100 MG/1
100 TABLET, FILM COATED ORAL DAILY
Qty: 30 TABLET | Refills: 0 | Status: SHIPPED | OUTPATIENT
Start: 2020-11-18 | End: 2020-12-17 | Stop reason: DRUGHIGH

## 2020-11-18 RX ORDER — FERROUS SULFATE 325(65) MG
325 TABLET ORAL DAILY
Qty: 90 TABLET | Refills: 0 | Status: SHIPPED | OUTPATIENT
Start: 2020-11-18 | End: 2021-02-22 | Stop reason: SDUPTHER

## 2020-11-18 RX ORDER — FOLIC ACID/VIT B COMPLEX AND C 0.8 MG
1 TABLET ORAL DAILY
Qty: 90 TABLET | Refills: 0 | Status: SHIPPED | OUTPATIENT
Start: 2020-11-18 | End: 2021-04-07 | Stop reason: SDUPTHER

## 2020-11-18 RX ORDER — ASPIRIN 81 MG/1
81 TABLET, COATED ORAL DAILY
Qty: 90 TABLET | Refills: 0 | Status: SHIPPED | OUTPATIENT
Start: 2020-11-18 | End: 2022-06-17 | Stop reason: SDUPTHER

## 2020-11-18 NOTE — TELEPHONE ENCOUNTER
Left message for Zayda Johnson to call back. Script sent to pharmacy. Patient needs an appointment- 30minutes VV is okay as well.

## 2020-12-04 NOTE — TELEPHONE ENCOUNTER
Medication:   Requested Prescriptions     Pending Prescriptions Disp Refills    apixaban (ELIQUIS) 2.5 MG TABS tablet 60 tablet 0     Sig: Take 1 tablet by mouth 2 times daily      Last Filled:      Patient Phone Number: [phone] (home)     Last appt: 10/15/2020   Next appt: 12/17/2020    Last OARRS: No flowsheet data found.   PDMP Monitoring:    Last PDMP Lele Sauceda as Reviewed [facility]):  Review User Review Instant Review Result   Bristol Collvishnu 10/28/2020  4:53 PM Reviewed PDMP [1]     Preferred Pharmacy:   [place] [address] [address] [facility] [address]  [facility] [address]  Phone: [phone] Fax: [phone]

## 2020-12-17 ENCOUNTER — VIRTUAL VISIT (OUTPATIENT)
Dept: FAMILY MEDICINE CLINIC | Age: 60
End: 2020-12-17
Payer: MEDICARE

## 2020-12-17 PROCEDURE — 99213 OFFICE O/P EST LOW 20 MIN: CPT | Performed by: NURSE PRACTITIONER

## 2020-12-17 RX ORDER — SERTRALINE HYDROCHLORIDE 100 MG/1
200 TABLET, FILM COATED ORAL DAILY
Qty: 180 TABLET | Refills: 1 | Status: SHIPPED | OUTPATIENT
Start: 2020-12-17 | End: 2021-06-25 | Stop reason: SDUPTHER

## 2020-12-17 RX ORDER — SERTRALINE HYDROCHLORIDE 100 MG/1
100 TABLET, FILM COATED ORAL DAILY
Qty: 30 TABLET | Refills: 0 | Status: CANCELLED | OUTPATIENT
Start: 2020-12-17

## 2020-12-17 RX ORDER — BUSPIRONE HYDROCHLORIDE 7.5 MG/1
7.5 TABLET ORAL DAILY
Qty: 90 TABLET | Refills: 1 | Status: SHIPPED | OUTPATIENT
Start: 2020-12-17 | End: 2021-09-21

## 2020-12-17 ASSESSMENT — ENCOUNTER SYMPTOMS
COUGH: 0
DIARRHEA: 0
SHORTNESS OF BREATH: 0
VOMITING: 0
NAUSEA: 0

## 2020-12-17 NOTE — PROGRESS NOTES
Juice Sosa  : 1960  Encounter date: 2020    This byron 56 y.o. male who is being seen Virtually using Doxy. me. Patient is at home and EDU Calvin is at the office. The patient has consented to having a virtual visit due to the [place] 19 pandemic. Chief Complaint   Patient presents with    Medication Refill     zoloft and buspar      History of present illness:    HPI   1. Presents virtually for follow up on increasing Zoloft approximately one month prior. Had some issues getting prescription filled, but has been at the 150mg dosage for a few weeks now. Per patient has been feeling better with approximately 40% improvement in mood. Is happy with results so far, but would like to try to increase to max dosing of 200mg. Kennyabran Lyudmila also states she has noticed an improvement in his mood. He does continue with some fatigue in relation to his chronic kidney disease and dialysis.    2. Has decided to continue with being added to the kidney transplant list - has regular follow up with dialysis 3 times weekly, Nephrology, Cardiology and transplant team.    No Known Allergies  Current Outpatient Medications   Medication Sig Dispense Refill    busPIRone (BUSPAR) 7.5 MG tablet Take 1 tablet by mouth daily 90 tablet 1    sertraline (ZOLOFT) 100 MG tablet Take 2 tablets by mouth daily 180 tablet 1    apixaban (ELIQUIS) 2.5 MG TABS tablet Take 1 tablet by mouth 2 times daily 60 tablet 0    ASPIRIN LOW DOSE 81 MG EC tablet Take 1 tablet by mouth daily 90 tablet 0    ferrous sulfate (IRON 325) 325 (65 Fe) MG tablet Take 1 tablet by mouth daily 90 tablet 0    folic acid (FOLVITE) 1 MG tablet Take 1 tablet by mouth daily 90 tablet 0    vitamin B-1 (THIAMINE) 100 MG tablet Take 1 tablet by mouth daily 90 tablet 0    B Complex-C-Folic Acid (SHERRY-ALAN) TABS Take 1 tablet by mouth daily 90 tablet 0    levothyroxine (SYNTHROID) 50 MCG tablet Take 1 tablet by mouth daily 90 tablet 0  traZODone (DESYREL) 50 MG tablet Take 1 tablet by mouth daily 30 tablet 0    atorvastatin (LIPITOR) 40 MG tablet Take 40 mg by mouth daily      MELATONIN PO Take 5 mg by mouth nightly      Calcium Acetate, Phos Binder, 667 MG CAPS Take 667 mg by mouth Take 2 capsules by mouth 3 times a day with each meal      ondansetron (ZOFRAN) 4 MG tablet Take 4 mg by mouth       No current facility-administered medications for this visit. Review of Systems   Constitutional: Negative for activity change, appetite change, chills, fatigue and fever. Respiratory: Negative for cough and shortness of breath. Cardiovascular: Negative for chest pain and palpitations. Gastrointestinal: Negative for diarrhea, nausea and vomiting. Psychiatric/Behavioral: Positive for dysphoric mood. Past medical, surgical, family and social history were reviewed and updated with the patient. Objective: There were no vitals taken for this visit. BP Readings from Last 3 Encounters:   10/15/20 110/70   06/18/20 102/74     Wt Readings from Last 3 Encounters:   10/15/20 148 lb 9.6 oz (67.4 kg)   06/18/20 144 lb (65.3 kg)     - PHYSICAL EXAM LIMITED DUE TO VIRTUAL VISIT, HOWEVER VISUAL INSPECTION DID REVEAL:  Physical Exam  Constitutional:       General: He is not in acute distress. Appearance: Normal appearance. Neurological:      Mental Status: He is alert and oriented to person, place, and time. Psychiatric:         Mood and Affect: Mood normal.         Speech: Speech normal.         Behavior: Behavior normal.       Assessment/Plan    1. Anxiety and depression  Increase Zoloft to 200mg daily. Continue with Buspar. Continue to monitor for side effects. Return to office in 6 months for medication monitoring and annual exam.  - busPIRone (BUSPAR) 7.5 MG tablet; Take 1 tablet by mouth daily  Dispense: 90 tablet;  Refill: 1 - sertraline (ZOLOFT) 100 MG tablet; Take 2 tablets by mouth daily  Dispense: 180 tablet; Refill: 1    2. Stage 5 chronic kidney disease on chronic dialysis [facility])  Continue follow up with dialysis, Nephrology, Transplant team.      Fariha Roberts was counseled regarding symptoms of current diagnosis, course and complications of disease if inadequately treated. Discussed side effects of medications, diagnosis, treatment options, and prognosis along with risks, benefits, complications, and alternatives of treatment including labs, imaging and other studies/treatment targets and goals. He verbalized understanding of instructions and counseling. Return in about 6 months (around 6/17/2021) for annual exam.     Pursuant to the emergency declaration under the [facility], [facility] waiver authority and the [employer] Act, this Virtual  Visit was conducted, with patient's consent, to reduce the patient's risk of exposure to COVID-19 and provide continuity of care for an established patient. Services were provided through a video synchronous discussion virtually to substitute for in-person clinic visit. Patient was instructed that the AVS is available on My Chart or was emailed to the patient if not on My Chart. Lab orders were emailed to patient if they do not use a [facility] lab. Any work notes were sent to patient through My Chart or e-mail.

## 2021-02-02 ENCOUNTER — TELEPHONE (OUTPATIENT)
Dept: FAMILY MEDICINE CLINIC | Age: 61
End: 2021-02-02

## 2021-02-02 NOTE — TELEPHONE ENCOUNTER
That will be fine to override, but does patient see Nephrology, does not show who originally ordered prescription.

## 2021-02-02 NOTE — TELEPHONE ENCOUNTER
Cele (on HIPAA) called about the Calcium Acetate, Phos Binder, 667 MG CAPS. Pharmacy is refusing to fill it and says the earliest he can fill it would be 2/4. Patient is completely out of the med and needs it today. They want to see if PCP can override and allow them to fill it 2 days sooner.       Provider out of office    Please advise

## 2021-02-03 DIAGNOSIS — E03.9 HYPOTHYROIDISM, UNSPECIFIED TYPE: ICD-10-CM

## 2021-02-03 RX ORDER — LEVOTHYROXINE SODIUM 0.05 MG/1
50 TABLET ORAL DAILY
Qty: 90 TABLET | Refills: 0 | Status: SHIPPED | OUTPATIENT
Start: 2021-02-03 | End: 2021-05-03 | Stop reason: SDUPTHER

## 2021-02-03 NOTE — TELEPHONE ENCOUNTER
Called and spoke with Jai Davidson- patient's cousin. Per Jai Davidson unable to fill due to insurance. Advised that they can get a short supply from the pharmacy and pay out of pocket. Advised her that we have not prescribed this so not exactly sure why there wasn't enough to get him through to his next refill. Jai Davidson with call nephrologist to follow up and will call us with any questions or concerns.

## 2021-02-03 NOTE — TELEPHONE ENCOUNTER
Medication:   Requested Prescriptions     Pending Prescriptions Disp Refills    levothyroxine (SYNTHROID) 50 MCG tablet 90 tablet 0     Sig: Take 1 tablet by mouth daily      Last Filled:      Patient Phone Number: [phone] (home)     Last appt: 12/17/2020   Next appt: Visit date not found    Last OARRS: No flowsheet data found.   PDMP Monitoring:    Last PDMP Dionicio Rodgers as Reviewed [facility]):  Review User Review Instant Review Result   Judy Wilburn 10/28/2020  4:53 PM Reviewed PDMP [1]     Preferred Pharmacy:   Yoni Martin [facility] [address]  [facility] [address]  Phone: [phone] Fax: [phone]

## 2021-02-16 NOTE — TELEPHONE ENCOUNTER
Medication:   Requested Prescriptions     Pending Prescriptions Disp Refills    folic acid (FOLVITE) 1 MG tablet 90 tablet 0     Sig: Take 1 tablet by mouth daily      Last Filled:      Patient Phone Number: [phone] (home)     Last appt: 12/17/2020   Next appt: Visit date not found    Last OARRS: No flowsheet data found.   PDMP Monitoring:    Last PDMP Emily Spear as Reviewed [facility]):  Review User Review Instant Review Result   Willard Garcia 10/28/2020  4:53 PM Reviewed PDMP [1]     Preferred Pharmacy:   [address]  [facility] [address]  Phone: [phone] Fax: [phone]

## 2021-02-17 RX ORDER — FOLIC ACID 1 MG/1
1 TABLET ORAL DAILY
Qty: 90 TABLET | Refills: 0 | Status: SHIPPED | OUTPATIENT
Start: 2021-02-17 | End: 2021-05-06 | Stop reason: SDUPTHER

## 2021-02-22 RX ORDER — FERROUS SULFATE 325(65) MG
325 TABLET ORAL DAILY
Qty: 90 TABLET | Refills: 1 | Status: SHIPPED | OUTPATIENT
Start: 2021-02-22 | End: 2021-10-28

## 2021-03-03 NOTE — TELEPHONE ENCOUNTER
Medication:   Requested Prescriptions     Pending Prescriptions Disp Refills    apixaban (ELIQUIS) 2.5 MG TABS tablet 60 tablet 0     Sig: Take 1 tablet by mouth 2 times daily      Last Filled:      Patient Phone Number: [phone] (home)     Last appt: 12/17/2020   Next appt: Visit date not found    Last OARRS: No flowsheet data found.   PDMP Monitoring:    Last PDMP Rizwana Vizcarra as Reviewed [facility]):  Review User Review Instant Review Result   Augusta Deleon 10/28/2020  4:53 PM Reviewed PDMP [1]     Preferred Pharmacy:   [place] [address] [address] [facility] [address]  [facility] [address]  Phone: [phone] Fax: [phone]

## 2021-04-06 NOTE — TELEPHONE ENCOUNTER
Medication:   Requested Prescriptions     Pending Prescriptions Disp Refills    B Complex-C-Folic Acid (SHERRY-ALAN) TABS 90 tablet 0     Sig: Take 1 tablet by mouth daily      Last Filled:      Patient Phone Number: [phone] (home)     Last appt: 12/17/2020   Next appt: Visit date not found    Last OARRS: No flowsheet data found.   PDMP Monitoring:    Last PDMP Joel Em as Reviewed [facility]):  Review User Review Instant Review Result   Nehal Sale 10/28/2020  4:53 PM Reviewed PDMP [1]     Preferred Pharmacy:   [address]  [facility] [address]  Phone: [phone] Fax: [phone]

## 2021-04-07 RX ORDER — FOLIC ACID/VIT B COMPLEX AND C 0.8 MG
1 TABLET ORAL DAILY
Qty: 90 TABLET | Refills: 0 | Status: SHIPPED | OUTPATIENT
Start: 2021-04-07 | End: 2021-09-24

## 2021-05-03 DIAGNOSIS — E03.9 HYPOTHYROIDISM, UNSPECIFIED TYPE: ICD-10-CM

## 2021-05-03 RX ORDER — LEVOTHYROXINE SODIUM 0.05 MG/1
50 TABLET ORAL DAILY
Qty: 90 TABLET | Refills: 0 | Status: SHIPPED | OUTPATIENT
Start: 2021-05-03

## 2021-05-06 ENCOUNTER — TELEPHONE (OUTPATIENT)
Dept: FAMILY MEDICINE CLINIC | Age: 61
End: 2021-05-06

## 2021-05-06 RX ORDER — FOLIC ACID 1 MG/1
1 TABLET ORAL DAILY
Qty: 90 TABLET | Refills: 0 | Status: SHIPPED | OUTPATIENT
Start: 2021-05-06 | End: 2021-08-30

## 2021-05-06 RX ORDER — THIAMINE MONONITRATE (VIT B1) 100 MG
100 TABLET ORAL DAILY
Qty: 90 TABLET | Refills: 0 | Status: SHIPPED | OUTPATIENT
Start: 2021-05-06 | End: 2022-04-28 | Stop reason: SDUPTHER

## 2021-05-06 NOTE — TELEPHONE ENCOUNTER
vitamin B-1 (THIAMINE) 100 MG tablet [[id number]        folic acid (FOLVITE) 1 MG tablet [[id number]        [facility] #[id number] - [address] [facility], [address] [place] [address],[address], [address] [facility],[address] [address]   Phone:  [phone]  Fax:  [phone]

## 2021-06-01 NOTE — TELEPHONE ENCOUNTER
Medication:   Requested Prescriptions     Pending Prescriptions Disp Refills    ELIQUIS 2.5 MG TABS tablet [Pharmacy Med Name: ELIQUIS 2.5MG TABLETS] 180 tablet 0     Sig: TAKE 1 TABLET BY MOUTH TWICE DAILY      Last Filled:      Patient Phone Number: [phone] (home)     Last appt: 12/17/2020   Next appt: Visit date not found    Last OARRS: No flowsheet data found.   PDMP Monitoring:    Last PDMP Jeaneth García as Reviewed [facility]):  Review User Review Instant Review Result   Lex Blanco 10/28/2020  4:53 PM Reviewed PDMP [1]     Preferred Pharmacy:   A[place] [address] [facility] [address]  [facility] [address]  Phone: [phone] Fax: [phone]

## 2021-06-02 RX ORDER — APIXABAN 2.5 MG/1
TABLET, FILM COATED ORAL
Qty: 180 TABLET | Refills: 0 | Status: SHIPPED | OUTPATIENT
Start: 2021-06-02 | End: 2022-06-17

## 2021-06-21 NOTE — ANESTHESIA POSTPROCEDURE EVALUATION
Patient: Axel Desir    Procedure Summary     Date:  06/21/19 Room / Location:  [facility] OR [address] [place] [address] COR OR    Anesthesia Start:  1528 Anesthesia Stop:  1549    Procedures:       COLONOSCOPY (N/A )      ESOPHAGOGASTRODUODENOSCOPY (N/A Esophagus) Diagnosis:       Iron deficiency anemia, unspecified iron deficiency anemia type      (Iron deficiency anemia, unspecified iron deficiency anemia type [D50.9])    Surgeon:  Yan Espana MD Provider:  Simeon Winchester MD    Anesthesia Type:  general ASA Status:  3          Anesthesia Type: general  Last vitals  BP   101/69 (06/21/19 1620)   Temp   98.5 °F (36.9 °C) (06/21/19 1620)   Pulse   82 (06/21/19 1620)   Resp   20 (06/21/19 1620)     SpO2   95 % (06/21/19 1620)     Post Anesthesia Care and Evaluation    Patient location during evaluation: PACU  Patient participation: complete - patient participated  Level of consciousness: awake and alert  Pain score: 1  Pain management: adequate  Airway patency: patent  Anesthetic complications: No anesthetic complications  PONV Status: controlled  Cardiovascular status: acceptable  Respiratory status: acceptable  Hydration status: acceptable      
[Follow-Up] : a follow-up evaluation of

## 2021-06-25 DIAGNOSIS — F41.9 ANXIETY AND DEPRESSION: ICD-10-CM

## 2021-06-25 DIAGNOSIS — F32.A ANXIETY AND DEPRESSION: ICD-10-CM

## 2021-06-25 RX ORDER — SERTRALINE HYDROCHLORIDE 100 MG/1
200 TABLET, FILM COATED ORAL DAILY
Qty: 180 TABLET | Refills: 1 | Status: SHIPPED | OUTPATIENT
Start: 2021-06-25 | End: 2022-04-28

## 2021-07-09 ENCOUNTER — TELEPHONE (OUTPATIENT)
Dept: ADMINISTRATIVE | Age: 61
End: 2021-07-09

## 2021-08-30 RX ORDER — FOLIC ACID 1 MG/1
1 TABLET ORAL DAILY
Qty: 30 TABLET | Refills: 0 | Status: SHIPPED | OUTPATIENT
Start: 2021-08-30 | End: 2021-09-29

## 2021-08-30 NOTE — TELEPHONE ENCOUNTER
Medication:   Requested Prescriptions     Pending Prescriptions Disp Refills    folic acid (FOLVITE) 1 MG tablet [Pharmacy Med Name: FOLIC ACID 1MG TABLETS] 90 tablet 0     Sig: TAKE 1 TABLET BY MOUTH DAILY      Last Filled:      Patient Phone Number: [phone] (home)     Last appt: 12/17/2020   Next appt: Visit date not found    Last OARRS: No flowsheet data found.   PDMP Monitoring:    Last PDMP Shraddha Garcia as Reviewed [facility]):  Review User Review Instant Review Result   Jaclyn Ann 10/28/2020  4:53 PM Reviewed PDMP [1]     Preferred Pharmacy:   [place] [address] [address] [facility] [address]  [facility] [address]  Phone: [phone] Fax: [phone]

## 2021-09-16 DIAGNOSIS — F32.A ANXIETY AND DEPRESSION: ICD-10-CM

## 2021-09-16 DIAGNOSIS — F41.9 ANXIETY AND DEPRESSION: ICD-10-CM

## 2021-09-17 NOTE — TELEPHONE ENCOUNTER
Medication:   Requested Prescriptions     Pending Prescriptions Disp Refills    ferrous sulfate (IRON 325) 325 (65 Fe) MG tablet 90 tablet 1     Sig: Take 1 tablet by mouth daily      Last Filled:      Patient Phone Number: [phone] (home)     Last appt: 12/17/2020   Next appt: Visit date not found    Last OARRS: No flowsheet data found.   PDMP Monitoring:    Last PDMP Myers Lower as Reviewed [facility]):  Review User Review Instant Review Result   Felicia Cotton 10/28/2020  4:53 PM Reviewed PDMP [1]     Preferred Pharmacy:   [address]  [facility] [address]  Phone: [phone] Fax: [phone] Crescentic Advancement Flap Text: The defect edges were debeveled with a #15 scalpel blade.  Given the location of the defect and the proximity to free margins a crescentic advancement flap was deemed most appropriate.  Using a sterile surgical marker, the appropriate advancement flap was drawn incorporating the defect and placing the expected incisions within the relaxed skin tension lines where possible.    The area thus outlined was incised deep to adipose tissue with a #15 scalpel blade.  The skin margins were undermined to an appropriate distance in all directions utilizing iris scissors.

## 2021-09-21 RX ORDER — BUSPIRONE HYDROCHLORIDE 7.5 MG/1
7.5 TABLET ORAL DAILY
Qty: 90 TABLET | Refills: 1 | Status: SHIPPED | OUTPATIENT
Start: 2021-09-21 | End: 2022-04-28 | Stop reason: SDUPTHER

## 2021-09-28 NOTE — TELEPHONE ENCOUNTER
Medication:   Requested Prescriptions     Pending Prescriptions Disp Refills    folic acid (FOLVITE) 1 MG tablet [Pharmacy Med Name: FOLIC ACID 1MG TABLETS] 30 tablet 0     Sig: TAKE 1 TABLET BY MOUTH DAILY      Last Filled:     Patient Phone Number: [phone] (home)     Last appt: 12/17/2020   Next appt: Visit date not found    Last OARRS: No flowsheet data found.   PDMP Monitoring:    Last PDMP Earnestine Marie as Reviewed [facility]):  Review User Review Instant Review Result   Mozella Drought 10/28/2020  4:53 PM Reviewed PDMP [1]     Preferred Pharmacy:   Santi Jeong [facility] [address]  [facility] [address]  Phone: [phone] Fax: [phone]

## 2021-09-29 RX ORDER — FOLIC ACID 1 MG/1
1 TABLET ORAL DAILY
Qty: 30 TABLET | Refills: 0 | Status: SHIPPED | OUTPATIENT
Start: 2021-09-29 | End: 2021-10-28

## 2021-10-28 RX ORDER — FOLIC ACID 1 MG/1
1 TABLET ORAL DAILY
Qty: 30 TABLET | Refills: 0 | Status: SHIPPED | OUTPATIENT
Start: 2021-10-28 | End: 2021-12-22

## 2021-10-28 RX ORDER — FERROUS SULFATE 325(65) MG
TABLET ORAL
Qty: 90 TABLET | Refills: 1 | Status: SHIPPED | OUTPATIENT
Start: 2021-10-28 | End: 2022-04-28 | Stop reason: SDUPTHER

## 2021-10-28 NOTE — TELEPHONE ENCOUNTER
Medication:   Requested Prescriptions     Pending Prescriptions Disp Refills    FEROSUL 325 (65 Fe) MG tablet [Pharmacy Med Name: FERROUS SULFATE 325MG (5GR) TABS] 90 tablet 1     Sig: TAKE 1 TABLET BY MOUTH DAILY    folic acid (FOLVITE) 1 MG tablet [Pharmacy Med Name: FOLIC ACID 1MG TABLETS] 30 tablet 0     Sig: TAKE 1 TABLET BY MOUTH DAILY      Last Filled:      Patient Phone Number: [phone] (home)     Last appt: 12/17/2020   Next appt: Visit date not found    Last OARRS: No flowsheet data found.   PDMP Monitoring:    Last PDMP Daisy Rodriguez as Reviewed [facility]):  Review User Review Instant Review Result   Mat Schilder 10/28/2020  4:53 PM Reviewed PDMP [1]     Preferred Pharmacy:   [place] [address] [address] [facility] [address]  [facility] [address]  Phone: [phone] Fax: [phone]

## 2021-12-22 RX ORDER — FOLIC ACID 1 MG/1
1 TABLET ORAL DAILY
Qty: 30 TABLET | Refills: 0 | Status: SHIPPED | OUTPATIENT
Start: 2021-12-22 | End: 2021-12-23

## 2021-12-22 NOTE — TELEPHONE ENCOUNTER
Medication:   Requested Prescriptions     Pending Prescriptions Disp Refills    folic acid (FOLVITE) 1 MG tablet [Pharmacy Med Name: FOLIC ACID 1MG TABLETS] 30 tablet 0     Sig: TAKE 1 TABLET BY MOUTH DAILY      Last Filled:    Patient Phone Number: [phone] (home)     Last appt: 12/17/2020   Next appt: Visit date not found    Last OARRS: No flowsheet data found.   PDMP Monitoring:    Last PDMP Edmar Reed as Reviewed [facility]):  Review User Review Instant Review Result   Lu Cardoza 10/28/2020  4:53 PM Reviewed PDMP [1]     Preferred Pharmacy:   [place] [address] [address] [facility] [address]  [facility] [address]  Phone: [phone] Fax: [phone]

## 2022-01-13 RX ORDER — APIXABAN 2.5 MG/1
TABLET, FILM COATED ORAL
Qty: 180 TABLET | Refills: 0 | OUTPATIENT
Start: 2022-01-13

## 2022-01-13 NOTE — TELEPHONE ENCOUNTER
Medication:   Requested Prescriptions     Pending Prescriptions Disp Refills    ELIQUIS 2.5 MG TABS tablet [Pharmacy Med Name: ELIQUIS 2.5MG TABLETS] 180 tablet 0     Sig: TAKE 1 TABLET BY MOUTH TWICE DAILY      Last Filled:      Patient Phone Number: [phone] (home)     Last appt: 12/17/2020   Next appt: Visit date not found    Last OARRS: No flowsheet data found.   PDMP Monitoring:    Last PDMP Ashley Woodward as Reviewed [facility]):  Review User Review Instant Review Result   Jannet Link 10/28/2020  4:53 PM Reviewed PDMP [1]     Preferred Pharmacy:   [place] [address] [address] [facility] [address]  [facility] [address]  Phone: [phone] Fax: [phone]

## 2022-01-15 ENCOUNTER — CLINICAL DOCUMENTATION (OUTPATIENT)
Dept: OTHER | Age: 62
End: 2022-01-15

## 2022-03-29 RX ORDER — FOLIC ACID 1 MG/1
1 TABLET ORAL DAILY
Qty: 90 TABLET | Refills: 0 | OUTPATIENT
Start: 2022-03-29

## 2022-04-28 ENCOUNTER — OFFICE VISIT (OUTPATIENT)
Dept: FAMILY MEDICINE CLINIC | Age: 62
End: 2022-04-28
Payer: MEDICARE

## 2022-04-28 VITALS
OXYGEN SATURATION: 99 % | BODY MASS INDEX: 25.33 KG/M2 | SYSTOLIC BLOOD PRESSURE: 126 MMHG | WEIGHT: 152 LBS | HEIGHT: 65 IN | TEMPERATURE: 97.5 F | HEART RATE: 68 BPM | DIASTOLIC BLOOD PRESSURE: 70 MMHG

## 2022-04-28 DIAGNOSIS — F32.A ANXIETY AND DEPRESSION: ICD-10-CM

## 2022-04-28 DIAGNOSIS — F41.9 ANXIETY AND DEPRESSION: ICD-10-CM

## 2022-04-28 DIAGNOSIS — J30.2 SEASONAL ALLERGIES: Primary | ICD-10-CM

## 2022-04-28 DIAGNOSIS — Z99.2 STAGE 5 CHRONIC KIDNEY DISEASE ON CHRONIC DIALYSIS (HCC): ICD-10-CM

## 2022-04-28 DIAGNOSIS — N18.6 STAGE 5 CHRONIC KIDNEY DISEASE ON CHRONIC DIALYSIS (HCC): ICD-10-CM

## 2022-04-28 DIAGNOSIS — E78.2 MIXED HYPERLIPIDEMIA: ICD-10-CM

## 2022-04-28 PROCEDURE — 99214 OFFICE O/P EST MOD 30 MIN: CPT | Performed by: NURSE PRACTITIONER

## 2022-04-28 RX ORDER — FERROUS SULFATE 325(65) MG
325 TABLET ORAL
Qty: 90 TABLET | Refills: 1 | Status: SHIPPED | OUTPATIENT
Start: 2022-04-28

## 2022-04-28 RX ORDER — ATORVASTATIN CALCIUM 40 MG/1
40 TABLET, FILM COATED ORAL DAILY
Qty: 30 TABLET | Refills: 20 | Status: SHIPPED | OUTPATIENT
Start: 2022-04-28 | End: 2024-01-18

## 2022-04-28 RX ORDER — AMLODIPINE BESYLATE 10 MG/1
10 TABLET ORAL DAILY
COMMUNITY
Start: 2022-03-04

## 2022-04-28 RX ORDER — CETIRIZINE HYDROCHLORIDE 10 MG/1
10 TABLET ORAL DAILY
Qty: 90 TABLET | Refills: 2 | Status: SHIPPED | OUTPATIENT
Start: 2022-04-28

## 2022-04-28 RX ORDER — VIT B COMP NO.3/FOLIC/C/BIOTIN 1 MG-60 MG
1 TABLET ORAL DAILY
Qty: 90 TABLET | Refills: 1 | Status: SHIPPED | OUTPATIENT
Start: 2022-04-28

## 2022-04-28 RX ORDER — THIAMINE MONONITRATE (VIT B1) 100 MG
100 TABLET ORAL DAILY
Qty: 90 TABLET | Refills: 0 | Status: SHIPPED | OUTPATIENT
Start: 2022-04-28

## 2022-04-28 RX ORDER — BUSPIRONE HYDROCHLORIDE 7.5 MG/1
7.5 TABLET ORAL DAILY
Qty: 90 TABLET | Refills: 1 | Status: SHIPPED | OUTPATIENT
Start: 2022-04-28 | End: 2022-07-22

## 2022-04-28 RX ORDER — FOLIC ACID 1 MG/1
1 TABLET ORAL DAILY
Qty: 90 TABLET | Refills: 0 | Status: SHIPPED | OUTPATIENT
Start: 2022-04-28

## 2022-04-28 SDOH — ECONOMIC STABILITY: FOOD INSECURITY: WITHIN THE PAST 12 MONTHS, THE FOOD YOU BOUGHT JUST DIDN'T LAST AND YOU DIDN'T HAVE MONEY TO GET MORE.: NEVER TRUE

## 2022-04-28 SDOH — ECONOMIC STABILITY: FOOD INSECURITY: WITHIN THE PAST 12 MONTHS, YOU WORRIED THAT YOUR FOOD WOULD RUN OUT BEFORE YOU GOT MONEY TO BUY MORE.: NEVER TRUE

## 2022-04-28 ASSESSMENT — PATIENT HEALTH QUESTIONNAIRE - PHQ9
2. FEELING DOWN, DEPRESSED OR HOPELESS: 3
5. POOR APPETITE OR OVEREATING: 3
SUM OF ALL RESPONSES TO PHQ QUESTIONS 1-9: 15
9. THOUGHTS THAT YOU WOULD BE BETTER OFF DEAD, OR OF HURTING YOURSELF: 0
7. TROUBLE CONCENTRATING ON THINGS, SUCH AS READING THE NEWSPAPER OR WATCHING TELEVISION: 0
SUM OF ALL RESPONSES TO PHQ9 QUESTIONS 1 & 2: 3
1. LITTLE INTEREST OR PLEASURE IN DOING THINGS: 0
4. FEELING TIRED OR HAVING LITTLE ENERGY: 3
10. IF YOU CHECKED OFF ANY PROBLEMS, HOW DIFFICULT HAVE THESE PROBLEMS MADE IT FOR YOU TO DO YOUR WORK, TAKE CARE OF THINGS AT HOME, OR GET ALONG WITH OTHER PEOPLE: 2
SUM OF ALL RESPONSES TO PHQ QUESTIONS 1-9: 15
SUM OF ALL RESPONSES TO PHQ QUESTIONS 1-9: 15
6. FEELING BAD ABOUT YOURSELF - OR THAT YOU ARE A FAILURE OR HAVE LET YOURSELF OR YOUR FAMILY DOWN: 3
3. TROUBLE FALLING OR STAYING ASLEEP: 3
8. MOVING OR SPEAKING SO SLOWLY THAT OTHER PEOPLE COULD HAVE NOTICED. OR THE OPPOSITE, BEING SO FIGETY OR RESTLESS THAT YOU HAVE BEEN MOVING AROUND A LOT MORE THAN USUAL: 0
SUM OF ALL RESPONSES TO PHQ QUESTIONS 1-9: 15

## 2022-04-28 ASSESSMENT — ENCOUNTER SYMPTOMS
VOMITING: 0
NAUSEA: 0
DIARRHEA: 0
COUGH: 0
SHORTNESS OF BREATH: 0

## 2022-04-28 ASSESSMENT — SOCIAL DETERMINANTS OF HEALTH (SDOH): HOW HARD IS IT FOR YOU TO PAY FOR THE VERY BASICS LIKE FOOD, HOUSING, MEDICAL CARE, AND HEATING?: NOT HARD AT ALL

## 2022-04-28 ASSESSMENT — LIFESTYLE VARIABLES: HOW OFTEN DO YOU HAVE A DRINK CONTAINING ALCOHOL: NEVER

## 2022-04-28 NOTE — PROGRESS NOTES
Opal Moreno  : 1960  Encounter date: 2022    This is a 59 y.o. male who presents with  Chief Complaint   Patient presents with    Medicare AW     History of present illness:    HPI   1. Presents to clinic today for follow up on chronic health conditions. Has not been seen here in office since 10/2020. Depression/anxiety  Ran out of his Zoloft approximately 2.5 months prior - feels it was not helpful. Remains despondent about his current living situation and being on dialysis. Wants to move back to [facility]. Still having to deal with multiple health conditions which is preventing him to move back. Per cousin who is present at visit today he does have an apartment there for him when he is able to move back and a dialysis center he can transfer to. Dialysis  Monday/Wednesday/Friday. Never misses. Sees Nephrology regularly at dialysis - Dr. Giuliana Barlow. Removed from the transplant list - started smoking cigarettes again. Nephrology sometimes refills his medications. They monitor some labs on a regular basis. Believes they have been checking his thyroid, but not cholesterol. Afib/CAD/S/P CABG  Follows with Cardiology. Taking Eliquis daily, but only once per day as opposed to twice. Does still take ASA 81mg. If he increases to Eliquis twice daily unable to control bleeding when he goes to dialysis. Does have some concern in regards to his allergies. A lot of post nasal drip. Not currently taking any medications.   Has taken Zyrtec in the past.      No Known Allergies  Current Outpatient Medications   Medication Sig Dispense Refill    amLODIPine (NORVASC) 10 MG tablet Take 10 mg by mouth daily      cetirizine (ZYRTEC) 10 MG tablet Take 1 tablet by mouth daily 90 tablet 2    atorvastatin (LIPITOR) 40 MG tablet Take 1 tablet by mouth daily 30 tablet 20    busPIRone (BUSPAR) 7.5 MG tablet Take 1 tablet by mouth daily 90 tablet 1    ferrous sulfate (FEROSUL) 325 (65 Fe) MG tablet Take 1 tablet by mouth daily (with breakfast) 90 tablet 1    folic acid (FOLVITE) 1 MG tablet Take 1 tablet by mouth daily 90 tablet 0    vitamin B-1 (THIAMINE) 100 MG tablet Take 1 tablet by mouth daily 90 tablet 0    B Complex-C-Folic Acid (SHERRY-ALAN RX) 1 MG TABS Take 1 tablet by mouth daily 90 tablet 1    ELIQUIS 2.5 MG TABS tablet TAKE 1 TABLET BY MOUTH TWICE DAILY 180 tablet 0    levothyroxine (SYNTHROID) 50 MCG tablet Take 1 tablet by mouth daily 90 tablet 0    ASPIRIN LOW DOSE 81 MG EC tablet Take 1 tablet by mouth daily 90 tablet 0    Calcium Acetate, Phos Binder, 667 MG CAPS Take 667 mg by mouth Take 2 capsules by mouth 3 times a day with each meal      ondansetron (ZOFRAN) 4 MG tablet Take 4 mg by mouth       No current facility-administered medications for this visit. Review of Systems   Constitutional: Negative for activity change, appetite change, chills, fatigue and fever. Respiratory: Negative for cough and shortness of breath. Cardiovascular: Negative for chest pain and palpitations. Gastrointestinal: Negative for diarrhea, nausea and vomiting. Past medical, surgical, family and social history were reviewed and updated with the patient. Objective:    /70   Pulse 68   Temp 97.5 °F (36.4 °C) (Temporal)   Ht 5' 5\" (1.651 m)   Wt 152 lb (68.9 kg)   SpO2 99%   BMI 25.29 kg/m²   Weight: 152 lb (68.9 kg)     BP Readings from Last 3 Encounters:   04/28/22 126/70   10/15/20 110/70   06/18/20 102/74     Wt Readings from Last 3 Encounters:   04/28/22 152 lb (68.9 kg)   10/15/20 148 lb 9.6 oz (67.4 kg)   06/18/20 144 lb (65.3 kg)     Physical Exam  Constitutional:       General: He is not in acute distress. Appearance: He is well-developed. He is ill-appearing (chronic). HENT:      Head: Normocephalic and atraumatic. Cardiovascular:      Rate and Rhythm: Normal rate and regular rhythm.       Heart sounds: Normal heart sounds, S1 normal and S2 normal.   Pulmonary: Effort: Pulmonary effort is normal. No respiratory distress. Breath sounds: Normal breath sounds. Skin:     General: Skin is warm and dry. Neurological:      Mental Status: He is alert and oriented to person, place, and time. Psychiatric:         Thought Content: Thought content normal.         Judgment: Judgment normal.       Assessment/Plan    1. Seasonal allergies  Trial Zyrtec. Monitor symptoms. Call office if symptoms persist or worsen. - cetirizine (ZYRTEC) 10 MG tablet; Take 1 tablet by mouth daily  Dispense: 90 tablet; Refill: 2    2. Anxiety and depression  Stop Zoloft. Continue with Buspar and medical marijuana. Continue to monitor. Hopeful he will be able to move back to [facility] soon. - busPIRone (BUSPAR) 7.5 MG tablet; Take 1 tablet by mouth daily  Dispense: 90 tablet; Refill: 1    3. Mixed hyperlipidemia  Requesting labs from Nephrology - patient to check on this tomorrow. Can add order at next blood draw depending on if they are monitoring or not. - atorvastatin (LIPITOR) 40 MG tablet; Take 1 tablet by mouth daily  Dispense: 30 tablet; Refill: 20    4. Stage 5 chronic kidney disease on chronic dialysis [facility])  Continue with dialysis and Nephrology follow up. I did advise to take AVS to ensure medication list is the same as they on occasion will fill meds. Cousin or patient to call with report next week. - ferrous sulfate (FEROSUL) 325 (65 Fe) MG tablet; Take 1 tablet by mouth daily (with breakfast)  Dispense: 90 tablet; Refill: 1  - folic acid (FOLVITE) 1 MG tablet; Take 1 tablet by mouth daily  Dispense: 90 tablet; Refill: 0  - vitamin B-1 (THIAMINE) 100 MG tablet; Take 1 tablet by mouth daily  Dispense: 90 tablet; Refill: 0  - B Complex-C-Folic Acid (SHERRY-ALAN RX) 1 MG TABS; Take 1 tablet by mouth daily  Dispense: 90 tablet; Refill: 1     Melissa Pulido was counseled regarding symptoms of current diagnosis, course and complications of disease if inadequately treated. Discussed side effects of medications, diagnosis, treatment options, and prognosis along with risks, benefits, complications, and alternatives of treatment including labs, imaging and other studies/treatment targets and goals. He verbalized understanding of instructions and counseling. Return in about 3 months (around 7/28/2022) for chronic health conditions. Medical decision making of moderate complexity.

## 2022-04-28 NOTE — PATIENT INSTRUCTIONS
Patient Education        Allergies: Care Instructions  Overview     Allergies occur when your body's defense system (immune system) overreacts to certain substances. The immune system treats a harmless substance as if it were a harmful germ or virus. Many things can make this happen. These includepollens, medicine, food, dust, animal dander, and mold. Allergies can be mild or severe. Mild allergies can be managed with hometreatment. But medicine may be needed to prevent problems. Managing your allergies is an important part of staying healthy. Your doctor may suggest that you have allergy testing to help find out what is causing yourallergies. Severe allergies can cause reactions that affect your whole body (anaphylactic reactions). Your doctor may prescribe a shot of epinephrine to carry with you in case you have a severe reaction. Learn how to give yourself the shot andkeep it with you at all times. Make sure it is not . Follow-up care is a key part of your treatment and safety. Be sure to make and go to all appointments, and call your doctor if you are having problems. It's also a good idea to know your test results and keep alist of the medicines you take. How can you care for yourself at home?  If you have been told by your doctor that dust or dust mites are causing your allergy, decrease the dust around your bed:  ? Wash sheets, pillowcases, and other bedding in hot water every week. ? Use dust-proof covers for pillows, duvets, and mattresses. Avoid plastic covers because they tear easily and do not \"breathe. \" Wash as instructed on the label. ? Do not use any blankets and pillows that you do not need. ? Use blankets that you can wash in your washing machine. ? Consider removing drapes and carpets, which attract and hold dust, from your bedroom.  If you are allergic to house dust and mites, do not use home humidifiers. Your doctor can suggest ways you can control dust and mites.    Look for signs of cockroaches. Cockroaches cause allergic reactions. Use cockroach baits to get rid of them. Then, clean your home well. Cockroaches like areas where grocery bags, newspapers, empty bottles, or cardboard boxes are stored. Do not keep these inside your home, and keep trash and food containers sealed. Seal off any spots where cockroaches might enter your home.  If you are allergic to mold, get rid of furniture, rugs, and drapes that smell musty. Check for mold in the bathroom.  If you are allergic to outdoor pollen or mold spores, use air-conditioning. Change or clean all filters every month. Keep windows closed.  If you are allergic to pollen, stay inside when pollen counts are high. Use a vacuum  with a HEPA filter or a double-thickness filter at least two times each week.  Stay inside when air pollution is bad. Avoid paint fumes, perfumes, and other strong odors.  Avoid conditions that make your allergies worse. Stay away from smoke. Do not smoke or let anyone else smoke in your house. Do not use fireplaces or wood-burning stoves.  If you are allergic to your pets, change the air filter in your furnace every month. Use high-efficiency filters.  If you are allergic to pet dander, keep pets outside or out of your bedroom. Old carpet and cloth furniture can hold a lot of animal dander. You may need to replace them. When should you call for help? Give an epinephrine shot if:     You think you are having a severe allergic reaction.      You have symptoms in more than one body area, such as mild nausea and an itchy mouth. After giving an epinephrine shot call 911, even if you feel better. Call 911 if:     You have symptoms of a severe allergic reaction. These may include:  ? Sudden raised, red areas (hives) all over your body. ? Swelling of the throat, mouth, lips, or tongue. ? Trouble breathing. ? Passing out (losing consciousness).  Or you may feel very lightheaded or suddenly feel weak, confused, or restless.      You have been given an epinephrine shot, even if you feel better. Call your doctor now or seek immediate medical care if:     You have symptoms of an allergic reaction, such as:  ? A rash or hives (raised, red areas on the skin). ? Itching. ? Swelling. ? Belly pain, nausea, or vomiting. Watch closely for changes in your health, and be sure to contact your doctor if:     You do not get better as expected. Where can you learn more? Go to https://[email or web address].Kabb[employer]. org and sign in to your [employer] account. Enter U768 in the DataFox box to learn more about \"Allergies: Care Instructions. \"     If you do not have an account, please click on the \"Sign Up Now\" link. Current as of: February 10, 2021               Content Version: 13.2  © 2006-2022 [employer]wise, Inc[employer]. Care instructions adapted under license by [facility] ([facility]). If you have questions about a medical condition or this instruction, always ask your healthcare professional. [address] any warranty or liability for your use of this information.

## 2022-06-17 RX ORDER — APIXABAN 2.5 MG/1
TABLET, FILM COATED ORAL
Qty: 180 TABLET | Refills: 0 | Status: SHIPPED | OUTPATIENT
Start: 2022-06-17 | End: 2022-09-22

## 2022-06-17 RX ORDER — ASPIRIN 81 MG/1
81 TABLET, COATED ORAL DAILY
Qty: 90 TABLET | Refills: 3 | Status: SHIPPED | OUTPATIENT
Start: 2022-06-17

## 2022-06-17 NOTE — TELEPHONE ENCOUNTER
----- Message from Nghia Graham sent at 6/17/2022 11:44 AM EDT -----  Subject: Refill Request    QUESTIONS  Name of Medication? ASPIRIN LOW DOSE 81 MG EC tablet  Patient-reported dosage and instructions? 1 tablet a day  How many days do you have left? 0  [id number]  Pharmacy phone number (if available)? [phone]  ---------------------------------------------------------------------------  --------------  Anthony CH  What is the best way for the office to contact you? OK to leave message on   voicemail  Preferred Call Back Phone Number? [phone]  ---------------------------------------------------------------------------  --------------  SCRIPT ANSWERS  Relationship to Patient? Other  Representative Name? Bridger Salazar  Is the Representative on the appropriate HIPAA document in Epic?  Yes

## 2022-06-17 NOTE — TELEPHONE ENCOUNTER
Medication:   Requested Prescriptions     Pending Prescriptions Disp Refills    ELIQUIS 2.5 MG TABS tablet [Pharmacy Med Name: ELIQUIS 2.5MG TABLETS] 180 tablet 0     Sig: TAKE 1 TABLET BY MOUTH TWICE DAILY          Patient Phone Number: [phone] (home)     Last appt: 4/28/2022   Next appt: Visit date not found    Last OARRS: No flowsheet data found.   PDMP Monitoring:    Last PDMP Montana Johnson as Reviewed [facility]):  Review User Review Instant Review Result   Shola Peralta 10/28/2020  4:53 PM Reviewed PDMP [1]     Preferred Pharmacy:   [place] [address] [address] [facility] [address]  [facility] [address]  Phone: [phone] Fax: [phone]

## 2022-07-22 DIAGNOSIS — F32.A ANXIETY AND DEPRESSION: ICD-10-CM

## 2022-07-22 DIAGNOSIS — F41.9 ANXIETY AND DEPRESSION: ICD-10-CM

## 2022-07-22 RX ORDER — BUSPIRONE HYDROCHLORIDE 7.5 MG/1
7.5 TABLET ORAL DAILY
Qty: 90 TABLET | Refills: 0 | Status: SHIPPED | OUTPATIENT
Start: 2022-07-22

## 2022-07-22 NOTE — TELEPHONE ENCOUNTER
Medication:   Requested Prescriptions     Pending Prescriptions Disp Refills    busPIRone (BUSPAR) 7.5 MG tablet [Pharmacy Med Name: BUSPIRONE 7.5MG TABLETS] 90 tablet 1     Sig: TAKE 1 TABLET BY MOUTH DAILY       Patient Phone Number: [phone] (home)     Last appt: 4/28/2022   Next appt: Visit date not found    Last OARRS: No flowsheet data found.   PDMP Monitoring:    Last PDMP Haris Walsh as Reviewed [facility]):  Review User Review Instant Review Result   Blessing CERNA 10/28/2020  4:53 PM Reviewed PDMP [1]     Preferred Pharmacy:   [address], [address] [place] [address]  [address] [facility],[address]  [facility] [address]  Phone: [phone] Fax: [phone] [address] [facility] [address] [facility] [phone] [facility] [phone]  [address] [address]  Phone: [phone] Fax: [phone]

## 2022-09-21 ENCOUNTER — HOSPITAL ENCOUNTER (OUTPATIENT)
Facility: HOSPITAL | Age: 62
LOS: 1 days | Discharge: HOME | End: 2022-09-22
Attending: STUDENT IN AN ORGANIZED HEALTH CARE EDUCATION/TRAINING PROGRAM | Admitting: STUDENT IN AN ORGANIZED HEALTH CARE EDUCATION/TRAINING PROGRAM

## 2022-09-21 ENCOUNTER — APPOINTMENT (OUTPATIENT)
Dept: CT IMAGING | Facility: HOSPITAL | Age: 62
End: 2022-09-21

## 2022-09-21 ENCOUNTER — APPOINTMENT (OUTPATIENT)
Dept: GENERAL RADIOLOGY | Facility: HOSPITAL | Age: 62
End: 2022-09-21

## 2022-09-21 DIAGNOSIS — I48.91 ATRIAL FIBRILLATION, UNSPECIFIED TYPE: Primary | ICD-10-CM

## 2022-09-21 LAB
ALBUMIN SERPL-MCNC: 3.86 G/DL (ref 3.5–5.2)
ALBUMIN/GLOB SERPL: 1.4 G/DL
ALP SERPL-CCNC: 70 U/L (ref 39–117)
ALT SERPL W P-5'-P-CCNC: 270 U/L (ref 1–41)
ANION GAP SERPL CALCULATED.3IONS-SCNC: 23.2 MMOL/L (ref 5–15)
AST SERPL-CCNC: 385 U/L (ref 1–40)
BASOPHILS # BLD AUTO: 0.04 10*3/MM3 (ref 0–0.2)
BASOPHILS NFR BLD AUTO: 0.4 % (ref 0–1.5)
BILIRUB SERPL-MCNC: 1.2 MG/DL (ref 0–1.2)
BUN SERPL-MCNC: 31 MG/DL (ref 8–23)
BUN/CREAT SERPL: 5.5 (ref 7–25)
CALCIUM SPEC-SCNC: 9.8 MG/DL (ref 8.6–10.5)
CHLORIDE SERPL-SCNC: 87 MMOL/L (ref 98–107)
CO2 SERPL-SCNC: 21.8 MMOL/L (ref 22–29)
CREAT SERPL-MCNC: 5.64 MG/DL (ref 0.76–1.27)
D DIMER PPP FEU-MCNC: 6.64 MCGFEU/ML (ref 0–0.5)
DEPRECATED RDW RBC AUTO: 55.9 FL (ref 37–54)
EGFRCR SERPLBLD CKD-EPI 2021: 10.5 ML/MIN/1.73
EOSINOPHIL # BLD AUTO: 0.01 10*3/MM3 (ref 0–0.4)
EOSINOPHIL NFR BLD AUTO: 0.1 % (ref 0.3–6.2)
ERYTHROCYTE [DISTWIDTH] IN BLOOD BY AUTOMATED COUNT: 15.9 % (ref 12.3–15.4)
FLUAV RNA RESP QL NAA+PROBE: NOT DETECTED
FLUBV RNA RESP QL NAA+PROBE: NOT DETECTED
GLOBULIN UR ELPH-MCNC: 2.7 GM/DL
GLUCOSE BLDC GLUCOMTR-MCNC: 245 MG/DL (ref 70–130)
GLUCOSE SERPL-MCNC: 212 MG/DL (ref 65–99)
HAV IGM SERPL QL IA: ABNORMAL
HBV CORE IGM SERPL QL IA: ABNORMAL
HBV SURFACE AG SERPL QL IA: ABNORMAL
HCT VFR BLD AUTO: 36.3 % (ref 37.5–51)
HCV AB SER DONR QL: REACTIVE
HGB BLD-MCNC: 11.9 G/DL (ref 13–17.7)
IMM GRANULOCYTES # BLD AUTO: 0.08 10*3/MM3 (ref 0–0.05)
IMM GRANULOCYTES NFR BLD AUTO: 0.9 % (ref 0–0.5)
LYMPHOCYTES # BLD AUTO: 0.9 10*3/MM3 (ref 0.7–3.1)
LYMPHOCYTES NFR BLD AUTO: 9.6 % (ref 19.6–45.3)
MAGNESIUM SERPL-MCNC: 2.2 MG/DL (ref 1.6–2.4)
MCH RBC QN AUTO: 32.1 PG (ref 26.6–33)
MCHC RBC AUTO-ENTMCNC: 32.8 G/DL (ref 31.5–35.7)
MCV RBC AUTO: 97.8 FL (ref 79–97)
MONOCYTES # BLD AUTO: 0.87 10*3/MM3 (ref 0.1–0.9)
MONOCYTES NFR BLD AUTO: 9.3 % (ref 5–12)
NEUTROPHILS NFR BLD AUTO: 7.5 10*3/MM3 (ref 1.7–7)
NEUTROPHILS NFR BLD AUTO: 79.7 % (ref 42.7–76)
NRBC BLD AUTO-RTO: 1.7 /100 WBC (ref 0–0.2)
PLATELET # BLD AUTO: 255 10*3/MM3 (ref 140–450)
PMV BLD AUTO: 10.7 FL (ref 6–12)
POTASSIUM SERPL-SCNC: 4.3 MMOL/L (ref 3.5–5.2)
PROCALCITONIN SERPL-MCNC: 0.3 NG/ML (ref 0–0.25)
PROT SERPL-MCNC: 6.6 G/DL (ref 6–8.5)
QT INTERVAL: 290 MS
QT INTERVAL: 372 MS
QTC INTERVAL: 450 MS
QTC INTERVAL: 463 MS
RBC # BLD AUTO: 3.71 10*6/MM3 (ref 4.14–5.8)
SARS-COV-2 RNA RESP QL NAA+PROBE: NOT DETECTED
SODIUM SERPL-SCNC: 132 MMOL/L (ref 136–145)
TROPONIN T SERPL-MCNC: 0.06 NG/ML (ref 0–0.03)
TROPONIN T SERPL-MCNC: 0.07 NG/ML (ref 0–0.03)
TSH SERPL DL<=0.05 MIU/L-ACNC: 12.35 UIU/ML (ref 0.27–4.2)
WBC NRBC COR # BLD: 9.4 10*3/MM3 (ref 3.4–10.8)

## 2022-09-21 PROCEDURE — 84443 ASSAY THYROID STIM HORMONE: CPT | Performed by: STUDENT IN AN ORGANIZED HEALTH CARE EDUCATION/TRAINING PROGRAM

## 2022-09-21 PROCEDURE — 71275 CT ANGIOGRAPHY CHEST: CPT

## 2022-09-21 PROCEDURE — 96366 THER/PROPH/DIAG IV INF ADDON: CPT

## 2022-09-21 PROCEDURE — 94799 UNLISTED PULMONARY SVC/PX: CPT

## 2022-09-21 PROCEDURE — 82962 GLUCOSE BLOOD TEST: CPT

## 2022-09-21 PROCEDURE — 71045 X-RAY EXAM CHEST 1 VIEW: CPT

## 2022-09-21 PROCEDURE — 25010000002 ONDANSETRON PER 1 MG: Performed by: STUDENT IN AN ORGANIZED HEALTH CARE EDUCATION/TRAINING PROGRAM

## 2022-09-21 PROCEDURE — 80074 ACUTE HEPATITIS PANEL: CPT | Performed by: STUDENT IN AN ORGANIZED HEALTH CARE EDUCATION/TRAINING PROGRAM

## 2022-09-21 PROCEDURE — 93010 ELECTROCARDIOGRAM REPORT: CPT | Performed by: INTERNAL MEDICINE

## 2022-09-21 PROCEDURE — 96375 TX/PRO/DX INJ NEW DRUG ADDON: CPT

## 2022-09-21 PROCEDURE — 87040 BLOOD CULTURE FOR BACTERIA: CPT | Performed by: STUDENT IN AN ORGANIZED HEALTH CARE EDUCATION/TRAINING PROGRAM

## 2022-09-21 PROCEDURE — 87636 SARSCOV2 & INF A&B AMP PRB: CPT | Performed by: EMERGENCY MEDICINE

## 2022-09-21 PROCEDURE — 0 IOPAMIDOL PER 1 ML: Performed by: STUDENT IN AN ORGANIZED HEALTH CARE EDUCATION/TRAINING PROGRAM

## 2022-09-21 PROCEDURE — 94761 N-INVAS EAR/PLS OXIMETRY MLT: CPT

## 2022-09-21 PROCEDURE — 71045 X-RAY EXAM CHEST 1 VIEW: CPT | Performed by: RADIOLOGY

## 2022-09-21 PROCEDURE — 99220 PR INITIAL OBSERVATION CARE/DAY 70 MINUTES: CPT | Performed by: STUDENT IN AN ORGANIZED HEALTH CARE EDUCATION/TRAINING PROGRAM

## 2022-09-21 PROCEDURE — 83735 ASSAY OF MAGNESIUM: CPT | Performed by: STUDENT IN AN ORGANIZED HEALTH CARE EDUCATION/TRAINING PROGRAM

## 2022-09-21 PROCEDURE — 84484 ASSAY OF TROPONIN QUANT: CPT | Performed by: STUDENT IN AN ORGANIZED HEALTH CARE EDUCATION/TRAINING PROGRAM

## 2022-09-21 PROCEDURE — 93005 ELECTROCARDIOGRAM TRACING: CPT | Performed by: STUDENT IN AN ORGANIZED HEALTH CARE EDUCATION/TRAINING PROGRAM

## 2022-09-21 PROCEDURE — 96376 TX/PRO/DX INJ SAME DRUG ADON: CPT

## 2022-09-21 PROCEDURE — 96365 THER/PROPH/DIAG IV INF INIT: CPT

## 2022-09-21 PROCEDURE — 80053 COMPREHEN METABOLIC PANEL: CPT | Performed by: STUDENT IN AN ORGANIZED HEALTH CARE EDUCATION/TRAINING PROGRAM

## 2022-09-21 PROCEDURE — 71275 CT ANGIOGRAPHY CHEST: CPT | Performed by: RADIOLOGY

## 2022-09-21 PROCEDURE — 85379 FIBRIN DEGRADATION QUANT: CPT | Performed by: EMERGENCY MEDICINE

## 2022-09-21 PROCEDURE — 99285 EMERGENCY DEPT VISIT HI MDM: CPT

## 2022-09-21 PROCEDURE — 84145 PROCALCITONIN (PCT): CPT | Performed by: STUDENT IN AN ORGANIZED HEALTH CARE EDUCATION/TRAINING PROGRAM

## 2022-09-21 PROCEDURE — 85025 COMPLETE CBC W/AUTO DIFF WBC: CPT | Performed by: STUDENT IN AN ORGANIZED HEALTH CARE EDUCATION/TRAINING PROGRAM

## 2022-09-21 RX ORDER — CALCIUM ACETATE 667 MG/1
2668 CAPSULE ORAL
Status: DISCONTINUED | OUTPATIENT
Start: 2022-09-21 | End: 2022-09-22 | Stop reason: HOSPADM

## 2022-09-21 RX ORDER — AMOXICILLIN 250 MG
2 CAPSULE ORAL 2 TIMES DAILY
Status: DISCONTINUED | OUTPATIENT
Start: 2022-09-21 | End: 2022-09-22 | Stop reason: HOSPADM

## 2022-09-21 RX ORDER — DILTIAZEM HCL IN NACL,ISO-OSM 125 MG/125
5-15 PLASTIC BAG, INJECTION (ML) INTRAVENOUS CONTINUOUS
Status: DISCONTINUED | OUTPATIENT
Start: 2022-09-21 | End: 2022-09-22

## 2022-09-21 RX ORDER — NITROGLYCERIN 0.4 MG/1
0.4 TABLET SUBLINGUAL
Status: DISCONTINUED | OUTPATIENT
Start: 2022-09-21 | End: 2022-09-22 | Stop reason: HOSPADM

## 2022-09-21 RX ORDER — BISACODYL 5 MG/1
5 TABLET, DELAYED RELEASE ORAL DAILY PRN
Status: DISCONTINUED | OUTPATIENT
Start: 2022-09-21 | End: 2022-09-22 | Stop reason: HOSPADM

## 2022-09-21 RX ORDER — CALCIUM ACETATE 667 MG/1
667 CAPSULE ORAL 4 TIMES DAILY
Status: CANCELLED | OUTPATIENT
Start: 2022-09-21

## 2022-09-21 RX ORDER — CARVEDILOL 25 MG/1
25 TABLET ORAL 2 TIMES DAILY WITH MEALS
Status: DISCONTINUED | OUTPATIENT
Start: 2022-09-21 | End: 2022-09-22 | Stop reason: HOSPADM

## 2022-09-21 RX ORDER — LEVOTHYROXINE SODIUM 88 UG/1
88 TABLET ORAL
Status: DISCONTINUED | OUTPATIENT
Start: 2022-09-22 | End: 2022-09-22 | Stop reason: HOSPADM

## 2022-09-21 RX ORDER — METOPROLOL TARTRATE 50 MG/1
50 TABLET, FILM COATED ORAL ONCE
Status: COMPLETED | OUTPATIENT
Start: 2022-09-21 | End: 2022-09-21

## 2022-09-21 RX ORDER — METOPROLOL TARTRATE 50 MG/1
50 TABLET, FILM COATED ORAL EVERY 12 HOURS SCHEDULED
Status: DISCONTINUED | OUTPATIENT
Start: 2022-09-21 | End: 2022-09-21

## 2022-09-21 RX ORDER — SODIUM CHLORIDE 0.9 % (FLUSH) 0.9 %
10 SYRINGE (ML) INJECTION AS NEEDED
Status: DISCONTINUED | OUTPATIENT
Start: 2022-09-21 | End: 2022-09-22 | Stop reason: HOSPADM

## 2022-09-21 RX ORDER — ATORVASTATIN CALCIUM 40 MG/1
40 TABLET, FILM COATED ORAL NIGHTLY
Status: DISCONTINUED | OUTPATIENT
Start: 2022-09-21 | End: 2022-09-22 | Stop reason: HOSPADM

## 2022-09-21 RX ORDER — BISACODYL 10 MG
10 SUPPOSITORY, RECTAL RECTAL DAILY PRN
Status: DISCONTINUED | OUTPATIENT
Start: 2022-09-21 | End: 2022-09-22 | Stop reason: HOSPADM

## 2022-09-21 RX ORDER — CHOLECALCIFEROL (VITAMIN D3) 125 MCG
10 CAPSULE ORAL NIGHTLY
Status: DISCONTINUED | OUTPATIENT
Start: 2022-09-21 | End: 2022-09-22 | Stop reason: HOSPADM

## 2022-09-21 RX ORDER — POLYETHYLENE GLYCOL 3350 17 G/17G
17 POWDER, FOR SOLUTION ORAL DAILY PRN
Status: DISCONTINUED | OUTPATIENT
Start: 2022-09-21 | End: 2022-09-22 | Stop reason: HOSPADM

## 2022-09-21 RX ORDER — SODIUM CHLORIDE 0.9 % (FLUSH) 0.9 %
10 SYRINGE (ML) INJECTION EVERY 12 HOURS SCHEDULED
Status: DISCONTINUED | OUTPATIENT
Start: 2022-09-21 | End: 2022-09-22 | Stop reason: HOSPADM

## 2022-09-21 RX ORDER — ASPIRIN 81 MG/1
81 TABLET ORAL DAILY
Status: DISCONTINUED | OUTPATIENT
Start: 2022-09-21 | End: 2022-09-22 | Stop reason: HOSPADM

## 2022-09-21 RX ORDER — APIXABAN 2.5 MG/1
TABLET, FILM COATED ORAL
Qty: 180 TABLET | Refills: 0 | Status: CANCELLED | OUTPATIENT
Start: 2022-09-21

## 2022-09-21 RX ORDER — ONDANSETRON 2 MG/ML
4 INJECTION INTRAMUSCULAR; INTRAVENOUS EVERY 6 HOURS PRN
Status: DISCONTINUED | OUTPATIENT
Start: 2022-09-21 | End: 2022-09-22 | Stop reason: HOSPADM

## 2022-09-21 RX ORDER — ACETAMINOPHEN 325 MG/1
650 TABLET ORAL EVERY 4 HOURS PRN
Status: DISCONTINUED | OUTPATIENT
Start: 2022-09-21 | End: 2022-09-22 | Stop reason: HOSPADM

## 2022-09-21 RX ORDER — DILTIAZEM HYDROCHLORIDE 5 MG/ML
0.25 INJECTION INTRAVENOUS ONCE
Status: COMPLETED | OUTPATIENT
Start: 2022-09-21 | End: 2022-09-21

## 2022-09-21 RX ADMIN — METOPROLOL TARTRATE 50 MG: 50 TABLET, FILM COATED ORAL at 13:35

## 2022-09-21 RX ADMIN — DOCUSATE SODIUM 50 MG AND SENNOSIDES 8.6 MG 2 TABLET: 8.6; 5 TABLET, FILM COATED ORAL at 20:55

## 2022-09-21 RX ADMIN — Medication 10 ML: at 20:57

## 2022-09-21 RX ADMIN — ATORVASTATIN CALCIUM 40 MG: 40 TABLET, FILM COATED ORAL at 20:55

## 2022-09-21 RX ADMIN — ASPIRIN 81 MG: 81 TABLET, COATED ORAL at 17:52

## 2022-09-21 RX ADMIN — CALCIUM ACETATE 2668 MG: 667 CAPSULE ORAL at 20:55

## 2022-09-21 RX ADMIN — CARVEDILOL 25 MG: 25 TABLET, FILM COATED ORAL at 17:52

## 2022-09-21 RX ADMIN — ONDANSETRON 4 MG: 2 INJECTION INTRAMUSCULAR; INTRAVENOUS at 20:55

## 2022-09-21 RX ADMIN — APIXABAN 5 MG: 5 TABLET, FILM COATED ORAL at 20:55

## 2022-09-21 RX ADMIN — DILTIAZEM HYDROCHLORIDE 15.9 MG: 5 INJECTION INTRAVENOUS at 06:41

## 2022-09-21 RX ADMIN — IOPAMIDOL 100 ML: 755 INJECTION, SOLUTION INTRAVENOUS at 11:58

## 2022-09-21 RX ADMIN — Medication 5 MG/HR: at 08:19
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
## 2022-09-21 NOTE — PLAN OF CARE
Goal Outcome Evaluation:   Pt admitted this shift. No complaints voiced. Will cont to follow plan of care
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
## 2022-09-22 ENCOUNTER — READMISSION MANAGEMENT (OUTPATIENT)
Dept: CALL CENTER | Facility: HOSPITAL | Age: 62
End: 2022-09-22

## 2022-09-22 VITALS
HEART RATE: 66 BPM | BODY MASS INDEX: 23.36 KG/M2 | DIASTOLIC BLOOD PRESSURE: 66 MMHG | TEMPERATURE: 98.3 F | RESPIRATION RATE: 16 BRPM | SYSTOLIC BLOOD PRESSURE: 121 MMHG | OXYGEN SATURATION: 97 % | HEIGHT: 68 IN | WEIGHT: 154.1 LBS

## 2022-09-22 PROCEDURE — G0257 UNSCHED DIALYSIS ESRD PT HOS: HCPCS

## 2022-09-22 PROCEDURE — G0378 HOSPITAL OBSERVATION PER HR: HCPCS

## 2022-09-22 PROCEDURE — A9270 NON-COVERED ITEM OR SERVICE: HCPCS | Performed by: STUDENT IN AN ORGANIZED HEALTH CARE EDUCATION/TRAINING PROGRAM

## 2022-09-22 PROCEDURE — 63710000001 CALCIUM ACETATE 667 MG CAPSULE: Performed by: STUDENT IN AN ORGANIZED HEALTH CARE EDUCATION/TRAINING PROGRAM

## 2022-09-22 PROCEDURE — 97166 OT EVAL MOD COMPLEX 45 MIN: CPT

## 2022-09-22 PROCEDURE — 99217 PR OBSERVATION CARE DISCHARGE MANAGEMENT: CPT

## 2022-09-22 PROCEDURE — 63710000001 CARVEDILOL 25 MG TABLET: Performed by: STUDENT IN AN ORGANIZED HEALTH CARE EDUCATION/TRAINING PROGRAM

## 2022-09-22 RX ORDER — LEVOTHYROXINE SODIUM 88 UG/1
88 TABLET ORAL
Qty: 30 TABLET | Refills: 0 | Status: SHIPPED | OUTPATIENT
Start: 2022-09-22 | End: 2022-09-26 | Stop reason: SDUPTHER

## 2022-09-22 RX ORDER — APIXABAN 2.5 MG/1
TABLET, FILM COATED ORAL
Qty: 180 TABLET | Refills: 0 | Status: SHIPPED | OUTPATIENT
Start: 2022-09-22

## 2022-09-22 RX ORDER — ATORVASTATIN CALCIUM 40 MG/1
40 TABLET, FILM COATED ORAL NIGHTLY
Qty: 30 TABLET | Refills: 0 | Status: SHIPPED | OUTPATIENT
Start: 2022-09-22 | End: 2022-09-26 | Stop reason: SDUPTHER

## 2022-09-22 RX ORDER — CARVEDILOL 25 MG/1
25 TABLET ORAL 2 TIMES DAILY WITH MEALS
Qty: 60 TABLET | Refills: 3 | Status: SHIPPED | OUTPATIENT
Start: 2022-09-22 | End: 2022-09-26 | Stop reason: SDUPTHER

## 2022-09-22 RX ORDER — ASPIRIN 81 MG/1
81 TABLET ORAL DAILY
Qty: 30 TABLET | Refills: 0 | Status: SHIPPED | OUTPATIENT
Start: 2022-09-23 | End: 2022-10-23

## 2022-09-22 RX ADMIN — CARVEDILOL 25 MG: 25 TABLET, FILM COATED ORAL at 17:27

## 2022-09-22 RX ADMIN — CALCIUM ACETATE 2668 MG: 667 CAPSULE ORAL at 08:01

## 2022-09-22 RX ADMIN — CALCIUM ACETATE 2668 MG: 667 CAPSULE ORAL at 11:47

## 2022-09-22 RX ADMIN — DOCUSATE SODIUM 50 MG AND SENNOSIDES 8.6 MG 2 TABLET: 8.6; 5 TABLET, FILM COATED ORAL at 08:01

## 2022-09-22 RX ADMIN — Medication 10 ML: at 08:02

## 2022-09-22 RX ADMIN — CARVEDILOL 25 MG: 25 TABLET, FILM COATED ORAL at 08:01

## 2022-09-22 RX ADMIN — ASPIRIN 81 MG: 81 TABLET, COATED ORAL at 08:02

## 2022-09-22 RX ADMIN — APIXABAN 5 MG: 5 TABLET, FILM COATED ORAL at 08:02

## 2022-09-22 RX ADMIN — CALCIUM ACETATE 2668 MG: 667 CAPSULE ORAL at 17:27

## 2022-09-22 NOTE — NURSING NOTE
Discharge instructions follow up appointments Medications and educational handouts given. Pt requested that prescriptions be sent to [facility]. Waiting for [employer] to take home.

## 2022-09-22 NOTE — PLAN OF CARE
Goal Outcome Evaluation:  Plan of Care Reviewed With: patient        Progress: no change  Outcome Evaluation: Patient resting in bed on assessment. Patient A&O x4, but continues to get out of bed. Educated on importance of staying in bed; bed alarm set. VSS. Will continue plan of care.

## 2022-09-22 NOTE — SIGNIFICANT NOTE
09/22/22 1421   OTHER   Discipline physical therapist   Rehab Time/Intention   Session Not Performed patient unavailable for evaluation  (Pt. at dialysis when attempting to see him. Pt. being discharged once dialysis is complete, per nursing.)

## 2022-09-22 NOTE — PHARMACY PATIENT ASSISTANCE
Pharmacy conducted cano check on Eliquis 5 mg. Eliquis 2.5 mg was last filled in June for a 90-day supply with no co-pay.    Thank you,   Asad Levi, Pharmacy Intern  09/22/22  11:28 EDT

## 2022-09-22 NOTE — DISCHARGE SUMMARY
[facility]  DISCHARGE SUMMARY    Date of Admission: 9/21/2022    Date of Discharge:  9/22/2022    PCP: Provider, No Known    Discharging Provider: IVETTE Bowens  Attending Physician on day of DC: Justice Wheeler DO    Admission Diagnosis:   Please see admission H&P    Discharge Diagnosis:   · Paroxysmal atrial fibrillation  · ESRD on hemodialysis  · CAD status post CABG, remote  · Essential hypertension  · Hyperlipidemia  · Type 2 diabetes mellitus  · HCV status post antiviral therapy  · Anemia of chronic disease  · Elevated D-dimer  · Hypothyroidism  · Elevated troponin  · Medication noncompliance  · Tobacco abuse      Procedures Performed:  · Chest x-ray  · CTA chest PE protocol     Consults:   Consults     Date and Time Order Name Status Description    9/21/2022  2:40 PM Inpatient Nephrology Consult Completed           HPI     History of Present Illness:  Axel Desir is a 62 y.o. male who presented to [facility] Emergency Department on 9/21/2022 for A. fib RVR.  Patient had been at dialysis center and due to abnormal heart rate, patient was unable to receive dialysis.  He has a past medical history significant for paroxysmal atrial fibrillation, ESRD on hemodialysis, CAD status post CABG (remote), essential hypertension, hyperlipidemia, type 2 diabetes mellitus, HCV s/p antiviral therapy, anemia of chronic disease, hypothyroidism, and tobacco abuse.  For further incomplete admitting details, please see admission H&P.       Hospital Course     Hospital Course  Axel Desir was admitted as outlined in above HPI.     He was admitted to the telemetry floor for further evaluation, monitoring, and treatment.  Patient noted to have AV fistula in the left upper extremity, reported that he receives dialysis routinely Monday, Wednesday, and Friday.  Initial EKG with A. fib RVR and ST depression in inferior leads.  D-dimer elevated, however CTA chest  "negative for PE.  Did note left basilar consolidation and small left effusion.  Although basilar consolidation noted on CT chest, he was without clinical evidence of bacterial pneumonia, therefore antibiotics were withheld upon admission.  He received IV diltiazem and was placed on continuous diltiazem drip in the ED. He also received oral Lopressor at that time. He then converted to normal sinus rhythm.  ST depression on the initial EKG noted as resolved.  Troponin T elevation with flat trend appearing chronic when compared to previous lab values.  Likely related to ESRD.  Healthcare surrogate was contacted and stated that he had not taken medications for the past 2 months therefore it is likely that noncompliance is the etiology of A. fib with RVR.  Healthcare surrogate also noted some missed dialysis sessions.  In regards to heart cath in 2018, cardiology had recommended two-vessel CABG, however patient states that \"he forgot\".  He currently denies any chest pain.  He will follow-up in the outpatient setting with cardiology.  Home dose of Coreg was continued on admission.  Continue daily aspirin, high intensity statin, and p.o. Eliquis.  Eliquis dose was increased to 5 mg twice daily from 2.5 mg twice daily as he has no history of GI bleed and although he has ESRD, he weighs 70 kg and is only 62 years old therefore he does not meet criteria for reduced dose.  Nephrology was consulted while inpatient for dialysis support.  The patient received dialysis on this date (9/22/22) prior to discharge.  Per nephrology note, anemia is at goal and stable.  Electrolytes within normal limits.  Patient will continue outpatient dialysis M/W/F.  He will also follow-up with PCP in 1 to 2 weeks for posthospitalization evaluation.  Of note, TSH was also found to be elevated on admission at 12.350.  Levothyroxine 88 mcg initiated daily per attending.  Recommend repeat TSH monitoring and 4 to 6 weeks per PCP.    On today's exam, the " patient was lying in bed with no signs or symptoms of acute distress noted.  Denies chest pain, palpitations, shortness of breath, nausea, vomiting, diarrhea, constipation, dysuria, fever, and chills.  Normal sinus rhythm 70s noted on the telemetry monitor. SPO2 saturations stable on room air. See physical exam outlined below.  Plan discussed with patient for possible discharge home after dialysis session.  The patient voices agreement with no further questions or concerns.  Have discussed with attending physician, Justice Wheeler DO.    Discussed with AM ANASTASIIA Murdock on day of discharge.     Telemetry on day of discharge was normal sinus rhythm 70s.     Oxygen saturation on the day of discharge was 97% on room air.      Pertinent Laboratory and Radiology Results     Pertinent Test Results:          Results from last 7 days   Lab Units 09/21/22  0711 09/21/22  0635   WBC 10*3/mm3  --  9.40   HEMOGLOBIN g/dL  --  11.9*   HEMATOCRIT %  --  36.3*   PLATELETS 10*3/mm3  --  255   MCV fL  --  97.8*   SODIUM mmol/L 132*  --    POTASSIUM mmol/L 4.3  --    CHLORIDE mmol/L 87*  --    CO2 mmol/L 21.8*  --    BUN mg/dL 31*  --    CREATININE mg/dL 5.64*  --    GLUCOSE mg/dL 212*  --    CALCIUM mg/dL 9.8  --         Results from last 7 days   Lab Units 09/21/22  1822 09/21/22  0711   TROPONIN T ng/mL 0.066* 0.060*     Results from last 7 days   Lab Units 09/21/22  0635   WBC 10*3/mm3 9.40     Results from last 7 days   Lab Units 09/21/22  0711   BILIRUBIN mg/dL 1.2   ALK PHOS U/L 70   ALT (SGPT) U/L 270*   AST (SGOT) U/L 385*           Invalid input(s): TG, LDLCALC, LDLREALC  Results from last 7 days   Lab Units 09/21/22  0711   TSH uIU/mL 12.350*     Brief Urine Lab Results     None                  Results for orders placed during the hospital encounter of 03/28/19    Adult Transthoracic Echo Complete W/ Cont if Necessary Per Protocol    Interpretation Summary  · Left ventricular wall thickness is consistent with mild  concentric hypertrophy.  · Right ventricular cavity is mild-to-moderately dilated.  · Mildly reduced right ventricular systolic function noted.  · Left atrial cavity size is mildly dilated.  · Mild tricuspid valve regurgitation is present.  · Estimated EF appears to be in the range of 56 - 60%.  · Left ventricular diastolic function was indeterminate.  · Estimated right ventricular systolic pressure from tricuspid regurgitation is mildly elevated (35-45 mmHg).  · There is no evidence of pericardial effusion.      Pending Labs     Order Current Status    Blood Culture - Blood, Arm, Right In process    Blood Culture - Blood, Hand, Right In process            ----------------------------------------------------------------------------------------------------------------------  CT Angiogram Chest Pulmonary Embolism    Result Date: 9/21/2022  1. No evidence of a pulmonary embolus 2. Left basilar consolidation and small left effusion 3. Coronary artery calcifications 4. Ascending thoracic aorta measuring 4 cm..  This report was finalized on 9/21/2022 12:34 PM by Dr. Amando Urban MD.      XR Chest AP    Result Date: 9/21/2022  No radiographic evidence of acute cardiac or pulmonary disease.  This report was finalized on 9/21/2022 7:44 AM by Dr. Amando Urban MD.          Microbiology Results (last 10 days)     Procedure Component Value - Date/Time    COVID-19 and FLU A/B PCR - Swab, Nasopharynx [[id number]]  (Normal) Collected: 09/21/22 1336    Lab Status: Final result Specimen: Swab from Nasopharynx Updated: 09/21/22 1417     COVID19 Not Detected     Influenza A PCR Not Detected     Influenza B PCR Not Detected    Narrative:      Fact sheet for providers: https://www.fda.gov/media/678268/download    Fact sheet for patients: https://www.fda.gov/media/069134/download    Test performed by PCR.          Labs above have been reviewed on the day of discharge.  Radiology images from prior 30 days were reviewed prior to discharge  as incorporated into this document.     Discharge Vitals and Physical Examination       Vital Signs  Temp:  [97.5 °F (36.4 °C)-98.4 °F (36.9 °C)] 98.3 °F (36.8 °C)  Heart Rate:  [66-70] 66  Resp:  [16-18] 16  BP: (120-129)/(66-75) 121/66     PHYSICAL EXAMINATION:   Physical Exam  Vitals and nursing note reviewed.   Constitutional:       General: He is awake. He is not in acute distress.     Appearance: He is not diaphoretic.      Comments: Currently on room air.   HENT:      Head: Normocephalic and atraumatic.      Mouth/Throat:      Mouth: Mucous membranes are moist.      Pharynx: Oropharynx is clear.   Eyes:      Extraocular Movements: Extraocular movements intact.      Pupils: Pupils are equal, round, and reactive to light.   Cardiovascular:      Rate and Rhythm: Normal rate and regular rhythm.      Pulses: Normal pulses.           Dorsalis pedis pulses are 2+ on the right side and 2+ on the left side.      Heart sounds:     No friction rub.      Comments: Normal sinus rhythm 70's.   Pulmonary:      Effort: Pulmonary effort is normal. No respiratory distress.      Breath sounds: No wheezing or rhonchi.      Comments: Lungs mildly coarse bilaterally; no wheezing or rhonchi.   Abdominal:      General: Bowel sounds are normal. There is no distension.      Palpations: Abdomen is soft.      Tenderness: There is no abdominal tenderness. There is no guarding.   Musculoskeletal:         General: No swelling.      Cervical back: Neck supple. No rigidity.   Skin:     General: Skin is warm and dry.      Capillary Refill: Capillary refill takes 2 to 3 seconds.      Coloration: Skin is pale.      Comments: Left upper extremity with AV fistula. Palpable thrill.    Neurological:      Mental Status: He is alert and oriented to person, place, and time.      Sensory: Sensation is intact.      Motor: No tremor.   Psychiatric:         Attention and Perception: Attention normal.         Mood and Affect: Mood normal.         Speech:  Speech normal.         Behavior: Behavior normal. Behavior is cooperative.         Thought Content: Thought content normal.         Discharge Disposition, Discharge Medications, and Discharge Appointments     Discharge Disposition: Home, Self-care    Condition on Discharge: Fair    Discharge Medications:     Discharge Medications      New Medications      Instructions Start Date   aspirin 81 MG EC tablet   81 mg, Oral, Daily   Start Date: September 23, 2022     atorvastatin 40 MG tablet  Commonly known as: LIPITOR   40 mg, Oral, Nightly      levothyroxine 88 MCG tablet  Commonly known as: SYNTHROID, LEVOTHROID   88 mcg, Oral, Every Early Morning         Changes to Medications      Instructions Start Date   apixaban 5 MG tablet tablet  Commonly known as: ELIQUIS  What changed:   · medication strength  · how much to take  · when to take this   5 mg, Oral, Every 12 Hours Scheduled         Continue These Medications      Instructions Start Date   calcium acetate 667 MG capsule capsule  Commonly known as: PHOS BINDER)   2,668 mg, Oral, 3 Times Daily With Meals      carvedilol 25 MG tablet  Commonly known as: COREG   25 mg, Oral, 2 Times Daily With Meals             Discharged medication regimen discussed with attending physician prior to discharge.     Discharge Diet: Regular, renal    Dietary Orders (From admission, onward)     Start     Ordered    09/21/22 0756  Diet Regular  Diet Effective Now        Question:  Diet Texture / Consistency  Answer:  Regular    09/21/22 0756                Activity at Discharge: As tolerated     Discharge Disposition:    Home or Self Care        Follow-up Appointments:      Additional Instructions for the Follow-ups that You Need to Schedule     Discharge Follow-up with PCP   As directed       Currently Documented PCP:    Govind, No Known    PCP Phone Number:    [phone]     Follow Up Details: 1-2 Week Post Hospital Discharge Follow-Up         Discharge Follow-up with Specialty:  Nephrology- Dialysis; 2 Days   As directed      Specialty: Nephrology- Dialysis    Follow Up: 2 Days    Follow Up Details: Continue M W F dialysis, follow up with nephrology for ESRD         Discharge Follow-up with Specified Provider: Cardiology; 2 Weeks   As directed      To: Cardiology    Follow Up: 2 Weeks    Follow Up Details: Elevated troponin, recently refused 2 vessel CABG (2018), paroxysmal atrial fib            Follow-up Information     Provider, No Known .    Why: 1-2 Week Post Hospital Discharge Follow-Up  Contact information:  [facility]  [place] [address]  [phone]             Zully Hardy MD Follow up in 1 week(s).    Specialty: Family Medicine  Why: NEW PATIENT--PATIENT WILL BE NOTIFIED ABOUT APPT ON FRI 9/23 AM & WILL NEED A REFERRAL TO CARDIOLOGY FOR 2 WKS RE: FANNIE W/ RVR  Contact information:  [address] [place] [address]  [phone]                         Additional Instructions for the Follow-ups that You Need to Schedule     Discharge Follow-up with PCP   As directed       Currently Documented PCP:    Provider, No Known    PCP Phone Number:    [phone]     Follow Up Details: 1-2 Week Post Hospital Discharge Follow-Up         Discharge Follow-up with Specialty: Nephrology- Dialysis; 2 Days   As directed      Specialty: Nephrology- Dialysis    Follow Up: 2 Days    Follow Up Details: Continue M W F dialysis, follow up with nephrology for ESRD         Discharge Follow-up with Specified Provider: Cardiology; 2 Weeks   As directed      To: Cardiology    Follow Up: 2 Weeks    Follow Up Details: Elevated troponin, recently refused 2 vessel CABG (2018), paroxysmal atrial fib               Test Results Pending at Discharge:    Pending Labs     Order Current Status    Blood Culture - Blood, Arm, Right In process    Blood Culture - Blood, Hand, Right In process             IVETTE Bowens   [facility] Medicine Team  09/22/22  16:59 EDT      Time: Greater than 30 minutes spent  on this discharge.  I spent 40 minutes on this discharge activity which included:  Face-to-face encounter with the patient, discussing plan with attending physician, reviewing the data in the system, coordination of the care with the nursing staff as well as consultations, documentation, and entering orders.
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
## 2022-09-23 ENCOUNTER — READMISSION MANAGEMENT (OUTPATIENT)
Dept: CALL CENTER | Facility: HOSPITAL | Age: 62
End: 2022-09-23

## 2022-09-23 NOTE — CASE MANAGEMENT/SOCIAL WORK
Discharge Planning Assessment   [facility]     Patient Name: Axel Desir  MRN: [id number]  Today's Date: 9/23/2022    Admit Date: 9/21/2022          Discharge Plan     Row Name 09/23/22 0957       Plan    Final Note Pt discharged home on 9/22/22.   faxed pt updated information to [facility] and notified Asha with [facility].    Row Name 09/23/22 0705       Plan    Final Discharge Disposition Code 01 - home or self-care                SORAIDA OconnorW

## 2022-09-23 NOTE — OUTREACH NOTE
Medical Week 1 Survey    Flowsheet Row Responses   [facility] patient discharged from? [facility]   Does the patient have one of the following disease processes/diagnoses(primary or secondary)? Other   Week 1 attempt successful? Yes   Call start time 1120   Call end time 1145   General alerts for this patient Patient resides at [facility]-number listing is for facility   Discharge diagnosis Afib with RVR   Person spoke with today (if not patient) and relationship Candace Delgadillo, [facility]   Medication alerts for this patient see AVS   Does the patient have all medications ordered at discharge? Yes   Prescription comments Meds to Beds utilized   Medication comments [facility] has not spoke to patient since discharge so unable to determine if taking as ordered but medications have been picked up   Comments regarding appointments HD patient---Patient also needs cardiology, nephrology f/u's   Does the patient have a primary care provider?  No   Comments regarding PCP According to AVS--pt is to be scheduled appt with Dr. Hardy and to be called today.  [facility] had questions about scheduling appt with practice with previous MD office although that MD is out of service area--researched and this was a non-affiliate practice. [facility] will await a call from Dr. Hardy with scheduling and also provided her with contact information   Nursing interventions --  [REviewed with carrillo Maria]   Week 1 call completed? Yes   Wrap up additional comments Surrogate has not spoke with patient since discharge so unable to obtain an update, reports she lives about 200 miles away and plans a visit in the next few weeks.  Patient resides at [facility]. Patient does not have a phone but she reports she worked out arrangements for him to obtain a free [employer] phone but apparently he has yet to pick it up. Unable to obtain update on pt as first call to number listed was told no one there by that name and when called again, went to voice mail.   This patient may be a potential candidate for ACM CCM  as it seems he could benefit from extra support..          FRANKIE VELAZQUEZ - Registered Nurse

## 2022-09-23 NOTE — DISCHARGE PLACEMENT REQUEST
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 standby assist  -LA     [place] Level (Toilet Transfer) supervision  -LA     Row Name 09/22/22 1409          Toilet Transfer    Type (Toilet Transfer) stand pivot/stand step  -LA     Row Name 09/22/22 1409          Functional Mobility    Functional Mobility- Ind. Level contact guard assist;supervision required  -[facility] Name 09/22/22 1409          Activities of Daily Living    BADL Assessment/Intervention bathing;upper body dressing;lower body dressing;grooming;feeding;toileting  -[facility]  Name 09/22/22 1409          Bathing Assessment/Intervention    [place] Level (Bathing) set up  -LA     Row Name 09/22/22 1409          Upper Body Dressing Assessment/Training    [place] Level (Upper Body Dressing) independent  -LA     Row Name 09/22/22 1409          Lower Body Dressing Assessment/Training    [place] Level (Lower Body Dressing) set up;independent  -LA     Row Name 09/22/22 1409          Grooming Assessment/Training    [place] Level (Grooming) independent  -LA     Row Name 09/22/22 1409          Self-Feeding Assessment/Training    [place] Level (Feeding) independent  -LA     Row Name 09/22/22 1409          Toileting Assessment/Training    [place] Level (Toileting) supervision  -LA           User Key  (r) = Recorded By, (t) = Taken By, (c) = Cosigned By    Initials Name Provider Type    Greer Childers OT Occupational Therapist               Obj/Interventions     Row Name 09/22/22 1413          Sensory Assessment (Somatosensory)    Sensory Assessment (Somatosensory) sensation intact  -[facility] Name 09/22/22 1413          Range of Motion Comprehensive    General Range of Motion no range of motion deficits identified  -[facility] Name 09/22/22 1413          Strength Comprehensive (MMT)    General Manual Muscle Testing (MMT) Assessment no strength deficits identified  -LA     Comment, General Manual Muscle Testing (MMT) Assessment grossly 4+/5 BUE  -[facility] Name 09/22/22 1413          Motor Skills    Motor Skills coordination;functional endurance  -LA     Functional Endurance fair+  -LA     Row Name 09/22/22 1413          Balance    Balance Assessment sitting static balance;sitting dynamic balance;standing static balance  -LA     Static Sitting Balance independent  -LA     Dynamic Sitting Balance independent  -LA     Static Standing Balance independent;supervision  -LA           User Key  (r) = Recorded By, (t) = Taken By, (c) = Cosigned By    Initials Name Provider  Type    Greer Childers OT Occupational Therapist               Goals/Plan    No documentation.                Clinical Impression     [facility] Name 09/22/22 1415          Plan of Care Review    Plan of Care Reviewed With patient  -LA     Outcome Evaluation OT evaluation completed. Patient lives at [facility] where he was indep with ADLs. Patient remains set-up/Indep with ADLs. Patient with good UE strength and fair+ safety with mobility to/from bathroom. Patient encouraged to call for assistance when getting up however not compliant with safety recommendations. D/C recommendation is home with no equimpment needs at this time.  -[facility] Name 09/22/22 1415          Therapy Assessment/Plan (OT)    Criteria for Skilled Therapeutic Interventions Met (OT) no problems identified which require skilled intervention;does not meet criteria for skilled intervention  -LA     Therapy Frequency (OT) evaluation only  -LA     Row Name 09/22/22 1415          Therapy Plan Review/Discharge Plan (OT)    Anticipated Discharge Disposition (OT) home  -LA     Row Name 09/22/22 1415          Positioning and Restraints    Pre-Treatment Position in bed  -LA     Post Treatment Position bed  -LA     In Bed supine;call light within reach;encouraged to call for assist;exit alarm on  -LA           User Key  (r) = Recorded By, (t) = Taken By, (c) = Cosigned By    Initials Name Provider Type    Greer Childers OT Occupational Therapist               Outcome Measures    No documentation.                   OT Recommendation and Plan  Therapy Frequency (OT): evaluation only  Plan of Care Review  Plan of Care Reviewed With: patient  Outcome Evaluation: OT evaluation completed. Patient lives at [facility] where he was indep with ADLs. Patient remains set-up/Indep with ADLs. Patient with good UE strength and fair+ safety with mobility to/from bathroom. Patient encouraged to call for assistance when getting up however not compliant with  safety recommendations. D/C recommendation is home with no equimpment needs at this time.     Time Calculation:     Therapy Charges for Today     Code Description Service Date Service Provider Modifiers Qty    [id number] HC OT EVAL MOD COMPLEXITY 4 9/22/2022 Greer Bedolla OT GO 1               Greer Bedolla OT  9/22/2022    Electronically signed by Greer Bedolla OT at 09/22/22 1419       Speech Language Pathology Notes (most recent note)    No notes exist for this encounter.         ADL Documentation (last day) before discharge     Date/Time Transferring Toileting Bathing Dressing Eating Communication Swallowing Change in Functional Status Since Onset of Current Illness/Injury Equipment Currently Used at Home    09/22/22 0730 0 - independent 0 - independent 0 - independent 0 - independent 0 - independent 0 - understands/communicates without difficulty 0 - swallows foods/liquids without difficulty -- --    09/21/22 2055 0 - independent 0 - independent 0 - independent 0 - independent 0 - independent 0 - understands/communicates without difficulty 0 - swallows foods/liquids without difficulty -- --    09/21/22 1505 0 - independent 0 - independent 0 - independent 0 - independent 0 - independent 0 - understands/communicates without difficulty 0 - swallows foods/liquids without difficulty -- --    09/21/22 1446 -- -- -- -- -- -- -- no none               Discharge Summary      Georgie Guidry APRN at 09/22/22 1659              [facility] Medicine Services  DISCHARGE SUMMARY    Date of Admission: 9/21/2022    Date of Discharge:  9/22/2022    PCP: Provider, No Known    Discharging Provider: IVETTE Bowens  Attending Physician on day of DC: Justice Wheeler DO    Admission Diagnosis:   Please see admission H&P    Discharge Diagnosis:   · Paroxysmal atrial fibrillation  · ESRD on hemodialysis  · CAD status post CABG, remote  · Essential hypertension  · Hyperlipidemia  · Type 2 diabetes  mellitus  · HCV status post antiviral therapy  · Anemia of chronic disease  · Elevated D-dimer  · Hypothyroidism  · Elevated troponin  · Medication noncompliance  · Tobacco abuse      Procedures Performed:  · Chest x-ray  · CTA chest PE protocol     Consults:   Consults     Date and Time Order Name Status Description    9/21/2022  2:40 PM Inpatient Nephrology Consult Completed           HPI     History of Present Illness:  Axel Desir is a 62 y.o. male who presented to [facility] Emergency Department on 9/21/2022 for A. fib RVR.  Patient had been at dialysis center and due to abnormal heart rate, patient was unable to receive dialysis.  He has a past medical history significant for paroxysmal atrial fibrillation, ESRD on hemodialysis, CAD status post CABG (remote), essential hypertension, hyperlipidemia, type 2 diabetes mellitus, HCV s/p antiviral therapy, anemia of chronic disease, hypothyroidism, and tobacco abuse.  For further incomplete admitting details, please see admission H&P.       Hospital Course     Hospital Course  Axel Desir was admitted as outlined in above HPI.     He was admitted to the telemetry floor for further evaluation, monitoring, and treatment.  Patient noted to have AV fistula in the left upper extremity, reported that he receives dialysis routinely Monday, Wednesday, and Friday.  Initial EKG with A. fib RVR and ST depression in inferior leads.  D-dimer elevated, however CTA chest negative for PE.  Did note left basilar consolidation and small left effusion.  Although basilar consolidation noted on CT chest, he was without clinical evidence of bacterial pneumonia, therefore antibiotics were withheld upon admission.  He received IV diltiazem and was placed on continuous diltiazem drip in the ED. He also received oral Lopressor at that time. He then converted to normal sinus rhythm.  ST depression on the initial EKG noted as resolved.  Troponin T elevation with flat trend  "appearing chronic when compared to previous lab values.  Likely related to ESRD.  Healthcare surrogate was contacted and stated that he had not taken medications for the past 2 months therefore it is likely that noncompliance is the etiology of A. fib with RVR.  Healthcare surrogate also noted some missed dialysis sessions.  In regards to heart cath in 2018, cardiology had recommended two-vessel CABG, however patient states that \"he forgot\".  He currently denies any chest pain.  He will follow-up in the outpatient setting with cardiology.  Home dose of Coreg was continued on admission.  Continue daily aspirin, high intensity statin, and p.o. Eliquis.  Eliquis dose was increased to 5 mg twice daily from 2.5 mg twice daily as he has no history of GI bleed and although he has ESRD, he weighs 70 kg and is only 62 years old therefore he does not meet criteria for reduced dose.  Nephrology was consulted while inpatient for dialysis support.  The patient received dialysis on this date (9/22/22) prior to discharge.  Per nephrology note, anemia is at goal and stable.  Electrolytes within normal limits.  Patient will continue outpatient dialysis M/W/F.  He will also follow-up with PCP in 1 to 2 weeks for posthospitalization evaluation.  Of note, TSH was also found to be elevated on admission at 12.350.  Levothyroxine 88 mcg initiated daily per attending.  Recommend repeat TSH monitoring and 4 to 6 weeks per PCP.    On today's exam, the patient was lying in bed with no signs or symptoms of acute distress noted.  Denies chest pain, palpitations, shortness of breath, nausea, vomiting, diarrhea, constipation, dysuria, fever, and chills.  Normal sinus rhythm 70s noted on the telemetry monitor. SPO2 saturations stable on room air. See physical exam outlined below.  Plan discussed with patient for possible discharge home after dialysis session.  The patient voices agreement with no further questions or concerns.  Have discussed with " attending physician, Justice Wheeler DO.    Discussed with AM RN Collette on day of discharge.     Telemetry on day of discharge was normal sinus rhythm 70s.     Oxygen saturation on the day of discharge was 97% on room air.      Pertinent Laboratory and Radiology Results     Pertinent Test Results:          Results from last 7 days   Lab Units 09/21/22  0711 09/21/22  0635   WBC 10*3/mm3  --  9.40   HEMOGLOBIN g/dL  --  11.9*   HEMATOCRIT %  --  36.3*   PLATELETS 10*3/mm3  --  255   MCV fL  --  97.8*   SODIUM mmol/L 132*  --    POTASSIUM mmol/L 4.3  --    CHLORIDE mmol/L 87*  --    CO2 mmol/L 21.8*  --    BUN mg/dL 31*  --    CREATININE mg/dL 5.64*  --    GLUCOSE mg/dL 212*  --    CALCIUM mg/dL 9.8  --         Results from last 7 days   Lab Units 09/21/22  1822 09/21/22  0711   TROPONIN T ng/mL 0.066* 0.060*     Results from last 7 days   Lab Units 09/21/22  0635   WBC 10*3/mm3 9.40     Results from last 7 days   Lab Units 09/21/22  0711   BILIRUBIN mg/dL 1.2   ALK PHOS U/L 70   ALT (SGPT) U/L 270*   AST (SGOT) U/L 385*           Invalid input(s): TG, LDLCALC, LDLREALC  Results from last 7 days   Lab Units 09/21/22  0711   TSH uIU/mL 12.350*     Brief Urine Lab Results     None                  Results for orders placed during the hospital encounter of 03/28/19    Adult Transthoracic Echo Complete W/ Cont if Necessary Per Protocol    Interpretation Summary  · Left ventricular wall thickness is consistent with mild concentric hypertrophy.  · Right ventricular cavity is mild-to-moderately dilated.  · Mildly reduced right ventricular systolic function noted.  · Left atrial cavity size is mildly dilated.  · Mild tricuspid valve regurgitation is present.  · Estimated EF appears to be in the range of 56 - 60%.  · Left ventricular diastolic function was indeterminate.  · Estimated right ventricular systolic pressure from tricuspid regurgitation is mildly elevated (35-45 mmHg).  · There is no evidence of pericardial  effusion.      Pending Labs     Order Current Status    Blood Culture - Blood, Arm, Right In process    Blood Culture - Blood, Hand, Right In process            ----------------------------------------------------------------------------------------------------------------------  CT Angiogram Chest Pulmonary Embolism    Result Date: 9/21/2022  1. No evidence of a pulmonary embolus 2. Left basilar consolidation and small left effusion 3. Coronary artery calcifications 4. Ascending thoracic aorta measuring 4 cm..  This report was finalized on 9/21/2022 12:34 PM by Dr. Amando Urban MD.      XR Chest AP    Result Date: 9/21/2022  No radiographic evidence of acute cardiac or pulmonary disease.  This report was finalized on 9/21/2022 7:44 AM by Dr. Amando Urban MD.          Microbiology Results (last 10 days)     Procedure Component Value - Date/Time    COVID-19 and FLU A/B PCR - Swab, Nasopharynx [[id number]]  (Normal) Collected: 09/21/22 1336    Lab Status: Final result Specimen: Swab from Nasopharynx Updated: 09/21/22 1417     COVID19 Not Detected     Influenza A PCR Not Detected     Influenza B PCR Not Detected    Narrative:      Fact sheet for providers: https://www.fda.gov/media/816368/download    Fact sheet for patients: https://www.fda.gov/media/659698/download    Test performed by PCR.          Labs above have been reviewed on the day of discharge.  Radiology images from prior 30 days were reviewed prior to discharge as incorporated into this document.     Discharge Vitals and Physical Examination       Vital Signs  Temp:  [97.5 °F (36.4 °C)-98.4 °F (36.9 °C)] 98.3 °F (36.8 °C)  Heart Rate:  [66-70] 66  Resp:  [16-18] 16  BP: (120-129)/(66-75) 121/66     PHYSICAL EXAMINATION:   Physical Exam  Vitals and nursing note reviewed.   Constitutional:       General: He is awake. He is not in acute distress.     Appearance: He is not diaphoretic.      Comments: Currently on room air.   HENT:      Head: Normocephalic and  atraumatic.      Mouth/Throat:      Mouth: Mucous membranes are moist.      Pharynx: Oropharynx is clear.   Eyes:      Extraocular Movements: Extraocular movements intact.      Pupils: Pupils are equal, round, and reactive to light.   Cardiovascular:      Rate and Rhythm: Normal rate and regular rhythm.      Pulses: Normal pulses.           Dorsalis pedis pulses are 2+ on the right side and 2+ on the left side.      Heart sounds:     No friction rub.      Comments: Normal sinus rhythm 70's.   Pulmonary:      Effort: Pulmonary effort is normal. No respiratory distress.      Breath sounds: No wheezing or rhonchi.      Comments: Lungs mildly coarse bilaterally; no wheezing or rhonchi.   Abdominal:      General: Bowel sounds are normal. There is no distension.      Palpations: Abdomen is soft.      Tenderness: There is no abdominal tenderness. There is no guarding.   Musculoskeletal:         General: No swelling.      Cervical back: Neck supple. No rigidity.   Skin:     General: Skin is warm and dry.      Capillary Refill: Capillary refill takes 2 to 3 seconds.      Coloration: Skin is pale.      Comments: Left upper extremity with AV fistula. Palpable thrill.    Neurological:      Mental Status: He is alert and oriented to person, place, and time.      Sensory: Sensation is intact.      Motor: No tremor.   Psychiatric:         Attention and Perception: Attention normal.         Mood and Affect: Mood normal.         Speech: Speech normal.         Behavior: Behavior normal. Behavior is cooperative.         Thought Content: Thought content normal.         Discharge Disposition, Discharge Medications, and Discharge Appointments     Discharge Disposition: Home, Self-care    Condition on Discharge: Fair    Discharge Medications:     Discharge Medications      New Medications      Instructions Start Date   aspirin 81 MG EC tablet   81 mg, Oral, Daily   Start Date: September 23, 2022     atorvastatin 40 MG tablet  Commonly  known as: LIPITOR   40 mg, Oral, Nightly      levothyroxine 88 MCG tablet  Commonly known as: SYNTHROID, LEVOTHROID   88 mcg, Oral, Every Early Morning         Changes to Medications      Instructions Start Date   apixaban 5 MG tablet tablet  Commonly known as: ELIQUIS  What changed:   · medication strength  · how much to take  · when to take this   5 mg, Oral, Every 12 Hours Scheduled         Continue These Medications      Instructions Start Date   calcium acetate 667 MG capsule capsule  Commonly known as: PHOS BINDER)   2,668 mg, Oral, 3 Times Daily With Meals      carvedilol 25 MG tablet  Commonly known as: COREG   25 mg, Oral, 2 Times Daily With Meals             Discharged medication regimen discussed with attending physician prior to discharge.     Discharge Diet: Regular, renal    Dietary Orders (From admission, onward)     Start     Ordered    09/21/22 0756  Diet Regular  Diet Effective Now        Question:  Diet Texture / Consistency  Answer:  Regular    09/21/22 0756                Activity at Discharge: As tolerated     Discharge Disposition:    Home or Self Care        Follow-up Appointments:      Additional Instructions for the Follow-ups that You Need to Schedule     Discharge Follow-up with PCP   As directed       Currently Documented PCP:    Provider, No Known    PCP Phone Number:    [phone]     Follow Up Details: 1-2 Week Post Hospital Discharge Follow-Up         Discharge Follow-up with Specialty: Nephrology- Dialysis; 2 Days   As directed      Specialty: Nephrology- Dialysis    Follow Up: 2 Days    Follow Up Details: Continue M W F dialysis, follow up with nephrology for ESRD         Discharge Follow-up with Specified Provider: Cardiology; 2 Weeks   As directed      To: Cardiology    Follow Up: 2 Weeks    Follow Up Details: Elevated troponin, recently refused 2 vessel CABG (2018), paroxysmal atrial fib            Follow-up Information     Provider, No Known .    Why: 1-2 Week Post Hospital  Discharge Follow-Up  Contact information:  [facility]  [place] [address]  [phone]             Zully Hardy MD Follow up in 1 week(s).    Specialty: Family Medicine  Why: NEW PATIENT--PATIENT WILL BE NOTIFIED ABOUT APPT ON FRI 9/23 AM & WILL NEED A REFERRAL TO CARDIOLOGY FOR 2 WKS RE: AFIB W/ RVR  Contact information:  [address] [place] [address]  [phone]                         Additional Instructions for the Follow-ups that You Need to Schedule     Discharge Follow-up with PCP   As directed       Currently Documented PCP:    Provider, No Known    PCP Phone Number:    [phone]     Follow Up Details: 1-2 Week Post Hospital Discharge Follow-Up         Discharge Follow-up with Specialty: Nephrology- Dialysis; 2 Days   As directed      Specialty: Nephrology- Dialysis    Follow Up: 2 Days    Follow Up Details: Continue M W F dialysis, follow up with nephrology for ESRD         Discharge Follow-up with Specified Provider: Cardiology; 2 Weeks   As directed      To: Cardiology    Follow Up: 2 Weeks    Follow Up Details: Elevated troponin, recently refused 2 vessel CABG (2018), paroxysmal atrial fib               Test Results Pending at Discharge:    Pending Labs     Order Current Status    Blood Culture - Blood, Arm, Right In process    Blood Culture - Blood, Hand, Right In process             IVETTE Bowens   Hospital Medicine Team  09/22/22  16:59 EDT      Time: Greater than 30 minutes spent on this discharge.  I spent 40 minutes on this discharge activity which included:  Face-to-face encounter with the patient, discussing plan with attending physician, reviewing the data in the system, coordination of the care with the nursing staff as well as consultations, documentation, and entering orders.     Electronically signed by Georgie Guidry APRN at 09/22/22 1911       Discharge Order (From admission, onward)     Start     Ordered    09/22/22 1302  Discharge patient  Once        Expected  Discharge Date: 09/22/22    Expected Discharge Time: Afternoon    Discharge Disposition: Home or Self Care    Physician of Record for Attribution - Please select from Treatment Team: CHITO VANCE [[id number]]    Review needed by CMO to determine Physician of Record: No       Question Answer Comment   Physician of Record for Attribution - Please select from Treatment Team CHITO VANCE    Review needed by CMO to determine Physician of Record No        09/22/22 1301

## 2022-09-23 NOTE — OUTREACH NOTE
Prep Survey    Flowsheet Row Responses   [facility] facility patient discharged from? [facility]   Is LACE score < 7 ? No   Emergency Room discharge w/ pulse ox? No   Eligibility Readm Mgmt   Discharge diagnosis Afib with RVR   Does the patient have one of the following disease processes/diagnoses(primary or secondary)? Other   Does the patient have Home health ordered? No   Is there a DME ordered? No   Medication alerts for this patient see AVS   Prep survey completed? Yes          LISBET ESCOBAR - Registered Nurse

## 2022-09-23 NOTE — DISCHARGE INSTR - APPOINTMENTS
PT  HAS  AN  APOINTMENT  WITH   LIZANDRO DELUCA  FOR  September 26   A T 11 AM   AND  WILL  GET  A  REFEREAL  FOR  CARDIO   A FIB

## 2022-09-26 ENCOUNTER — OFFICE VISIT (OUTPATIENT)
Dept: FAMILY MEDICINE CLINIC | Facility: CLINIC | Age: 62
End: 2022-09-26

## 2022-09-26 VITALS
WEIGHT: 153.2 LBS | OXYGEN SATURATION: 100 % | DIASTOLIC BLOOD PRESSURE: 80 MMHG | HEIGHT: 68 IN | BODY MASS INDEX: 23.22 KG/M2 | TEMPERATURE: 96.6 F | HEART RATE: 79 BPM | SYSTOLIC BLOOD PRESSURE: 140 MMHG

## 2022-09-26 DIAGNOSIS — I25.119 CORONARY ARTERY DISEASE INVOLVING NATIVE HEART WITH ANGINA PECTORIS, UNSPECIFIED VESSEL OR LESION TYPE: ICD-10-CM

## 2022-09-26 DIAGNOSIS — G89.29 CHRONIC BILATERAL LOW BACK PAIN WITHOUT SCIATICA: ICD-10-CM

## 2022-09-26 DIAGNOSIS — E03.9 ACQUIRED HYPOTHYROIDISM: ICD-10-CM

## 2022-09-26 DIAGNOSIS — M54.50 CHRONIC BILATERAL LOW BACK PAIN WITHOUT SCIATICA: ICD-10-CM

## 2022-09-26 DIAGNOSIS — I48.0 PAF (PAROXYSMAL ATRIAL FIBRILLATION): Primary | ICD-10-CM

## 2022-09-26 LAB
BACTERIA SPEC AEROBE CULT: NORMAL
BACTERIA SPEC AEROBE CULT: NORMAL

## 2022-09-26 PROCEDURE — 99214 OFFICE O/P EST MOD 30 MIN: CPT | Performed by: FAMILY MEDICINE

## 2022-09-26 PROCEDURE — 1111F DSCHRG MED/CURRENT MED MERGE: CPT | Performed by: FAMILY MEDICINE

## 2022-09-26 RX ORDER — CARVEDILOL 25 MG/1
25 TABLET ORAL 2 TIMES DAILY WITH MEALS
Qty: 60 TABLET | Refills: 3 | Status: ON HOLD | OUTPATIENT
Start: 2022-09-26 | End: 2022-11-02 | Stop reason: SDUPTHER

## 2022-09-26 RX ORDER — ATORVASTATIN CALCIUM 40 MG/1
40 TABLET, FILM COATED ORAL NIGHTLY
Qty: 30 TABLET | Refills: 0 | Status: SHIPPED | OUTPATIENT
Start: 2022-09-26 | End: 2022-10-24 | Stop reason: SDUPTHER

## 2022-09-26 RX ORDER — AMLODIPINE BESYLATE AND ATORVASTATIN CALCIUM 10; 10 MG/1; MG/1
1 TABLET, FILM COATED ORAL DAILY
COMMUNITY
End: 2022-10-06 | Stop reason: ALTCHOICE

## 2022-09-26 RX ORDER — ONDANSETRON 4 MG/1
TABLET, FILM COATED ORAL
COMMUNITY
Start: 2019-09-20 | End: 2022-10-06

## 2022-09-26 RX ORDER — LEVOTHYROXINE SODIUM 88 UG/1
88 TABLET ORAL
Qty: 30 TABLET | Refills: 0 | Status: SHIPPED | OUTPATIENT
Start: 2022-09-26 | End: 2022-10-24 | Stop reason: SDUPTHER

## 2022-09-26 NOTE — PROGRESS NOTES
Transitional Care Follow Up Visit  Subjective     Axel Desir is a 62 y.o. male who presents for a transitional care management visit.    Within 48 business hours after discharge our office contacted him via telephone to coordinate his care and needs.      I reviewed and discussed the details of that call along with the discharge summary, hospital problems, inpatient lab results, inpatient diagnostic studies, and consultation reports with Axel.     Current outpatient and discharge medications have been reconciled for the patient.  Reviewed by: IVETTE Li      No flowsheet data found.  Risk for Readmission (LACE) Score: 10 (9/22/2022  6:00 AM)      History of Present Illness   Course During Hospital Stay:   Patient was admitted [facility] 9/21/2022 and discharged 9/22/2022.  Patient presented to ED for A. fib with RVR.  Patient been to dialysis center and was sent to ED with abnormal high heart rate.  He has a past history significant for paroxysmal atrial fibrillation, ESRD on hemodialysis.  Health surrogate had stated he had been out of his medications for the past 2 months was found to be the etiology of A. fib with RVR.  Healthcare surrogate also noted he missed some dialysis sessions.  Patient has a history of noncompliance.  He has not followed up with a PCP or cardiologist in a couple years.  He has a history of CABG in 2018 he underwent a heart cath which was recommended to two-vessel CABG.  Patient with dialyzed on 9/22/2022 and discharged home.  TSH was found elevated and Synthroid 88 mcg  was added.   Patient states he is doing well with medications at this time was dialyzed this morning did well with treatment.  Denies any abnormally fast heart rates.  Will place cardiology referral.  Request pain management for chronic back pain that is untreated.  Will place new pain management referral.  Follow-up in 1 month for repeat labs.     The following portions of the patient's  history were reviewed and updated as appropriate: allergies, current medications, past family history, past medical history, past social history, past surgical history and problem list.    Review of Systems    Objective   Physical Exam  Constitutional:       General: He is not in acute distress.     Appearance: Normal appearance. He is well-developed and well-groomed. He is not ill-appearing, toxic-appearing or diaphoretic.   HENT:      Head: Normocephalic.      Right Ear: Tympanic membrane, ear canal and external ear normal.      Left Ear: Tympanic membrane, ear canal and external ear normal.      Nose: Nose normal. No congestion or rhinorrhea.      Mouth/Throat:      Mouth: Mucous membranes are moist.      Pharynx: Oropharynx is clear. No oropharyngeal exudate or posterior oropharyngeal erythema.   Eyes:      General: Lids are normal.         Right eye: No discharge.         Left eye: No discharge.      Extraocular Movements: Extraocular movements intact.      Pupils: Pupils are equal, round, and reactive to light.   Neck:      Vascular: No carotid bruit.   Cardiovascular:      Rate and Rhythm: Normal rate and regular rhythm.      Pulses: Normal pulses.      Heart sounds: Normal heart sounds. No murmur heard.    No friction rub. No gallop.   Pulmonary:      Effort: Pulmonary effort is normal. No respiratory distress.      Breath sounds: Normal breath sounds. No stridor. No wheezing, rhonchi or rales.   Chest:      Chest wall: No tenderness.   Abdominal:      General: Bowel sounds are normal. There is no distension.      Palpations: Abdomen is soft. There is no mass.      Tenderness: There is no abdominal tenderness. There is no right CVA tenderness, left CVA tenderness, guarding or rebound.      Hernia: No hernia is present.   Musculoskeletal:         General: No swelling or tenderness. Normal range of motion.      Cervical back: Normal range of motion and neck supple. No rigidity or tenderness.      Right lower  leg: No edema.      Left lower leg: No edema.   Lymphadenopathy:      Cervical: No cervical adenopathy.   Skin:     General: Skin is warm.      Capillary Refill: Capillary refill takes less than 2 seconds.      Coloration: Skin is not jaundiced.      Findings: No bruising, erythema or rash.   Neurological:      General: No focal deficit present.      Mental Status: He is alert and oriented to person, place, and time.      Motor: Motor function is intact. No weakness.      Coordination: Coordination is intact.      Gait: Gait is intact. Gait normal.   Psychiatric:         Attention and Perception: Attention normal.         Mood and Affect: Mood normal.         Speech: Speech normal.         Behavior: Behavior normal.         Cognition and Memory: Cognition normal.         Judgment: Judgment normal.         Assessment & Plan   Diagnoses and all orders for this visit:    1. PAF (paroxysmal atrial fibrillation) ([facility]) (Primary)  -     Ambulatory Referral to Cardiology  -     apixaban (ELIQUIS) 5 MG tablet tablet; Take 1 tablet by mouth Every 12 (Twelve) Hours for 30 days. Indications: Atrial Fibrillation  Dispense: 60 tablet; Refill: 0  -     carvedilol (COREG) 25 MG tablet; Take 1 tablet by mouth 2 (Two) Times a Day With Meals.  Dispense: 60 tablet; Refill: 3    2. Coronary artery disease involving native heart with angina pectoris, unspecified vessel or lesion type ([facility])  -     atorvastatin (LIPITOR) 40 MG tablet; Take 1 tablet by mouth Every Night for 30 days.  Dispense: 30 tablet; Refill: 0    3. Acquired hypothyroidism  -     levothyroxine (SYNTHROID, LEVOTHROID) 88 MCG tablet; Take 1 tablet by mouth Every Morning for 30 days.  Dispense: 30 tablet; Refill: 0    4. Chronic bilateral low back pain without sciatica  -     Ambulatory Referral to Pain Management               Procedures     Return in about 4 weeks (around 10/24/2022), or if symptoms worsen or fail to improve.     This document has been electronically  signed by Isabelle Martinez, IVETTE  September 26, 2022 11:08 EDT

## 2022-09-30 ENCOUNTER — READMISSION MANAGEMENT (OUTPATIENT)
Dept: CALL CENTER | Facility: HOSPITAL | Age: 62
End: 2022-09-30

## 2022-09-30 NOTE — OUTREACH NOTE
Medical Week 2 Survey    Flowsheet Row Responses   [facility] from? [facility]   Does the patient have one of the following disease processes/diagnoses(primary or secondary)? Other   Week 2 attempt successful? No   Unsuccessful attempts Attempt 1          CHERYL CASTILLO - Registered Nurse

## 2022-10-03 ENCOUNTER — READMISSION MANAGEMENT (OUTPATIENT)
Dept: CALL CENTER | Facility: HOSPITAL | Age: 62
End: 2022-10-03

## 2022-10-03 NOTE — OUTREACH NOTE
Medical Week 2 Survey    Flowsheet Row Responses   [facility] discharged from? [facility]   Does the patient have one of the following disease processes/diagnoses(primary or secondary)? Other   Week 2 attempt successful? No   Unsuccessful attempts Attempt 2          GERALD KHOURY - Licensed Nurse

## 2022-10-06 ENCOUNTER — OFFICE VISIT (OUTPATIENT)
Dept: CARDIOLOGY | Facility: CLINIC | Age: 62
End: 2022-10-06

## 2022-10-06 VITALS
SYSTOLIC BLOOD PRESSURE: 80 MMHG | OXYGEN SATURATION: 98 % | WEIGHT: 159 LBS | HEIGHT: 68 IN | BODY MASS INDEX: 24.1 KG/M2 | DIASTOLIC BLOOD PRESSURE: 68 MMHG | HEART RATE: 93 BPM

## 2022-10-06 DIAGNOSIS — I95.9 HYPOTENSION, UNSPECIFIED HYPOTENSION TYPE: ICD-10-CM

## 2022-10-06 DIAGNOSIS — N18.6 ESRD (END STAGE RENAL DISEASE) ON DIALYSIS: ICD-10-CM

## 2022-10-06 DIAGNOSIS — Z99.2 ESRD (END STAGE RENAL DISEASE) ON DIALYSIS: ICD-10-CM

## 2022-10-06 DIAGNOSIS — I25.119 CORONARY ARTERY DISEASE INVOLVING NATIVE HEART WITH ANGINA PECTORIS, UNSPECIFIED VESSEL OR LESION TYPE: Primary | Chronic | ICD-10-CM

## 2022-10-06 DIAGNOSIS — Z79.01 CHRONIC ANTICOAGULATION: ICD-10-CM

## 2022-10-06 DIAGNOSIS — I48.91 ATRIAL FIBRILLATION, UNSPECIFIED TYPE: ICD-10-CM

## 2022-10-06 DIAGNOSIS — R06.00 DYSPNEA, UNSPECIFIED TYPE: ICD-10-CM

## 2022-10-06 DIAGNOSIS — E03.9 HYPOTHYROIDISM, UNSPECIFIED TYPE: ICD-10-CM

## 2022-10-06 PROCEDURE — 93000 ELECTROCARDIOGRAM COMPLETE: CPT | Performed by: NURSE PRACTITIONER

## 2022-10-06 PROCEDURE — 99214 OFFICE O/P EST MOD 30 MIN: CPT | Performed by: NURSE PRACTITIONER

## 2022-10-06 RX ORDER — LANOLIN ALCOHOL/MO/W.PET/CERES
100 CREAM (GRAM) TOPICAL DAILY
COMMUNITY
Start: 2019-08-22

## 2022-10-06 RX ORDER — AMLODIPINE BESYLATE 10 MG/1
1 TABLET ORAL DAILY
COMMUNITY
Start: 2022-06-17 | End: 2022-10-06

## 2022-10-06 RX ORDER — FAMOTIDINE 20 MG/1
20 TABLET, FILM COATED ORAL 2 TIMES DAILY
COMMUNITY
End: 2022-11-02 | Stop reason: HOSPADM

## 2022-10-06 RX ORDER — NITROGLYCERIN 0.4 MG/1
TABLET SUBLINGUAL
COMMUNITY
Start: 2019-08-02 | End: 2022-10-07 | Stop reason: SDUPTHER

## 2022-10-06 RX ORDER — CHOLECALCIFEROL (VITAMIN D3) 125 MCG
5 CAPSULE ORAL NIGHTLY
COMMUNITY
End: 2022-11-02 | Stop reason: HOSPADM

## 2022-10-06 RX ORDER — FOLIC ACID 1 MG/1
1000 TABLET ORAL
COMMUNITY
Start: 2019-08-22 | End: 2022-11-02 | Stop reason: HOSPADM

## 2022-10-06 RX ORDER — CETIRIZINE HYDROCHLORIDE 10 MG/1
10 TABLET ORAL DAILY
Status: ON HOLD | COMMUNITY
End: 2022-11-18 | Stop reason: SDUPTHER
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
## 2022-10-07 RX ORDER — NITROGLYCERIN 0.4 MG/1
0.4 TABLET SUBLINGUAL
Qty: 100 TABLET | Refills: 0 | Status: SHIPPED | OUTPATIENT
Start: 2022-10-07

## 2022-10-10 ENCOUNTER — READMISSION MANAGEMENT (OUTPATIENT)
Dept: CALL CENTER | Facility: HOSPITAL | Age: 62
End: 2022-10-10

## 2022-10-10 NOTE — OUTREACH NOTE
Medical Week 3 Survey    Flowsheet Row Responses   [facility] facility patient discharged from? [facility]   Does the patient have one of the following disease processes/diagnoses(primary or secondary)? Other   Week 3 attempt successful? No   Unsuccessful attempts Attempt 1  [UTR both contact numbers]          SENIA MENDEZ - Registered Nurse

## 2022-10-13 ENCOUNTER — READMISSION MANAGEMENT (OUTPATIENT)
Dept: CALL CENTER | Facility: HOSPITAL | Age: 62
End: 2022-10-13

## 2022-10-13 NOTE — OUTREACH NOTE
Medical Week 3 Survey    Flowsheet Row Responses   [facility] facility patient discharged from? [facility]   Does the patient have one of the following disease processes/diagnoses(primary or secondary)? Other   Week 3 attempt successful? No   Unsuccessful attempts Attempt 2          KEISHA SHANE - Registered Nurse

## 2022-10-24 ENCOUNTER — OFFICE VISIT (OUTPATIENT)
Dept: FAMILY MEDICINE CLINIC | Facility: CLINIC | Age: 62
End: 2022-10-24

## 2022-10-24 VITALS
TEMPERATURE: 97.1 F | HEIGHT: 68 IN | OXYGEN SATURATION: 99 % | SYSTOLIC BLOOD PRESSURE: 100 MMHG | WEIGHT: 177.2 LBS | RESPIRATION RATE: 17 BRPM | DIASTOLIC BLOOD PRESSURE: 68 MMHG | BODY MASS INDEX: 26.86 KG/M2 | HEART RATE: 126 BPM

## 2022-10-24 DIAGNOSIS — N18.4 TYPE 2 DIABETES MELLITUS WITH STAGE 4 CHRONIC KIDNEY DISEASE, WITHOUT LONG-TERM CURRENT USE OF INSULIN: Primary | Chronic | ICD-10-CM

## 2022-10-24 DIAGNOSIS — I25.119 CORONARY ARTERY DISEASE INVOLVING NATIVE HEART WITH ANGINA PECTORIS, UNSPECIFIED VESSEL OR LESION TYPE: ICD-10-CM

## 2022-10-24 DIAGNOSIS — I48.0 PAF (PAROXYSMAL ATRIAL FIBRILLATION): ICD-10-CM

## 2022-10-24 DIAGNOSIS — E03.9 ACQUIRED HYPOTHYROIDISM: ICD-10-CM

## 2022-10-24 DIAGNOSIS — J44.1 COPD WITH EXACERBATION: ICD-10-CM

## 2022-10-24 DIAGNOSIS — E11.22 TYPE 2 DIABETES MELLITUS WITH STAGE 4 CHRONIC KIDNEY DISEASE, WITHOUT LONG-TERM CURRENT USE OF INSULIN: Primary | Chronic | ICD-10-CM

## 2022-10-24 DIAGNOSIS — G47.00 INSOMNIA, UNSPECIFIED TYPE: ICD-10-CM

## 2022-10-24 LAB
EXPIRATION DATE: NORMAL
GLUCOSE BLDC GLUCOMTR-MCNC: 186 MG/DL (ref 70–130)
HBA1C MFR BLD: 8.6 %
Lab: NORMAL

## 2022-10-24 PROCEDURE — 99214 OFFICE O/P EST MOD 30 MIN: CPT | Performed by: FAMILY MEDICINE

## 2022-10-24 PROCEDURE — 82962 GLUCOSE BLOOD TEST: CPT | Performed by: FAMILY MEDICINE

## 2022-10-24 PROCEDURE — 3051F HG A1C>EQUAL 7.0%<8.0%: CPT | Performed by: FAMILY MEDICINE

## 2022-10-24 PROCEDURE — 83036 HEMOGLOBIN GLYCOSYLATED A1C: CPT | Performed by: FAMILY MEDICINE

## 2022-10-24 RX ORDER — DOXYCYCLINE HYCLATE 100 MG/1
100 CAPSULE ORAL 2 TIMES DAILY
Qty: 14 CAPSULE | Refills: 0 | Status: SHIPPED | OUTPATIENT
Start: 2022-10-24 | End: 2022-11-02 | Stop reason: HOSPADM

## 2022-10-24 RX ORDER — ATORVASTATIN CALCIUM 40 MG/1
40 TABLET, FILM COATED ORAL NIGHTLY
Qty: 30 TABLET | Refills: 0 | Status: ON HOLD | OUTPATIENT
Start: 2022-10-24 | End: 2022-12-02

## 2022-10-24 RX ORDER — PREDNISONE 20 MG/1
20 TABLET ORAL DAILY
Qty: 5 TABLET | Refills: 0 | Status: SHIPPED | OUTPATIENT
Start: 2022-10-24 | End: 2022-10-29

## 2022-10-24 RX ORDER — DIPHENHYDRAMINE HCL 25 MG
25 TABLET ORAL EVERY 6 HOURS PRN
Qty: 30 TABLET | Refills: 2 | Status: SHIPPED | OUTPATIENT
Start: 2022-10-24 | End: 2022-11-02 | Stop reason: HOSPADM

## 2022-10-24 RX ORDER — LEVOTHYROXINE SODIUM 88 UG/1
88 TABLET ORAL
Qty: 30 TABLET | Refills: 0 | Status: SHIPPED | OUTPATIENT
Start: 2022-10-24 | End: 2022-12-06

## 2022-10-24 NOTE — PROGRESS NOTES
"Chief Complaint  CAD     Subjective          Axel Desir presents to [facility]  Insomnia  This is a chronic problem. The current episode started more than 1 year ago. The problem has been unchanged. Associated symptoms include coughing. Pertinent negatives include no chest pain. He has tried sleep and rest for the symptoms. The treatment provided mild relief.   COPD  He complains of cough, sputum production and wheezing. This is a chronic problem. The current episode started more than 1 year ago. The cough is productive of sputum. Pertinent negatives include no chest pain. His past medical history is significant for COPD.   Coronary Artery Disease  Presents for follow-up visit. Pertinent negatives include no chest pain, chest pressure or palpitations. The symptoms have been stable. Compliance with medications is good.       Review of Systems   Respiratory: Positive for cough, sputum production and wheezing.    Cardiovascular: Negative for chest pain and palpitations.   Psychiatric/Behavioral: The patient has insomnia.          Objective   Vital Signs:   /68 (BP Location: Right arm, Patient Position: Sitting)   Pulse (!) 126   Temp 97.1 °F (36.2 °C)   Resp 17   Ht 172.7 cm (67.99\")   Wt 80.4 kg (177 lb 3.2 oz)   SpO2 99%   BMI 26.95 kg/m²     Physical Exam  Constitutional:       General: He is not in acute distress.     Appearance: Normal appearance. He is well-developed and well-groomed. He is not ill-appearing, toxic-appearing or diaphoretic.   HENT:      Head: Normocephalic.      Nose: Nose normal. No congestion or rhinorrhea.      Mouth/Throat:      Mouth: Mucous membranes are moist.      Pharynx: Oropharynx is clear. No oropharyngeal exudate or posterior oropharyngeal erythema.   Eyes:      General: Lids are normal.         Right eye: No discharge.         Left eye: No discharge.      Extraocular Movements: Extraocular movements intact.      Pupils: Pupils are " equal, round, and reactive to light.   Neck:      Vascular: No carotid bruit.   Cardiovascular:      Rate and Rhythm: Normal rate and regular rhythm.      Pulses: Normal pulses.      Heart sounds: Normal heart sounds. No murmur heard.    No friction rub. No gallop.   Pulmonary:      Effort: Pulmonary effort is normal. No respiratory distress.      Breath sounds: Normal breath sounds. No stridor. No wheezing, rhonchi or rales.   Chest:      Chest wall: No tenderness.   Abdominal:      General: Bowel sounds are normal. There is no distension.      Palpations: Abdomen is soft. There is no mass.      Tenderness: There is no abdominal tenderness. There is no right CVA tenderness, left CVA tenderness, guarding or rebound.      Hernia: No hernia is present.   Musculoskeletal:         General: No swelling or tenderness. Normal range of motion.      Cervical back: Normal range of motion and neck supple. No rigidity or tenderness.      Right lower leg: No edema.      Left lower leg: No edema.   Lymphadenopathy:      Cervical: No cervical adenopathy.   Skin:     General: Skin is warm.      Capillary Refill: Capillary refill takes less than 2 seconds.      Coloration: Skin is not jaundiced.      Findings: No bruising, erythema or rash.   Neurological:      General: No focal deficit present.      Mental Status: He is alert and oriented to person, place, and time.      Motor: Motor function is intact. No weakness.      Coordination: Coordination is intact.      Gait: Gait is intact. Gait normal.   Psychiatric:         Attention and Perception: Attention normal.         Mood and Affect: Mood normal.         Speech: Speech normal.         Behavior: Behavior normal.         Cognition and Memory: Cognition normal.         Judgment: Judgment normal.        Result Review :                 Assessment and Plan    Diagnoses and all orders for this visit:    1. Type 2 diabetes mellitus with stage 4 chronic kidney disease, without long-term  current use of insulin ([facility]) (Primary)  -     metFORMIN (Glucophage) 500 MG tablet; Take 1 tablet by mouth 2 (Two) Times a Day With Meals.  Dispense: 60 tablet; Refill: 2  -     POCT Glucose  -     POC Glycosylated Hemoglobin (Hb A1C)    2. PAF (paroxysmal atrial fibrillation) ([facility])  -     apixaban (ELIQUIS) 5 MG tablet tablet; Take 1 tablet by mouth Every 12 (Twelve) Hours for 30 days. Indications: Atrial Fibrillation  Dispense: 60 tablet; Refill: 0    3. Coronary artery disease involving native heart with angina pectoris, unspecified vessel or lesion type ([facility])  -     atorvastatin (LIPITOR) 40 MG tablet; Take 1 tablet by mouth Every Night for 30 days.  Dispense: 30 tablet; Refill: 0    4. Acquired hypothyroidism  -     levothyroxine (SYNTHROID, LEVOTHROID) 88 MCG tablet; Take 1 tablet by mouth Every Morning for 30 days.  Dispense: 30 tablet; Refill: 0    5. Insomnia, unspecified type  -     diphenhydrAMINE (BENADRYL) 25 MG tablet; Take 1 tablet by mouth Every 6 (Six) Hours As Needed for Itching or Sleep.  Dispense: 30 tablet; Refill: 2    6. COPD with exacerbation ([facility])  -     doxycycline (VIBRAMYCIN) 100 MG capsule; Take 1 capsule by mouth 2 (Two) Times a Day.  Dispense: 14 capsule; Refill: 0  -     predniSONE (DELTASONE) 20 MG tablet; Take 1 tablet by mouth Daily for 5 days.  Dispense: 5 tablet; Refill: 0      Patient's Body mass index is 26.95 kg/m². indicating that he is overweight (BMI 25-29.9). Patient's (Body mass index is 26.95 kg/m².) indicates that they are overweight with health conditions that include hypertension, diabetes mellitus and dyslipidemias . Weight is unchanged. BMI is is above average; BMI management plan is completed. We discussed low calorie, low carb based diet program, portion control and increasing exercise. .    Follow Up   Return in about 3 months (around 1/24/2023).  Patient was given instructions and counseling regarding his condition or for health maintenance advice. Please see  specific information pulled into the AVS if appropriate.     This document has been electronically signed by IVETTE Li  October 25, 2022 14:42 EDT

## 2022-11-01 ENCOUNTER — APPOINTMENT (OUTPATIENT)
Dept: GENERAL RADIOLOGY | Facility: HOSPITAL | Age: 62
End: 2022-11-01

## 2022-11-01 ENCOUNTER — APPOINTMENT (OUTPATIENT)
Dept: ULTRASOUND IMAGING | Facility: HOSPITAL | Age: 62
End: 2022-11-01

## 2022-11-01 ENCOUNTER — HOSPITAL ENCOUNTER (INPATIENT)
Facility: HOSPITAL | Age: 62
LOS: 1 days | Discharge: HOME | End: 2022-11-02
Attending: STUDENT IN AN ORGANIZED HEALTH CARE EDUCATION/TRAINING PROGRAM | Admitting: INTERNAL MEDICINE

## 2022-11-01 ENCOUNTER — APPOINTMENT (OUTPATIENT)
Dept: NEPHROLOGY | Facility: HOSPITAL | Age: 62
End: 2022-11-01

## 2022-11-01 DIAGNOSIS — Z79.4 TYPE 2 DIABETES MELLITUS WITH STAGE 4 CHRONIC KIDNEY DISEASE, WITH LONG-TERM CURRENT USE OF INSULIN: Chronic | ICD-10-CM

## 2022-11-01 DIAGNOSIS — Z99.2 ESRD (END STAGE RENAL DISEASE) ON DIALYSIS: Chronic | ICD-10-CM

## 2022-11-01 DIAGNOSIS — N18.4 TYPE 2 DIABETES MELLITUS WITH STAGE 4 CHRONIC KIDNEY DISEASE, WITH LONG-TERM CURRENT USE OF INSULIN: Chronic | ICD-10-CM

## 2022-11-01 DIAGNOSIS — N18.6 ESRD (END STAGE RENAL DISEASE) ON DIALYSIS: Chronic | ICD-10-CM

## 2022-11-01 DIAGNOSIS — Z91.199 MEDICAL NON-COMPLIANCE: ICD-10-CM

## 2022-11-01 DIAGNOSIS — I48.91 ATRIAL FIBRILLATION WITH RVR: ICD-10-CM

## 2022-11-01 DIAGNOSIS — I48.0 PAF (PAROXYSMAL ATRIAL FIBRILLATION): ICD-10-CM

## 2022-11-01 DIAGNOSIS — E87.1 HYPONATREMIA: ICD-10-CM

## 2022-11-01 DIAGNOSIS — E87.20 METABOLIC ACIDOSIS: ICD-10-CM

## 2022-11-01 DIAGNOSIS — E87.20 LACTIC ACIDOSIS: ICD-10-CM

## 2022-11-01 DIAGNOSIS — Z79.01 CHRONIC ANTICOAGULATION: Chronic | ICD-10-CM

## 2022-11-01 DIAGNOSIS — E87.5 HYPERKALEMIA: Primary | ICD-10-CM

## 2022-11-01 DIAGNOSIS — E11.65 HYPERGLYCEMIA DUE TO DIABETES MELLITUS: ICD-10-CM

## 2022-11-01 DIAGNOSIS — E11.22 TYPE 2 DIABETES MELLITUS WITH STAGE 4 CHRONIC KIDNEY DISEASE, WITH LONG-TERM CURRENT USE OF INSULIN: Chronic | ICD-10-CM

## 2022-11-01 DIAGNOSIS — Z99.2 ESRD (END STAGE RENAL DISEASE) ON DIALYSIS: ICD-10-CM

## 2022-11-01 DIAGNOSIS — N18.6 ESRD (END STAGE RENAL DISEASE) ON DIALYSIS: ICD-10-CM

## 2022-11-01 DIAGNOSIS — R77.8 TROPONIN LEVEL ELEVATED: ICD-10-CM

## 2022-11-01 PROBLEM — E03.9 HYPOTHYROIDISM: Chronic | Status: ACTIVE | Noted: 2022-09-26

## 2022-11-01 LAB
ALBUMIN SERPL-MCNC: 3.7 G/DL (ref 3.5–5.2)
ALBUMIN/GLOB SERPL: 1.5 G/DL
ALP SERPL-CCNC: 91 U/L (ref 39–117)
ALT SERPL W P-5'-P-CCNC: 350 U/L (ref 1–41)
ANION GAP SERPL CALCULATED.3IONS-SCNC: 29 MMOL/L (ref 5–15)
ANION GAP SERPL CALCULATED.3IONS-SCNC: 31 MMOL/L (ref 5–15)
ANION GAP SERPL CALCULATED.3IONS-SCNC: 32 MMOL/L (ref 5–15)
ARTERIAL PATENCY WRIST A: ABNORMAL
AST SERPL-CCNC: 357 U/L (ref 1–40)
ATMOSPHERIC PRESS: ABNORMAL MM[HG]
BASE EXCESS BLDA CALC-SCNC: -10.5 MMOL/L (ref 0–2)
BASOPHILS # BLD MANUAL: 0 10*3/MM3 (ref 0–0.2)
BASOPHILS NFR BLD MANUAL: 0 % (ref 0–1.5)
BDY SITE: ABNORMAL
BILIRUB SERPL-MCNC: 2.5 MG/DL (ref 0–1.2)
BODY TEMPERATURE: 37 C
BUN SERPL-MCNC: 84 MG/DL (ref 8–23)
BUN SERPL-MCNC: 89 MG/DL (ref 8–23)
BUN SERPL-MCNC: 91 MG/DL (ref 8–23)
BUN/CREAT SERPL: 11.7 (ref 7–25)
BUN/CREAT SERPL: 13.1 (ref 7–25)
BUN/CREAT SERPL: 13.5 (ref 7–25)
CALCIUM SPEC-SCNC: 8.5 MG/DL (ref 8.6–10.5)
CALCIUM SPEC-SCNC: 8.9 MG/DL (ref 8.6–10.5)
CALCIUM SPEC-SCNC: 9.3 MG/DL (ref 8.6–10.5)
CHLORIDE SERPL-SCNC: 86 MMOL/L (ref 98–107)
CHLORIDE SERPL-SCNC: 86 MMOL/L (ref 98–107)
CHLORIDE SERPL-SCNC: 87 MMOL/L (ref 98–107)
CO2 BLDA-SCNC: 15 MMOL/L (ref 22–33)
CO2 SERPL-SCNC: 15 MMOL/L (ref 22–29)
CO2 SERPL-SCNC: 16 MMOL/L (ref 22–29)
CO2 SERPL-SCNC: 16 MMOL/L (ref 22–29)
COHGB MFR BLD: 1.2 % (ref 0–2)
CREAT SERPL-MCNC: 6.75 MG/DL (ref 0.76–1.27)
CREAT SERPL-MCNC: 6.79 MG/DL (ref 0.76–1.27)
CREAT SERPL-MCNC: 7.19 MG/DL (ref 0.76–1.27)
D-LACTATE SERPL-SCNC: 3.8 MMOL/L (ref 0.5–2)
D-LACTATE SERPL-SCNC: 4.1 MMOL/L (ref 0.5–2)
D-LACTATE SERPL-SCNC: 4.7 MMOL/L (ref 0.5–2)
DEPRECATED RDW RBC AUTO: 63.9 FL (ref 37–54)
DEPRECATED RDW RBC AUTO: 66.1 FL (ref 37–54)
EGFRCR SERPLBLD CKD-EPI 2021: 7.9 ML/MIN/1.73
EGFRCR SERPLBLD CKD-EPI 2021: 8.4 ML/MIN/1.73
EGFRCR SERPLBLD CKD-EPI 2021: 8.5 ML/MIN/1.73
EOSINOPHIL # BLD MANUAL: 0 10*3/MM3 (ref 0–0.4)
EOSINOPHIL NFR BLD MANUAL: 0 % (ref 0.3–6.2)
EPAP: 0
ERYTHROCYTE [DISTWIDTH] IN BLOOD BY AUTOMATED COUNT: 19.4 % (ref 12.3–15.4)
ERYTHROCYTE [DISTWIDTH] IN BLOOD BY AUTOMATED COUNT: 19.5 % (ref 12.3–15.4)
FLUAV RNA RESP QL NAA+PROBE: NOT DETECTED
FLUBV RNA RESP QL NAA+PROBE: NOT DETECTED
GLOBULIN UR ELPH-MCNC: 2.4 GM/DL
GLUCOSE BLDC GLUCOMTR-MCNC: 120 MG/DL (ref 70–130)
GLUCOSE BLDC GLUCOMTR-MCNC: 182 MG/DL (ref 70–130)
GLUCOSE BLDC GLUCOMTR-MCNC: 191 MG/DL (ref 70–130)
GLUCOSE BLDC GLUCOMTR-MCNC: 198 MG/DL (ref 70–130)
GLUCOSE BLDC GLUCOMTR-MCNC: 204 MG/DL (ref 70–130)
GLUCOSE SERPL-MCNC: 216 MG/DL (ref 65–99)
GLUCOSE SERPL-MCNC: 218 MG/DL (ref 65–99)
GLUCOSE SERPL-MCNC: 220 MG/DL (ref 65–99)
HCO3 BLDA-SCNC: 14.1 MMOL/L (ref 20–26)
HCT VFR BLD AUTO: 35.1 % (ref 37.5–51)
HCT VFR BLD AUTO: 37.2 % (ref 37.5–51)
HCT VFR BLD CALC: 33.9 % (ref 38–51)
HGB BLD-MCNC: 11.4 G/DL (ref 13–17.7)
HGB BLD-MCNC: 12.2 G/DL (ref 13–17.7)
HGB BLDA-MCNC: 11.1 G/DL (ref 13.5–17.5)
HOLD SPECIMEN: NORMAL
INHALED O2 CONCENTRATION: 21 %
IPAP: 0
LIPASE SERPL-CCNC: 53 U/L (ref 13–60)
LYMPHOCYTES # BLD MANUAL: 0.57 10*3/MM3 (ref 0.7–3.1)
LYMPHOCYTES NFR BLD MANUAL: 4 % (ref 5–12)
MACROCYTES BLD QL SMEAR: ABNORMAL
MAGNESIUM SERPL-MCNC: 2.4 MG/DL (ref 1.6–2.4)
MAGNESIUM SERPL-MCNC: 2.6 MG/DL (ref 1.6–2.4)
MCH RBC QN AUTO: 33.9 PG (ref 26.6–33)
MCH RBC QN AUTO: 34.2 PG (ref 26.6–33)
MCHC RBC AUTO-ENTMCNC: 32.5 G/DL (ref 31.5–35.7)
MCHC RBC AUTO-ENTMCNC: 32.8 G/DL (ref 31.5–35.7)
MCV RBC AUTO: 103.3 FL (ref 79–97)
MCV RBC AUTO: 105.4 FL (ref 79–97)
METAMYELOCYTES NFR BLD MANUAL: 1 % (ref 0–0)
METHGB BLD QL: 0.5 % (ref 0–1.5)
MODALITY: ABNORMAL
MONOCYTES # BLD: 0.45 10*3/MM3 (ref 0.1–0.9)
MYELOCYTES NFR BLD MANUAL: 1 % (ref 0–0)
NEUTROPHILS # BLD AUTO: 10.1 10*3/MM3 (ref 1.7–7)
NEUTROPHILS NFR BLD MANUAL: 79 % (ref 42.7–76)
NEUTS BAND NFR BLD MANUAL: 10 % (ref 0–5)
NOTE: ABNORMAL
NRBC SPEC MANUAL: 20 /100 WBC (ref 0–0.2)
OXYHGB MFR BLDV: 95 % (ref 94–99)
PAW @ PEAK INSP FLOW SETTING VENT: 0 CMH2O
PCO2 BLDA: 27.1 MM HG (ref 35–45)
PCO2 TEMP ADJ BLD: 27.1 MM HG (ref 35–48)
PH BLDA: 7.33 PH UNITS (ref 7.35–7.45)
PH, TEMP CORRECTED: 7.33 PH UNITS
PHOSPHATE SERPL-MCNC: 10.4 MG/DL (ref 2.5–4.5)
PHOSPHATE SERPL-MCNC: 11.3 MG/DL (ref 2.5–4.5)
PLAT MORPH BLD: NORMAL
PLATELET # BLD AUTO: 113 10*3/MM3 (ref 140–450)
PLATELET # BLD AUTO: 94 10*3/MM3 (ref 140–450)
PMV BLD AUTO: 11.2 FL (ref 6–12)
PMV BLD AUTO: 11.5 FL (ref 6–12)
PO2 BLDA: 98.2 MM HG (ref 83–108)
PO2 TEMP ADJ BLD: 98.2 MM HG (ref 83–108)
POTASSIUM SERPL-SCNC: 5.6 MMOL/L (ref 3.5–5.2)
POTASSIUM SERPL-SCNC: 6.3 MMOL/L (ref 3.5–5.2)
POTASSIUM SERPL-SCNC: 6.7 MMOL/L (ref 3.5–5.2)
PROCALCITONIN SERPL-MCNC: 0.52 NG/ML (ref 0–0.25)
PROT SERPL-MCNC: 6.1 G/DL (ref 6–8.5)
RBC # BLD AUTO: 3.33 10*6/MM3 (ref 4.14–5.8)
RBC # BLD AUTO: 3.6 10*6/MM3 (ref 4.14–5.8)
RSV RNA NPH QL NAA+NON-PROBE: NOT DETECTED
SARS-COV-2 RNA RESP QL NAA+PROBE: NOT DETECTED
SODIUM SERPL-SCNC: 132 MMOL/L (ref 136–145)
SODIUM SERPL-SCNC: 133 MMOL/L (ref 136–145)
SODIUM SERPL-SCNC: 133 MMOL/L (ref 136–145)
T4 FREE SERPL-MCNC: 1.02 NG/DL (ref 0.93–1.7)
TOTAL RATE: 0 BREATHS/MINUTE
TOXIC GRANULATION: ABNORMAL
TROPONIN T SERPL-MCNC: 0.06 NG/ML (ref 0–0.03)
TROPONIN T SERPL-MCNC: 0.07 NG/ML (ref 0–0.03)
TSH SERPL DL<=0.05 MIU/L-ACNC: 9.13 UIU/ML (ref 0.27–4.2)
VARIANT LYMPHS NFR BLD MANUAL: 5 % (ref 19.6–45.3)
WBC NRBC COR # BLD: 11.35 10*3/MM3 (ref 3.4–10.8)
WBC NRBC COR # BLD: 9.76 10*3/MM3 (ref 3.4–10.8)
WHOLE BLOOD HOLD COAG: NORMAL
WHOLE BLOOD HOLD SPECIMEN: NORMAL

## 2022-11-01 PROCEDURE — 87040 BLOOD CULTURE FOR BACTERIA: CPT | Performed by: STUDENT IN AN ORGANIZED HEALTH CARE EDUCATION/TRAINING PROGRAM

## 2022-11-01 PROCEDURE — 87637 SARSCOV2&INF A&B&RSV AMP PRB: CPT | Performed by: STUDENT IN AN ORGANIZED HEALTH CARE EDUCATION/TRAINING PROGRAM

## 2022-11-01 PROCEDURE — 83050 HGB METHEMOGLOBIN QUAN: CPT

## 2022-11-01 PROCEDURE — 84484 ASSAY OF TROPONIN QUANT: CPT

## 2022-11-01 PROCEDURE — 85025 COMPLETE CBC W/AUTO DIFF WBC: CPT | Performed by: STUDENT IN AN ORGANIZED HEALTH CARE EDUCATION/TRAINING PROGRAM

## 2022-11-01 PROCEDURE — 80053 COMPREHEN METABOLIC PANEL: CPT

## 2022-11-01 PROCEDURE — 84145 PROCALCITONIN (PCT): CPT | Performed by: STUDENT IN AN ORGANIZED HEALTH CARE EDUCATION/TRAINING PROGRAM

## 2022-11-01 PROCEDURE — 84439 ASSAY OF FREE THYROXINE: CPT | Performed by: INTERNAL MEDICINE

## 2022-11-01 PROCEDURE — 5A1D70Z PERFORMANCE OF URINARY FILTRATION, INTERMITTENT, LESS THAN 6 HOURS PER DAY: ICD-10-PCS | Performed by: STUDENT IN AN ORGANIZED HEALTH CARE EDUCATION/TRAINING PROGRAM

## 2022-11-01 PROCEDURE — 99285 EMERGENCY DEPT VISIT HI MDM: CPT

## 2022-11-01 PROCEDURE — 85027 COMPLETE CBC AUTOMATED: CPT | Performed by: NURSE PRACTITIONER

## 2022-11-01 PROCEDURE — P9047 ALBUMIN (HUMAN), 25%, 50ML: HCPCS

## 2022-11-01 PROCEDURE — 84100 ASSAY OF PHOSPHORUS: CPT | Performed by: STUDENT IN AN ORGANIZED HEALTH CARE EDUCATION/TRAINING PROGRAM

## 2022-11-01 PROCEDURE — 85007 BL SMEAR W/DIFF WBC COUNT: CPT | Performed by: STUDENT IN AN ORGANIZED HEALTH CARE EDUCATION/TRAINING PROGRAM

## 2022-11-01 PROCEDURE — 71045 X-RAY EXAM CHEST 1 VIEW: CPT

## 2022-11-01 PROCEDURE — 63710000001 INSULIN LISPRO (HUMAN) PER 5 UNITS: Performed by: INTERNAL MEDICINE

## 2022-11-01 PROCEDURE — 99222 1ST HOSP IP/OBS MODERATE 55: CPT | Performed by: INTERNAL MEDICINE

## 2022-11-01 PROCEDURE — 82962 GLUCOSE BLOOD TEST: CPT

## 2022-11-01 PROCEDURE — 83690 ASSAY OF LIPASE: CPT | Performed by: INTERNAL MEDICINE

## 2022-11-01 PROCEDURE — 83735 ASSAY OF MAGNESIUM: CPT

## 2022-11-01 PROCEDURE — 63710000001 INSULIN REGULAR HUMAN PER 5 UNITS: Performed by: STUDENT IN AN ORGANIZED HEALTH CARE EDUCATION/TRAINING PROGRAM

## 2022-11-01 PROCEDURE — 83735 ASSAY OF MAGNESIUM: CPT | Performed by: NURSE PRACTITIONER

## 2022-11-01 PROCEDURE — 84443 ASSAY THYROID STIM HORMONE: CPT | Performed by: INTERNAL MEDICINE

## 2022-11-01 PROCEDURE — 84100 ASSAY OF PHOSPHORUS: CPT | Performed by: NURSE PRACTITIONER

## 2022-11-01 PROCEDURE — 25010000002 CALCIUM GLUCONATE-NACL 1-0.675 GM/50ML-% SOLUTION: Performed by: STUDENT IN AN ORGANIZED HEALTH CARE EDUCATION/TRAINING PROGRAM

## 2022-11-01 PROCEDURE — 36600 WITHDRAWAL OF ARTERIAL BLOOD: CPT

## 2022-11-01 PROCEDURE — 82375 ASSAY CARBOXYHB QUANT: CPT

## 2022-11-01 PROCEDURE — 76705 ECHO EXAM OF ABDOMEN: CPT

## 2022-11-01 PROCEDURE — 83605 ASSAY OF LACTIC ACID: CPT | Performed by: STUDENT IN AN ORGANIZED HEALTH CARE EDUCATION/TRAINING PROGRAM

## 2022-11-01 PROCEDURE — 93005 ELECTROCARDIOGRAM TRACING: CPT | Performed by: STUDENT IN AN ORGANIZED HEALTH CARE EDUCATION/TRAINING PROGRAM

## 2022-11-01 PROCEDURE — 25010000002 ALBUMIN HUMAN 25% PER 50 ML

## 2022-11-01 PROCEDURE — 82805 BLOOD GASES W/O2 SATURATION: CPT

## 2022-11-01 PROCEDURE — 84484 ASSAY OF TROPONIN QUANT: CPT | Performed by: INTERNAL MEDICINE

## 2022-11-01 PROCEDURE — 36415 COLL VENOUS BLD VENIPUNCTURE: CPT

## 2022-11-01 PROCEDURE — 93005 ELECTROCARDIOGRAM TRACING: CPT

## 2022-11-01 RX ORDER — FAMOTIDINE 20 MG/1
20 TABLET, FILM COATED ORAL 2 TIMES DAILY
Status: DISCONTINUED | OUTPATIENT
Start: 2022-11-01 | End: 2022-11-01

## 2022-11-01 RX ORDER — NICOTINE POLACRILEX 4 MG
15 LOZENGE BUCCAL
Status: DISCONTINUED | OUTPATIENT
Start: 2022-11-01 | End: 2022-11-02 | Stop reason: HOSPADM

## 2022-11-01 RX ORDER — ATORVASTATIN CALCIUM 40 MG/1
40 TABLET, FILM COATED ORAL NIGHTLY
Status: DISCONTINUED | OUTPATIENT
Start: 2022-11-01 | End: 2022-11-02 | Stop reason: HOSPADM

## 2022-11-01 RX ORDER — DEXTROSE MONOHYDRATE 25 G/50ML
25 INJECTION, SOLUTION INTRAVENOUS
Status: DISCONTINUED | OUTPATIENT
Start: 2022-11-01 | End: 2022-11-02 | Stop reason: HOSPADM

## 2022-11-01 RX ORDER — DEXTROSE MONOHYDRATE 25 G/50ML
50 INJECTION, SOLUTION INTRAVENOUS ONCE
Status: COMPLETED | OUTPATIENT
Start: 2022-11-01 | End: 2022-11-01

## 2022-11-01 RX ORDER — FAMOTIDINE 20 MG/1
20 TABLET, FILM COATED ORAL DAILY
Status: DISCONTINUED | OUTPATIENT
Start: 2022-11-01 | End: 2022-11-02 | Stop reason: HOSPADM

## 2022-11-01 RX ORDER — LEVOTHYROXINE SODIUM 88 UG/1
88 TABLET ORAL
Status: DISCONTINUED | OUTPATIENT
Start: 2022-11-01 | End: 2022-11-02 | Stop reason: HOSPADM

## 2022-11-01 RX ORDER — CALCIUM GLUCONATE 20 MG/ML
1 INJECTION, SOLUTION INTRAVENOUS ONCE
Status: COMPLETED | OUTPATIENT
Start: 2022-11-01 | End: 2022-11-01

## 2022-11-01 RX ORDER — INSULIN LISPRO 100 [IU]/ML
0-7 INJECTION, SOLUTION INTRAVENOUS; SUBCUTANEOUS
Status: DISCONTINUED | OUTPATIENT
Start: 2022-11-01 | End: 2022-11-02

## 2022-11-01 RX ORDER — ALBUMIN (HUMAN) 12.5 G/50ML
12.5 SOLUTION INTRAVENOUS AS NEEDED
Status: DISCONTINUED | OUTPATIENT
Start: 2022-11-01 | End: 2022-11-02 | Stop reason: HOSPADM

## 2022-11-01 RX ORDER — SODIUM CHLORIDE 0.9 % (FLUSH) 0.9 %
10 SYRINGE (ML) INJECTION AS NEEDED
Status: DISCONTINUED | OUTPATIENT
Start: 2022-11-01 | End: 2022-11-02 | Stop reason: HOSPADM

## 2022-11-01 RX ORDER — IPRATROPIUM BROMIDE AND ALBUTEROL SULFATE 2.5; .5 MG/3ML; MG/3ML
3 SOLUTION RESPIRATORY (INHALATION) EVERY 4 HOURS PRN
Status: DISCONTINUED | OUTPATIENT
Start: 2022-11-01 | End: 2022-11-02 | Stop reason: HOSPADM

## 2022-11-01 RX ORDER — ALBUMIN (HUMAN) 12.5 G/50ML
SOLUTION INTRAVENOUS
Status: COMPLETED
Start: 2022-11-01 | End: 2022-11-01

## 2022-11-01 RX ADMIN — CALCIUM GLUCONATE 1 G: 20 INJECTION, SOLUTION INTRAVENOUS at 01:54

## 2022-11-01 RX ADMIN — SODIUM ZIRCONIUM CYCLOSILICATE 10 G: 10 POWDER, FOR SUSPENSION ORAL at 01:54

## 2022-11-01 RX ADMIN — FAMOTIDINE 20 MG: 20 TABLET ORAL at 08:10

## 2022-11-01 RX ADMIN — THIAMINE HCL TAB 100 MG 100 MG: 100 TAB at 08:10

## 2022-11-01 RX ADMIN — SODIUM BICARBONATE 50 MEQ: 84 INJECTION INTRAVENOUS at 02:23

## 2022-11-01 RX ADMIN — APIXABAN 5 MG: 5 TABLET, FILM COATED ORAL at 20:26

## 2022-11-01 RX ADMIN — ATORVASTATIN CALCIUM 40 MG: 40 TABLET, FILM COATED ORAL at 20:26

## 2022-11-01 RX ADMIN — DEXTROSE MONOHYDRATE 50 ML: 25 INJECTION, SOLUTION INTRAVENOUS at 02:14

## 2022-11-01 RX ADMIN — ALBUMIN (HUMAN) 25 G: 0.25 INJECTION, SOLUTION INTRAVENOUS at 10:21

## 2022-11-01 RX ADMIN — LEVOTHYROXINE SODIUM 88 MCG: 88 TABLET ORAL at 05:53

## 2022-11-01 RX ADMIN — INSULIN LISPRO 2 UNITS: 100 INJECTION, SOLUTION INTRAVENOUS; SUBCUTANEOUS at 17:41

## 2022-11-01 RX ADMIN — Medication 10 ML: at 20:26

## 2022-11-01 RX ADMIN — SODIUM CHLORIDE 500 ML: 9 INJECTION, SOLUTION INTRAVENOUS at 01:37

## 2022-11-01 RX ADMIN — APIXABAN 5 MG: 5 TABLET, FILM COATED ORAL at 08:10

## 2022-11-01 RX ADMIN — INSULIN HUMAN 10 UNITS: 100 INJECTION, SOLUTION PARENTERAL at 02:17

## 2022-11-01 RX ADMIN — INSULIN LISPRO 2 UNITS: 100 INJECTION, SOLUTION INTRAVENOUS; SUBCUTANEOUS at 08:10

## 2022-11-01 NOTE — CASE MANAGEMENT/SOCIAL WORK
Discharge Planning Assessment  [facility]     Patient Name: Axel Desir  MRN: [id number]  Today's Date: 11/1/2022    Admit Date: 11/1/2022    Plan: Ongoing   Discharge Needs Assessment     Row Name 11/01/22 1102       Living Environment    People in Home alone    Current Living Arrangements apartment    Primary Care Provided by self    Provides Primary Care For no one, unable/limited ability to care for self    Family Caregiver if Needed other (see comments)  Candaceeamon Mcgowanzier, cousin, is listed as healthcare surrogate    Quality of Family Relationships supportive       Resource/Environmental Concerns    Resource/Environmental Concerns none    Transportation Concerns none       Transition Planning    Patient/Family Anticipates Transition to home    Patient/Family Anticipated Services at Transition     Transportation Anticipated other (see comments)  Patient states he uses [employer] for transporation       Discharge Needs Assessment    Current Outpatient/Agency/Support Group outpatient hemodialysis  Patient goes to [facility] on MWF    Equipment Currently Used at Home none    Concerns to be Addressed discharge planning    Anticipated Changes Related to Illness inability to care for self               Discharge Plan     Row Name 11/01/22 1106       Plan    Plan Ongoing    Patient/Family in Agreement with Plan yes    Plan Comments Spoke with patient at  bedside.  Patient seems confused with some information.  Spoke with his cousin, who is also listed as his surrogate, Candace over the phone.  Patient resides in [facility] at [address] [place] [address] in [place].  Patient lives alone.  Prior to admission patient was independent with ADL's and mobility.  Patient does not have any DME and is not current with a home health agency.  Patient goes to dialysis MWF at [facility].  Spoke with [place] with [facility] at [phone] who confirms this.  Their fax number is [phone].  Patient uses [employer] for  transporation.  Candace states that patient lived with her and her  for 3 years in [place] and just moved back to [place] about 4 months ago.  Candace states she has spent a total of 4 weeks during that 4 month span with patient assisting him as needed.  Candace is at least interested in home health for patient upon discharge.  Candace also would like to speak with someone regarding possible assisted living in the future for patient.  Referral called to Magda Mcnamara with [facility].  Patients PCP is Isabelle Martinez and he does have [employer] for his insurance and according to Candace he received [place] Medicaid as of midnight last night.  Discharge plan at this time is ongoing.  CM will continue to follow.              Continued Care and Services - Admitted Since 11/1/2022    Coordination has not been started for this encounter.       Expected Discharge Date and Time     Expected Discharge Date Expected Discharge Time    Nov 8, 2022          Demographic Summary     Row Name 11/01/22 1102       General Information    Admission Type inpatient    Arrived From home    Referral Source admission list    Reason for Consult discharge planning    Preferred Language English       Contact Information    Permission Granted to Share Info With                Functional Status    No documentation.                Psychosocial    No documentation.                Abuse/Neglect    No documentation.                Legal    No documentation.                Substance Abuse    No documentation.                Patient Forms    No documentation.                   Jayleen Johnson RN

## 2022-11-01 NOTE — PROGRESS NOTES
Consult received from Dr Acevedo in [place]    Case discussed over the ph. Patient has missed Hd session today as he was feeling week and didn’t go for dialysis. Labs suggestive of hyperkalemia and met acidosis. Will give 1 round of shifting agents including Insulin, lokelma, D50, ca-gluconate, 2 amps of Na-bicarb 50 meq, Repeat labs in 1 hr if persistent hyperkalemia k> 6 will plan for emergent dialysis.

## 2022-11-01 NOTE — PAYOR COMM NOTE
"Axel Desir (62 y.o. Male)     Date of Birth   1960    Social Security Number       Address   [address] [place] [address]    Home Phone   [phone]    MRN   [id number]       [facility]    Marital Status   Single                            Admission Date   11/1/22    Admission Type   Emergency    Admitting Provider   Tavon Harrell MD    Attending Provider   Tavon Harrell MD    Department, Room/Bed   [facility] 2A ICU, N208/1       Discharge Date       Discharge Disposition       Discharge Destination                               Attending Provider: Tavon Harrell MD    Allergies: No Known Allergies    Isolation: None   Infection: None   Code Status: CPR    Ht: 167.6 cm (66\")   Wt: 82.1 kg (181 lb)    Admission Cmt: None   Principal Problem: Hyperkalemia [E87.5]                 Active Insurance as of 11/1/2022     Primary Coverage     Payor Plan Insurance Group Employer/Plan Group    ANTH MEDICARE REPLACEMENT ANTH MEDICARE ADVANTAGE [id number]     Payor Plan Address Payor Plan Phone Number Payor Plan Fax Number Effective Dates    [address] [phone]  9/1/2022 - None Entered    [facility] [address]       Subscriber Name Subscriber Birth Date Member ID       AXEL DESIR 1960 [id number]           Secondary Coverage     Payor Plan Insurance Group Employer/Plan Group    [place]      Payor Plan Address Payor Plan Phone Number Payor Plan Fax Number Effective Dates    [address] [phone]  11/1/2022 - None Entered    [facility] [address]       Subscriber Name Subscriber Birth Date Member ID       AXEL DESIR 1960 [id number]                 Emergency Contacts      (Rel.) Home Phone Work Phone Mobile Phone    JONESANDRE (Surrogate) [phone] -- [phone]            Lena: NPI [id number] Tax ID [id number]  Insurance Information                ANTHEM MEDICARE " REPLACEMENT/ANTHEM MEDICARE ADVANTAGE Phone: [phone]    Subscriber: Axel Desir Subscriber#: [id number]    Group#: [id number] Precert#: [id number]        [place] Phone: [phone]    Subscriber: Axel Desir Subscriber#: [id number]    Group#: -- Precert#: --             History & Physical      Tavon Harrell MD at 11/01/22 0211          Intensive Care Admission Note     Hyperkalemia    History of Present Illness     Axel Desir is a 62 y.o. male smoker with PMH of PAF anticoagulated on Eliquis, HTN, hypothyroidism, CAD s/p CABG, HCV, ESRD on HD, and medical non-compliance who presents to [facility] ED on 11/1 with complaints of progressive weakness found to have metabolic acidosis and hyperkalemia.     He was recently admitted to [facility] in September for AF RVR likely 2* medication non-compliance. D-dimer elevated however CTA negative for pulmonary embolism. Possible left basilar pulmonary infiltrate without clinical evidence of active infection, therefore antibiotics were deferred. He was discharged on Coreg and Eliquis dose was increased.     The patient reportedly missed his HD session last Friday. His cousin also denotes that he has not been taking his oral medications. Labs reveal a K 6.7, CO2 16, and LA 4.1. Liver enzymes also elevated which seems slightly improved since September. He does have HCV. Troponin mildly elevated at 0.071 and EKG shows AF without obvious ischemic changes. Nephrology was consulted who recommended shifting agents and if hyperkalemia persists will plan for emergent dialysis. He will be admitted to the ICU for higher level of care.     Time spent: 10 minutes  Electronically signed by IVETTE Jenkins, 11/01/22, 2:36 AM EDT.    Problem List, Surgical History, Family, Social History, and ROS     Patient Active Problem List    Diagnosis    • *Hyperkalemia [E87.5]    • Medical non-compliance [Z91.199]    • Chronic anticoagulation (Eliquis)  [Z79.01]     • Hypothyroidism [E03.9]    • ESRD on HD  [N18.6, Z99.2]    • Urinary retention [R33.9]    • Iron deficiency anemia [D50.9]    • Bradycardia [R00.1]    • Iron deficiency anemia [D50.9]    • PAF  [I48.0]    • T2DM  [E11.9]    • Hepatitis C [B19.20]    • CAD s/p CABG x 2 [I25.119]      Past Surgical History:   Procedure Laterality Date   • CARDIAC CATHETERIZATION N/A 7/2/2018    Procedure: Left Heart Cath;  Surgeon: Rodney Lema MD;  Location: [facility] CATH INVASIVE LOCATION;  Service: Cardiovascular   • COLONOSCOPY     • COLONOSCOPY N/A 6/21/2019    Procedure: COLONOSCOPY;  Surgeon: Yan Espana MD;  Location: [facility] OR;  Service: Gastroenterology   • CORONARY ARTERY BYPASS GRAFT N/A 9/17/2018    Procedure: CORONARY ARTERY BYPASSx 2 WITH INTERNAL MAMMARY WITH EVH OF THE RIGHT GREATER SAPHENOUS VEIN;  Surgeon: Oral Calero MD;  Location:  [facility] OR;  Service: Cardiothoracic   • ENDOSCOPY N/A 6/21/2019    Procedure: ESOPHAGOGASTRODUODENOSCOPY;  Surgeon: Yan Espana MD;  Location: [facility] OR;  Service: Gastroenterology   • GTUBE INSERTION     • INSERTION HEMODIALYSIS CATHETER N/A 4/15/2019    Procedure: HEMODIALYSIS CATHETER INSERTION;  Surgeon: Nelly Lemus MD;  Location: [facility] OR;  Service: General   • SHOULDER SURGERY Right 1980s   • TONSILLECTOMY         No Known Allergies  No current facility-administered medications on file prior to encounter.     Current Outpatient Medications on File Prior to Encounter   Medication Sig   • apixaban (ELIQUIS) 5 MG tablet tablet Take 1 tablet by mouth Every 12 (Twelve) Hours for 30 days. Indications: Atrial Fibrillation   • atorvastatin (LIPITOR) 40 MG tablet Take 1 tablet by mouth Every Night for 30 days.   • B Complex-C-Folic Acid (TOMASA-GAB PO) Take 1 tablet by mouth Daily.   • calcium acetate (PHOS BINDER,) 667 MG capsule capsule Take 2,668 mg by mouth 3 (Three) Times a Day With Meals.   • carvedilol (COREG) 25 MG tablet Take 1 tablet by mouth 2 (Two)  "Times a Day With Meals.   • cetirizine (zyrTEC) 10 MG tablet Take 10 mg by mouth Daily.   • diphenhydrAMINE (BENADRYL) 25 MG tablet Take 1 tablet by mouth Every 6 (Six) Hours As Needed for Itching or Sleep.   • doxycycline (VIBRAMYCIN) 100 MG capsule Take 1 capsule by mouth 2 (Two) Times a Day.   • famotidine (PEPCID) 20 MG tablet Take 20 mg by mouth 2 (Two) Times a Day.   • folic acid (FOLVITE) 1 MG tablet Take 1,000 mcg by mouth.   • levothyroxine (SYNTHROID, LEVOTHROID) 88 MCG tablet Take 1 tablet by mouth Every Morning for 30 days.   • melatonin 5 MG tablet tablet Take 5 mg by mouth Every Night.   • metFORMIN (Glucophage) 500 MG tablet Take 1 tablet by mouth 2 (Two) Times a Day With Meals.   • nitroglycerin (NITROSTAT) 0.4 MG SL tablet Place 1 tablet under the tongue Every 5 (Five) Minutes As Needed for Chest Pain.   • thiamine (VITAMIN B1) 100 MG tablet Take 100 mg by mouth.     MEDICATION LIST AND ALLERGIES REVIEWED.    Family History   Problem Relation Age of Onset   • Heart disease Father    • No Known Problems Mother    • No Known Problems Sister      Social History     Tobacco Use   • Smoking status: Every Day     Packs/day: 1.00     Years: 20.00     Pack years: 20.00     Types: Cigarettes     Last attempt to quit: 6/15/2019     Years since quitting: 3.3   • Smokeless tobacco: Never   • Tobacco comments:     states he is trying to quit smoking but still currently smokes daily   Vaping Use   • Vaping Use: Never used   Substance Use Topics   • Alcohol use: No     Comment: states years ago he would have an occasional drink   • Drug use: No     Social History     Social History Narrative    Lives alone in [facility] [facility]     FAMILY AND SOCIAL HISTORY REVIEWED.    Review of Systems  ALL OTHER SYSTEMS REVIEWED AND ARE NEGATIVE.    Physical Exam and Clinical Information   /80   Pulse 80   Temp 97.3 °F (36.3 °C) (Oral)   Resp 18   Ht 167.6 cm (66\")   Wt 77.1 kg (170 lb)   SpO2 98%   BMI 27.44 kg/m² "   Physical Exam  General Appearance: Awake, alert, in no acute distress  Neck:   Supple, no adenopathy.   Lungs:   B/L Breath sounds present with decreased breath sounds on bases, no wheezing heard, no crackles.   Heart: S1 and S2 present, no murmur, A fib  Abdomen: Soft, nontender, no guarding or rigidity, bowel sounds positive  Extremities:  no cyanosis or clubbing,  + edema, warm to touch. Scratch marks lower extremity  Pulses: Positive and symmetric.  Neurologic:  Moving all four extremities. Good strength bilaterally.   Psychological: Normal affect, Cooperative    Results from last 7 days   Lab Units 11/01/22  0028   WBC 10*3/mm3 11.35*   HEMOGLOBIN g/dL 12.2*   PLATELETS 10*3/mm3 113*     Results from last 7 days   Lab Units 11/01/22  0145 11/01/22  0028   SODIUM mmol/L 133* 133*   POTASSIUM mmol/L 6.3* 6.7*   CO2 mmol/L 15.0* 16.0*   BUN mg/dL 91* 84*   CREATININE mg/dL 6.75* 7.19*   MAGNESIUM mg/dL  --  2.6*   PHOSPHORUS mg/dL  --  11.3*   GLUCOSE mg/dL 216* 220*     Estimated Creatinine Clearance: 10.8 mL/min (A) (by C-G formula based on SCr of 6.75 mg/dL (H)).      Results from last 7 days   Lab Units 11/01/22  0222   PH, ARTERIAL pH units 7.326*   PCO2, ARTERIAL mm Hg 27.1*   PO2 ART mm Hg 98.2     Lab Results   Component Value Date    LACTATE 4.1 (C) 11/01/2022        Images:   Chest x-ray reviewed personally and showed cardiomegaly with pulmonary pulmonary vascular congestion.  No significant consolidation or atelectasis noted.  No large pleural effusions noted either.    I reviewed the patient's results and images.     EKG reviewed and showed atrial fibrillation rhythm.  No acute ST changes noted.      Impression     Hyperkalemia    CAD s/p CABG x 2    T2DM     Hepatitis C    PAF     Iron deficiency anemia    ESRD on HD     Hypothyroidism    Medical non-compliance    Chronic anticoagulation (Eliquis)     Plan/Recommendations     62-year-old male with past medical history of paroxysmal atrial  fibrillation anticoagulated on Eliquis, hypertension, hypothyroidism, coronary disease s/p CABG, hepatitis C positive, elevated LFT, end-stage renal disease on hemodialysis Monday Wednesday Friday, medical noncompliance.  Patient presented to [facility] on October 30 with complains of progressive weakness, mild lactic acidosis and hyperkalemia.  We were asked to admit patient to intensive care unit due to significant hyperkalemia and atrial fibrillation with rapid ventricular rate and probable need for emergent hemodialysis.    Patient states that he missed his  regular dialysis on Monday.  States that he was just feeling fatigued and kind of stumbling and not very steady on his feet.  Denies any nausea or vomiting.  Denies any fevers.  Denies any cough, shortness of breath.  Denies any abdominal pain.  States that he has been having more lower extremity swelling for past 5 to 7 days.  Currently is feeling thirsty.    1.  Admit to intensive care unit for closer monitoring.  2.  Nephrology team was contacted for hyperkalemia and had recommended medical treatment for hyperkalemia for now and  potassium level is better now to 5.6.  His heart rate is under better control.  Plan is to continue current care.  Nephrology to evaluate and patient likely will need hemodialysis today morning.  3.  Patient  has chronic A. fib.  On Coreg and Eliquis.  Will resume Eliquis for now.  Monitor hemodynamics closely and if continues to be maintaining blood pressure okay will resume beta-blocker as well.  4.  Continue Lipitor.  5.  Resume home thyroid medications with levothyroxine. Check TSH. Pt is poorly compliant.   6.  GI prophylaxis.  7.  LFTs are elevated which are elevated chronically.  May be has ongoing low-grade liver inflammation from hepatitis C or passive congestion. Check liver ultrasound.  Will continue to trend for now. Check lipase. Mild lactic acidosis noted.  Unclear etiology.  Will trend for clearance for now.   Blood cultures have been sent.  Has mild bandemia noted as well.  Check Pro-Tyler. Check urinalysis.   8.  Mild troponin leak.  Trend for now.  Patient denies any symptom complex suggestive of acute coronary syndrome.  9.  Sliding scale insulin coverage for hyperglycemia management for now. Hold metformin.    Patient can be transferred out of ICU to telemetry floor after HD.  Will sign out to hospitalist team.      Time spent 35 min (exclusive of procedure time)  including high complexity decision making to assess, manipulate, and support vital organ system failure in this individual who has impairment of one or more vital organ systems such that there is a high probability of imminent or life threatening deterioration in the patient’s condition.      Tavon Harrell MD, [facility]  Pulmonary and Critical Care Medicine  11/01/22 02:47 EDT     CC: Isabelle Martinez APRN    Electronically signed by Tavon Harrell MD at 11/01/22 0605       Emergency Department Notes    No notes of this type exist for this encounter.         Vital Signs (last day)     Date/Time Temp Temp src Pulse Resp BP Patient Position SpO2    11/01/22 0700 -- -- 86 -- 125/81 -- 98    11/01/22 0600 -- -- 87 -- 115/74 -- 100    11/01/22 0545 -- -- 88 -- 112/78 -- 100    11/01/22 0530 -- -- 90 -- 111/74 -- 99    11/01/22 0515 -- -- 79 -- 115/70 -- 98    11/01/22 0500 -- -- 81 -- 99/72 -- 98    11/01/22 0445 -- -- 81 -- 89/71 -- 99    11/01/22 0430 -- -- 79 -- 118/75 -- 98    11/01/22 0415 -- -- 80 -- 115/79 -- 99    11/01/22 0400 97.8 (36.6) Oral 80 16 -- Lying 100    11/01/22 0315 97.8 (36.6) Oral 86 18 130/108 Lying 96    11/01/22 0300 -- -- 80 -- -- -- 97    11/01/22 0253 -- -- 79 -- -- -- 98    11/01/22 0245 -- -- 81 -- -- -- 95    11/01/22 0238 -- -- 82 -- 131/78 -- 96    11/01/22 0234 -- -- 80 -- 119/80 -- 98    11/01/22 0230 -- -- 78 -- -- -- 100    11/01/22 0227 97.3 (36.3) Oral -- -- -- -- --    11/01/22 0223 -- -- 73 -- -- -- 99    11/01/22 0222  -- -- 73 -- -- -- 99    11/01/22 0217 -- -- 73 -- -- -- 98    11/01/22 0212 -- -- 73 -- -- -- 99    11/01/22 0207 -- -- 73 -- -- -- 100    11/01/22 0202 -- -- 72 -- 106/83 -- 99    11/01/22 0157 -- -- 74 -- -- -- 100    11/01/22 0152 -- -- 73 -- -- -- 98    11/01/22 0147 -- -- 74 -- -- -- 99    11/01/22 0140 -- -- 104 -- 111/73 -- 93    11/01/22 0139 -- -- 104 -- -- -- 100    11/01/22 0137 -- -- 105 -- 107/82 -- 99    11/01/22 0132 -- -- 99 -- -- -- 97    11/01/22 0127 -- -- 110 -- -- -- 100    11/01/22 00:12:09 -- -- 115 18 111/83 Sitting 95            Current Facility-Administered Medications   Medication Dose Route Frequency Provider Last Rate Last Admin   • apixaban (ELIQUIS) tablet 5 mg  5 mg Oral Q12H Tavon Harrell MD       • atorvastatin (LIPITOR) tablet 40 mg  40 mg Oral Nightly Tavon Harrell MD       • dextrose (D50W) (25 g/50 mL) IV injection 25 g  25 g Intravenous Q15 Min PRN Tavon Harrell MD       • dextrose (GLUTOSE) oral gel 15 g  15 g Oral Q15 Min PRN Tavon Harrell MD       • famotidine (PEPCID) tablet 20 mg  20 mg Oral BID Jessie Delgado, APRKATHY       • glucagon (human recombinant) (GLUCAGEN DIAGNOSTIC) injection 1 mg  1 mg Intramuscular Q15 Min PRN Tavon Harrell MD       • Insulin Lispro (humaLOG) injection 0-7 Units  0-7 Units Subcutaneous TID AC Tavon Harrell MD       • ipratropium-albuterol (DUO-NEB) nebulizer solution 3 mL  3 mL Nebulization Q4H PRN SandyJessie sarabia APRN       • levothyroxine (SYNTHROID, LEVOTHROID) tablet 88 mcg  88 mcg Oral Q AM Jessie Delgado APRN   88 mcg at 11/01/22 0553   • sodium chloride 0.9 % flush 10 mL  10 mL Intravenous PRN Andres Acevedo MD       • thiamine (VITAMIN B-1) tablet 100 mg  100 mg Oral Daily Tavon Harrell MD           Lab Results (last 24 hours)     Procedure Component Value Units Date/Time    TSH Rfx On Abnormal To Free T4 [[id number]]  (Abnormal) Collected: 11/01/22 0337    Specimen: Blood Updated: 11/01/22 0718     TSH  9.130 uIU/mL     T4, Free [[id number]] Collected: 11/01/22 0337    Specimen: Blood Updated: 11/01/22 0718    STAT Lactic Acid, Reflex [[id number]]  (Abnormal) Collected: 11/01/22 0642    Specimen: Blood Updated: 11/01/22 0711     Lactate 3.8 mmol/L      Comment: Falsely depressed results may occur on samples drawn from patients receiving N-Acetylcysteine (NAC) or Metamizole.       Lipase [[id number]]  (Normal) Collected: 11/01/22 0337    Specimen: Blood Updated: 11/01/22 0626     Lipase 53 U/L     Troponin [[id number]]  (Abnormal) Collected: 11/01/22 0337    Specimen: Blood Updated: 11/01/22 0616     Troponin T 0.060 ng/mL     Narrative:      Troponin T Reference Range:  <= 0.03 ng/mL-   Negative for AMI  >0.03 ng/mL-     Abnormal for myocardial necrosis.  Clinicians would have to utilize clinical acumen, EKG, Troponin and serial changes to determine if it is an Acute Myocardial Infarction or myocardial injury due to an underlying chronic condition.       Results may be falsely decreased if patient taking Biotin.      POC Glucose Once [[id number]]  (Abnormal) Collected: 11/01/22 0545    Specimen: Blood Updated: 11/01/22 0549     Glucose 191 mg/dL      Comment: Meter: [id number] : [id number] Fadumo Diaz       STAT Lactic Acid, Reflex [[id number]]  (Abnormal) Collected: 11/01/22 0344    Specimen: Blood Updated: 11/01/22 0434     Lactate 4.7 mmol/L      Comment: Falsely depressed results may occur on samples drawn from patients receiving N-Acetylcysteine (NAC) or Metamizole.       Blood Culture - Blood, Arm, Right [[id number]] Collected: 11/01/22 0400    Specimen: Blood from Arm, Right Updated: 11/01/22 0431    [place] Draw [[id number]] Collected: 11/01/22 0028    Specimen: Blood Updated: 11/01/22 0431    Narrative:      The following orders were created for panel order [place] Draw.  Procedure                               Abnormality         Status                     ---------                                -----------         ------                     Green Top (Gel)[[id number]]                                  Final result               Lavender Top[[id number]]                                     Final result               Gold Top - SST[[id number]]                                   Final result               Gray Top[[id number]]                                         Final result               Light Blue Top[[id number]]                                   Final result                 Please view results for these tests on the individual orders.    Gray Top [[id number]] Collected: 11/01/22 0028    Specimen: Blood Updated: 11/01/22 0431     Extra Tube Hold for add-ons.     Comment: Auto resulted.       Blood Culture - Blood, Arm, Right [[id number]] Collected: 11/01/22 0344    Specimen: Blood from Arm, Right Updated: 11/01/22 0409    Basic Metabolic Panel [[id number]]  (Abnormal) Collected: 11/01/22 0337    Specimen: Blood Updated: 11/01/22 0402     Glucose 218 mg/dL      BUN 89 mg/dL      Creatinine 6.79 mg/dL      Sodium 132 mmol/L      Potassium 5.6 mmol/L      Comment: Slight hemolysis detected by analyzer. Results may be affected.        Chloride 87 mmol/L      CO2 16.0 mmol/L      Calcium 8.5 mg/dL      BUN/Creatinine Ratio 13.1     Anion Gap 29.0 mmol/L      eGFR 8.4 mL/min/1.73      Comment: <15 Indicative of kidney failure       Narrative:      GFR Normal >60  Chronic Kidney Disease <60  Kidney Failure <15      Magnesium [[id number]]  (Normal) Collected: 11/01/22 0337    Specimen: Blood Updated: 11/01/22 0402     Magnesium 2.4 mg/dL     Phosphorus [[id number]]  (Abnormal) Collected: 11/01/22 0337    Specimen: Blood Updated: 11/01/22 0402     Phosphorus 10.4 mg/dL     CBC (No Diff) [[id number]]  (Abnormal) Collected: 11/01/22 0337    Specimen: Blood Updated: 11/01/22 0350     WBC 9.76 10*3/mm3      RBC 3.33 10*6/mm3      Hemoglobin 11.4 g/dL      Hematocrit 35.1 %      .4 fL      MCH 34.2 pg      MCHC 32.5 g/dL       RDW 19.4 %      RDW-SD 66.1 fl      MPV 11.2 fL      Platelets 94 10*3/mm3     COVID PRE-OP / PRE-PROCEDURE SCREENING ORDER (NO ISOLATION) - Swab, Nasopharynx [[id number]]  (Normal) Collected: 11/01/22 0153    Specimen: Swab from Nasopharynx Updated: 11/01/22 0241    Narrative:      The following orders were created for panel order COVID PRE-OP / PRE-PROCEDURE SCREENING ORDER (NO ISOLATION) - Swab, Nasopharynx.  Procedure                               Abnormality         Status                     ---------                               -----------         ------                     COVID-19, FLU A/B, RSV P...[[id number]]  Normal              Final result                 Please view results for these tests on the individual orders.    COVID-19, FLU A/B, RSV PCR - Swab, Nasopharynx [[id number]]  (Normal) Collected: 11/01/22 0153    Specimen: Swab from Nasopharynx Updated: 11/01/22 0241     COVID19 Not Detected     Influenza A PCR Not Detected     Influenza B PCR Not Detected     RSV, PCR Not Detected    Narrative:      Fact sheet for providers: https://www.fda.gov/media/779979/download    Fact sheet for patients: https://www.fda.gov/media/273598/download    Test performed by PCR.    Basic Metabolic Panel [[id number]]  (Abnormal) Collected: 11/01/22 0145    Specimen: Blood Updated: 11/01/22 0231     Glucose 216 mg/dL      BUN 91 mg/dL      Creatinine 6.75 mg/dL      Sodium 133 mmol/L      Potassium 6.3 mmol/L      Comment: Slight hemolysis detected by analyzer. Results may be affected.        Chloride 86 mmol/L      CO2 15.0 mmol/L      Calcium 8.9 mg/dL      BUN/Creatinine Ratio 13.5     Anion Gap 32.0 mmol/L      eGFR 8.5 mL/min/1.73      Comment: <15 Indicative of kidney failure       Narrative:      GFR Normal >60  Chronic Kidney Disease <60  Kidney Failure <15      Procalcitonin [[id number]]  (Abnormal) Collected: 11/01/22 0028    Specimen: Blood Updated: 11/01/22 0227     Procalcitonin 0.52 ng/mL     Narrative:       "As a Marker for Sepsis (Non-Neonates):    1. <0.5 ng/mL represents a low risk of severe sepsis and/or septic shock.  2. >2 ng/mL represents a high risk of severe sepsis and/or septic shock.    As a Marker for Lower Respiratory Tract Infections that require antibiotic therapy:    PCT on Admission    Antibiotic Therapy       6-12 Hrs later    >0.5                Strongly Recommended  >0.25 - <0.5        Recommended   0.1 - 0.25          Discouraged              Remeasure/reassess PCT  <0.1                Strongly Discouraged     Remeasure/reassess PCT    As 28 day mortality risk marker: \"Change in Procalcitonin Result\" (>80% or <=80%) if Day 0 (or Day 1) and Day 4 values are available. Refer to http://www.[employer] Bi[employer]icspct-calculator.com    Change in PCT <=80%  A decrease of PCT levels below or equal to 80% defines a positive change in PCT test result representing a higher risk for 28-day all-cause mortality of patients diagnosed with severe sepsis for septic shock.    Change in PCT >80%  A decrease of PCT levels of more than 80% defines a negative change in PCT result representing a lower risk for 28-day all-cause mortality of patients diagnosed with severe sepsis or septic shock.       Blood Gas, Arterial With Co-Ox [[id number]]  (Abnormal) Collected: 11/01/22 0222    Specimen: Arterial Blood Updated: 11/01/22 0222     Site Right Radial     Graeme's Test N/A     pH, Arterial 7.326 pH units      Comment: 84 Value below reference range        pCO2, Arterial 27.1 mm Hg      Comment: 84 Value below reference range        pO2, Arterial 98.2 mm Hg      HCO3, Arterial 14.1 mmol/L      Base Excess, Arterial -10.5 mmol/L      Hemoglobin, Blood Gas 11.1 g/dL      Comment: 84 Value below reference range        Hematocrit, Blood Gas 33.9 %      Oxyhemoglobin 95.0 %      Methemoglobin 0.50 %      Carboxyhemoglobin 1.2 %      CO2 Content 15.0 mmol/L      Temperature 37.0 C      Barometric Pressure for Blood Gas --     Comment: N/A        " Modality Room Air     FIO2 21 %      Rate 0 Breaths/minute      PIP 0 cmH2O      Comment: Meter: [id number]     :  [id number]        IPAP 0     EPAP 0     Note --     pH, Temp Corrected 7.326 pH Units      pCO2, Temperature Corrected 27.1 mm Hg      pO2, Temperature Corrected 98.2 mm Hg     Lactic Acid, Plasma [[id number]]  (Abnormal) Collected: 11/01/22 0028    Specimen: Blood Updated: 11/01/22 0216     Lactate 4.1 mmol/L      Comment: Falsely depressed results may occur on samples drawn from patients receiving N-Acetylcysteine ([facility]) or Metamizole.       Phosphorus [[id number]]  (Abnormal) Collected: 11/01/22 0028    Specimen: Blood Updated: 11/01/22 0209     Phosphorus 11.3 mg/dL     POC Glucose Once [[id number]]  (Abnormal) Collected: 11/01/22 0205    Specimen: Blood Updated: 11/01/22 0207     Glucose 204 mg/dL      Comment: Meter: [id number] : [id number] Liza Ruthann       CBC & Differential [[id number]]  (Abnormal) Collected: 11/01/22 0028    Specimen: Blood Updated: 11/01/22 0139    Narrative:      The following orders were created for panel order CBC & Differential.  Procedure                               Abnormality         Status                     ---------                               -----------         ------                     CBC Auto Differential[[id number]]        Abnormal            Final result                 Please view results for these tests on the individual orders.    CBC Auto Differential [[id number]]  (Abnormal) Collected: 11/01/22 0028    Specimen: Blood Updated: 11/01/22 0139     WBC 11.35 10*3/mm3      RBC 3.60 10*6/mm3      Hemoglobin 12.2 g/dL      Hematocrit 37.2 %      .3 fL      MCH 33.9 pg      MCHC 32.8 g/dL      RDW 19.5 %      RDW-SD 63.9 fl      MPV 11.5 fL      Platelets 113 10*3/mm3     Narrative:      Modified report. Previous result was Hemogram on 11/1/2022 at 0048 EDT.    Manual Differential [[id number]]  (Abnormal) Collected: 11/01/22 0028     Specimen: Blood Updated: 11/01/22 0139     Neutrophil % 79.0 %      Lymphocyte % 5.0 %      Monocyte % 4.0 %      Eosinophil % 0.0 %      Basophil % 0.0 %      Bands %  10.0 %      Metamyelocyte % 1.0 %      Myelocyte % 1.0 %      Neutrophils Absolute 10.10 10*3/mm3      Lymphocytes Absolute 0.57 10*3/mm3      Monocytes Absolute 0.45 10*3/mm3      Eosinophils Absolute 0.00 10*3/mm3      Basophils Absolute 0.00 10*3/mm3      nRBC 20.0 /100 WBC      Macrocytes Slight/1+     Toxic Granulation Slight/1+     Platelet Morphology Normal    Lavender Top [[id number]] Collected: 11/01/22 0028    Specimen: Blood Updated: 11/01/22 0131     Extra Tube hold for add-on     Comment: Auto resulted       Light Blue Top [[id number]] Collected: 11/01/22 0028    Specimen: Blood Updated: 11/01/22 0131     Extra Tube Hold for add-ons.     Comment: Auto resulted       Green Top (Gel) [[id number]] Collected: 11/01/22 0028    Specimen: Blood Updated: 11/01/22 0131     Extra Tube Hold for add-ons.     Comment: Auto resulted.       Gold Top - SST [[id number]] Collected: 11/01/22 0028    Specimen: Blood Updated: 11/01/22 0131     Extra Tube Hold for add-ons.     Comment: Auto resulted.       Troponin [[id number]]  (Abnormal) Collected: 11/01/22 0028    Specimen: Blood Updated: 11/01/22 0113     Troponin T 0.071 ng/mL     Narrative:      Troponin T Reference Range:  <= 0.03 ng/mL-   Negative for AMI  >0.03 ng/mL-     Abnormal for myocardial necrosis.  Clinicians would have to utilize clinical acumen, EKG, Troponin and serial changes to determine if it is an Acute Myocardial Infarction or myocardial injury due to an underlying chronic condition.       Results may be falsely decreased if patient taking Biotin.      Comprehensive Metabolic Panel [[id number]]  (Abnormal) Collected: 11/01/22 0028    Specimen: Blood Updated: 11/01/22 0113     Glucose 220 mg/dL      BUN 84 mg/dL      Creatinine 7.19 mg/dL      Sodium 133 mmol/L      Potassium 6.7 mmol/L       Comment: Slight hemolysis detected by analyzer. Results may be affected.        Chloride 86 mmol/L      CO2 16.0 mmol/L      Calcium 9.3 mg/dL      Total Protein 6.1 g/dL      Albumin 3.70 g/dL      ALT (SGPT) 350 U/L      AST (SGOT) 357 U/L      Alkaline Phosphatase 91 U/L      Total Bilirubin 2.5 mg/dL      Globulin 2.4 gm/dL      Comment: Calculated Result        A/G Ratio 1.5 g/dL      BUN/Creatinine Ratio 11.7     Anion Gap 31.0 mmol/L      eGFR 7.9 mL/min/1.73      Comment: <15 Indicative of kidney failure       Narrative:      GFR Normal >60  Chronic Kidney Disease <60  Kidney Failure <15      Magnesium [[id number]]  (Abnormal) Collected: 11/01/22 0028    Specimen: Blood Updated: 11/01/22 0103     Magnesium 2.6 mg/dL         Imaging Results (Last 24 Hours)     Procedure Component Value Units Date/Time    XR Chest 1 View [[id number]] Collected: 11/01/22 0131     Updated: 11/01/22 0134    Narrative:      EXAMINATION: XR CHEST 1 VW      DATE OF EXAM: 10/31/2022 10:39 PM    HISTORY: Weakness and dizziness    COMPARISON: 9/20/2018.    FINDINGS:     The examination is underinflated. There is cardiomegaly. Median sternotomy. No focal consolidation. Demineralization. Lateral right clavicular resection. Upper abdomen is normal.        Impression:        1.  No acute cardiopulmonary abnormality.     Electronically signed by:  Andres Gutierrez M.D.    10/31/2022 11:33 PM Mountain Time        ECG/EMG Results (last 24 hours)     Procedure Component Value Units Date/Time    ECG 12 Lead ED Triage Standing Order; Weak / Dizzy / AMS [[id number]] Collected: 11/01/22 0021     Updated: 11/01/22 0718     QT Interval 382 ms      QTC Interval 511 ms     Narrative:      Test Reason : ED Triage Standing Order~  Blood Pressure :   */*   mmHG  Vent. Rate : 108 BPM     Atrial Rate : 104 BPM     P-R Int :   * ms          QRS Dur : 112 ms      QT Int : 382 ms       P-R-T Axes :   * 128 -17 degrees     QTc Int : 511 ms    Atrial  fibrillation with rapid ventricular response  Right axis deviation  Low voltage QRS  Cannot rule out Anterior infarct (cited on or before 21-SEP-2022)  Abnormal ECG  When compared with ECG of 21-SEP-2022 14:08,  Significant changes have occurred    Referred By: ARTI           Confirmed By:           Orders (last 24 hrs)      Start     Ordered    11/02/22 0600  CBC & Differential  Morning Draw         11/01/22 0603    11/02/22 0600  Magnesium  Morning Draw         11/01/22 0603    11/02/22 0600  Phosphorus  Morning Draw         11/01/22 0603    11/02/22 0600  Comprehensive Metabolic Panel  Morning Draw         11/01/22 0603    11/02/22 0600  Lactic Acid, Plasma  Morning Draw         11/01/22 0603    11/01/22 2100  atorvastatin (LIPITOR) tablet 40 mg  Nightly         11/01/22 0555    11/01/22 0942  STAT Lactic Acid, Reflex  PROCEDURE ONCE         11/01/22 0708    11/01/22 0900  famotidine (PEPCID) tablet 20 mg  2 Times Daily         11/01/22 0242    11/01/22 0900  apixaban (ELIQUIS) tablet 5 mg  Every 12 Hours Scheduled         11/01/22 0555    11/01/22 0900  thiamine (VITAMIN B-1) tablet 100 mg  Daily         11/01/22 0555    11/01/22 0731  Inpatient Admission  Once         11/01/22 0730    11/01/22 0730  Insulin Lispro (humaLOG) injection 0-7 Units  3 Times Daily Before Meals         11/01/22 0559    11/01/22 0719  T4, Free  Once         11/01/22 0718    11/01/22 0709  Diet Regular; Renal  Diet Effective Now         11/01/22 0708    11/01/22 0702  Inpatient Nephrology Consult  IN AM        Specialty:  Nephrology  Provider:  (Not yet assigned)    11/01/22 0242    11/01/22 0700  POC Glucose TID AC  3 Times Daily Before Meals       11/01/22 0559    11/01/22 0644  STAT Lactic Acid, Reflex  PROCEDURE ONCE         11/01/22 0418    11/01/22 0606  Lipase  Once         11/01/22 0605    11/01/22 0603  Urinalysis With Culture If Indicated -  Once         11/01/22 0602    11/01/22 0602  TSH Rfx On Abnormal To Free T4  Once          11/01/22 0601    11/01/22 0600  levothyroxine (SYNTHROID, LEVOTHROID) tablet 88 mcg  Every Early Morning         11/01/22 0242    11/01/22 0600  CBC (No Diff)  Morning Draw         11/01/22 0249    11/01/22 0600  Magnesium  Morning Draw         11/01/22 0249    11/01/22 0600  Phosphorus  Morning Draw         11/01/22 0249    11/01/22 0600  Basic Metabolic Panel  Morning Draw         11/01/22 0249    11/01/22 0600  Do NOT Hold Basal or Correction Scale Insulin When Patient is NPO, Hold Scheduled Mealtime (Bolus) Insulin if NPO  Continuous         11/01/22 0559    11/01/22 0600  Troponin  Once         11/01/22 0559    11/01/22 0559  dextrose (GLUTOSE) oral gel 15 g  Every 15 Minutes PRN         11/01/22 0559    11/01/22 0559  dextrose (D50W) (25 g/50 mL) IV injection 25 g  Every 15 Minutes PRN         11/01/22 0559    11/01/22 0559  glucagon (human recombinant) (GLUCAGEN DIAGNOSTIC) injection 1 mg  Every 15 Minutes PRN         11/01/22 0559    11/01/22 0550  POC Glucose Once  PROCEDURE ONCE         11/01/22 0545    11/01/22 0548  Consult to Wound / Ostomy Care  Once         11/01/22 0548    11/01/22 0403  Inpatient Case Management  Consult  Once        Provider:  (Not yet assigned)    11/01/22 0403    11/01/22 0340  Basic Metabolic Panel  Once in 2 hours        Comments: Collect 2 hours after hyperkalemia meds administered.      11/01/22 0140    11/01/22 0328  STAT Lactic Acid, Reflex  PROCEDURE ONCE         11/01/22 0215    11/01/22 0300  Vital Signs Every Hour and Per Hospital Policy Based on Patient Condition  Every Hour       11/01/22 0242    11/01/22 0300  Intake and Output  Every Hour       11/01/22 0242    11/01/22 0256  Potassium  STAT,   Status:  Canceled         11/01/22 0255    11/01/22 0249  US Liver  1 Time Imaging         11/01/22 0249    11/01/22 0243  Daily Weights  Daily       11/01/22 0242    11/01/22 0241  VTE Prophylaxis Not Indicated: Other: Patient Currently Anticoagulated /  Receiving Prophylaxis  Once         11/01/22 0242 11/01/22 0240  ipratropium-albuterol (DUO-NEB) nebulizer solution 3 mL  Every 4 Hours PRN         11/01/22 0242 11/01/22 0240  Cardiac Monitoring  Continuous        Comments: Follow Standing Orders As Outlined in Process Instructions (Open Order Report to View Full Instructions)    11/01/22 0242 11/01/22 0240  Continuous Pulse Oximetry  Continuous         11/01/22 0242 11/01/22 0240  Height & Weight  Once         11/01/22 0242    11/01/22 0240  Oral care for patients not on NPPV or intubated  Every Shift      Comments: Oral care for patients not on NPPV or intubated    11/01/22 0242    11/01/22 0240  Use Mobility Guidelines for Advancement of Activity  Continuous         11/01/22 0242 11/01/22 0240  Code Status and Medical Interventions:  Continuous         11/01/22 0242 11/01/22 0223  Blood Gas, Arterial With Co-Ox  PROCEDURE ONCE         11/01/22 0222 11/01/22 0217  For ICU RN. If repeat K is >6 patient will need HD, Please let house supervisor know in that situation so she can call on call dialysis RN.  Nursing Communication  Once        Comments: For ICU RN. If repeat K is >6 patient will need HD, Please let house supervisor know in that situation so she can call on call dialysis RN.    11/01/22 0217 11/01/22 0216  Hemodialysis Inpatient  Once         11/01/22 0216 11/01/22 0210  ICU / CCU Bed Request ([facility] / [facility] ONLY)  Once         11/01/22 0209 11/01/22 0207  POC Glucose Once  PROCEDURE ONCE         11/01/22 0205 11/01/22 0156  dextrose (D50W) (25 g/50 mL) IV injection 50 mL  Once         11/01/22 0154    11/01/22 0156  insulin regular (humuLIN R,novoLIN R) injection 10 Units  Once         11/01/22 0154 11/01/22 0155  sodium bicarbonate injection 8.4% 50 mEq  Once         11/01/22 0154    11/01/22 0142  calcium gluconate 1g/50ml 0.675% NaCl IV SOLN  Once         11/01/22 0140 11/01/22 0142  sodium zirconium cyclosilicate  (LOKELMA) pack 10 g  Once         11/01/22 0140    11/01/22 0141  Blood Gas, Arterial -With Co-Ox Panel: Yes  Once         11/01/22 0140    11/01/22 0141  Blood Culture - Blood, Arm, Right  Once         11/01/22 0140    11/01/22 0141  Blood Culture - Blood, Arm, Right  Once         11/01/22 0140    11/01/22 0141  Lactic Acid, Plasma  Once         11/01/22 0140    11/01/22 0141  Procalcitonin  Once         11/01/22 0140    11/01/22 0141  Phosphorus  Once         11/01/22 0140    11/01/22 0140  COVID PRE-OP / PRE-PROCEDURE SCREENING ORDER (NO ISOLATION) - Swab, Nasopharynx  Once         11/01/22 0140    11/01/22 0140  COVID-19, FLU A/B, RSV PCR - Swab, Nasopharynx  PROCEDURE ONCE         11/01/22 0140    11/01/22 0137  sodium chloride 0.9 % bolus 500 mL  Once         11/01/22 0135    11/01/22 0128  lactated ringers bolus 500 mL  Once,   Status:  Discontinued         11/01/22 0126    11/01/22 0126  Potassium  STAT,   Status:  Canceled         11/01/22 0125    11/01/22 0041  Manual Differential  Once         11/01/22 0040    11/01/22 0018  NPO Diet NPO Type: Strict NPO  Diet Effective Now,   Status:  Canceled         11/01/22 0017    11/01/22 0018  Undress & Gown  Once         11/01/22 0017    11/01/22 0018  Cardiac Monitoring  Continuous,   Status:  Canceled        Comments: Follow Standing Orders As Outlined in Process Instructions (Open Order Report to View Full Instructions)    11/01/22 0017    11/01/22 0018  Continuous Pulse Oximetry  Continuous,   Status:  Canceled         11/01/22 0017    11/01/22 0018  Vital Signs  Per Hospital Policy         11/01/22 0017    11/01/22 0018  Orthostatic Blood Pressure  Once         11/01/22 0017    11/01/22 0018  Fall Precautions  Continuous         11/01/22 0017    11/01/22 0018  XR Chest 1 View  1 Time Imaging         11/01/22 0017    11/01/22 0018  ECG 12 Lead ED Triage Standing Order; Weak / Dizzy / AMS  Once         11/01/22 0017 11/01/22 0018  POC Glucose Once  Once          11/01/22 0017 11/01/22 0018  Insert Peripheral IV  Once         11/01/22 0017 11/01/22 0018  [place] Draw  Once         11/01/22 0017 11/01/22 0018  CBC & Differential  Once         11/01/22 0017 11/01/22 0018  Comprehensive Metabolic Panel  Once         11/01/22 0017 11/01/22 0018  Troponin  Once         11/01/22 0017 11/01/22 0018  Magnesium  Once         11/01/22 0017 11/01/22 0018  Urinalysis With Microscopic If Indicated (No Culture) - Urine, Clean Catch  Once         11/01/22 0017 11/01/22 0018  Green Top (Gel)  PROCEDURE ONCE         11/01/22 0017 11/01/22 0018  Lavender Top  PROCEDURE ONCE         11/01/22 0017 11/01/22 0018  Gold Top - SST  PROCEDURE ONCE         11/01/22 0017 11/01/22 0018  Gray Top  PROCEDURE ONCE         11/01/22 0017 11/01/22 0018  Light Blue Top  PROCEDURE ONCE         11/01/22 0017 11/01/22 0018  CBC Auto Differential  PROCEDURE ONCE         11/01/22 0017 11/01/22 0017  sodium chloride 0.9 % flush 10 mL  As Needed         11/01/22 0017    Unscheduled  Oxygen Therapy- Nasal Cannula; 2 LPM; Titrate for SPO2: 92%, Greater Than or Equal To  Continuous PRN       11/01/22 0017    Unscheduled  Follow Hypoglycemia Standing Orders For Blood Glucose <70 & Notify Provider of Treatment  As Needed      Comments: Follow Hypoglycemia Orders As Outlined in Process Instructions (Open Order Report to View Full Instructions)  Notify Provider Any Time Hypoglycemia Treatment is Administered    11/01/22 0559    Signed and Held  albumin human 25 % IV SOLN 12.5 g  As Needed         Signed and Held                   Physician Progress Notes (last 24 hours)      Ameya Soto MD at 11/01/22 0156        Consult received from Dr Acevedo in [facility]    Case discussed over the ph. Patient has missed Hd session today as he was feeling week and didn’t go for dialysis. Labs suggestive of hyperkalemia and met acidosis. Will give 1 round of shifting agents including Insulin,  lokelma, D50, ca-gluconate, 2 amps of Na-bicarb 50 meq, Repeat labs in 1 hr if persistent hyperkalemia k> 6 will plan for emergent dialysis.    Electronically signed by Ameya Soto MD at 11/01/22 0156       Consult Notes (last 24 hours)  Notes from 10/31/22 0740 through 11/01/22 0740   No notes of this type exist for this encounter.

## 2022-11-01 NOTE — PLAN OF CARE
Problem: Device-Related Complication Risk (Hemodialysis)  Goal: Safe, Effective Therapy Delivery  Outcome: Ongoing, Progressing     Problem: Hemodynamic Instability (Hemodialysis)  Goal: Effective Tissue Perfusion  Outcome: Ongoing, Progressing     Problem: Infection (Hemodialysis)  Goal: Absence of Infection Signs and Symptoms  Outcome: Ongoing, Progressing   Goal Outcome Evaluation:   Received HD as ordered, B/P unstable, but stabilized with Albumin 25%/ 25G admin. 30 min into tx. Rmvd 2000ml. No s/s of dialysis related infection noted

## 2022-11-01 NOTE — PLAN OF CARE
Goal Outcome Evaluation:  Plan of Care Reviewed With: patient        Progress: improving  Outcome Evaluation: Patient arrived from the ED at 0315. Patient A&Ox4, on room air. Patient remains in A.fib with a controlled rate. Potassiuum redrawl was below 6, plan for HD today.

## 2022-11-01 NOTE — NURSING NOTE
WOC team consulted for: Other    WOC RN called and spoke with bedside RN who states that patient has wounds to right shoulder and coccyx but the coccyx is blanchable.  Patient is having CRRT at this time and cannot be turned at this time.      Sensory Perception: 4-->no impairment  Moisture: 4-->rarely moist  Activity: 3-->walks occasionally  Mobility: 3-->slightly limited  Nutrition: 3-->adequate  Friction and Shear: 2-->potential problem  G[place] Score: 19 (11/01/22 0800)    Please implement pressure injury preventions per protocol.    Thank you for the consult.  WOC team will plan to follow-up tomorrow.  Alteration to skin integrity and change in wound bed presentation please contact the WOC team.

## 2022-11-01 NOTE — H&P
Intensive Care Admission Note     Hyperkalemia    History of Present Illness     Axel Desir is a 62 y.o. male smoker with PMH of PAF anticoagulated on Eliquis, HTN, hypothyroidism, CAD s/p CABG, HCV, ESRD on HD, and medical non-compliance who presents to [facility] ED on 11/1 with complaints of progressive weakness found to have metabolic acidosis and hyperkalemia.     He was recently admitted to [facility] in September for AF RVR likely 2* medication non-compliance. D-dimer elevated however CTA negative for pulmonary embolism. Possible left basilar pulmonary infiltrate without clinical evidence of active infection, therefore antibiotics were deferred. He was discharged on Coreg and Eliquis dose was increased.     The patient reportedly missed his HD session last Friday. His cousin also denotes that he has not been taking his oral medications. Labs reveal a K 6.7, CO2 16, and LA 4.1. Liver enzymes also elevated which seems slightly improved since September. He does have HCV. Troponin mildly elevated at 0.071 and EKG shows AF without obvious ischemic changes. Nephrology was consulted who recommended shifting agents and if hyperkalemia persists will plan for emergent dialysis. He will be admitted to the ICU for higher level of care.     Time spent: 10 minutes  Electronically signed by IVETTE Jenkins, 11/01/22, 2:36 AM EDT.    Problem List, Surgical History, Family, Social History, and ROS     Patient Active Problem List    Diagnosis    • *Hyperkalemia [E87.5]    • Medical non-compliance [Z91.199]    • Chronic anticoagulation (Eliquis)  [Z79.01]    • Hypothyroidism [E03.9]    • ESRD on HD  [N18.6, Z99.2]    • Urinary retention [R33.9]    • Iron deficiency anemia [D50.9]    • Bradycardia [R00.1]    • Iron deficiency anemia [D50.9]    • PAF  [I48.0]    • T2DM  [E11.9]    • Hepatitis C [B19.20]    • CAD s/p CABG x 2 [I25.119]      Past Surgical History:   Procedure Laterality Date   • CARDIAC CATHETERIZATION N/A 7/2/2018     Procedure: Left Heart Cath;  Surgeon: Rodney Lema MD;  [facility]  [facility] CATH [facility];  Service: Cardiovascular   • COLONOSCOPY     • COLONOSCOPY N/A 6/21/2019    Procedure: COLONOSCOPY;  Surgeon: Yan Espana MD;  Location:  [facility] OR;  Service: Gastroenterology   • CORONARY ARTERY BYPASS GRAFT N/A 9/17/2018    Procedure: CORONARY ARTERY BYPASSx 2 WITH INTERNAL MAMMARY WITH EVH OF THE RIGHT GREATER SAPHENOUS VEIN;  Surgeon: Oral Calero MD;  Location:  [facility] OR;  Service: Cardiothoracic   • ENDOSCOPY N/A 6/21/2019    Procedure: ESOPHAGOGASTRODUODENOSCOPY;  Surgeon: Yan Espana MD;  Location:  [facility] OR;  Service: Gastroenterology   • GTUBE INSERTION     • INSERTION HEMODIALYSIS CATHETER N/A 4/15/2019    Procedure: HEMODIALYSIS CATHETER INSERTION;  Surgeon: Nelly Lemus MD;  Location:  [facility] OR;  Service: General   • SHOULDER SURGERY Right 1980s   • TONSILLECTOMY         No Known Allergies  No current facility-administered medications on file prior to encounter.     Current Outpatient Medications on File Prior to Encounter   Medication Sig   • apixaban (ELIQUIS) 5 MG tablet tablet Take 1 tablet by mouth Every 12 (Twelve) Hours for 30 days. Indications: Atrial Fibrillation   • atorvastatin (LIPITOR) 40 MG tablet Take 1 tablet by mouth Every Night for 30 days.   • B Complex-C-Folic Acid (TOMASA-GAB PO) Take 1 tablet by mouth Daily.   • calcium acetate (PHOS BINDER,) 667 MG capsule capsule Take 2,668 mg by mouth 3 (Three) Times a Day With Meals.   • carvedilol (COREG) 25 MG tablet Take 1 tablet by mouth 2 (Two) Times a Day With Meals.   • cetirizine (zyrTEC) 10 MG tablet Take 10 mg by mouth Daily.   • diphenhydrAMINE (BENADRYL) 25 MG tablet Take 1 tablet by mouth Every 6 (Six) Hours As Needed for Itching or Sleep.   • doxycycline (VIBRAMYCIN) 100 MG capsule Take 1 capsule by mouth 2 (Two) Times a Day.   • famotidine (PEPCID) 20 MG tablet Take 20 mg by mouth 2 (Two) Times a Day.   • folic  "acid (FOLVITE) 1 MG tablet Take 1,000 mcg by mouth.   • levothyroxine (SYNTHROID, LEVOTHROID) 88 MCG tablet Take 1 tablet by mouth Every Morning for 30 days.   • melatonin 5 MG tablet tablet Take 5 mg by mouth Every Night.   • metFORMIN (Glucophage) 500 MG tablet Take 1 tablet by mouth 2 (Two) Times a Day With Meals.   • nitroglycerin (NITROSTAT) 0.4 MG SL tablet Place 1 tablet under the tongue Every 5 (Five) Minutes As Needed for Chest Pain.   • thiamine (VITAMIN B1) 100 MG tablet Take 100 mg by mouth.     MEDICATION LIST AND ALLERGIES REVIEWED.    Family History   Problem Relation Age of Onset   • Heart disease Father    • No Known Problems Mother    • No Known Problems Sister      Social History     Tobacco Use   • Smoking status: Every Day     Packs/day: 1.00     Years: 20.00     Pack years: 20.00     Types: Cigarettes     Last attempt to quit: 6/15/2019     Years since quitting: 3.3   • Smokeless tobacco: Never   • Tobacco comments:     states he is trying to quit smoking but still currently smokes daily   Vaping Use   • Vaping Use: Never used   Substance Use Topics   • Alcohol use: No     Comment: states years ago he would have an occasional drink   • Drug use: No     Social History     Social History Narrative    Lives alone in [facility]     FAMILY AND SOCIAL HISTORY REVIEWED.    Review of Systems  ALL OTHER SYSTEMS REVIEWED AND ARE NEGATIVE.    Physical Exam and Clinical Information   /80   Pulse 80   Temp 97.3 °F (36.3 °C) (Oral)   Resp 18   Ht 167.6 cm (66\")   Wt 77.1 kg (170 lb)   SpO2 98%   BMI 27.44 kg/m²   Physical Exam  General Appearance: Awake, alert, in no acute distress  Neck:   Supple, no adenopathy.   Lungs:   B/L Breath sounds present with decreased breath sounds on bases, no wheezing heard, no crackles.   Heart: S1 and S2 present, no murmur, A fib  Abdomen: Soft, nontender, no guarding or rigidity, bowel sounds positive  Extremities:  no cyanosis or clubbing,  + edema, warm to " touch. Scratch marks lower extremity  Pulses: Positive and symmetric.  Neurologic:  Moving all four extremities. Good strength bilaterally.   Psychological: Normal affect, Cooperative    Results from last 7 days   Lab Units 11/01/22  0028   WBC 10*3/mm3 11.35*   HEMOGLOBIN g/dL 12.2*   PLATELETS 10*3/mm3 113*     Results from last 7 days   Lab Units 11/01/22  0145 11/01/22  0028   SODIUM mmol/L 133* 133*   POTASSIUM mmol/L 6.3* 6.7*   CO2 mmol/L 15.0* 16.0*   BUN mg/dL 91* 84*   CREATININE mg/dL 6.75* 7.19*   MAGNESIUM mg/dL  --  2.6*   PHOSPHORUS mg/dL  --  11.3*   GLUCOSE mg/dL 216* 220*     Estimated Creatinine Clearance: 10.8 mL/min (A) (by C-G formula based on SCr of 6.75 mg/dL (H)).      Results from last 7 days   Lab Units 11/01/22  0222   PH, ARTERIAL pH units 7.326*   PCO2, ARTERIAL mm Hg 27.1*   PO2 ART mm Hg 98.2     Lab Results   Component Value Date    LACTATE 4.1 (C) 11/01/2022        Images:   Chest x-ray reviewed personally and showed cardiomegaly with pulmonary pulmonary vascular congestion.  No significant consolidation or atelectasis noted.  No large pleural effusions noted either.    I reviewed the patient's results and images.     EKG reviewed and showed atrial fibrillation rhythm.  No acute ST changes noted.      Impression     Hyperkalemia    CAD s/p CABG x 2    T2DM     Hepatitis C    PAF     Iron deficiency anemia    ESRD on HD     Hypothyroidism    Medical non-compliance    Chronic anticoagulation (Eliquis)     Plan/Recommendations     62-year-old male with past medical history of paroxysmal atrial fibrillation anticoagulated on Eliquis, hypertension, hypothyroidism, coronary disease s/p CABG, hepatitis C positive, elevated LFT, end-stage renal disease on hemodialysis Monday Wednesday Friday, medical noncompliance.  Patient presented to [facility] on October 30 with complains of progressive weakness, mild lactic acidosis and hyperkalemia.  We were asked to admit patient to intensive  care unit due to significant hyperkalemia and atrial fibrillation with rapid ventricular rate and probable need for emergent hemodialysis.    Patient states that he missed his  regular dialysis on Monday.  States that he was just feeling fatigued and kind of stumbling and not very steady on his feet.  Denies any nausea or vomiting.  Denies any fevers.  Denies any cough, shortness of breath.  Denies any abdominal pain.  States that he has been having more lower extremity swelling for past 5 to 7 days.  Currently is feeling thirsty.    1.  Admit to intensive care unit for closer monitoring.  2.  Nephrology team was contacted for hyperkalemia and had recommended medical treatment for hyperkalemia for now and  potassium level is better now to 5.6.  His heart rate is under better control.  Plan is to continue current care.  Nephrology to evaluate and patient likely will need hemodialysis today morning.  3.  Patient  has chronic A. fib.  On Coreg and Eliquis.  Will resume Eliquis for now.  Monitor hemodynamics closely and if continues to be maintaining blood pressure okay will resume beta-blocker as well.  4.  Continue Lipitor.  5.  Resume home thyroid medications with levothyroxine. Check TSH. Pt is poorly compliant.   6.  GI prophylaxis.  7.  LFTs are elevated which are elevated chronically.  May be has ongoing low-grade liver inflammation from hepatitis C or passive congestion. Check liver ultrasound.  Will continue to trend for now. Check lipase. Mild lactic acidosis noted.  Unclear etiology.  Will trend for clearance for now.  Blood cultures have been sent.  Has mild bandemia noted as well.  Check Pro-Tyler. Check urinalysis.   8.  Mild troponin leak.  Trend for now.  Patient denies any symptom complex suggestive of acute coronary syndrome.  9.  Sliding scale insulin coverage for hyperglycemia management for now. Hold metformin.    Patient can be transferred out of ICU to telemetry floor after HD.  Will sign out to  hospitalist team.      Time spent 35 min (exclusive of procedure time)  including high complexity decision making to assess, manipulate, and support vital organ system failure in this individual who has impairment of one or more vital organ systems such that there is a high probability of imminent or life threatening deterioration in the patient’s condition.      Tavon Harrell MD, [facility]  Pulmonary and Critical Care Medicine  11/01/22 02:47 EDT     CC: Isabelle Martinez APRN
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
## 2022-11-02 ENCOUNTER — READMISSION MANAGEMENT (OUTPATIENT)
Dept: CALL CENTER | Facility: HOSPITAL | Age: 62
End: 2022-11-02

## 2022-11-02 ENCOUNTER — APPOINTMENT (OUTPATIENT)
Dept: NEPHROLOGY | Facility: HOSPITAL | Age: 62
End: 2022-11-02

## 2022-11-02 ENCOUNTER — TELEPHONE (OUTPATIENT)
Dept: FAMILY MEDICINE CLINIC | Facility: CLINIC | Age: 62
End: 2022-11-02

## 2022-11-02 VITALS
TEMPERATURE: 98.2 F | DIASTOLIC BLOOD PRESSURE: 65 MMHG | BODY MASS INDEX: 29.09 KG/M2 | SYSTOLIC BLOOD PRESSURE: 95 MMHG | OXYGEN SATURATION: 96 % | RESPIRATION RATE: 16 BRPM | WEIGHT: 181 LBS | HEIGHT: 66 IN | HEART RATE: 81 BPM

## 2022-11-02 LAB
ALBUMIN SERPL-MCNC: 3.4 G/DL (ref 3.5–5.2)
ALBUMIN/GLOB SERPL: 1.9 G/DL
ALP SERPL-CCNC: 70 U/L (ref 39–117)
ALT SERPL W P-5'-P-CCNC: 229 U/L (ref 1–41)
ANION GAP SERPL CALCULATED.3IONS-SCNC: 20 MMOL/L (ref 5–15)
ANISOCYTOSIS BLD QL: ABNORMAL
AST SERPL-CCNC: 181 U/L (ref 1–40)
BASOPHILS # BLD MANUAL: 0 10*3/MM3 (ref 0–0.2)
BASOPHILS NFR BLD MANUAL: 0 % (ref 0–1.5)
BILIRUB SERPL-MCNC: 1.6 MG/DL (ref 0–1.2)
BUN SERPL-MCNC: 62 MG/DL (ref 8–23)
BUN/CREAT SERPL: 11 (ref 7–25)
BURR CELLS BLD QL SMEAR: ABNORMAL
CALCIUM SPEC-SCNC: 8.3 MG/DL (ref 8.6–10.5)
CHLORIDE SERPL-SCNC: 92 MMOL/L (ref 98–107)
CO2 SERPL-SCNC: 21 MMOL/L (ref 22–29)
CREAT SERPL-MCNC: 5.65 MG/DL (ref 0.76–1.27)
D-LACTATE SERPL-SCNC: 2 MMOL/L (ref 0.5–2)
DEPRECATED RDW RBC AUTO: 62.4 FL (ref 37–54)
EGFRCR SERPLBLD CKD-EPI 2021: 10.5 ML/MIN/1.73
EOSINOPHIL # BLD MANUAL: 0 10*3/MM3 (ref 0–0.4)
EOSINOPHIL NFR BLD MANUAL: 0 % (ref 0.3–6.2)
ERYTHROCYTE [DISTWIDTH] IN BLOOD BY AUTOMATED COUNT: 20.3 % (ref 12.3–15.4)
GLOBULIN UR ELPH-MCNC: 1.8 GM/DL
GLUCOSE BLDC GLUCOMTR-MCNC: 169 MG/DL (ref 70–130)
GLUCOSE BLDC GLUCOMTR-MCNC: 202 MG/DL (ref 70–130)
GLUCOSE BLDC GLUCOMTR-MCNC: 270 MG/DL (ref 70–130)
GLUCOSE BLDC GLUCOMTR-MCNC: 296 MG/DL (ref 70–130)
GLUCOSE SERPL-MCNC: 192 MG/DL (ref 65–99)
HCT VFR BLD AUTO: 32.1 % (ref 37.5–51)
HGB BLD-MCNC: 10.7 G/DL (ref 13–17.7)
LYMPHOCYTES # BLD MANUAL: 0.33 10*3/MM3 (ref 0.7–3.1)
LYMPHOCYTES NFR BLD MANUAL: 7 % (ref 5–12)
MAGNESIUM SERPL-MCNC: 2.1 MG/DL (ref 1.6–2.4)
MCH RBC QN AUTO: 34.1 PG (ref 26.6–33)
MCHC RBC AUTO-ENTMCNC: 33.3 G/DL (ref 31.5–35.7)
MCV RBC AUTO: 102.2 FL (ref 79–97)
MONOCYTES # BLD: 0.47 10*3/MM3 (ref 0.1–0.9)
NEUTROPHILS # BLD AUTO: 5.89 10*3/MM3 (ref 1.7–7)
NEUTROPHILS NFR BLD MANUAL: 88 % (ref 42.7–76)
PHOSPHATE SERPL-MCNC: 7.5 MG/DL (ref 2.5–4.5)
PLAT MORPH BLD: NORMAL
PLATELET # BLD AUTO: 73 10*3/MM3 (ref 140–450)
PMV BLD AUTO: 11.6 FL (ref 6–12)
POTASSIUM SERPL-SCNC: 4.7 MMOL/L (ref 3.5–5.2)
PROT SERPL-MCNC: 5.2 G/DL (ref 6–8.5)
QT INTERVAL: 382 MS
QTC INTERVAL: 511 MS
RBC # BLD AUTO: 3.14 10*6/MM3 (ref 4.14–5.8)
SODIUM SERPL-SCNC: 133 MMOL/L (ref 136–145)
VARIANT LYMPHS NFR BLD MANUAL: 5 % (ref 19.6–45.3)
WBC MORPH BLD: NORMAL
WBC NRBC COR # BLD: 6.69 10*3/MM3 (ref 3.4–10.8)

## 2022-11-02 PROCEDURE — 63710000001 INSULIN LISPRO (HUMAN) PER 5 UNITS: Performed by: INTERNAL MEDICINE

## 2022-11-02 PROCEDURE — 85025 COMPLETE CBC W/AUTO DIFF WBC: CPT | Performed by: INTERNAL MEDICINE

## 2022-11-02 PROCEDURE — 99232 SBSQ HOSP IP/OBS MODERATE 35: CPT | Performed by: NURSE PRACTITIONER

## 2022-11-02 PROCEDURE — 80053 COMPREHEN METABOLIC PANEL: CPT | Performed by: INTERNAL MEDICINE

## 2022-11-02 PROCEDURE — 63710000001 INSULIN LISPRO (HUMAN) PER 5 UNITS: Performed by: NURSE PRACTITIONER

## 2022-11-02 PROCEDURE — 84100 ASSAY OF PHOSPHORUS: CPT | Performed by: INTERNAL MEDICINE

## 2022-11-02 PROCEDURE — 82962 GLUCOSE BLOOD TEST: CPT

## 2022-11-02 PROCEDURE — 85007 BL SMEAR W/DIFF WBC COUNT: CPT | Performed by: INTERNAL MEDICINE

## 2022-11-02 PROCEDURE — 83605 ASSAY OF LACTIC ACID: CPT | Performed by: INTERNAL MEDICINE

## 2022-11-02 PROCEDURE — 83735 ASSAY OF MAGNESIUM: CPT | Performed by: INTERNAL MEDICINE

## 2022-11-02 RX ORDER — FAMOTIDINE 20 MG/1
20 TABLET, FILM COATED ORAL DAILY
Qty: 60 TABLET | Refills: 2 | OUTPATIENT
Start: 2022-11-03

## 2022-11-02 RX ORDER — CARVEDILOL 25 MG/1
12.5 TABLET ORAL 2 TIMES DAILY WITH MEALS
Qty: 60 TABLET | Refills: 3 | Status: SHIPPED | OUTPATIENT
Start: 2022-11-02 | End: 2022-11-20 | Stop reason: HOSPADM

## 2022-11-02 RX ORDER — LEVOTHYROXINE SODIUM 88 UG/1
88 TABLET ORAL
Qty: 60 TABLET | Refills: 2 | OUTPATIENT
Start: 2022-11-03

## 2022-11-02 RX ORDER — LIDOCAINE 40 MG/G
1 CREAM TOPICAL AS NEEDED
Status: DISCONTINUED | OUTPATIENT
Start: 2022-11-02 | End: 2022-11-02 | Stop reason: HOSPADM

## 2022-11-02 RX ORDER — INSULIN LISPRO 100 [IU]/ML
0-9 INJECTION, SOLUTION INTRAVENOUS; SUBCUTANEOUS
Status: DISCONTINUED | OUTPATIENT
Start: 2022-11-02 | End: 2022-11-02 | Stop reason: HOSPADM

## 2022-11-02 RX ADMIN — LEVOTHYROXINE SODIUM 88 MCG: 88 TABLET ORAL at 06:11

## 2022-11-02 RX ADMIN — INSULIN LISPRO 3 UNITS: 100 INJECTION, SOLUTION INTRAVENOUS; SUBCUTANEOUS at 08:25

## 2022-11-02 RX ADMIN — THIAMINE HCL TAB 100 MG 100 MG: 100 TAB at 08:27

## 2022-11-02 RX ADMIN — INSULIN LISPRO 4 UNITS: 100 INJECTION, SOLUTION INTRAVENOUS; SUBCUTANEOUS at 17:21

## 2022-11-02 RX ADMIN — APIXABAN 5 MG: 5 TABLET, FILM COATED ORAL at 08:27

## 2022-11-02 RX ADMIN — INSULIN LISPRO 2 UNITS: 100 INJECTION, SOLUTION INTRAVENOUS; SUBCUTANEOUS at 12:21

## 2022-11-02 RX ADMIN — INSULIN LISPRO 6 UNITS: 100 INJECTION, SOLUTION INTRAVENOUS; SUBCUTANEOUS at 20:53

## 2022-11-02 RX ADMIN — ATORVASTATIN CALCIUM 40 MG: 40 TABLET, FILM COATED ORAL at 20:53

## 2022-11-02 RX ADMIN — FAMOTIDINE 20 MG: 20 TABLET ORAL at 08:27

## 2022-11-02 RX ADMIN — APIXABAN 5 MG: 5 TABLET, FILM COATED ORAL at 20:53

## 2022-11-02 NOTE — PROGRESS NOTES
"Intensive Care Follow-up     Hospital:  LOS: 1 day   Mr. Axel Desir, 62 y.o. male is followed for:   Hyperkalemia     Subjective   Interval History:  The chart has been reviewed. No acute events overnight. AM labs with improved renal function and normalization of lactate. LFTs also improving when c/w labs 2 days prior.     Patient resting comfortably in bed this afternoon following HD session. He is confused (thinks we are in [place]) but is pleasant. He remains afebrile, hemodynamically stable, and is oxygenating appropriately on RA. Tolerating a PO diet and eating >50% of meals. He has not been up ambulating as of yet.     He denies current chest pain, shortness of breath, nausea, vomiting, diarrhea, or abdominal comfort. He notes feeling \"weak\" and additionally complains of chronic bilateral lower extremity pain.      The patient's past medical, surgical and social history were reviewed and updated in Epic as appropriate.     Objective     Infusions:     Medications:  apixaban, 5 mg, Oral, Q12H  atorvastatin, 40 mg, Oral, Nightly  famotidine, 20 mg, Oral, Daily  insulin lispro, 0-7 Units, Subcutaneous, TID AC  levothyroxine, 88 mcg, Oral, Q AM  thiamine, 100 mg, Oral, Daily    I reviewed the patient's medications.    Vital Sign Min/Max for last 24 hours  Temp  Min: 97.8 °F (36.6 °C)  Max: 98.8 °F (37.1 °C)   BP  Min: 89/54  Max: 123/80   Pulse  Min: 68  Max: 80   Resp  Min: 14  Max: 20   SpO2  Min: 90 %  Max: 100 %   Flow (L/min)  Min: 2  Max: 2       Input/Output for last 24 hour shift  11/01 0701 - 11/02 0700  In: 900 [P.O.:900]  Out: 2000     Physical Exam:  GENERAL: Frail-appearing male appearing older than stated age, lying in bed and conversant. No acute distress.   HEENT: Normocephalic and atraumatic. Trachea midline. PER. EOM WNL.   LUNGS: Chest rise of normal depth and symmetric. Lungs clear to auscultation bilaterally. No wheezes, rhonchi, or rales.   HEART: S1,S2 detected. Regular rate and rhythm. " No rub, murmur, or gallop.   ABDOMEN: Soft, rounded, nondistended, and nontender. Bowel sounds present.   EXTREMITIES: No clubbing or cyanosis. Mild peripheral edema. Peripheral pulses present. Skin warm and dry. Generalized bruising and scratches present.   NEURO/PSYCH: Alert but confused to place & situation. Follows commands. Moves all extremities. No new focal deficits.      Results from last 7 days   Lab Units 11/02/22  0451 11/01/22 0337 11/01/22  0028   WBC 10*3/mm3 6.69 9.76 11.35*   HEMOGLOBIN g/dL 10.7* 11.4* 12.2*   PLATELETS 10*3/mm3 73* 94* 113*     Results from last 7 days   Lab Units 11/02/22 0451 11/01/22 0337 11/01/22  0145 11/01/22  0028   SODIUM mmol/L 133* 132* 133* 133*   POTASSIUM mmol/L 4.7 5.6* 6.3* 6.7*   CO2 mmol/L 21.0* 16.0* 15.0* 16.0*   BUN mg/dL 62* 89* 91* 84*   CREATININE mg/dL 5.65* 6.79* 6.75* 7.19*   MAGNESIUM mg/dL 2.1 2.4  --  2.6*   PHOSPHORUS mg/dL 7.5* 10.4*  --  11.3*   GLUCOSE mg/dL 192* 218* 216* 220*     Estimated Creatinine Clearance: 13.3 mL/min (A) (by C-G formula based on SCr of 5.65 mg/dL (H)).    Results from last 7 days   Lab Units 11/01/22  0222   PH, ARTERIAL pH units 7.326*   PCO2, ARTERIAL mm Hg 27.1*   PO2 ART mm Hg 98.2     I reviewed the patient's new clinical results.  I reviewed the patient's new imaging results/reports including actual images and agree with reports.     Assessment & Plan   Impression      Hyperkalemia    CAD s/p CABG x 2    T2DM     Hepatitis C    PAF     Iron deficiency anemia    ESRD on HD     Hypothyroidism    Medical non-compliance    Chronic anticoagulation (Eliquis)     62 yoM with PMH of tobacco use, PAF on Eliquis, HTN, hypothyroidism, CAD s/p CABG, HCV, ESRD on HD, GERD, and medical noncompliance who was admitted to [facility] 11/1/22 with progressive weakness found to have metabolic acidosis and hyperkalemia in the setting of missing his last two outpatient HD sessions.     He received hyperkalemia protocol and Nephrology was  consulted / he underwent HD on 11/1 and 11/2 ultimately resulting in improved electrolytes and normalization of lactate.      Plan        1. Hyperkalemia & CKD: K+ improved. Nephrology following; continue HD as tolerated.   2. For T2DM: Increase sliding-scale insulin to moderate dosing; goal FSBG less than or equal to 180 mg/dL.   3. For PAF: BB on hold currently. Continue Eliquis.   4. For hypothyroidism: Continue levothyroxine.   5. For elevated LFTs: Improving on AM labs. Liver US yesterday negative for biliary obstruction.   6. For dyslipidemia: Continue statin therapy.   7. Ambulate.   8. AM labs.     DVT Prophylaxis: Eliquis   GI Prophylaxis: Pepcid  Dispo: Ok to transfer to telemetry. Hopefully home tomorrow once transportation can be arranged.     Time spent: 30 minutes  Plan of care and goals reviewed with multidisciplinary/antibiotic stewardship team during rounds.   I discussed the patient's findings and my recommendations with patient, nursing staff and primary care team     Emily Dent, DNP, APRN, AGACNP-BC  Pulmonary and Critical Care Medicine
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
## 2022-11-02 NOTE — TELEPHONE ENCOUNTER
Caller: MICHAELA    Relationship: [facility]    Best call back number:[phone]    What is the best time to reach you: ANY TIME    Who are you requesting to speak with (clinical staff, provider,  specific staff member): LIZANDRO DELUCA    Do you know the name of the person who called: MICHAELA  What was the call regarding: MICHAELA FROM [facility] WANTS TO KNOW IF LIZANDRO WILL SIGN HOME HEALTH ORDERS? PLEASE ADVISE     Do you require a callback: YES

## 2022-11-02 NOTE — PROGRESS NOTES
" LOS: 1 day   Patient Care Team:  Isabelle Martinez APRN as PCP - General (Nurse Practitioner)    Chief Complaint: ESRD    Subjective       Subjective:  Symptoms:  Stable.  He reports weakness.  No shortness of breath, chest pain or anxiety.        History taken from: patient    Objective     Vital Sign Min/Max for last 24 hours  Temp  Min: 97.8 °F (36.6 °C)  Max: 98.8 °F (37.1 °C)   BP  Min: 89/54  Max: 124/78   Pulse  Min: 68  Max: 86   Resp  Min: 14  Max: 20   SpO2  Min: 90 %  Max: 100 %   Flow (L/min)  Min: 2  Max: 2   No data recorded     Flowsheet Rows    Flowsheet Row First Filed Value   Admission Height 167.6 cm (66\") Documented at 11/01/2022 0012   Admission Weight 77.1 kg (170 lb) Documented at 11/01/2022 0012          No intake/output data recorded.  I/O last 3 completed shifts:  In: 1450 [P.O.:900; IV Piggyback:550]  Out: 2000 [Other:2000]    Objective:  General Appearance:  Comfortable.    Vital signs: (most recent): Blood pressure 115/77, pulse 71, temperature 97.8 °F (36.6 °C), temperature source Oral, resp. rate 20, height 167.6 cm (66\"), weight 82.1 kg (181 lb), SpO2 98 %.  Vital signs are normal.    HEENT: Normal HEENT exam.    Lungs:  Normal effort and normal respiratory rate.  Breath sounds clear to auscultation.  He is not in respiratory distress.  No decreased breath sounds or wheezes.    Heart: Normal rate.  Regular rhythm.  S1 normal and S2 normal.  No murmur or gallop.   Abdomen: Abdomen is soft.  Bowel sounds are normal.     Extremities: Normal range of motion.  There is no dependent edema or local swelling.    Pulses: Distal pulses are intact.    Neurological: Patient is alert and oriented to person, place and time.  Normal strength.    Pupils:  Pupils are equal, round, and reactive to light.    Skin:  There is a rash.              Results Review:     I reviewed the patient's new clinical results.    WBC WBC   Date Value Ref Range Status   11/02/2022 6.69 3.40 - 10.80 10*3/mm3 Final "   11/01/2022 9.76 3.40 - 10.80 10*3/mm3 Final   11/01/2022 11.35 (H) 3.40 - 10.80 10*3/mm3 Final      HGB Hemoglobin   Date Value Ref Range Status   11/02/2022 10.7 (L) 13.0 - 17.7 g/dL Final   11/01/2022 11.4 (L) 13.0 - 17.7 g/dL Final   11/01/2022 12.2 (L) 13.0 - 17.7 g/dL Final      HCT Hematocrit   Date Value Ref Range Status   11/02/2022 32.1 (L) 37.5 - 51.0 % Final   11/01/2022 35.1 (L) 37.5 - 51.0 % Final   11/01/2022 37.2 (L) 37.5 - 51.0 % Final      Platlets No results found for: LABPLAT   MCV MCV   Date Value Ref Range Status   11/02/2022 102.2 (H) 79.0 - 97.0 fL Final   11/01/2022 105.4 (H) 79.0 - 97.0 fL Final   11/01/2022 103.3 (H) 79.0 - 97.0 fL Final          Sodium Sodium   Date Value Ref Range Status   11/02/2022 133 (L) 136 - 145 mmol/L Final   11/01/2022 132 (L) 136 - 145 mmol/L Final   11/01/2022 133 (L) 136 - 145 mmol/L Final   11/01/2022 133 (L) 136 - 145 mmol/L Final      Potassium Potassium   Date Value Ref Range Status   11/02/2022 4.7 3.5 - 5.2 mmol/L Final     Comment:     Slight hemolysis detected by analyzer. Results may be affected.   11/01/2022 5.6 (H) 3.5 - 5.2 mmol/L Final     Comment:     Slight hemolysis detected by analyzer. Results may be affected.   11/01/2022 6.3 (C) 3.5 - 5.2 mmol/L Final     Comment:     Slight hemolysis detected by analyzer. Results may be affected.   11/01/2022 6.7 (C) 3.5 - 5.2 mmol/L Final     Comment:     Slight hemolysis detected by analyzer. Results may be affected.      Chloride Chloride   Date Value Ref Range Status   11/02/2022 92 (L) 98 - 107 mmol/L Final   11/01/2022 87 (L) 98 - 107 mmol/L Final   11/01/2022 86 (L) 98 - 107 mmol/L Final   11/01/2022 86 (L) 98 - 107 mmol/L Final      CO2 CO2   Date Value Ref Range Status   11/02/2022 21.0 (L) 22.0 - 29.0 mmol/L Final   11/01/2022 16.0 (L) 22.0 - 29.0 mmol/L Final   11/01/2022 15.0 (L) 22.0 - 29.0 mmol/L Final   11/01/2022 16.0 (L) 22.0 - 29.0 mmol/L Final      BUN BUN   Date Value Ref Range Status    11/02/2022 62 (H) 8 - 23 mg/dL Final   11/01/2022 89 (H) 8 - 23 mg/dL Final   11/01/2022 91 (H) 8 - 23 mg/dL Final   11/01/2022 84 (H) 8 - 23 mg/dL Final      Creatinine Creatinine   Date Value Ref Range Status   11/02/2022 5.65 (H) 0.76 - 1.27 mg/dL Final   11/01/2022 6.79 (H) 0.76 - 1.27 mg/dL Final   11/01/2022 6.75 (H) 0.76 - 1.27 mg/dL Final   11/01/2022 7.19 (H) 0.76 - 1.27 mg/dL Final      Calcium Calcium   Date Value Ref Range Status   11/02/2022 8.3 (L) 8.6 - 10.5 mg/dL Final   11/01/2022 8.5 (L) 8.6 - 10.5 mg/dL Final   11/01/2022 8.9 8.6 - 10.5 mg/dL Final   11/01/2022 9.3 8.6 - 10.5 mg/dL Final      PO4 No results found for: CAPO4   Albumin Albumin   Date Value Ref Range Status   11/02/2022 3.40 (L) 3.50 - 5.20 g/dL Final   11/01/2022 3.70 3.50 - 5.20 g/dL Final      Magnesium Magnesium   Date Value Ref Range Status   11/02/2022 2.1 1.6 - 2.4 mg/dL Final   11/01/2022 2.4 1.6 - 2.4 mg/dL Final   11/01/2022 2.6 (H) 1.6 - 2.4 mg/dL Final      Uric Acid No results found for: URICACID     Medication Review: yes    Assessment & Plan       Hyperkalemia    CAD s/p CABG x 2    T2DM     Hepatitis C    PAF     Iron deficiency anemia    ESRD on HD     Hypothyroidism    Medical non-compliance    Chronic anticoagulation (Eliquis)       Assessment & Plan     -ESRD: On MWF dilip [facility], Missed HD on Monday. Presented with hyperkalemia .  -Hyponatremia  -Hyperkalemia  -Metabolic acidosis  -Hyperphosphatemia  -Lactic acidosis  -Elevated liver enzymes      PLAN: Thank you for asking us to see Axel Desir, I recommend the following:     -ESRD on HD MWF schedule: Seen on HD today tolerating well. Stable for discharge. Strongly encourage to comply with outpatient HD alley.   - Need topical abx cream application on B/L LE  - Stable for discharge from renal standpoint.       Ameya Soto MD  11/02/22  11:52 EDT

## 2022-11-02 NOTE — NURSING NOTE
[facility] team consulted for shoulder and coccyx    RN at bedside to assist with turning patient in ICU and iso-Librium bed alert and oriented    Patient has multiple areas of bruising to left lateral thoracic spine/scapula, Left greater trochanter, abdominal fold.  Skin is maroon/purple blanchable.  Periwound skin is blanchable.  No drainage noted.    Patient has abrasions to both bilateral lower extremities with scabs and partial-thickness open wounds as well as bruising and scratches.  Skin is blanchable as are wound beds.  No drainage noted.    Patient noted to have abrasion versus skin tear to midline thoracic spine.  Wound bed is moist red, yellow and periwound skin is intact both are blanchable.  Cleanse with normal saline, patted dry, and applied silicone foam border dressing.    Patient states that wounds are the result of falls.  Intensivist nurse practitioner entered room and while WO RN was finishing head to toe assessment discussed discharge with patient for later today.  She responded that no dressing change orders are needed given patient's discharge.    Patient's coccyx and buttocks are intact blanchable As are his heels.     Left greater trochanter:       Left lateral thoracic spine/scapula      Bilateral lower legs          Midline thoracic spine      Abdominal fold      Thank you for the consult.  [facility] team will sign off.

## 2022-11-02 NOTE — PLAN OF CARE
Problem: Adult Inpatient Plan of Care  Goal: Plan of Care Review  11/2/2022 0541 by La Nena Cruz RN  Outcome: Ongoing, Progressing  Flowsheets (Taken 11/2/2022 0541)  Progress: improving  Outcome Evaluation: Patient with no acute events overnight,. A&Ox4, room air-2LNC applied while sleeping. No reports of pain. Potassium level normalized. Plans for HD again today.

## 2022-11-02 NOTE — CASE MANAGEMENT/SOCIAL WORK
Continued Stay Note  [facility]     Patient Name: Axel Desir  MRN: [id number]  Today's Date: 11/2/2022    Admit Date: 11/1/2022    Plan: Home with home health   Discharge Plan     Row Name 11/02/22 1215       Plan    Plan Home with home health    Patient/Family in Agreement with Plan yes    Plan Comments Discussed in MDR.  Patient may discharge home today after HD.  Per MD zhou to order home health.  Referral faxed to [facility] and Honey with [facility].  has accepted patient.  CM will notify [facility] and fax appropriate paperwork to [facility] when discharge is known. CM will continue to follow.  1515  Attempted to call patients [facility], Candace and voice mail left explaining patient would be discharged tomorrow and home health has been arranged.  SW is checking into transportation with [facility] in case [facility] can not transport.  Spoke with Honey with [facility].  and she has accepted patient and is aware patient will discharge tomorrow.  CM will continue to follow.    Final Discharge Disposition Code 06 - home with home health care               Discharge Codes    No documentation.               Expected Discharge Date and Time     Expected Discharge Date Expected Discharge Time    Nov 8, 2022             Jayleen Johnson RN

## 2022-11-02 NOTE — CASE MANAGEMENT/SOCIAL WORK
Continued Stay Note   [place]     Patient Name: Axel Desir  MRN: [id number]  Today's Date: 11/2/2022    Admit Date: 11/1/2022    Plan: ZOEY   Discharge Plan     Row Name 11/02/22 1544       Plan    Plan SW    Plan Comments Sw’er called [facility] [phone] Transportation to inquire about eligibility. Patient is not eligible for free transportation through Medicaid Transportation. Rep reports that patient private pays for his dialysis transportation trips. Patient able to private pay for transportation home which would be $130.50. Transportation can be requested on day of discharge; early as possible.               Discharge Codes    No documentation.               Expected Discharge Date and Time     Expected Discharge Date Expected Discharge Time    Nov 8, 2022             JAE Hung (Kay)

## 2022-11-03 ENCOUNTER — HOSPITAL ENCOUNTER (INPATIENT)
Facility: HOSPITAL | Age: 62
LOS: 9 days | Discharge: HOME | End: 2022-11-20
Attending: STUDENT IN AN ORGANIZED HEALTH CARE EDUCATION/TRAINING PROGRAM | Admitting: INTERNAL MEDICINE

## 2022-11-03 ENCOUNTER — APPOINTMENT (OUTPATIENT)
Dept: GENERAL RADIOLOGY | Facility: HOSPITAL | Age: 62
End: 2022-11-03

## 2022-11-03 ENCOUNTER — TRANSITIONAL CARE MANAGEMENT TELEPHONE ENCOUNTER (OUTPATIENT)
Dept: CALL CENTER | Facility: HOSPITAL | Age: 62
End: 2022-11-03

## 2022-11-03 DIAGNOSIS — J18.9 PNEUMONIA OF RIGHT UPPER LOBE DUE TO INFECTIOUS ORGANISM: Primary | ICD-10-CM

## 2022-11-03 DIAGNOSIS — I25.119 CORONARY ARTERY DISEASE INVOLVING NATIVE HEART WITH ANGINA PECTORIS, UNSPECIFIED VESSEL OR LESION TYPE: Chronic | ICD-10-CM

## 2022-11-03 DIAGNOSIS — N18.6 ESRD (END STAGE RENAL DISEASE) ON DIALYSIS: Chronic | ICD-10-CM

## 2022-11-03 DIAGNOSIS — Z99.2 ESRD (END STAGE RENAL DISEASE) ON DIALYSIS: Chronic | ICD-10-CM

## 2022-11-03 LAB
ALBUMIN SERPL-MCNC: 3.56 G/DL (ref 3.5–5.2)
ALBUMIN/GLOB SERPL: 1.7 G/DL
ALP SERPL-CCNC: 80 U/L (ref 39–117)
ALT SERPL W P-5'-P-CCNC: 202 U/L (ref 1–41)
ANION GAP SERPL CALCULATED.3IONS-SCNC: 19.1 MMOL/L (ref 5–15)
ANISOCYTOSIS BLD QL: ABNORMAL
AST SERPL-CCNC: 135 U/L (ref 1–40)
BILIRUB SERPL-MCNC: 1.8 MG/DL (ref 0–1.2)
BUN SERPL-MCNC: 65 MG/DL (ref 8–23)
BUN/CREAT SERPL: 12.3 (ref 7–25)
BURR CELLS BLD QL SMEAR: ABNORMAL
CALCIUM SPEC-SCNC: 8.3 MG/DL (ref 8.6–10.5)
CHLORIDE SERPL-SCNC: 92 MMOL/L (ref 98–107)
CO2 SERPL-SCNC: 23.9 MMOL/L (ref 22–29)
CREAT SERPL-MCNC: 5.28 MG/DL (ref 0.76–1.27)
CRP SERPL-MCNC: 2.33 MG/DL (ref 0–0.5)
D-LACTATE SERPL-SCNC: 2.3 MMOL/L (ref 0.5–2)
D-LACTATE SERPL-SCNC: 3.7 MMOL/L (ref 0.5–2)
DEPRECATED RDW RBC AUTO: 67.1 FL (ref 37–54)
EGFRCR SERPLBLD CKD-EPI 2021: 11.4 ML/MIN/1.73
EOSINOPHIL # BLD MANUAL: 0.08 10*3/MM3 (ref 0–0.4)
EOSINOPHIL NFR BLD MANUAL: 1 % (ref 0.3–6.2)
ERYTHROCYTE [DISTWIDTH] IN BLOOD BY AUTOMATED COUNT: 21.1 % (ref 12.3–15.4)
FLUAV SUBTYP SPEC NAA+PROBE: NOT DETECTED
FLUBV RNA ISLT QL NAA+PROBE: NOT DETECTED
GIANT PLATELETS: ABNORMAL
GLOBULIN UR ELPH-MCNC: 2.1 GM/DL
GLUCOSE SERPL-MCNC: 195 MG/DL (ref 65–99)
HCT VFR BLD AUTO: 35.7 % (ref 37.5–51)
HGB BLD-MCNC: 12.2 G/DL (ref 13–17.7)
HYPOCHROMIA BLD QL: ABNORMAL
LARGE PLATELETS: ABNORMAL
LYMPHOCYTES # BLD MANUAL: 0.9 10*3/MM3 (ref 0.7–3.1)
LYMPHOCYTES NFR BLD MANUAL: 5 % (ref 5–12)
MCH RBC QN AUTO: 34.7 PG (ref 26.6–33)
MCHC RBC AUTO-ENTMCNC: 34.2 G/DL (ref 31.5–35.7)
MCV RBC AUTO: 101.4 FL (ref 79–97)
MONOCYTES # BLD: 0.38 10*3/MM3 (ref 0.1–0.9)
NEUTROPHILS # BLD AUTO: 6.15 10*3/MM3 (ref 1.7–7)
NEUTROPHILS NFR BLD MANUAL: 80 % (ref 42.7–76)
NEUTS BAND NFR BLD MANUAL: 2 % (ref 0–5)
NRBC SPEC MANUAL: 2 /100 WBC (ref 0–0.2)
PLATELET # BLD AUTO: 62 10*3/MM3 (ref 140–450)
PMV BLD AUTO: 11.6 FL (ref 6–12)
POIKILOCYTOSIS BLD QL SMEAR: ABNORMAL
POTASSIUM SERPL-SCNC: 4.8 MMOL/L (ref 3.5–5.2)
PROT SERPL-MCNC: 5.7 G/DL (ref 6–8.5)
RBC # BLD AUTO: 3.52 10*6/MM3 (ref 4.14–5.8)
SARS-COV-2 RNA PNL SPEC NAA+PROBE: NOT DETECTED
SCAN SLIDE: NORMAL
SODIUM SERPL-SCNC: 135 MMOL/L (ref 136–145)
TARGETS BLD QL SMEAR: ABNORMAL
VARIANT LYMPHS NFR BLD MANUAL: 12 % (ref 19.6–45.3)
WBC NRBC COR # BLD: 7.5 10*3/MM3 (ref 3.4–10.8)

## 2022-11-03 PROCEDURE — 63710000001 DOXYCYCLINE 100 MG CAPSULE: Performed by: PHYSICIAN ASSISTANT

## 2022-11-03 PROCEDURE — 86140 C-REACTIVE PROTEIN: CPT | Performed by: PHYSICIAN ASSISTANT

## 2022-11-03 PROCEDURE — 83605 ASSAY OF LACTIC ACID: CPT | Performed by: PHYSICIAN ASSISTANT

## 2022-11-03 PROCEDURE — 71045 X-RAY EXAM CHEST 1 VIEW: CPT | Performed by: RADIOLOGY

## 2022-11-03 PROCEDURE — 36415 COLL VENOUS BLD VENIPUNCTURE: CPT

## 2022-11-03 PROCEDURE — 71045 X-RAY EXAM CHEST 1 VIEW: CPT

## 2022-11-03 PROCEDURE — A9270 NON-COVERED ITEM OR SERVICE: HCPCS | Performed by: PHYSICIAN ASSISTANT

## 2022-11-03 PROCEDURE — 85007 BL SMEAR W/DIFF WBC COUNT: CPT | Performed by: PHYSICIAN ASSISTANT

## 2022-11-03 PROCEDURE — 85025 COMPLETE CBC W/AUTO DIFF WBC: CPT | Performed by: PHYSICIAN ASSISTANT

## 2022-11-03 PROCEDURE — 63710000001 CARVEDILOL 6.25 MG TABLET: Performed by: PHYSICIAN ASSISTANT

## 2022-11-03 PROCEDURE — 0 CEFTRIAXONE SODIUM 1 G RECONSTITUTED SOLUTION: Performed by: PHYSICIAN ASSISTANT

## 2022-11-03 PROCEDURE — 99285 EMERGENCY DEPT VISIT HI MDM: CPT

## 2022-11-03 PROCEDURE — 80053 COMPREHEN METABOLIC PANEL: CPT | Performed by: PHYSICIAN ASSISTANT

## 2022-11-03 PROCEDURE — G0378 HOSPITAL OBSERVATION PER HR: HCPCS

## 2022-11-03 PROCEDURE — 87636 SARSCOV2 & INF A&B AMP PRB: CPT | Performed by: PHYSICIAN ASSISTANT

## 2022-11-03 PROCEDURE — 87040 BLOOD CULTURE FOR BACTERIA: CPT | Performed by: PHYSICIAN ASSISTANT

## 2022-11-03 PROCEDURE — 99223 1ST HOSP IP/OBS HIGH 75: CPT | Performed by: PHYSICIAN ASSISTANT

## 2022-11-03 RX ORDER — DOXYCYCLINE 100 MG/1
100 CAPSULE ORAL 2 TIMES DAILY
Qty: 20 CAPSULE | Refills: 0 | Status: SHIPPED | OUTPATIENT
Start: 2022-11-03 | End: 2022-11-18

## 2022-11-03 RX ORDER — DOXYCYCLINE 100 MG/1
100 CAPSULE ORAL ONCE
Status: COMPLETED | OUTPATIENT
Start: 2022-11-03 | End: 2022-11-03

## 2022-11-03 RX ORDER — SODIUM CHLORIDE 0.9 % (FLUSH) 0.9 %
10 SYRINGE (ML) INJECTION AS NEEDED
Status: DISCONTINUED | OUTPATIENT
Start: 2022-11-03 | End: 2022-11-20 | Stop reason: HOSPADM

## 2022-11-03 RX ORDER — CEFUROXIME AXETIL 500 MG/1
500 TABLET ORAL 2 TIMES DAILY
Qty: 20 TABLET | Refills: 0 | Status: SHIPPED | OUTPATIENT
Start: 2022-11-03 | End: 2022-11-18

## 2022-11-03 RX ORDER — CARVEDILOL 6.25 MG/1
6.25 TABLET ORAL 2 TIMES DAILY WITH MEALS
Status: DISCONTINUED | OUTPATIENT
Start: 2022-11-03 | End: 2022-11-15

## 2022-11-03 RX ADMIN — CEFTRIAXONE SODIUM 1 G: 1 INJECTION, POWDER, FOR SOLUTION INTRAVENOUS at 22:20

## 2022-11-03 RX ADMIN — CARVEDILOL 6.25 MG: 6.25 TABLET, FILM COATED ORAL at 21:19

## 2022-11-03 RX ADMIN — DOXYCYCLINE 100 MG: 100 CAPSULE ORAL at 21:19

## 2022-11-03 NOTE — TELEPHONE ENCOUNTER
I don't have any order to sign, can they refax them    Angela at [facility] notified ,they will fax.

## 2022-11-03 NOTE — OUTREACH NOTE
Call Center TCM Note    Flowsheet Row Responses   [facility] patient discharged from? [place]   Does the patient have one of the following disease processes/diagnoses(primary or secondary)? Other   TCM attempt successful? Yes   Call start time 0848   Call end time 0905   Discharge diagnosis Hyperkalemia   Person spoke with today (if not patient) and relationship Candace Delgadillo [facility]   Meds reviewed with patient/caregiver? Yes   Is the patient having any side effects they believe may be caused by any medication additions or changes? No   Does the patient have all medications ordered at discharge? Yes   Is the patient taking all medications as directed (includes completed medication regime)? Yes   Medication comments Reviewed meds with Candace. She was not aware of med at  pharm. She will call to have the med transfered.    Comments Candace will call PCP office to make FU appt. Declined offered appt.    Does the patient have an appointment with their PCP within 7 days of discharge? No   Nursing Interventions Routed TCM call to PCP office, Patient declined scheduling/rescheduling appointment at this time   What [facility] the Home health agency?  [facility]     Has home health visited the patient within 72 hours of discharge? Unsure   Psychosocial issues? No   Did the patient receive a copy of their discharge instructions? Yes   Nursing interventions Reviewed instructions with patient   What is the patient's perception of their health status since discharge? Improving   Is the patient/caregiver able to teach back signs and symptoms related to disease process for when to call PCP? Yes   Is the patient/caregiver able to teach back signs and symptoms related to disease process for when to call 911? Yes   Is the patient/caregiver able to teach back the hierarchy of who to call/visit for symptoms/problems? PCP, Specialist, Home health nurse, Urgent Care, ED, 911 Yes   TCM call completed? Yes   Wrap up additional comments  Candace reports that Pt got home late last night. She states that she fixed his meds up and she is back at home. She will call PCP office for FU.      Call end time 0905        Candace called back wanting a return call. Attempted x 3 and no answer.   Again received call asking for call back, spoke with Canadce and she did not have any questions . States she did accept the appt from PCP office, that was again offered to her by the office. No other needs at this time.   Lizzie Sierra RN    11/3/2022, 09:07 EDT

## 2022-11-03 NOTE — DISCHARGE SUMMARY
Discharge Summary  Date of Discharge:  11/2/2022    Discharge Diagnosis: Hyperkalemia    Presenting Problem/History of Present Illness    Hyperkalemia    CAD s/p CABG x 2    T2DM     Hepatitis C    PAF     Iron deficiency anemia    ESRD on HD     Hypothyroidism    Medical non-compliance    Chronic anticoagulation (Eliquis)       Hospital Course  62 yoM with PMH of tobacco use, PAF on Eliquis, HTN, hypothyroidism, CAD s/p CABG, HCV, ESRD on HD, GERD, and medical noncompliance who was admitted to [facility] 11/1/22 with progressive weakness found to have metabolic acidosis and hyperkalemia in the setting of missing his last two outpatient HD sessions.      He received hyperkalemia protocol and Nephrology was consulted / he underwent HD on 11/1 and 11/2 ultimately resulting in improved electrolytes and normalization of lactate.  His sister has driven down from [place] and is to take him home by private vehicle.  The patient has a dialysis appointment already scheduled for 11/4 and she will be available to take him to this and will follow up w/ him at his next nephrology appointment.       Procedures Performed: none      Consults:   Consults     Date and Time Order Name Status Description    11/1/2022  2:42 AM Inpatient Nephrology Consult Completed           Pertinent Test Results:            Results from last 7 days   Lab Units 11/02/22  0451 11/01/22  0337 11/01/22  0028   WBC 10*3/mm3 6.69 9.76 11.35*   HEMOGLOBIN g/dL 10.7* 11.4* 12.2*   PLATELETS 10*3/mm3 73* 94* 113*              Results from last 7 days   Lab Units 11/02/22  0451 11/01/22  0337 11/01/22  0145 11/01/22  0028   SODIUM mmol/L 133* 132* 133* 133*   POTASSIUM mmol/L 4.7 5.6* 6.3* 6.7*   CO2 mmol/L 21.0* 16.0* 15.0* 16.0*   BUN mg/dL 62* 89* 91* 84*   CREATININE mg/dL 5.65* 6.79* 6.75* 7.19*   MAGNESIUM mg/dL 2.1 2.4  --  2.6*   PHOSPHORUS mg/dL 7.5* 10.4*  --  11.3*   GLUCOSE mg/dL 192* 218* 216* 220*      Estimated Creatinine Clearance: 13.3 mL/min (A) (by C-G  formula based on SCr of 5.65 mg/dL (H)).          Results from last 7 days   Lab Units 11/01/22  0222   PH, ARTERIAL pH units 7.326*   PCO2, ARTERIAL mm Hg 27.1*   PO2 ART mm Hg 98.2         Condition on Discharge:  stable    Vital Signs  Temp:  [97.8 °F (36.6 °C)-98.8 °F (37.1 °C)] 98.7 °F (37.1 °C)  Heart Rate:  [68-82] 82  Resp:  [16-20] 16  BP: ()/(54-81) 87/70      Physical Exam:   GENERAL: Frail-appearing male appearing older than stated age, lying in bed and conversant. No acute distress.   HEENT: Normocephalic and atraumatic. Trachea midline. PER. EOM WNL.   LUNGS: Chest rise of normal depth and symmetric. Lungs clear to auscultation bilaterally. No wheezes, rhonchi, or rales.   HEART: S1,S2 detected. Regular rate and rhythm. No rub, murmur, or gallop.   ABDOMEN: Soft, rounded, nondistended, and nontender. Bowel sounds present.   EXTREMITIES: No clubbing or cyanosis. Mild peripheral edema. Peripheral pulses present. Skin warm and dry. Generalized bruising and scratches present.   NEURO/PSYCH: Alert but confused to place & situation. Follows commands. Moves all extremities. No new focal deficits.           Discharge Disposition  Home or Self Care    Discharge Medications     Discharge Medications      Changes to Medications      Instructions Start Date   carvedilol 25 MG tablet  Commonly known as: COREG  What changed: how much to take   12.5 mg, Oral, 2 Times Daily With Meals         Continue These Medications      Instructions Start Date   apixaban 5 MG tablet tablet  Commonly known as: ELIQUIS   5 mg, Oral, Every 12 Hours Scheduled      atorvastatin 40 MG tablet  Commonly known as: LIPITOR   40 mg, Oral, Nightly      calcium acetate 667 MG capsule capsule  Commonly known as: PHOS BINDER)   2,668 mg, Oral, 3 Times Daily With Meals      cetirizine 10 MG tablet  Commonly known as: zyrTEC   10 mg, Oral, Daily      levothyroxine 88 MCG tablet  Commonly known as: SYNTHROID, LEVOTHROID   88 mcg, Oral, Every  Early Morning      metFORMIN 500 MG tablet  Commonly known as: Glucophage   500 mg, Oral, 2 Times Daily With Meals      nitroglycerin 0.4 MG SL tablet  Commonly known as: NITROSTAT   0.4 mg, Sublingual, Every 5 Minutes PRN      TOMASA-GAB PO   1 tablet, Oral, Daily      thiamine 100 MG tablet  Commonly known as: VITAMIN B1   100 mg, Oral         Stop These Medications    diphenhydrAMINE 25 MG tablet  Commonly known as: BENADRYL     doxycycline 100 MG capsule  Commonly known as: VIBRAMYCIN     famotidine 20 MG tablet  Commonly known as: PEPCID     folic acid 1 MG tablet  Commonly known as: FOLVITE     melatonin 5 MG tablet tablet            Discharge Diet: renal     Activity at Discharge: as tolerated    Follow-up Appointments  Future Appointments   Date Time Provider Department Center   11/8/2022 10:45 AM Emily Wyatt APRN [facility]   11/11/2022 10:00 AM COR [facility] ECHO/VAS CART 4 BH COR NLA COR   1/23/2023 10:45 AM Lizandro Martinez APRN [facility] COR     Additional Instructions for the Follow-ups that You Need to Schedule     Ambulatory Referral to Home Health   As directed      Face to Face Visit Date: 11/2/2022    Follow-up provider for Plan of Care?: I treated the patient in an acute care facility and will not continue treatment after discharge.    Follow-up provider: LIZANDRO MARTINEZ [[id number]]    Reason/Clinical Findings: Hyperkalemia ICD-10-CM: E87.5  ESRD on HD (Chronic) ICD-10-CM: N18.6, Z99.2  T2DM (Chronic) ICD-10-CM: E11.9    Describe mobility limitations that make leaving home difficult: Hyperkalemia ICD-10-CM: E87.5  ESRD on HD (Chronic) ICD-10-CM: N18.6, Z99.2  T2DM (Chronic) ICD-10-CM: E11.9    Nursing/Therapeutic Services Requested: Skilled Nursing Physical Therapy Occupational Therapy    Skilled nursing orders: Medication education Cardiopulmonary assessments    PT orders: Therapeutic exercise (evaluate and treat) Strengthening Home safety assessment    Occupational orders: Activities of  daily living (evaluate and treat) Strengthening Cognition Fine motor Home safety assessment    Frequency: 1 Week 1               Test Results Pending at Discharge  Pending Labs     Order Current Status    Blood Culture - Blood, Arm, Right Preliminary result    Blood Culture - Blood, Arm, Right Preliminary result           IVETTE Crain  11/02/22  20:43 EDT    Time: Discharge 18 min    ADDENDUM:  Reviewed and agree.  Met with patient and his sister who is giving him transportation back home.  We discussed about not missing dialysis.  She showed me that she will make sure that he is taking care of his health.  Apparently patient used to live with her for 3years  in [place] and moved back to [place] this June.  She is going back and forth to [place] to take care of him.  Offered patient inpatient rehab to get more strength but patient refused and wants to go home. Sister present and agreed to take him home. Will discharge home.  Electronically signed by Tavon Harrell MD, 11/02/22, 9:17 PM EDT.

## 2022-11-03 NOTE — OUTREACH NOTE
Prep Survey    Flowsheet Row Responses   [facility] patient discharged from? [place]   Is LACE score < 7 ? No   Emergency Room discharge w/ pulse ox? No   Eligibility [facility]   Date of Admission 11/01/22   Date of Discharge 11/02/22   Discharge Disposition Home or Self Care   Discharge diagnosis Hyperkalemia   Does the patient have one of the following disease processes/diagnoses(primary or secondary)? Other   Does the patient have Home health ordered? Yes   What is the Home health agency?  [facility]     Is there a DME ordered? No   Prep survey completed? Yes          LAN CABALLERO - Registered Nurse

## 2022-11-03 NOTE — CASE MANAGEMENT/SOCIAL WORK
Case Management Discharge Note      Final Note: Patient discharged last evening, cousin transported.  Spoke with Chrissy with [facility] this am and informed of patients discharge and will return to them tomorrow.  Discharge summary and last dialysis order faxed to [facility].  FIORELLA spoke with Honey with [facility] yesterday and informed that patient would be discharging today.  Will reach out to her when they have opened and let her know he actually discharged last evening.  Discharge summary has been faxed to [facility].         Selected Continued Care - Discharged on 11/2/2022 Admission date: 11/1/2022 - Discharge disposition: Home or Self Care    Destination    No services have been selected for the patient.              Durable Medical Equipment    No services have been selected for the patient.              Dialysis/Infusion    No services have been selected for the patient.              Home Medical Care     Service Provider Selected Services Address Phone Fax Patient Preferred    [facility] [address] [place] [address] [facility] [address] [phone] --          Therapy    No services have been selected for the patient.              Community Resources    No services have been selected for the patient.              Community & DME    No services have been selected for the patient.                       Final Discharge Disposition Code: 06 - home with home health care

## 2022-11-04 ENCOUNTER — READMISSION MANAGEMENT (OUTPATIENT)
Dept: CALL CENTER | Facility: HOSPITAL | Age: 62
End: 2022-11-04

## 2022-11-04 LAB
ALBUMIN SERPL-MCNC: 3.05 G/DL (ref 3.5–5.2)
ALBUMIN/GLOB SERPL: 1.6 G/DL
ALP SERPL-CCNC: 72 U/L (ref 39–117)
ALT SERPL W P-5'-P-CCNC: 152 U/L (ref 1–41)
ANION GAP SERPL CALCULATED.3IONS-SCNC: 19.7 MMOL/L (ref 5–15)
AST SERPL-CCNC: 84 U/L (ref 1–40)
ATMOSPHERIC PRESS: 732 MMHG
BASE EXCESS BLDV CALC-SCNC: -2.1 MMOL/L (ref 0–2)
BDY SITE: ABNORMAL
BILIRUB CONJ SERPL-MCNC: 0.7 MG/DL (ref 0–0.3)
BILIRUB SERPL-MCNC: 1.2 MG/DL (ref 0–1.2)
BODY TEMPERATURE: 37 C
BUN SERPL-MCNC: 74 MG/DL (ref 8–23)
BUN/CREAT SERPL: 13.3 (ref 7–25)
CALCIUM SPEC-SCNC: 7.6 MG/DL (ref 8.6–10.5)
CHLORIDE SERPL-SCNC: 92 MMOL/L (ref 98–107)
CO2 BLDA-SCNC: 24.7 MMOL/L (ref 22–33)
CO2 SERPL-SCNC: 19.3 MMOL/L (ref 22–29)
COHGB MFR BLD: 1.5 % (ref 0–5)
CREAT SERPL-MCNC: 5.57 MG/DL (ref 0.76–1.27)
CRP SERPL-MCNC: 2.03 MG/DL (ref 0–0.5)
D-LACTATE SERPL-SCNC: 1.6 MMOL/L (ref 0.5–2)
D-LACTATE SERPL-SCNC: 2.4 MMOL/L (ref 0.5–2)
D-LACTATE SERPL-SCNC: 3.4 MMOL/L (ref 0.5–2)
D-LACTATE SERPL-SCNC: 4.2 MMOL/L (ref 0.5–2)
DEPRECATED RDW RBC AUTO: 68.1 FL (ref 37–54)
EGFRCR SERPLBLD CKD-EPI 2021: 10.7 ML/MIN/1.73
ERYTHROCYTE [DISTWIDTH] IN BLOOD BY AUTOMATED COUNT: 21.2 % (ref 12.3–15.4)
GLOBULIN UR ELPH-MCNC: 2 GM/DL
GLUCOSE BLDC GLUCOMTR-MCNC: 194 MG/DL (ref 70–130)
GLUCOSE BLDC GLUCOMTR-MCNC: 265 MG/DL (ref 70–130)
GLUCOSE BLDC GLUCOMTR-MCNC: 381 MG/DL (ref 70–130)
GLUCOSE SERPL-MCNC: 319 MG/DL (ref 65–99)
HAV IGM SERPL QL IA: ABNORMAL
HBV CORE IGM SERPL QL IA: ABNORMAL
HBV SURFACE AG SERPL QL IA: ABNORMAL
HCO3 BLDV-SCNC: 23.4 MMOL/L (ref 22–28)
HCT VFR BLD AUTO: 32.5 % (ref 37.5–51)
HCV AB SER DONR QL: REACTIVE
HGB BLD-MCNC: 10.9 G/DL (ref 13–17.7)
HGB BLDA-MCNC: 11.3 G/DL (ref 14–18)
INHALED O2 CONCENTRATION: 21 %
Lab: ABNORMAL
MAGNESIUM SERPL-MCNC: 2.3 MG/DL (ref 1.6–2.4)
MCH RBC QN AUTO: 34.3 PG (ref 26.6–33)
MCHC RBC AUTO-ENTMCNC: 33.5 G/DL (ref 31.5–35.7)
MCV RBC AUTO: 102.2 FL (ref 79–97)
METHGB BLD QL: 0.4 % (ref 0–3)
MODALITY: ABNORMAL
OXYHGB MFR BLDV: 58.9 % (ref 45–75)
PCO2 BLDV: 42.1 MM HG (ref 41–51)
PH BLDV: 7.35 PH UNITS (ref 7.32–7.42)
PLATELET # BLD AUTO: 47 10*3/MM3 (ref 140–450)
PMV BLD AUTO: 13 FL (ref 6–12)
PO2 BLDV: 36.8 MM HG (ref 27–53)
POTASSIUM SERPL-SCNC: 4.9 MMOL/L (ref 3.5–5.2)
PROT SERPL-MCNC: 5 G/DL (ref 6–8.5)
QT INTERVAL: 368 MS
QTC INTERVAL: 402 MS
RBC # BLD AUTO: 3.18 10*6/MM3 (ref 4.14–5.8)
SAO2 % BLDCOV: 60 % (ref 45–75)
SODIUM SERPL-SCNC: 131 MMOL/L (ref 136–145)
VENTILATOR MODE: ABNORMAL
WBC NRBC COR # BLD: 6.25 10*3/MM3 (ref 3.4–10.8)

## 2022-11-04 PROCEDURE — 94799 UNLISTED PULMONARY SVC/PX: CPT

## 2022-11-04 PROCEDURE — 82820 HEMOGLOBIN-OXYGEN AFFINITY: CPT

## 2022-11-04 PROCEDURE — 94761 N-INVAS EAR/PLS OXIMETRY MLT: CPT

## 2022-11-04 PROCEDURE — 83605 ASSAY OF LACTIC ACID: CPT | Performed by: PHYSICIAN ASSISTANT

## 2022-11-04 PROCEDURE — A9270 NON-COVERED ITEM OR SERVICE: HCPCS | Performed by: PHYSICIAN ASSISTANT

## 2022-11-04 PROCEDURE — 86140 C-REACTIVE PROTEIN: CPT | Performed by: PHYSICIAN ASSISTANT

## 2022-11-04 PROCEDURE — 82805 BLOOD GASES W/O2 SATURATION: CPT

## 2022-11-04 PROCEDURE — 85027 COMPLETE CBC AUTOMATED: CPT | Performed by: PHYSICIAN ASSISTANT

## 2022-11-04 PROCEDURE — 63710000001 HYDROXYZINE 25 MG TABLET: Performed by: PHYSICIAN ASSISTANT

## 2022-11-04 PROCEDURE — 80074 ACUTE HEPATITIS PANEL: CPT | Performed by: INTERNAL MEDICINE

## 2022-11-04 PROCEDURE — 92610 EVALUATE SWALLOWING FUNCTION: CPT

## 2022-11-04 PROCEDURE — 82962 GLUCOSE BLOOD TEST: CPT

## 2022-11-04 PROCEDURE — 83735 ASSAY OF MAGNESIUM: CPT | Performed by: PHYSICIAN ASSISTANT

## 2022-11-04 PROCEDURE — 63710000001 PANTOPRAZOLE 40 MG TABLET DELAYED-RELEASE: Performed by: PHYSICIAN ASSISTANT

## 2022-11-04 PROCEDURE — 93005 ELECTROCARDIOGRAM TRACING: CPT | Performed by: INTERNAL MEDICINE

## 2022-11-04 PROCEDURE — 93010 ELECTROCARDIOGRAM REPORT: CPT | Performed by: INTERNAL MEDICINE

## 2022-11-04 PROCEDURE — G0378 HOSPITAL OBSERVATION PER HR: HCPCS

## 2022-11-04 PROCEDURE — 80053 COMPREHEN METABOLIC PANEL: CPT | Performed by: PHYSICIAN ASSISTANT

## 2022-11-04 PROCEDURE — 82248 BILIRUBIN DIRECT: CPT | Performed by: PHYSICIAN ASSISTANT

## 2022-11-04 PROCEDURE — 97162 PT EVAL MOD COMPLEX 30 MIN: CPT

## 2022-11-04 PROCEDURE — 63710000001 INSULIN ASPART PER 5 UNITS: Performed by: PHYSICIAN ASSISTANT

## 2022-11-04 PROCEDURE — 63710000001 CEFDINIR 300 MG CAPSULE: Performed by: PHYSICIAN ASSISTANT

## 2022-11-04 RX ORDER — PANTOPRAZOLE SODIUM 40 MG/1
40 TABLET, DELAYED RELEASE ORAL
Status: DISCONTINUED | OUTPATIENT
Start: 2022-11-04 | End: 2022-11-20 | Stop reason: HOSPADM

## 2022-11-04 RX ORDER — INSULIN ASPART 100 [IU]/ML
0-7 INJECTION, SOLUTION INTRAVENOUS; SUBCUTANEOUS
Status: DISCONTINUED | OUTPATIENT
Start: 2022-11-04 | End: 2022-11-09

## 2022-11-04 RX ORDER — SODIUM CHLORIDE 0.9 % (FLUSH) 0.9 %
3-10 SYRINGE (ML) INJECTION AS NEEDED
Status: DISCONTINUED | OUTPATIENT
Start: 2022-11-04 | End: 2022-11-20 | Stop reason: HOSPADM

## 2022-11-04 RX ORDER — ATORVASTATIN CALCIUM 40 MG/1
40 TABLET, FILM COATED ORAL NIGHTLY
Status: CANCELLED | OUTPATIENT
Start: 2022-11-04 | End: 2022-11-23

## 2022-11-04 RX ORDER — NITROGLYCERIN 0.4 MG/1
0.4 TABLET SUBLINGUAL
Status: CANCELLED | OUTPATIENT
Start: 2022-11-04

## 2022-11-04 RX ORDER — IPRATROPIUM BROMIDE AND ALBUTEROL SULFATE 2.5; .5 MG/3ML; MG/3ML
3 SOLUTION RESPIRATORY (INHALATION)
Status: DISCONTINUED | OUTPATIENT
Start: 2022-11-04 | End: 2022-11-12

## 2022-11-04 RX ORDER — CALCIUM ACETATE 667 MG/1
2668 CAPSULE ORAL
Status: CANCELLED | OUTPATIENT
Start: 2022-11-04

## 2022-11-04 RX ORDER — NICOTINE POLACRILEX 4 MG
15 LOZENGE BUCCAL
Status: DISCONTINUED | OUTPATIENT
Start: 2022-11-04 | End: 2022-11-15 | Stop reason: SDUPTHER

## 2022-11-04 RX ORDER — CARVEDILOL 6.25 MG/1
12.5 TABLET ORAL 2 TIMES DAILY WITH MEALS
Status: CANCELLED | OUTPATIENT
Start: 2022-11-04

## 2022-11-04 RX ORDER — SODIUM CHLORIDE 0.9 % (FLUSH) 0.9 %
3 SYRINGE (ML) INJECTION EVERY 12 HOURS SCHEDULED
Status: DISCONTINUED | OUTPATIENT
Start: 2022-11-04 | End: 2022-11-20 | Stop reason: HOSPADM

## 2022-11-04 RX ORDER — METHION/INOS/CHOL BT/B COM/LIV 110MG-86MG
100 CAPSULE ORAL DAILY
Status: CANCELLED | OUTPATIENT
Start: 2022-11-04

## 2022-11-04 RX ORDER — CEFDINIR 300 MG/1
300 CAPSULE ORAL
Status: COMPLETED | OUTPATIENT
Start: 2022-11-04 | End: 2022-11-10

## 2022-11-04 RX ORDER — CHOLECALCIFEROL (VITAMIN D3) 125 MCG
10 CAPSULE ORAL NIGHTLY PRN
Status: DISCONTINUED | OUTPATIENT
Start: 2022-11-04 | End: 2022-11-20 | Stop reason: HOSPADM

## 2022-11-04 RX ORDER — CALCIUM CARBONATE 200(500)MG
2 TABLET,CHEWABLE ORAL 3 TIMES DAILY PRN
Status: DISCONTINUED | OUTPATIENT
Start: 2022-11-04 | End: 2022-11-20 | Stop reason: HOSPADM

## 2022-11-04 RX ORDER — ASCORBIC ACID, THIAMINE MONONITRATE,RIBOFLAVIN, NIACINAMIDE, PYRIDOXINE HYDROCHLORIDE, FOLIC ACID, CYANOCOBALAMIN, BIOTIN, CALCIUM PANTOTHENATE, 100; 1.5; 1.7; 20; 10; 1; 6000; 150000; 5 MG/1; MG/1; MG/1; MG/1; MG/1; MG/1; UG/1; UG/1; MG/1
1 CAPSULE, LIQUID FILLED ORAL DAILY
Status: CANCELLED | OUTPATIENT
Start: 2022-11-04

## 2022-11-04 RX ORDER — BENZONATATE 100 MG/1
100 CAPSULE ORAL 3 TIMES DAILY PRN
Status: DISCONTINUED | OUTPATIENT
Start: 2022-11-04 | End: 2022-11-20 | Stop reason: HOSPADM

## 2022-11-04 RX ORDER — DEXTROSE MONOHYDRATE 25 G/50ML
25 INJECTION, SOLUTION INTRAVENOUS
Status: DISCONTINUED | OUTPATIENT
Start: 2022-11-04 | End: 2022-11-15 | Stop reason: SDUPTHER

## 2022-11-04 RX ORDER — HYDROXYZINE HYDROCHLORIDE 25 MG/1
25 TABLET, FILM COATED ORAL 3 TIMES DAILY PRN
Status: DISCONTINUED | OUTPATIENT
Start: 2022-11-04 | End: 2022-11-20 | Stop reason: HOSPADM

## 2022-11-04 RX ORDER — GUAIFENESIN/DEXTROMETHORPHAN 100-10MG/5
5 SYRUP ORAL EVERY 4 HOURS PRN
Status: DISCONTINUED | OUTPATIENT
Start: 2022-11-04 | End: 2022-11-20 | Stop reason: HOSPADM

## 2022-11-04 RX ORDER — LEVOTHYROXINE SODIUM 88 UG/1
88 TABLET ORAL
Status: CANCELLED | OUTPATIENT
Start: 2022-11-04

## 2022-11-04 RX ORDER — CETIRIZINE HYDROCHLORIDE 10 MG/1
10 TABLET ORAL DAILY
Status: CANCELLED | OUTPATIENT
Start: 2022-11-04

## 2022-11-04 RX ADMIN — CEFDINIR 300 MG: 300 CAPSULE ORAL at 08:41

## 2022-11-04 RX ADMIN — DOXYCYCLINE 100 MG: 100 INJECTION, POWDER, LYOPHILIZED, FOR SOLUTION INTRAVENOUS at 20:37

## 2022-11-04 RX ADMIN — HYDROXYZINE HYDROCHLORIDE 25 MG: 25 TABLET ORAL at 18:23

## 2022-11-04 RX ADMIN — PANTOPRAZOLE SODIUM 40 MG: 40 TABLET, DELAYED RELEASE ORAL at 05:38

## 2022-11-04 RX ADMIN — Medication 3 ML: at 08:41

## 2022-11-04 RX ADMIN — Medication 3 ML: at 01:49

## 2022-11-04 RX ADMIN — INSULIN ASPART 4 UNITS: 100 INJECTION, SOLUTION INTRAVENOUS; SUBCUTANEOUS at 08:40

## 2022-11-04 RX ADMIN — CARVEDILOL 6.25 MG: 6.25 TABLET, FILM COATED ORAL at 18:22

## 2022-11-04 RX ADMIN — DOXYCYCLINE 100 MG: 100 INJECTION, POWDER, LYOPHILIZED, FOR SOLUTION INTRAVENOUS at 08:41

## 2022-11-04 RX ADMIN — IPRATROPIUM BROMIDE AND ALBUTEROL SULFATE 3 ML: .5; 3 SOLUTION RESPIRATORY (INHALATION) at 07:35

## 2022-11-04 RX ADMIN — INSULIN ASPART 2 UNITS: 100 INJECTION, SOLUTION INTRAVENOUS; SUBCUTANEOUS at 18:23

## 2022-11-04 RX ADMIN — SODIUM CHLORIDE 250 ML: 9 INJECTION, SOLUTION INTRAVENOUS at 02:32

## 2022-11-04 RX ADMIN — INSULIN ASPART 6 UNITS: 100 INJECTION, SOLUTION INTRAVENOUS; SUBCUTANEOUS at 11:30

## 2022-11-04 RX ADMIN — IPRATROPIUM BROMIDE AND ALBUTEROL SULFATE 3 ML: .5; 3 SOLUTION RESPIRATORY (INHALATION) at 12:35

## 2022-11-04 RX ADMIN — Medication 3 ML: at 20:37

## 2022-11-04 NOTE — SIGNIFICANT NOTE
11/04/22 1418   OTHER   Discipline occupational therapist   Rehab Time/Intention   Session Not Performed patient unavailable for evaluation  (Patient out of room for dialysis)

## 2022-11-04 NOTE — NURSING NOTE
Wound consult for abrasions to the RT lower extremity and multiple abrasions to the RT forearm. Areas present with shallow wound beds and blanchable trista wound skin. New order for thera-honey and cover with a border gauze every other day and PRN
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
## 2022-11-04 NOTE — CASE MANAGEMENT/SOCIAL WORK
Case Management/Social Work    Patient Name:  Axel Desir  YOB: 1960  MRN: [id number]  Admit Date:  11/3/2022    SS received call via on-call from Dr. James on 11/3/22 at 9:58 pm. Pt lives alone at [facility]. Pt was seen by [facility] and they wanted to refer him to rehab, however he returned home after refusing to stay. Pt is a hemodialysis pt. Pt is unable to ambulate, therefore [facility] refused to transport him back to [facility]. ED does not have a walker available to attempt to ambulate pt. Pt's cousin, Candace Delgadillo lives in [place]. SS attempted contact with cousin [phone] without success. SS discussed pt with PA, Monica Rae, Dr. James, and , Candace Galvin. Pt will be placed in observation. Pt is agreeable to rehab placement per PA. Pt needs PT/OT consults ordered. SS will follow with pt on 11/4/22 for discharge planning.      Electronically signed by:  JAE Retana  11/04/22 08:36 EDT

## 2022-11-04 NOTE — PLAN OF CARE
Goal Outcome Evaluation:              Outcome Evaluation: Pt has been resting in bed this shift. Pt has had dialysis and worked with physical therapy. No complaints at this time. Will continue with plan of care
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
## 2022-11-04 NOTE — PLAN OF CARE
Goal Outcome Evaluation:  Plan of Care Reviewed With: patient        Progress: no change     PT transferred to 3N from ER. Pt has no c/o at this time. Lactate was 4.2 MARLENY Marte notified and orders placed. PT is scheduled to have dialysis today. No s/sx of distress at this time, VSS, will continue to monitor for changes.

## 2022-11-04 NOTE — PROGRESS NOTES
Interim hospitalist note    Patient seen and examined at bedside with nursing staff present.  Patient states he is doing well today but does have some pain in his legs.  He denies any shortness of breath, fevers, chills, sweats, rigors, nausea or vomiting.  No new events overnight.  Patient is currently awaiting placement.  Patient with right upper lobe community-acquired pneumonia.  We will continue oral Omnicef and doxycycline.  We will also continue hemodialysis every Monday, Wednesday and Friday.
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
## 2022-11-04 NOTE — CASE MANAGEMENT/SOCIAL WORK
Discharge Planning Assessment  [facility]     Patient Name: Axel Desir  MRN: [id number]  Today's Date: 11/4/2022    Admit Date: 11/3/2022    Plan: SS recieved a consult for pt lives at [facility]; and pt cannot go back unless he is able to walk. SS attempted to speak with pt at bedside on this date with no success due to pt being out of the room. SS attempted to call pt mutiple times with no success. SS to follow up with pt on monday. SS to follow and assist with discharge planning.   Discharge Plan     Row Name 11/04/22 1642       Plan    Plan SS recieved a consult for pt lives at [facility]; and pt cannot go back unless he is able to walk. SS attempted to speak with pt at bedside on this date with no success due to pt being out of the room. SS attempted to call pt mutiple times with no success. SS to follow up with pt on monday. SS to follow and assist with discharge planning.              Continued Care and Services - Admitted Since 11/3/2022    Coordination has not been started for this encounter.     Selected Continued Care - Prior Encounters Includes continued care and service providers with selected services from prior encounters from 8/5/2022 to 11/4/2022    Discharged on 11/2/2022 Admission date: 11/1/2022 - Discharge disposition: Home or Self Care    Home Medical Care     Service Provider Selected Services Address Phone Fax Patient Preferred    [facility] [address] [place] [address] [facility] [address] [phone] --                    Expected Discharge Date and Time     Expected Discharge Date Expected Discharge Time    Nov 4, 2022          Demographic Summary     Row Name 11/04/22 1641       General Information    Admission Type observation    Referral Source physician    Reason for Consult other (see comments)  Lives at [facility]; told that the patient cannot go back there unless he is able to walk.                    Mary Ann Buitrago  BSW

## 2022-11-04 NOTE — PROGRESS NOTES
Antibiotic length of therapy :    Cefdinir day 1  ( cephalosporins day 2 )    Doxycycline     Day    2

## 2022-11-05 LAB
ANION GAP SERPL CALCULATED.3IONS-SCNC: 14.3 MMOL/L (ref 5–15)
BUN SERPL-MCNC: 43 MG/DL (ref 8–23)
BUN/CREAT SERPL: 11.5 (ref 7–25)
CALCIUM SPEC-SCNC: 7.6 MG/DL (ref 8.6–10.5)
CHLORIDE SERPL-SCNC: 94 MMOL/L (ref 98–107)
CO2 SERPL-SCNC: 24.7 MMOL/L (ref 22–29)
CREAT SERPL-MCNC: 3.74 MG/DL (ref 0.76–1.27)
DEPRECATED RDW RBC AUTO: 71.7 FL (ref 37–54)
EGFRCR SERPLBLD CKD-EPI 2021: 17.3 ML/MIN/1.73
ERYTHROCYTE [DISTWIDTH] IN BLOOD BY AUTOMATED COUNT: 21 % (ref 12.3–15.4)
GLUCOSE BLDC GLUCOMTR-MCNC: 111 MG/DL (ref 70–130)
GLUCOSE BLDC GLUCOMTR-MCNC: 261 MG/DL (ref 70–130)
GLUCOSE BLDC GLUCOMTR-MCNC: 363 MG/DL (ref 70–130)
GLUCOSE SERPL-MCNC: 247 MG/DL (ref 65–99)
HCT VFR BLD AUTO: 31.9 % (ref 37.5–51)
HGB BLD-MCNC: 11 G/DL (ref 13–17.7)
MCH RBC QN AUTO: 34.2 PG (ref 26.6–33)
MCHC RBC AUTO-ENTMCNC: 34.5 G/DL (ref 31.5–35.7)
MCV RBC AUTO: 99.1 FL (ref 79–97)
PLATELET # BLD AUTO: 44 10*3/MM3 (ref 140–450)
PMV BLD AUTO: 12.9 FL (ref 6–12)
POTASSIUM SERPL-SCNC: 4.4 MMOL/L (ref 3.5–5.2)
RBC # BLD AUTO: 3.22 10*6/MM3 (ref 4.14–5.8)
SODIUM SERPL-SCNC: 133 MMOL/L (ref 136–145)
WBC NRBC COR # BLD: 6.28 10*3/MM3 (ref 3.4–10.8)

## 2022-11-05 PROCEDURE — 63710000001 INSULIN ASPART PER 5 UNITS: Performed by: PHYSICIAN ASSISTANT

## 2022-11-05 PROCEDURE — 80048 BASIC METABOLIC PNL TOTAL CA: CPT | Performed by: INTERNAL MEDICINE

## 2022-11-05 PROCEDURE — 63710000001 CEFDINIR 300 MG CAPSULE: Performed by: PHYSICIAN ASSISTANT

## 2022-11-05 PROCEDURE — G0378 HOSPITAL OBSERVATION PER HR: HCPCS

## 2022-11-05 PROCEDURE — 82962 GLUCOSE BLOOD TEST: CPT

## 2022-11-05 PROCEDURE — 94799 UNLISTED PULMONARY SVC/PX: CPT

## 2022-11-05 PROCEDURE — A9270 NON-COVERED ITEM OR SERVICE: HCPCS | Performed by: PHYSICIAN ASSISTANT

## 2022-11-05 PROCEDURE — 85027 COMPLETE CBC AUTOMATED: CPT | Performed by: INTERNAL MEDICINE

## 2022-11-05 PROCEDURE — 94761 N-INVAS EAR/PLS OXIMETRY MLT: CPT

## 2022-11-05 PROCEDURE — 99232 SBSQ HOSP IP/OBS MODERATE 35: CPT | Performed by: INTERNAL MEDICINE

## 2022-11-05 PROCEDURE — 63710000001 CARVEDILOL 6.25 MG TABLET: Performed by: PHYSICIAN ASSISTANT

## 2022-11-05 RX ADMIN — DOXYCYCLINE 100 MG: 100 INJECTION, POWDER, LYOPHILIZED, FOR SOLUTION INTRAVENOUS at 09:08

## 2022-11-05 RX ADMIN — IPRATROPIUM BROMIDE AND ALBUTEROL SULFATE 3 ML: .5; 3 SOLUTION RESPIRATORY (INHALATION) at 12:36

## 2022-11-05 RX ADMIN — CARVEDILOL 6.25 MG: 6.25 TABLET, FILM COATED ORAL at 17:31

## 2022-11-05 RX ADMIN — CEFDINIR 300 MG: 300 CAPSULE ORAL at 09:09

## 2022-11-05 RX ADMIN — IPRATROPIUM BROMIDE AND ALBUTEROL SULFATE 3 ML: .5; 3 SOLUTION RESPIRATORY (INHALATION) at 06:54

## 2022-11-05 RX ADMIN — Medication 3 ML: at 20:06

## 2022-11-05 RX ADMIN — CARVEDILOL 6.25 MG: 6.25 TABLET, FILM COATED ORAL at 09:09

## 2022-11-05 RX ADMIN — Medication 3 ML: at 09:09

## 2022-11-05 RX ADMIN — DOXYCYCLINE 100 MG: 100 INJECTION, POWDER, LYOPHILIZED, FOR SOLUTION INTRAVENOUS at 20:06

## 2022-11-05 RX ADMIN — INSULIN ASPART 4 UNITS: 100 INJECTION, SOLUTION INTRAVENOUS; SUBCUTANEOUS at 09:09

## 2022-11-05 RX ADMIN — PANTOPRAZOLE SODIUM 40 MG: 40 TABLET, DELAYED RELEASE ORAL at 05:11

## 2022-11-05 RX ADMIN — INSULIN ASPART 6 UNITS: 100 INJECTION, SOLUTION INTRAVENOUS; SUBCUTANEOUS at 11:21

## 2022-11-05 NOTE — PLAN OF CARE
Goal Outcome Evaluation:              Outcome Evaluation: Pt resting in bed. Platelets 44; PA notified. Pt has not urinated so far this shift. No other acute changes noted. Will cont POC

## 2022-11-05 NOTE — PROGRESS NOTES
Nephrology Progress Note      Subjective     Patient denied any chest pain or shortness of breath.     Objective       Vital signs :     Temp:  [97.6 °F (36.4 °C)-97.9 °F (36.6 °C)] 97.6 °F (36.4 °C)  Heart Rate:  [70-80] 73  Resp:  [18-20] 18  BP: (116-132)/(71-81) 127/77    Intake/Output                       11/03/22 0701 - 11/04/22 0700 11/04/22 0701 - 11/05/22 0700     [id number] Total [id number] 7222-9098 Total                 Intake    P.O.  --  240 240  --  180 180    IV Piggyback  --  100 100  --  -- --    Total Intake -- 340 340 -- 180 180       Output    Total Output -- -- -- -- -- --           Physical Exam:    General Appearance : not in acute distress  Lungs : clear to auscultation, respirations regular  Heart :  regular rhythm & normal rate, normal S1, S2 and no murmur, no rub  Abdomen : soft, non distended  Extremities : no edema   Neurologic :   orientated to person, place, time and situation, Grossly no focal deficits        Laboratory Data :     Albumin Albumin   Date Value Ref Range Status   11/04/2022 3.05 (L) 3.50 - 5.20 g/dL Final   11/03/2022 3.56 3.50 - 5.20 g/dL Final      Magnesium Magnesium   Date Value Ref Range Status   11/04/2022 2.3 1.6 - 2.4 mg/dL Final          PTH               No results found for: PTH    CBC and coagulation:  Results from last 7 days   Lab Units 11/05/22  0108 11/04/22  1612 11/04/22  0940 11/04/22  0405 11/03/22  2201 11/03/22  1248 11/01/22  0337 11/01/22  0028   PROCALCITONIN ng/mL  --   --   --   --   --   --   --  0.52*   LACTATE mmol/L  --  1.6 2.4* 3.4*   < > 2.3*   < > 4.1*   CRP mg/dL  --   --   --  2.03*  --  2.33*  --   --    WBC 10*3/mm3 6.28  --   --  6.25  --  7.50   < > 11.35*   HEMOGLOBIN g/dL 11.0*  --   --  10.9*  --  12.2*   < > 12.2*   HEMATOCRIT % 31.9*  --   --  32.5*  --  35.7*   < > 37.2*   MCV fL 99.1*  --   --  102.2*  --  101.4*   < > 103.3*   MCHC g/dL 34.5  --   --  33.5  --  34.2   < > 32.8   PLATELETS 10*3/mm3 44*  --   --   47*  --  62*   < > 113*    < > = values in this interval not displayed.     Acid/base balance:  Results from last 7 days   Lab Units 11/01/22  0222   PH, ARTERIAL pH units 7.326*   PO2 ART mm Hg 98.2   PCO2, ARTERIAL mm Hg 27.1*   HCO3 ART mmol/L 14.1*     Renal and electrolytes:    Results from last 7 days   Lab Units 11/05/22  0108 11/04/22  0405 11/03/22  1248 11/02/22 0451 11/01/22  0337 11/01/22  0145 11/01/22  0028   SODIUM mmol/L 133* 131* 135* 133* 132*   < > 133*   POTASSIUM mmol/L 4.4 4.9 4.8 4.7 5.6*   < > 6.7*   MAGNESIUM mg/dL  --  2.3  --  2.1 2.4  --  2.6*   CHLORIDE mmol/L 94* 92* 92* 92* 87*   < > 86*   CO2 mmol/L 24.7 19.3* 23.9 21.0* 16.0*   < > 16.0*   BUN mg/dL 43* 74* 65* 62* 89*   < > 84*   CREATININE mg/dL 3.74* 5.57* 5.28* 5.65* 6.79*   < > 7.19*   CALCIUM mg/dL 7.6* 7.6* 8.3* 8.3* 8.5*   < > 9.3   PHOSPHORUS mg/dL  --   --   --  7.5* 10.4*  --  11.3*    < > = values in this interval not displayed.     Estimated Creatinine Clearance: 21 mL/min (A) (by C-G formula based on SCr of 3.74 mg/dL (H)).  @GFRCG:3@   Liver and pancreatic function:  Results from last 7 days   Lab Units 11/04/22  0405 11/03/22  1248 11/02/22 0451 11/01/22  0337   ALBUMIN g/dL 3.05* 3.56 3.40*  --    BILIRUBIN mg/dL 1.2 1.8* 1.6*  --    ALK PHOS U/L 72 80 70  --    AST (SGOT) U/L 84* 135* 181*  --    ALT (SGPT) U/L 152* 202* 229*  --    LIPASE U/L  --   --   --  53         Cardiac:      Liver and pancreatic function:  Results from last 7 days   Lab Units 11/04/22  0405 11/03/22  1248 11/02/22  0451 11/01/22  0337   ALBUMIN g/dL 3.05* 3.56 3.40*  --    BILIRUBIN mg/dL 1.2 1.8* 1.6*  --    ALK PHOS U/L 72 80 70  --    AST (SGOT) U/L 84* 135* 181*  --    ALT (SGPT) U/L 152* 202* 229*  --    LIPASE U/L  --   --   --  53       Medications :     carvedilol, 6.25 mg, Oral, BID With Meals  cefdinir, 300 mg, Oral, Q24H  doxycycline, 100 mg, Intravenous, Q12H  Insulin Aspart, 0-7 Units, Subcutaneous, TID  AC  ipratropium-albuterol, 3 mL, Nebulization, 4x Daily - RT  pantoprazole, 40 mg, Oral, Q AM  sodium chloride, 3 mL, Intravenous, Q12H             Assessment & Plan     1. ESKD on IHDx  2. Anemia  3. Hyponatremia, hypervolemic  4. Anion gap metabolic acidosis  5. SHPT  6. Acute bacterial pneumonia    Pt had uneventful dialysis yesterday, toleated well, continue on dialysis MWF schedule.   Anemia; HH at target  SHPT; follow outpatient management        Bladimir Alvarado MD  11/05/22  09:15 EDT

## 2022-11-05 NOTE — OUTREACH NOTE
Medical Week 2 Survey    Flowsheet Row Responses   [facility] patient discharged from? [facility]   Does the patient have one of the following disease processes/diagnoses(primary or secondary)? Other   Week 2 attempt successful? No   Unsuccessful attempts Attempt 1   Revoke Readmitted          CHERYL CASTILLO - Registered Nurse

## 2022-11-05 NOTE — PROGRESS NOTES
[facility]     Patient Identification:  Name:  Axel Desir  Age:  62 y.o.  Sex:  male  :  1960  MRN:  [id number]  Visit Number:  [id number]  Primary Care Provider:  Isabelle Martinez APRN    Length of stay:  0    Chief complaint: Generalized weakness    Subjective:    Patient states he has no new complaints overnight.  He currently denies any fevers, chills, sweats, rigors, nausea or vomiting.  Patient is still agreeable to SNF placement.  ----------------------------------------------------------------------------------------------------------------------  Current [facility] Meds:  carvedilol, 6.25 mg, Oral, BID With Meals  cefdinir, 300 mg, Oral, Q24H  doxycycline, 100 mg, Intravenous, Q12H  Insulin Aspart, 0-7 Units, Subcutaneous, TID AC  ipratropium-albuterol, 3 mL, Nebulization, 4x Daily - RT  pantoprazole, 40 mg, Oral, Q AM  sodium chloride, 3 mL, Intravenous, Q12H         ----------------------------------------------------------------------------------------------------------------------  Vital Signs:  Temp:  [97.6 °F (36.4 °C)-97.9 °F (36.6 °C)] 97.7 °F (36.5 °C)  Heart Rate:  [73-81] 81  Resp:  [18-20] 20  BP: (120-132)/(71-81) 127/75      22  1205 22  0000   Weight: 77.1 kg (170 lb) 74.5 kg (164 lb 4.8 oz)     Body mass index is 24.98 kg/m².    Intake/Output Summary (Last 24 hours) at 2022 1531  Last data filed at 2022 0900  Gross per 24 hour   Intake 300 ml   Output --   Net 300 ml     Diet Soft Texture; Chopped; Thin; Cardiac, Consistent Carbohydrate, Renal  ----------------------------------------------------------------------------------------------------------------------  Physical exam:  Constitutional: Chronically ill-appearing  male in no apparent distress.     HENT:  Head:  Normocephalic and atraumatic.  Mouth:  Moist mucous membranes.    Eyes:  Conjunctivae and EOM are normal.  Pupils are equal, round, and  reactive to light.  No scleral icterus.    Neck:  Neck supple. No thyromegaly.  No JVD present.    Cardiovascular:  Regular rate and rhythm with no murmurs, rubs, clicks or gallops appreciated.  Pulmonary/Chest:  Clear to auscultation bilaterally with no crackles, wheezes or rhonchi appreciated.  Abdominal:  Soft. Nontender. Nondistended  Bowel sounds are normal in all four quadrants. No organomegally appreciated.   Musculoskeletal:  5/5 muscle strength bilateral upper and lower extremities with equal muscle tone and bulk. No edema, no tenderness, and no deformity.  No red or swollen joints anywhere.    Neurological:  Alert, Cranial nerves II-XII intact with no focal deficits.  No facial droop.  No slurred speech.   Skin:  Warm and dry to palpation with no rashes or lesions appreciated.  Peripheral vascular:  2+ radial and pedal pulses in bilateral upper and lower extremities.  Psychiatric:  Alert and oriented x3, demonstrates appropriate judgment and insight.  -------------------------------------------------------------------------------  ----------------------------------------------------------------------------------------------------------------------  Results from last 7 days   Lab Units 11/01/22  0337 11/01/22  0028   TROPONIN T ng/mL 0.060* 0.071*     Results from last 7 days   Lab Units 11/05/22  0108 11/04/22  1612 11/04/22  0940 11/04/22  0405 11/03/22  2201 11/03/22  1248   CRP mg/dL  --   --   --  2.03*  --  2.33*   LACTATE mmol/L  --  1.6 2.4* 3.4*   < > 2.3*   WBC 10*3/mm3 6.28  --   --  6.25  --  7.50   HEMOGLOBIN g/dL 11.0*  --   --  10.9*  --  12.2*   HEMATOCRIT % 31.9*  --   --  32.5*  --  35.7*   MCV fL 99.1*  --   --  102.2*  --  101.4*   MCHC g/dL 34.5  --   --  33.5  --  34.2   PLATELETS 10*3/mm3 44*  --   --  47*  --  62*    < > = values in this interval not displayed.     Results from last 7 days   Lab Units 11/01/22 0222   PH, ARTERIAL pH units 7.326*   PO2 ART mm Hg 98.2   PCO2, ARTERIAL  mm Hg 27.1*   HCO3 ART mmol/L 14.1*     Results from last 7 days   Lab Units 11/05/22  0108 11/04/22  0405 11/03/22  1248 11/02/22  0451 11/01/22  0337   SODIUM mmol/L 133* 131* 135* 133* 132*   POTASSIUM mmol/L 4.4 4.9 4.8 4.7 5.6*   MAGNESIUM mg/dL  --  2.3  --  2.1 2.4   CHLORIDE mmol/L 94* 92* 92* 92* 87*   CO2 mmol/L 24.7 19.3* 23.9 21.0* 16.0*   BUN mg/dL 43* 74* 65* 62* 89*   CREATININE mg/dL 3.74* 5.57* 5.28* 5.65* 6.79*   CALCIUM mg/dL 7.6* 7.6* 8.3* 8.3* 8.5*   GLUCOSE mg/dL 247* 319* 195* 192* 218*   ALBUMIN g/dL  --  3.05* 3.56 3.40*  --    BILIRUBIN mg/dL  --  1.2 1.8* 1.6*  --    ALK PHOS U/L  --  72 80 70  --    AST (SGOT) U/L  --  84* 135* 181*  --    ALT (SGPT) U/L  --  152* 202* 229*  --    Estimated Creatinine Clearance: 21 mL/min (A) (by C-G formula based on SCr of 3.74 mg/dL (H)).    No results found for: AMMONIA      Blood Culture   Date Value Ref Range Status   11/03/2022 No growth at 24 hours  Preliminary   11/03/2022 No growth at 24 hours  Preliminary   11/01/2022 No growth at 4 days  Preliminary   11/01/2022 No growth at 4 days  Preliminary     No results found for: URINECX  No results found for: WOUNDCX  No results found for: STOOLCX    I have personally looked at the labs and they are summarized above.  ----------------------------------------------------------------------------------------------------------------------  Imaging Results (Last 24 Hours)     ** No results found for the last 24 hours. **        ----------------------------------------------------------------------------------------------------------------------  Assessment and Plan:    1.  Right upper lobe pneumonia -continue Omnicef and doxycycline    2.  Generalized weakness -continue PT/OT    3.  End-stage renal disease on hemodialysis -continue current hemodialysis schedule, appreciate nephrology recommendation    4.  Chronic macrocytic anemia -continue to monitor H&H, transfuse for hemoglobin less than 7.    5.   Paroxysmal A. Fib -currently in normal sinus rhythm, continue Coreg and Eliquis    Disposition currently pending placement    Shawn Matthew, DO   11/05/22   15:31 EDT

## 2022-11-05 NOTE — PLAN OF CARE
Goal Outcome Evaluation:              Outcome Evaluation: Pt resting in bed at this time. Pt has ambulated to the bathroom this shift. No complaints at this time. Will continue with plan of care.

## 2022-11-06 LAB
ANION GAP SERPL CALCULATED.3IONS-SCNC: 18.4 MMOL/L (ref 5–15)
BACTERIA SPEC AEROBE CULT: NORMAL
BACTERIA SPEC AEROBE CULT: NORMAL
BUN SERPL-MCNC: 54 MG/DL (ref 8–23)
BUN/CREAT SERPL: 10.5 (ref 7–25)
CALCIUM SPEC-SCNC: 7.2 MG/DL (ref 8.6–10.5)
CHLORIDE SERPL-SCNC: 91 MMOL/L (ref 98–107)
CO2 SERPL-SCNC: 20.6 MMOL/L (ref 22–29)
CREAT SERPL-MCNC: 5.12 MG/DL (ref 0.76–1.27)
DEPRECATED RDW RBC AUTO: 76.2 FL (ref 37–54)
EGFRCR SERPLBLD CKD-EPI 2021: 11.8 ML/MIN/1.73
ERYTHROCYTE [DISTWIDTH] IN BLOOD BY AUTOMATED COUNT: 21.2 % (ref 12.3–15.4)
GLUCOSE BLDC GLUCOMTR-MCNC: 251 MG/DL (ref 70–130)
GLUCOSE BLDC GLUCOMTR-MCNC: 276 MG/DL (ref 70–130)
GLUCOSE BLDC GLUCOMTR-MCNC: 313 MG/DL (ref 70–130)
GLUCOSE SERPL-MCNC: 188 MG/DL (ref 65–99)
HCT VFR BLD AUTO: 33.3 % (ref 37.5–51)
HGB BLD-MCNC: 10.9 G/DL (ref 13–17.7)
MCH RBC QN AUTO: 34.1 PG (ref 26.6–33)
MCHC RBC AUTO-ENTMCNC: 32.7 G/DL (ref 31.5–35.7)
MCV RBC AUTO: 104.1 FL (ref 79–97)
PLATELET # BLD AUTO: 61 10*3/MM3 (ref 140–450)
PMV BLD AUTO: 12.7 FL (ref 6–12)
POTASSIUM SERPL-SCNC: 4.6 MMOL/L (ref 3.5–5.2)
QT INTERVAL: 398 MS
QTC INTERVAL: 462 MS
RBC # BLD AUTO: 3.2 10*6/MM3 (ref 4.14–5.8)
SODIUM SERPL-SCNC: 130 MMOL/L (ref 136–145)
WBC NRBC COR # BLD: 6.14 10*3/MM3 (ref 3.4–10.8)

## 2022-11-06 PROCEDURE — 63710000001 LEVOTHYROXINE 88 MCG TABLET: Performed by: INTERNAL MEDICINE

## 2022-11-06 PROCEDURE — 94799 UNLISTED PULMONARY SVC/PX: CPT

## 2022-11-06 PROCEDURE — 63710000001 THIAMINE 100 MG TABLET: Performed by: INTERNAL MEDICINE

## 2022-11-06 PROCEDURE — A9270 NON-COVERED ITEM OR SERVICE: HCPCS | Performed by: PHYSICIAN ASSISTANT

## 2022-11-06 PROCEDURE — A9270 NON-COVERED ITEM OR SERVICE: HCPCS | Performed by: INTERNAL MEDICINE

## 2022-11-06 PROCEDURE — 63710000001 HYDROXYZINE 25 MG TABLET: Performed by: PHYSICIAN ASSISTANT

## 2022-11-06 PROCEDURE — 93005 ELECTROCARDIOGRAM TRACING: CPT | Performed by: INTERNAL MEDICINE

## 2022-11-06 PROCEDURE — 82962 GLUCOSE BLOOD TEST: CPT

## 2022-11-06 PROCEDURE — 94761 N-INVAS EAR/PLS OXIMETRY MLT: CPT

## 2022-11-06 PROCEDURE — 25010000002 METHYLPREDNISOLONE PER 40 MG: Performed by: INTERNAL MEDICINE

## 2022-11-06 PROCEDURE — 93010 ELECTROCARDIOGRAM REPORT: CPT | Performed by: INTERNAL MEDICINE

## 2022-11-06 PROCEDURE — 63710000001 INSULIN ASPART PER 5 UNITS: Performed by: PHYSICIAN ASSISTANT

## 2022-11-06 PROCEDURE — 63710000001 CETIRIZINE 10 MG TABLET: Performed by: INTERNAL MEDICINE

## 2022-11-06 PROCEDURE — 85027 COMPLETE CBC AUTOMATED: CPT | Performed by: INTERNAL MEDICINE

## 2022-11-06 PROCEDURE — 63710000001 B COMPLEX-C-FOLIC ACID 1 MG CAPSULE: Performed by: INTERNAL MEDICINE

## 2022-11-06 PROCEDURE — 80048 BASIC METABOLIC PNL TOTAL CA: CPT | Performed by: INTERNAL MEDICINE

## 2022-11-06 PROCEDURE — 99232 SBSQ HOSP IP/OBS MODERATE 35: CPT | Performed by: INTERNAL MEDICINE

## 2022-11-06 PROCEDURE — 87205 SMEAR GRAM STAIN: CPT | Performed by: PHYSICIAN ASSISTANT

## 2022-11-06 PROCEDURE — 87070 CULTURE OTHR SPECIMN AEROBIC: CPT | Performed by: PHYSICIAN ASSISTANT

## 2022-11-06 PROCEDURE — G0378 HOSPITAL OBSERVATION PER HR: HCPCS

## 2022-11-06 PROCEDURE — 63710000001 CALCIUM ACETATE 667 MG CAPSULE: Performed by: INTERNAL MEDICINE

## 2022-11-06 PROCEDURE — 63710000001 PANTOPRAZOLE 40 MG TABLET DELAYED-RELEASE: Performed by: PHYSICIAN ASSISTANT

## 2022-11-06 RX ORDER — NITROGLYCERIN 0.4 MG/1
0.4 TABLET SUBLINGUAL
Status: DISCONTINUED | OUTPATIENT
Start: 2022-11-06 | End: 2022-11-20 | Stop reason: HOSPADM

## 2022-11-06 RX ORDER — ASCORBIC ACID, THIAMINE MONONITRATE,RIBOFLAVIN, NIACINAMIDE, PYRIDOXINE HYDROCHLORIDE, FOLIC ACID, CYANOCOBALAMIN, BIOTIN, CALCIUM PANTOTHENATE, 100; 1.5; 1.7; 20; 10; 1; 6000; 150000; 5 MG/1; MG/1; MG/1; MG/1; MG/1; MG/1; UG/1; UG/1; MG/1
1 CAPSULE, LIQUID FILLED ORAL DAILY
Status: DISCONTINUED | OUTPATIENT
Start: 2022-11-06 | End: 2022-11-20 | Stop reason: HOSPADM

## 2022-11-06 RX ORDER — CALCIUM ACETATE 667 MG/1
2668 CAPSULE ORAL
Status: DISCONTINUED | OUTPATIENT
Start: 2022-11-06 | End: 2022-11-20 | Stop reason: HOSPADM

## 2022-11-06 RX ORDER — METHYLPREDNISOLONE SODIUM SUCCINATE 40 MG/ML
40 INJECTION, POWDER, LYOPHILIZED, FOR SOLUTION INTRAMUSCULAR; INTRAVENOUS 2 TIMES DAILY
Status: DISCONTINUED | OUTPATIENT
Start: 2022-11-06 | End: 2022-11-12

## 2022-11-06 RX ORDER — LEVOTHYROXINE SODIUM 88 UG/1
88 TABLET ORAL
Status: DISCONTINUED | OUTPATIENT
Start: 2022-11-06 | End: 2022-11-20 | Stop reason: HOSPADM

## 2022-11-06 RX ORDER — METHION/INOS/CHOL BT/B COM/LIV 110MG-86MG
100 CAPSULE ORAL DAILY
Status: DISCONTINUED | OUTPATIENT
Start: 2022-11-06 | End: 2022-11-20 | Stop reason: HOSPADM

## 2022-11-06 RX ORDER — CETIRIZINE HYDROCHLORIDE 10 MG/1
10 TABLET ORAL DAILY
Status: DISCONTINUED | OUTPATIENT
Start: 2022-11-06 | End: 2022-11-16

## 2022-11-06 RX ADMIN — LEVOTHYROXINE SODIUM 88 MCG: 88 TABLET ORAL at 17:48

## 2022-11-06 RX ADMIN — CEFDINIR 300 MG: 300 CAPSULE ORAL at 08:45

## 2022-11-06 RX ADMIN — IPRATROPIUM BROMIDE AND ALBUTEROL SULFATE 3 ML: .5; 3 SOLUTION RESPIRATORY (INHALATION) at 07:29

## 2022-11-06 RX ADMIN — PANTOPRAZOLE SODIUM 40 MG: 40 TABLET, DELAYED RELEASE ORAL at 05:13

## 2022-11-06 RX ADMIN — METHYLPREDNISOLONE SODIUM SUCCINATE 40 MG: 40 INJECTION, POWDER, FOR SOLUTION INTRAMUSCULAR; INTRAVENOUS at 15:14

## 2022-11-06 RX ADMIN — DOXYCYCLINE 100 MG: 100 INJECTION, POWDER, LYOPHILIZED, FOR SOLUTION INTRAVENOUS at 08:45

## 2022-11-06 RX ADMIN — INSULIN ASPART 4 UNITS: 100 INJECTION, SOLUTION INTRAVENOUS; SUBCUTANEOUS at 17:22

## 2022-11-06 RX ADMIN — METHYLPREDNISOLONE SODIUM SUCCINATE 40 MG: 40 INJECTION, POWDER, FOR SOLUTION INTRAMUSCULAR; INTRAVENOUS at 21:49

## 2022-11-06 RX ADMIN — Medication 100 MG: at 17:48

## 2022-11-06 RX ADMIN — RENO CAPS 1 MG: 100; 1.5; 1.7; 20; 10; 1; 150; 5; 6 CAPSULE ORAL at 17:48

## 2022-11-06 RX ADMIN — Medication 3 ML: at 08:46

## 2022-11-06 RX ADMIN — CARVEDILOL 6.25 MG: 6.25 TABLET, FILM COATED ORAL at 08:45

## 2022-11-06 RX ADMIN — Medication 3 ML: at 21:48

## 2022-11-06 RX ADMIN — IPRATROPIUM BROMIDE AND ALBUTEROL SULFATE 3 ML: .5; 3 SOLUTION RESPIRATORY (INHALATION) at 03:19

## 2022-11-06 RX ADMIN — INSULIN ASPART 4 UNITS: 100 INJECTION, SOLUTION INTRAVENOUS; SUBCUTANEOUS at 08:46

## 2022-11-06 RX ADMIN — CARVEDILOL 6.25 MG: 6.25 TABLET, FILM COATED ORAL at 17:22

## 2022-11-06 RX ADMIN — IPRATROPIUM BROMIDE AND ALBUTEROL SULFATE 3 ML: .5; 3 SOLUTION RESPIRATORY (INHALATION) at 13:36

## 2022-11-06 RX ADMIN — HYDROXYZINE HYDROCHLORIDE 25 MG: 25 TABLET ORAL at 04:31

## 2022-11-06 RX ADMIN — INSULIN ASPART 5 UNITS: 100 INJECTION, SOLUTION INTRAVENOUS; SUBCUTANEOUS at 10:38

## 2022-11-06 RX ADMIN — CALCIUM ACETATE 2668 MG: 667 CAPSULE ORAL at 17:48

## 2022-11-06 RX ADMIN — DOXYCYCLINE 100 MG: 100 INJECTION, POWDER, LYOPHILIZED, FOR SOLUTION INTRAVENOUS at 21:49

## 2022-11-06 RX ADMIN — CETIRIZINE HYDROCHLORIDE 10 MG: 10 TABLET, FILM COATED ORAL at 17:48

## 2022-11-06 NOTE — PLAN OF CARE
Goal Outcome Evaluation:  Plan of Care Reviewed With: patient        Progress: no change  Outcome Evaluation: pt has been resting in bed. Pt has ambulated to BR. No other changes to note at this time; will continue to monitor

## 2022-11-06 NOTE — PROGRESS NOTES
Nephrology Progress Note      Subjective     Patient denied any chest pain or shortness of breath.     Objective       Vital signs :     Temp:  [97.7 °F (36.5 °C)-98.5 °F (36.9 °C)] 98.5 °F (36.9 °C)  Heart Rate:  [50-83] 81  Resp:  [16-20] 18  BP: (109-130)/(53-84) 130/84    Intake/Output                             11/04/22 0701 - 11/05/22 0700 11/05/22 0701 - 11/06/22 0700 11/06/22 0701 - 11/07/22 0700     7794-1151 2670-0385 Total 4144-9742 1705-4039 Total 7840-2543 2395-3466 Total                    Intake    P.O.  --  180 180  240  540 780  360  -- 360    Total Intake -- 180 180 240 540 780 360 -- 360       Output    Total Output -- -- -- -- -- -- -- -- --           Physical Exam:    General Appearance : not in acute distress  Lungs : clear to auscultation, respirations regular  Heart :  regular rhythm & normal rate, normal S1, S2 and no murmur, no rub  Abdomen : soft, non distended  Extremities : no edema   Neurologic :   orientated to person, place, time and situation, Grossly no focal deficits        Laboratory Data :     Albumin Albumin   Date Value Ref Range Status   11/04/2022 3.05 (L) 3.50 - 5.20 g/dL Final      Magnesium Magnesium   Date Value Ref Range Status   11/04/2022 2.3 1.6 - 2.4 mg/dL Final          PTH               No results found for: PTH    CBC and coagulation:  Results from last 7 days   Lab Units 11/06/22  0129 11/05/22  0108 11/04/22  1612 11/04/22  0940 11/04/22  0405 11/03/22  2201 11/03/22  1248 11/01/22  0337 11/01/22  0028   PROCALCITONIN ng/mL  --   --   --   --   --   --   --   --  0.52*   LACTATE mmol/L  --   --  1.6 2.4* 3.4*   < > 2.3*   < > 4.1*   CRP mg/dL  --   --   --   --  2.03*  --  2.33*  --   --    WBC 10*3/mm3 6.14 6.28  --   --  6.25  --  7.50   < > 11.35*   HEMOGLOBIN g/dL 10.9* 11.0*  --   --  10.9*  --  12.2*   < > 12.2*   HEMATOCRIT % 33.3* 31.9*  --   --  32.5*  --  35.7*   < > 37.2*   MCV fL 104.1* 99.1*  --   --  102.2*  --  101.4*   < > 103.3*   MCHC g/dL  32.7 34.5  --   --  33.5  --  34.2   < > 32.8   PLATELETS 10*3/mm3 61* 44*  --   --  47*  --  62*   < > 113*    < > = values in this interval not displayed.     Acid/base balance:  Results from last 7 days   Lab Units 11/01/22 0222   PH, ARTERIAL pH units 7.326*   PO2 ART mm Hg 98.2   PCO2, ARTERIAL mm Hg 27.1*   HCO3 ART mmol/L 14.1*     Renal and electrolytes:    Results from last 7 days   Lab Units 11/06/22  0129 11/05/22  0108 11/04/22  0405 11/03/22  1248 11/02/22 0451 11/01/22  0337 11/01/22  0145 11/01/22  0028   SODIUM mmol/L 130* 133* 131* 135* 133* 132*   < > 133*   POTASSIUM mmol/L 4.6 4.4 4.9 4.8 4.7 5.6*   < > 6.7*   MAGNESIUM mg/dL  --   --  2.3  --  2.1 2.4  --  2.6*   CHLORIDE mmol/L 91* 94* 92* 92* 92* 87*   < > 86*   CO2 mmol/L 20.6* 24.7 19.3* 23.9 21.0* 16.0*   < > 16.0*   BUN mg/dL 54* 43* 74* 65* 62* 89*   < > 84*   CREATININE mg/dL 5.12* 3.74* 5.57* 5.28* 5.65* 6.79*   < > 7.19*   CALCIUM mg/dL 7.2* 7.6* 7.6* 8.3* 8.3* 8.5*   < > 9.3   PHOSPHORUS mg/dL  --   --   --   --  7.5* 10.4*  --  11.3*    < > = values in this interval not displayed.     Estimated Creatinine Clearance: 15.4 mL/min (A) (by C-G formula based on SCr of 5.12 mg/dL (H)).  @GFRCG:3@   Liver and pancreatic function:  Results from last 7 days   Lab Units 11/04/22  0405 11/03/22  1248 11/02/22  0451 11/01/22  0337   ALBUMIN g/dL 3.05* 3.56 3.40*  --    BILIRUBIN mg/dL 1.2 1.8* 1.6*  --    ALK PHOS U/L 72 80 70  --    AST (SGOT) U/L 84* 135* 181*  --    ALT (SGPT) U/L 152* 202* 229*  --    LIPASE U/L  --   --   --  53         Cardiac:      Liver and pancreatic function:  Results from last 7 days   Lab Units 11/04/22  0405 11/03/22  1248 11/02/22  0451 11/01/22  0337   ALBUMIN g/dL 3.05* 3.56 3.40*  --    BILIRUBIN mg/dL 1.2 1.8* 1.6*  --    ALK PHOS U/L 72 80 70  --    AST (SGOT) U/L 84* 135* 181*  --    ALT (SGPT) U/L 152* 202* 229*  --    LIPASE U/L  --   --   --  53       Medications :     carvedilol, 6.25 mg, Oral, BID With  Meals  cefdinir, 300 mg, Oral, Q24H  doxycycline, 100 mg, Intravenous, Q12H  Insulin Aspart, 0-7 Units, Subcutaneous, TID AC  ipratropium-albuterol, 3 mL, Nebulization, 4x Daily - RT  methylPREDNISolone sodium succinate, 40 mg, Intravenous, BID  pantoprazole, 40 mg, Oral, Q AM  sodium chloride, 3 mL, Intravenous, Q12H           Assessment & Plan     1. ESKD on IHDx  2. Anemia  3. Hyponatremia, hypervolemic  4. Anion gap metabolic acidosis  5. SHPT  6. Acute bacterial pneumonia    No overt fluid overload, shortness of breath most likely due to COPD exacerbation and agree on treatment.   Continue on IHDx MWF   Anemia; HH at target  SHPT; follow outpatient management        Bladimir Alvarado MD  11/06/22  13:32 EST

## 2022-11-06 NOTE — PROGRESS NOTES
[facility] HOSPITALIST PROGRESS NOTE     Patient Identification:  Name:  Axel Desir  Age:  62 y.o.  Sex:  male  :  1960  MRN:  [id number]  Visit Number:  [id number]  Primary Care Provider:  Isabelle Martinez APRN    Length of stay:  0    Chief complaint: Shortness of breath    Subjective:    Patient reports that shortness of breath is slightly worse today.  He also reports new onset wheezing with nonproductive cough.  On auscultation, patient does not fact have increased wheezing and appears to be developing COPD exacerbation.  Otherwise, patient denies any fevers, chills, sweats, rigors, nausea or vomiting.  ----------------------------------------------------------------------------------------------------------------------  Current Hospital Meds:  carvedilol, 6.25 mg, Oral, BID With Meals  cefdinir, 300 mg, Oral, Q24H  doxycycline, 100 mg, Intravenous, Q12H  Insulin Aspart, 0-7 Units, Subcutaneous, TID AC  ipratropium-albuterol, 3 mL, Nebulization, 4x Daily - RT  methylPREDNISolone sodium succinate, 40 mg, Intravenous, BID  pantoprazole, 40 mg, Oral, Q AM  sodium chloride, 3 mL, Intravenous, Q12H         ----------------------------------------------------------------------------------------------------------------------  Vital Signs:  Temp:  [97.7 °F (36.5 °C)-98.5 °F (36.9 °C)] 98.5 °F (36.9 °C)  Heart Rate:  [50-83] 81  Resp:  [16-20] 18  BP: (109-130)/(53-84) 130/84      22  1205 22  0000   Weight: 77.1 kg (170 lb) 74.5 kg (164 lb 4.8 oz)     Body mass index is 24.98 kg/m².    Intake/Output Summary (Last 24 hours) at 2022 1315  Last data filed at 2022 0858  Gross per 24 hour   Intake 900 ml   Output --   Net 900 ml     Diet Soft Texture; Chopped; Thin; Cardiac, Consistent Carbohydrate, Renal  ----------------------------------------------------------------------------------------------------------------------  Physical exam:  Constitutional: Chronically  ill-appearing  male in no apparent distress.     HENT:  Head:  Normocephalic and atraumatic.  Mouth:  Moist mucous membranes.    Eyes:  Conjunctivae and EOM are normal.  Pupils are equal, round, and reactive to light.  No scleral icterus.    Neck:  Neck supple. No thyromegaly.  No JVD present.    Cardiovascular:  Regular rate and rhythm with no murmurs, rubs, clicks or gallops appreciated.  Pulmonary/Chest: Diffuse wheezing bilaterally with faint end inspiratory bibasilar crackles   Abdominal:  Soft. Nontender. Nondistended  Bowel sounds are normal in all four quadrants. No organomegally appreciated.   Musculoskeletal:  5/5 muscle strength bilateral upper and lower extremities with equal muscle tone and bulk. No edema, no tenderness, and no deformity.  No red or swollen joints anywhere.    Neurological:  Alert, Cranial nerves II-XII intact with no focal deficits.  No facial droop.  No slurred speech.   Skin:  Warm and dry to palpation with no rashes or lesions appreciated.  Peripheral vascular:  2+ radial and pedal pulses in bilateral upper and lower extremities.  Psychiatric:  Alert and oriented x3, demonstrates appropriate judgment and insight.  -------------------------------------------------------------------------------  ----------------------------------------------------------------------------------------------------------------------  Results from last 7 days   Lab Units 11/01/22  0337 11/01/22  0028   TROPONIN T ng/mL 0.060* 0.071*     Results from last 7 days   Lab Units 11/06/22  0129 11/05/22  0108 11/04/22  1612 11/04/22  0940 11/04/22  0405 11/03/22  2201 11/03/22  1248   CRP mg/dL  --   --   --   --  2.03*  --  2.33*   LACTATE mmol/L  --   --  1.6 2.4* 3.4*   < > 2.3*   WBC 10*3/mm3 6.14 6.28  --   --  6.25  --  7.50   HEMOGLOBIN g/dL 10.9* 11.0*  --   --  10.9*  --  12.2*   HEMATOCRIT % 33.3* 31.9*  --   --  32.5*  --  35.7*   MCV fL 104.1* 99.1*  --   --  102.2*  --  101.4*   MCHC g/dL  32.7 34.5  --   --  33.5  --  34.2   PLATELETS 10*3/mm3 61* 44*  --   --  47*  --  62*    < > = values in this interval not displayed.     Results from last 7 days   Lab Units 11/01/22 0222   PH, ARTERIAL pH units 7.326*   PO2 ART mm Hg 98.2   PCO2, ARTERIAL mm Hg 27.1*   HCO3 ART mmol/L 14.1*     Results from last 7 days   Lab Units 11/06/22  0129 11/05/22  0108 11/04/22  0405 11/03/22  1248 11/02/22  0451 11/01/22  0337   SODIUM mmol/L 130* 133* 131* 135* 133* 132*   POTASSIUM mmol/L 4.6 4.4 4.9 4.8 4.7 5.6*   MAGNESIUM mg/dL  --   --  2.3  --  2.1 2.4   CHLORIDE mmol/L 91* 94* 92* 92* 92* 87*   CO2 mmol/L 20.6* 24.7 19.3* 23.9 21.0* 16.0*   BUN mg/dL 54* 43* 74* 65* 62* 89*   CREATININE mg/dL 5.12* 3.74* 5.57* 5.28* 5.65* 6.79*   CALCIUM mg/dL 7.2* 7.6* 7.6* 8.3* 8.3* 8.5*   GLUCOSE mg/dL 188* 247* 319* 195* 192* 218*   ALBUMIN g/dL  --   --  3.05* 3.56 3.40*  --    BILIRUBIN mg/dL  --   --  1.2 1.8* 1.6*  --    ALK PHOS U/L  --   --  72 80 70  --    AST (SGOT) U/L  --   --  84* 135* 181*  --    ALT (SGPT) U/L  --   --  152* 202* 229*  --    Estimated Creatinine Clearance: 15.4 mL/min (A) (by C-G formula based on SCr of 5.12 mg/dL (H)).    No results found for: AMMONIA      Blood Culture   Date Value Ref Range Status   11/03/2022 No growth at 24 hours  Preliminary   11/03/2022 No growth at 24 hours  Preliminary   11/01/2022 No growth at 4 days  Preliminary   11/01/2022 No growth at 4 days  Preliminary     No results found for: URINECX  No results found for: WOUNDCX  No results found for: STOOLCX    I have personally looked at the labs and they are summarized above.  ----------------------------------------------------------------------------------------------------------------------  Imaging Results (Last 24 Hours)     ** No results found for the last 24 hours. **        ----------------------------------------------------------------------------------------------------------------------  Assessment and  Plan:    1.  Right upper lobe pneumonia -continue Omnicef and doxycycline    2.  COPD exacerbation -patient with new onset wheezing today and worsening shortness of breath.  We will start Solu-Medrol 40 mg IV twice daily and continue current DuoNebs every 6 hours.    2.  Generalized weakness -continue PT/OT    3.  End-stage renal disease on hemodialysis -continue current hemodialysis schedule, appreciate nephrology recommendation    4.  Chronic macrocytic anemia -continue to monitor H&H, transfuse for hemoglobin less than 7.    5.  Paroxysmal A. Fib -currently in normal sinus rhythm, continue Coreg and Eliquis    Disposition currently pending placement    Shawn Matthew, DO   11/06/22   13:15 EST

## 2022-11-06 NOTE — PLAN OF CARE
"Goal Outcome Evaluation:              Outcome Evaluation: Pt resting in bed. Pt c/o sob; O2 sat >90% on RA; see orders. Pt put on 2L NC for comfort and PRN atarax given for anxiety. PA notified. Pt has called out for staff very frequently this shift to \"get comfortable in bed,\" fix blankets, etc. although pt has ambulated independently several times this shift. Noted that pt is able to pull self up in bed with encouragement. No other acute changes noted. Will continue POC  "

## 2022-11-07 LAB
ANION GAP SERPL CALCULATED.3IONS-SCNC: 18 MMOL/L (ref 5–15)
BUN SERPL-MCNC: 77 MG/DL (ref 8–23)
BUN/CREAT SERPL: 12.7 (ref 7–25)
CALCIUM SPEC-SCNC: 7.3 MG/DL (ref 8.6–10.5)
CHLORIDE SERPL-SCNC: 90 MMOL/L (ref 98–107)
CO2 SERPL-SCNC: 19 MMOL/L (ref 22–29)
CREAT SERPL-MCNC: 6.07 MG/DL (ref 0.76–1.27)
DEPRECATED RDW RBC AUTO: 77.4 FL (ref 37–54)
EGFRCR SERPLBLD CKD-EPI 2021: 9.7 ML/MIN/1.73
ERYTHROCYTE [DISTWIDTH] IN BLOOD BY AUTOMATED COUNT: 20.9 % (ref 12.3–15.4)
GLUCOSE BLDC GLUCOMTR-MCNC: 206 MG/DL (ref 70–130)
GLUCOSE BLDC GLUCOMTR-MCNC: 215 MG/DL (ref 70–130)
GLUCOSE BLDC GLUCOMTR-MCNC: 330 MG/DL (ref 70–130)
GLUCOSE BLDC GLUCOMTR-MCNC: 467 MG/DL (ref 70–130)
GLUCOSE BLDC GLUCOMTR-MCNC: 478 MG/DL (ref 70–130)
GLUCOSE SERPL-MCNC: 493 MG/DL (ref 65–99)
HCT VFR BLD AUTO: 31.2 % (ref 37.5–51)
HGB BLD-MCNC: 10.1 G/DL (ref 13–17.7)
MCH RBC QN AUTO: 33.6 PG (ref 26.6–33)
MCHC RBC AUTO-ENTMCNC: 32.4 G/DL (ref 31.5–35.7)
MCV RBC AUTO: 103.7 FL (ref 79–97)
PLATELET # BLD AUTO: 60 10*3/MM3 (ref 140–450)
PMV BLD AUTO: 12.4 FL (ref 6–12)
POTASSIUM SERPL-SCNC: 5.3 MMOL/L (ref 3.5–5.2)
RBC # BLD AUTO: 3.01 10*6/MM3 (ref 4.14–5.8)
REF LAB TEST METHOD: NORMAL
SODIUM SERPL-SCNC: 127 MMOL/L (ref 136–145)
WBC NRBC COR # BLD: 5.24 10*3/MM3 (ref 3.4–10.8)

## 2022-11-07 PROCEDURE — 94799 UNLISTED PULMONARY SVC/PX: CPT

## 2022-11-07 PROCEDURE — 63710000001 CETIRIZINE 10 MG TABLET: Performed by: INTERNAL MEDICINE

## 2022-11-07 PROCEDURE — 80048 BASIC METABOLIC PNL TOTAL CA: CPT | Performed by: INTERNAL MEDICINE

## 2022-11-07 PROCEDURE — 99232 SBSQ HOSP IP/OBS MODERATE 35: CPT | Performed by: INTERNAL MEDICINE

## 2022-11-07 PROCEDURE — 85027 COMPLETE CBC AUTOMATED: CPT | Performed by: INTERNAL MEDICINE

## 2022-11-07 PROCEDURE — 63710000001 LEVOTHYROXINE 88 MCG TABLET: Performed by: INTERNAL MEDICINE

## 2022-11-07 PROCEDURE — A9270 NON-COVERED ITEM OR SERVICE: HCPCS | Performed by: INTERNAL MEDICINE

## 2022-11-07 PROCEDURE — 94761 N-INVAS EAR/PLS OXIMETRY MLT: CPT

## 2022-11-07 PROCEDURE — A9270 NON-COVERED ITEM OR SERVICE: HCPCS | Performed by: PHYSICIAN ASSISTANT

## 2022-11-07 PROCEDURE — 63710000001 B COMPLEX-C-FOLIC ACID 1 MG CAPSULE: Performed by: INTERNAL MEDICINE

## 2022-11-07 PROCEDURE — 63710000001 THIAMINE 100 MG TABLET: Performed by: INTERNAL MEDICINE

## 2022-11-07 PROCEDURE — 94762 N-INVAS EAR/PLS OXIMTRY CONT: CPT

## 2022-11-07 PROCEDURE — 63710000001 CEFDINIR 300 MG CAPSULE: Performed by: PHYSICIAN ASSISTANT

## 2022-11-07 PROCEDURE — 82962 GLUCOSE BLOOD TEST: CPT

## 2022-11-07 PROCEDURE — 63710000001 PANTOPRAZOLE 40 MG TABLET DELAYED-RELEASE: Performed by: PHYSICIAN ASSISTANT

## 2022-11-07 PROCEDURE — 63710000001 CALCIUM ACETATE 667 MG CAPSULE: Performed by: INTERNAL MEDICINE

## 2022-11-07 PROCEDURE — 94640 AIRWAY INHALATION TREATMENT: CPT

## 2022-11-07 PROCEDURE — G0378 HOSPITAL OBSERVATION PER HR: HCPCS

## 2022-11-07 PROCEDURE — 25010000002 METHYLPREDNISOLONE PER 40 MG: Performed by: INTERNAL MEDICINE

## 2022-11-07 PROCEDURE — 63710000001 INSULIN DETEMIR PER 5 UNITS: Performed by: INTERNAL MEDICINE

## 2022-11-07 PROCEDURE — 63710000001 ACETAMINOPHEN 325 MG TABLET: Performed by: INTERNAL MEDICINE

## 2022-11-07 PROCEDURE — 63710000001 INSULIN ASPART PER 5 UNITS: Performed by: PHYSICIAN ASSISTANT

## 2022-11-07 RX ORDER — INSULIN ASPART 100 [IU]/ML
15 INJECTION, SOLUTION INTRAVENOUS; SUBCUTANEOUS ONCE
Status: COMPLETED | OUTPATIENT
Start: 2022-11-08 | End: 2022-11-08

## 2022-11-07 RX ORDER — INSULIN ASPART 100 [IU]/ML
10 INJECTION, SOLUTION INTRAVENOUS; SUBCUTANEOUS ONCE
Status: COMPLETED | OUTPATIENT
Start: 2022-11-07 | End: 2022-11-07

## 2022-11-07 RX ORDER — ACETAMINOPHEN 325 MG/1
650 TABLET ORAL EVERY 6 HOURS PRN
Status: DISCONTINUED | OUTPATIENT
Start: 2022-11-07 | End: 2022-11-20 | Stop reason: HOSPADM

## 2022-11-07 RX ORDER — BUDESONIDE AND FORMOTEROL FUMARATE DIHYDRATE 160; 4.5 UG/1; UG/1
2 AEROSOL RESPIRATORY (INHALATION)
Status: DISCONTINUED | OUTPATIENT
Start: 2022-11-07 | End: 2022-11-20 | Stop reason: HOSPADM

## 2022-11-07 RX ADMIN — LEVOTHYROXINE SODIUM 88 MCG: 88 TABLET ORAL at 06:10

## 2022-11-07 RX ADMIN — Medication 100 MG: at 12:13

## 2022-11-07 RX ADMIN — CETIRIZINE HYDROCHLORIDE 10 MG: 10 TABLET, FILM COATED ORAL at 12:13

## 2022-11-07 RX ADMIN — PANTOPRAZOLE SODIUM 40 MG: 40 TABLET, DELAYED RELEASE ORAL at 06:10

## 2022-11-07 RX ADMIN — INSULIN ASPART 3 UNITS: 100 INJECTION, SOLUTION INTRAVENOUS; SUBCUTANEOUS at 09:07

## 2022-11-07 RX ADMIN — INSULIN ASPART 10 UNITS: 100 INJECTION, SOLUTION INTRAVENOUS; SUBCUTANEOUS at 02:46

## 2022-11-07 RX ADMIN — CARVEDILOL 6.25 MG: 6.25 TABLET, FILM COATED ORAL at 12:13

## 2022-11-07 RX ADMIN — BUDESONIDE AND FORMOTEROL FUMARATE DIHYDRATE 2 PUFF: 160; 4.5 AEROSOL RESPIRATORY (INHALATION) at 20:34

## 2022-11-07 RX ADMIN — CARVEDILOL 6.25 MG: 6.25 TABLET, FILM COATED ORAL at 17:31

## 2022-11-07 RX ADMIN — Medication 3 ML: at 21:11

## 2022-11-07 RX ADMIN — IPRATROPIUM BROMIDE AND ALBUTEROL SULFATE 3 ML: .5; 3 SOLUTION RESPIRATORY (INHALATION) at 12:55

## 2022-11-07 RX ADMIN — INSULIN DETEMIR 10 UNITS: 100 INJECTION, SOLUTION SUBCUTANEOUS at 09:07

## 2022-11-07 RX ADMIN — INSULIN ASPART 10 UNITS: 100 INJECTION, SOLUTION INTRAVENOUS; SUBCUTANEOUS at 21:09

## 2022-11-07 RX ADMIN — METHYLPREDNISOLONE SODIUM SUCCINATE 40 MG: 40 INJECTION, POWDER, FOR SOLUTION INTRAMUSCULAR; INTRAVENOUS at 12:13

## 2022-11-07 RX ADMIN — RENO CAPS 1 MG: 100; 1.5; 1.7; 20; 10; 1; 150; 5; 6 CAPSULE ORAL at 12:13

## 2022-11-07 RX ADMIN — INSULIN ASPART 5 UNITS: 100 INJECTION, SOLUTION INTRAVENOUS; SUBCUTANEOUS at 17:31

## 2022-11-07 RX ADMIN — IPRATROPIUM BROMIDE AND ALBUTEROL SULFATE 3 ML: .5; 3 SOLUTION RESPIRATORY (INHALATION) at 19:40

## 2022-11-07 RX ADMIN — INSULIN ASPART 3 UNITS: 100 INJECTION, SOLUTION INTRAVENOUS; SUBCUTANEOUS at 12:32

## 2022-11-07 RX ADMIN — CEFDINIR 300 MG: 300 CAPSULE ORAL at 12:13

## 2022-11-07 RX ADMIN — DOXYCYCLINE 100 MG: 100 INJECTION, POWDER, LYOPHILIZED, FOR SOLUTION INTRAVENOUS at 12:14

## 2022-11-07 RX ADMIN — ACETAMINOPHEN 650 MG: 325 TABLET, FILM COATED ORAL at 09:07

## 2022-11-07 RX ADMIN — CALCIUM ACETATE 2668 MG: 667 CAPSULE ORAL at 12:13

## 2022-11-07 RX ADMIN — APIXABAN 5 MG: 5 TABLET, FILM COATED ORAL at 21:11

## 2022-11-07 RX ADMIN — Medication 3 ML: at 12:14

## 2022-11-07 RX ADMIN — HYDROXYZINE HYDROCHLORIDE 25 MG: 25 TABLET ORAL at 21:10

## 2022-11-07 RX ADMIN — METHYLPREDNISOLONE SODIUM SUCCINATE 40 MG: 40 INJECTION, POWDER, FOR SOLUTION INTRAMUSCULAR; INTRAVENOUS at 21:10

## 2022-11-07 RX ADMIN — CALCIUM ACETATE 2668 MG: 667 CAPSULE ORAL at 17:31

## 2022-11-07 RX ADMIN — DOXYCYCLINE 100 MG: 100 INJECTION, POWDER, LYOPHILIZED, FOR SOLUTION INTRAVENOUS at 21:10

## 2022-11-07 NOTE — PLAN OF CARE
Goal Outcome Evaluation:              Outcome Evaluation: Patient resting in bed at this time. VSS. Ambulated to BR. No other changes or complaints at this time. Will continue plan of care.

## 2022-11-07 NOTE — PROGRESS NOTES
[facility] HOSPITALIST PROGRESS NOTE    Subjective     History:   Axel Desir is a 62 y.o. male admitted on 11/3/2022 secondary to Pneumonia of right upper lobe due to infectious organism     Procedures: None    CC: Follow up COPD exacerbation    Patient seen and examined with ANASTASIIA Carcamo. Awake and alert. Continues to report some cough, dyspnea and wheezing. Reports HA. No reported CP or palpitations. No reported nausea or vomiting. No acute events overnight per RN.     History taken from: patient, chart, and RN.      Objective     Vital Signs  Temp:  [96.3 °F (35.7 °C)-98 °F (36.7 °C)] 96.3 °F (35.7 °C)  Heart Rate:  [] 101  Resp:  [16-22] 18  BP: (100-137)/(67-90) 124/80    Intake/Output Summary (Last 24 hours) at 11/7/2022 1454  Last data filed at 11/7/2022 1229  Gross per 24 hour   Intake 397.59 ml   Output 3700 ml   Net -3302.41 ml         Physical Exam:  General:    Awake, alert, in no acute distress, chronically ill appearing   Heart:      Normal S1 and S2. Regular rate and rhythm. No significant murmur, rubs or gallops appreciated.   Lungs:     Respirations regular, even and unlabored. Scattered expiratory wheezes throughout.    Abdomen:   Soft and nontender. No guarding, rebound tenderness or  organomegaly noted. Bowel sounds present x 4.   Extremities:  No clubbing, cyanosis or edema noted. Moves UE and LE equally B/L.     Results Review:    Results from last 7 days   Lab Units 11/07/22  0123 11/06/22  0129 11/05/22  0108 11/04/22  0405 11/03/22  1248 11/02/22  0451 11/01/22  0337   WBC 10*3/mm3 5.24 6.14 6.28 6.25 7.50 6.69 9.76   HEMOGLOBIN g/dL 10.1* 10.9* 11.0* 10.9* 12.2* 10.7* 11.4*   PLATELETS 10*3/mm3 60* 61* 44* 47* 62* 73* 94*     Results from last 7 days   Lab Units 11/07/22  0123 11/06/22  0129 11/05/22  0108 11/04/22  0405 11/03/22  1248 11/02/22  0451 11/01/22  0337   SODIUM mmol/L 127* 130* 133* 131* 135* 133* 132*   POTASSIUM mmol/L 5.3* 4.6 4.4 4.9 4.8 4.7 5.6*    CHLORIDE mmol/L 90* 91* 94* 92* 92* 92* 87*   CO2 mmol/L 19.0* 20.6* 24.7 19.3* 23.9 21.0* 16.0*   BUN mg/dL 77* 54* 43* 74* 65* 62* 89*   CREATININE mg/dL 6.07* 5.12* 3.74* 5.57* 5.28* 5.65* 6.79*   CALCIUM mg/dL 7.3* 7.2* 7.6* 7.6* 8.3* 8.3* 8.5*   GLUCOSE mg/dL 493* 188* 247* 319* 195* 192* 218*     Results from last 7 days   Lab Units 11/04/22  0405 11/03/22  1248 11/02/22  0451 11/01/22  0028   BILIRUBIN mg/dL 1.2 1.8* 1.6* 2.5*   ALK PHOS U/L 72 80 70 91   AST (SGOT) U/L 84* 135* 181* 357*   ALT (SGPT) U/L 152* 202* 229* 350*     Results from last 7 days   Lab Units 11/04/22  0405 11/02/22  0451 11/01/22  0337 11/01/22  0028   MAGNESIUM mg/dL 2.3 2.1 2.4 2.6*         Results from last 7 days   Lab Units 11/01/22  0337 11/01/22  0028   TROPONIN T ng/mL 0.060* 0.071*       Imaging Results (Last 24 Hours)     ** No results found for the last 24 hours. **            Medications:  calcium acetate, 2,668 mg, Oral, TID With Meals  carvedilol, 6.25 mg, Oral, BID With Meals  cefdinir, 300 mg, Oral, Q24H  cetirizine, 10 mg, Oral, Daily  doxycycline, 100 mg, Intravenous, Q12H  Insulin Aspart, 0-7 Units, Subcutaneous, TID AC  insulin detemir, 10 Units, Subcutaneous, Daily  ipratropium-albuterol, 3 mL, Nebulization, 4x Daily - RT  levothyroxine, 88 mcg, Oral, Q AM  methylPREDNISolone sodium succinate, 40 mg, Intravenous, BID  pantoprazole, 40 mg, Oral, Q AM  Renal, 1 capsule, Oral, Daily  sodium chloride, 3 mL, Intravenous, Q12H  thiamine, 100 mg, Oral, Daily               Assessment & Plan   RUL pneumonia: Sputum culture reveals normal respiratory joanne. Check procal. Cont Omnicef and Doxycycline.     Acute exacerbation of COPD: Cont antibiotics as above, steroids and nebs. Add Symbicort.     ESRD on HD: Cont HD ([facility] schedule) per nephrology with input appreciated.     DM II, non-insulin dependent: Recent HgbA1c 8.6. Steroids likely contributing to hyperglycemia. Add basal insulin. Cont SSI with Accuchecks.      Paroxysmal Afib: Currently in sinus rhythm. Cont Coreg. Restart home Eliquis for stroke prevention.     Chronic macrocytic anemia: Check B12 and folate. Repeat CBC in the AM.     Thrombocytopenia: Possibly 2/2 above. B12 and folate ordered. Check peripheral smear. Repeat CBC in the AM.     Generalized weakness: PT/OT    DVT PPX: Restart home Eliquis.     Disposition: Will likely require SNF placement.     Andres Reardon,   11/07/22  14:54 EST

## 2022-11-07 NOTE — PLAN OF CARE
Goal Outcome Evaluation:  Plan of Care Reviewed With: patient        Progress: no change  Outcome Evaluation: Pt has been resting in bed. Pt had dialysis per orders today. No other changes to note at this time; will continue to monitor

## 2022-11-07 NOTE — PROGRESS NOTES
Nephrology Progress Note      Subjective     Seen on dialysis    Objective       Vital signs :     Temp:  [97.6 °F (36.4 °C)-98 °F (36.7 °C)] 97.7 °F (36.5 °C)  Heart Rate:  [] 114  Resp:  [16-22] 18  BP: (100-137)/(67-90) 120/86    Intake/Output                       11/05/22 0701 - 11/06/22 0700 11/06/22 0701 - 11/07/22 0700     0443-7210 0862-4805 Total 5203-7668 2083-9935 Total                 Intake    P.O.  240  540 780  600  200 800    I.V.  --  -- --  98.4  99.2 197.6    Total Intake 240 540 780 698.4 299.2 997.6       Output    Stool  --  -- --  1  -- 1    Total Output -- -- -- 1 -- 1           Physical Exam:    General Appearance : not in acute distress  Lungs : clear to auscultation, respirations regular  Heart :  regular rhythm & normal rate, normal S1, S2 and no murmur, no rub  Abdomen : soft, non distended  Extremities : no edema   Neurologic :   orientated to person, place, time and situation, Grossly no focal deficits        Laboratory Data :     Albumin No results found for: ALBUMIN   Magnesium No results found for: MG       PTH               No results found for: PTH    CBC and coagulation:  Results from last 7 days   Lab Units 11/07/22  0123 11/06/22  0129 11/05/22  0108 11/04/22  1612 11/04/22  0940 11/04/22  0405 11/03/22  2201 11/03/22  1248 11/01/22  0337 11/01/22  0028   PROCALCITONIN ng/mL  --   --   --   --   --   --   --   --   --  0.52*   LACTATE mmol/L  --   --   --  1.6 2.4* 3.4*   < > 2.3*   < > 4.1*   CRP mg/dL  --   --   --   --   --  2.03*  --  2.33*  --   --    WBC 10*3/mm3 5.24 6.14 6.28  --   --  6.25  --  7.50   < > 11.35*   HEMOGLOBIN g/dL 10.1* 10.9* 11.0*  --   --  10.9*  --  12.2*   < > 12.2*   HEMATOCRIT % 31.2* 33.3* 31.9*  --   --  32.5*  --  35.7*   < > 37.2*   MCV fL 103.7* 104.1* 99.1*  --   --  102.2*  --  101.4*   < > 103.3*   MCHC g/dL 32.4 32.7 34.5  --   --  33.5  --  34.2   < > 32.8   PLATELETS 10*3/mm3 60* 61* 44*  --   --  47*  --  62*   < > 113*    < > =  values in this interval not displayed.     Acid/base balance:  Results from last 7 days   Lab Units 11/01/22  0222   PH, ARTERIAL pH units 7.326*   PO2 ART mm Hg 98.2   PCO2, ARTERIAL mm Hg 27.1*   HCO3 ART mmol/L 14.1*     Renal and electrolytes:    Results from last 7 days   Lab Units 11/07/22  0123 11/06/22  0129 11/05/22  0108 11/04/22  0405 11/03/22  1248 11/02/22  0451 11/01/22  0337 11/01/22  0145 11/01/22  0028   SODIUM mmol/L 127* 130* 133* 131* 135* 133* 132*   < > 133*   POTASSIUM mmol/L 5.3* 4.6 4.4 4.9 4.8 4.7 5.6*   < > 6.7*   MAGNESIUM mg/dL  --   --   --  2.3  --  2.1 2.4  --  2.6*   CHLORIDE mmol/L 90* 91* 94* 92* 92* 92* 87*   < > 86*   CO2 mmol/L 19.0* 20.6* 24.7 19.3* 23.9 21.0* 16.0*   < > 16.0*   BUN mg/dL 77* 54* 43* 74* 65* 62* 89*   < > 84*   CREATININE mg/dL 6.07* 5.12* 3.74* 5.57* 5.28* 5.65* 6.79*   < > 7.19*   CALCIUM mg/dL 7.3* 7.2* 7.6* 7.6* 8.3* 8.3* 8.5*   < > 9.3   PHOSPHORUS mg/dL  --   --   --   --   --  7.5* 10.4*  --  11.3*    < > = values in this interval not displayed.     Estimated Creatinine Clearance: 13 mL/min (A) (by C-G formula based on SCr of 6.07 mg/dL (H)).  @GFRCG:3@   Liver and pancreatic function:  Results from last 7 days   Lab Units 11/04/22  0405 11/03/22  1248 11/02/22  0451 11/01/22  0337   ALBUMIN g/dL 3.05* 3.56 3.40*  --    BILIRUBIN mg/dL 1.2 1.8* 1.6*  --    ALK PHOS U/L 72 80 70  --    AST (SGOT) U/L 84* 135* 181*  --    ALT (SGPT) U/L 152* 202* 229*  --    LIPASE U/L  --   --   --  53         Cardiac:      Liver and pancreatic function:  Results from last 7 days   Lab Units 11/04/22  0405 11/03/22  1248 11/02/22  0451 11/01/22  0337   ALBUMIN g/dL 3.05* 3.56 3.40*  --    BILIRUBIN mg/dL 1.2 1.8* 1.6*  --    ALK PHOS U/L 72 80 70  --    AST (SGOT) U/L 84* 135* 181*  --    ALT (SGPT) U/L 152* 202* 229*  --    LIPASE U/L  --   --   --  53       Medications :     calcium acetate, 2,668 mg, Oral, TID With Meals  carvedilol, 6.25 mg, Oral, BID With  Meals  cefdinir, 300 mg, Oral, Q24H  cetirizine, 10 mg, Oral, Daily  doxycycline, 100 mg, Intravenous, Q12H  Insulin Aspart, 0-7 Units, Subcutaneous, TID AC  insulin detemir, 10 Units, Subcutaneous, Daily  ipratropium-albuterol, 3 mL, Nebulization, 4x Daily - RT  levothyroxine, 88 mcg, Oral, Q AM  methylPREDNISolone sodium succinate, 40 mg, Intravenous, BID  pantoprazole, 40 mg, Oral, Q AM  Renal, 1 capsule, Oral, Daily  sodium chloride, 3 mL, Intravenous, Q12H  thiamine, 100 mg, Oral, Daily           Assessment & Plan     1. ESKD on IHDx  2. Anemia  3. Hyponatremia, hypervolemic  4. Anion gap metabolic acidosis  5. SHPT  6. Acute bacterial pneumonia    Evaluated on dialysis, tolerating well. Planned 3L UF as tolerated.   Continue on IHDx MWF   Anemia; HH at target  SHPT; follow outpatient management        Bladimir Alvarado MD  11/07/22  09:55 EST

## 2022-11-07 NOTE — CASE MANAGEMENT/SOCIAL WORK
Discharge Planning Assessment  [facility]     Patient Name: Axel Desir  MRN: [id number]  Today's Date: 11/7/2022    Admit Date: 11/3/2022    Plan: SS spoke with pt at bedside on this date. Pt lives alone at [address] Pt does not utilize home health services at this time. Pt has wheel chair and rolling walker via unknown provider. PCP is Isabelle mariscal. Pt utilizes [facility]. PT does not have a POA/Living will. Pt plans to return home at discharge. SS to follow and assist with discharge planning.   Discharge Needs Assessment     Row Name 11/07/22 1357       Living Environment    People in Home alone    Current Living Arrangements apartment    Primary Care Provided by self    Provides Primary Care For no one    Family Caregiver if Needed other (see comments)  Candace Delgadillo    Quality of Family Relationships unable to assess    Able to Return to Prior Arrangements yes       Resource/Environmental Concerns    Resource/Environmental Concerns none    Transportation Concerns none       Transition Planning    Patient/Family Anticipates Transition to home    Transportation Anticipated family or friend will provide       Discharge Needs Assessment    Equipment Currently Used at Home wheelchair;walker, rolling  Provider unknown.    Anticipated Changes Related to Illness none    Equipment Needed After Discharge none               Discharge Plan     Row Name 11/07/22 3475       Plan    Plan SS spoke with pt at bedside on this date. Pt lives alone at [address] Pt does not utilize home health services at this time. Pt has wheel chair and rolling walker via unknown provider. PCP is Isabelle mariscal. Pt utilizes [facility]. PT does not have a POA/Living will. Pt plans to return home at discharge. SS to follow and assist with discharge planning.    Patient/Family in Agreement with Plan yes                   JAXON Jhaveri

## 2022-11-07 NOTE — NURSING NOTE
Right leg and right arm abrasions are now scabs and no longer need a dressing. Dressing was not placed at this time

## 2022-11-07 NOTE — SIGNIFICANT NOTE
11/07/22 1557   OTHER   Discipline physical therapist   Rehab Time/Intention   Session Not Performed patient unavailable for treatment  (Pt. in dialysis during attempt to work with him, per nursing staff.)

## 2022-11-08 LAB
ALBUMIN SERPL-MCNC: 3.2 G/DL (ref 3.5–5.2)
ALBUMIN/GLOB SERPL: 1.8 G/DL
ALP SERPL-CCNC: 69 U/L (ref 39–117)
ALT SERPL W P-5'-P-CCNC: 69 U/L (ref 1–41)
ANION GAP SERPL CALCULATED.3IONS-SCNC: 17.4 MMOL/L (ref 5–15)
ANISOCYTOSIS BLD QL: NORMAL
AST SERPL-CCNC: 23 U/L (ref 1–40)
BACTERIA SPEC AEROBE CULT: NORMAL
BACTERIA SPEC AEROBE CULT: NORMAL
BACTERIA SPEC RESP CULT: NORMAL
BASO STIPL COARSE BLD QL SMEAR: NORMAL
BASOPHILS # BLD AUTO: 0.01 10*3/MM3 (ref 0–0.2)
BASOPHILS NFR BLD AUTO: 0.2 % (ref 0–1.5)
BILIRUB SERPL-MCNC: 0.8 MG/DL (ref 0–1.2)
BUN SERPL-MCNC: 52 MG/DL (ref 8–23)
BUN/CREAT SERPL: 11.4 (ref 7–25)
BURR CELLS BLD QL SMEAR: NORMAL
CALCIUM SPEC-SCNC: 7.7 MG/DL (ref 8.6–10.5)
CHLORIDE SERPL-SCNC: 87 MMOL/L (ref 98–107)
CO2 SERPL-SCNC: 22.6 MMOL/L (ref 22–29)
CREAT SERPL-MCNC: 4.58 MG/DL (ref 0.76–1.27)
DACRYOCYTES BLD QL SMEAR: NORMAL
DEPRECATED RDW RBC AUTO: 72 FL (ref 37–54)
EGFRCR SERPLBLD CKD-EPI 2021: 13.5 ML/MIN/1.73
ELLIPTOCYTES BLD QL SMEAR: NORMAL
EOSINOPHIL # BLD AUTO: 0 10*3/MM3 (ref 0–0.4)
EOSINOPHIL NFR BLD AUTO: 0 % (ref 0.3–6.2)
ERYTHROCYTE [DISTWIDTH] IN BLOOD BY AUTOMATED COUNT: 20.6 % (ref 12.3–15.4)
FOLATE SERPL-MCNC: >20 NG/ML (ref 4.78–24.2)
GLOBULIN UR ELPH-MCNC: 1.8 GM/DL
GLUCOSE BLDC GLUCOMTR-MCNC: 208 MG/DL (ref 70–130)
GLUCOSE BLDC GLUCOMTR-MCNC: 236 MG/DL (ref 70–130)
GLUCOSE BLDC GLUCOMTR-MCNC: 265 MG/DL (ref 70–130)
GLUCOSE BLDC GLUCOMTR-MCNC: 267 MG/DL (ref 70–130)
GLUCOSE BLDC GLUCOMTR-MCNC: 78 MG/DL (ref 70–130)
GLUCOSE SERPL-MCNC: 229 MG/DL (ref 65–99)
GRAM STN SPEC: NORMAL
HCT VFR BLD AUTO: 29.5 % (ref 37.5–51)
HGB BLD-MCNC: 10 G/DL (ref 13–17.7)
IMM GRANULOCYTES # BLD AUTO: 0.04 10*3/MM3 (ref 0–0.05)
IMM GRANULOCYTES NFR BLD AUTO: 0.6 % (ref 0–0.5)
LYMPHOCYTES # BLD AUTO: 0.39 10*3/MM3 (ref 0.7–3.1)
LYMPHOCYTES NFR BLD AUTO: 5.9 % (ref 19.6–45.3)
MACROCYTES BLD QL SMEAR: NORMAL
MCH RBC QN AUTO: 33.7 PG (ref 26.6–33)
MCHC RBC AUTO-ENTMCNC: 33.9 G/DL (ref 31.5–35.7)
MCV RBC AUTO: 99.3 FL (ref 79–97)
MONOCYTES # BLD AUTO: 0.52 10*3/MM3 (ref 0.1–0.9)
MONOCYTES NFR BLD AUTO: 7.8 % (ref 5–12)
NEUTROPHILS NFR BLD AUTO: 5.67 10*3/MM3 (ref 1.7–7)
NEUTROPHILS NFR BLD AUTO: 85.5 % (ref 42.7–76)
NRBC BLD AUTO-RTO: 0 /100 WBC (ref 0–0.2)
PLATELET # BLD AUTO: 72 10*3/MM3 (ref 140–450)
PMV BLD AUTO: 11.9 FL (ref 6–12)
POTASSIUM SERPL-SCNC: 4 MMOL/L (ref 3.5–5.2)
PROCALCITONIN SERPL-MCNC: 0.19 NG/ML (ref 0–0.25)
PROT SERPL-MCNC: 5 G/DL (ref 6–8.5)
RBC # BLD AUTO: 2.97 10*6/MM3 (ref 4.14–5.8)
SCHISTOCYTES BLD QL SMEAR: NORMAL
SMALL PLATELETS BLD QL SMEAR: NORMAL
SODIUM SERPL-SCNC: 127 MMOL/L (ref 136–145)
TARGETS BLD QL SMEAR: NORMAL
VIT B12 BLD-MCNC: 1870 PG/ML (ref 211–946)
WBC NRBC COR # BLD: 6.63 10*3/MM3 (ref 3.4–10.8)

## 2022-11-08 PROCEDURE — 25010000002 METHYLPREDNISOLONE PER 40 MG: Performed by: INTERNAL MEDICINE

## 2022-11-08 PROCEDURE — 82962 GLUCOSE BLOOD TEST: CPT

## 2022-11-08 PROCEDURE — 99232 SBSQ HOSP IP/OBS MODERATE 35: CPT | Performed by: INTERNAL MEDICINE

## 2022-11-08 PROCEDURE — 94799 UNLISTED PULMONARY SVC/PX: CPT

## 2022-11-08 PROCEDURE — 97166 OT EVAL MOD COMPLEX 45 MIN: CPT

## 2022-11-08 PROCEDURE — 84145 PROCALCITONIN (PCT): CPT | Performed by: INTERNAL MEDICINE

## 2022-11-08 PROCEDURE — 82746 ASSAY OF FOLIC ACID SERUM: CPT | Performed by: INTERNAL MEDICINE

## 2022-11-08 PROCEDURE — G0378 HOSPITAL OBSERVATION PER HR: HCPCS

## 2022-11-08 PROCEDURE — 85025 COMPLETE CBC W/AUTO DIFF WBC: CPT | Performed by: INTERNAL MEDICINE

## 2022-11-08 PROCEDURE — 94761 N-INVAS EAR/PLS OXIMETRY MLT: CPT

## 2022-11-08 PROCEDURE — 63710000001 INSULIN ASPART PER 5 UNITS: Performed by: PHYSICIAN ASSISTANT

## 2022-11-08 PROCEDURE — 82607 VITAMIN B-12: CPT | Performed by: INTERNAL MEDICINE

## 2022-11-08 PROCEDURE — 63710000001 INSULIN DETEMIR PER 5 UNITS: Performed by: INTERNAL MEDICINE

## 2022-11-08 PROCEDURE — 85007 BL SMEAR W/DIFF WBC COUNT: CPT | Performed by: INTERNAL MEDICINE

## 2022-11-08 PROCEDURE — 80053 COMPREHEN METABOLIC PANEL: CPT | Performed by: INTERNAL MEDICINE

## 2022-11-08 RX ADMIN — DOXYCYCLINE 100 MG: 100 INJECTION, POWDER, LYOPHILIZED, FOR SOLUTION INTRAVENOUS at 07:48

## 2022-11-08 RX ADMIN — APIXABAN 5 MG: 5 TABLET, FILM COATED ORAL at 07:49

## 2022-11-08 RX ADMIN — CALCIUM ACETATE 2668 MG: 667 CAPSULE ORAL at 07:49

## 2022-11-08 RX ADMIN — HYDROXYZINE HYDROCHLORIDE 25 MG: 25 TABLET ORAL at 20:43

## 2022-11-08 RX ADMIN — INSULIN ASPART 3 UNITS: 100 INJECTION, SOLUTION INTRAVENOUS; SUBCUTANEOUS at 10:39

## 2022-11-08 RX ADMIN — DOXYCYCLINE 100 MG: 100 INJECTION, POWDER, LYOPHILIZED, FOR SOLUTION INTRAVENOUS at 20:42

## 2022-11-08 RX ADMIN — CARVEDILOL 6.25 MG: 6.25 TABLET, FILM COATED ORAL at 16:21

## 2022-11-08 RX ADMIN — CEFDINIR 300 MG: 300 CAPSULE ORAL at 07:49

## 2022-11-08 RX ADMIN — BUDESONIDE AND FORMOTEROL FUMARATE DIHYDRATE 2 PUFF: 160; 4.5 AEROSOL RESPIRATORY (INHALATION) at 06:37

## 2022-11-08 RX ADMIN — INSULIN ASPART 4 UNITS: 100 INJECTION, SOLUTION INTRAVENOUS; SUBCUTANEOUS at 16:21

## 2022-11-08 RX ADMIN — CARVEDILOL 6.25 MG: 6.25 TABLET, FILM COATED ORAL at 07:50

## 2022-11-08 RX ADMIN — IPRATROPIUM BROMIDE AND ALBUTEROL SULFATE 3 ML: .5; 3 SOLUTION RESPIRATORY (INHALATION) at 19:37

## 2022-11-08 RX ADMIN — CETIRIZINE HYDROCHLORIDE 10 MG: 10 TABLET, FILM COATED ORAL at 07:49

## 2022-11-08 RX ADMIN — APIXABAN 5 MG: 5 TABLET, FILM COATED ORAL at 20:43

## 2022-11-08 RX ADMIN — INSULIN ASPART 15 UNITS: 100 INJECTION, SOLUTION INTRAVENOUS; SUBCUTANEOUS at 00:13

## 2022-11-08 RX ADMIN — BUDESONIDE AND FORMOTEROL FUMARATE DIHYDRATE 2 PUFF: 160; 4.5 AEROSOL RESPIRATORY (INHALATION) at 19:37

## 2022-11-08 RX ADMIN — METHYLPREDNISOLONE SODIUM SUCCINATE 40 MG: 40 INJECTION, POWDER, FOR SOLUTION INTRAMUSCULAR; INTRAVENOUS at 07:48

## 2022-11-08 RX ADMIN — Medication 3 ML: at 20:43

## 2022-11-08 RX ADMIN — IPRATROPIUM BROMIDE AND ALBUTEROL SULFATE 3 ML: .5; 3 SOLUTION RESPIRATORY (INHALATION) at 06:37

## 2022-11-08 RX ADMIN — LEVOTHYROXINE SODIUM 88 MCG: 88 TABLET ORAL at 05:20

## 2022-11-08 RX ADMIN — CALCIUM ACETATE 2668 MG: 667 CAPSULE ORAL at 16:21

## 2022-11-08 RX ADMIN — Medication 100 MG: at 07:49

## 2022-11-08 RX ADMIN — INSULIN DETEMIR 10 UNITS: 100 INJECTION, SOLUTION SUBCUTANEOUS at 07:51

## 2022-11-08 RX ADMIN — IPRATROPIUM BROMIDE AND ALBUTEROL SULFATE 3 ML: .5; 3 SOLUTION RESPIRATORY (INHALATION) at 13:25

## 2022-11-08 RX ADMIN — PANTOPRAZOLE SODIUM 40 MG: 40 TABLET, DELAYED RELEASE ORAL at 05:20

## 2022-11-08 RX ADMIN — ACETAMINOPHEN 650 MG: 325 TABLET, FILM COATED ORAL at 14:54

## 2022-11-08 RX ADMIN — RENO CAPS 1 MG: 100; 1.5; 1.7; 20; 10; 1; 150; 5; 6 CAPSULE ORAL at 07:49

## 2022-11-08 RX ADMIN — METHYLPREDNISOLONE SODIUM SUCCINATE 40 MG: 40 INJECTION, POWDER, FOR SOLUTION INTRAMUSCULAR; INTRAVENOUS at 20:43

## 2022-11-08 RX ADMIN — CALCIUM ACETATE 2668 MG: 667 CAPSULE ORAL at 12:43

## 2022-11-08 NOTE — PROGRESS NOTES
[facility] HOSPITALIST PROGRESS NOTE    Subjective     History:   Axel Desir is a 62 y.o. male admitted on 11/3/2022 secondary to Pneumonia of right upper lobe due to infectious organism     Procedures: None    CC: Follow up COPD exacerbation    Patient seen and examined with ANASTASIIA Perdue. Sleeping upon my arrival but easily awakens. Reports some improvement in his cough, dyspnea and wheezing. No reported CP or palpitations. No reported nausea or vomiting. No acute events overnight per RN.     History taken from: patient, chart, and RN.      Objective     Vital Signs  Temp:  [97.2 °F (36.2 °C)-98 °F (36.7 °C)] 97.9 °F (36.6 °C)  Heart Rate:  [] 108  Resp:  [16-22] 20  BP: (118-138)/(75-85) 120/84    Intake/Output Summary (Last 24 hours) at 11/8/2022 1400  Last data filed at 11/8/2022 0900  Gross per 24 hour   Intake 1315.18 ml   Output --   Net 1315.18 ml         Physical Exam:  General:    Awake, alert, in no acute distress, chronically ill appearing   Heart:      Normal S1 and S2. Regular rate and rhythm. No significant murmur, rubs or gallops appreciated.   Lungs:     Respirations regular, even and unlabored. A few scattered expiratory wheezes but aeration overall improved today.     Abdomen:   Soft and nontender. No guarding, rebound tenderness or  organomegaly noted. Bowel sounds present x 4.   Extremities:  No clubbing, cyanosis or edema noted. Moves UE and LE equally B/L.     Results Review:    Results from last 7 days   Lab Units 11/08/22  0226 11/07/22  0123 11/06/22  0129 11/05/22  0108 11/04/22  0405 11/03/22  1248 11/02/22  0451   WBC 10*3/mm3 6.63 5.24 6.14 6.28 6.25 7.50 6.69   HEMOGLOBIN g/dL 10.0* 10.1* 10.9* 11.0* 10.9* 12.2* 10.7*   PLATELETS 10*3/mm3 72* 60* 61* 44* 47* 62* 73*     Results from last 7 days   Lab Units 11/08/22  0226 11/07/22  0123 11/06/22  0129 11/05/22  0108 11/04/22  0405 11/03/22  1248 11/02/22  0451   SODIUM mmol/L 127* 127* 130* 133* 131* 135* 133*    POTASSIUM mmol/L 4.0 5.3* 4.6 4.4 4.9 4.8 4.7   CHLORIDE mmol/L 87* 90* 91* 94* 92* 92* 92*   CO2 mmol/L 22.6 19.0* 20.6* 24.7 19.3* 23.9 21.0*   BUN mg/dL 52* 77* 54* 43* 74* 65* 62*   CREATININE mg/dL 4.58* 6.07* 5.12* 3.74* 5.57* 5.28* 5.65*   CALCIUM mg/dL 7.7* 7.3* 7.2* 7.6* 7.6* 8.3* 8.3*   GLUCOSE mg/dL 229* 493* 188* 247* 319* 195* 192*     Results from last 7 days   Lab Units 11/08/22  0226 11/04/22  0405 11/03/22  1248 11/02/22  0451   BILIRUBIN mg/dL 0.8 1.2 1.8* 1.6*   ALK PHOS U/L 69 72 80 70   AST (SGOT) U/L 23 84* 135* 181*   ALT (SGPT) U/L 69* 152* 202* 229*     Results from last 7 days   Lab Units 11/04/22  0405 11/02/22  0451   MAGNESIUM mg/dL 2.3 2.1               Imaging Results (Last 24 Hours)     ** No results found for the last 24 hours. **            Medications:  apixaban, 5 mg, Oral, Q12H  budesonide-formoterol, 2 puff, Inhalation, BID - RT  calcium acetate, 2,668 mg, Oral, TID With Meals  carvedilol, 6.25 mg, Oral, BID With Meals  cefdinir, 300 mg, Oral, Q24H  cetirizine, 10 mg, Oral, Daily  doxycycline, 100 mg, Intravenous, Q12H  Insulin Aspart, 0-7 Units, Subcutaneous, TID AC  insulin detemir, 10 Units, Subcutaneous, Daily  ipratropium-albuterol, 3 mL, Nebulization, 4x Daily - RT  levothyroxine, 88 mcg, Oral, Q AM  methylPREDNISolone sodium succinate, 40 mg, Intravenous, BID  pantoprazole, 40 mg, Oral, Q AM  Renal, 1 capsule, Oral, Daily  sodium chloride, 3 mL, Intravenous, Q12H  thiamine, 100 mg, Oral, Daily               Assessment & Plan   RUL pneumonia: Sputum culture reveals normal respiratory joanne. Procal is normal. Cont Omnicef and Doxycycline.     Acute exacerbation of COPD: Cont antibiotics as above, steroids and nebs. Cont Symbicort.     ESRD on HD: Cont HD (MWF schedule) per nephrology with input appreciated.     DM II, non-insulin dependent: Recent HgbA1c 8.6. Steroids likely contributing to hyperglycemia. Cont current insulin regimen today. Cont Accuchecks.     Paroxysmal  Afib: Currently in sinus rhythm. Cont Coreg. Cont home Eliquis for stroke prevention.     Chronic macrocytic anemia: B12 and folate are replete. Stable today. Repeat CBC in the AM.     Thrombocytopenia: Possibly 2/2 above. B12 and folate replete. No schistocytes on peripheral smear. Stable/improved today. Repeat CBC in the AM.     Generalized weakness: PT/OT    DVT PPX: Eliquis.     Disposition: SS to follow up with pt and family regarding possible SNF placement.      Andres Reardon DO  11/08/22  14:00 EST
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
## 2022-11-08 NOTE — PROGRESS NOTES
Nephrology Progress Note      Subjective     Seen on dialysis    Objective       Vital signs :     Temp:  [97.2 °F (36.2 °C)-98 °F (36.7 °C)] 97.9 °F (36.6 °C)  Heart Rate:  [] 108  Resp:  [16-22] 20  BP: (118-138)/(75-85) 120/84    Intake/Output                             11/06/22 0701 - 11/07/22 0700 11/07/22 0701 - 11/08/22 0700 11/08/22 0701 - 11/09/22 0700     [id number] 5447-4013 Total 3129-4588 5832-4895 Total 2347-3403 6783-4155 Total                    Intake    P.O.  600  200 800  --  740 740  480  -- 480    I.V.  98.4  99.2 197.6  95.2  -- 95.2  --  -- --    Total Intake 698.4 299.2 997.6 95.2 740 835.2 480 -- 480       Output    Other  --  -- --  3700  -- 3700  --  -- --    Ultrafiltration (mL) -- -- -- 3700 -- 3700 -- -- --    Stool  1  -- 1  --  -- --  --  -- --    Total Output 1 -- 1 3700 -- 3700 -- -- --           Physical Exam:    General Appearance : not in acute distress  Lungs : clear to auscultation, respirations regular  Heart :  regular rhythm & normal rate, normal S1, S2 and no murmur, no rub  Abdomen : soft, non distended  Extremities : no edema   Neurologic :   orientated to person, place, time and situation, Grossly no focal deficits        Laboratory Data :     Albumin Albumin   Date Value Ref Range Status   11/08/2022 3.20 (L) 3.50 - 5.20 g/dL Final      Magnesium No results found for: MG       PTH               No results found for: PTH    CBC and coagulation:  Results from last 7 days   Lab Units 11/08/22  0226 11/07/22  0123 11/06/22  0129 11/05/22  0108 11/04/22  1612 11/04/22  0940 11/04/22  0405 11/03/22  2201 11/03/22  1248   PROCALCITONIN ng/mL 0.19  --   --   --   --   --   --   --   --    LACTATE mmol/L  --   --   --   --  1.6 2.4* 3.4*   < > 2.3*   CRP mg/dL  --   --   --   --   --   --  2.03*  --  2.33*   WBC 10*3/mm3 6.63 5.24 6.14   < >  --   --  6.25  --  7.50   HEMOGLOBIN g/dL 10.0* 10.1* 10.9*   < >  --   --  10.9*  --  12.2*   HEMATOCRIT % 29.5* 31.2* 33.3*   < >   --   --  32.5*  --  35.7*   MCV fL 99.3* 103.7* 104.1*   < >  --   --  102.2*  --  101.4*   MCHC g/dL 33.9 32.4 32.7   < >  --   --  33.5  --  34.2   PLATELETS 10*3/mm3 72* 60* 61*   < >  --   --  47*  --  62*    < > = values in this interval not displayed.     Acid/base balance:      Renal and electrolytes:    Results from last 7 days   Lab Units 11/08/22 0226 11/07/22 0123 11/06/22  0129 11/05/22  0108 11/04/22  0405 11/03/22  1248 11/02/22  0451   SODIUM mmol/L 127* 127* 130* 133* 131*   < > 133*   POTASSIUM mmol/L 4.0 5.3* 4.6 4.4 4.9   < > 4.7   MAGNESIUM mg/dL  --   --   --   --  2.3  --  2.1   CHLORIDE mmol/L 87* 90* 91* 94* 92*   < > 92*   CO2 mmol/L 22.6 19.0* 20.6* 24.7 19.3*   < > 21.0*   BUN mg/dL 52* 77* 54* 43* 74*   < > 62*   CREATININE mg/dL 4.58* 6.07* 5.12* 3.74* 5.57*   < > 5.65*   CALCIUM mg/dL 7.7* 7.3* 7.2* 7.6* 7.6*   < > 8.3*   PHOSPHORUS mg/dL  --   --   --   --   --   --  7.5*    < > = values in this interval not displayed.     Estimated Creatinine Clearance: 17.2 mL/min (A) (by C-G formula based on SCr of 4.58 mg/dL (H)).  @GFRCG:3@   Liver and pancreatic function:  Results from last 7 days   Lab Units 11/08/22 0226 11/04/22  0405 11/03/22  1248   ALBUMIN g/dL 3.20* 3.05* 3.56   BILIRUBIN mg/dL 0.8 1.2 1.8*   ALK PHOS U/L 69 72 80   AST (SGOT) U/L 23 84* 135*   ALT (SGPT) U/L 69* 152* 202*         Cardiac:      Liver and pancreatic function:  Results from last 7 days   Lab Units 11/08/22  0226 11/04/22  0405 11/03/22  1248   ALBUMIN g/dL 3.20* 3.05* 3.56   BILIRUBIN mg/dL 0.8 1.2 1.8*   ALK PHOS U/L 69 72 80   AST (SGOT) U/L 23 84* 135*   ALT (SGPT) U/L 69* 152* 202*       Medications :     apixaban, 5 mg, Oral, Q12H  budesonide-formoterol, 2 puff, Inhalation, BID - RT  calcium acetate, 2,668 mg, Oral, TID With Meals  carvedilol, 6.25 mg, Oral, BID With Meals  cefdinir, 300 mg, Oral, Q24H  cetirizine, 10 mg, Oral, Daily  doxycycline, 100 mg, Intravenous, Q12H  Insulin Aspart, 0-7 Units,  Subcutaneous, TID AC  insulin detemir, 10 Units, Subcutaneous, Daily  ipratropium-albuterol, 3 mL, Nebulization, 4x Daily - RT  levothyroxine, 88 mcg, Oral, Q AM  methylPREDNISolone sodium succinate, 40 mg, Intravenous, BID  pantoprazole, 40 mg, Oral, Q AM  Renal, 1 capsule, Oral, Daily  sodium chloride, 3 mL, Intravenous, Q12H  thiamine, 100 mg, Oral, Daily           Assessment & Plan     1. ESKD on IHDx  2. Anemia  3. Hyponatremia, hypervolemic  4. Anion gap metabolic acidosis  5. SHPT  6. Acute bacterial pneumonia    Had uneventful dialysis yesterday, continue on MWF schedule  Mild hyponatrmeia, FR 800cc/day  Anemia; HH at target  SHPT; follow outpatient management        Bladimir Alvarado MD  11/08/22  12:47 EST

## 2022-11-08 NOTE — PLAN OF CARE
Goal Outcome Evaluation:  Plan of Care Reviewed With: patient        Progress: no change  Outcome Evaluation: Patient seen for OT evaluation. He presents with overall functional decline and generalized weakness impacting functional performance with daily occupations. Recommend OT skilled services and home health upon discharge.

## 2022-11-08 NOTE — PROGRESS NOTES
Antimicrobial Length of Therapy:    Day 5 Cefdinir (Day 6 total cephalosporin)  Day 6 Doxycycline    Thank you,    Tiffany Cruz, PharmD

## 2022-11-08 NOTE — PLAN OF CARE
Goal Outcome Evaluation:              Outcome Evaluation: Pt resting in bed at this time. BG elevated this shift, PA aware, see orders. No complaints of pain or discomfort. Will continue POC.

## 2022-11-08 NOTE — CASE MANAGEMENT/SOCIAL WORK
Discharge Planning Assessment  [facility]     Patient Name: Axel Desir  MRN: [id number]  Today's Date: 11/8/2022    Admit Date: 11/3/2022    Plan: SS spoke with pt at bedside on this date 11/8/22. Pt states he would like to return back to [facility]. He states he has been ambulating independently in his room. Pt is requesting SS to family on contact list. SS to follow.     Discharge Plan     Row Name 11/08/22 1253       Plan    Plan SS spoke with pt at bedside on this date 11/8/22. Pt states he would like to return back to [facility]. He states he has been ambulating independently in his room. Pt is requesting SS to family on contact list. SS to follow.                  JAXON Jhaveri

## 2022-11-08 NOTE — CONSULTS
"COPD education was attempted with Mr. Desir but he said he was not interested in it at this time. I told him if he decided other dunne I would be happy to come back. When I introduced myself to him, I also explained why I had came to see him (COPD education), then I asked him if that was OK. He stated he was \"not interested\".   "

## 2022-11-09 LAB
ANION GAP SERPL CALCULATED.3IONS-SCNC: 15.3 MMOL/L (ref 5–15)
ANION GAP SERPL CALCULATED.3IONS-SCNC: 17 MMOL/L (ref 5–15)
ANISOCYTOSIS BLD QL: NORMAL
BASOPHILS # BLD AUTO: 0.01 10*3/MM3 (ref 0–0.2)
BASOPHILS NFR BLD AUTO: 0.1 % (ref 0–1.5)
BUN SERPL-MCNC: 35 MG/DL (ref 8–23)
BUN SERPL-MCNC: 66 MG/DL (ref 8–23)
BUN/CREAT SERPL: 10.7 (ref 7–25)
BUN/CREAT SERPL: 13 (ref 7–25)
BURR CELLS BLD QL SMEAR: NORMAL
BURR CELLS BLD QL SMEAR: NORMAL
CALCIUM SPEC-SCNC: 7.8 MG/DL (ref 8.6–10.5)
CALCIUM SPEC-SCNC: 7.8 MG/DL (ref 8.6–10.5)
CHLORIDE SERPL-SCNC: 86 MMOL/L (ref 98–107)
CHLORIDE SERPL-SCNC: 90 MMOL/L (ref 98–107)
CO2 SERPL-SCNC: 21.7 MMOL/L (ref 22–29)
CO2 SERPL-SCNC: 22 MMOL/L (ref 22–29)
CREAT SERPL-MCNC: 3.28 MG/DL (ref 0.76–1.27)
CREAT SERPL-MCNC: 5.09 MG/DL (ref 0.76–1.27)
DEPRECATED RDW RBC AUTO: 73.7 FL (ref 37–54)
EGFRCR SERPLBLD CKD-EPI 2021: 11.9 ML/MIN/1.73
EGFRCR SERPLBLD CKD-EPI 2021: 20.2 ML/MIN/1.73
EOSINOPHIL # BLD AUTO: 0 10*3/MM3 (ref 0–0.4)
EOSINOPHIL NFR BLD AUTO: 0 % (ref 0.3–6.2)
ERYTHROCYTE [DISTWIDTH] IN BLOOD BY AUTOMATED COUNT: 20.1 % (ref 12.3–15.4)
GLUCOSE BLDC GLUCOMTR-MCNC: 194 MG/DL (ref 70–130)
GLUCOSE BLDC GLUCOMTR-MCNC: 279 MG/DL (ref 70–130)
GLUCOSE BLDC GLUCOMTR-MCNC: 329 MG/DL (ref 70–130)
GLUCOSE BLDC GLUCOMTR-MCNC: 379 MG/DL (ref 70–130)
GLUCOSE SERPL-MCNC: 252 MG/DL (ref 65–99)
GLUCOSE SERPL-MCNC: 262 MG/DL (ref 65–99)
HCT VFR BLD AUTO: 30.3 % (ref 37.5–51)
HGB BLD-MCNC: 10.1 G/DL (ref 13–17.7)
HYPOCHROMIA BLD QL: NORMAL
IMM GRANULOCYTES # BLD AUTO: 0.09 10*3/MM3 (ref 0–0.05)
IMM GRANULOCYTES NFR BLD AUTO: 1.2 % (ref 0–0.5)
LYMPHOCYTES # BLD AUTO: 0.41 10*3/MM3 (ref 0.7–3.1)
LYMPHOCYTES NFR BLD AUTO: 5.6 % (ref 19.6–45.3)
MACROCYTES BLD QL SMEAR: NORMAL
MCH RBC QN AUTO: 33.6 PG (ref 26.6–33)
MCHC RBC AUTO-ENTMCNC: 33.3 G/DL (ref 31.5–35.7)
MCV RBC AUTO: 100.7 FL (ref 79–97)
MONOCYTES # BLD AUTO: 0.55 10*3/MM3 (ref 0.1–0.9)
MONOCYTES NFR BLD AUTO: 7.6 % (ref 5–12)
NEUTROPHILS NFR BLD AUTO: 6.22 10*3/MM3 (ref 1.7–7)
NEUTROPHILS NFR BLD AUTO: 85.5 % (ref 42.7–76)
NRBC BLD AUTO-RTO: 0 /100 WBC (ref 0–0.2)
PLATELET # BLD AUTO: 86 10*3/MM3 (ref 140–450)
PMV BLD AUTO: 11.5 FL (ref 6–12)
POIKILOCYTOSIS BLD QL SMEAR: NORMAL
POTASSIUM SERPL-SCNC: 4.4 MMOL/L (ref 3.5–5.2)
POTASSIUM SERPL-SCNC: 4.5 MMOL/L (ref 3.5–5.2)
RBC # BLD AUTO: 3.01 10*6/MM3 (ref 4.14–5.8)
REF LAB TEST METHOD: NORMAL
SMALL PLATELETS BLD QL SMEAR: NORMAL
SODIUM SERPL-SCNC: 123 MMOL/L (ref 136–145)
SODIUM SERPL-SCNC: 129 MMOL/L (ref 136–145)
WBC NRBC COR # BLD: 7.28 10*3/MM3 (ref 3.4–10.8)

## 2022-11-09 PROCEDURE — G0378 HOSPITAL OBSERVATION PER HR: HCPCS

## 2022-11-09 PROCEDURE — 82962 GLUCOSE BLOOD TEST: CPT

## 2022-11-09 PROCEDURE — 63710000001 INSULIN ASPART PER 5 UNITS: Performed by: PHYSICIAN ASSISTANT

## 2022-11-09 PROCEDURE — 63710000001 INSULIN DETEMIR PER 5 UNITS: Performed by: INTERNAL MEDICINE

## 2022-11-09 PROCEDURE — 5A1D70Z PERFORMANCE OF URINARY FILTRATION, INTERMITTENT, LESS THAN 6 HOURS PER DAY: ICD-10-PCS | Performed by: INTERNAL MEDICINE

## 2022-11-09 PROCEDURE — 63710000001 INSULIN ASPART PER 5 UNITS: Performed by: INTERNAL MEDICINE

## 2022-11-09 PROCEDURE — 94799 UNLISTED PULMONARY SVC/PX: CPT

## 2022-11-09 PROCEDURE — 99232 SBSQ HOSP IP/OBS MODERATE 35: CPT | Performed by: INTERNAL MEDICINE

## 2022-11-09 PROCEDURE — 80048 BASIC METABOLIC PNL TOTAL CA: CPT | Performed by: INTERNAL MEDICINE

## 2022-11-09 PROCEDURE — 85007 BL SMEAR W/DIFF WBC COUNT: CPT | Performed by: INTERNAL MEDICINE

## 2022-11-09 PROCEDURE — 25010000002 METHYLPREDNISOLONE PER 40 MG: Performed by: INTERNAL MEDICINE

## 2022-11-09 PROCEDURE — 85025 COMPLETE CBC W/AUTO DIFF WBC: CPT | Performed by: INTERNAL MEDICINE

## 2022-11-09 PROCEDURE — 94761 N-INVAS EAR/PLS OXIMETRY MLT: CPT

## 2022-11-09 RX ORDER — INSULIN ASPART 100 [IU]/ML
6 INJECTION, SOLUTION INTRAVENOUS; SUBCUTANEOUS ONCE
Status: COMPLETED | OUTPATIENT
Start: 2022-11-09 | End: 2022-11-09

## 2022-11-09 RX ORDER — INSULIN ASPART 100 [IU]/ML
0-9 INJECTION, SOLUTION INTRAVENOUS; SUBCUTANEOUS
Status: DISCONTINUED | OUTPATIENT
Start: 2022-11-09 | End: 2022-11-15

## 2022-11-09 RX ORDER — CHOLECALCIFEROL (VITAMIN D3) 125 MCG
10 CAPSULE ORAL NIGHTLY
Status: DISCONTINUED | OUTPATIENT
Start: 2022-11-09 | End: 2022-11-20 | Stop reason: HOSPADM

## 2022-11-09 RX ADMIN — DOXYCYCLINE 100 MG: 100 INJECTION, POWDER, LYOPHILIZED, FOR SOLUTION INTRAVENOUS at 21:37

## 2022-11-09 RX ADMIN — APIXABAN 5 MG: 5 TABLET, FILM COATED ORAL at 21:36

## 2022-11-09 RX ADMIN — LEVOTHYROXINE SODIUM 88 MCG: 88 TABLET ORAL at 05:59

## 2022-11-09 RX ADMIN — INSULIN ASPART 6 UNITS: 100 INJECTION, SOLUTION INTRAVENOUS; SUBCUTANEOUS at 21:37

## 2022-11-09 RX ADMIN — PANTOPRAZOLE SODIUM 40 MG: 40 TABLET, DELAYED RELEASE ORAL at 05:59

## 2022-11-09 RX ADMIN — CETIRIZINE HYDROCHLORIDE 10 MG: 10 TABLET, FILM COATED ORAL at 12:30

## 2022-11-09 RX ADMIN — METHYLPREDNISOLONE SODIUM SUCCINATE 40 MG: 40 INJECTION, POWDER, FOR SOLUTION INTRAMUSCULAR; INTRAVENOUS at 12:31

## 2022-11-09 RX ADMIN — Medication 3 ML: at 12:32

## 2022-11-09 RX ADMIN — INSULIN ASPART 7 UNITS: 100 INJECTION, SOLUTION INTRAVENOUS; SUBCUTANEOUS at 16:59

## 2022-11-09 RX ADMIN — APIXABAN 5 MG: 5 TABLET, FILM COATED ORAL at 12:29

## 2022-11-09 RX ADMIN — INSULIN DETEMIR 15 UNITS: 100 INJECTION, SOLUTION SUBCUTANEOUS at 12:53

## 2022-11-09 RX ADMIN — ACETAMINOPHEN 650 MG: 325 TABLET, FILM COATED ORAL at 10:16

## 2022-11-09 RX ADMIN — Medication 100 MG: at 12:30

## 2022-11-09 RX ADMIN — CALCIUM ACETATE 2668 MG: 667 CAPSULE ORAL at 17:00

## 2022-11-09 RX ADMIN — DOXYCYCLINE 100 MG: 100 INJECTION, POWDER, LYOPHILIZED, FOR SOLUTION INTRAVENOUS at 12:52

## 2022-11-09 RX ADMIN — INSULIN ASPART 2 UNITS: 100 INJECTION, SOLUTION INTRAVENOUS; SUBCUTANEOUS at 12:53

## 2022-11-09 RX ADMIN — ACETAMINOPHEN 650 MG: 325 TABLET, FILM COATED ORAL at 16:04

## 2022-11-09 RX ADMIN — CALCIUM ACETATE 2668 MG: 667 CAPSULE ORAL at 12:30

## 2022-11-09 RX ADMIN — CARVEDILOL 6.25 MG: 6.25 TABLET, FILM COATED ORAL at 17:00

## 2022-11-09 RX ADMIN — Medication 10 MG: at 21:36

## 2022-11-09 RX ADMIN — CEFDINIR 300 MG: 300 CAPSULE ORAL at 12:31

## 2022-11-09 RX ADMIN — RENO CAPS 1 MG: 100; 1.5; 1.7; 20; 10; 1; 150; 5; 6 CAPSULE ORAL at 12:30

## 2022-11-09 RX ADMIN — Medication 3 ML: at 21:37

## 2022-11-09 RX ADMIN — IPRATROPIUM BROMIDE AND ALBUTEROL SULFATE 3 ML: .5; 3 SOLUTION RESPIRATORY (INHALATION) at 18:24

## 2022-11-09 RX ADMIN — HYDROXYZINE HYDROCHLORIDE 25 MG: 25 TABLET ORAL at 21:36

## 2022-11-09 RX ADMIN — IPRATROPIUM BROMIDE AND ALBUTEROL SULFATE 3 ML: .5; 3 SOLUTION RESPIRATORY (INHALATION) at 00:28

## 2022-11-09 RX ADMIN — METHYLPREDNISOLONE SODIUM SUCCINATE 40 MG: 40 INJECTION, POWDER, FOR SOLUTION INTRAMUSCULAR; INTRAVENOUS at 21:37

## 2022-11-09 NOTE — PROGRESS NOTES
[facility] HOSPITALIST PROGRESS NOTE    Subjective     History:   Axel Desir is a 62 y.o. male admitted on 11/3/2022 secondary to Pneumonia of right upper lobe due to infectious organism     Procedures: None    CC: Follow up COPD exacerbation    Patient seen and examined in dialysis. Awake and alert. States he feels about the same. Continues to report cough and dyspnea. No reported CP. No reported nausea or vomiting. States he has not been sleeping well. No acute events overnight per RN.     History taken from: patient, chart, and RN.      Objective     Vital Signs  Temp:  [97.4 °F (36.3 °C)-98.8 °F (37.1 °C)] 97.4 °F (36.3 °C)  Heart Rate:  [] 114  Resp:  [18-20] 20  BP: (105-126)/(69-84) 126/84    Intake/Output Summary (Last 24 hours) at 11/9/2022 1606  Last data filed at 11/9/2022 1507  Gross per 24 hour   Intake 2060 ml   Output 1500 ml   Net 560 ml         Physical Exam:  General:    Awake, alert, in no acute distress, chronically ill appearing   Heart:      Normal S1 and S2. Regular rate and rhythm. No significant murmur, rubs or gallops appreciated.   Lungs:     Respirations regular, even and unlabored. Scattered expiratory wheezes, worse in TONIO with diminished breath sounds at bases.     Abdomen:   Soft and nontender. No guarding, rebound tenderness or  organomegaly noted. Bowel sounds present x 4.   Extremities:  No clubbing, cyanosis or edema noted. Moves UE and LE equally B/L.     Results Review:    Results from last 7 days   Lab Units 11/09/22  0044 11/08/22  0226 11/07/22  0123 11/06/22  0129 11/05/22  0108 11/04/22  0405 11/03/22  1248   WBC 10*3/mm3 7.28 6.63 5.24 6.14 6.28 6.25 7.50   HEMOGLOBIN g/dL 10.1* 10.0* 10.1* 10.9* 11.0* 10.9* 12.2*   PLATELETS 10*3/mm3 86* 72* 60* 61* 44* 47* 62*     Results from last 7 days   Lab Units 11/09/22  1347 11/09/22  0044 11/08/22  0226 11/07/22  0123 11/06/22  0129 11/05/22  0108 11/04/22  0405   SODIUM mmol/L 129* 123* 127* 127* 130*  133* 131*   POTASSIUM mmol/L 4.4 4.5 4.0 5.3* 4.6 4.4 4.9   CHLORIDE mmol/L 90* 86* 87* 90* 91* 94* 92*   CO2 mmol/L 22.0 21.7* 22.6 19.0* 20.6* 24.7 19.3*   BUN mg/dL 35* 66* 52* 77* 54* 43* 74*   CREATININE mg/dL 3.28* 5.09* 4.58* 6.07* 5.12* 3.74* 5.57*   CALCIUM mg/dL 7.8* 7.8* 7.7* 7.3* 7.2* 7.6* 7.6*   GLUCOSE mg/dL 252* 262* 229* 493* 188* 247* 319*     Results from last 7 days   Lab Units 11/08/22  0226 11/04/22  0405 11/03/22  1248   BILIRUBIN mg/dL 0.8 1.2 1.8*   ALK PHOS U/L 69 72 80   AST (SGOT) U/L 23 84* 135*   ALT (SGPT) U/L 69* 152* 202*     Results from last 7 days   Lab Units 11/04/22  0405   MAGNESIUM mg/dL 2.3               Imaging Results (Last 24 Hours)     ** No results found for the last 24 hours. **            Medications:  apixaban, 5 mg, Oral, Q12H  budesonide-formoterol, 2 puff, Inhalation, BID - RT  calcium acetate, 2,668 mg, Oral, TID With Meals  carvedilol, 6.25 mg, Oral, BID With Meals  cefdinir, 300 mg, Oral, Q24H  cetirizine, 10 mg, Oral, Daily  doxycycline, 100 mg, Intravenous, Q12H  Insulin Aspart, 0-9 Units, Subcutaneous, TID AC  insulin detemir, 15 Units, Subcutaneous, Daily  ipratropium-albuterol, 3 mL, Nebulization, 4x Daily - RT  levothyroxine, 88 mcg, Oral, Q AM  methylPREDNISolone sodium succinate, 40 mg, Intravenous, BID  pantoprazole, 40 mg, Oral, Q AM  Renal, 1 capsule, Oral, Daily  sodium chloride, 3 mL, Intravenous, Q12H  thiamine, 100 mg, Oral, Daily               Assessment & Plan   RUL pneumonia: Sputum culture reveals normal respiratory joanne. Procal is normal. Complete course of Omincef and Doxycycline.     Acute exacerbation of COPD: Cont antibiotics as above, steroids and nebs. Cont Symbicort.     ESRD on HD: Cont HD ([facility] schedule) per nephrology with input appreciated.     DM II, non-insulin dependent: Recent HgbA1c 8.6. Steroids likely contributing to hyperglycemia. Increase basal insulin and sliding scale. Cont Accuchecks.     Hyponatremia: Likely  hypervolemic. Improved on repeat following HD today. Repeat labs in the AM.     Paroxysmal Afib: Currently in sinus rhythm. Cont Coreg. Cont home Eliquis for stroke prevention.     Chronic macrocytic anemia: B12 and folate are replete. Stable today. Repeat CBC in the AM.     Thrombocytopenia: Possibly 2/2 above. B12 and folate replete. No schistocytes on peripheral smear. Improved today. Repeat CBC in the AM.     Generalized weakness: PT/OT    DVT PPX: Eliquis.     Disposition: Pt and his cousin/HCS requesting rehab placement in [place] to be closer to family in [place].      Andres Reardon,   11/09/22  16:06 EST

## 2022-11-09 NOTE — CONSULTS
Diabetes Education    Patient Name:  Axel Desir  YOB: 1960  MRN: [id number]  Admit Date:  11/3/2022        Patient off floor for dialysis, will continue to follow.       Electronically signed by:  Ana Collins RN  11/09/22 11:22 EST

## 2022-11-09 NOTE — PLAN OF CARE
Goal Outcome Evaluation:              Outcome Evaluation: Pt resting in bed at this time. No complaints of pain or discomfort. 2L NC in place. VSS. Pt will be getting dialysis this morning. Will continue POC.

## 2022-11-09 NOTE — PLAN OF CARE
Goal Outcome Evaluation:  Plan of Care Reviewed With: patient        Progress: no change  Outcome Evaluation: Pt's resting in bed, no c/o, no s/s of distress noted, O2 at 2L n/c. Pt had HD today, 1000 cc removed per HD nurse, tolerated well. Con't IV ABX/BS control. Will con't to monitor.

## 2022-11-09 NOTE — CASE MANAGEMENT/SOCIAL WORK
Discharge Planning Assessment   [facility]     Patient Name: Axel Desir  MRN: [id number]  Today's Date: 11/9/2022    Admit Date: 11/3/2022    Plan: Pt is agreeable for placement and request for SS to speak with Candace Aguero. SS contacted pt's Health care Surrogate, Candace Elie [phone] who states she would like for pt to be placed for rehab somwhere in [place] to be closer to her. SS to follow and assist with discharge planning.     Discharge Plan     Row Name 11/09/22 1325       Plan    Plan Pt is agreeable for placement and request for SS to speak with Candace Aguero. SS contacted pt's Health care Surrogate, Candace Delgadillo [phone] who states she would like for pt to be placed for rehab somwhere in [place] to be closer to her. SS to follow and assist with discharge planning.                  JAXON Jhaveri

## 2022-11-09 NOTE — PROGRESS NOTES
Nephrology Progress Note      Subjective     Seen on dialysis, tolerating well    Objective       Vital signs :     Temp:  [97.9 °F (36.6 °C)-98.8 °F (37.1 °C)] 98.4 °F (36.9 °C)  Heart Rate:  [] 104  Resp:  [16-18] 18  BP: (105-132)/(69-89) 105/71    Intake/Output                       11/07/22 0701 - 11/08/22 0700 11/08/22 0701 - 11/09/22 0700     6388-7577 7595-2874 Total 0616-3263 6316-8023 Total                 Intake    P.O.  --  740 740  1800  640 2440    I.V.  95.2  -- 95.2  --  -- --    Total Intake 95.2 740 835.2 1541 730 5337       Output    Other  3700  -- 3700  --  -- --    Ultrafiltration (mL) 3700 -- 3700 -- -- --    Total Output 3700 -- 3700 -- -- --           Physical Exam:    General Appearance : not in acute distress  Lungs : clear to auscultation, respirations regular  Heart :  regular rhythm & normal rate, normal S1, S2 and no murmur, no rub  Abdomen : soft, non distended  Extremities : no edema   Neurologic :   orientated to person, place, time and situation, Grossly no focal deficits    Laboratory Data :     Albumin Albumin   Date Value Ref Range Status   11/08/2022 3.20 (L) 3.50 - 5.20 g/dL Final      Magnesium No results found for: MG       PTH               No results found for: PTH    CBC and coagulation:  Results from last 7 days   Lab Units 11/09/22  0044 11/08/22  0226 11/07/22  0123 11/05/22  0108 11/04/22  1612 11/04/22  0940 11/04/22  0405 11/03/22  2201 11/03/22  1248   PROCALCITONIN ng/mL  --  0.19  --   --   --   --   --   --   --    LACTATE mmol/L  --   --   --   --  1.6 2.4* 3.4*   < > 2.3*   CRP mg/dL  --   --   --   --   --   --  2.03*  --  2.33*   WBC 10*3/mm3 7.28 6.63 5.24   < >  --   --  6.25  --  7.50   HEMOGLOBIN g/dL 10.1* 10.0* 10.1*   < >  --   --  10.9*  --  12.2*   HEMATOCRIT % 30.3* 29.5* 31.2*   < >  --   --  32.5*  --  35.7*   MCV fL 100.7* 99.3* 103.7*   < >  --   --  102.2*  --  101.4*   MCHC g/dL 33.3 33.9 32.4   < >  --   --  33.5  --  34.2   PLATELETS  10*3/mm3 86* 72* 60*   < >  --   --  47*  --  62*    < > = values in this interval not displayed.     Acid/base balance:      Renal and electrolytes:    Results from last 7 days   Lab Units 11/09/22  0044 11/08/22 0226 11/07/22  0123 11/06/22  0129 11/05/22  0108 11/04/22  0405   SODIUM mmol/L 123* 127* 127* 130* 133* 131*   POTASSIUM mmol/L 4.5 4.0 5.3* 4.6 4.4 4.9   MAGNESIUM mg/dL  --   --   --   --   --  2.3   CHLORIDE mmol/L 86* 87* 90* 91* 94* 92*   CO2 mmol/L 21.7* 22.6 19.0* 20.6* 24.7 19.3*   BUN mg/dL 66* 52* 77* 54* 43* 74*   CREATININE mg/dL 5.09* 4.58* 6.07* 5.12* 3.74* 5.57*   CALCIUM mg/dL 7.8* 7.7* 7.3* 7.2* 7.6* 7.6*     Estimated Creatinine Clearance: 15.4 mL/min (A) (by C-G formula based on SCr of 5.09 mg/dL (H)).  @GFRCG:3@   Liver and pancreatic function:  Results from last 7 days   Lab Units 11/08/22 0226 11/04/22 0405 11/03/22  1248   ALBUMIN g/dL 3.20* 3.05* 3.56   BILIRUBIN mg/dL 0.8 1.2 1.8*   ALK PHOS U/L 69 72 80   AST (SGOT) U/L 23 84* 135*   ALT (SGPT) U/L 69* 152* 202*         Cardiac:      Liver and pancreatic function:  Results from last 7 days   Lab Units 11/08/22 0226 11/04/22  0405 11/03/22  1248   ALBUMIN g/dL 3.20* 3.05* 3.56   BILIRUBIN mg/dL 0.8 1.2 1.8*   ALK PHOS U/L 69 72 80   AST (SGOT) U/L 23 84* 135*   ALT (SGPT) U/L 69* 152* 202*       Medications :     apixaban, 5 mg, Oral, Q12H  budesonide-formoterol, 2 puff, Inhalation, BID - RT  calcium acetate, 2,668 mg, Oral, TID With Meals  carvedilol, 6.25 mg, Oral, BID With Meals  cefdinir, 300 mg, Oral, Q24H  cetirizine, 10 mg, Oral, Daily  doxycycline, 100 mg, Intravenous, Q12H  Insulin Aspart, 0-9 Units, Subcutaneous, TID AC  insulin detemir, 15 Units, Subcutaneous, Daily  ipratropium-albuterol, 3 mL, Nebulization, 4x Daily - RT  levothyroxine, 88 mcg, Oral, Q AM  methylPREDNISolone sodium succinate, 40 mg, Intravenous, BID  pantoprazole, 40 mg, Oral, Q AM  Renal, 1 capsule, Oral, Daily  sodium chloride, 3 mL,  Intravenous, Q12H  thiamine, 100 mg, Oral, Daily           Assessment & Plan     1. ESKD on IHDx  2. Anemia  3. Hyponatremia, hypervolemic  4. Anion gap metabolic acidosis  5. SHPT  6. Acute bacterial pneumonia    Evaluated on dialysis, tolerating well. No symptoms  Educated on FR 32 ounces/day  Anemia; HH at target  SHPT; follow outpatient management        Bladimir Alvarado MD  11/09/22  10:38 EST

## 2022-11-09 NOTE — SIGNIFICANT NOTE
"   11/09/22 7977   OTHER   Discipline physical therapy assistant   Rehab Time/Intention   Session Not Performed other (see comments)  (Pt reported he was D/Cing on this date and did not want to work with therapy. Pt states he had diaylsis and just, \" Don't feel like doing it.\" when asking him if we could try any type of theraputic activitiy.)     "

## 2022-11-10 ENCOUNTER — APPOINTMENT (OUTPATIENT)
Dept: CT IMAGING | Facility: HOSPITAL | Age: 62
End: 2022-11-10

## 2022-11-10 LAB
ANION GAP SERPL CALCULATED.3IONS-SCNC: 15.1 MMOL/L (ref 5–15)
ANISOCYTOSIS BLD QL: NORMAL
BASOPHILS # BLD AUTO: 0 10*3/MM3 (ref 0–0.2)
BASOPHILS NFR BLD AUTO: 0 % (ref 0–1.5)
BUN SERPL-MCNC: 46 MG/DL (ref 8–23)
BUN/CREAT SERPL: 11.3 (ref 7–25)
CALCIUM SPEC-SCNC: 8.2 MG/DL (ref 8.6–10.5)
CHLORIDE SERPL-SCNC: 89 MMOL/L (ref 98–107)
CO2 SERPL-SCNC: 23.9 MMOL/L (ref 22–29)
CREAT SERPL-MCNC: 4.06 MG/DL (ref 0.76–1.27)
DEPRECATED RDW RBC AUTO: 74.9 FL (ref 37–54)
EGFRCR SERPLBLD CKD-EPI 2021: 15.6 ML/MIN/1.73
EOSINOPHIL # BLD AUTO: 0 10*3/MM3 (ref 0–0.4)
EOSINOPHIL NFR BLD AUTO: 0 % (ref 0.3–6.2)
ERYTHROCYTE [DISTWIDTH] IN BLOOD BY AUTOMATED COUNT: 20.5 % (ref 12.3–15.4)
GLUCOSE BLDC GLUCOMTR-MCNC: 166 MG/DL (ref 70–130)
GLUCOSE BLDC GLUCOMTR-MCNC: 215 MG/DL (ref 70–130)
GLUCOSE BLDC GLUCOMTR-MCNC: 233 MG/DL (ref 70–130)
GLUCOSE BLDC GLUCOMTR-MCNC: 84 MG/DL (ref 70–130)
GLUCOSE SERPL-MCNC: 158 MG/DL (ref 65–99)
HCT VFR BLD AUTO: 31.4 % (ref 37.5–51)
HGB BLD-MCNC: 10.3 G/DL (ref 13–17.7)
IMM GRANULOCYTES # BLD AUTO: 0.08 10*3/MM3 (ref 0–0.05)
IMM GRANULOCYTES NFR BLD AUTO: 1.1 % (ref 0–0.5)
LYMPHOCYTES # BLD AUTO: 0.49 10*3/MM3 (ref 0.7–3.1)
LYMPHOCYTES NFR BLD AUTO: 6.7 % (ref 19.6–45.3)
MACROCYTES BLD QL SMEAR: NORMAL
MCH RBC QN AUTO: 33.7 PG (ref 26.6–33)
MCHC RBC AUTO-ENTMCNC: 32.8 G/DL (ref 31.5–35.7)
MCV RBC AUTO: 102.6 FL (ref 79–97)
MONOCYTES # BLD AUTO: 0.84 10*3/MM3 (ref 0.1–0.9)
MONOCYTES NFR BLD AUTO: 11.5 % (ref 5–12)
NEUTROPHILS NFR BLD AUTO: 5.88 10*3/MM3 (ref 1.7–7)
NEUTROPHILS NFR BLD AUTO: 80.7 % (ref 42.7–76)
NRBC BLD AUTO-RTO: 0 /100 WBC (ref 0–0.2)
PLATELET # BLD AUTO: 103 10*3/MM3 (ref 140–450)
PMV BLD AUTO: 11.7 FL (ref 6–12)
POTASSIUM SERPL-SCNC: 4.5 MMOL/L (ref 3.5–5.2)
RBC # BLD AUTO: 3.06 10*6/MM3 (ref 4.14–5.8)
SMALL PLATELETS BLD QL SMEAR: NORMAL
SODIUM SERPL-SCNC: 128 MMOL/L (ref 136–145)
WBC NRBC COR # BLD: 7.29 10*3/MM3 (ref 3.4–10.8)

## 2022-11-10 PROCEDURE — G0378 HOSPITAL OBSERVATION PER HR: HCPCS

## 2022-11-10 PROCEDURE — 82962 GLUCOSE BLOOD TEST: CPT

## 2022-11-10 PROCEDURE — 63710000001 INSULIN ASPART PER 5 UNITS: Performed by: INTERNAL MEDICINE

## 2022-11-10 PROCEDURE — 97110 THERAPEUTIC EXERCISES: CPT

## 2022-11-10 PROCEDURE — 71275 CT ANGIOGRAPHY CHEST: CPT | Performed by: RADIOLOGY

## 2022-11-10 PROCEDURE — 80048 BASIC METABOLIC PNL TOTAL CA: CPT | Performed by: INTERNAL MEDICINE

## 2022-11-10 PROCEDURE — 99232 SBSQ HOSP IP/OBS MODERATE 35: CPT | Performed by: INTERNAL MEDICINE

## 2022-11-10 PROCEDURE — 94761 N-INVAS EAR/PLS OXIMETRY MLT: CPT

## 2022-11-10 PROCEDURE — 94799 UNLISTED PULMONARY SVC/PX: CPT

## 2022-11-10 PROCEDURE — 0 IOPAMIDOL PER 1 ML: Performed by: INTERNAL MEDICINE

## 2022-11-10 PROCEDURE — 85025 COMPLETE CBC W/AUTO DIFF WBC: CPT | Performed by: INTERNAL MEDICINE

## 2022-11-10 PROCEDURE — 25010000002 METHYLPREDNISOLONE PER 40 MG: Performed by: INTERNAL MEDICINE

## 2022-11-10 PROCEDURE — 85007 BL SMEAR W/DIFF WBC COUNT: CPT | Performed by: INTERNAL MEDICINE

## 2022-11-10 PROCEDURE — 63710000001 INSULIN DETEMIR PER 5 UNITS: Performed by: INTERNAL MEDICINE

## 2022-11-10 PROCEDURE — 71275 CT ANGIOGRAPHY CHEST: CPT

## 2022-11-10 RX ADMIN — METHYLPREDNISOLONE SODIUM SUCCINATE 40 MG: 40 INJECTION, POWDER, FOR SOLUTION INTRAMUSCULAR; INTRAVENOUS at 20:54

## 2022-11-10 RX ADMIN — CETIRIZINE HYDROCHLORIDE 10 MG: 10 TABLET, FILM COATED ORAL at 09:21

## 2022-11-10 RX ADMIN — PANTOPRAZOLE SODIUM 40 MG: 40 TABLET, DELAYED RELEASE ORAL at 06:07

## 2022-11-10 RX ADMIN — HYDROXYZINE HYDROCHLORIDE 25 MG: 25 TABLET ORAL at 20:54

## 2022-11-10 RX ADMIN — Medication 3 ML: at 20:56

## 2022-11-10 RX ADMIN — INSULIN DETEMIR 15 UNITS: 100 INJECTION, SOLUTION SUBCUTANEOUS at 09:19

## 2022-11-10 RX ADMIN — CARVEDILOL 6.25 MG: 6.25 TABLET, FILM COATED ORAL at 09:21

## 2022-11-10 RX ADMIN — METHYLPREDNISOLONE SODIUM SUCCINATE 40 MG: 40 INJECTION, POWDER, FOR SOLUTION INTRAMUSCULAR; INTRAVENOUS at 09:22

## 2022-11-10 RX ADMIN — Medication 100 MG: at 09:21

## 2022-11-10 RX ADMIN — CALCIUM ACETATE 2668 MG: 667 CAPSULE ORAL at 12:10

## 2022-11-10 RX ADMIN — DOXYCYCLINE 100 MG: 100 INJECTION, POWDER, LYOPHILIZED, FOR SOLUTION INTRAVENOUS at 09:20

## 2022-11-10 RX ADMIN — INSULIN ASPART 4 UNITS: 100 INJECTION, SOLUTION INTRAVENOUS; SUBCUTANEOUS at 16:59

## 2022-11-10 RX ADMIN — BUDESONIDE AND FORMOTEROL FUMARATE DIHYDRATE 2 PUFF: 160; 4.5 AEROSOL RESPIRATORY (INHALATION) at 18:41

## 2022-11-10 RX ADMIN — LEVOTHYROXINE SODIUM 88 MCG: 88 TABLET ORAL at 06:07

## 2022-11-10 RX ADMIN — CEFDINIR 300 MG: 300 CAPSULE ORAL at 09:21

## 2022-11-10 RX ADMIN — RENO CAPS 1 MG: 100; 1.5; 1.7; 20; 10; 1; 150; 5; 6 CAPSULE ORAL at 09:21

## 2022-11-10 RX ADMIN — DOXYCYCLINE 100 MG: 100 INJECTION, POWDER, LYOPHILIZED, FOR SOLUTION INTRAVENOUS at 20:54

## 2022-11-10 RX ADMIN — CALCIUM ACETATE 2668 MG: 667 CAPSULE ORAL at 09:20

## 2022-11-10 RX ADMIN — CALCIUM ACETATE 2668 MG: 667 CAPSULE ORAL at 16:58

## 2022-11-10 RX ADMIN — APIXABAN 5 MG: 5 TABLET, FILM COATED ORAL at 09:21

## 2022-11-10 RX ADMIN — IOPAMIDOL 86 ML: 755 INJECTION, SOLUTION INTRAVENOUS at 11:28

## 2022-11-10 RX ADMIN — APIXABAN 5 MG: 5 TABLET, FILM COATED ORAL at 20:55

## 2022-11-10 RX ADMIN — Medication 10 MG: at 20:55

## 2022-11-10 RX ADMIN — CARVEDILOL 6.25 MG: 6.25 TABLET, FILM COATED ORAL at 16:58

## 2022-11-10 RX ADMIN — BUDESONIDE AND FORMOTEROL FUMARATE DIHYDRATE 2 PUFF: 160; 4.5 AEROSOL RESPIRATORY (INHALATION) at 06:52

## 2022-11-10 NOTE — CONSULTS
"Diabetes Education  Assessment/Teaching    Patient Name:  Axel Desir  YOB: 1960  MRN: [id number]  Admit Date:  11/3/2022      Assessment Date:  11/10/2022  Flowsheet Row Most Recent Value   General Information     Height 172.7 cm (68\")   Height Method Stated   Weight 74.5 kg (164 lb 4.8 oz)   Weight Method Stated   Pregnancy Assessment    Diabetes History    Education Preferences    Nutrition Information    Assessment Topics    DM Goals           Flowsheet Row Most Recent Value   DM Education Needs    Meter Has own   Frequency of Testing Daily   Medication Oral   Problem Solving Hypoglycemia, Hyperglycemia, Sick days, Signs, Symptoms, Treatment   Reducing Risks Cardiovascular, Immunizations, Foot care, Dental exam, Eye exam, Blood pressure, Lipids, A1C testing, Neuropathy, Retinopathy   Healthy Eating Basic meal plan provided   Physical Activity Walking   Physical Activity Frequency Occasionally   Healthy Coping Appropriate   Discharge Plan Home, Follow-up with PCP   Motivation Moderate   Teaching Method Explanation, Discussion, Handouts   Patient Response Verbalized understanding            Other Comments:  A1C 8.6 Patient did not wear a mask. Patient was educated and received AADE7 and ADA handouts on diet, activity, checking blood glucose, taking medication as prescribed, checking feet daily and S/S of hypo/hyperglycemia. Patient was educated on sick rule days. Patient had no questions or concerns. Please re-consult or call for concerns or questions. Thank you.        Electronically signed by:  Ana Collins RN  11/10/22 15:13 EST  "

## 2022-11-10 NOTE — CASE MANAGEMENT/SOCIAL WORK
Discharge Planning Assessment   [facility]     Patient Name: Axel Desir  MRN: [id number]  Today's Date: 11/10/2022    Admit Date: 11/3/2022    Plan: SS attempted to speak with pt at bedside on this date. Pt states he is tired and couldn't speak at this time. SS spoke with pt's [facility] Candace who has a preference of [facility] in [place]. SS contacted [facility] [phone] without success. SS left a voicemail with name and contact number. SS to follow.   Discharge Plan     Row Name 11/10/22 5077       Plan    Plan SS attempted to speak with pt at bedside on this date. Pt states he is tired and couldn't speak at this time. SS spoke with pt's [facility] Candace who has a preference of [facility] in [place]. SS contacted [facility] [phone] without success. SS left a voicemail with name and contact number. SS to follow.    1614-SS contacted [facility] per Deonna admissions coordinator without sucess. SS left detailed voicemail and number to return call. SS to follow.                JAXON Jhaveri

## 2022-11-10 NOTE — PROGRESS NOTES
Nephrology Progress Note      Subjective     Seen on dialysis, tolerating well    Objective       Vital signs :     Temp:  [97.4 °F (36.3 °C)-98.4 °F (36.9 °C)] 97.9 °F (36.6 °C)  Heart Rate:  [104-136] 131  Resp:  [18-22] 18  BP: ()/(73-92) 131/92    Intake/Output                       11/08/22 0701 - 11/09/22 0700 11/09/22 0701 - 11/10/22 0700     [id number] 6537-8177 Total 5245-7018 0035-7601 Total                 Intake    P.O.  1800  640 2440  1200  720 1920    IV Piggyback  --  -- --  100  -- 100    Total Intake 5554 240 0579 8825 984 9611       Output    Other  --  -- --  1500  -- 1500    Ultrafiltration (mL) -- -- -- 1500 -- 1500    Total Output -- -- -- 1500 -- 1500           Physical Exam:    General Appearance : not in acute distress  Lungs : clear to auscultation, respirations regular  Heart :  regular rhythm & normal rate, normal S1, S2 and no murmur, no rub  Abdomen : soft, non distended  Extremities : no edema   Neurologic :   orientated to person, place, time and situation, Grossly no focal deficits    Laboratory Data :     Albumin Albumin   Date Value Ref Range Status   11/08/2022 3.20 (L) 3.50 - 5.20 g/dL Final      Magnesium No results found for: MG       PTH               No results found for: PTH    CBC and coagulation:  Results from last 7 days   Lab Units 11/10/22  0056 11/09/22  0044 11/08/22  0226 11/05/22  0108 11/04/22  1612 11/04/22  0940 11/04/22  0405   PROCALCITONIN ng/mL  --   --  0.19  --   --   --   --    LACTATE mmol/L  --   --   --   --  1.6 2.4* 3.4*   CRP mg/dL  --   --   --   --   --   --  2.03*   WBC 10*3/mm3 7.29 7.28 6.63   < >  --   --  6.25   HEMOGLOBIN g/dL 10.3* 10.1* 10.0*   < >  --   --  10.9*   HEMATOCRIT % 31.4* 30.3* 29.5*   < >  --   --  32.5*   MCV fL 102.6* 100.7* 99.3*   < >  --   --  102.2*   MCHC g/dL 32.8 33.3 33.9   < >  --   --  33.5   PLATELETS 10*3/mm3 103* 86* 72*   < >  --   --  47*    < > = values in this interval not displayed.     Acid/base  balance:      Renal and electrolytes:    Results from last 7 days   Lab Units 11/10/22  0056 11/09/22  1347 11/09/22  0044 11/08/22  0226 11/07/22  0123 11/05/22  0108 11/04/22  0405   SODIUM mmol/L 128* 129* 123* 127* 127*   < > 131*   POTASSIUM mmol/L 4.5 4.4 4.5 4.0 5.3*   < > 4.9   MAGNESIUM mg/dL  --   --   --   --   --   --  2.3   CHLORIDE mmol/L 89* 90* 86* 87* 90*   < > 92*   CO2 mmol/L 23.9 22.0 21.7* 22.6 19.0*   < > 19.3*   BUN mg/dL 46* 35* 66* 52* 77*   < > 74*   CREATININE mg/dL 4.06* 3.28* 5.09* 4.58* 6.07*   < > 5.57*   CALCIUM mg/dL 8.2* 7.8* 7.8* 7.7* 7.3*   < > 7.6*    < > = values in this interval not displayed.     Estimated Creatinine Clearance: 19.4 mL/min (A) (by C-G formula based on SCr of 4.06 mg/dL (H)).  @GFRCG:3@   Liver and pancreatic function:  Results from last 7 days   Lab Units 11/08/22 0226 11/04/22  0405   ALBUMIN g/dL 3.20* 3.05*   BILIRUBIN mg/dL 0.8 1.2   ALK PHOS U/L 69 72   AST (SGOT) U/L 23 84*   ALT (SGPT) U/L 69* 152*         Cardiac:      Liver and pancreatic function:  Results from last 7 days   Lab Units 11/08/22 0226 11/04/22  0405   ALBUMIN g/dL 3.20* 3.05*   BILIRUBIN mg/dL 0.8 1.2   ALK PHOS U/L 69 72   AST (SGOT) U/L 23 84*   ALT (SGPT) U/L 69* 152*       Medications :     apixaban, 5 mg, Oral, Q12H  budesonide-formoterol, 2 puff, Inhalation, BID - RT  calcium acetate, 2,668 mg, Oral, TID With Meals  carvedilol, 6.25 mg, Oral, BID With Meals  cetirizine, 10 mg, Oral, Daily  doxycycline, 100 mg, Intravenous, Q12H  Insulin Aspart, 0-9 Units, Subcutaneous, TID AC  insulin detemir, 15 Units, Subcutaneous, Daily  ipratropium-albuterol, 3 mL, Nebulization, 4x Daily - RT  levothyroxine, 88 mcg, Oral, Q AM  melatonin, 10 mg, Oral, Nightly  methylPREDNISolone sodium succinate, 40 mg, Intravenous, BID  pantoprazole, 40 mg, Oral, Q AM  Renal, 1 capsule, Oral, Daily  sodium chloride, 3 mL, Intravenous, Q12H  thiamine, 100 mg, Oral, Daily           Assessment & Plan     1.  ESKD on IHDx  2. Anemia  3. Hyponatremia, hypervolemic  4. Anion gap metabolic acidosis  5. SHPT  6. Acute bacterial pneumonia    Had dialysis, uneventful, continue on dialysis, grossly stable from renal stands point.   Educated on FR 32 ounces/day  Anemia; HH at target  SHPT; follow outpatient management        Bladimir Alvarado MD  11/10/22  13:41 EST

## 2022-11-10 NOTE — PLAN OF CARE
Goal Outcome Evaluation:   Pt resting with no complaints at this time. Pt back to floor from ct with pe protocol. New iv placed this shift.
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
## 2022-11-10 NOTE — PROGRESS NOTES
[facility] HOSPITALIST PROGRESS NOTE    Subjective     History:   Axel Desir is a 62 y.o. male admitted on 11/3/2022 secondary to Pneumonia of right upper lobe due to infectious organism     Procedures: None    CC: Follow up COPD exacerbation    Patient seen and examined. Awake and alert. States he continues to not sleep well. Cough and dyspnea appear slightly improved today. No reported CP or palpitations. No reported nausea or vomiting. More tachycardic today. No acute events overnight per RN.     History taken from: patient, chart, and RN.      Objective     Vital Signs  Temp:  [97.4 °F (36.3 °C)-98.4 °F (36.9 °C)] 97.9 °F (36.6 °C)  Heart Rate:  [104-136] 123  Resp:  [18-22] 18  BP: ()/(73-92) 131/92    Intake/Output Summary (Last 24 hours) at 11/10/2022 1654  Last data filed at 11/10/2022 1300  Gross per 24 hour   Intake 1560 ml   Output --   Net 1560 ml         Physical Exam:  General:    Awake, alert, in no acute distress, chronically ill appearing   Heart:      Normal S1 and S2. Tachycardic. No significant murmur, rubs or gallops appreciated.   Lungs:     Respirations regular, even and unlabored. Expiratory wheezes in TONIO with diminished breath sounds at bases.     Abdomen:   Soft and nontender. No guarding, rebound tenderness or  organomegaly noted. Bowel sounds present x 4.   Extremities:  Trace bilateral lower extremity edema. Moves UE and LE equally B/L.     Results Review:    Results from last 7 days   Lab Units 11/10/22  0056 11/09/22  0044 11/08/22  0226 11/07/22  0123 11/06/22  0129 11/05/22  0108 11/04/22  0405   WBC 10*3/mm3 7.29 7.28 6.63 5.24 6.14 6.28 6.25   HEMOGLOBIN g/dL 10.3* 10.1* 10.0* 10.1* 10.9* 11.0* 10.9*   PLATELETS 10*3/mm3 103* 86* 72* 60* 61* 44* 47*     Results from last 7 days   Lab Units 11/10/22  0056 11/09/22  1347 11/09/22  0044 11/08/22  0226 11/07/22  0123 11/06/22  0129 11/05/22  0108   SODIUM mmol/L 128* 129* 123* 127* 127* 130* 133*    POTASSIUM mmol/L 4.5 4.4 4.5 4.0 5.3* 4.6 4.4   CHLORIDE mmol/L 89* 90* 86* 87* 90* 91* 94*   CO2 mmol/L 23.9 22.0 21.7* 22.6 19.0* 20.6* 24.7   BUN mg/dL 46* 35* 66* 52* 77* 54* 43*   CREATININE mg/dL 4.06* 3.28* 5.09* 4.58* 6.07* 5.12* 3.74*   CALCIUM mg/dL 8.2* 7.8* 7.8* 7.7* 7.3* 7.2* 7.6*   GLUCOSE mg/dL 158* 252* 262* 229* 493* 188* 247*     Results from last 7 days   Lab Units 11/08/22  0226 11/04/22  0405   BILIRUBIN mg/dL 0.8 1.2   ALK PHOS U/L 69 72   AST (SGOT) U/L 23 84*   ALT (SGPT) U/L 69* 152*     Results from last 7 days   Lab Units 11/04/22  0405   MAGNESIUM mg/dL 2.3               Imaging Results (Last 24 Hours)     Procedure Component Value Units Date/Time    CT Angiogram Chest Pulmonary Embolism [[id number]] Collected: 11/10/22 1141     Updated: 11/10/22 1146    Narrative:      EXAM:    CT Angiography Chest With Intravenous Contrast     EXAM DATE:    11/10/2022 11:22 AM     CLINICAL HISTORY:    Pulmonary embolism (PE) suspected, unknown D-dimer; J18.9-Pneumonia,  unspecified organism     TECHNIQUE:    Axial computed tomographic angiography images of the chest with  intravenous contrast.  This CT exam was performed using one or more of  the following dose reduction techniques:  automated exposure control,  adjustment of the mA and/or kV according to patient size, and/or use of  iterative reconstruction technique.    MIP reconstructed images were created and reviewed.     COMPARISON:    09/21/2022     FINDINGS:    Pulmonary arteries:  No PE.    Aorta:  Atherosclerosis aorta without aneurysm identified.  Stable  calcification surrounding the dorsal aortic arch with borderline  dilatation noted. No change from previous exam.  Stable ectasia of the  ascending thoracic aorta, 4.1 cm.    Lungs:  Consolidative airspace disease right lower lobe.  Left basilar  atelectasis.  Interstitial edema.  Emphysema is stable.    Pleural space:  Right greater than left small to moderate pleural  effusions.  No  pneumothorax.    Heart:  CABG changes.  Cardiomegaly.  No significant pericardial  effusion.  No evidence of RV dysfunction.    Bones/joints:  Degenerative changes of thoracic spine.  No acute  fracture.  No dislocation.    Soft tissues:  Anasarca.    Lymph nodes:  Unremarkable.  No enlarged lymph nodes.    Kidneys and ureters:  Polycystic kidneys.    Intraperitoneal space:  Mild upper abdominal ascites.       Impression:      1.  No PE.  2.  Right lower lobe consolidative pneumonia.  3.  CHF/edema with right greater than left pleural effusions.  4.  Anasarca.  5.  Upper abdominal ascites.  6.  Polycystic kidneys, stable.  7.  Other nonacute findings detailed above.     This report was finalized on 11/10/2022 11:44 AM by Dr. Justino Huggins MD.               Medications:  apixaban, 5 mg, Oral, Q12H  budesonide-formoterol, 2 puff, Inhalation, BID - RT  calcium acetate, 2,668 mg, Oral, TID With Meals  carvedilol, 6.25 mg, Oral, BID With Meals  cetirizine, 10 mg, Oral, Daily  doxycycline, 100 mg, Intravenous, Q12H  Insulin Aspart, 0-9 Units, Subcutaneous, TID AC  insulin detemir, 15 Units, Subcutaneous, Daily  ipratropium-albuterol, 3 mL, Nebulization, 4x Daily - RT  levothyroxine, 88 mcg, Oral, Q AM  melatonin, 10 mg, Oral, Nightly  methylPREDNISolone sodium succinate, 40 mg, Intravenous, BID  pantoprazole, 40 mg, Oral, Q AM  Renal, 1 capsule, Oral, Daily  sodium chloride, 3 mL, Intravenous, Q12H  thiamine, 100 mg, Oral, Daily               Assessment & Plan   Right-sided pneumonia: Sputum culture reveals normal respiratory joanne. Procal is normal. Complete course of Omincef and Doxycycline.     Acute exacerbation of COPD: Cont antibiotics as above, steroids and nebs. Cont Symbicort.     ESRD on HD: CT reveals CHF/edema with R>L pleural effusions, anasarca and upper abdominal ascites. Cont HD ([facility] schedule) per nephrology with input appreciated.     DM II, non-insulin dependent: Recent HgbA1c 8.6. Steroids likely  contributing to hyperglycemia. Cont current insulin regimen today after recent adjustments to basal and sliding scale. Cont Accuchecks.     Hyponatremia: Likely hypervolemic. Stable today. Repeat labs in the AM.     Paroxysmal Afib: Currently in sinus rhythm. Cont Coreg. Cont home Eliquis for stroke prevention.     Chronic macrocytic anemia: B12 and folate are replete. Stable today. Repeat CBC in the AM.     Thrombocytopenia: Possibly 2/2 above. B12 and folate replete. No schistocytes on peripheral smear. Improving. Repeat CBC in the AM.     Generalized weakness: PT/OT    DVT PPX: Eliquis.     Disposition: Pt and his cousin/HCS requesting rehab placement in [place] to be closer to family in [place].      Andres Reardon,   11/10/22  16:54 EST
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
## 2022-11-10 NOTE — PLAN OF CARE
Goal Outcome Evaluation:              Outcome Evaluation: Pt has not rested well tonight despite PRN medications and scheduled medications administered for sleep. Pt has been asking for food/snacks all night despite an elevated BG reading that required insulin at bedtime. Educated pt on importance of limiting snacks to prevent high BG. 2L NC in place. No complaints of pain or discomfort. Will continue POC.

## 2022-11-11 ENCOUNTER — APPOINTMENT (OUTPATIENT)
Dept: CARDIOLOGY | Facility: HOSPITAL | Age: 62
End: 2022-11-11

## 2022-11-11 LAB
ANION GAP SERPL CALCULATED.3IONS-SCNC: 15.1 MMOL/L (ref 5–15)
ANISOCYTOSIS BLD QL: NORMAL
BASOPHILS # BLD AUTO: 0 10*3/MM3 (ref 0–0.2)
BASOPHILS NFR BLD AUTO: 0 % (ref 0–1.5)
BUN SERPL-MCNC: 74 MG/DL (ref 8–23)
BUN/CREAT SERPL: 15 (ref 7–25)
BURR CELLS BLD QL SMEAR: NORMAL
CALCIUM SPEC-SCNC: 8.3 MG/DL (ref 8.6–10.5)
CHLORIDE SERPL-SCNC: 85 MMOL/L (ref 98–107)
CO2 SERPL-SCNC: 21.9 MMOL/L (ref 22–29)
CREAT SERPL-MCNC: 4.94 MG/DL (ref 0.76–1.27)
DACRYOCYTES BLD QL SMEAR: NORMAL
DEPRECATED RDW RBC AUTO: 76.6 FL (ref 37–54)
EGFRCR SERPLBLD CKD-EPI 2021: 12.4 ML/MIN/1.73
EOSINOPHIL # BLD AUTO: 0 10*3/MM3 (ref 0–0.4)
EOSINOPHIL NFR BLD AUTO: 0 % (ref 0.3–6.2)
ERYTHROCYTE [DISTWIDTH] IN BLOOD BY AUTOMATED COUNT: 20.6 % (ref 12.3–15.4)
GLUCOSE BLDC GLUCOMTR-MCNC: 150 MG/DL (ref 70–130)
GLUCOSE BLDC GLUCOMTR-MCNC: 172 MG/DL (ref 70–130)
GLUCOSE BLDC GLUCOMTR-MCNC: 267 MG/DL (ref 70–130)
GLUCOSE SERPL-MCNC: 202 MG/DL (ref 65–99)
HCT VFR BLD AUTO: 32.5 % (ref 37.5–51)
HGB BLD-MCNC: 10.7 G/DL (ref 13–17.7)
IMM GRANULOCYTES # BLD AUTO: 0.06 10*3/MM3 (ref 0–0.05)
IMM GRANULOCYTES NFR BLD AUTO: 0.8 % (ref 0–0.5)
LYMPHOCYTES # BLD AUTO: 0.46 10*3/MM3 (ref 0.7–3.1)
LYMPHOCYTES NFR BLD AUTO: 6.1 % (ref 19.6–45.3)
MACROCYTES BLD QL SMEAR: NORMAL
MCH RBC QN AUTO: 34.1 PG (ref 26.6–33)
MCHC RBC AUTO-ENTMCNC: 32.9 G/DL (ref 31.5–35.7)
MCV RBC AUTO: 103.5 FL (ref 79–97)
MONOCYTES # BLD AUTO: 0.45 10*3/MM3 (ref 0.1–0.9)
MONOCYTES NFR BLD AUTO: 6 % (ref 5–12)
NEUTROPHILS NFR BLD AUTO: 6.59 10*3/MM3 (ref 1.7–7)
NEUTROPHILS NFR BLD AUTO: 87.1 % (ref 42.7–76)
NRBC BLD AUTO-RTO: 0 /100 WBC (ref 0–0.2)
PLAT MORPH BLD: NORMAL
PLATELET # BLD AUTO: 102 10*3/MM3 (ref 140–450)
PMV BLD AUTO: 11.8 FL (ref 6–12)
POTASSIUM SERPL-SCNC: 5.4 MMOL/L (ref 3.5–5.2)
RBC # BLD AUTO: 3.14 10*6/MM3 (ref 4.14–5.8)
SODIUM SERPL-SCNC: 122 MMOL/L (ref 136–145)
TARGETS BLD QL SMEAR: NORMAL
WBC NRBC COR # BLD: 7.56 10*3/MM3 (ref 3.4–10.8)

## 2022-11-11 PROCEDURE — 94799 UNLISTED PULMONARY SVC/PX: CPT

## 2022-11-11 PROCEDURE — 80048 BASIC METABOLIC PNL TOTAL CA: CPT | Performed by: INTERNAL MEDICINE

## 2022-11-11 PROCEDURE — 85025 COMPLETE CBC W/AUTO DIFF WBC: CPT | Performed by: INTERNAL MEDICINE

## 2022-11-11 PROCEDURE — 99232 SBSQ HOSP IP/OBS MODERATE 35: CPT | Performed by: INTERNAL MEDICINE

## 2022-11-11 PROCEDURE — 25010000002 METHYLPREDNISOLONE PER 40 MG: Performed by: INTERNAL MEDICINE

## 2022-11-11 PROCEDURE — 63710000001 INSULIN ASPART PER 5 UNITS: Performed by: INTERNAL MEDICINE

## 2022-11-11 PROCEDURE — 63710000001 INSULIN DETEMIR PER 5 UNITS: Performed by: INTERNAL MEDICINE

## 2022-11-11 PROCEDURE — 85007 BL SMEAR W/DIFF WBC COUNT: CPT | Performed by: INTERNAL MEDICINE

## 2022-11-11 PROCEDURE — 82962 GLUCOSE BLOOD TEST: CPT

## 2022-11-11 RX ADMIN — APIXABAN 5 MG: 5 TABLET, FILM COATED ORAL at 20:40

## 2022-11-11 RX ADMIN — Medication 10 MG: at 20:40

## 2022-11-11 RX ADMIN — APIXABAN 5 MG: 5 TABLET, FILM COATED ORAL at 13:33

## 2022-11-11 RX ADMIN — CARVEDILOL 6.25 MG: 6.25 TABLET, FILM COATED ORAL at 16:16

## 2022-11-11 RX ADMIN — CARVEDILOL 6.25 MG: 6.25 TABLET, FILM COATED ORAL at 13:33

## 2022-11-11 RX ADMIN — Medication 3 ML: at 20:41

## 2022-11-11 RX ADMIN — PANTOPRAZOLE SODIUM 40 MG: 40 TABLET, DELAYED RELEASE ORAL at 06:00

## 2022-11-11 RX ADMIN — CETIRIZINE HYDROCHLORIDE 10 MG: 10 TABLET, FILM COATED ORAL at 13:33

## 2022-11-11 RX ADMIN — METHYLPREDNISOLONE SODIUM SUCCINATE 40 MG: 40 INJECTION, POWDER, FOR SOLUTION INTRAMUSCULAR; INTRAVENOUS at 13:33

## 2022-11-11 RX ADMIN — BUDESONIDE AND FORMOTEROL FUMARATE DIHYDRATE 2 PUFF: 160; 4.5 AEROSOL RESPIRATORY (INHALATION) at 06:47

## 2022-11-11 RX ADMIN — BUDESONIDE AND FORMOTEROL FUMARATE DIHYDRATE 2 PUFF: 160; 4.5 AEROSOL RESPIRATORY (INHALATION) at 18:49

## 2022-11-11 RX ADMIN — CALCIUM ACETATE 2668 MG: 667 CAPSULE ORAL at 16:17

## 2022-11-11 RX ADMIN — INSULIN ASPART 2 UNITS: 100 INJECTION, SOLUTION INTRAVENOUS; SUBCUTANEOUS at 13:39

## 2022-11-11 RX ADMIN — METHYLPREDNISOLONE SODIUM SUCCINATE 40 MG: 40 INJECTION, POWDER, FOR SOLUTION INTRAMUSCULAR; INTRAVENOUS at 20:40

## 2022-11-11 RX ADMIN — LEVOTHYROXINE SODIUM 88 MCG: 88 TABLET ORAL at 06:00

## 2022-11-11 RX ADMIN — RENO CAPS 1 MG: 100; 1.5; 1.7; 20; 10; 1; 150; 5; 6 CAPSULE ORAL at 13:33

## 2022-11-11 RX ADMIN — INSULIN ASPART 2 UNITS: 100 INJECTION, SOLUTION INTRAVENOUS; SUBCUTANEOUS at 16:17

## 2022-11-11 RX ADMIN — CALCIUM ACETATE 2668 MG: 667 CAPSULE ORAL at 13:33

## 2022-11-11 RX ADMIN — Medication 100 MG: at 13:33

## 2022-11-11 RX ADMIN — INSULIN DETEMIR 20 UNITS: 100 INJECTION, SOLUTION SUBCUTANEOUS at 13:40

## 2022-11-11 NOTE — PROGRESS NOTES
Nephrology Progress Note      Subjective     Seen on dialysis, says not feeling well, does not feel like eating. No chest pain or shortness  Of breath, has some nausea.     Objective       Vital signs :     Temp:  [97.5 °F (36.4 °C)-97.9 °F (36.6 °C)] 97.5 °F (36.4 °C)  Heart Rate:  [101-131] 107  Resp:  [16-20] 16  BP: (115-131)/(79-92) 131/92    Intake/Output                             11/09/22 0701 - 11/10/22 0700 11/10/22 0701 - 11/11/22 0700 11/11/22 0701 - 11/12/22 0700     3443-7222 8618-2170 Total 1111-9573 8219-5960 Total 8820-0794 1593-5767 Total                    Intake    P.O.  1200  720 1920  600  120 720  120  -- 120    IV Piggyback  100  -- 100  --  -- --  --  -- --    Total Intake 6900 886 2445 600 120 720 120 -- 120       Output    Other  1500  -- 1500  --  -- --  --  -- --    Ultrafiltration (mL) 1500 -- 1500 -- -- -- -- -- --    Total Output 1500 -- 1500 -- -- -- -- -- --           Physical Exam:    General Appearance : not in acute distress  Lungs : clear to auscultation, respirations regular  Heart :  regular rhythm & normal rate, normal S1, S2 and no murmur, no rub  Abdomen : soft, non distended  Extremities : no edema   Neurologic :   orientated to person, place, time and situation, Grossly no focal deficits    Laboratory Data :     Albumin No results found for: ALBUMIN   Magnesium No results found for: MG       PTH               No results found for: PTH    CBC and coagulation:  Results from last 7 days   Lab Units 11/11/22  0411 11/10/22  0056 11/09/22  0044 11/08/22  0226 11/05/22  0108 11/04/22  1612   PROCALCITONIN ng/mL  --   --   --  0.19  --   --    LACTATE mmol/L  --   --   --   --   --  1.6   WBC 10*3/mm3 7.56 7.29 7.28 6.63   < >  --    HEMOGLOBIN g/dL 10.7* 10.3* 10.1* 10.0*   < >  --    HEMATOCRIT % 32.5* 31.4* 30.3* 29.5*   < >  --    MCV fL 103.5* 102.6* 100.7* 99.3*   < >  --    MCHC g/dL 32.9 32.8 33.3 33.9   < >  --    PLATELETS 10*3/mm3 102* 103* 86* 72*   < >  --     < >  = values in this interval not displayed.     Acid/base balance:      Renal and electrolytes:    Results from last 7 days   Lab Units 11/11/22  0411 11/10/22  0056 11/09/22  1347 11/09/22  0044 11/08/22  0226   SODIUM mmol/L 122* 128* 129* 123* 127*   POTASSIUM mmol/L 5.4* 4.5 4.4 4.5 4.0   CHLORIDE mmol/L 85* 89* 90* 86* 87*   CO2 mmol/L 21.9* 23.9 22.0 21.7* 22.6   BUN mg/dL 74* 46* 35* 66* 52*   CREATININE mg/dL 4.94* 4.06* 3.28* 5.09* 4.58*   CALCIUM mg/dL 8.3* 8.2* 7.8* 7.8* 7.7*     Estimated Creatinine Clearance: 15.9 mL/min (A) (by C-G formula based on SCr of 4.94 mg/dL (H)).  @GFRCG:3@   Liver and pancreatic function:  Results from last 7 days   Lab Units 11/08/22  0226   ALBUMIN g/dL 3.20*   BILIRUBIN mg/dL 0.8   ALK PHOS U/L 69   AST (SGOT) U/L 23   ALT (SGPT) U/L 69*         Cardiac:      Liver and pancreatic function:  Results from last 7 days   Lab Units 11/08/22  0226   ALBUMIN g/dL 3.20*   BILIRUBIN mg/dL 0.8   ALK PHOS U/L 69   AST (SGOT) U/L 23   ALT (SGPT) U/L 69*       Medications :     apixaban, 5 mg, Oral, Q12H  budesonide-formoterol, 2 puff, Inhalation, BID - RT  calcium acetate, 2,668 mg, Oral, TID With Meals  carvedilol, 6.25 mg, Oral, BID With Meals  cetirizine, 10 mg, Oral, Daily  Insulin Aspart, 0-9 Units, Subcutaneous, TID AC  insulin detemir, 20 Units, Subcutaneous, Daily  ipratropium-albuterol, 3 mL, Nebulization, 4x Daily - RT  levothyroxine, 88 mcg, Oral, Q AM  melatonin, 10 mg, Oral, Nightly  methylPREDNISolone sodium succinate, 40 mg, Intravenous, BID  pantoprazole, 40 mg, Oral, Q AM  Renal, 1 capsule, Oral, Daily  sodium chloride, 3 mL, Intravenous, Q12H  thiamine, 100 mg, Oral, Daily           Assessment & Plan     1. ESKD on IHDx  2. Anemia  3. Hyponatremia, hypervolemic  4. Anion gap metabolic acidosis  5. SHPT  6. Acute bacterial pneumonia    Evaluated on dialysis, low oral intake due to nausea, reduced UF to 1.0L, PRN antiemetics.   Educated on FR 32 ounces/day  Anemia; HH at  target  SHPT; follow outpatient management        Bladimir Alvarado MD  11/11/22  12:35 EST

## 2022-11-11 NOTE — CASE MANAGEMENT/SOCIAL WORK
Discharge Planning Assessment  [facility]     Patient Name: Axel Desir  MRN: [id number]  Today's Date: 11/11/2022    Admit Date: 11/3/2022    Plan: SS contactted [facility] [phone] with no sucess to follow up on referral. SS left detailed voicemail and number to return call. SS contacted [facility] per deirdre about a referral for [facility] in [place], [place]. SS faxed referral to [phone]. SS to follow.     Discharge Plan     Row Name 11/11/22 1053       Plan    Plan SS contactted [facility] [phone] with no sucess to follow up on referral. SS left detailed voicemail and number to return call. SS contacted [facility] per deirdre about a referral for [facility] in [place], [place]. SS faxed referral to [phone]. SS to follow.    1215- SS was contacted per Deirdre who states they have no avaliable beds at this time at [facility] in [place], [place]                JAXON Jhaveri

## 2022-11-11 NOTE — PROGRESS NOTES
[facility] HOSPITALIST PROGRESS NOTE    Subjective     History:   Axel Desir is a 62 y.o. male admitted on 11/3/2022 secondary to Pneumonia of right upper lobe due to infectious organism     Procedures: None    CC: Follow up COPD exacerbation    Patient seen and examined in dialysis. Awake and alert. States he feels about the same today with stable respiratory status. No reported CP or palpitations. No reported nausea or vomiting.  No acute events overnight per RN.     History taken from: patient, chart, and RN.      Objective     Vital Signs  Temp:  [97.5 °F (36.4 °C)-97.9 °F (36.6 °C)] 97.5 °F (36.4 °C)  Heart Rate:  [101-122] 118  Resp:  [16-20] 16  BP: (101-131)/(68-92) 101/68    Intake/Output Summary (Last 24 hours) at 11/11/2022 1812  Last data filed at 11/11/2022 1430  Gross per 24 hour   Intake 360 ml   Output 1000 ml   Net -640 ml         Physical Exam:  General:    Awake, alert, in no acute distress, chronically ill appearing   Heart:      Normal S1 and S2. Tachycardic. No significant murmur, rubs or gallops appreciated.   Lungs:     Respirations regular, even and unlabored. Diminished breath sounds at bases. No wheezes appreciated today.      Abdomen:   Soft and nontender. No guarding, rebound tenderness or  organomegaly noted. Bowel sounds present x 4.   Extremities:  Trace bilateral lower extremity edema. Moves UE and LE equally B/L.     Results Review:    Results from last 7 days   Lab Units 11/11/22  0411 11/10/22  0056 11/09/22  0044 11/08/22  0226 11/07/22  0123 11/06/22  0129 11/05/22  0108   WBC 10*3/mm3 7.56 7.29 7.28 6.63 5.24 6.14 6.28   HEMOGLOBIN g/dL 10.7* 10.3* 10.1* 10.0* 10.1* 10.9* 11.0*   PLATELETS 10*3/mm3 102* 103* 86* 72* 60* 61* 44*     Results from last 7 days   Lab Units 11/11/22  0411 11/10/22  0056 11/09/22  1347 11/09/22  0044 11/08/22  0226 11/07/22  0123 11/06/22  0129   SODIUM mmol/L 122* 128* 129* 123* 127* 127* 130*   POTASSIUM mmol/L 5.4* 4.5 4.4 4.5  4.0 5.3* 4.6   CHLORIDE mmol/L 85* 89* 90* 86* 87* 90* 91*   CO2 mmol/L 21.9* 23.9 22.0 21.7* 22.6 19.0* 20.6*   BUN mg/dL 74* 46* 35* 66* 52* 77* 54*   CREATININE mg/dL 4.94* 4.06* 3.28* 5.09* 4.58* 6.07* 5.12*   CALCIUM mg/dL 8.3* 8.2* 7.8* 7.8* 7.7* 7.3* 7.2*   GLUCOSE mg/dL 202* 158* 252* 262* 229* 493* 188*     Results from last 7 days   Lab Units 11/08/22  0226   BILIRUBIN mg/dL 0.8   ALK PHOS U/L 69   AST (SGOT) U/L 23   ALT (SGPT) U/L 69*                   Imaging Results (Last 24 Hours)     ** No results found for the last 24 hours. **            Medications:  apixaban, 5 mg, Oral, Q12H  budesonide-formoterol, 2 puff, Inhalation, BID - RT  calcium acetate, 2,668 mg, Oral, TID With Meals  carvedilol, 6.25 mg, Oral, BID With Meals  cetirizine, 10 mg, Oral, Daily  Insulin Aspart, 0-9 Units, Subcutaneous, TID AC  insulin detemir, 20 Units, Subcutaneous, Daily  ipratropium-albuterol, 3 mL, Nebulization, 4x Daily - RT  levothyroxine, 88 mcg, Oral, Q AM  melatonin, 10 mg, Oral, Nightly  methylPREDNISolone sodium succinate, 40 mg, Intravenous, BID  pantoprazole, 40 mg, Oral, Q AM  Renal, 1 capsule, Oral, Daily  sodium chloride, 3 mL, Intravenous, Q12H  thiamine, 100 mg, Oral, Daily               Assessment & Plan   Right-sided pneumonia: Sputum culture reveals normal respiratory joanne. Procal is normal. Completed course of Omincef and Doxycycline.     Acute exacerbation of COPD: Completed antibiotics as above. Cont steroids and nebs. Cont Symbicort.     ESRD on HD: CT reveals CHF/edema with R>L pleural effusions, anasarca and upper abdominal ascites. Cont HD ([facility] schedule) per nephrology with input appreciated.     DM II, non-insulin dependent: Recent HgbA1c 8.6. Steroids likely contributing to hyperglycemia. Increase basal insulin. Cont SSI with Accuchecks.     Hyponatremia: Likely hypervolemic. Decreased today. Repeat labs in the AM.     Paroxysmal Afib: Currently in sinus rhythm. Cont Coreg. Cont home Eliquis  for stroke prevention.     Chronic macrocytic anemia: B12 and folate are replete. Stable today. Repeat CBC in the AM.     Thrombocytopenia: Possibly 2/2 above. B12 and folate replete. No schistocytes on peripheral smear. Overall improved and stable today. Repeat CBC in the AM.     Generalized weakness: PT/OT    DVT PPX: Eliquis.     Disposition: Pt and his cousin/HCS requesting rehab placement in [place] to be closer to family in [place].      Andres Reardon,   11/11/22  18:12 EST
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
## 2022-11-11 NOTE — PLAN OF CARE
Goal Outcome Evaluation:              Outcome Evaluation: Pt resting in bed at this time. VSS. No complaints of pain or discomfort. Will continue POC.

## 2022-11-11 NOTE — PLAN OF CARE
Goal Outcome Evaluation:         Pt resting with no complaints at this time. Pt had dialysis this shift.

## 2022-11-12 LAB
ACANTHOCYTES BLD QL SMEAR: NORMAL
ANION GAP SERPL CALCULATED.3IONS-SCNC: 13.6 MMOL/L (ref 5–15)
ANISOCYTOSIS BLD QL: NORMAL
BASOPHILS # BLD AUTO: 0 10*3/MM3 (ref 0–0.2)
BASOPHILS NFR BLD AUTO: 0 % (ref 0–1.5)
BUN SERPL-MCNC: 53 MG/DL (ref 8–23)
BUN/CREAT SERPL: 13.4 (ref 7–25)
CALCIUM SPEC-SCNC: 8.4 MG/DL (ref 8.6–10.5)
CHLORIDE SERPL-SCNC: 90 MMOL/L (ref 98–107)
CO2 SERPL-SCNC: 26.4 MMOL/L (ref 22–29)
CREAT SERPL-MCNC: 3.95 MG/DL (ref 0.76–1.27)
DEPRECATED RDW RBC AUTO: 73.9 FL (ref 37–54)
EGFRCR SERPLBLD CKD-EPI 2021: 16.2 ML/MIN/1.73
EOSINOPHIL # BLD AUTO: 0 10*3/MM3 (ref 0–0.4)
EOSINOPHIL NFR BLD AUTO: 0 % (ref 0.3–6.2)
ERYTHROCYTE [DISTWIDTH] IN BLOOD BY AUTOMATED COUNT: 20.4 % (ref 12.3–15.4)
GLUCOSE BLDC GLUCOMTR-MCNC: 187 MG/DL (ref 70–130)
GLUCOSE BLDC GLUCOMTR-MCNC: 237 MG/DL (ref 70–130)
GLUCOSE BLDC GLUCOMTR-MCNC: 95 MG/DL (ref 70–130)
GLUCOSE SERPL-MCNC: 100 MG/DL (ref 65–99)
HCT VFR BLD AUTO: 31.1 % (ref 37.5–51)
HGB BLD-MCNC: 10.2 G/DL (ref 13–17.7)
IMM GRANULOCYTES # BLD AUTO: 0.07 10*3/MM3 (ref 0–0.05)
IMM GRANULOCYTES NFR BLD AUTO: 0.9 % (ref 0–0.5)
LYMPHOCYTES # BLD AUTO: 0.45 10*3/MM3 (ref 0.7–3.1)
LYMPHOCYTES NFR BLD AUTO: 5.6 % (ref 19.6–45.3)
MACROCYTES BLD QL SMEAR: NORMAL
MCH RBC QN AUTO: 33.2 PG (ref 26.6–33)
MCHC RBC AUTO-ENTMCNC: 32.8 G/DL (ref 31.5–35.7)
MCV RBC AUTO: 101.3 FL (ref 79–97)
MONOCYTES # BLD AUTO: 0.41 10*3/MM3 (ref 0.1–0.9)
MONOCYTES NFR BLD AUTO: 5.1 % (ref 5–12)
NEUTROPHILS NFR BLD AUTO: 7.06 10*3/MM3 (ref 1.7–7)
NEUTROPHILS NFR BLD AUTO: 88.4 % (ref 42.7–76)
NRBC BLD AUTO-RTO: 0 /100 WBC (ref 0–0.2)
PLATELET # BLD AUTO: 109 10*3/MM3 (ref 140–450)
PMV BLD AUTO: 11.3 FL (ref 6–12)
POTASSIUM SERPL-SCNC: 4.9 MMOL/L (ref 3.5–5.2)
RBC # BLD AUTO: 3.07 10*6/MM3 (ref 4.14–5.8)
SCHISTOCYTES BLD QL SMEAR: NORMAL
SMALL PLATELETS BLD QL SMEAR: NORMAL
SODIUM SERPL-SCNC: 130 MMOL/L (ref 136–145)
WBC NRBC COR # BLD: 7.99 10*3/MM3 (ref 3.4–10.8)

## 2022-11-12 PROCEDURE — 80048 BASIC METABOLIC PNL TOTAL CA: CPT | Performed by: INTERNAL MEDICINE

## 2022-11-12 PROCEDURE — 63710000001 INSULIN DETEMIR PER 5 UNITS: Performed by: INTERNAL MEDICINE

## 2022-11-12 PROCEDURE — 82962 GLUCOSE BLOOD TEST: CPT

## 2022-11-12 PROCEDURE — 94799 UNLISTED PULMONARY SVC/PX: CPT

## 2022-11-12 PROCEDURE — 94761 N-INVAS EAR/PLS OXIMETRY MLT: CPT

## 2022-11-12 PROCEDURE — 99232 SBSQ HOSP IP/OBS MODERATE 35: CPT | Performed by: INTERNAL MEDICINE

## 2022-11-12 PROCEDURE — 25010000002 METHYLPREDNISOLONE PER 40 MG: Performed by: INTERNAL MEDICINE

## 2022-11-12 PROCEDURE — 85025 COMPLETE CBC W/AUTO DIFF WBC: CPT | Performed by: INTERNAL MEDICINE

## 2022-11-12 PROCEDURE — 85007 BL SMEAR W/DIFF WBC COUNT: CPT | Performed by: INTERNAL MEDICINE

## 2022-11-12 PROCEDURE — 63710000001 INSULIN ASPART PER 5 UNITS: Performed by: INTERNAL MEDICINE

## 2022-11-12 RX ORDER — PREDNISONE 20 MG/1
40 TABLET ORAL
Status: DISCONTINUED | OUTPATIENT
Start: 2022-11-13 | End: 2022-11-15

## 2022-11-12 RX ORDER — IPRATROPIUM BROMIDE AND ALBUTEROL SULFATE 2.5; .5 MG/3ML; MG/3ML
3 SOLUTION RESPIRATORY (INHALATION) EVERY 6 HOURS PRN
Status: DISCONTINUED | OUTPATIENT
Start: 2022-11-12 | End: 2022-11-20 | Stop reason: HOSPADM

## 2022-11-12 RX ADMIN — METHYLPREDNISOLONE SODIUM SUCCINATE 40 MG: 40 INJECTION, POWDER, FOR SOLUTION INTRAMUSCULAR; INTRAVENOUS at 08:22

## 2022-11-12 RX ADMIN — Medication 100 MG: at 08:22

## 2022-11-12 RX ADMIN — CALCIUM ACETATE 2668 MG: 667 CAPSULE ORAL at 08:22

## 2022-11-12 RX ADMIN — CARVEDILOL 6.25 MG: 6.25 TABLET, FILM COATED ORAL at 08:22

## 2022-11-12 RX ADMIN — LEVOTHYROXINE SODIUM 88 MCG: 88 TABLET ORAL at 05:29

## 2022-11-12 RX ADMIN — BUDESONIDE AND FORMOTEROL FUMARATE DIHYDRATE 2 PUFF: 160; 4.5 AEROSOL RESPIRATORY (INHALATION) at 07:30

## 2022-11-12 RX ADMIN — CALCIUM ACETATE 2668 MG: 667 CAPSULE ORAL at 11:16

## 2022-11-12 RX ADMIN — APIXABAN 5 MG: 5 TABLET, FILM COATED ORAL at 08:22

## 2022-11-12 RX ADMIN — CARVEDILOL 6.25 MG: 6.25 TABLET, FILM COATED ORAL at 17:26

## 2022-11-12 RX ADMIN — INSULIN DETEMIR 20 UNITS: 100 INJECTION, SOLUTION SUBCUTANEOUS at 11:17

## 2022-11-12 RX ADMIN — INSULIN ASPART 2 UNITS: 100 INJECTION, SOLUTION INTRAVENOUS; SUBCUTANEOUS at 11:16

## 2022-11-12 RX ADMIN — Medication 3 ML: at 08:23

## 2022-11-12 RX ADMIN — PANTOPRAZOLE SODIUM 40 MG: 40 TABLET, DELAYED RELEASE ORAL at 05:29

## 2022-11-12 RX ADMIN — CETIRIZINE HYDROCHLORIDE 10 MG: 10 TABLET, FILM COATED ORAL at 08:22

## 2022-11-12 RX ADMIN — BUDESONIDE AND FORMOTEROL FUMARATE DIHYDRATE 2 PUFF: 160; 4.5 AEROSOL RESPIRATORY (INHALATION) at 19:29

## 2022-11-12 RX ADMIN — RENO CAPS 1 MG: 100; 1.5; 1.7; 20; 10; 1; 150; 5; 6 CAPSULE ORAL at 08:22

## 2022-11-12 RX ADMIN — Medication 10 MG: at 20:48

## 2022-11-12 RX ADMIN — INSULIN ASPART 4 UNITS: 100 INJECTION, SOLUTION INTRAVENOUS; SUBCUTANEOUS at 17:26

## 2022-11-12 RX ADMIN — APIXABAN 5 MG: 5 TABLET, FILM COATED ORAL at 20:48

## 2022-11-12 RX ADMIN — Medication 3 ML: at 21:37

## 2022-11-12 RX ADMIN — CALCIUM ACETATE 2668 MG: 667 CAPSULE ORAL at 17:26

## 2022-11-12 NOTE — PAYOR COMM NOTE
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   BILIRUBIN mg/dL 0.8   ALK PHOS U/L 69   AST (SGOT) U/L 23   ALT (SGPT) U/L 69*                   Imaging Results (Last 24 Hours)     ** No results found for the last 24 hours. **            Medications:  apixaban, 5 mg, Oral, Q12H  budesonide-formoterol, 2 puff, Inhalation, BID - RT  calcium acetate, 2,668 mg, Oral, TID With Meals  carvedilol, 6.25 mg, Oral, BID With Meals  cetirizine, 10 mg, Oral, Daily  Insulin Aspart, 0-9 Units, Subcutaneous, TID AC  insulin detemir, 20 Units, Subcutaneous, Daily  levothyroxine, 88 mcg, Oral, Q AM  melatonin, 10 mg, Oral, Nightly  pantoprazole, 40 mg, Oral, Q AM  [START ON 11/13/2022] predniSONE, 40 mg, Oral, Daily With Breakfast  Renal, 1 capsule, Oral, Daily  sodium chloride, 3 mL, Intravenous, Q12H  thiamine, 100 mg, Oral, Daily               Assessment & Plan   Right-sided pneumonia: Sputum culture revealed normal respiratory joanne. Procal is normal. Completed course of Omincef and Doxycycline.     Acute exacerbation  of COPD: Completed antibiotics as above. Cont steroids and nebs. Cont Symbicort. Transition to Prednisone.     ESRD on HD: CT reveals CHF/edema with R>L pleural effusions, anasarca and upper abdominal ascites. Cont HD (MWF schedule) per nephrology with input appreciated.     DM II, non-insulin dependent: Recent HgbA1c 8.6. Steroids likely contributing to hyperglycemia. Overall improved with recent insulin adjustments. Cont current regimen today.      Hyponatremia: Likely hypervolemic. Stable today. Cont fluid restriction and HD. Repeat labs in the AM.     Paroxysmal Afib: Currently in sinus rhythm. Cont Coreg. Cont home Eliquis for stroke prevention.     Chronic macrocytic anemia: B12 and folate are replete. Stable today. Repeat CBC in the AM.     Thrombocytopenia: Possibly 2/2 above. B12 and folate replete. No schistocytes on peripheral smear. Overall improved and stable today. Repeat CBC in the AM.     Generalized weakness: PT/OT    DVT PPX: Eliquis.     Disposition: Pt and his cousin/HCS requesting rehab placement in [place] to be closer to family in [place].      Andres Reardon DO  11/12/22  12:53 EST    Electronically signed by Andres Reardon DO at 11/12/22 1301     Bladimir Alvarado MD at 11/12/22 0924          Nephrology Progress Note      Subjective     No chest pain or shortness of breath.     Objective       Vital signs :     Temp:  [97.5 °F (36.4 °C)-97.6 °F (36.4 °C)] 97.5 °F (36.4 °C)  Heart Rate:  [100-133] 100  Resp:  [18] 18  BP: (101-140)/() 108/81    Intake/Output                       11/10/22 0701 - 11/11/22 0700 11/11/22 0701 - 11/12/22 0700     1339-8416 3545-6807 Total 7280-8522 1310-6145 Total                 Intake    P.O.  600  120 720  120  480 600    Total Intake 600 120 720 120 480 600       Output    Other  --  -- --  1000  -- 1000    Ultrafiltration (mL) -- -- -- 1000 -- 1000    Total Output -- -- -- 1000 -- 1000           Physical Exam:    General Appearance : not  in acute distress  Lungs : clear to auscultation, respirations regular  Heart :  regular rhythm & normal rate, normal S1, S2 and no murmur, no rub  Abdomen : soft, non distended  Extremities : no edema   Neurologic :   orientated to person, place, time and situation, Grossly no focal deficits    Laboratory Data :     Albumin No results found for: ALBUMIN   Magnesium No results found for: MG       PTH               No results found for: PTH    CBC and coagulation:  Results from last 7 days   Lab Units 11/12/22 0514 11/11/22  0411 11/10/22  0056 11/09/22  0044 11/08/22  0226   PROCALCITONIN ng/mL  --   --   --   --  0.19   WBC 10*3/mm3 7.99 7.56 7.29   < > 6.63   HEMOGLOBIN g/dL 10.2* 10.7* 10.3*   < > 10.0*   HEMATOCRIT % 31.1* 32.5* 31.4*   < > 29.5*   MCV fL 101.3* 103.5* 102.6*   < > 99.3*   MCHC g/dL 32.8 32.9 32.8   < > 33.9   PLATELETS 10*3/mm3 109* 102* 103*   < > 72*    < > = values in this interval not displayed.     Acid/base balance:      Renal and electrolytes:    Results from last 7 days   Lab Units 11/12/22  0514 11/11/22  0411 11/10/22  0056 11/09/22  1347 11/09/22  0044   SODIUM mmol/L 130* 122* 128* 129* 123*   POTASSIUM mmol/L 4.9 5.4* 4.5 4.4 4.5   CHLORIDE mmol/L 90* 85* 89* 90* 86*   CO2 mmol/L 26.4 21.9* 23.9 22.0 21.7*   BUN mg/dL 53* 74* 46* 35* 66*   CREATININE mg/dL 3.95* 4.94* 4.06* 3.28* 5.09*   CALCIUM mg/dL 8.4* 8.3* 8.2* 7.8* 7.8*     Estimated Creatinine Clearance: 19.9 mL/min (A) (by C-G formula based on SCr of 3.95 mg/dL (H)).  @GFRCG:3@   Liver and pancreatic function:  Results from last 7 days   Lab Units 11/08/22  0226   ALBUMIN g/dL 3.20*   BILIRUBIN mg/dL 0.8   ALK PHOS U/L 69   AST (SGOT) U/L 23   ALT (SGPT) U/L 69*         Cardiac:      Liver and pancreatic function:  Results from last 7 days   Lab Units 11/08/22  0226   ALBUMIN g/dL 3.20*   BILIRUBIN mg/dL 0.8   ALK PHOS U/L 69   AST (SGOT) U/L 23   ALT (SGPT) U/L 69*       Medications :     apixaban, 5 mg, Oral,  Q12H  budesonide-formoterol, 2 puff, Inhalation, BID - RT  calcium acetate, 2,668 mg, Oral, TID With Meals  carvedilol, 6.25 mg, Oral, BID With Meals  cetirizine, 10 mg, Oral, Daily  Insulin Aspart, 0-9 Units, Subcutaneous, TID AC  insulin detemir, 20 Units, Subcutaneous, Daily  ipratropium-albuterol, 3 mL, Nebulization, 4x Daily - RT  levothyroxine, 88 mcg, Oral, Q AM  melatonin, 10 mg, Oral, Nightly  methylPREDNISolone sodium succinate, 40 mg, Intravenous, BID  pantoprazole, 40 mg, Oral, Q AM  Renal, 1 capsule, Oral, Daily  sodium chloride, 3 mL, Intravenous, Q12H  thiamine, 100 mg, Oral, Daily        Assessment & Plan     1. ESKD on IHDx  2. Anemia  3. Hyponatremia, hypervolemic  4. Anion gap metabolic acidosis  5. SHPT  6. Acute bacterial pneumonia    Pt had uneventful dialysis yesterday, will continue on dialysis as scheduled  Evaluated on dialysis, low oral intake due to nausea, reduced UF to 1.0L, PRN antiemetics.   Educated on FR 32 ounces/day  Anemia; HH at target  SHPT; follow outpatient management        Bladimir Alvarado MD  11/12/22  09:24 EST      Electronically signed by Bladimir Alvarado MD at 11/12/22 0926     Andres Reardon DO at 11/11/22 1812                [facility] HOSPITALIST PROGRESS NOTE    Subjective     History:   Axel Desir is a 62 y.o. male admitted on 11/3/2022 secondary to Pneumonia of right upper lobe due to infectious organism     Procedures: None    CC: Follow up COPD exacerbation    Patient seen and examined in dialysis. Awake and alert. States he feels about the same today with stable respiratory status. No reported CP or palpitations. No reported nausea or vomiting.  No acute events overnight per RN.     History taken from: patient, chart, and RN.      Objective     Vital Signs  Temp:  [97.5 °F (36.4 °C)-97.9 °F (36.6 °C)] 97.5 °F (36.4 °C)  Heart Rate:  [101-122] 118  Resp:  [16-20] 16  BP: (101-131)/(68-92) 101/68    Intake/Output Summary (Last 24 hours) at  11/11/2022 1812  Last data filed at 11/11/2022 1430  Gross per 24 hour   Intake 360 ml   Output 1000 ml   Net -640 ml         Physical Exam:  General:    Awake, alert, in no acute distress, chronically ill appearing   Heart:      Normal S1 and S2. Tachycardic. No significant murmur, rubs or gallops appreciated.   Lungs:     Respirations regular, even and unlabored. Diminished breath sounds at bases. No wheezes appreciated today.      Abdomen:   Soft and nontender. No guarding, rebound tenderness or  organomegaly noted. Bowel sounds present x 4.   Extremities:  Trace bilateral lower extremity edema. Moves UE and LE equally B/L.     Results Review:    Results from last 7 days   Lab Units 11/11/22  0411 11/10/22  0056 11/09/22  0044 11/08/22  0226 11/07/22  0123 11/06/22  0129 11/05/22  0108   WBC 10*3/mm3 7.56 7.29 7.28 6.63 5.24 6.14 6.28   HEMOGLOBIN g/dL 10.7* 10.3* 10.1* 10.0* 10.1* 10.9* 11.0*   PLATELETS 10*3/mm3 102* 103* 86* 72* 60* 61* 44*     Results from last 7 days   Lab Units 11/11/22  0411 11/10/22  0056 11/09/22  1347 11/09/22  0044 11/08/22  0226 11/07/22  0123 11/06/22  0129   SODIUM mmol/L 122* 128* 129* 123* 127* 127* 130*   POTASSIUM mmol/L 5.4* 4.5 4.4 4.5 4.0 5.3* 4.6   CHLORIDE mmol/L 85* 89* 90* 86* 87* 90* 91*   CO2 mmol/L 21.9* 23.9 22.0 21.7* 22.6 19.0* 20.6*   BUN mg/dL 74* 46* 35* 66* 52* 77* 54*   CREATININE mg/dL 4.94* 4.06* 3.28* 5.09* 4.58* 6.07* 5.12*   CALCIUM mg/dL 8.3* 8.2* 7.8* 7.8* 7.7* 7.3* 7.2*   GLUCOSE mg/dL 202* 158* 252* 262* 229* 493* 188*     Results from last 7 days   Lab Units 11/08/22  0226   BILIRUBIN mg/dL 0.8   ALK PHOS U/L 69   AST (SGOT) U/L 23   ALT (SGPT) U/L 69*                   Imaging Results (Last 24 Hours)     ** No results found for the last 24 hours. **            Medications:  apixaban, 5 mg, Oral, Q12H  budesonide-formoterol, 2 puff, Inhalation, BID - RT  calcium acetate, 2,668 mg, Oral, TID With Meals  carvedilol, 6.25 mg, Oral, BID With  Meals  cetirizine, 10 mg, Oral, Daily  Insulin Aspart, 0-9 Units, Subcutaneous, TID AC  insulin detemir, 20 Units, Subcutaneous, Daily  ipratropium-albuterol, 3 mL, Nebulization, 4x Daily - RT  levothyroxine, 88 mcg, Oral, Q AM  melatonin, 10 mg, Oral, Nightly  methylPREDNISolone sodium succinate, 40 mg, Intravenous, BID  pantoprazole, 40 mg, Oral, Q AM  Renal, 1 capsule, Oral, Daily  sodium chloride, 3 mL, Intravenous, Q12H  thiamine, 100 mg, Oral, Daily               Assessment & Plan   Right-sided pneumonia: Sputum culture reveals normal respiratory joanne. Procal is normal. Completed course of Omincef and Doxycycline.     Acute exacerbation of COPD: Completed antibiotics as above. Cont steroids and nebs. Cont Symbicort.     ESRD on HD: CT reveals CHF/edema with R>L pleural effusions, anasarca and upper abdominal ascites. Cont HD (MWF schedule) per nephrology with input appreciated.     DM II, non-insulin dependent: Recent HgbA1c 8.6. Steroids likely contributing to hyperglycemia. Increase basal insulin. Cont SSI with Accuchecks.     Hyponatremia: Likely hypervolemic. Decreased today. Repeat labs in the AM.     Paroxysmal Afib: Currently in sinus rhythm. Cont Coreg. Cont home Eliquis for stroke prevention.     Chronic macrocytic anemia: B12 and folate are replete. Stable today. Repeat CBC in the AM.     Thrombocytopenia: Possibly 2/2 above. B12 and folate replete. No schistocytes on peripheral smear. Overall improved and stable today. Repeat CBC in the AM.     Generalized weakness: PT/OT    DVT PPX: Eliquis.     Disposition: Pt and his cousin/HCS requesting rehab placement in [place] to be closer to family in [place].      Andres Reardon DO  11/11/22  18:12 EST    Electronically signed by Andres Reardon DO at 11/11/22 3277     Bladimir Alvarado MD at 11/11/22 1235          Nephrology Progress Note      Subjective     Seen on dialysis, says not feeling well, does not feel like eating. No chest pain or  shortness  Of breath, has some nausea.     Objective       Vital signs :     Temp:  [97.5 °F (36.4 °C)-97.9 °F (36.6 °C)] 97.5 °F (36.4 °C)  Heart Rate:  [101-131] 107  Resp:  [16-20] 16  BP: (115-131)/(79-92) 131/92    Intake/Output                             11/09/22 0701 - 11/10/22 0700 11/10/22 0701 - 11/11/22 0700 11/11/22 0701 - 11/12/22 0700     0541-4481 8105-3527 Total 6761-1028 4025-0949 Total 0571-3699 1147-3970 Total                    Intake    P.O.  1200  720 1920  600  120 720  120  -- 120    IV Piggyback  100  -- 100  --  -- --  --  -- --    Total Intake 6643 052 0021 600 120 720 120 -- 120       Output    Other  1500  -- 1500  --  -- --  --  -- --    Ultrafiltration (mL) 1500 -- 1500 -- -- -- -- -- --    Total Output 1500 -- 1500 -- -- -- -- -- --           Physical Exam:    General Appearance : not in acute distress  Lungs : clear to auscultation, respirations regular  Heart :  regular rhythm & normal rate, normal S1, S2 and no murmur, no rub  Abdomen : soft, non distended  Extremities : no edema   Neurologic :   orientated to person, place, time and situation, Grossly no focal deficits    Laboratory Data :     Albumin No results found for: ALBUMIN   Magnesium No results found for: MG       PTH               No results found for: PTH    CBC and coagulation:  Results from last 7 days   Lab Units 11/11/22  0411 11/10/22  0056 11/09/22  0044 11/08/22  0226 11/05/22  0108 11/04/22  1612   PROCALCITONIN ng/mL  --   --   --  0.19  --   --    LACTATE mmol/L  --   --   --   --   --  1.6   WBC 10*3/mm3 7.56 7.29 7.28 6.63   < >  --    HEMOGLOBIN g/dL 10.7* 10.3* 10.1* 10.0*   < >  --    HEMATOCRIT % 32.5* 31.4* 30.3* 29.5*   < >  --    MCV fL 103.5* 102.6* 100.7* 99.3*   < >  --    MCHC g/dL 32.9 32.8 33.3 33.9   < >  --    PLATELETS 10*3/mm3 102* 103* 86* 72*   < >  --     < > = values in this interval not displayed.     Acid/base balance:      Renal and electrolytes:    Results from last 7 days   Lab  Units 11/11/22  0411 11/10/22  0056 11/09/22  1347 11/09/22  0044 11/08/22  0226   SODIUM mmol/L 122* 128* 129* 123* 127*   POTASSIUM mmol/L 5.4* 4.5 4.4 4.5 4.0   CHLORIDE mmol/L 85* 89* 90* 86* 87*   CO2 mmol/L 21.9* 23.9 22.0 21.7* 22.6   BUN mg/dL 74* 46* 35* 66* 52*   CREATININE mg/dL 4.94* 4.06* 3.28* 5.09* 4.58*   CALCIUM mg/dL 8.3* 8.2* 7.8* 7.8* 7.7*     Estimated Creatinine Clearance: 15.9 mL/min (A) (by C-G formula based on SCr of 4.94 mg/dL (H)).  @GFRCG:3@   Liver and pancreatic function:  Results from last 7 days   Lab Units 11/08/22  0226   ALBUMIN g/dL 3.20*   BILIRUBIN mg/dL 0.8   ALK PHOS U/L 69   AST (SGOT) U/L 23   ALT (SGPT) U/L 69*         Cardiac:      Liver and pancreatic function:  Results from last 7 days   Lab Units 11/08/22  0226   ALBUMIN g/dL 3.20*   BILIRUBIN mg/dL 0.8   ALK PHOS U/L 69   AST (SGOT) U/L 23   ALT (SGPT) U/L 69*       Medications :     apixaban, 5 mg, Oral, Q12H  budesonide-formoterol, 2 puff, Inhalation, BID - RT  calcium acetate, 2,668 mg, Oral, TID With Meals  carvedilol, 6.25 mg, Oral, BID With Meals  cetirizine, 10 mg, Oral, Daily  Insulin Aspart, 0-9 Units, Subcutaneous, TID AC  insulin detemir, 20 Units, Subcutaneous, Daily  ipratropium-albuterol, 3 mL, Nebulization, 4x Daily - RT  levothyroxine, 88 mcg, Oral, Q AM  melatonin, 10 mg, Oral, Nightly  methylPREDNISolone sodium succinate, 40 mg, Intravenous, BID  pantoprazole, 40 mg, Oral, Q AM  Renal, 1 capsule, Oral, Daily  sodium chloride, 3 mL, Intravenous, Q12H  thiamine, 100 mg, Oral, Daily           Assessment & Plan     1. ESKD on IHDx  2. Anemia  3. Hyponatremia, hypervolemic  4. Anion gap metabolic acidosis  5. SHPT  6. Acute bacterial pneumonia    Evaluated on dialysis, low oral intake due to nausea, reduced UF to 1.0L, PRN antiemetics.   Educated on FR 32 ounces/day  Anemia; HH at target  SHPT; follow outpatient management        Bladimir Alvarado MD  11/11/22  12:35 EST      Electronically signed by Christiano,  MD Bladimir at 11/11/22 1236     Andres Reardon DO at 11/10/22 1654                [facility] HOSPITALIST PROGRESS NOTE    Subjective     History:   Axel Desir is a 62 y.o. male admitted on 11/3/2022 secondary to Pneumonia of right upper lobe due to infectious organism     Procedures: None    CC: Follow up COPD exacerbation    Patient seen and examined. Awake and alert. States he continues to not sleep well. Cough and dyspnea appear slightly improved today. No reported CP or palpitations. No reported nausea or vomiting. More tachycardic today. No acute events overnight per RN.     History taken from: patient, chart, and RN.      Objective     Vital Signs  Temp:  [97.4 °F (36.3 °C)-98.4 °F (36.9 °C)] 97.9 °F (36.6 °C)  Heart Rate:  [104-136] 123  Resp:  [18-22] 18  BP: ()/(73-92) 131/92    Intake/Output Summary (Last 24 hours) at 11/10/2022 1654  Last data filed at 11/10/2022 1300  Gross per 24 hour   Intake 1560 ml   Output --   Net 1560 ml         Physical Exam:  General:    Awake, alert, in no acute distress, chronically ill appearing   Heart:      Normal S1 and S2. Tachycardic. No significant murmur, rubs or gallops appreciated.   Lungs:     Respirations regular, even and unlabored. Expiratory wheezes in TONIO with diminished breath sounds at bases.     Abdomen:   Soft and nontender. No guarding, rebound tenderness or  organomegaly noted. Bowel sounds present x 4.   Extremities:  Trace bilateral lower extremity edema. Moves UE and LE equally B/L.     Results Review:    Results from last 7 days   Lab Units 11/10/22  0056 11/09/22  0044 11/08/22  0226 11/07/22  0123 11/06/22  0129 11/05/22  0108 11/04/22  0405   WBC 10*3/mm3 7.29 7.28 6.63 5.24 6.14 6.28 6.25   HEMOGLOBIN g/dL 10.3* 10.1* 10.0* 10.1* 10.9* 11.0* 10.9*   PLATELETS 10*3/mm3 103* 86* 72* 60* 61* 44* 47*     Results from last 7 days   Lab Units 11/10/22  0056 11/09/22  1347 11/09/22  0044 11/08/22  0226 11/07/22  0123  11/06/22  0129 11/05/22  0108   SODIUM mmol/L 128* 129* 123* 127* 127* 130* 133*   POTASSIUM mmol/L 4.5 4.4 4.5 4.0 5.3* 4.6 4.4   CHLORIDE mmol/L 89* 90* 86* 87* 90* 91* 94*   CO2 mmol/L 23.9 22.0 21.7* 22.6 19.0* 20.6* 24.7   BUN mg/dL 46* 35* 66* 52* 77* 54* 43*   CREATININE mg/dL 4.06* 3.28* 5.09* 4.58* 6.07* 5.12* 3.74*   CALCIUM mg/dL 8.2* 7.8* 7.8* 7.7* 7.3* 7.2* 7.6*   GLUCOSE mg/dL 158* 252* 262* 229* 493* 188* 247*     Results from last 7 days   Lab Units 11/08/22  0226 11/04/22  0405   BILIRUBIN mg/dL 0.8 1.2   ALK PHOS U/L 69 72   AST (SGOT) U/L 23 84*   ALT (SGPT) U/L 69* 152*     Results from last 7 days   Lab Units 11/04/22  0405   MAGNESIUM mg/dL 2.3               Imaging Results (Last 24 Hours)     Procedure Component Value Units Date/Time    CT Angiogram Chest Pulmonary Embolism [[id number]] Collected: 11/10/22 1141     Updated: 11/10/22 1146    Narrative:      EXAM:    CT Angiography Chest With Intravenous Contrast     EXAM DATE:    11/10/2022 11:22 AM     CLINICAL HISTORY:    Pulmonary embolism (PE) suspected, unknown D-dimer; J18.9-Pneumonia,  unspecified organism     TECHNIQUE:    Axial computed tomographic angiography images of the chest with  intravenous contrast.  This CT exam was performed using one or more of  the following dose reduction techniques:  automated exposure control,  adjustment of the mA and/or kV according to patient size, and/or use of  iterative reconstruction technique.    MIP reconstructed images were created and reviewed.     COMPARISON:    09/21/2022     FINDINGS:    Pulmonary arteries:  No PE.    Aorta:  Atherosclerosis aorta without aneurysm identified.  Stable  calcification surrounding the dorsal aortic arch with borderline  dilatation noted. No change from previous exam.  Stable ectasia of the  ascending thoracic aorta, 4.1 cm.    Lungs:  Consolidative airspace disease right lower lobe.  Left basilar  atelectasis.  Interstitial edema.  Emphysema is stable.     Pleural space:  Right greater than left small to moderate pleural  effusions.  No pneumothorax.    Heart:  CABG changes.  Cardiomegaly.  No significant pericardial  effusion.  No evidence of RV dysfunction.    Bones/joints:  Degenerative changes of thoracic spine.  No acute  fracture.  No dislocation.    Soft tissues:  Anasarca.    Lymph nodes:  Unremarkable.  No enlarged lymph nodes.    Kidneys and ureters:  Polycystic kidneys.    Intraperitoneal space:  Mild upper abdominal ascites.       Impression:      1.  No PE.  2.  Right lower lobe consolidative pneumonia.  3.  CHF/edema with right greater than left pleural effusions.  4.  Anasarca.  5.  Upper abdominal ascites.  6.  Polycystic kidneys, stable.  7.  Other nonacute findings detailed above.     This report was finalized on 11/10/2022 11:44 AM by Dr. Justino Huggins MD.               Medications:  apixaban, 5 mg, Oral, Q12H  budesonide-formoterol, 2 puff, Inhalation, BID - RT  calcium acetate, 2,668 mg, Oral, TID With Meals  carvedilol, 6.25 mg, Oral, BID With Meals  cetirizine, 10 mg, Oral, Daily  doxycycline, 100 mg, Intravenous, Q12H  Insulin Aspart, 0-9 Units, Subcutaneous, TID AC  insulin detemir, 15 Units, Subcutaneous, Daily  ipratropium-albuterol, 3 mL, Nebulization, 4x Daily - RT  levothyroxine, 88 mcg, Oral, Q AM  melatonin, 10 mg, Oral, Nightly  methylPREDNISolone sodium succinate, 40 mg, Intravenous, BID  pantoprazole, 40 mg, Oral, Q AM  Renal, 1 capsule, Oral, Daily  sodium chloride, 3 mL, Intravenous, Q12H  thiamine, 100 mg, Oral, Daily               Assessment & Plan   Right-sided pneumonia: Sputum culture reveals normal respiratory joanne. Procal is normal. Complete course of Omincef and Doxycycline.     Acute exacerbation of COPD: Cont antibiotics as above, steroids and nebs. Cont Symbicort.     ESRD on HD: CT reveals CHF/edema with R>L pleural effusions, anasarca and upper abdominal ascites. Cont HD (MWF schedule) per nephrology with input  appreciated.     DM II, non-insulin dependent: Recent HgbA1c 8.6. Steroids likely contributing to hyperglycemia. Cont current insulin regimen today after recent adjustments to basal and sliding scale. Cont Accuchecks.     Hyponatremia: Likely hypervolemic. Stable today. Repeat labs in the AM.     Paroxysmal Afib: Currently in sinus rhythm. Cont Coreg. Cont home Eliquis for stroke prevention.     Chronic macrocytic anemia: B12 and folate are replete. Stable today. Repeat CBC in the AM.     Thrombocytopenia: Possibly 2/2 above. B12 and folate replete. No schistocytes on peripheral smear. Improving. Repeat CBC in the AM.     Generalized weakness: PT/OT    DVT PPX: Eliquis.     Disposition: Pt and his cousin/HCS requesting rehab placement in [place] to be closer to family in [place].      Andres Reardon DO  11/10/22  16:54 EST    Electronically signed by Andres Reardon DO at 11/10/22 1659     Bladimir Alvarado MD at 11/10/22 1341          Nephrology Progress Note      Subjective     Seen on dialysis, tolerating well    Objective       Vital signs :     Temp:  [97.4 °F (36.3 °C)-98.4 °F (36.9 °C)] 97.9 °F (36.6 °C)  Heart Rate:  [104-136] 131  Resp:  [18-22] 18  BP: ()/(73-92) 131/92    Intake/Output                       11/08/22 0701 - 11/09/22 0700 11/09/22 0701 - 11/10/22 0700     7864-5387 8069-7364 Total 9565-8293 7205-9522 Total                 Intake    P.O.  1800  640 2440  1200  720 1920    IV Piggyback  --  -- --  100  -- 100    Total Intake 3631 258 6707 2198 655 9145       Output    Other  --  -- --  1500  -- 1500    Ultrafiltration (mL) -- -- -- 1500 -- 1500    Total Output -- -- -- 1500 -- 1500           Physical Exam:    General Appearance : not in acute distress  Lungs : clear to auscultation, respirations regular  Heart :  regular rhythm & normal rate, normal S1, S2 and no murmur, no rub  Abdomen : soft, non distended  Extremities : no edema   Neurologic :   orientated to person,  place, time and situation, Grossly no focal deficits    Laboratory Data :     Albumin Albumin   Date Value Ref Range Status   11/08/2022 3.20 (L) 3.50 - 5.20 g/dL Final      Magnesium No results found for: MG       PTH               No results found for: PTH    CBC and coagulation:  Results from last 7 days   Lab Units 11/10/22  0056 11/09/22  0044 11/08/22  0226 11/05/22  0108 11/04/22  1612 11/04/22  0940 11/04/22  0405   PROCALCITONIN ng/mL  --   --  0.19  --   --   --   --    LACTATE mmol/L  --   --   --   --  1.6 2.4* 3.4*   CRP mg/dL  --   --   --   --   --   --  2.03*   WBC 10*3/mm3 7.29 7.28 6.63   < >  --   --  6.25   HEMOGLOBIN g/dL 10.3* 10.1* 10.0*   < >  --   --  10.9*   HEMATOCRIT % 31.4* 30.3* 29.5*   < >  --   --  32.5*   MCV fL 102.6* 100.7* 99.3*   < >  --   --  102.2*   MCHC g/dL 32.8 33.3 33.9   < >  --   --  33.5   PLATELETS 10*3/mm3 103* 86* 72*   < >  --   --  47*    < > = values in this interval not displayed.     Acid/base balance:      Renal and electrolytes:    Results from last 7 days   Lab Units 11/10/22  0056 11/09/22  1347 11/09/22  0044 11/08/22  0226 11/07/22  0123 11/05/22  0108 11/04/22  0405   SODIUM mmol/L 128* 129* 123* 127* 127*   < > 131*   POTASSIUM mmol/L 4.5 4.4 4.5 4.0 5.3*   < > 4.9   MAGNESIUM mg/dL  --   --   --   --   --   --  2.3   CHLORIDE mmol/L 89* 90* 86* 87* 90*   < > 92*   CO2 mmol/L 23.9 22.0 21.7* 22.6 19.0*   < > 19.3*   BUN mg/dL 46* 35* 66* 52* 77*   < > 74*   CREATININE mg/dL 4.06* 3.28* 5.09* 4.58* 6.07*   < > 5.57*   CALCIUM mg/dL 8.2* 7.8* 7.8* 7.7* 7.3*   < > 7.6*    < > = values in this interval not displayed.     Estimated Creatinine Clearance: 19.4 mL/min (A) (by C-G formula based on SCr of 4.06 mg/dL (H)).  @GFRCG:3@   Liver and pancreatic function:  Results from last 7 days   Lab Units 11/08/22  0226 11/04/22  0405   ALBUMIN g/dL 3.20* 3.05*   BILIRUBIN mg/dL 0.8 1.2   ALK PHOS U/L 69 72   AST (SGOT) U/L 23 84*   ALT (SGPT) U/L 69* 152*          Cardiac:      Liver and pancreatic function:  Results from last 7 days   Lab Units 11/08/22  0226 11/04/22  0405   ALBUMIN g/dL 3.20* 3.05*   BILIRUBIN mg/dL 0.8 1.2   ALK PHOS U/L 69 72   AST (SGOT) U/L 23 84*   ALT (SGPT) U/L 69* 152*       Medications :     apixaban, 5 mg, Oral, Q12H  budesonide-formoterol, 2 puff, Inhalation, BID - RT  calcium acetate, 2,668 mg, Oral, TID With Meals  carvedilol, 6.25 mg, Oral, BID With Meals  cetirizine, 10 mg, Oral, Daily  doxycycline, 100 mg, Intravenous, Q12H  Insulin Aspart, 0-9 Units, Subcutaneous, TID AC  insulin detemir, 15 Units, Subcutaneous, Daily  ipratropium-albuterol, 3 mL, Nebulization, 4x Daily - RT  levothyroxine, 88 mcg, Oral, Q AM  melatonin, 10 mg, Oral, Nightly  methylPREDNISolone sodium succinate, 40 mg, Intravenous, BID  pantoprazole, 40 mg, Oral, Q AM  Renal, 1 capsule, Oral, Daily  sodium chloride, 3 mL, Intravenous, Q12H  thiamine, 100 mg, Oral, Daily           Assessment & Plan     1. ESKD on IHDx  2. Anemia  3. Hyponatremia, hypervolemic  4. Anion gap metabolic acidosis  5. SHPT  6. Acute bacterial pneumonia    Had dialysis, uneventful, continue on dialysis, grossly stable from renal stands point.   Educated on FR 32 ounces/day  Anemia; HH at target  SHPT; follow outpatient management        Bladimir Alvarado MD  11/10/22  13:41 EST      Electronically signed by Bladimir Alvarado MD at 11/10/22 1343     Andres Reardon DO at 11/09/22 1606                [facility]    Subjective     History:   Axel Desir is a 62 y.o. male admitted on 11/3/2022 secondary to Pneumonia of right upper lobe due to infectious organism     Procedures: None    CC: Follow up COPD exacerbation    Patient seen and examined in dialysis. Awake and alert. States he feels about the same. Continues to report cough and dyspnea. No reported CP. No reported nausea or vomiting. States he has not been sleeping well. No acute events overnight  per RN.     History taken from: patient, chart, and RN.      Objective     Vital Signs  Temp:  [97.4 °F (36.3 °C)-98.8 °F (37.1 °C)] 97.4 °F (36.3 °C)  Heart Rate:  [] 114  Resp:  [18-20] 20  BP: (105-126)/(69-84) 126/84    Intake/Output Summary (Last 24 hours) at 11/9/2022 1606  Last data filed at 11/9/2022 1507  Gross per 24 hour   Intake 2060 ml   Output 1500 ml   Net 560 ml         Physical Exam:  General:    Awake, alert, in no acute distress, chronically ill appearing   Heart:      Normal S1 and S2. Regular rate and rhythm. No significant murmur, rubs or gallops appreciated.   Lungs:     Respirations regular, even and unlabored. Scattered expiratory wheezes, worse in TONIO with diminished breath sounds at bases.     Abdomen:   Soft and nontender. No guarding, rebound tenderness or  organomegaly noted. Bowel sounds present x 4.   Extremities:  No clubbing, cyanosis or edema noted. Moves UE and LE equally B/L.     Results Review:    Results from last 7 days   Lab Units 11/09/22  0044 11/08/22  0226 11/07/22  0123 11/06/22  0129 11/05/22  0108 11/04/22  0405 11/03/22  1248   WBC 10*3/mm3 7.28 6.63 5.24 6.14 6.28 6.25 7.50   HEMOGLOBIN g/dL 10.1* 10.0* 10.1* 10.9* 11.0* 10.9* 12.2*   PLATELETS 10*3/mm3 86* 72* 60* 61* 44* 47* 62*     Results from last 7 days   Lab Units 11/09/22  1347 11/09/22  0044 11/08/22  0226 11/07/22  0123 11/06/22  0129 11/05/22  0108 11/04/22  0405   SODIUM mmol/L 129* 123* 127* 127* 130* 133* 131*   POTASSIUM mmol/L 4.4 4.5 4.0 5.3* 4.6 4.4 4.9   CHLORIDE mmol/L 90* 86* 87* 90* 91* 94* 92*   CO2 mmol/L 22.0 21.7* 22.6 19.0* 20.6* 24.7 19.3*   BUN mg/dL 35* 66* 52* 77* 54* 43* 74*   CREATININE mg/dL 3.28* 5.09* 4.58* 6.07* 5.12* 3.74* 5.57*   CALCIUM mg/dL 7.8* 7.8* 7.7* 7.3* 7.2* 7.6* 7.6*   GLUCOSE mg/dL 252* 262* 229* 493* 188* 247* 319*     Results from last 7 days   Lab Units 11/08/22  0226 11/04/22  0405 11/03/22  1248   BILIRUBIN mg/dL 0.8 1.2 1.8*   ALK PHOS U/L 69 72 80   AST  (SGOT) U/L 23 84* 135*   ALT (SGPT) U/L 69* 152* 202*     Results from last 7 days   Lab Units 11/04/22  0405   MAGNESIUM mg/dL 2.3               Imaging Results (Last 24 Hours)     ** No results found for the last 24 hours. **            Medications:  apixaban, 5 mg, Oral, Q12H  budesonide-formoterol, 2 puff, Inhalation, BID - RT  calcium acetate, 2,668 mg, Oral, TID With Meals  carvedilol, 6.25 mg, Oral, BID With Meals  cefdinir, 300 mg, Oral, Q24H  cetirizine, 10 mg, Oral, Daily  doxycycline, 100 mg, Intravenous, Q12H  Insulin Aspart, 0-9 Units, Subcutaneous, TID AC  insulin detemir, 15 Units, Subcutaneous, Daily  ipratropium-albuterol, 3 mL, Nebulization, 4x Daily - RT  levothyroxine, 88 mcg, Oral, Q AM  methylPREDNISolone sodium succinate, 40 mg, Intravenous, BID  pantoprazole, 40 mg, Oral, Q AM  Renal, 1 capsule, Oral, Daily  sodium chloride, 3 mL, Intravenous, Q12H  thiamine, 100 mg, Oral, Daily               Assessment & Plan   RUL pneumonia: Sputum culture reveals normal respiratory joanne. Procal is normal. Complete course of Omincef and Doxycycline.     Acute exacerbation of COPD: Cont antibiotics as above, steroids and nebs. Cont Symbicort.     ESRD on HD: Cont HD ([facility] schedule) per nephrology with input appreciated.     DM II, non-insulin dependent: Recent HgbA1c 8.6. Steroids likely contributing to hyperglycemia. Increase basal insulin and sliding scale. Cont Accuchecks.     Hyponatremia: Likely hypervolemic. Improved on repeat following HD today. Repeat labs in the AM.     Paroxysmal Afib: Currently in sinus rhythm. Cont Coreg. Cont home Eliquis for stroke prevention.     Chronic macrocytic anemia: B12 and folate are replete. Stable today. Repeat CBC in the AM.     Thrombocytopenia: Possibly 2/2 above. B12 and folate replete. No schistocytes on peripheral smear. Improved today. Repeat CBC in the AM.     Generalized weakness: PT/OT    DVT PPX: Eliquis.     Disposition: Pt and his cousin/HCS requesting  rehab placement in [place] to be closer to family in [place].      Andres Reardon DO  11/09/22  16:06 EST    Electronically signed by Andres Reardon DO at 11/09/22 1611     Bladimir Alvarado MD at 11/09/22 1038          Nephrology Progress Note      Subjective     Seen on dialysis, tolerating well    Objective       Vital signs :     Temp:  [97.9 °F (36.6 °C)-98.8 °F (37.1 °C)] 98.4 °F (36.9 °C)  Heart Rate:  [] 104  Resp:  [16-18] 18  BP: (105-132)/(69-89) 105/71    Intake/Output                       11/07/22 0701 - 11/08/22 0700 11/08/22 0701 - 11/09/22 0700     5722-6694 2293-9032 Total 7130-7730 5201-0222 Total                 Intake    P.O.  --  740 740  1800  640 2440    I.V.  95.2  -- 95.2  --  -- --    Total Intake 95.2 740 835.2 2870 132 3885       Output    Other  3700  -- 3700  --  -- --    Ultrafiltration (mL) 3700 -- 3700 -- -- --    Total Output 3700 -- 3700 -- -- --           Physical Exam:    General Appearance : not in acute distress  Lungs : clear to auscultation, respirations regular  Heart :  regular rhythm & normal rate, normal S1, S2 and no murmur, no rub  Abdomen : soft, non distended  Extremities : no edema   Neurologic :   orientated to person, place, time and situation, Grossly no focal deficits    Laboratory Data :     Albumin Albumin   Date Value Ref Range Status   11/08/2022 3.20 (L) 3.50 - 5.20 g/dL Final      Magnesium No results found for: MG       PTH               No results found for: PTH    CBC and coagulation:  Results from last 7 days   Lab Units 11/09/22  0044 11/08/22  0226 11/07/22  0123 11/05/22  0108 11/04/22  1612 11/04/22  0940 11/04/22  0405 11/03/22  2201 11/03/22  1248   PROCALCITONIN ng/mL  --  0.19  --   --   --   --   --   --   --    LACTATE mmol/L  --   --   --   --  1.6 2.4* 3.4*   < > 2.3*   CRP mg/dL  --   --   --   --   --   --  2.03*  --  2.33*   WBC 10*3/mm3 7.28 6.63 5.24   < >  --   --  6.25  --  7.50   HEMOGLOBIN g/dL 10.1* 10.0* 10.1*    < >  --   --  10.9*  --  12.2*   HEMATOCRIT % 30.3* 29.5* 31.2*   < >  --   --  32.5*  --  35.7*   MCV fL 100.7* 99.3* 103.7*   < >  --   --  102.2*  --  101.4*   MCHC g/dL 33.3 33.9 32.4   < >  --   --  33.5  --  34.2   PLATELETS 10*3/mm3 86* 72* 60*   < >  --   --  47*  --  62*    < > = values in this interval not displayed.     Acid/base balance:      Renal and electrolytes:    Results from last 7 days   Lab Units 11/09/22  0044 11/08/22 0226 11/07/22  0123 11/06/22  0129 11/05/22  0108 11/04/22  0405   SODIUM mmol/L 123* 127* 127* 130* 133* 131*   POTASSIUM mmol/L 4.5 4.0 5.3* 4.6 4.4 4.9   MAGNESIUM mg/dL  --   --   --   --   --  2.3   CHLORIDE mmol/L 86* 87* 90* 91* 94* 92*   CO2 mmol/L 21.7* 22.6 19.0* 20.6* 24.7 19.3*   BUN mg/dL 66* 52* 77* 54* 43* 74*   CREATININE mg/dL 5.09* 4.58* 6.07* 5.12* 3.74* 5.57*   CALCIUM mg/dL 7.8* 7.7* 7.3* 7.2* 7.6* 7.6*     Estimated Creatinine Clearance: 15.4 mL/min (A) (by C-G formula based on SCr of 5.09 mg/dL (H)).  @GFRCG:3@   Liver and pancreatic function:  Results from last 7 days   Lab Units 11/08/22 0226 11/04/22  0405 11/03/22  1248   ALBUMIN g/dL 3.20* 3.05* 3.56   BILIRUBIN mg/dL 0.8 1.2 1.8*   ALK PHOS U/L 69 72 80   AST (SGOT) U/L 23 84* 135*   ALT (SGPT) U/L 69* 152* 202*         Cardiac:      Liver and pancreatic function:  Results from last 7 days   Lab Units 11/08/22  0226 11/04/22  0405 11/03/22  1248   ALBUMIN g/dL 3.20* 3.05* 3.56   BILIRUBIN mg/dL 0.8 1.2 1.8*   ALK PHOS U/L 69 72 80   AST (SGOT) U/L 23 84* 135*   ALT (SGPT) U/L 69* 152* 202*       Medications :     apixaban, 5 mg, Oral, Q12H  budesonide-formoterol, 2 puff, Inhalation, BID - RT  calcium acetate, 2,668 mg, Oral, TID With Meals  carvedilol, 6.25 mg, Oral, BID With Meals  cefdinir, 300 mg, Oral, Q24H  cetirizine, 10 mg, Oral, Daily  doxycycline, 100 mg, Intravenous, Q12H  Insulin Aspart, 0-9 Units, Subcutaneous, TID AC  insulin detemir, 15 Units, Subcutaneous,  Daily  ipratropium-albuterol, 3 mL, Nebulization, 4x Daily - RT  levothyroxine, 88 mcg, Oral, Q AM  methylPREDNISolone sodium succinate, 40 mg, Intravenous, BID  pantoprazole, 40 mg, Oral, Q AM  Renal, 1 capsule, Oral, Daily  sodium chloride, 3 mL, Intravenous, Q12H  thiamine, 100 mg, Oral, Daily           Assessment & Plan     1. ESKD on IHDx  2. Anemia  3. Hyponatremia, hypervolemic  4. Anion gap metabolic acidosis  5. SHPT  6. Acute bacterial pneumonia    Evaluated on dialysis, tolerating well. No symptoms  Educated on FR 32 ounces/day  Anemia; HH at target  SHPT; follow outpatient management        Bladimir Alvarado MD  11/09/22  10:38 EST      Electronically signed by Bladimir Alvarado MD at 11/09/22 1103     Andres Reardon DO at 11/08/22 1354                [facility] HOSPITALIST PROGRESS NOTE    Subjective     History:   Axel Desir is a 62 y.o. male admitted on 11/3/2022 secondary to Pneumonia of right upper lobe due to infectious organism     Procedures: None    CC: Follow up COPD exacerbation    Patient seen and examined with ANASTASIIA Perdue. Sleeping upon my arrival but easily awakens. Reports some improvement in his cough, dyspnea and wheezing. No reported CP or palpitations. No reported nausea or vomiting. No acute events overnight per RN.     History taken from: patient, chart, and RN.      Objective     Vital Signs  Temp:  [97.2 °F (36.2 °C)-98 °F (36.7 °C)] 97.9 °F (36.6 °C)  Heart Rate:  [] 108  Resp:  [16-22] 20  BP: (118-138)/(75-85) 120/84    Intake/Output Summary (Last 24 hours) at 11/8/2022 1400  Last data filed at 11/8/2022 0900  Gross per 24 hour   Intake 1315.18 ml   Output --   Net 1315.18 ml         Physical Exam:  General:    Awake, alert, in no acute distress, chronically ill appearing   Heart:      Normal S1 and S2. Regular rate and rhythm. No significant murmur, rubs or gallops appreciated.   Lungs:     Respirations regular, even and unlabored. A few scattered  expiratory wheezes but aeration overall improved today.     Abdomen:   Soft and nontender. No guarding, rebound tenderness or  organomegaly noted. Bowel sounds present x 4.   Extremities:  No clubbing, cyanosis or edema noted. Moves UE and LE equally B/L.     Results Review:    Results from last 7 days   Lab Units 11/08/22 0226 11/07/22 0123 11/06/22  0129 11/05/22  0108 11/04/22  0405 11/03/22  1248 11/02/22  0451   WBC 10*3/mm3 6.63 5.24 6.14 6.28 6.25 7.50 6.69   HEMOGLOBIN g/dL 10.0* 10.1* 10.9* 11.0* 10.9* 12.2* 10.7*   PLATELETS 10*3/mm3 72* 60* 61* 44* 47* 62* 73*     Results from last 7 days   Lab Units 11/08/22 0226 11/07/22 0123 11/06/22  0129 11/05/22 0108 11/04/22 0405 11/03/22 1248 11/02/22  0451   SODIUM mmol/L 127* 127* 130* 133* 131* 135* 133*   POTASSIUM mmol/L 4.0 5.3* 4.6 4.4 4.9 4.8 4.7   CHLORIDE mmol/L 87* 90* 91* 94* 92* 92* 92*   CO2 mmol/L 22.6 19.0* 20.6* 24.7 19.3* 23.9 21.0*   BUN mg/dL 52* 77* 54* 43* 74* 65* 62*   CREATININE mg/dL 4.58* 6.07* 5.12* 3.74* 5.57* 5.28* 5.65*   CALCIUM mg/dL 7.7* 7.3* 7.2* 7.6* 7.6* 8.3* 8.3*   GLUCOSE mg/dL 229* 493* 188* 247* 319* 195* 192*     Results from last 7 days   Lab Units 11/08/22 0226 11/04/22  0405 11/03/22 1248 11/02/22  0451   BILIRUBIN mg/dL 0.8 1.2 1.8* 1.6*   ALK PHOS U/L 69 72 80 70   AST (SGOT) U/L 23 84* 135* 181*   ALT (SGPT) U/L 69* 152* 202* 229*     Results from last 7 days   Lab Units 11/04/22  0405 11/02/22  0451   MAGNESIUM mg/dL 2.3 2.1               Imaging Results (Last 24 Hours)     ** No results found for the last 24 hours. **            Medications:  apixaban, 5 mg, Oral, Q12H  budesonide-formoterol, 2 puff, Inhalation, BID - RT  calcium acetate, 2,668 mg, Oral, TID With Meals  carvedilol, 6.25 mg, Oral, BID With Meals  cefdinir, 300 mg, Oral, Q24H  cetirizine, 10 mg, Oral, Daily  doxycycline, 100 mg, Intravenous, Q12H  Insulin Aspart, 0-7 Units, Subcutaneous, TID AC  insulin detemir, 10 Units, Subcutaneous,  Daily  ipratropium-albuterol, 3 mL, Nebulization, 4x Daily - RT  levothyroxine, 88 mcg, Oral, Q AM  methylPREDNISolone sodium succinate, 40 mg, Intravenous, BID  pantoprazole, 40 mg, Oral, Q AM  Renal, 1 capsule, Oral, Daily  sodium chloride, 3 mL, Intravenous, Q12H  thiamine, 100 mg, Oral, Daily               Assessment & Plan   RUL pneumonia: Sputum culture reveals normal respiratory joanne. Procal is normal. Cont Omnicef and Doxycycline.     Acute exacerbation of COPD: Cont antibiotics as above, steroids and nebs. Cont Symbicort.     ESRD on HD: Cont HD (MWF schedule) per nephrology with input appreciated.     DM II, non-insulin dependent: Recent HgbA1c 8.6. Steroids likely contributing to hyperglycemia. Cont current insulin regimen today. Cont Accuchecks.     Paroxysmal Afib: Currently in sinus rhythm. Cont Coreg. Cont home Eliquis for stroke prevention.     Chronic macrocytic anemia: B12 and folate are replete. Stable today. Repeat CBC in the AM.     Thrombocytopenia: Possibly 2/2 above. B12 and folate replete. No schistocytes on peripheral smear. Stable/improved today. Repeat CBC in the AM.     Generalized weakness: PT/OT    DVT PPX: Eliquis.     Disposition: SS to follow up with pt and family regarding possible SNF placement.      Andres Reardon DO  11/08/22  14:00 EST    Electronically signed by Andres Reradon DO at 11/09/22 1612     Bladimir Alvarado MD at 11/08/22 1247          Nephrology Progress Note      Subjective     Seen on dialysis    Objective       Vital signs :     Temp:  [97.2 °F (36.2 °C)-98 °F (36.7 °C)] 97.9 °F (36.6 °C)  Heart Rate:  [] 108  Resp:  [16-22] 20  BP: (118-138)/(75-85) 120/84    Intake/Output                             11/06/22 0701 - 11/07/22 0700 11/07/22 0701 - 11/08/22 0700 11/08/22 0701 - 11/09/22 0700     0194-6906 1496-7916 Total 0168-5307 1469-3515 Total 7888-0435 8793-8150 Total                    Intake    P.O.  600  200 800  --  155 880 618  --  480    I.V.  98.4  99.2 197.6  95.2  -- 95.2  --  -- --    Total Intake 698.4 299.2 997.6 95.2 740 835.2 480 -- 480       Output    Other  --  -- --  3700  -- 3700  --  -- --    Ultrafiltration (mL) -- -- -- 3700 -- 3700 -- -- --    Stool  1  -- 1  --  -- --  --  -- --    Total Output 1 -- 1 3700 -- 3700 -- -- --           Physical Exam:    General Appearance : not in acute distress  Lungs : clear to auscultation, respirations regular  Heart :  regular rhythm & normal rate, normal S1, S2 and no murmur, no rub  Abdomen : soft, non distended  Extremities : no edema   Neurologic :   orientated to person, place, time and situation, Grossly no focal deficits        Laboratory Data :     Albumin Albumin   Date Value Ref Range Status   11/08/2022 3.20 (L) 3.50 - 5.20 g/dL Final      Magnesium No results found for: MG       PTH               No results found for: PTH    CBC and coagulation:  Results from last 7 days   Lab Units 11/08/22  0226 11/07/22  0123 11/06/22  0129 11/05/22  0108 11/04/22  1612 11/04/22  0940 11/04/22  0405 11/03/22  2201 11/03/22  1248   PROCALCITONIN ng/mL 0.19  --   --   --   --   --   --   --   --    LACTATE mmol/L  --   --   --   --  1.6 2.4* 3.4*   < > 2.3*   CRP mg/dL  --   --   --   --   --   --  2.03*  --  2.33*   WBC 10*3/mm3 6.63 5.24 6.14   < >  --   --  6.25  --  7.50   HEMOGLOBIN g/dL 10.0* 10.1* 10.9*   < >  --   --  10.9*  --  12.2*   HEMATOCRIT % 29.5* 31.2* 33.3*   < >  --   --  32.5*  --  35.7*   MCV fL 99.3* 103.7* 104.1*   < >  --   --  102.2*  --  101.4*   MCHC g/dL 33.9 32.4 32.7   < >  --   --  33.5  --  34.2   PLATELETS 10*3/mm3 72* 60* 61*   < >  --   --  47*  --  62*    < > = values in this interval not displayed.     Acid/base balance:      Renal and electrolytes:    Results from last 7 days   Lab Units 11/08/22  0226 11/07/22  0123 11/06/22  0129 11/05/22  0108 11/04/22  0405 11/03/22  1248 11/02/22  0451   SODIUM mmol/L 127* 127* 130* 133* 131*   < > 133*   POTASSIUM  mmol/L 4.0 5.3* 4.6 4.4 4.9   < > 4.7   MAGNESIUM mg/dL  --   --   --   --  2.3  --  2.1   CHLORIDE mmol/L 87* 90* 91* 94* 92*   < > 92*   CO2 mmol/L 22.6 19.0* 20.6* 24.7 19.3*   < > 21.0*   BUN mg/dL 52* 77* 54* 43* 74*   < > 62*   CREATININE mg/dL 4.58* 6.07* 5.12* 3.74* 5.57*   < > 5.65*   CALCIUM mg/dL 7.7* 7.3* 7.2* 7.6* 7.6*   < > 8.3*   PHOSPHORUS mg/dL  --   --   --   --   --   --  7.5*    < > = values in this interval not displayed.     Estimated Creatinine Clearance: 17.2 mL/min (A) (by C-G formula based on SCr of 4.58 mg/dL (H)).  @GFRCG:3@   Liver and pancreatic function:  Results from last 7 days   Lab Units 11/08/22 0226 11/04/22  0405 11/03/22  1248   ALBUMIN g/dL 3.20* 3.05* 3.56   BILIRUBIN mg/dL 0.8 1.2 1.8*   ALK PHOS U/L 69 72 80   AST (SGOT) U/L 23 84* 135*   ALT (SGPT) U/L 69* 152* 202*         Cardiac:      Liver and pancreatic function:  Results from last 7 days   Lab Units 11/08/22 0226 11/04/22  0405 11/03/22  1248   ALBUMIN g/dL 3.20* 3.05* 3.56   BILIRUBIN mg/dL 0.8 1.2 1.8*   ALK PHOS U/L 69 72 80   AST (SGOT) U/L 23 84* 135*   ALT (SGPT) U/L 69* 152* 202*       Medications :     apixaban, 5 mg, Oral, Q12H  budesonide-formoterol, 2 puff, Inhalation, BID - RT  calcium acetate, 2,668 mg, Oral, TID With Meals  carvedilol, 6.25 mg, Oral, BID With Meals  cefdinir, 300 mg, Oral, Q24H  cetirizine, 10 mg, Oral, Daily  doxycycline, 100 mg, Intravenous, Q12H  Insulin Aspart, 0-7 Units, Subcutaneous, TID AC  insulin detemir, 10 Units, Subcutaneous, Daily  ipratropium-albuterol, 3 mL, Nebulization, 4x Daily - RT  levothyroxine, 88 mcg, Oral, Q AM  methylPREDNISolone sodium succinate, 40 mg, Intravenous, BID  pantoprazole, 40 mg, Oral, Q AM  Renal, 1 capsule, Oral, Daily  sodium chloride, 3 mL, Intravenous, Q12H  thiamine, 100 mg, Oral, Daily           Assessment & Plan     1. ESKD on IHDx  2. Anemia  3. Hyponatremia, hypervolemic  4. Anion gap metabolic acidosis  5. SHPT  6. Acute bacterial  pneumonia    Had uneventful dialysis yesterday, continue on MWF schedule  Mild hyponatrmeia, FR 800cc/day  Anemia; HH at target  SHPT; follow outpatient management        Bladimir Alvarado MD  11/08/22  12:47 EST      Electronically signed by Bladimir Alvarado MD at 11/08/22 1248     Andres Reardon DO at 11/07/22 1454                [facility]    Subjective     History:   Axel Desir is a 62 y.o. male admitted on 11/3/2022 secondary to Pneumonia of right upper lobe due to infectious organism     Procedures: None    CC: Follow up COPD exacerbation    Patient seen and examined with ANASTASIIA Carcamo. Awake and alert. Continues to report some cough, dyspnea and wheezing. Reports HA. No reported CP or palpitations. No reported nausea or vomiting. No acute events overnight per RN.     History taken from: patient, chart, and RN.      Objective     Vital Signs  Temp:  [96.3 °F (35.7 °C)-98 °F (36.7 °C)] 96.3 °F (35.7 °C)  Heart Rate:  [] 101  Resp:  [16-22] 18  BP: (100-137)/(67-90) 124/80    Intake/Output Summary (Last 24 hours) at 11/7/2022 1454  Last data filed at 11/7/2022 1229  Gross per 24 hour   Intake 397.59 ml   Output 3700 ml   Net -3302.41 ml         Physical Exam:  General:    Awake, alert, in no acute distress, chronically ill appearing   Heart:      Normal S1 and S2. Regular rate and rhythm. No significant murmur, rubs or gallops appreciated.   Lungs:     Respirations regular, even and unlabored. Scattered expiratory wheezes throughout.    Abdomen:   Soft and nontender. No guarding, rebound tenderness or  organomegaly noted. Bowel sounds present x 4.   Extremities:  No clubbing, cyanosis or edema noted. Moves UE and LE equally B/L.     Results Review:    Results from last 7 days   Lab Units 11/07/22  0123 11/06/22  0129 11/05/22  0108 11/04/22  0405 11/03/22  1248 11/02/22  0451 11/01/22  0337   WBC 10*3/mm3 5.24 6.14 6.28 6.25 7.50 6.69 9.76   HEMOGLOBIN g/dL 10.1*  10.9* 11.0* 10.9* 12.2* 10.7* 11.4*   PLATELETS 10*3/mm3 60* 61* 44* 47* 62* 73* 94*     Results from last 7 days   Lab Units 11/07/22  0123 11/06/22  0129 11/05/22  0108 11/04/22  0405 11/03/22  1248 11/02/22  0451 11/01/22  0337   SODIUM mmol/L 127* 130* 133* 131* 135* 133* 132*   POTASSIUM mmol/L 5.3* 4.6 4.4 4.9 4.8 4.7 5.6*   CHLORIDE mmol/L 90* 91* 94* 92* 92* 92* 87*   CO2 mmol/L 19.0* 20.6* 24.7 19.3* 23.9 21.0* 16.0*   BUN mg/dL 77* 54* 43* 74* 65* 62* 89*   CREATININE mg/dL 6.07* 5.12* 3.74* 5.57* 5.28* 5.65* 6.79*   CALCIUM mg/dL 7.3* 7.2* 7.6* 7.6* 8.3* 8.3* 8.5*   GLUCOSE mg/dL 493* 188* 247* 319* 195* 192* 218*     Results from last 7 days   Lab Units 11/04/22  0405 11/03/22  1248 11/02/22 0451 11/01/22  0028   BILIRUBIN mg/dL 1.2 1.8* 1.6* 2.5*   ALK PHOS U/L 72 80 70 91   AST (SGOT) U/L 84* 135* 181* 357*   ALT (SGPT) U/L 152* 202* 229* 350*     Results from last 7 days   Lab Units 11/04/22  0405 11/02/22  0451 11/01/22  0337 11/01/22  0028   MAGNESIUM mg/dL 2.3 2.1 2.4 2.6*         Results from last 7 days   Lab Units 11/01/22  0337 11/01/22  0028   TROPONIN T ng/mL 0.060* 0.071*       Imaging Results (Last 24 Hours)     ** No results found for the last 24 hours. **            Medications:  calcium acetate, 2,668 mg, Oral, TID With Meals  carvedilol, 6.25 mg, Oral, BID With Meals  cefdinir, 300 mg, Oral, Q24H  cetirizine, 10 mg, Oral, Daily  doxycycline, 100 mg, Intravenous, Q12H  Insulin Aspart, 0-7 Units, Subcutaneous, TID AC  insulin detemir, 10 Units, Subcutaneous, Daily  ipratropium-albuterol, 3 mL, Nebulization, 4x Daily - RT  levothyroxine, 88 mcg, Oral, Q AM  methylPREDNISolone sodium succinate, 40 mg, Intravenous, BID  pantoprazole, 40 mg, Oral, Q AM  Renal, 1 capsule, Oral, Daily  sodium chloride, 3 mL, Intravenous, Q12H  thiamine, 100 mg, Oral, Daily               Assessment & Plan   RUL pneumonia: Sputum culture reveals normal respiratory joanne. Check procal. Cont Omnicef and  Doxycycline.     Acute exacerbation of COPD: Cont antibiotics as above, steroids and nebs. Add Symbicort.     ESRD on HD: Cont HD (MWF schedule) per nephrology with input appreciated.     DM II, non-insulin dependent: Recent HgbA1c 8.6. Steroids likely contributing to hyperglycemia. Add basal insulin. Cont SSI with Accuchecks.     Paroxysmal Afib: Currently in sinus rhythm. Cont Coreg. Restart home Eliquis for stroke prevention.     Chronic macrocytic anemia: Check B12 and folate. Repeat CBC in the AM.     Thrombocytopenia: Possibly 2/2 above. B12 and folate ordered. Check peripheral smear. Repeat CBC in the AM.     Generalized weakness: PT/OT    DVT PPX: Restart home Eliquis.     Disposition: Will likely require SNF placement.     Andres Reardon DO  11/07/22  14:54 EST    Electronically signed by Andres Reardon DO at 11/09/22 1612     Bladimir Alvarado MD at 11/07/22 0933          Nephrology Progress Note      Subjective     Seen on dialysis    Objective       Vital signs :     Temp:  [97.6 °F (36.4 °C)-98 °F (36.7 °C)] 97.7 °F (36.5 °C)  Heart Rate:  [] 114  Resp:  [16-22] 18  BP: (100-137)/(67-90) 120/86    Intake/Output                       11/05/22 0701 - 11/06/22 0700 11/06/22 0701 - 11/07/22 0700     [id number] 0881-4624 Total 4968-5033 0676-0131 Total                 Intake    P.O.  240  540 780  600  200 800    I.V.  --  -- --  98.4  99.2 197.6    Total Intake 240 540 780 698.4 299.2 997.6       Output    Stool  --  -- --  1  -- 1    Total Output -- -- -- 1 -- 1           Physical Exam:    General Appearance : not in acute distress  Lungs : clear to auscultation, respirations regular  Heart :  regular rhythm & normal rate, normal S1, S2 and no murmur, no rub  Abdomen : soft, non distended  Extremities : no edema   Neurologic :   orientated to person, place, time and situation, Grossly no focal deficits        Laboratory Data :     Albumin No results found for: ALBUMIN   Magnesium No  results found for: MG       PTH               No results found for: PTH    CBC and coagulation:  Results from last 7 days   Lab Units 11/07/22  0123 11/06/22  0129 11/05/22  0108 11/04/22  1612 11/04/22  0940 11/04/22  0405 11/03/22  2201 11/03/22  1248 11/01/22  0337 11/01/22  0028   PROCALCITONIN ng/mL  --   --   --   --   --   --   --   --   --  0.52*   LACTATE mmol/L  --   --   --  1.6 2.4* 3.4*   < > 2.3*   < > 4.1*   CRP mg/dL  --   --   --   --   --  2.03*  --  2.33*  --   --    WBC 10*3/mm3 5.24 6.14 6.28  --   --  6.25  --  7.50   < > 11.35*   HEMOGLOBIN g/dL 10.1* 10.9* 11.0*  --   --  10.9*  --  12.2*   < > 12.2*   HEMATOCRIT % 31.2* 33.3* 31.9*  --   --  32.5*  --  35.7*   < > 37.2*   MCV fL 103.7* 104.1* 99.1*  --   --  102.2*  --  101.4*   < > 103.3*   MCHC g/dL 32.4 32.7 34.5  --   --  33.5  --  34.2   < > 32.8   PLATELETS 10*3/mm3 60* 61* 44*  --   --  47*  --  62*   < > 113*    < > = values in this interval not displayed.     Acid/base balance:  Results from last 7 days   Lab Units 11/01/22  0222   PH, ARTERIAL pH units 7.326*   PO2 ART mm Hg 98.2   PCO2, ARTERIAL mm Hg 27.1*   HCO3 ART mmol/L 14.1*     Renal and electrolytes:    Results from last 7 days   Lab Units 11/07/22  0123 11/06/22  0129 11/05/22  0108 11/04/22  0405 11/03/22  1248 11/02/22  0451 11/01/22  0337 11/01/22  0145 11/01/22  0028   SODIUM mmol/L 127* 130* 133* 131* 135* 133* 132*   < > 133*   POTASSIUM mmol/L 5.3* 4.6 4.4 4.9 4.8 4.7 5.6*   < > 6.7*   MAGNESIUM mg/dL  --   --   --  2.3  --  2.1 2.4  --  2.6*   CHLORIDE mmol/L 90* 91* 94* 92* 92* 92* 87*   < > 86*   CO2 mmol/L 19.0* 20.6* 24.7 19.3* 23.9 21.0* 16.0*   < > 16.0*   BUN mg/dL 77* 54* 43* 74* 65* 62* 89*   < > 84*   CREATININE mg/dL 6.07* 5.12* 3.74* 5.57* 5.28* 5.65* 6.79*   < > 7.19*   CALCIUM mg/dL 7.3* 7.2* 7.6* 7.6* 8.3* 8.3* 8.5*   < > 9.3   PHOSPHORUS mg/dL  --   --   --   --   --  7.5* 10.4*  --  11.3*    < > = values in this interval not displayed.      Estimated Creatinine Clearance: 13 mL/min (A) (by C-G formula based on SCr of 6.07 mg/dL (H)).  @GFRCG:3@   Liver and pancreatic function:  Results from last 7 days   Lab Units 11/04/22 0405 11/03/22 1248 11/02/22 0451 11/01/22  0337   ALBUMIN g/dL 3.05* 3.56 3.40*  --    BILIRUBIN mg/dL 1.2 1.8* 1.6*  --    ALK PHOS U/L 72 80 70  --    AST (SGOT) U/L 84* 135* 181*  --    ALT (SGPT) U/L 152* 202* 229*  --    LIPASE U/L  --   --   --  53         Cardiac:      Liver and pancreatic function:  Results from last 7 days   Lab Units 11/04/22 0405 11/03/22 1248 11/02/22 0451 11/01/22  0337   ALBUMIN g/dL 3.05* 3.56 3.40*  --    BILIRUBIN mg/dL 1.2 1.8* 1.6*  --    ALK PHOS U/L 72 80 70  --    AST (SGOT) U/L 84* 135* 181*  --    ALT (SGPT) U/L 152* 202* 229*  --    LIPASE U/L  --   --   --  53       Medications :     calcium acetate, 2,668 mg, Oral, TID With Meals  carvedilol, 6.25 mg, Oral, BID With Meals  cefdinir, 300 mg, Oral, Q24H  cetirizine, 10 mg, Oral, Daily  doxycycline, 100 mg, Intravenous, Q12H  Insulin Aspart, 0-7 Units, Subcutaneous, TID AC  insulin detemir, 10 Units, Subcutaneous, Daily  ipratropium-albuterol, 3 mL, Nebulization, 4x Daily - RT  levothyroxine, 88 mcg, Oral, Q AM  methylPREDNISolone sodium succinate, 40 mg, Intravenous, BID  pantoprazole, 40 mg, Oral, Q AM  Renal, 1 capsule, Oral, Daily  sodium chloride, 3 mL, Intravenous, Q12H  thiamine, 100 mg, Oral, Daily           Assessment & Plan     1. ESKD on IHDx  2. Anemia  3. Hyponatremia, hypervolemic  4. Anion gap metabolic acidosis  5. SHPT  6. Acute bacterial pneumonia    Evaluated on dialysis, tolerating well. Planned 3L UF as tolerated.   Continue on IHDx MWF   Anemia; HH at target  SHPT; follow outpatient management        Bladimir Alvarado MD  11/07/22  09:55 EST      Electronically signed by Bladimir Alvarado MD at 11/07/22 0924     Bladimir Alvarado MD at 11/06/22 5422          Nephrology Progress Note      Subjective     Patient denied any  chest pain or shortness of breath.     Objective       Vital signs :     Temp:  [97.7 °F (36.5 °C)-98.5 °F (36.9 °C)] 98.5 °F (36.9 °C)  Heart Rate:  [50-83] 81  Resp:  [16-20] 18  BP: (109-130)/(53-84) 130/84    Intake/Output                             11/04/22 0701 - 11/05/22 0700 11/05/22 0701 - 11/06/22 0700 11/06/22 0701 - 11/07/22 0700     8064-1987 5327-4122 Total 4362-4063 5369-7615 Total 1247-7935 9907-2031 Total                    Intake    P.O.  --  180 180  240  540 780  360  -- 360    Total Intake -- 180 180 240 540 780 360 -- 360       Output    Total Output -- -- -- -- -- -- -- -- --           Physical Exam:    General Appearance : not in acute distress  Lungs : clear to auscultation, respirations regular  Heart :  regular rhythm & normal rate, normal S1, S2 and no murmur, no rub  Abdomen : soft, non distended  Extremities : no edema   Neurologic :   orientated to person, place, time and situation, Grossly no focal deficits        Laboratory Data :     Albumin Albumin   Date Value Ref Range Status   11/04/2022 3.05 (L) 3.50 - 5.20 g/dL Final      Magnesium Magnesium   Date Value Ref Range Status   11/04/2022 2.3 1.6 - 2.4 mg/dL Final          PTH               No results found for: PTH    CBC and coagulation:  Results from last 7 days   Lab Units 11/06/22  0129 11/05/22  0108 11/04/22  1612 11/04/22  0940 11/04/22  0405 11/03/22  2201 11/03/22  1248 11/01/22  0337 11/01/22  0028   PROCALCITONIN ng/mL  --   --   --   --   --   --   --   --  0.52*   LACTATE mmol/L  --   --  1.6 2.4* 3.4*   < > 2.3*   < > 4.1*   CRP mg/dL  --   --   --   --  2.03*  --  2.33*  --   --    WBC 10*3/mm3 6.14 6.28  --   --  6.25  --  7.50   < > 11.35*   HEMOGLOBIN g/dL 10.9* 11.0*  --   --  10.9*  --  12.2*   < > 12.2*   HEMATOCRIT % 33.3* 31.9*  --   --  32.5*  --  35.7*   < > 37.2*   MCV fL 104.1* 99.1*  --   --  102.2*  --  101.4*   < > 103.3*   MCHC g/dL 32.7 34.5  --   --  33.5  --  34.2   < > 32.8   PLATELETS 10*3/mm3  61* 44*  --   --  47*  --  62*   < > 113*    < > = values in this interval not displayed.     Acid/base balance:  Results from last 7 days   Lab Units 11/01/22  0222   PH, ARTERIAL pH units 7.326*   PO2 ART mm Hg 98.2   PCO2, ARTERIAL mm Hg 27.1*   HCO3 ART mmol/L 14.1*     Renal and electrolytes:    Results from last 7 days   Lab Units 11/06/22  0129 11/05/22  0108 11/04/22  0405 11/03/22  1248 11/02/22  0451 11/01/22  0337 11/01/22  0145 11/01/22  0028   SODIUM mmol/L 130* 133* 131* 135* 133* 132*   < > 133*   POTASSIUM mmol/L 4.6 4.4 4.9 4.8 4.7 5.6*   < > 6.7*   MAGNESIUM mg/dL  --   --  2.3  --  2.1 2.4  --  2.6*   CHLORIDE mmol/L 91* 94* 92* 92* 92* 87*   < > 86*   CO2 mmol/L 20.6* 24.7 19.3* 23.9 21.0* 16.0*   < > 16.0*   BUN mg/dL 54* 43* 74* 65* 62* 89*   < > 84*   CREATININE mg/dL 5.12* 3.74* 5.57* 5.28* 5.65* 6.79*   < > 7.19*   CALCIUM mg/dL 7.2* 7.6* 7.6* 8.3* 8.3* 8.5*   < > 9.3   PHOSPHORUS mg/dL  --   --   --   --  7.5* 10.4*  --  11.3*    < > = values in this interval not displayed.     Estimated Creatinine Clearance: 15.4 mL/min (A) (by C-G formula based on SCr of 5.12 mg/dL (H)).  @GFRCG:3@   Liver and pancreatic function:  Results from last 7 days   Lab Units 11/04/22  0405 11/03/22  1248 11/02/22  0451 11/01/22  0337   ALBUMIN g/dL 3.05* 3.56 3.40*  --    BILIRUBIN mg/dL 1.2 1.8* 1.6*  --    ALK PHOS U/L 72 80 70  --    AST (SGOT) U/L 84* 135* 181*  --    ALT (SGPT) U/L 152* 202* 229*  --    LIPASE U/L  --   --   --  53         Cardiac:      Liver and pancreatic function:  Results from last 7 days   Lab Units 11/04/22  0405 11/03/22  1248 11/02/22  0451 11/01/22  0337   ALBUMIN g/dL 3.05* 3.56 3.40*  --    BILIRUBIN mg/dL 1.2 1.8* 1.6*  --    ALK PHOS U/L 72 80 70  --    AST (SGOT) U/L 84* 135* 181*  --    ALT (SGPT) U/L 152* 202* 229*  --    LIPASE U/L  --   --   --  53       Medications :     carvedilol, 6.25 mg, Oral, BID With Meals  cefdinir, 300 mg, Oral, Q24H  doxycycline, 100 mg,  Intravenous, Q12H  Insulin Aspart, 0-7 Units, Subcutaneous, TID AC  ipratropium-albuterol, 3 mL, Nebulization, 4x Daily - RT  methylPREDNISolone sodium succinate, 40 mg, Intravenous, BID  pantoprazole, 40 mg, Oral, Q AM  sodium chloride, 3 mL, Intravenous, Q12H           Assessment & Plan     1. ESKD on IHDx  2. Anemia  3. Hyponatremia, hypervolemic  4. Anion gap metabolic acidosis  5. SHPT  6. Acute bacterial pneumonia    No overt fluid overload, shortness of breath most likely due to COPD exacerbation and agree on treatment.   Continue on IHDx MWF   Anemia; HH at target  SHPT; follow outpatient management        Bladimir Alvarado MD  22  13:32 EST      Electronically signed by Bladimir Alvarado MD at 22 0987     Shawn Matthew DO at 22 1313              [facility]     Patient Identification:  Name:  Axel Desir  Age:  62 y.o.  Sex:  male  :  1960  MRN:  [id number]  Visit Number:  [id number]  Primary Care Provider:  Isabelle Martinez APRN    Length of stay:  0    Chief complaint: Shortness of breath    Subjective:    Patient reports that shortness of breath is slightly worse today.  He also reports new onset wheezing with nonproductive cough.  On auscultation, patient does not fact have increased wheezing and appears to be developing COPD exacerbation.  Otherwise, patient denies any fevers, chills, sweats, rigors, nausea or vomiting.  ----------------------------------------------------------------------------------------------------------------------  Current Hospital Meds:  carvedilol, 6.25 mg, Oral, BID With Meals  cefdinir, 300 mg, Oral, Q24H  doxycycline, 100 mg, Intravenous, Q12H  Insulin Aspart, 0-7 Units, Subcutaneous, TID AC  ipratropium-albuterol, 3 mL, Nebulization, 4x Daily - RT  methylPREDNISolone sodium succinate, 40 mg, Intravenous, BID  pantoprazole, 40 mg, Oral, Q AM  sodium chloride, 3 mL, Intravenous, Q12H          ----------------------------------------------------------------------------------------------------------------------  Vital Signs:  Temp:  [97.7 °F (36.5 °C)-98.5 °F (36.9 °C)] 98.5 °F (36.9 °C)  Heart Rate:  [50-83] 81  Resp:  [16-20] 18  BP: (109-130)/(53-84) 130/84      11/03/22  1205 11/04/22  0000   Weight: 77.1 kg (170 lb) 74.5 kg (164 lb 4.8 oz)     Body mass index is 24.98 kg/m².    Intake/Output Summary (Last 24 hours) at 11/6/2022 1315  Last data filed at 11/6/2022 0858  Gross per 24 hour   Intake 900 ml   Output --   Net 900 ml     Diet Soft Texture; Chopped; Thin; Cardiac, Consistent Carbohydrate, Renal  ----------------------------------------------------------------------------------------------------------------------  Physical exam:  Constitutional: Chronically ill-appearing  male in no apparent distress.     HENT:  Head:  Normocephalic and atraumatic.  Mouth:  Moist mucous membranes.    Eyes:  Conjunctivae and EOM are normal.  Pupils are equal, round, and reactive to light.  No scleral icterus.    Neck:  Neck supple. No thyromegaly.  No JVD present.    Cardiovascular:  Regular rate and rhythm with no murmurs, rubs, clicks or gallops appreciated.  Pulmonary/Chest: Diffuse wheezing bilaterally with faint end inspiratory bibasilar crackles   Abdominal:  Soft. Nontender. Nondistended  Bowel sounds are normal in all four quadrants. No organomegally appreciated.   Musculoskeletal:  5/5 muscle strength bilateral upper and lower extremities with equal muscle tone and bulk. No edema, no tenderness, and no deformity.  No red or swollen joints anywhere.    Neurological:  Alert, Cranial nerves II-XII intact with no focal deficits.  No facial droop.  No slurred speech.   Skin:  Warm and dry to palpation with no rashes or lesions appreciated.  Peripheral vascular:  2+ radial and pedal pulses in bilateral upper and lower extremities.  Psychiatric:  Alert and oriented x3, demonstrates appropriate  judgment and insight.  -------------------------------------------------------------------------------  ----------------------------------------------------------------------------------------------------------------------  Results from last 7 days   Lab Units 11/01/22  0337 11/01/22  0028   TROPONIN T ng/mL 0.060* 0.071*     Results from last 7 days   Lab Units 11/06/22  0129 11/05/22  0108 11/04/22  1612 11/04/22  0940 11/04/22  0405 11/03/22  2201 11/03/22  1248   CRP mg/dL  --   --   --   --  2.03*  --  2.33*   LACTATE mmol/L  --   --  1.6 2.4* 3.4*   < > 2.3*   WBC 10*3/mm3 6.14 6.28  --   --  6.25  --  7.50   HEMOGLOBIN g/dL 10.9* 11.0*  --   --  10.9*  --  12.2*   HEMATOCRIT % 33.3* 31.9*  --   --  32.5*  --  35.7*   MCV fL 104.1* 99.1*  --   --  102.2*  --  101.4*   MCHC g/dL 32.7 34.5  --   --  33.5  --  34.2   PLATELETS 10*3/mm3 61* 44*  --   --  47*  --  62*    < > = values in this interval not displayed.     Results from last 7 days   Lab Units 11/01/22  0222   PH, ARTERIAL pH units 7.326*   PO2 ART mm Hg 98.2   PCO2, ARTERIAL mm Hg 27.1*   HCO3 ART mmol/L 14.1*     Results from last 7 days   Lab Units 11/06/22  0129 11/05/22  0108 11/04/22  0405 11/03/22  1248 11/02/22  0451 11/01/22  0337   SODIUM mmol/L 130* 133* 131* 135* 133* 132*   POTASSIUM mmol/L 4.6 4.4 4.9 4.8 4.7 5.6*   MAGNESIUM mg/dL  --   --  2.3  --  2.1 2.4   CHLORIDE mmol/L 91* 94* 92* 92* 92* 87*   CO2 mmol/L 20.6* 24.7 19.3* 23.9 21.0* 16.0*   BUN mg/dL 54* 43* 74* 65* 62* 89*   CREATININE mg/dL 5.12* 3.74* 5.57* 5.28* 5.65* 6.79*   CALCIUM mg/dL 7.2* 7.6* 7.6* 8.3* 8.3* 8.5*   GLUCOSE mg/dL 188* 247* 319* 195* 192* 218*   ALBUMIN g/dL  --   --  3.05* 3.56 3.40*  --    BILIRUBIN mg/dL  --   --  1.2 1.8* 1.6*  --    ALK PHOS U/L  --   --  72 80 70  --    AST (SGOT) U/L  --   --  84* 135* 181*  --    ALT (SGPT) U/L  --   --  152* 202* 229*  --    Estimated Creatinine Clearance: 15.4 mL/min (A) (by C-G formula based on SCr of 5.12 mg/dL  (H)).    No results found for: AMMONIA      Blood Culture   Date Value Ref Range Status   2022 No growth at 24 hours  Preliminary   2022 No growth at 24 hours  Preliminary   2022 No growth at 4 days  Preliminary   2022 No growth at 4 days  Preliminary     No results found for: URINECX  No results found for: WOUNDCX  No results found for: STOOLCX    I have personally looked at the labs and they are summarized above.  ----------------------------------------------------------------------------------------------------------------------  Imaging Results (Last 24 Hours)     ** No results found for the last 24 hours. **        ----------------------------------------------------------------------------------------------------------------------  Assessment and Plan:    1.  Right upper lobe pneumonia -continue Omnicef and doxycycline    2.  COPD exacerbation -patient with new onset wheezing today and worsening shortness of breath.  We will start Solu-Medrol 40 mg IV twice daily and continue current DuoNebs every 6 hours.    2.  Generalized weakness -continue PT/OT    3.  End-stage renal disease on hemodialysis -continue current hemodialysis schedule, appreciate nephrology recommendation    4.  Chronic macrocytic anemia -continue to monitor H&H, transfuse for hemoglobin less than 7.    5.  Paroxysmal A. Fib -currently in normal sinus rhythm, continue Coreg and Eliquis    Disposition currently pending placement    Shawn Matthew,    22   13:15 EST       Electronically signed by Shawn Matthew DO at 22 1319     Shawn Matthew DO at 22 1530              [facility]     Patient Identification:  Name:  Axel Desir  Age:  62 y.o.  Sex:  male  :  1960  MRN:  [id number]  Visit Number:  [id number]  Primary Care Provider:  Isabelle Martinez APRN    Length of stay:  0    Chief complaint: Generalized  weakness    Subjective:    Patient states he has no new complaints overnight.  He currently denies any fevers, chills, sweats, rigors, nausea or vomiting.  Patient is still agreeable to SNF placement.  ----------------------------------------------------------------------------------------------------------------------  Current Hospital Meds:  carvedilol, 6.25 mg, Oral, BID With Meals  cefdinir, 300 mg, Oral, Q24H  doxycycline, 100 mg, Intravenous, Q12H  Insulin Aspart, 0-7 Units, Subcutaneous, TID AC  ipratropium-albuterol, 3 mL, Nebulization, 4x Daily - RT  pantoprazole, 40 mg, Oral, Q AM  sodium chloride, 3 mL, Intravenous, Q12H         ----------------------------------------------------------------------------------------------------------------------  Vital Signs:  Temp:  [97.6 °F (36.4 °C)-97.9 °F (36.6 °C)] 97.7 °F (36.5 °C)  Heart Rate:  [73-81] 81  Resp:  [18-20] 20  BP: (120-132)/(71-81) 127/75      11/03/22  1205 11/04/22  0000   Weight: 77.1 kg (170 lb) 74.5 kg (164 lb 4.8 oz)     Body mass index is 24.98 kg/m².    Intake/Output Summary (Last 24 hours) at 11/5/2022 1531  Last data filed at 11/5/2022 0900  Gross per 24 hour   Intake 300 ml   Output --   Net 300 ml     Diet Soft Texture; Chopped; Thin; Cardiac, Consistent Carbohydrate, Renal  ----------------------------------------------------------------------------------------------------------------------  Physical exam:  Constitutional: Chronically ill-appearing  male in no apparent distress.     HENT:  Head:  Normocephalic and atraumatic.  Mouth:  Moist mucous membranes.    Eyes:  Conjunctivae and EOM are normal.  Pupils are equal, round, and reactive to light.  No scleral icterus.    Neck:  Neck supple. No thyromegaly.  No JVD present.    Cardiovascular:  Regular rate and rhythm with no murmurs, rubs, clicks or gallops appreciated.  Pulmonary/Chest:  Clear to auscultation bilaterally with no crackles, wheezes or rhonchi  appreciated.  Abdominal:  Soft. Nontender. Nondistended  Bowel sounds are normal in all four quadrants. No organomegally appreciated.   Musculoskeletal:  5/5 muscle strength bilateral upper and lower extremities with equal muscle tone and bulk. No edema, no tenderness, and no deformity.  No red or swollen joints anywhere.    Neurological:  Alert, Cranial nerves II-XII intact with no focal deficits.  No facial droop.  No slurred speech.   Skin:  Warm and dry to palpation with no rashes or lesions appreciated.  Peripheral vascular:  2+ radial and pedal pulses in bilateral upper and lower extremities.  Psychiatric:  Alert and oriented x3, demonstrates appropriate judgment and insight.  -------------------------------------------------------------------------------  ----------------------------------------------------------------------------------------------------------------------  Results from last 7 days   Lab Units 11/01/22  0337 11/01/22  0028   TROPONIN T ng/mL 0.060* 0.071*     Results from last 7 days   Lab Units 11/05/22  0108 11/04/22  1612 11/04/22  0940 11/04/22  0405 11/03/22  2201 11/03/22  1248   CRP mg/dL  --   --   --  2.03*  --  2.33*   LACTATE mmol/L  --  1.6 2.4* 3.4*   < > 2.3*   WBC 10*3/mm3 6.28  --   --  6.25  --  7.50   HEMOGLOBIN g/dL 11.0*  --   --  10.9*  --  12.2*   HEMATOCRIT % 31.9*  --   --  32.5*  --  35.7*   MCV fL 99.1*  --   --  102.2*  --  101.4*   MCHC g/dL 34.5  --   --  33.5  --  34.2   PLATELETS 10*3/mm3 44*  --   --  47*  --  62*    < > = values in this interval not displayed.     Results from last 7 days   Lab Units 11/01/22  0222   PH, ARTERIAL pH units 7.326*   PO2 ART mm Hg 98.2   PCO2, ARTERIAL mm Hg 27.1*   HCO3 ART mmol/L 14.1*     Results from last 7 days   Lab Units 11/05/22  0108 11/04/22  0405 11/03/22  1248 11/02/22  0451 11/01/22  0337   SODIUM mmol/L 133* 131* 135* 133* 132*   POTASSIUM mmol/L 4.4 4.9 4.8 4.7 5.6*   MAGNESIUM mg/dL  --  2.3  --  2.1 2.4   CHLORIDE  mmol/L 94* 92* 92* 92* 87*   CO2 mmol/L 24.7 19.3* 23.9 21.0* 16.0*   BUN mg/dL 43* 74* 65* 62* 89*   CREATININE mg/dL 3.74* 5.57* 5.28* 5.65* 6.79*   CALCIUM mg/dL 7.6* 7.6* 8.3* 8.3* 8.5*   GLUCOSE mg/dL 247* 319* 195* 192* 218*   ALBUMIN g/dL  --  3.05* 3.56 3.40*  --    BILIRUBIN mg/dL  --  1.2 1.8* 1.6*  --    ALK PHOS U/L  --  72 80 70  --    AST (SGOT) U/L  --  84* 135* 181*  --    ALT (SGPT) U/L  --  152* 202* 229*  --    Estimated Creatinine Clearance: 21 mL/min (A) (by C-G formula based on SCr of 3.74 mg/dL (H)).    No results found for: AMMONIA      Blood Culture   Date Value Ref Range Status   11/03/2022 No growth at 24 hours  Preliminary   11/03/2022 No growth at 24 hours  Preliminary   11/01/2022 No growth at 4 days  Preliminary   11/01/2022 No growth at 4 days  Preliminary     No results found for: URINECX  No results found for: WOUNDCX  No results found for: STOOLCX    I have personally looked at the labs and they are summarized above.  ----------------------------------------------------------------------------------------------------------------------  Imaging Results (Last 24 Hours)     ** No results found for the last 24 hours. **        ----------------------------------------------------------------------------------------------------------------------  Assessment and Plan:    1.  Right upper lobe pneumonia -continue Omnicef and doxycycline    2.  Generalized weakness -continue PT/OT    3.  End-stage renal disease on hemodialysis -continue current hemodialysis schedule, appreciate nephrology recommendation    4.  Chronic macrocytic anemia -continue to monitor H&H, transfuse for hemoglobin less than 7.    5.  Paroxysmal A. Fib -currently in normal sinus rhythm, continue Coreg and Eliquis    Disposition currently pending placement    Shwan Matthew DO   11/05/22   15:31 EDT       Electronically signed by Shawn Matthew DO at 11/05/22 0337       Consult Notes (last 24 hours)   Notes from 11/11/22 1308 through 11/12/22 1309   No notes of this type exist for this encounter.

## 2022-11-12 NOTE — PROGRESS NOTES
Nephrology Progress Note      Subjective     No chest pain or shortness of breath.     Objective       Vital signs :     Temp:  [97.5 °F (36.4 °C)-97.6 °F (36.4 °C)] 97.5 °F (36.4 °C)  Heart Rate:  [100-133] 100  Resp:  [18] 18  BP: (101-140)/() 108/81    Intake/Output                       11/10/22 0701 - 11/11/22 0700 11/11/22 0701 - 11/12/22 0700     [id number] 6182-6799 Total 9833-3560 6127-6351 Total                 Intake    P.O.  600  120 720  120  480 600    Total Intake 600 120 720 120 480 600       Output    Other  --  -- --  1000  -- 1000    Ultrafiltration (mL) -- -- -- 1000 -- 1000    Total Output -- -- -- 1000 -- 1000           Physical Exam:    General Appearance : not in acute distress  Lungs : clear to auscultation, respirations regular  Heart :  regular rhythm & normal rate, normal S1, S2 and no murmur, no rub  Abdomen : soft, non distended  Extremities : no edema   Neurologic :   orientated to person, place, time and situation, Grossly no focal deficits    Laboratory Data :     Albumin No results found for: ALBUMIN   Magnesium No results found for: MG       PTH               No results found for: PTH    CBC and coagulation:  Results from last 7 days   Lab Units 11/12/22  0514 11/11/22  0411 11/10/22  0056 11/09/22  0044 11/08/22  0226   PROCALCITONIN ng/mL  --   --   --   --  0.19   WBC 10*3/mm3 7.99 7.56 7.29   < > 6.63   HEMOGLOBIN g/dL 10.2* 10.7* 10.3*   < > 10.0*   HEMATOCRIT % 31.1* 32.5* 31.4*   < > 29.5*   MCV fL 101.3* 103.5* 102.6*   < > 99.3*   MCHC g/dL 32.8 32.9 32.8   < > 33.9   PLATELETS 10*3/mm3 109* 102* 103*   < > 72*    < > = values in this interval not displayed.     Acid/base balance:      Renal and electrolytes:    Results from last 7 days   Lab Units 11/12/22  0514 11/11/22  0411 11/10/22  0056 11/09/22  1347 11/09/22  0044   SODIUM mmol/L 130* 122* 128* 129* 123*   POTASSIUM mmol/L 4.9 5.4* 4.5 4.4 4.5   CHLORIDE mmol/L 90* 85* 89* 90* 86*   CO2 mmol/L 26.4 21.9* 23.9  22.0 21.7*   BUN mg/dL 53* 74* 46* 35* 66*   CREATININE mg/dL 3.95* 4.94* 4.06* 3.28* 5.09*   CALCIUM mg/dL 8.4* 8.3* 8.2* 7.8* 7.8*     Estimated Creatinine Clearance: 19.9 mL/min (A) (by C-G formula based on SCr of 3.95 mg/dL (H)).  @GFRCG:3@   Liver and pancreatic function:  Results from last 7 days   Lab Units 11/08/22  0226   ALBUMIN g/dL 3.20*   BILIRUBIN mg/dL 0.8   ALK PHOS U/L 69   AST (SGOT) U/L 23   ALT (SGPT) U/L 69*         Cardiac:      Liver and pancreatic function:  Results from last 7 days   Lab Units 11/08/22  0226   ALBUMIN g/dL 3.20*   BILIRUBIN mg/dL 0.8   ALK PHOS U/L 69   AST (SGOT) U/L 23   ALT (SGPT) U/L 69*       Medications :     apixaban, 5 mg, Oral, Q12H  budesonide-formoterol, 2 puff, Inhalation, BID - RT  calcium acetate, 2,668 mg, Oral, TID With Meals  carvedilol, 6.25 mg, Oral, BID With Meals  cetirizine, 10 mg, Oral, Daily  Insulin Aspart, 0-9 Units, Subcutaneous, TID AC  insulin detemir, 20 Units, Subcutaneous, Daily  ipratropium-albuterol, 3 mL, Nebulization, 4x Daily - RT  levothyroxine, 88 mcg, Oral, Q AM  melatonin, 10 mg, Oral, Nightly  methylPREDNISolone sodium succinate, 40 mg, Intravenous, BID  pantoprazole, 40 mg, Oral, Q AM  Renal, 1 capsule, Oral, Daily  sodium chloride, 3 mL, Intravenous, Q12H  thiamine, 100 mg, Oral, Daily        Assessment & Plan     1. ESKD on IHDx  2. Anemia  3. Hyponatremia, hypervolemic  4. Anion gap metabolic acidosis  5. SHPT  6. Acute bacterial pneumonia    Pt had uneventful dialysis yesterday, will continue on dialysis as scheduled  Evaluated on dialysis, low oral intake due to nausea, reduced UF to 1.0L, PRN antiemetics.   Educated on FR 32 ounces/day  Anemia; HH at target  SHPT; follow outpatient management        Bladimir Alvarado MD  11/12/22  09:24 EST

## 2022-11-12 NOTE — PLAN OF CARE
Goal Outcome Evaluation:  Plan of Care Reviewed With: patient           Outcome Evaluation: Pt states that he feels about the same today.  Pt has no new complaints. Pt on RA. Will continue plan of care.

## 2022-11-12 NOTE — PLAN OF CARE
Goal Outcome Evaluation:  Plan of Care Reviewed With: patient        Progress: no change   PT resting in bed this shift. Has ambulated in room with nurse standing by. PT remains on 800 ml FR. No c/o or s/sx of discomfort, will continue to POC.

## 2022-11-13 LAB
GLUCOSE BLDC GLUCOMTR-MCNC: 110 MG/DL (ref 70–130)
GLUCOSE BLDC GLUCOMTR-MCNC: 117 MG/DL (ref 70–130)
GLUCOSE BLDC GLUCOMTR-MCNC: 124 MG/DL (ref 70–130)
GLUCOSE BLDC GLUCOMTR-MCNC: 153 MG/DL (ref 70–130)
GLUCOSE BLDC GLUCOMTR-MCNC: 70 MG/DL (ref 70–130)

## 2022-11-13 PROCEDURE — 94799 UNLISTED PULMONARY SVC/PX: CPT

## 2022-11-13 PROCEDURE — 63710000001 PREDNISONE PER 1 MG: Performed by: INTERNAL MEDICINE

## 2022-11-13 PROCEDURE — 94761 N-INVAS EAR/PLS OXIMETRY MLT: CPT

## 2022-11-13 PROCEDURE — 63710000001 INSULIN DETEMIR PER 5 UNITS: Performed by: INTERNAL MEDICINE

## 2022-11-13 PROCEDURE — 82962 GLUCOSE BLOOD TEST: CPT

## 2022-11-13 PROCEDURE — 94664 DEMO&/EVAL PT USE INHALER: CPT

## 2022-11-13 PROCEDURE — 99231 SBSQ HOSP IP/OBS SF/LOW 25: CPT | Performed by: INTERNAL MEDICINE

## 2022-11-13 PROCEDURE — 63710000001 INSULIN ASPART PER 5 UNITS: Performed by: INTERNAL MEDICINE

## 2022-11-13 RX ADMIN — APIXABAN 5 MG: 5 TABLET, FILM COATED ORAL at 20:22

## 2022-11-13 RX ADMIN — PREDNISONE 40 MG: 20 TABLET ORAL at 08:16

## 2022-11-13 RX ADMIN — Medication 10 MG: at 20:22

## 2022-11-13 RX ADMIN — INSULIN ASPART 2 UNITS: 100 INJECTION, SOLUTION INTRAVENOUS; SUBCUTANEOUS at 10:33

## 2022-11-13 RX ADMIN — BUDESONIDE AND FORMOTEROL FUMARATE DIHYDRATE 2 PUFF: 160; 4.5 AEROSOL RESPIRATORY (INHALATION) at 19:56

## 2022-11-13 RX ADMIN — CALCIUM ACETATE 2668 MG: 667 CAPSULE ORAL at 08:16

## 2022-11-13 RX ADMIN — PANTOPRAZOLE SODIUM 40 MG: 40 TABLET, DELAYED RELEASE ORAL at 06:01

## 2022-11-13 RX ADMIN — CALCIUM ACETATE 2668 MG: 667 CAPSULE ORAL at 11:57

## 2022-11-13 RX ADMIN — LEVOTHYROXINE SODIUM 88 MCG: 88 TABLET ORAL at 06:01

## 2022-11-13 RX ADMIN — RENO CAPS 1 MG: 100; 1.5; 1.7; 20; 10; 1; 150; 5; 6 CAPSULE ORAL at 08:16

## 2022-11-13 RX ADMIN — INSULIN DETEMIR 20 UNITS: 100 INJECTION, SOLUTION SUBCUTANEOUS at 08:17

## 2022-11-13 RX ADMIN — CARVEDILOL 6.25 MG: 6.25 TABLET, FILM COATED ORAL at 08:16

## 2022-11-13 RX ADMIN — Medication 3 ML: at 20:22

## 2022-11-13 RX ADMIN — BUDESONIDE AND FORMOTEROL FUMARATE DIHYDRATE 2 PUFF: 160; 4.5 AEROSOL RESPIRATORY (INHALATION) at 07:07

## 2022-11-13 RX ADMIN — APIXABAN 5 MG: 5 TABLET, FILM COATED ORAL at 08:16

## 2022-11-13 RX ADMIN — Medication 100 MG: at 08:16

## 2022-11-13 RX ADMIN — CALCIUM ACETATE 2668 MG: 667 CAPSULE ORAL at 17:15

## 2022-11-13 RX ADMIN — Medication 3 ML: at 08:16

## 2022-11-13 RX ADMIN — CARVEDILOL 6.25 MG: 6.25 TABLET, FILM COATED ORAL at 17:15

## 2022-11-13 RX ADMIN — CETIRIZINE HYDROCHLORIDE 10 MG: 10 TABLET, FILM COATED ORAL at 08:16

## 2022-11-13 NOTE — PLAN OF CARE
Goal Outcome Evaluation:           Progress: no change  Outcome Evaluation: Pt resting in bed at this time. No complaints or acute changes. VSS. Will continue to monitor.

## 2022-11-13 NOTE — PROGRESS NOTES
Nephrology Progress Note      Subjective     No chest pain or shortness of breath.     Objective       Vital signs :     Temp:  [98.1 °F (36.7 °C)-98.5 °F (36.9 °C)] 98.1 °F (36.7 °C)  Heart Rate:  [] 115  Resp:  [16-20] 16  BP: ()/(65-86) 95/65    Intake/Output                       11/11/22 0701 - 11/12/22 0700 11/12/22 0701 - 11/13/22 0700     6431-0785 0140-9789 Total 1322-5229 8457-0267 Total                 Intake    P.O.  120  480 600  180  300 480    Total Intake 120 480 600 180 300 480       Output    Other  1000  -- 1000  --  -- --    Ultrafiltration (mL) 1000 -- 1000 -- -- --    Total Output 1000 -- 1000 -- -- --           Physical Exam:    General Appearance : not in acute distress  Lungs : clear to auscultation, respirations regular  Heart :  regular rhythm & normal rate, normal S1, S2 and no murmur, no rub  Abdomen : soft, non distended  Extremities : no edema   Neurologic :   orientated to person, place, time and situation, Grossly no focal deficits    Laboratory Data :     Albumin No results found for: ALBUMIN   Magnesium No results found for: MG       PTH               No results found for: PTH    CBC and coagulation:  Results from last 7 days   Lab Units 11/12/22  0514 11/11/22  0411 11/10/22  0056 11/09/22  0044 11/08/22  0226   PROCALCITONIN ng/mL  --   --   --   --  0.19   WBC 10*3/mm3 7.99 7.56 7.29   < > 6.63   HEMOGLOBIN g/dL 10.2* 10.7* 10.3*   < > 10.0*   HEMATOCRIT % 31.1* 32.5* 31.4*   < > 29.5*   MCV fL 101.3* 103.5* 102.6*   < > 99.3*   MCHC g/dL 32.8 32.9 32.8   < > 33.9   PLATELETS 10*3/mm3 109* 102* 103*   < > 72*    < > = values in this interval not displayed.     Acid/base balance:      Renal and electrolytes:    Results from last 7 days   Lab Units 11/12/22 0514 11/11/22  0411 11/10/22  0056 11/09/22  1347 11/09/22  0044   SODIUM mmol/L 130* 122* 128* 129* 123*   POTASSIUM mmol/L 4.9 5.4* 4.5 4.4 4.5   CHLORIDE mmol/L 90* 85* 89* 90* 86*   CO2 mmol/L 26.4 21.9* 23.9  22.0 21.7*   BUN mg/dL 53* 74* 46* 35* 66*   CREATININE mg/dL 3.95* 4.94* 4.06* 3.28* 5.09*   CALCIUM mg/dL 8.4* 8.3* 8.2* 7.8* 7.8*     Estimated Creatinine Clearance: 19.9 mL/min (A) (by C-G formula based on SCr of 3.95 mg/dL (H)).  @GFRCG:3@   Liver and pancreatic function:  Results from last 7 days   Lab Units 11/08/22  0226   ALBUMIN g/dL 3.20*   BILIRUBIN mg/dL 0.8   ALK PHOS U/L 69   AST (SGOT) U/L 23   ALT (SGPT) U/L 69*         Cardiac:      Liver and pancreatic function:  Results from last 7 days   Lab Units 11/08/22  0226   ALBUMIN g/dL 3.20*   BILIRUBIN mg/dL 0.8   ALK PHOS U/L 69   AST (SGOT) U/L 23   ALT (SGPT) U/L 69*       Medications :     apixaban, 5 mg, Oral, Q12H  budesonide-formoterol, 2 puff, Inhalation, BID - RT  calcium acetate, 2,668 mg, Oral, TID With Meals  carvedilol, 6.25 mg, Oral, BID With Meals  cetirizine, 10 mg, Oral, Daily  Insulin Aspart, 0-9 Units, Subcutaneous, TID AC  insulin detemir, 20 Units, Subcutaneous, Daily  levothyroxine, 88 mcg, Oral, Q AM  melatonin, 10 mg, Oral, Nightly  pantoprazole, 40 mg, Oral, Q AM  predniSONE, 40 mg, Oral, Daily With Breakfast  Renal, 1 capsule, Oral, Daily  sodium chloride, 3 mL, Intravenous, Q12H  thiamine, 100 mg, Oral, Daily        Assessment & Plan     1. ESKD on IHDx  2. Anemia  3. Hyponatremia, hypervolemic  4. Anion gap metabolic acidosis  5. SHPT  6. Acute bacterial pneumonia    Grossly stable from renal stands point, continue on dialysis.   Hyponatremia; is much better  Continue on FR 32 ounces/day  Anemia; HH at target  SHPT; follow outpatient management        Bladimir Alvarado MD  11/13/22  08:42 EST

## 2022-11-13 NOTE — PROGRESS NOTES
[facility] HOSPITALIST PROGRESS NOTE    Subjective     History:   Axel Desir is a 62 y.o. male admitted on 11/3/2022 secondary to Pneumonia of right upper lobe due to infectious organism     Procedures: None    CC: Follow up COPD exacerbation    Patient seen and examined with ANASTASIIA Carcamo. Awake and alert. States he feels some better today with improvement in his dyspnea. No reported CP or palpitations. No reported nausea or vomiting.  No acute events overnight per RN.     History taken from: patient, chart, and RN.      Objective     Vital Signs  Temp:  [98.1 °F (36.7 °C)-98.2 °F (36.8 °C)] 98.1 °F (36.7 °C)  Heart Rate:  [] 98  Resp:  [16-20] 16  BP: ()/(64-86) 111/64    Intake/Output Summary (Last 24 hours) at 11/13/2022 1635  Last data filed at 11/13/2022 1609  Gross per 24 hour   Intake 1059 ml   Output --   Net 1059 ml         Physical Exam:  General:    Awake, alert, in no acute distress, chronically ill appearing   Heart:      Normal S1 and S2. Tachycardic. No significant murmur, rubs or gallops appreciated.   Lungs:     Respirations regular, even and unlabored. A few scattered wheezes with diminished breath sounds at bases.       Abdomen:   Soft and nontender. No guarding, rebound tenderness or  organomegaly noted. Bowel sounds present x 4.   Extremities:  Trace-1+ bilateral lower extremity edema. Moves UE and LE equally B/L.     Results Review:    Results from last 7 days   Lab Units 11/12/22  0514 11/11/22  0411 11/10/22  0056 11/09/22  0044 11/08/22  0226 11/07/22  0123   WBC 10*3/mm3 7.99 7.56 7.29 7.28 6.63 5.24   HEMOGLOBIN g/dL 10.2* 10.7* 10.3* 10.1* 10.0* 10.1*   PLATELETS 10*3/mm3 109* 102* 103* 86* 72* 60*     Results from last 7 days   Lab Units 11/12/22  0514 11/11/22  0411 11/10/22  0056 11/09/22  1347 11/09/22  0044 11/08/22  0226 11/07/22  0123   SODIUM mmol/L 130* 122* 128* 129* 123* 127* 127*   POTASSIUM mmol/L 4.9 5.4* 4.5 4.4 4.5 4.0 5.3*   CHLORIDE  mmol/L 90* 85* 89* 90* 86* 87* 90*   CO2 mmol/L 26.4 21.9* 23.9 22.0 21.7* 22.6 19.0*   BUN mg/dL 53* 74* 46* 35* 66* 52* 77*   CREATININE mg/dL 3.95* 4.94* 4.06* 3.28* 5.09* 4.58* 6.07*   CALCIUM mg/dL 8.4* 8.3* 8.2* 7.8* 7.8* 7.7* 7.3*   GLUCOSE mg/dL 100* 202* 158* 252* 262* 229* 493*     Results from last 7 days   Lab Units 11/08/22  0226   BILIRUBIN mg/dL 0.8   ALK PHOS U/L 69   AST (SGOT) U/L 23   ALT (SGPT) U/L 69*                   Imaging Results (Last 24 Hours)     ** No results found for the last 24 hours. **            Medications:  apixaban, 5 mg, Oral, Q12H  budesonide-formoterol, 2 puff, Inhalation, BID - RT  calcium acetate, 2,668 mg, Oral, TID With Meals  carvedilol, 6.25 mg, Oral, BID With Meals  cetirizine, 10 mg, Oral, Daily  Insulin Aspart, 0-9 Units, Subcutaneous, TID AC  insulin detemir, 20 Units, Subcutaneous, Daily  levothyroxine, 88 mcg, Oral, Q AM  melatonin, 10 mg, Oral, Nightly  pantoprazole, 40 mg, Oral, Q AM  predniSONE, 40 mg, Oral, Daily With Breakfast  Renal, 1 capsule, Oral, Daily  sodium chloride, 3 mL, Intravenous, Q12H  thiamine, 100 mg, Oral, Daily               Assessment & Plan   Right-sided pneumonia: Sputum culture revealed normal respiratory joanne. Procal is normal. Completed course of Omincef and Doxycycline.     Acute exacerbation of COPD: Completed antibiotics as above. Cont steroids and nebs. Cont Symbicort. Transitioned to Prednisone to complete course of steroids.     ESRD on HD: CT reveals CHF/edema with R>L pleural effusions, anasarca and upper abdominal ascites. Cont HD (MWF schedule) per nephrology with input appreciated.     DM II, non-insulin dependent: Recent HgbA1c 8.6. Steroids likely contributing to hyperglycemia. Overall improved with recent insulin adjustments. Cont current regimen today.      Hyponatremia: Likely hypervolemic. Stable. Cont fluid restriction and HD. Repeat labs in the AM.     Paroxysmal Afib: Currently in sinus rhythm. Cont Coreg. Cont  home Eliquis for stroke prevention.     Chronic macrocytic anemia: B12 and folate are replete. Stable. Repeat CBC in the AM.     Thrombocytopenia: Possibly 2/2 above. B12 and folate replete. No schistocytes on peripheral smear. Overall improved and stable. Repeat CBC in the AM.     Generalized weakness: PT/OT    DVT PPX: Eliquis.     Disposition: Pt and his cousin/HCS requesting rehab placement in [place] to be closer to family in [place].      Andres Reardon,   11/13/22  16:35 EST

## 2022-11-13 NOTE — PLAN OF CARE
Goal Outcome Evaluation:  Plan of Care Reviewed With: patient        Progress: no change  Outcome Evaluation: pt has been resting in bed. no pt went to BR with assist. no changes to note at this time; will continue to monitor

## 2022-11-14 LAB
ANION GAP SERPL CALCULATED.3IONS-SCNC: 15.1 MMOL/L (ref 5–15)
BUN SERPL-MCNC: 86 MG/DL (ref 8–23)
BUN/CREAT SERPL: 16.2 (ref 7–25)
CALCIUM SPEC-SCNC: 8.2 MG/DL (ref 8.6–10.5)
CHLORIDE SERPL-SCNC: 88 MMOL/L (ref 98–107)
CO2 SERPL-SCNC: 20.9 MMOL/L (ref 22–29)
CREAT SERPL-MCNC: 5.3 MG/DL (ref 0.76–1.27)
DEPRECATED RDW RBC AUTO: 73.8 FL (ref 37–54)
EGFRCR SERPLBLD CKD-EPI 2021: 11.4 ML/MIN/1.73
ERYTHROCYTE [DISTWIDTH] IN BLOOD BY AUTOMATED COUNT: 20 % (ref 12.3–15.4)
GLUCOSE BLDC GLUCOMTR-MCNC: 185 MG/DL (ref 70–130)
GLUCOSE BLDC GLUCOMTR-MCNC: 254 MG/DL (ref 70–130)
GLUCOSE BLDC GLUCOMTR-MCNC: 260 MG/DL (ref 70–130)
GLUCOSE BLDC GLUCOMTR-MCNC: 71 MG/DL (ref 70–130)
GLUCOSE BLDC GLUCOMTR-MCNC: 71 MG/DL (ref 70–130)
GLUCOSE BLDC GLUCOMTR-MCNC: 76 MG/DL (ref 70–130)
GLUCOSE SERPL-MCNC: 259 MG/DL (ref 65–99)
HCT VFR BLD AUTO: 32.5 % (ref 37.5–51)
HGB BLD-MCNC: 10.9 G/DL (ref 13–17.7)
MCH RBC QN AUTO: 34.3 PG (ref 26.6–33)
MCHC RBC AUTO-ENTMCNC: 33.5 G/DL (ref 31.5–35.7)
MCV RBC AUTO: 102.2 FL (ref 79–97)
PLATELET # BLD AUTO: 108 10*3/MM3 (ref 140–450)
PMV BLD AUTO: 11.5 FL (ref 6–12)
POTASSIUM SERPL-SCNC: 6.1 MMOL/L (ref 3.5–5.2)
RBC # BLD AUTO: 3.18 10*6/MM3 (ref 4.14–5.8)
SODIUM SERPL-SCNC: 124 MMOL/L (ref 136–145)
WBC NRBC COR # BLD: 10.44 10*3/MM3 (ref 3.4–10.8)

## 2022-11-14 PROCEDURE — 25010000002 CALCIUM GLUCONATE-NACL 1-0.675 GM/50ML-% SOLUTION: Performed by: PHYSICIAN ASSISTANT

## 2022-11-14 PROCEDURE — 63710000001 INSULIN REGULAR HUMAN PER 5 UNITS: Performed by: PHYSICIAN ASSISTANT

## 2022-11-14 PROCEDURE — 63710000001 INSULIN ASPART PER 5 UNITS: Performed by: INTERNAL MEDICINE

## 2022-11-14 PROCEDURE — 63710000001 INSULIN DETEMIR PER 5 UNITS: Performed by: INTERNAL MEDICINE

## 2022-11-14 PROCEDURE — 80048 BASIC METABOLIC PNL TOTAL CA: CPT | Performed by: INTERNAL MEDICINE

## 2022-11-14 PROCEDURE — 94799 UNLISTED PULMONARY SVC/PX: CPT

## 2022-11-14 PROCEDURE — 85027 COMPLETE CBC AUTOMATED: CPT | Performed by: INTERNAL MEDICINE

## 2022-11-14 PROCEDURE — 93005 ELECTROCARDIOGRAM TRACING: CPT | Performed by: PHYSICIAN ASSISTANT

## 2022-11-14 PROCEDURE — 94761 N-INVAS EAR/PLS OXIMETRY MLT: CPT

## 2022-11-14 PROCEDURE — 93010 ELECTROCARDIOGRAM REPORT: CPT | Performed by: INTERNAL MEDICINE

## 2022-11-14 PROCEDURE — 94760 N-INVAS EAR/PLS OXIMETRY 1: CPT

## 2022-11-14 PROCEDURE — 63710000001 PREDNISONE PER 1 MG: Performed by: INTERNAL MEDICINE

## 2022-11-14 PROCEDURE — 99232 SBSQ HOSP IP/OBS MODERATE 35: CPT | Performed by: INTERNAL MEDICINE

## 2022-11-14 PROCEDURE — 82962 GLUCOSE BLOOD TEST: CPT

## 2022-11-14 RX ORDER — CALCIUM GLUCONATE 20 MG/ML
1 INJECTION, SOLUTION INTRAVENOUS ONCE
Status: COMPLETED | OUTPATIENT
Start: 2022-11-14 | End: 2022-11-14

## 2022-11-14 RX ADMIN — HUMAN INSULIN 5 UNITS: 100 INJECTION, SOLUTION SUBCUTANEOUS at 04:18

## 2022-11-14 RX ADMIN — APIXABAN 5 MG: 5 TABLET, FILM COATED ORAL at 20:42

## 2022-11-14 RX ADMIN — LEVOTHYROXINE SODIUM 88 MCG: 88 TABLET ORAL at 05:19

## 2022-11-14 RX ADMIN — Medication 10 MG: at 20:42

## 2022-11-14 RX ADMIN — CETIRIZINE HYDROCHLORIDE 10 MG: 10 TABLET, FILM COATED ORAL at 12:06

## 2022-11-14 RX ADMIN — PANTOPRAZOLE SODIUM 40 MG: 40 TABLET, DELAYED RELEASE ORAL at 05:19

## 2022-11-14 RX ADMIN — CARVEDILOL 6.25 MG: 6.25 TABLET, FILM COATED ORAL at 12:06

## 2022-11-14 RX ADMIN — CALCIUM GLUCONATE 1 G: 20 INJECTION, SOLUTION INTRAVENOUS at 04:15

## 2022-11-14 RX ADMIN — CALCIUM ACETATE 2668 MG: 667 CAPSULE ORAL at 12:06

## 2022-11-14 RX ADMIN — CALCIUM ACETATE 2668 MG: 667 CAPSULE ORAL at 17:33

## 2022-11-14 RX ADMIN — RENO CAPS 1 MG: 100; 1.5; 1.7; 20; 10; 1; 150; 5; 6 CAPSULE ORAL at 12:06

## 2022-11-14 RX ADMIN — APIXABAN 5 MG: 5 TABLET, FILM COATED ORAL at 12:06

## 2022-11-14 RX ADMIN — BUDESONIDE AND FORMOTEROL FUMARATE DIHYDRATE 2 PUFF: 160; 4.5 AEROSOL RESPIRATORY (INHALATION) at 07:01

## 2022-11-14 RX ADMIN — Medication 3 ML: at 12:07

## 2022-11-14 RX ADMIN — INSULIN DETEMIR 20 UNITS: 100 INJECTION, SOLUTION SUBCUTANEOUS at 07:09

## 2022-11-14 RX ADMIN — ALBUTEROL SULFATE 10 MG: 2.5 SOLUTION RESPIRATORY (INHALATION) at 05:00

## 2022-11-14 RX ADMIN — INSULIN ASPART 6 UNITS: 100 INJECTION, SOLUTION INTRAVENOUS; SUBCUTANEOUS at 07:09

## 2022-11-14 RX ADMIN — Medication 3 ML: at 20:42

## 2022-11-14 RX ADMIN — PREDNISONE 40 MG: 20 TABLET ORAL at 12:06

## 2022-11-14 RX ADMIN — Medication 100 MG: at 12:06

## 2022-11-14 NOTE — NURSING NOTE
Pt back from dialysis. Pt put back on tele and pulse ox per orders. Educated pt on the importance of keeping tele on; pt voiced understanding. Confirmed with tele tech gladis that it was working properly.

## 2022-11-14 NOTE — NURSING NOTE
At 0800 tell was notified by tele that leads had been off since 0650. RN told tele tech gladis that pt was in dialysis and leads would put back on when pt comes back to the floor around lunch time. Tele tech voiced understanding. Rn discussed plan of care with CNA

## 2022-11-14 NOTE — PROGRESS NOTES
Nephrology Progress Note      Subjective     Seen on dialysis, tolerating well.     Objective       Vital signs :     Temp:  [97 °F (36.1 °C)-98.2 °F (36.8 °C)] 97 °F (36.1 °C)  Heart Rate:  [] 104  Resp:  [16-18] 18  BP: (111-132)/(64-86) 124/86    Intake/Output                             11/12/22 0701 - 11/13/22 0700 11/13/22 0701 - 11/14/22 0700 11/14/22 0701 - 11/15/22 0700     7756-4857 5354-8669 Total 0521-2966 1681-1891 Total 7600-5919 6738-5861 Total                    Intake    P.O.  180  300 480  639  160 799  240  -- 240    I.V.  --  -- --  0  -- 0  --  -- --    Total Intake 180 300 480 639 160 799 240 -- 240       Output    Total Output -- -- -- -- -- -- -- -- --           Physical Exam:    General Appearance : not in acute distress  Lungs : clear to auscultation, respirations regular  Heart :  regular rhythm & normal rate, normal S1, S2 and no murmur, no rub  Abdomen : soft, non distended  Extremities : 2+ edema  Neurologic :   orientated to person, place, time and situation, Grossly no focal deficits    Laboratory Data :     Albumin No results found for: ALBUMIN   Magnesium No results found for: MG       PTH               No results found for: PTH    CBC and coagulation:  Results from last 7 days   Lab Units 11/14/22  0121 11/12/22  0514 11/11/22  0411 11/09/22  0044 11/08/22  0226   PROCALCITONIN ng/mL  --   --   --   --  0.19   WBC 10*3/mm3 10.44 7.99 7.56   < > 6.63   HEMOGLOBIN g/dL 10.9* 10.2* 10.7*   < > 10.0*   HEMATOCRIT % 32.5* 31.1* 32.5*   < > 29.5*   MCV fL 102.2* 101.3* 103.5*   < > 99.3*   MCHC g/dL 33.5 32.8 32.9   < > 33.9   PLATELETS 10*3/mm3 108* 109* 102*   < > 72*    < > = values in this interval not displayed.     Acid/base balance:      Renal and electrolytes:    Results from last 7 days   Lab Units 11/14/22  0121 11/12/22  0514 11/11/22  0411 11/10/22  0056 11/09/22  1347   SODIUM mmol/L 124* 130* 122* 128* 129*   POTASSIUM mmol/L 6.1* 4.9 5.4* 4.5 4.4   CHLORIDE mmol/L  88* 90* 85* 89* 90*   CO2 mmol/L 20.9* 26.4 21.9* 23.9 22.0   BUN mg/dL 86* 53* 74* 46* 35*   CREATININE mg/dL 5.30* 3.95* 4.94* 4.06* 3.28*   CALCIUM mg/dL 8.2* 8.4* 8.3* 8.2* 7.8*     Estimated Creatinine Clearance: 14.8 mL/min (A) (by C-G formula based on SCr of 5.3 mg/dL (H)).  @GFRCG:3@   Liver and pancreatic function:  Results from last 7 days   Lab Units 11/08/22  0226   ALBUMIN g/dL 3.20*   BILIRUBIN mg/dL 0.8   ALK PHOS U/L 69   AST (SGOT) U/L 23   ALT (SGPT) U/L 69*         Cardiac:      Liver and pancreatic function:  Results from last 7 days   Lab Units 11/08/22  0226   ALBUMIN g/dL 3.20*   BILIRUBIN mg/dL 0.8   ALK PHOS U/L 69   AST (SGOT) U/L 23   ALT (SGPT) U/L 69*       Medications :     apixaban, 5 mg, Oral, Q12H  budesonide-formoterol, 2 puff, Inhalation, BID - RT  calcium acetate, 2,668 mg, Oral, TID With Meals  carvedilol, 6.25 mg, Oral, BID With Meals  cetirizine, 10 mg, Oral, Daily  Insulin Aspart, 0-9 Units, Subcutaneous, TID AC  insulin detemir, 20 Units, Subcutaneous, Daily  levothyroxine, 88 mcg, Oral, Q AM  melatonin, 10 mg, Oral, Nightly  pantoprazole, 40 mg, Oral, Q AM  predniSONE, 40 mg, Oral, Daily With Breakfast  Renal, 1 capsule, Oral, Daily  sodium chloride, 3 mL, Intravenous, Q12H  thiamine, 100 mg, Oral, Daily        Assessment & Plan     1. ESKD on IHDx  2. Anemia  3. Hyponatremia, hypervolemic  4. Anion gap metabolic acidosis  5. SHPT  6. Acute bacterial pneumonia  7. Hyperkalemia    Evaluated on dialysis, tolerating well and hemodynamically stable.   Significant hyponatremia and hyperkalemia, urgent dialysis today, increased UF goal to 3 L. s.   Hyponatremia; FR and Low K diet  Anemia; HH at target  SHPT; follow outpatient management        Bladimir Alvarado MD  11/14/22  10:06 EST

## 2022-11-14 NOTE — PLAN OF CARE
Goal Outcome Evaluation:  Plan of Care Reviewed With: patient        Progress: no change  Outcome Evaluation: pt has been resting in bed. Pt sitting on side of bed. Pt had dialysis today. no other changes to note at this time; will continue to monitor

## 2022-11-14 NOTE — SIGNIFICANT NOTE
"   11/14/22 5671   OTHER   Discipline physical therapy assistant   Rehab Time/Intention   Session Not Performed other (see comments)  (Attempted to see Pt on this date and pt states, \" I'm to sleepy maybe tomorrow.\" When asking Pt if he could do anything at all he reports he is to tired.)     "

## 2022-11-14 NOTE — CASE MANAGEMENT/SOCIAL WORK
Discharge Planning Assessment   Eric     Patient Name: Axel Desir  MRN: [id number]  Today's Date: 11/14/2022    Admit Date: 11/3/2022    Plan: SS contacted [facility] per Estrella who states she was at the hospital and wanted to do an onsite visit with pt on this date 11/14/22. SS to follow up with Estrella. SS to follow.     Discharge Plan     Row Name 11/14/22 1601       Plan    Plan SS contacted [facility] per Estrella who states she was at the hospital and wanted to do an onsite visit with pt on this date 11/14/22. SS to follow up with Estrella. SS to follow.    1630- SS was contacted by [facility] per Ana who states they do not take dialysis pt's at their facility.                       SORAIDA JhaveriW

## 2022-11-15 LAB
ANION GAP SERPL CALCULATED.3IONS-SCNC: 11.4 MMOL/L (ref 5–15)
BUN SERPL-MCNC: 59 MG/DL (ref 8–23)
BUN/CREAT SERPL: 15.6 (ref 7–25)
CALCIUM SPEC-SCNC: 8.3 MG/DL (ref 8.6–10.5)
CHLORIDE SERPL-SCNC: 88 MMOL/L (ref 98–107)
CO2 SERPL-SCNC: 27.6 MMOL/L (ref 22–29)
CREAT SERPL-MCNC: 3.79 MG/DL (ref 0.76–1.27)
EGFRCR SERPLBLD CKD-EPI 2021: 17 ML/MIN/1.73
GLUCOSE BLDC GLUCOMTR-MCNC: 246 MG/DL (ref 70–130)
GLUCOSE BLDC GLUCOMTR-MCNC: 310 MG/DL (ref 70–130)
GLUCOSE BLDC GLUCOMTR-MCNC: 343 MG/DL (ref 70–130)
GLUCOSE SERPL-MCNC: 255 MG/DL (ref 65–99)
HCT VFR BLD AUTO: 30.5 % (ref 37.5–51)
HGB BLD-MCNC: 10.4 G/DL (ref 13–17.7)
POTASSIUM SERPL-SCNC: 5 MMOL/L (ref 3.5–5.2)
QT INTERVAL: 346 MS
QTC INTERVAL: 491 MS
SODIUM SERPL-SCNC: 127 MMOL/L (ref 136–145)
TROPONIN T SERPL-MCNC: 0.04 NG/ML (ref 0–0.03)

## 2022-11-15 PROCEDURE — 63710000001 PREDNISONE PER 1 MG: Performed by: INTERNAL MEDICINE

## 2022-11-15 PROCEDURE — 63710000001 INSULIN ASPART PER 5 UNITS: Performed by: INTERNAL MEDICINE

## 2022-11-15 PROCEDURE — 94761 N-INVAS EAR/PLS OXIMETRY MLT: CPT

## 2022-11-15 PROCEDURE — 93005 ELECTROCARDIOGRAM TRACING: CPT | Performed by: INTERNAL MEDICINE

## 2022-11-15 PROCEDURE — 85018 HEMOGLOBIN: CPT | Performed by: PHYSICIAN ASSISTANT

## 2022-11-15 PROCEDURE — 80048 BASIC METABOLIC PNL TOTAL CA: CPT | Performed by: INTERNAL MEDICINE

## 2022-11-15 PROCEDURE — 94799 UNLISTED PULMONARY SVC/PX: CPT

## 2022-11-15 PROCEDURE — 84484 ASSAY OF TROPONIN QUANT: CPT | Performed by: INTERNAL MEDICINE

## 2022-11-15 PROCEDURE — 99231 SBSQ HOSP IP/OBS SF/LOW 25: CPT | Performed by: INTERNAL MEDICINE

## 2022-11-15 PROCEDURE — 85014 HEMATOCRIT: CPT | Performed by: PHYSICIAN ASSISTANT

## 2022-11-15 PROCEDURE — 82962 GLUCOSE BLOOD TEST: CPT

## 2022-11-15 PROCEDURE — 63710000001 INSULIN DETEMIR PER 5 UNITS: Performed by: INTERNAL MEDICINE

## 2022-11-15 PROCEDURE — 97530 THERAPEUTIC ACTIVITIES: CPT

## 2022-11-15 PROCEDURE — 93010 ELECTROCARDIOGRAM REPORT: CPT | Performed by: INTERNAL MEDICINE

## 2022-11-15 RX ORDER — INSULIN ASPART 100 [IU]/ML
0-14 INJECTION, SOLUTION INTRAVENOUS; SUBCUTANEOUS
Status: DISCONTINUED | OUTPATIENT
Start: 2022-11-16 | End: 2022-11-17

## 2022-11-15 RX ORDER — NICOTINE POLACRILEX 4 MG
15 LOZENGE BUCCAL
Status: DISCONTINUED | OUTPATIENT
Start: 2022-11-15 | End: 2022-11-17 | Stop reason: SDUPTHER

## 2022-11-15 RX ORDER — DEXTROSE MONOHYDRATE 25 G/50ML
25 INJECTION, SOLUTION INTRAVENOUS
Status: DISCONTINUED | OUTPATIENT
Start: 2022-11-15 | End: 2022-11-17 | Stop reason: SDUPTHER

## 2022-11-15 RX ADMIN — BUDESONIDE AND FORMOTEROL FUMARATE DIHYDRATE 2 PUFF: 160; 4.5 AEROSOL RESPIRATORY (INHALATION) at 06:41

## 2022-11-15 RX ADMIN — METOPROLOL TARTRATE 12.5 MG: 25 TABLET, FILM COATED ORAL at 12:22

## 2022-11-15 RX ADMIN — PREDNISONE 40 MG: 20 TABLET ORAL at 08:09

## 2022-11-15 RX ADMIN — CALCIUM ACETATE 2668 MG: 667 CAPSULE ORAL at 17:13

## 2022-11-15 RX ADMIN — APIXABAN 5 MG: 5 TABLET, FILM COATED ORAL at 08:09

## 2022-11-15 RX ADMIN — Medication 3 ML: at 21:00

## 2022-11-15 RX ADMIN — INSULIN ASPART 7 UNITS: 100 INJECTION, SOLUTION INTRAVENOUS; SUBCUTANEOUS at 17:13

## 2022-11-15 RX ADMIN — CARVEDILOL 6.25 MG: 6.25 TABLET, FILM COATED ORAL at 08:09

## 2022-11-15 RX ADMIN — CALCIUM ACETATE 2668 MG: 667 CAPSULE ORAL at 08:09

## 2022-11-15 RX ADMIN — Medication 10 MG: at 20:52

## 2022-11-15 RX ADMIN — BUDESONIDE AND FORMOTEROL FUMARATE DIHYDRATE 2 PUFF: 160; 4.5 AEROSOL RESPIRATORY (INHALATION) at 18:39

## 2022-11-15 RX ADMIN — PANTOPRAZOLE SODIUM 40 MG: 40 TABLET, DELAYED RELEASE ORAL at 06:06

## 2022-11-15 RX ADMIN — INSULIN ASPART 4 UNITS: 100 INJECTION, SOLUTION INTRAVENOUS; SUBCUTANEOUS at 08:10

## 2022-11-15 RX ADMIN — INSULIN DETEMIR 20 UNITS: 100 INJECTION, SOLUTION SUBCUTANEOUS at 08:10

## 2022-11-15 RX ADMIN — METOPROLOL TARTRATE 12.5 MG: 25 TABLET, FILM COATED ORAL at 20:52

## 2022-11-15 RX ADMIN — CALCIUM ACETATE 2668 MG: 667 CAPSULE ORAL at 12:22

## 2022-11-15 RX ADMIN — INSULIN ASPART 7 UNITS: 100 INJECTION, SOLUTION INTRAVENOUS; SUBCUTANEOUS at 12:22

## 2022-11-15 RX ADMIN — Medication 3 ML: at 08:10

## 2022-11-15 RX ADMIN — Medication 100 MG: at 08:10

## 2022-11-15 RX ADMIN — CETIRIZINE HYDROCHLORIDE 10 MG: 10 TABLET, FILM COATED ORAL at 08:09

## 2022-11-15 RX ADMIN — RENO CAPS 1 MG: 100; 1.5; 1.7; 20; 10; 1; 150; 5; 6 CAPSULE ORAL at 08:09

## 2022-11-15 RX ADMIN — LEVOTHYROXINE SODIUM 88 MCG: 88 TABLET ORAL at 06:06

## 2022-11-15 RX ADMIN — APIXABAN 5 MG: 5 TABLET, FILM COATED ORAL at 20:52

## 2022-11-15 NOTE — PLAN OF CARE
Goal Outcome Evaluation:  Plan of Care Reviewed With: patient        Progress: no change  Outcome Evaluation: pt has been resting in bed at this time. pt ambulates to BR. no changes or complaints to note at this time; will continue to monitor

## 2022-11-15 NOTE — PLAN OF CARE
Goal Outcome Evaluation:           Progress: no change  Outcome Evaluation: Pt resting in bed at this time. No complaints or acute changes. Will continue with plan of care.

## 2022-11-15 NOTE — SIGNIFICANT NOTE
11/15/22 1341   OTHER   Discipline physical therapist   Rehab Time/Intention   Session Not Performed patient/family declined treatment  (Pt. claimed he is too tired to work w/ PT. Multiple attempts were made to convince pt. to work and he refused d/t needing to nap before doing anything.)

## 2022-11-15 NOTE — CASE MANAGEMENT/SOCIAL WORK
Discharge Planning Assessment   Eric     Patient Name: Axel Desir  MRN: [id number]  Today's Date: 11/15/2022    Admit Date: 11/3/2022    Plan: SS contacted [facility] per Estrella who states she done an onsite visit with pt and states it went well. Estrella states she has sent the referral to [facility] and also sent it to [facility]. SS to follow.     Discharge Plan     Row Name 11/15/22 1222       Plan    Plan SS contacted [facility] per Estrella who states she done an onsite visit with pt and states it went well. Estrella states she has sent the referral to [facility] and also sent it to [facility]. SS to follow.    1345- SS was contacted by [facility] per Estrella who states they are needing updated therapy notes so they can start a pre-auth at the [facility]. SS notified PT. SS to follow.    1630- SS was notified by Estrella who states pt will need a dialyisis chair closer to the [facility]. SS to contact [facility] in the am.               JAXON Jhaveri

## 2022-11-15 NOTE — PROGRESS NOTES
[facility] HOSPITALIST PROGRESS NOTE    Subjective     History:   Axel Desir is a 62 y.o. male admitted on 11/3/2022 secondary to Pneumonia of right upper lobe due to infectious organism     Procedures: None    CC: Follow up COPD exacerbation    Patient seen and examined in dialysis. Awake and alert. Reports some dyspnea today. No reported CP or palpitations. No reported nausea or vomiting.  No acute events overnight per RN.     History taken from: patient, chart, and RN.      Objective     Vital Signs  Temp:  [97 °F (36.1 °C)-98.2 °F (36.8 °C)] 98 °F (36.7 °C)  Heart Rate:  [] 104  Resp:  [16-18] 16  BP: (104-127)/(66-86) 124/85    Intake/Output Summary (Last 24 hours) at 11/14/2022 1916  Last data filed at 11/14/2022 1844  Gross per 24 hour   Intake 640 ml   Output 2500 ml   Net -1860 ml         Physical Exam:  General:    Awake, alert, in no acute distress, chronically ill appearing   Heart:      Normal S1 and S2. Mildly tachycardic. No significant murmur, rubs or gallops appreciated.   Lungs:     Respirations regular, even and unlabored. A few scattered wheezes with diminished breath sounds at bases.       Abdomen:   Soft and nontender. No guarding, rebound tenderness or  organomegaly noted. Bowel sounds present x 4.   Extremities:  Trace-1+ bilateral lower extremity edema. Moves UE and LE equally B/L.     Results Review:    Results from last 7 days   Lab Units 11/14/22  0121 11/12/22  0514 11/11/22 0411 11/10/22  0056 11/09/22  0044 11/08/22  0226   WBC 10*3/mm3 10.44 7.99 7.56 7.29 7.28 6.63   HEMOGLOBIN g/dL 10.9* 10.2* 10.7* 10.3* 10.1* 10.0*   PLATELETS 10*3/mm3 108* 109* 102* 103* 86* 72*     Results from last 7 days   Lab Units 11/14/22  0121 11/12/22  0514 11/11/22  0411 11/10/22  0056 11/09/22  1347 11/09/22  0044 11/08/22  0226   SODIUM mmol/L 124* 130* 122* 128* 129* 123* 127*   POTASSIUM mmol/L 6.1* 4.9 5.4* 4.5 4.4 4.5 4.0   CHLORIDE mmol/L 88* 90* 85* 89* 90* 86* 87*  Message   Recorded as Task   Date: 01/20/2017 10:03 AM, Created By: Shannen Dumont   Task Name: 4. Patient Message   Assigned To: Shannen Dumont   Regarding Patient: DAI HERBERT, Status: In Progress   Comment:    Shannen Dumont - 20 Jan 2017 10:03 AM     TASK CREATED  Left message to return call. Can patient come at 1300 on Thursday 1/26/17?   Shannen Dumont - 20 Jan 2017 10:03 AM     TASK IN PROGRESS   Shannen Dumont - 20 Jan 2017 3:07 PM     TASK EDITED  Spoke with dad. Dad states patient does not have dandruff now and wants to cancel appt.        Signatures   Electronically signed by : Shannen Dumont R.N.; Jan 20 2017  3:07PM CST       CO2 mmol/L 20.9* 26.4 21.9* 23.9 22.0 21.7* 22.6   BUN mg/dL 86* 53* 74* 46* 35* 66* 52*   CREATININE mg/dL 5.30* 3.95* 4.94* 4.06* 3.28* 5.09* 4.58*   CALCIUM mg/dL 8.2* 8.4* 8.3* 8.2* 7.8* 7.8* 7.7*   GLUCOSE mg/dL 259* 100* 202* 158* 252* 262* 229*     Results from last 7 days   Lab Units 11/08/22  0226   BILIRUBIN mg/dL 0.8   ALK PHOS U/L 69   AST (SGOT) U/L 23   ALT (SGPT) U/L 69*                   Imaging Results (Last 24 Hours)     ** No results found for the last 24 hours. **            Medications:  apixaban, 5 mg, Oral, Q12H  budesonide-formoterol, 2 puff, Inhalation, BID - RT  calcium acetate, 2,668 mg, Oral, TID With Meals  carvedilol, 6.25 mg, Oral, BID With Meals  cetirizine, 10 mg, Oral, Daily  Insulin Aspart, 0-9 Units, Subcutaneous, TID AC  insulin detemir, 20 Units, Subcutaneous, Daily  levothyroxine, 88 mcg, Oral, Q AM  melatonin, 10 mg, Oral, Nightly  pantoprazole, 40 mg, Oral, Q AM  predniSONE, 40 mg, Oral, Daily With Breakfast  Renal, 1 capsule, Oral, Daily  sodium chloride, 3 mL, Intravenous, Q12H  thiamine, 100 mg, Oral, Daily               Assessment & Plan   Right-sided pneumonia: Sputum culture revealed normal respiratory joanne. Procal is normal. Completed course of Omincef and Doxycycline.     Acute exacerbation of COPD: Completed antibiotics as above. Cont steroids and nebs. Cont Symbicort. Transitioned to Prednisone to complete course of steroids.     ESRD on HD: CT reveals CHF/edema with R>L pleural effusions, anasarca and upper abdominal ascites. Cont HD (MW schedule) per nephrology with input appreciated.     DM II, non-insulin dependent: Recent HgbA1c 8.6. Steroids likely contributing to hyperglycemia. Overall improved with recent insulin adjustments. Cont current regimen today.      Hyponatremia: Likely hypervolemic. Cont fluid restriction and HD. Repeat labs in the AM.     Hyperkalemia: HD today. Cont to monitor.     Paroxysmal Afib: Currently in sinus rhythm. Cont Coreg. Cont  home Eliquis for stroke prevention.     Chronic macrocytic anemia: B12 and folate are replete. Stable.     Thrombocytopenia: Possibly 2/2 above. B12 and folate replete. No schistocytes on peripheral smear. Overall improved and stable. Repeat CBC in the AM.     Generalized weakness: PT/OT    DVT PPX: Eliquis.     Disposition: Pt and his cousin/HCS requesting rehab placement closer to [place] to be closer to family in [place].      Andres Readron,   11/14/22  19:16 EST

## 2022-11-15 NOTE — NURSING NOTE
Pt has been educated multiple times by the RN and CNA on the importance of following his diet. He voices understanding, but refuses to follow diet and continues to call out for non diabetic snacks.
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
## 2022-11-16 LAB
ANION GAP SERPL CALCULATED.3IONS-SCNC: 11.7 MMOL/L (ref 5–15)
BUN SERPL-MCNC: 74 MG/DL (ref 8–23)
BUN/CREAT SERPL: 15.6 (ref 7–25)
CALCIUM SPEC-SCNC: 8.2 MG/DL (ref 8.6–10.5)
CHLORIDE SERPL-SCNC: 90 MMOL/L (ref 98–107)
CO2 SERPL-SCNC: 26.3 MMOL/L (ref 22–29)
CREAT SERPL-MCNC: 4.75 MG/DL (ref 0.76–1.27)
DEPRECATED RDW RBC AUTO: 74.5 FL (ref 37–54)
EGFRCR SERPLBLD CKD-EPI 2021: 13 ML/MIN/1.73
ERYTHROCYTE [DISTWIDTH] IN BLOOD BY AUTOMATED COUNT: 19.9 % (ref 12.3–15.4)
GLUCOSE BLDC GLUCOMTR-MCNC: 104 MG/DL (ref 70–130)
GLUCOSE BLDC GLUCOMTR-MCNC: 117 MG/DL (ref 70–130)
GLUCOSE BLDC GLUCOMTR-MCNC: 77 MG/DL (ref 70–130)
GLUCOSE SERPL-MCNC: 143 MG/DL (ref 65–99)
HCT VFR BLD AUTO: 27.3 % (ref 37.5–51)
HGB BLD-MCNC: 9.1 G/DL (ref 13–17.7)
MCH RBC QN AUTO: 34.1 PG (ref 26.6–33)
MCHC RBC AUTO-ENTMCNC: 33.3 G/DL (ref 31.5–35.7)
MCV RBC AUTO: 102.2 FL (ref 79–97)
PLATELET # BLD AUTO: 86 10*3/MM3 (ref 140–450)
PMV BLD AUTO: 10.4 FL (ref 6–12)
POTASSIUM SERPL-SCNC: 5.3 MMOL/L (ref 3.5–5.2)
QT INTERVAL: 382 MS
QTC INTERVAL: 497 MS
RBC # BLD AUTO: 2.67 10*6/MM3 (ref 4.14–5.8)
SODIUM SERPL-SCNC: 128 MMOL/L (ref 136–145)
WBC NRBC COR # BLD: 8.69 10*3/MM3 (ref 3.4–10.8)

## 2022-11-16 PROCEDURE — 94799 UNLISTED PULMONARY SVC/PX: CPT

## 2022-11-16 PROCEDURE — 80048 BASIC METABOLIC PNL TOTAL CA: CPT | Performed by: INTERNAL MEDICINE

## 2022-11-16 PROCEDURE — 99231 SBSQ HOSP IP/OBS SF/LOW 25: CPT | Performed by: INTERNAL MEDICINE

## 2022-11-16 PROCEDURE — 63710000001 INSULIN DETEMIR PER 5 UNITS: Performed by: INTERNAL MEDICINE

## 2022-11-16 PROCEDURE — 94761 N-INVAS EAR/PLS OXIMETRY MLT: CPT

## 2022-11-16 PROCEDURE — 85027 COMPLETE CBC AUTOMATED: CPT | Performed by: INTERNAL MEDICINE

## 2022-11-16 PROCEDURE — 82962 GLUCOSE BLOOD TEST: CPT

## 2022-11-16 RX ORDER — CETIRIZINE HYDROCHLORIDE 10 MG/1
5 TABLET ORAL DAILY
Status: DISCONTINUED | OUTPATIENT
Start: 2022-11-17 | End: 2022-11-20 | Stop reason: HOSPADM

## 2022-11-16 RX ADMIN — CALCIUM ACETATE 2668 MG: 667 CAPSULE ORAL at 12:03

## 2022-11-16 RX ADMIN — BUDESONIDE AND FORMOTEROL FUMARATE DIHYDRATE 2 PUFF: 160; 4.5 AEROSOL RESPIRATORY (INHALATION) at 18:39

## 2022-11-16 RX ADMIN — CALCIUM ACETATE 2668 MG: 667 CAPSULE ORAL at 18:14

## 2022-11-16 RX ADMIN — Medication 3 ML: at 21:27

## 2022-11-16 RX ADMIN — CETIRIZINE HYDROCHLORIDE 10 MG: 10 TABLET, FILM COATED ORAL at 12:12

## 2022-11-16 RX ADMIN — PANTOPRAZOLE SODIUM 40 MG: 40 TABLET, DELAYED RELEASE ORAL at 06:14

## 2022-11-16 RX ADMIN — METOPROLOL TARTRATE 12.5 MG: 25 TABLET, FILM COATED ORAL at 21:23

## 2022-11-16 RX ADMIN — APIXABAN 5 MG: 5 TABLET, FILM COATED ORAL at 21:23

## 2022-11-16 RX ADMIN — APIXABAN 5 MG: 5 TABLET, FILM COATED ORAL at 13:45

## 2022-11-16 RX ADMIN — BUDESONIDE AND FORMOTEROL FUMARATE DIHYDRATE 2 PUFF: 160; 4.5 AEROSOL RESPIRATORY (INHALATION) at 07:11

## 2022-11-16 RX ADMIN — Medication 100 MG: at 12:04

## 2022-11-16 RX ADMIN — METOPROLOL TARTRATE 12.5 MG: 25 TABLET, FILM COATED ORAL at 13:45

## 2022-11-16 RX ADMIN — Medication 3 ML: at 12:13

## 2022-11-16 RX ADMIN — RENO CAPS 1 MG: 100; 1.5; 1.7; 20; 10; 1; 150; 5; 6 CAPSULE ORAL at 12:04

## 2022-11-16 RX ADMIN — Medication 10 MG: at 21:22

## 2022-11-16 RX ADMIN — INSULIN DETEMIR 20 UNITS: 100 INJECTION, SOLUTION SUBCUTANEOUS at 12:10

## 2022-11-16 RX ADMIN — LEVOTHYROXINE SODIUM 88 MCG: 88 TABLET ORAL at 06:14

## 2022-11-16 NOTE — PLAN OF CARE
Goal Outcome Evaluation:  Plan of Care Reviewed With: patient           Outcome Evaluation: Patient resting in bed. Dialysis completed this AM. No acute changes

## 2022-11-16 NOTE — PLAN OF CARE
"Goal Outcome Evaluation:  Plan of Care Reviewed With: patient        Progress: no change  Outcome Evaluation: Pt resting in bed. Vital signs stable. Pt has been very demanding and argumentative wanting snacks and popsicles, pt educated that he is on a 800 mL fluid restriction and he cannot have snacks unless they are diabetic snacks. Pt stated \"shut up about it\" when educating him on the fluid restriction. Pt has continued to call out numerous times about snacks. No acute changes. Will continue plan of care.  "

## 2022-11-16 NOTE — PROGRESS NOTES
Nephrology Progress Note      Subjective     Seen on dialysis, tolerating well.     Objective       Vital signs :     Temp:  [97.4 °F (36.3 °C)-98.1 °F (36.7 °C)] 98.1 °F (36.7 °C)  Heart Rate:  [100-111] 100  Resp:  [16-22] 22  BP: (126-151)/(84-90) 151/84    Intake/Output                       11/14/22 0701 - 11/15/22 0700 11/15/22 0701 - 11/16/22 0700     5320-0065 2705-2556 Total 5763-7109 2424-5975 Total                 Intake    P.O.  480  300 780  1119  50 1169    I.V.  0  -- 0  0  -- 0    Total Intake 480  50 1169       Output    Other  2500  -- 2500  --  -- --    Ultrafiltration (mL) 2500 -- 2500 -- -- --    Total Output 2500 -- 2500 -- -- --           Physical Exam:    General Appearance : not in acute distress  Lungs : clear to auscultation, respirations regular  Heart :  regular rhythm & normal rate, normal S1, S2 and no murmur, no rub  Abdomen : soft, non distended  Extremities : 2+ edema  Neurologic :   orientated to person, place, time and situation, Grossly no focal deficits    Laboratory Data :     Albumin No results found for: ALBUMIN   Magnesium No results found for: MG       PTH               No results found for: PTH    CBC and coagulation:  Results from last 7 days   Lab Units 11/16/22  0216 11/15/22  0601 11/14/22  0121 11/12/22  0514   WBC 10*3/mm3 8.69  --  10.44 7.99   HEMOGLOBIN g/dL 9.1* 10.4* 10.9* 10.2*   HEMATOCRIT % 27.3* 30.5* 32.5* 31.1*   MCV fL 102.2*  --  102.2* 101.3*   MCHC g/dL 33.3  --  33.5 32.8   PLATELETS 10*3/mm3 86*  --  108* 109*     Acid/base balance:      Renal and electrolytes:    Results from last 7 days   Lab Units 11/16/22  0216 11/15/22  0400 11/14/22  0121 11/12/22  0514 11/11/22  0411   SODIUM mmol/L 128* 127* 124* 130* 122*   POTASSIUM mmol/L 5.3* 5.0 6.1* 4.9 5.4*   CHLORIDE mmol/L 90* 88* 88* 90* 85*   CO2 mmol/L 26.3 27.6 20.9* 26.4 21.9*   BUN mg/dL 74* 59* 86* 53* 74*   CREATININE mg/dL 4.75* 3.79* 5.30* 3.95* 4.94*   CALCIUM mg/dL 8.2* 8.3*  8.2* 8.4* 8.3*     Estimated Creatinine Clearance: 16.6 mL/min (A) (by C-G formula based on SCr of 4.75 mg/dL (H)).  @GFRCG:3@   Liver and pancreatic function:        Invalid input(s): PROT      Cardiac:      Liver and pancreatic function:        Invalid input(s): PROT    Medications :     apixaban, 5 mg, Oral, Q12H  budesonide-formoterol, 2 puff, Inhalation, BID - RT  calcium acetate, 2,668 mg, Oral, TID With Meals  cetirizine, 10 mg, Oral, Daily  Insulin Aspart, 0-14 Units, Subcutaneous, TID AC  insulin detemir, 20 Units, Subcutaneous, Daily  levothyroxine, 88 mcg, Oral, Q AM  melatonin, 10 mg, Oral, Nightly  metoprolol tartrate, 12.5 mg, Oral, Q12H  pantoprazole, 40 mg, Oral, Q AM  Renal, 1 capsule, Oral, Daily  sodium chloride, 3 mL, Intravenous, Q12H  thiamine, 100 mg, Oral, Daily        Assessment & Plan     1. ESKD on IHDx  2. Anemia  3. Hyponatremia, hypervolemic  4. Anion gap metabolic acidosis  5. SHPT  6. Acute bacterial pneumonia  7. Hyperkalemia    Evaluated on dialysis, tolerating well and hemodynamically stable.   UF today 3 L  Hyponatremia; FR and Low K diet  Anemia; HH at target  SHPT; follow outpatient management        Bladimir Alvarado MD  11/16/22  10:55 EST

## 2022-11-16 NOTE — PROGRESS NOTES
[facility] HOSPITALIST PROGRESS NOTE     Patient Identification:  Name:  Axel Desir  Age:  62 y.o.  Sex:  male  :  1960  MRN:  [id number]  Visit Number:  [id number]  ROOM: [address] [place] [address]     Primary Care Provider:  Isabelle Martinez APRN    Length of stay in inpatient status:  5    Subjective     Chief Compliant:    Chief Complaint   Patient presents with   • Weakness - Generalized       History of Presenting Illness:    Patient was resting on my evaluation with eyes closed. Denied any complaints or concerns. He has been angry with nursing staff requesting specific foods. Has been ambulating independently to bathroom per SW.     ROS:  Otherwise 10 point ROS negative other than documented above in HPI.     Objective     Current Hospital Meds:apixaban, 5 mg, Oral, Q12H  budesonide-formoterol, 2 puff, Inhalation, BID - RT  calcium acetate, 2,668 mg, Oral, TID With Meals  [START ON 2022] cetirizine, 5 mg, Oral, Daily  Insulin Aspart, 0-14 Units, Subcutaneous, TID AC  insulin detemir, 20 Units, Subcutaneous, Daily  levothyroxine, 88 mcg, Oral, Q AM  melatonin, 10 mg, Oral, Nightly  metoprolol tartrate, 12.5 mg, Oral, Q12H  pantoprazole, 40 mg, Oral, Q AM  Renal, 1 capsule, Oral, Daily  sodium chloride, 3 mL, Intravenous, Q12H  thiamine, 100 mg, Oral, Daily         Current Antimicrobial Therapy:  Anti-Infectives (From admission, onward)    Ordered     Dose/Rate Route Frequency Start Stop    22  cefdinir (OMNICEF) capsule 300 mg        Ordering Provider: Palma Todd PA-C    300 mg Oral Every 24 Hours Scheduled 22 0900 11/10/22 0922  doxycycline (VIBRAMYCIN) 100 mg in sodium chloride 0.9 % 100 mL IVPB-VTB        Ordering Provider: Palma Todd PA-C    100 mg  over 60 Minutes Intravenous Every 12 Hours 22 0900 11/10/22 2300    22  cefTRIAXone (ROCEPHIN) 1 g/100 mL 0.9% NS (MBP)        Ordering Provider: Monica Rae PA    1  g  over 30 Minutes Intravenous Once 11/03/22 2130 11/03/22 2305 11/03/22 1912  doxycycline (MONODOX) capsule 100 mg        Ordering Provider: Monica Rae PA    100 mg Oral Once 11/03/22 1914 11/03/22 2119 11/03/22 1911  cefuroxime (CEFTIN) 500 MG tablet        Ordering Provider: Monica Rae PA    500 mg Oral 2 Times Daily 11/03/22 0000      11/03/22 1911  doxycycline (MONODOX) 100 MG capsule        Ordering Provider: Monica Rae PA    100 mg Oral 2 Times Daily 11/03/22 0000 11/13/22 1996        Current Diuretic Therapy:  Diuretics (From admission, onward)    None        ----------------------------------------------------------------------------------------------------------------------  Vital Signs:  Temp:  [97.6 °F (36.4 °C)-98.1 °F (36.7 °C)] 98 °F (36.7 °C)  Heart Rate:  [] 88  Resp:  [16-22] 18  BP: (112-151)/(61-86) 120/68  SpO2:  [93 %-98 %] 93 %  on   ;   Device (Oxygen Therapy): room air  Body mass index is 24.98 kg/m².    Wt Readings from Last 3 Encounters:   11/04/22 74.5 kg (164 lb 4.8 oz)   11/01/22 82.1 kg (181 lb)   10/24/22 80.4 kg (177 lb 3.2 oz)     Intake & Output (last 3 days)       11/13 0701 11/14 0700 11/14 0701  11/15 0700 11/15 0701 11/16 0700 11/16 0701 11/17 0700    P.O.      I.V. (mL/kg) 0 (0) 0 (0) 0 (0)     Total Intake(mL/kg) 799 (10.7) 780 (10.5) 1169 (15.7)     Other  2500  2800    Total Output  2500  2800    Net +799 -1720 +1169 -2800            Urine Unmeasured Occurrence 4 x 1 x      Stool Unmeasured Occurrence 1 x 1 x 1 x         Diet Soft to Chew (NDD 3); Chopped Meat; Regular Consistency; Cardiac Diets, Diabetic Diets, Renal Diets, Fluid Restriction (240 mL/tray) Diets; Healthy Heart (2-3 Na+); Consistent Carbohydrate; Low Sodium (2-3g); Other (Specify mL/day) (800)  ----------------------------------------------------------------------------------------------------------------------  Physical exam:  Constitutional:  Well-developed and  well-nourished.  No respiratory distress.      HENT:  Head:  Normocephalic and atraumatic.  Mouth:  Moist mucous membranes.    Eyes:  Conjunctivae and EOM are normal. No scleral icterus.    Neck:  Neck supple.  No JVD present.    Cardiovascular:  Normal rate, regular rhythm and normal heart sounds with no murmur.  Pulmonary/Chest:  No respiratory distress, no wheezes, no crackles, with normal breath sounds and good air movement.  Abdominal:  Soft.  Bowel sounds are normal.  No distension and no tenderness.   Musculoskeletal:  No edema, no tenderness, and no deformity.  No red or swollen joints anywhere.    Neurological:  Alert and oriented to person, place, and time.  No cranial nerve deficit.  No tongue deviation.  No facial droop.  No slurred speech.   Skin:  Skin is warm and dry. No rash noted. No pallor.   Peripheral vascular:  Pulses in all 4 extremities with no clubbing, no cyanosis, no edema.  ----------------------------------------------------------------------------------------------------------------------  Tele:    ----------------------------------------------------------------------------------------------------------------------  Results from last 7 days   Lab Units 11/16/22  0216 11/15/22  0601 11/14/22  0121 11/12/22  0514   WBC 10*3/mm3 8.69  --  10.44 7.99   HEMOGLOBIN g/dL 9.1* 10.4* 10.9* 10.2*   HEMATOCRIT % 27.3* 30.5* 32.5* 31.1*   MCV fL 102.2*  --  102.2* 101.3*   MCHC g/dL 33.3  --  33.5 32.8   PLATELETS 10*3/mm3 86*  --  108* 109*         Results from last 7 days   Lab Units 11/16/22  0216 11/15/22  0400 11/14/22  0121   SODIUM mmol/L 128* 127* 124*   POTASSIUM mmol/L 5.3* 5.0 6.1*   CHLORIDE mmol/L 90* 88* 88*   CO2 mmol/L 26.3 27.6 20.9*   BUN mg/dL 74* 59* 86*   CREATININE mg/dL 4.75* 3.79* 5.30*   CALCIUM mg/dL 8.2* 8.3* 8.2*   GLUCOSE mg/dL 143* 255* 259*   Estimated Creatinine Clearance: 16.6 mL/min (A) (by C-G formula based on SCr of 4.75 mg/dL (H)).  No results found for:  AMMONIA  Results from last 7 days   Lab Units 11/15/22  0922   TROPONIN T ng/mL 0.040*             Glucose   Date/Time Value Ref Range Status   11/16/2022 1649 104 70 - 130 mg/dL Final     Comment:     Meter: [id number] : [id number] THU HANCOCK   11/16/2022 1202 117 70 - 130 mg/dL Final     Comment:     Meter: [id number] : [id number] THU HANCOCK   11/16/2022 0622 77 70 - 130 mg/dL Final     Comment:     Meter: [id number] : [id number] Moustapha Nelson   11/15/2022 1617 310 (H) 70 - 130 mg/dL Final     Comment:     Meter: [id number] : [id number] Dona Sutherlandney   11/15/2022 1019 343 (H) 70 - 130 mg/dL Final     Comment:     Meter: [id number] : [id number] Dona Sunil   11/15/2022 0649 246 (H) 70 - 130 mg/dL Final     Comment:     Meter: [id number] : [id number] EDGARD LEAH   11/14/2022 2041 185 (H) 70 - 130 mg/dL Final     Comment:     Meter: [id number] : [id number] SHANTANU SANTIZO   11/14/2022 1620 76 70 - 130 mg/dL Final     Comment:     Meter: [id number] : [id number] Dona Sunil     Lab Results   Component Value Date    TSH 9.130 (H) 11/01/2022    FREET4 1.02 11/01/2022     No results found for: PREGTESTUR, PREGSERUM, HCG, HCGQUANT  Pain Management Panel     Pain Management Panel Latest Ref Rng & Units 4/3/2019 4/2/2019    CREATININE UR mg/dL 18.4 44.3    AMPHETAMINES SCREEN, URINE Negative - -    BARBITURATES SCREEN Negative - -    BENZODIAZEPINE SCREEN, URINE Negative - -    BUPRENORPHINEUR Negative - -    COCAINE SCREEN, URINE Negative - -    METHADONE SCREEN, URINE Negative - -    METHAMPHETAMINEUR Negative - -        Brief Urine Lab Results     None        No results found for: BLOODCX  No results found for: URINECX  No results found for: WOUNDCX  No results found for: STOOLCX  No results found for: RESPCX  No results found for: AFBCX        I have personally looked at the labs and they are summarized  above.  ----------------------------------------------------------------------------------------------------------------------  Detailed radiology reports for the last 24 hours:    Imaging Results (Last 24 Hours)     ** No results found for the last 24 hours. **        Assessment & Plan    #Right-sided pneumonia  #Acute exacerbation of COPD  #ESRD on HD  #DM II, non-insulin dependent  #Hyponatremia  #Hyperkalemia  #Paroxysmal Afib  #Chronic macrocytic anemia  #Thrombocytopenia     Patient completed abx course. Continue nebs. Had been on steroids around 8 days, stopped prednisone. This will also help hyeprglycemia. Increased SSI with great improvement in blood glucoses. Continue dialysis as per nephrology. Hyperkalemia trended back up today. Will repeat in AM after dialysis today. Platelets down slightly but not far from baseline.      Dispo: Awaiting SNF      VTE Prophylaxis:   Mechanical Order History:     None      Pharmalogical Order History:      Ordered     Dose Route Frequency Stop    11/07/22 1520  apixaban (ELIQUIS) tablet 5 mg         5 mg PO Every 12 Hours Scheduled --    Pending  apixaban (ELIQUIS) tablet 5 mg         5 mg PO Every 12 Hours Scheduled 11/23/22 0859                Isaias Cooper MD  [facility]  11/16/22  18:15 EST

## 2022-11-16 NOTE — PROGRESS NOTES
[facility] HOSPITALIST PROGRESS NOTE     Patient Identification:  Name:  Axel Desir  Age:  62 y.o.  Sex:  male  :  1960  MRN:  [id number]  Visit Number:  [id number]  ROOM: [address] [place] [address]     Primary Care Provider:  Isabelle Martinez APRN    Length of stay in inpatient status:  4    Subjective     Chief Compliant:    Chief Complaint   Patient presents with   • Weakness - Generalized       History of Presenting Illness:    Patient denies any new complaints. He reports feeling well. No family bedside.     ROS:  Otherwise 10 point ROS negative other than documented above in HPI.     Objective     Current Hospital Meds:apixaban, 5 mg, Oral, Q12H  budesonide-formoterol, 2 puff, Inhalation, BID - RT  calcium acetate, 2,668 mg, Oral, TID With Meals  cetirizine, 10 mg, Oral, Daily  Insulin Aspart, 0-9 Units, Subcutaneous, TID AC  insulin detemir, 20 Units, Subcutaneous, Daily  levothyroxine, 88 mcg, Oral, Q AM  melatonin, 10 mg, Oral, Nightly  metoprolol tartrate, 12.5 mg, Oral, Q12H  pantoprazole, 40 mg, Oral, Q AM  predniSONE, 40 mg, Oral, Daily With Breakfast  Renal, 1 capsule, Oral, Daily  sodium chloride, 3 mL, Intravenous, Q12H  thiamine, 100 mg, Oral, Daily         Current Antimicrobial Therapy:  Anti-Infectives (From admission, onward)    Ordered     Dose/Rate Route Frequency Start Stop    22  cefdinir (OMNICEF) capsule 300 mg        Ordering Provider: Palma Todd PA-C    300 mg Oral Every 24 Hours Scheduled 22 0900 11/10/22 0921    11/04/22 0213  doxycycline (VIBRAMYCIN) 100 mg in sodium chloride 0.9 % 100 mL IVPB-VTB        Ordering Provider: Palma Todd PA-C    100 mg  over 60 Minutes Intravenous Every 12 Hours 22 0900 11/10/22 2300    11/03/22 2128  cefTRIAXone (ROCEPHIN) 1 g/100 mL 0.9% NS (MBP)        Ordering Provider: Monica Rae PA    1 g  over 30 Minutes Intravenous Once 22  doxycycline (MONODOX)  capsule 100 mg        Ordering Provider: Monica Rae PA    100 mg Oral Once 11/03/22 1914 11/03/22 2119 11/03/22 1911  cefuroxime (CEFTIN) 500 MG tablet        Ordering Provider: Monica Rae PA    500 mg Oral 2 Times Daily 11/03/22 0000      11/03/22 1911  doxycycline (MONODOX) 100 MG capsule        Ordering Provider: Monica Rae PA    100 mg Oral 2 Times Daily 11/03/22 0000 11/13/22 6829        Current Diuretic Therapy:  Diuretics (From admission, onward)    None        ----------------------------------------------------------------------------------------------------------------------  Vital Signs:  Temp:  [97.4 °F (36.3 °C)-98 °F (36.7 °C)] 98 °F (36.7 °C)  Heart Rate:  [] 111  Resp:  [18-20] 18  BP: (121-134)/(54-90) 132/86  SpO2:  [94 %-97 %] 96 %  on   ;   Device (Oxygen Therapy): room air  Body mass index is 24.98 kg/m².    Wt Readings from Last 3 Encounters:   11/04/22 74.5 kg (164 lb 4.8 oz)   11/01/22 82.1 kg (181 lb)   10/24/22 80.4 kg (177 lb 3.2 oz)     Intake & Output (last 3 days)       11/13 0701 11/14 0700 11/14 0701  11/15 0700 11/15 0701 11/16 0700    P.O.     I.V. (mL/kg) 0 (0) 0 (0) 0 (0)    Total Intake(mL/kg) 799 (10.7) 780 (10.5) 1119 (15)    Other  2500     Total Output  2500     Net +799 -1720 +1119           Urine Unmeasured Occurrence 4 x 1 x     Stool Unmeasured Occurrence 1 x 1 x 1 x        Diet Soft to Chew (NDD 3); Chopped Meat; Regular Consistency; Cardiac Diets, Diabetic Diets, Renal Diets, Fluid Restriction (240 mL/tray) Diets; Healthy Heart (2-3 Na+); Consistent Carbohydrate; Low Sodium (2-3g); Other (Specify mL/day) (800)  ----------------------------------------------------------------------------------------------------------------------  Physical exam:  Constitutional:  Well-developed and well-nourished.  No respiratory distress.      HENT:  Head:  Normocephalic and atraumatic.  Mouth:  Moist mucous membranes.    Eyes:  Conjunctivae and EOM  are normal. No scleral icterus.    Neck:  Neck supple.  No JVD present.    Cardiovascular:  Normal rate, regular rhythm and normal heart sounds with no murmur.  Pulmonary/Chest:  No respiratory distress, no wheezes, no crackles, with normal breath sounds and good air movement.  Abdominal:  Soft.  Bowel sounds are normal.  No distension and no tenderness.   Musculoskeletal:  No edema, no tenderness, and no deformity.  No red or swollen joints anywhere.    Neurological:  Alert and oriented to person, place, and time.  No cranial nerve deficit.  No tongue deviation.  No facial droop.  No slurred speech.   Skin:  Skin is warm and dry. No rash noted. No pallor.   Peripheral vascular:  Pulses in all 4 extremities with no clubbing, no cyanosis, no edema.  ----------------------------------------------------------------------------------------------------------------------  Tele:    ----------------------------------------------------------------------------------------------------------------------  Results from last 7 days   Lab Units 11/15/22  0601 11/14/22  0121 11/12/22  0514 11/11/22  0411   WBC 10*3/mm3  --  10.44 7.99 7.56   HEMOGLOBIN g/dL 10.4* 10.9* 10.2* 10.7*   HEMATOCRIT % 30.5* 32.5* 31.1* 32.5*   MCV fL  --  102.2* 101.3* 103.5*   MCHC g/dL  --  33.5 32.8 32.9   PLATELETS 10*3/mm3  --  108* 109* 102*         Results from last 7 days   Lab Units 11/15/22  0400 11/14/22  0121 11/12/22  0514   SODIUM mmol/L 127* 124* 130*   POTASSIUM mmol/L 5.0 6.1* 4.9   CHLORIDE mmol/L 88* 88* 90*   CO2 mmol/L 27.6 20.9* 26.4   BUN mg/dL 59* 86* 53*   CREATININE mg/dL 3.79* 5.30* 3.95*   CALCIUM mg/dL 8.3* 8.2* 8.4*   GLUCOSE mg/dL 255* 259* 100*   Estimated Creatinine Clearance: 20.7 mL/min (A) (by C-G formula based on SCr of 3.79 mg/dL (H)).  No results found for: AMMONIA  Results from last 7 days   Lab Units 11/15/22  0922   TROPONIN T ng/mL 0.040*             Glucose   Date/Time Value Ref Range Status   11/15/2022 1560  310 (H) 70 - 130 mg/dL Final     Comment:     Meter: [id number] : [id number] Dona Sutherlandney   11/15/2022 1019 343 (H) 70 - 130 mg/dL Final     Comment:     Meter: [id number] : [id number] Dona Nyssa   11/15/2022 0649 246 (H) 70 - 130 mg/dL Final     Comment:     Meter: [id number] : [id number] EDGARD LEAH   11/14/2022 2041 185 (H) 70 - 130 mg/dL Final     Comment:     Meter: [id number] : [id number] SHANTANU TADEOERT   11/14/2022 1620 76 70 - 130 mg/dL Final     Comment:     Meter: [id number] : [id number] Dona Sunil   11/14/2022 1311 71 70 - 130 mg/dL Final     Comment:     M[facility]: [id number] : [id number] [facility]   11/14/2022 1206 71 70 - 130 mg/dL Final     Comment:     Meter: [id number] : [id number] [facility]   11/14/2022 0616 260 (H) 70 - 130 mg/dL Final     Comment:     Meter: [id number] : [id number] EDGARD LEAH     Lab Results   Component Value Date    TSH 9.130 (H) 11/01/2022    FREET4 1.02 11/01/2022     No results found for: PREGTESTUR, PREGSERUM, HCG, HCGQUANT  Pain Management Panel     Pain Management Panel Latest Ref Rng & Units 4/3/2019 4/2/2019    CREATININE UR mg/dL 18.4 44.3    AMPHETAMINES SCREEN, URINE Negative - -    BARBITURATES SCREEN Negative - -    BENZODIAZEPINE SCREEN, URINE Negative - -    BUPRENORPHINEUR Negative - -    COCAINE SCREEN, URINE Negative - -    METHADONE SCREEN, URINE Negative - -    METHAMPHETAMINEUR Negative - -        Brief Urine Lab Results     None        No results found for: BLOODCX  No results found for: URINECX  No results found for: WOUNDCX  No results found for: STOOLCX  No results found for: RESPCX  No results found for: AFBCX        I have personally looked at the labs and they are summarized above.  ----------------------------------------------------------------------------------------------------------------------  Detailed radiology reports for the last 24 hours:    Imaging Results (Last 24 Hours)     ** No results found  for the last 24 hours. **        Assessment & Plan      #Right-sided pneumonia  #Acute exacerbation of COPD  #ESRD on HD  #DM II, non-insulin dependent  #Hyponatremia  #Hyperkalemia  #Paroxysmal Afib  #Chronic macrocytic anemia  #Thrombocytopenia    Patient completed abx course. Continue nebs. Has been on steroids around 8 days, will stop prednisone. This will also hep hyeprglycemia. Will increase SSI int he meantime. Continue dialysis as per nephrology. Hyperkalemia and hyponatremia improved with dialysis.     Dispo: Awaiting SNF      VTE Prophylaxis:   Mechanical Order History:     None      Pharmalogical Order History:      Ordered     Dose Route Frequency Stop    11/07/22 1520  apixaban (ELIQUIS) tablet 5 mg         5 mg PO Every 12 Hours Scheduled --    Pending  apixaban (ELIQUIS) tablet 5 mg         5 mg PO Every 12 Hours Scheduled 11/23/22 0859                  Isaias Cooper MD  [facility] Hospitalist  11/15/22  19:42 EST

## 2022-11-16 NOTE — CASE MANAGEMENT/SOCIAL WORK
Discharge Planning Assessment  [facility]     Patient Name: Axel Desir  MRN: [id number]  Today's Date: 11/16/2022    Admit Date: 11/3/2022    Plan: SS spoke with pt at bedside on this date 11/16/22 to follow up on discharge plan. Pt states he wants to go home. SS informed pt that we were working on placement and states he wants to return home. SS informed pt if he goes home we can arrange home health services if needed and pt states he would like home health arranged. Pt has no preference on home health agency. SS notified pt's cousin, Candace [phone] who states she is agreeable with what ever the pt wants to do and states it is a great idea to send him home with home health services. SS to notify physician. SS to follow.     Discharge Plan     Row Name 11/16/22 1545       Plan    Plan SS spoke with pt at bedside on this date 11/16/22 to follow up on discharge plan. Pt states he wants to go home. SS informed pt that we were working on placement and states he wants to return home. SS informed pt if he goes home we can arrange home health services if needed and pt states he would like home health arranged. Pt has no preference on home health agency. SS notified pt's cousin, Candace [phone] who states she is agreeable with what ever the pt wants to do and states it is a great idea to send him home with home health services. SS to notify physician. SS to follow.    1600- SS contacted [facility] kurt Arias [phone] who states pt still has an apartment and they are agreeable for him to return to their facility at discharge.                 Mary Ann Buitrago, BSW

## 2022-11-17 LAB
ACANTHOCYTES BLD QL SMEAR: NORMAL
ANION GAP SERPL CALCULATED.3IONS-SCNC: 10.9 MMOL/L (ref 5–15)
ANISOCYTOSIS BLD QL: NORMAL
BASOPHILS # BLD AUTO: 0.01 10*3/MM3 (ref 0–0.2)
BASOPHILS NFR BLD AUTO: 0.1 % (ref 0–1.5)
BUN SERPL-MCNC: 45 MG/DL (ref 8–23)
BUN/CREAT SERPL: 14.2 (ref 7–25)
C3 FRG RBC-MCNC: NORMAL
CALCIUM SPEC-SCNC: 8.4 MG/DL (ref 8.6–10.5)
CHLORIDE SERPL-SCNC: 87 MMOL/L (ref 98–107)
CO2 SERPL-SCNC: 27.1 MMOL/L (ref 22–29)
CREAT SERPL-MCNC: 3.17 MG/DL (ref 0.76–1.27)
DEPRECATED RDW RBC AUTO: 73.4 FL (ref 37–54)
EGFRCR SERPLBLD CKD-EPI 2021: 21 ML/MIN/1.73
EOSINOPHIL # BLD AUTO: 0.06 10*3/MM3 (ref 0–0.4)
EOSINOPHIL NFR BLD AUTO: 0.6 % (ref 0.3–6.2)
ERYTHROCYTE [DISTWIDTH] IN BLOOD BY AUTOMATED COUNT: 20.2 % (ref 12.3–15.4)
GLUCOSE BLDC GLUCOMTR-MCNC: 118 MG/DL (ref 70–130)
GLUCOSE BLDC GLUCOMTR-MCNC: 172 MG/DL (ref 70–130)
GLUCOSE BLDC GLUCOMTR-MCNC: 182 MG/DL (ref 70–130)
GLUCOSE BLDC GLUCOMTR-MCNC: 232 MG/DL (ref 70–130)
GLUCOSE BLDC GLUCOMTR-MCNC: 247 MG/DL (ref 70–130)
GLUCOSE BLDC GLUCOMTR-MCNC: 54 MG/DL (ref 70–130)
GLUCOSE BLDC GLUCOMTR-MCNC: 55 MG/DL (ref 70–130)
GLUCOSE BLDC GLUCOMTR-MCNC: 58 MG/DL (ref 70–130)
GLUCOSE SERPL-MCNC: 49 MG/DL (ref 65–99)
HCT VFR BLD AUTO: 27.5 % (ref 37.5–51)
HGB BLD-MCNC: 9.1 G/DL (ref 13–17.7)
IMM GRANULOCYTES # BLD AUTO: 0.13 10*3/MM3 (ref 0–0.05)
IMM GRANULOCYTES NFR BLD AUTO: 1.3 % (ref 0–0.5)
LYMPHOCYTES # BLD AUTO: 1.46 10*3/MM3 (ref 0.7–3.1)
LYMPHOCYTES NFR BLD AUTO: 14.2 % (ref 19.6–45.3)
MACROCYTES BLD QL SMEAR: NORMAL
MCH RBC QN AUTO: 33.8 PG (ref 26.6–33)
MCHC RBC AUTO-ENTMCNC: 33.1 G/DL (ref 31.5–35.7)
MCV RBC AUTO: 102.2 FL (ref 79–97)
MONOCYTES # BLD AUTO: 0.79 10*3/MM3 (ref 0.1–0.9)
MONOCYTES NFR BLD AUTO: 7.7 % (ref 5–12)
NEUTROPHILS NFR BLD AUTO: 7.82 10*3/MM3 (ref 1.7–7)
NEUTROPHILS NFR BLD AUTO: 76.1 % (ref 42.7–76)
NRBC BLD AUTO-RTO: 0 /100 WBC (ref 0–0.2)
PLAT MORPH BLD: NORMAL
PLATELET # BLD AUTO: 92 10*3/MM3 (ref 140–450)
PMV BLD AUTO: 11.1 FL (ref 6–12)
POTASSIUM SERPL-SCNC: 4.2 MMOL/L (ref 3.5–5.2)
RBC # BLD AUTO: 2.69 10*6/MM3 (ref 4.14–5.8)
SODIUM SERPL-SCNC: 125 MMOL/L (ref 136–145)
WBC NRBC COR # BLD: 10.27 10*3/MM3 (ref 3.4–10.8)

## 2022-11-17 PROCEDURE — 63710000001 INSULIN DETEMIR PER 5 UNITS: Performed by: INTERNAL MEDICINE

## 2022-11-17 PROCEDURE — 80048 BASIC METABOLIC PNL TOTAL CA: CPT | Performed by: INTERNAL MEDICINE

## 2022-11-17 PROCEDURE — 82962 GLUCOSE BLOOD TEST: CPT

## 2022-11-17 PROCEDURE — 85007 BL SMEAR W/DIFF WBC COUNT: CPT | Performed by: INTERNAL MEDICINE

## 2022-11-17 PROCEDURE — 99231 SBSQ HOSP IP/OBS SF/LOW 25: CPT | Performed by: INTERNAL MEDICINE

## 2022-11-17 PROCEDURE — 94799 UNLISTED PULMONARY SVC/PX: CPT

## 2022-11-17 PROCEDURE — 85025 COMPLETE CBC W/AUTO DIFF WBC: CPT | Performed by: INTERNAL MEDICINE

## 2022-11-17 PROCEDURE — 97116 GAIT TRAINING THERAPY: CPT

## 2022-11-17 PROCEDURE — 63710000001 INSULIN ASPART PER 5 UNITS: Performed by: INTERNAL MEDICINE

## 2022-11-17 RX ORDER — INSULIN ASPART 100 [IU]/ML
0-9 INJECTION, SOLUTION INTRAVENOUS; SUBCUTANEOUS
Status: DISCONTINUED | OUTPATIENT
Start: 2022-11-17 | End: 2022-11-20 | Stop reason: HOSPADM

## 2022-11-17 RX ORDER — DEXTROSE MONOHYDRATE 25 G/50ML
25 INJECTION, SOLUTION INTRAVENOUS
Status: DISCONTINUED | OUTPATIENT
Start: 2022-11-17 | End: 2022-11-20 | Stop reason: HOSPADM

## 2022-11-17 RX ORDER — NICOTINE POLACRILEX 4 MG
15 LOZENGE BUCCAL
Status: DISCONTINUED | OUTPATIENT
Start: 2022-11-17 | End: 2022-11-20 | Stop reason: HOSPADM

## 2022-11-17 RX ADMIN — APIXABAN 5 MG: 5 TABLET, FILM COATED ORAL at 08:52

## 2022-11-17 RX ADMIN — Medication 10 MG: at 20:00

## 2022-11-17 RX ADMIN — RENO CAPS 1 MG: 100; 1.5; 1.7; 20; 10; 1; 150; 5; 6 CAPSULE ORAL at 08:52

## 2022-11-17 RX ADMIN — DEXTROSE MONOHYDRATE 25 G: 25 INJECTION, SOLUTION INTRAVENOUS at 03:52

## 2022-11-17 RX ADMIN — METOPROLOL TARTRATE 12.5 MG: 25 TABLET, FILM COATED ORAL at 08:52

## 2022-11-17 RX ADMIN — CETIRIZINE HYDROCHLORIDE 5 MG: 10 TABLET, FILM COATED ORAL at 08:51

## 2022-11-17 RX ADMIN — INSULIN ASPART 4 UNITS: 100 INJECTION, SOLUTION INTRAVENOUS; SUBCUTANEOUS at 17:42

## 2022-11-17 RX ADMIN — IPRATROPIUM BROMIDE AND ALBUTEROL SULFATE 3 ML: .5; 2.5 SOLUTION RESPIRATORY (INHALATION) at 07:25

## 2022-11-17 RX ADMIN — APIXABAN 5 MG: 5 TABLET, FILM COATED ORAL at 20:00

## 2022-11-17 RX ADMIN — METOPROLOL TARTRATE 12.5 MG: 25 TABLET, FILM COATED ORAL at 20:00

## 2022-11-17 RX ADMIN — CALCIUM ACETATE 2668 MG: 667 CAPSULE ORAL at 11:05

## 2022-11-17 RX ADMIN — BUDESONIDE AND FORMOTEROL FUMARATE DIHYDRATE 2 PUFF: 160; 4.5 AEROSOL RESPIRATORY (INHALATION) at 19:37

## 2022-11-17 RX ADMIN — Medication 100 MG: at 08:51

## 2022-11-17 RX ADMIN — INSULIN DETEMIR 10 UNITS: 100 INJECTION, SOLUTION SUBCUTANEOUS at 08:57

## 2022-11-17 RX ADMIN — Medication 3 ML: at 08:55

## 2022-11-17 RX ADMIN — CALCIUM ACETATE 2668 MG: 667 CAPSULE ORAL at 17:42

## 2022-11-17 RX ADMIN — DEXTROSE 15 G: 15 GEL ORAL at 02:49

## 2022-11-17 RX ADMIN — INSULIN ASPART 2 UNITS: 100 INJECTION, SOLUTION INTRAVENOUS; SUBCUTANEOUS at 11:22

## 2022-11-17 RX ADMIN — PANTOPRAZOLE SODIUM 40 MG: 40 TABLET, DELAYED RELEASE ORAL at 05:48

## 2022-11-17 RX ADMIN — LEVOTHYROXINE SODIUM 88 MCG: 88 TABLET ORAL at 05:48

## 2022-11-17 RX ADMIN — CALCIUM ACETATE 2668 MG: 667 CAPSULE ORAL at 08:53

## 2022-11-17 NOTE — PROGRESS NOTES
Nephrology Progress Note      Subjective     No chest pain or shortness of breath.     Objective       Vital signs :     Temp:  [97.2 °F (36.2 °C)-98.6 °F (37 °C)] 97.2 °F (36.2 °C)  Heart Rate:  [] 113  Resp:  [12-20] 12  BP: (100-141)/(61-84) 110/69    Intake/Output                             11/15/22 0701 - 11/16/22 0700 11/16/22 0701 - 11/17/22 0700 11/17/22 0701 - 11/18/22 0700     3091-7017 4807-5448 Total 2545-5193 3611-9742 Total 9963-5808 9067-2777 Total                    Intake    P.O.  1119  50 1169  --  480 480  290  -- 290    I.V.  0  -- 0  --  -- --  --  -- --    Total Intake 1119 50 1169 -- 480 480 290 -- 290       Output    Other  --  -- --  2800  -- 2800  --  -- --    Ultrafiltration (mL) -- -- -- 2800 -- 2800 -- -- --    Total Output -- -- -- 2800 -- 2800 -- -- --           Physical Exam:    General Appearance : not in acute distress  Lungs : clear to auscultation, respirations regular  Heart :  regular rhythm & normal rate, normal S1, S2 and no murmur, no rub  Abdomen : soft, non distended  Extremities : 2+ edema  Neurologic :   orientated to person, place, time and situation, Grossly no focal deficits    Laboratory Data :     Albumin No results found for: ALBUMIN   Magnesium No results found for: MG       PTH               No results found for: PTH    CBC and coagulation:  Results from last 7 days   Lab Units 11/17/22  0118 11/16/22  0216 11/15/22  0601 11/14/22  0121   WBC 10*3/mm3 10.27 8.69  --  10.44   HEMOGLOBIN g/dL 9.1* 9.1* 10.4* 10.9*   HEMATOCRIT % 27.5* 27.3* 30.5* 32.5*   MCV fL 102.2* 102.2*  --  102.2*   MCHC g/dL 33.1 33.3  --  33.5   PLATELETS 10*3/mm3 92* 86*  --  108*     Acid/base balance:      Renal and electrolytes:    Results from last 7 days   Lab Units 11/17/22  0118 11/16/22  0216 11/15/22  0400 11/14/22  0121 11/12/22  0514   SODIUM mmol/L 125* 128* 127* 124* 130*   POTASSIUM mmol/L 4.2 5.3* 5.0 6.1* 4.9   CHLORIDE mmol/L 87* 90* 88* 88* 90*   CO2 mmol/L 27.1  26.3 27.6 20.9* 26.4   BUN mg/dL 45* 74* 59* 86* 53*   CREATININE mg/dL 3.17* 4.75* 3.79* 5.30* 3.95*   CALCIUM mg/dL 8.4* 8.2* 8.3* 8.2* 8.4*     Estimated Creatinine Clearance: 24.8 mL/min (A) (by C-G formula based on SCr of 3.17 mg/dL (H)).  @GFRCG:3@   Liver and pancreatic function:        Invalid input(s): PROT      Cardiac:      Liver and pancreatic function:        Invalid input(s): PROT    Medications :     apixaban, 5 mg, Oral, Q12H  budesonide-formoterol, 2 puff, Inhalation, BID - RT  calcium acetate, 2,668 mg, Oral, TID With Meals  cetirizine, 5 mg, Oral, Daily  Insulin Aspart, 0-9 Units, Subcutaneous, TID AC  insulin detemir, 10 Units, Subcutaneous, Daily  levothyroxine, 88 mcg, Oral, Q AM  melatonin, 10 mg, Oral, Nightly  metoprolol tartrate, 12.5 mg, Oral, Q12H  pantoprazole, 40 mg, Oral, Q AM  Renal, 1 capsule, Oral, Daily  sodium chloride, 3 mL, Intravenous, Q12H  thiamine, 100 mg, Oral, Daily        Assessment & Plan     1. ESKD on IHDx  2. Anemia  3. Hyponatremia, hypervolemic  4. Anion gap metabolic acidosis  5. SHPT  6. Acute bacterial pneumonia  7. Hyperkalemia    Pt had uneventful dialysis yesterday, grossly stable from renal stands point, continue on IHDX MWF    Hyponatremia; FR and Low K diet  Anemia; HH at target  SHPT; follow outpatient management        Bladimir Alvarado MD  11/17/22  11:51 EST

## 2022-11-17 NOTE — PLAN OF CARE
Goal Outcome Evaluation:  Plan of Care Reviewed With: patient        Progress: no change  Outcome Evaluation: Pt resting in bed. Vital signs stable. Pt glucose was critical at 49, hospitalist notified. PRN medications given per orders. Pt tolerated well, glucose now 172. Will continue plan of care.

## 2022-11-17 NOTE — PROGRESS NOTES
[facility] HOSPITALIST PROGRESS NOTE     Patient Identification:  Name:  Axel Desir  Age:  62 y.o.  Sex:  male  :  1960  MRN:  [id number]  Visit Number:  [id number]  ROOM: [address] [place] [address]     Primary Care Provider:  Isabelle Martinze APRN    Length of stay in inpatient status:  6    Subjective     Chief Compliant:    Chief Complaint   Patient presents with   • Weakness - Generalized       History of Presenting Illness:    I discussed dispo with patient. He noted he would now like to return home instead of go to nursing home. He lives at apartment and notes his neighbors do check on him time to time. He did not know his insulin regimen. Overnight patient got hypoglycemic into the 40's, now improved. He does not think his glucoses get that low at home. Patient noted he would be more comfortable staying till tomorrow to make sure we put him on a regimen that will not cause hypoglycemia in the future. I think this is reasonable given the lack of support he will have.     ROS:  Otherwise 10 point ROS negative other than documented above in HPI.     Objective     Current Hospital Meds:apixaban, 5 mg, Oral, Q12H  budesonide-formoterol, 2 puff, Inhalation, BID - RT  calcium acetate, 2,668 mg, Oral, TID With Meals  cetirizine, 5 mg, Oral, Daily  Insulin Aspart, 0-14 Units, Subcutaneous, TID AC  insulin detemir, 10 Units, Subcutaneous, Daily  levothyroxine, 88 mcg, Oral, Q AM  melatonin, 10 mg, Oral, Nightly  metoprolol tartrate, 12.5 mg, Oral, Q12H  pantoprazole, 40 mg, Oral, Q AM  Renal, 1 capsule, Oral, Daily  sodium chloride, 3 mL, Intravenous, Q12H  thiamine, 100 mg, Oral, Daily         Current Antimicrobial Therapy:  Anti-Infectives (From admission, onward)    Ordered     Dose/Rate Route Frequency Start Stop    22  cefdinir (OMNICEF) capsule 300 mg        Ordering Provider: Palma Todd PA-C    300 mg Oral Every 24 Hours Scheduled 22 0900 11/10/22 0921    22   doxycycline (VIBRAMYCIN) 100 mg in sodium chloride 0.9 % 100 mL IVPB-VTB        Ordering Provider: Palma Todd PA-C    100 mg  over 60 Minutes Intravenous Every 12 Hours 11/04/22 0900 11/10/22 2300    11/03/22 2128  cefTRIAXone (ROCEPHIN) 1 g/100 mL 0.9% NS (MBP)        Ordering Provider: Monica Rae PA    1 g  over 30 Minutes Intravenous Once 11/03/22 2130 11/03/22 2305 11/03/22 1912  doxycycline (MONODOX) capsule 100 mg        Ordering Provider: Monica Rae PA    100 mg Oral Once 11/03/22 1914 11/03/22 2119 11/03/22 1911  cefuroxime (CEFTIN) 500 MG tablet        Ordering Provider: Monica Rae PA    500 mg Oral 2 Times Daily 11/03/22 0000      11/03/22 1911  doxycycline (MONODOX) 100 MG capsule        Ordering Provider: Monica Rae PA    100 mg Oral 2 Times Daily 11/03/22 0000 11/13/22 5210        Current Diuretic Therapy:  Diuretics (From admission, onward)    None        ----------------------------------------------------------------------------------------------------------------------  Vital Signs:  Temp:  [97.2 °F (36.2 °C)-98.6 °F (37 °C)] 97.2 °F (36.2 °C)  Heart Rate:  [] 113  Resp:  [12-20] 12  BP: (100-141)/(61-84) 110/69  SpO2:  [93 %] 93 %  on  Flow (L/min):  [2] 2;   Device (Oxygen Therapy): nasal cannula  Body mass index is 24.98 kg/m².    Wt Readings from Last 3 Encounters:   11/04/22 74.5 kg (164 lb 4.8 oz)   11/01/22 82.1 kg (181 lb)   10/24/22 80.4 kg (177 lb 3.2 oz)     Intake & Output (last 3 days)       11/14 0701  11/15 0700 11/15 0701 11/16 0700 11/16 0701 11/17 0700 11/17 0701 11/18 0700    P.O. 780 1169 480     I.V. (mL/kg) 0 (0) 0 (0)      Total Intake(mL/kg) 780 (10.5) 1169 (15.7) 480 (6.4)     Other 2500  2800     Total Output 2500  2800     Net -1720 +1169 -2320             Urine Unmeasured Occurrence 1 x  1 x     Stool Unmeasured Occurrence 1 x 1 x 1 x         Diet Soft to Chew (NDD 3); Chopped Meat; Regular Consistency; Cardiac Diets, Diabetic  Diets, Renal Diets, Fluid Restriction (240 mL/tray) Diets; Healthy Heart (2-3 Na+); Consistent Carbohydrate; Low Sodium (2-3g); Other (Specify mL/day) (800)  ----------------------------------------------------------------------------------------------------------------------  Physical exam:  Constitutional:  Well-developed and well-nourished.  No respiratory distress.      HENT:  Head:  Normocephalic and atraumatic.  Mouth:  Moist mucous membranes.    Eyes:  Conjunctivae and EOM are normal. No scleral icterus.    Neck:  Neck supple.  No JVD present.    Cardiovascular:  Normal rate, regular rhythm and normal heart sounds with no murmur.  Pulmonary/Chest:  No respiratory distress, no wheezes, no crackles, with normal breath sounds and good air movement.  Abdominal:  Soft.  Bowel sounds are normal.  No distension and no tenderness.   Musculoskeletal:  No edema, no tenderness, and no deformity.  No red or swollen joints anywhere.    Neurological:  Alert and oriented to person, place, and time.  No cranial nerve deficit.  No tongue deviation.  No facial droop.  No slurred speech.   Skin:  Skin is warm and dry. No rash noted. No pallor.   Peripheral vascular:  Pulses in all 4 extremities with no clubbing, no cyanosis, no edema.  ----------------------------------------------------------------------------------------------------------------------  Tele:    ----------------------------------------------------------------------------------------------------------------------  Results from last 7 days   Lab Units 11/17/22  0118 11/16/22  0216 11/15/22  0601 11/14/22  0121   WBC 10*3/mm3 10.27 8.69  --  10.44   HEMOGLOBIN g/dL 9.1* 9.1* 10.4* 10.9*   HEMATOCRIT % 27.5* 27.3* 30.5* 32.5*   MCV fL 102.2* 102.2*  --  102.2*   MCHC g/dL 33.1 33.3  --  33.5   PLATELETS 10*3/mm3 92* 86*  --  108*         Results from last 7 days   Lab Units 11/17/22  0118 11/16/22  0216 11/15/22  0400   SODIUM mmol/L 125* 128* 127*   POTASSIUM  mmol/L 4.2 5.3* 5.0   CHLORIDE mmol/L 87* 90* 88*   CO2 mmol/L 27.1 26.3 27.6   BUN mg/dL 45* 74* 59*   CREATININE mg/dL 3.17* 4.75* 3.79*   CALCIUM mg/dL 8.4* 8.2* 8.3*   GLUCOSE mg/dL 49* 143* 255*   Estimated Creatinine Clearance: 24.8 mL/min (A) (by C-G formula based on SCr of 3.17 mg/dL (H)).  No results found for: AMMONIA  Results from last 7 days   Lab Units 11/15/22  0922   TROPONIN T ng/mL 0.040*             Glucose   Date/Time Value Ref Range Status   11/17/2022 0730 118 70 - 130 mg/dL Final     Comment:     Meter: [id number] : [id number] LINNETTE HERNANDEZ   11/17/2022 0455 172 (H) 70 - 130 mg/dL Final     Comment:     Meter: [id number] : [id number] RICHIE MELANI   11/17/2022 0345 58 (L) 70 - 130 mg/dL Final     Comment:     Meter: [id number] : [id number] JOLENE ACOSTA   11/17/2022 0236 54 (L) 70 - 130 mg/dL Final     Comment:     Meter: [id number] : [id number] JOLENE ACOSTA   11/17/2022 0207 55 (L) 70 - 130 mg/dL Final     Comment:     Meter: [id number] : [id number] JOLENE ACOSTA   11/16/2022 1649 104 70 - 130 mg/dL Final     Comment:     Meter: [id number] : [id number] THU HANCOCK   11/16/2022 1202 117 70 - 130 mg/dL Final     Comment:     Meter: [id number] : [id number] THU HANCOCK   11/16/2022 0622 77 70 - 130 mg/dL Final     Comment:     Meter: [id number] : [id number] Moustapha Nelson     Lab Results   Component Value Date    TSH 9.130 (H) 11/01/2022    FREET4 1.02 11/01/2022     No results found for: PREGTESTUR, PREGSERUM, HCG, HCGQUANT  Pain Management Panel     Pain Management Panel Latest Ref Rng & Units 4/3/2019 4/2/2019    CREATININE UR mg/dL 18.4 44.3    AMPHETAMINES SCREEN, URINE Negative - -    BARBITURATES SCREEN Negative - -    BENZODIAZEPINE SCREEN, URINE Negative - -    BUPRENORPHINEUR Negative - -    COCAINE SCREEN, URINE Negative - -    METHADONE SCREEN, URINE Negative - -    METHAMPHETAMINEUR Negative - -        Brief Urine Lab Results     None        No  results found for: BLOODCX  No results found for: URINECX  No results found for: WOUNDCX  No results found for: STOOLCX  No results found for: RESPCX  No results found for: AFBCX        I have personally looked at the labs and they are summarized above.  ----------------------------------------------------------------------------------------------------------------------  Detailed radiology reports for the last 24 hours:    Imaging Results (Last 24 Hours)     ** No results found for the last 24 hours. **        Assessment & Plan      Assessment & Plan    #Right-sided pneumonia  #Acute exacerbation of COPD  #ESRD on HD  #DM II, non-insulin dependent  #Hyponatremia  #Hyperkalemia  #Paroxysmal Afib  #Chronic macrocytic anemia  #Thrombocytopenia     Patient completed abx course. Continue nebs. Had been on steroids around 8 days, stopped prednisone on 11/15. This has helped with hyeprglycemia. A1C recently 8.6%. Patient now hypoglycemic overnight. Decreased basal insulin from 20 to 10 units. Decreased SSI.  Continue dialysis as per nephrology. Hyperkalemia resolved today, K 4.2. Will repeat in AM after dialysis today. Platelets around baseline.      Dispo: Likely home in AM.       VTE Prophylaxis:   Mechanical Order History:     None      Pharmalogical Order History:      Ordered     Dose Route Frequency Stop    11/07/22 1520  apixaban (ELIQUIS) tablet 5 mg         5 mg PO Every 12 Hours Scheduled --    Pending  apixaban (ELIQUIS) tablet 5 mg         5 mg PO Every 12 Hours Scheduled 11/23/22 0859                    Isaias Cooper MD  [facility] Hospitalist  11/17/22  09:00 EST

## 2022-11-17 NOTE — CASE MANAGEMENT/SOCIAL WORK
Discharge Planning Assessment  [facility]     Patient Name: Axel Desir  MRN: [id number]  Today's Date: 11/17/2022    Admit Date: 11/3/2022    Plan: SS spoke with pt at bedside on this date 11/17/22 to follow up on discharge plan. Pt states he wants to go home. SS informed pt that we were working on placement and states he wants to return home. SS informed pt if he goes home we can arrange home health services if needed and pt states he would like home health arranged. Pt has no preference on home health agency. Pt's cousin, Candace [phone] who states she is agreeable. SS to follow.     Discharge Plan     Row Name 11/17/22 1604       Plan    Plan SS spoke with pt at bedside on this date 11/17/22 to follow up on discharge plan. Pt states he wants to go home. SS informed pt that we were working on placement and states he wants to return home. SS informed pt if he goes home we can arrange home health services if needed and pt states he would like home health arranged. Pt has no preference on home health agency. Pt's cousin, Candace [phone] who states she is agreeable. SS to follow.                     JAXON Jhaveri
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
## 2022-11-17 NOTE — PLAN OF CARE
Goal Outcome Evaluation:              Outcome Evaluation: pt resting in bed, pt ambulates to restroom with stand by assist, no acute changes in pt, will continue plan of care

## 2022-11-18 LAB
ACANTHOCYTES BLD QL SMEAR: NORMAL
ANION GAP SERPL CALCULATED.3IONS-SCNC: 12.8 MMOL/L (ref 5–15)
ANISOCYTOSIS BLD QL: NORMAL
BASOPHILS # BLD AUTO: 0.01 10*3/MM3 (ref 0–0.2)
BASOPHILS NFR BLD AUTO: 0.1 % (ref 0–1.5)
BUN SERPL-MCNC: 58 MG/DL (ref 8–23)
BUN/CREAT SERPL: 15.1 (ref 7–25)
C3 FRG RBC-MCNC: NORMAL
CALCIUM SPEC-SCNC: 8.3 MG/DL (ref 8.6–10.5)
CHLORIDE SERPL-SCNC: 86 MMOL/L (ref 98–107)
CO2 SERPL-SCNC: 22.2 MMOL/L (ref 22–29)
CREAT SERPL-MCNC: 3.85 MG/DL (ref 0.76–1.27)
DEPRECATED RDW RBC AUTO: 70.1 FL (ref 37–54)
EGFRCR SERPLBLD CKD-EPI 2021: 16.7 ML/MIN/1.73
EOSINOPHIL # BLD AUTO: 0.05 10*3/MM3 (ref 0–0.4)
EOSINOPHIL NFR BLD AUTO: 0.6 % (ref 0.3–6.2)
ERYTHROCYTE [DISTWIDTH] IN BLOOD BY AUTOMATED COUNT: 19.5 % (ref 12.3–15.4)
GLUCOSE BLDC GLUCOMTR-MCNC: 127 MG/DL (ref 70–130)
GLUCOSE BLDC GLUCOMTR-MCNC: 129 MG/DL (ref 70–130)
GLUCOSE BLDC GLUCOMTR-MCNC: 286 MG/DL (ref 70–130)
GLUCOSE SERPL-MCNC: 204 MG/DL (ref 65–99)
HCT VFR BLD AUTO: 27.8 % (ref 37.5–51)
HGB BLD-MCNC: 9.3 G/DL (ref 13–17.7)
IMM GRANULOCYTES # BLD AUTO: 0.12 10*3/MM3 (ref 0–0.05)
IMM GRANULOCYTES NFR BLD AUTO: 1.4 % (ref 0–0.5)
LYMPHOCYTES # BLD AUTO: 1.04 10*3/MM3 (ref 0.7–3.1)
LYMPHOCYTES NFR BLD AUTO: 11.9 % (ref 19.6–45.3)
MACROCYTES BLD QL SMEAR: NORMAL
MCH RBC QN AUTO: 33.6 PG (ref 26.6–33)
MCHC RBC AUTO-ENTMCNC: 33.5 G/DL (ref 31.5–35.7)
MCV RBC AUTO: 100.4 FL (ref 79–97)
MONOCYTES # BLD AUTO: 0.78 10*3/MM3 (ref 0.1–0.9)
MONOCYTES NFR BLD AUTO: 9 % (ref 5–12)
NEUTROPHILS NFR BLD AUTO: 6.71 10*3/MM3 (ref 1.7–7)
NEUTROPHILS NFR BLD AUTO: 77 % (ref 42.7–76)
NRBC BLD AUTO-RTO: 0 /100 WBC (ref 0–0.2)
PLATELET # BLD AUTO: 78 10*3/MM3 (ref 140–450)
PMV BLD AUTO: 12.3 FL (ref 6–12)
POTASSIUM SERPL-SCNC: 5.3 MMOL/L (ref 3.5–5.2)
RBC # BLD AUTO: 2.77 10*6/MM3 (ref 4.14–5.8)
SMALL PLATELETS BLD QL SMEAR: NORMAL
SODIUM SERPL-SCNC: 121 MMOL/L (ref 136–145)
WBC NRBC COR # BLD: 8.71 10*3/MM3 (ref 3.4–10.8)

## 2022-11-18 PROCEDURE — 99231 SBSQ HOSP IP/OBS SF/LOW 25: CPT | Performed by: INTERNAL MEDICINE

## 2022-11-18 PROCEDURE — 94799 UNLISTED PULMONARY SVC/PX: CPT

## 2022-11-18 PROCEDURE — 85025 COMPLETE CBC W/AUTO DIFF WBC: CPT | Performed by: INTERNAL MEDICINE

## 2022-11-18 PROCEDURE — 85007 BL SMEAR W/DIFF WBC COUNT: CPT | Performed by: INTERNAL MEDICINE

## 2022-11-18 PROCEDURE — 82962 GLUCOSE BLOOD TEST: CPT

## 2022-11-18 PROCEDURE — 63710000001 INSULIN ASPART PER 5 UNITS: Performed by: INTERNAL MEDICINE

## 2022-11-18 PROCEDURE — 80048 BASIC METABOLIC PNL TOTAL CA: CPT | Performed by: INTERNAL MEDICINE

## 2022-11-18 RX ORDER — DOXYCYCLINE 100 MG/1
CAPSULE ORAL
Qty: 14 CAPSULE | Refills: 0 | Status: SHIPPED | OUTPATIENT
Start: 2022-11-18 | End: 2022-11-21

## 2022-11-18 RX ORDER — PREDNISONE 20 MG/1
40 TABLET ORAL DAILY
Qty: 10 TABLET | Refills: 0 | Status: SHIPPED | OUTPATIENT
Start: 2022-11-18 | End: 2022-11-21

## 2022-11-18 RX ORDER — IPRATROPIUM BROMIDE AND ALBUTEROL SULFATE 2.5; .5 MG/3ML; MG/3ML
SOLUTION RESPIRATORY (INHALATION)
Qty: 18 ML | Refills: 0 | Status: SHIPPED | OUTPATIENT
Start: 2022-11-18 | End: 2022-11-18 | Stop reason: SDUPTHER

## 2022-11-18 RX ORDER — CETIRIZINE HYDROCHLORIDE 10 MG/1
5 TABLET ORAL DAILY
Start: 2022-11-18

## 2022-11-18 RX ORDER — PREDNISONE 20 MG/1
40 TABLET ORAL DAILY
Qty: 10 TABLET | Refills: 0 | Status: SHIPPED | OUTPATIENT
Start: 2022-11-18 | End: 2022-11-18 | Stop reason: SDUPTHER

## 2022-11-18 RX ORDER — IPRATROPIUM BROMIDE AND ALBUTEROL SULFATE 2.5; .5 MG/3ML; MG/3ML
SOLUTION RESPIRATORY (INHALATION)
Qty: 18 ML | Refills: 0 | Status: SHIPPED | OUTPATIENT
Start: 2022-11-18 | End: 2022-11-21

## 2022-11-18 RX ORDER — BUDESONIDE AND FORMOTEROL FUMARATE DIHYDRATE 160; 4.5 UG/1; UG/1
2 AEROSOL RESPIRATORY (INHALATION)
Qty: 10.2 G | Refills: 0 | Status: SHIPPED | OUTPATIENT
Start: 2022-11-18

## 2022-11-18 RX ORDER — DOXYCYCLINE 100 MG/1
CAPSULE ORAL
Qty: 14 CAPSULE | Refills: 0 | Status: SHIPPED | OUTPATIENT
Start: 2022-11-18 | End: 2022-11-18 | Stop reason: SDUPTHER

## 2022-11-18 RX ADMIN — INSULIN ASPART 6 UNITS: 100 INJECTION, SOLUTION INTRAVENOUS; SUBCUTANEOUS at 18:10

## 2022-11-18 RX ADMIN — PANTOPRAZOLE SODIUM 40 MG: 40 TABLET, DELAYED RELEASE ORAL at 05:37

## 2022-11-18 RX ADMIN — LEVOTHYROXINE SODIUM 88 MCG: 88 TABLET ORAL at 05:37

## 2022-11-18 RX ADMIN — CALCIUM ACETATE 2668 MG: 667 CAPSULE ORAL at 12:19

## 2022-11-18 RX ADMIN — METOPROLOL TARTRATE 12.5 MG: 25 TABLET, FILM COATED ORAL at 20:17

## 2022-11-18 RX ADMIN — METOPROLOL TARTRATE 25 MG: 25 TABLET, FILM COATED ORAL at 15:54

## 2022-11-18 RX ADMIN — Medication 10 MG: at 20:17

## 2022-11-18 RX ADMIN — APIXABAN 5 MG: 5 TABLET, FILM COATED ORAL at 20:17

## 2022-11-18 RX ADMIN — CALCIUM ACETATE 2668 MG: 667 CAPSULE ORAL at 17:36

## 2022-11-18 NOTE — DISCHARGE SUMMARY
[facility] HOSPITALISTS DISCHARGE SUMMARY    Patient Identification:  Name:  Axel Desir  Age:  62 y.o.  Sex:  male  :  1960  MRN:  [id number]  Visit Number:  [id number]    Date of Admission: 11/3/2022  Date of Discharge:  2022    PCP: Isabelle Martinez APRN    DISCHARGE DIAGNOSIS  #Right-sided pneumonia  #Acute exacerbation of COPD  #ESRD on HD  #DM II, non-insulin dependent  #Hyponatremia  #Hyperkalemia  #Paroxysmal Afib  #Chronic macrocytic anemia  #Thrombocytopenia    CONSULTS   Diabetes education  Respiratory therapy   Nephrology   Diet education on low potassium diet    PROCEDURES PERFORMED  None    HOSPITAL COURSE  Patient is a 62 y.o. male presented on  to [facility] complaining of weakness.  Please see the admitting history and physical for further details.      Mr. Desir is our 61 yo M with hx ESRD on HD, medical noncompliance, hepatitis C, coronary artery disease status post CABG, hypothyroidism, paroxysmal atrial fibrillation, chronic anticoagulation with Eliquis who presented with weakness replacing placement. Patient was found to have RUL pneumonia and was started on omnicef and doxycyline and has since finished abx course. Patient developed a COPD exacerbation. Patient started on nebs and steroids with improvement. On my evaluations, patient has been saturating well with NC out of his nose. Patient has been on tachycardic side last couple of days with his Afib, up to 110's. Increased metoprolol this AM to 25 mg BID. He did have some HR up to 130's at dialysis but AM metoprolol was held per nephrology staff. Patient has been mildly hyponatremic and hyperkalemic between iHD that was managed by nephrology. Nutrition consulted to educate patient on low potassium diet. Patient is to continue MWF dialysis. Patient has been refusing PT/OT and ambulating to bathroom independently. He and his family have opted to return home. Ordered HHPT. Patient to f/u with  PCP. Discharged with COPD maintenance medications and COPD rescue kit. Did not prescribe regular JIMMY due to tachycardia. Patient had some hypoglycemia night before last, will discharge on home oral regimen that can be modified for A1C of 8.6% as outpatient. Patient had episodes of RVR and metoprolol titrated up to 37.5 mg BID with improvement. Sodium has varied with dialysis which nephrology reports it not out of the ordinary. K has slightly trended up today to 5.2 but patient will continue MWF outpatient dialysis schedule tomorrow. Today patient was oriented X4, saturating well on 2L NC which he reports he has at home. He noted he would get [facility] to take him to dialysis tomorrow. He reported he was comfortable going home and will be able to ambulate all he needs at home. He noted he was slightly fatigued from not resting well but he felt he would rest better at home.     Addendum: On day of discharge patient had assisted fall to his knees. He was getting around fine prior to event. Nursing staff reports patient gave up effort and in their embrace slowly slumped onto his knees onto the ground. I went to evaluate patient. He denied any discomfort or injury from the assisted fall. Exam also did not elicit any injury. I discussed further therapy that patient has been refusing or alternative disposition. Patient noted that was an isolated event that he can get around well enough to take care of himself at home. He continues to desire to go home even in light of my concern after the incident. Patient notes there is usually always somebody with him and his sister notified us earlier she is sending a local helper to be with him when he gets to his house.         VITAL SIGNS:  Temp:  [97.6 °F (36.4 °C)-98 °F (36.7 °C)] 98 °F (36.7 °C)  Heart Rate:  [] 120  Resp:  [16-18] 18  BP: (107-121)/(69-85) 110/85  SpO2:  [92 %-100 %] 100 %  on  Flow (L/min):  [2] 2;   Device (Oxygen Therapy): nasal cannula    Body mass index is  24.98 kg/m².  Wt Readings from Last 3 Encounters:   11/04/22 74.5 kg (164 lb 4.8 oz)   11/01/22 82.1 kg (181 lb)   10/24/22 80.4 kg (177 lb 3.2 oz)       PHYSICAL EXAM:  Constitutional:  Well-developed and well-nourished.  No respiratory distress.     HENT:  Head:  Normocephalic and atraumatic.  Mouth:  Moist mucous membranes.    Eyes:  Conjunctivae and EOM are normal.  Pupils are equal, round, and reactive to light.  No scleral icterus.    Cardiovascular:  Normal rate, regular rhythm and normal heart sounds with no murmur.  Pulmonary/Chest:  No respiratory distress, no wheezes, no crackles, with normal breath sounds and good air movement.  Abdominal:  Soft.  Bowel sounds are normal.  No distension and no tenderness.   Musculoskeletal:  No edema, no tenderness, and no deformity.  No red or swollen joints anywhere.    Neurological:  Alert and oriented to person, place, and time.  No gross neurological deficit.   Skin:  Skin is warm and dry. No rash noted. No pallor.   Peripheral vascular:  Strong pulses in all 4 extremities with no clubbing, no cyanosis, no edema.    DISCHARGE DISPOSITION   Stable    DISCHARGE MEDICATIONS:     Discharge Medications      New Medications      Instructions Start Date   budesonide-formoterol 160-4.5 MCG/ACT inhaler  Commonly known as: SYMBICORT   Inhale 2 puffs by mouth into the lungs 2 (Two) Times a Day.      doxycycline 100 MG capsule  Commonly known as: MONODOX   Take 1 capsule by mouth every 12 hours for 7 days as part of COPD Rescue Kit. (Only Start if in YELLOW ZONE.)      ipratropium-albuterol 0.5-2.5 mg/3 ml nebulizer  Commonly known as: DUO-NEB   Take 3 mL by neb every 30 minutes as needed for shortness of air for up to 6 doses. Part of COPD Rescue Kit. (Only Start if in YELLOW ZONE.)      Januvia 25 MG tablet  Generic drug: SITagliptin   25 mg, Oral, Daily      Metoprolol Tartrate 37.5 MG tablet   37.5 mg, Oral, Every 12 Hours Scheduled      predniSONE 20 MG tablet  Commonly  known as: DELTASONE   Take 2 tablets by mouth daily for 5 days as part of COPD Rescue Kit. (Only Start if in YELLOW ZONE.)      Spiriva HandiHaler 18 MCG per inhalation capsule  Generic drug: tiotropium   1 capsule, Inhalation, Daily - RT         Changes to Medications      Instructions Start Date   cetirizine 10 MG tablet  Commonly known as: zyrTEC  What changed: how much to take   5 mg, Oral, Daily         Continue These Medications      Instructions Start Date   apixaban 5 MG tablet tablet  Commonly known as: ELIQUIS   5 mg, Oral, Every 12 Hours Scheduled      atorvastatin 40 MG tablet  Commonly known as: LIPITOR   40 mg, Oral, Nightly      calcium acetate 667 MG capsule capsule  Commonly known as: PHOS BINDER)   2,668 mg, Oral, 3 Times Daily With Meals      levothyroxine 88 MCG tablet  Commonly known as: SYNTHROID, LEVOTHROID   88 mcg, Oral, Every Early Morning      nitroglycerin 0.4 MG SL tablet  Commonly known as: NITROSTAT   0.4 mg, Sublingual, Every 5 Minutes PRN      TOMASA-GAB PO   1 tablet, Oral, Daily      thiamine 100 MG tablet  Commonly known as: VITAMIN B1   100 mg, Oral, Daily         Stop These Medications    carvedilol 25 MG tablet  Commonly known as: COREG     metFORMIN 500 MG tablet  Commonly known as: Glucophage            Diet Instructions     Diet: Regular, Renal      Discharge Diet:  Regular  Renal           Additional Instructions for the Follow-ups that You Need to Schedule     Ambulatory Referral to Home Health   As directed      Face to Face Visit Date: 11/18/2022    Follow-up provider for Plan of Care?: I treated the patient in an acute care facility and will not continue treatment after discharge.    Follow-up provider: LIZANDRO DELUCA [[id number]]    Reason/Clinical Findings: ESRD, debility    Describe mobility limitations that make leaving home difficult: ESRD, debility    Nursing/Therapeutic Services Requested: Skilled Nursing Physical Therapy    Skilled nursing orders: Medication  education    PT orders: Home safety assessment Strengthening    Frequency: 1 Week 1            Follow-up Information     Isabelle Martinez APRN In 2 days.    Specialty: Nurse Practitioner  Contact information:  [address] [place] [address] [phone]                          TEST  RESULTS PENDING AT DISCHARGE       CODE STATUS  Code Status and Medical Interventions:   Ordered at: 11/03/22 9330     Level Of Support Discussed With:    Patient     Code Status (Patient has no pulse and is not breathing):    CPR (Attempt to Resuscitate)     Medical Interventions (Patient has pulse or is breathing):    Full Support       The ASCVD Risk score (Bandar DK, et al., 2019) failed to calculate for the following reasons:    The patient has a prior MI or stroke diagnosis     Isaias Cooper MD  [facility]  11/20/22  10:04 EST    Please note that this discharge summary required more than 30 minutes to complete.

## 2022-11-18 NOTE — CASE MANAGEMENT/SOCIAL WORK
Discharge Planning Assessment   Eric     Patient Name: Axel Desir  MRN: [id number]  Today's Date: 11/18/2022    Admit Date: 11/3/2022    Plan: SS spoke with pt at bedside on this date 11/17/22 to follow up on discharge plan. Pt states he wants to go home. SS informed pt that we were working on placement and states he wants to return home. SS informed pt if he goes home we can arrange home health services if needed and pt states he would like home health arranged. Pt has no preference on home health agency. Pt's cousin, Candace [phone] who states she is agreeable. SS to follow.   Discharge Plan     Row Name 11/18/22 1021       Plan    Final Discharge Disposition Code 06 - home with home health care    Final Note Pt is being discharged home with physician order for home health. Pt didn't have a preference for HH. SS faxed referral to [facility] [phone]. SS contacted [facility] per Isabel who states she will get things set up. Pt is currently in dialysis. pt will need [facility] arranged for discharged. No other needs identified at this time.    [id number] spoke with Eric MAURO per Asha [phone] who state they will continue to follow pt outpatient Asha states they will need a discharge summary faxed to [phone].    [id number] received a call from [facility] per Mariposa who states they are unable to take pt due to pt having a  Pre-auth started through [facility]. SS faxed referral to [facility] [phone].                     JAXON Jhaveri

## 2022-11-18 NOTE — PLAN OF CARE
Goal Outcome Evaluation:              Outcome Evaluation: pt resting in bed, pt had dialysis done and had 3L removed per dialysis nurse, pt ambulates to restroom with standby and tolerates well, no acute changes, will continue plan of care

## 2022-11-18 NOTE — DISCHARGE PLACEMENT REQUEST
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 11/08/22 1538          Transfer Goal 1 (OT)    Activity/Assistive Device (Transfer Goal 1, OT) transfers, all  -     [place] Level/Cues Needed (Transfer Goal 1, OT) modified independence  -     Time Frame (Transfer Goal 1, OT) by discharge  -     Row Name 11/08/22 1538          Dressing Goal 1 (OT)    Activity/Device (Dressing Goal 1, OT) dressing skills, all  -     [place]/Cues Needed (Dressing Goal 1, OT) modified independence  -     Time Frame (Dressing Goal 1, OT) by discharge  -     Row Name 11/08/22 1538          Problem Specific Goal 1 (OT)    Problem Specific Goal 1 (OT) Patient will perform sustained activity X15 minutes to improve functional performance needed for daily occupations.  -     Time Frame (Problem Specific Goal 1, OT) by discharge  -     Row Name 11/08/22 1538          Therapy Assessment/Plan (OT)    Planned Therapy Interventions (OT) activity tolerance training;BADL retraining;ROM/therapeutic exercise;passive ROM/stretching;strengthening exercise;transfer/mobility retraining;patient/caregiver education/training  -           User Key  (r) = Recorded By, (t) = Taken By, (c) = Cosigned By    Initials Name Provider Type     Gela Banegas OT Occupational Therapist               Clinical Impression     Row Name 11/08/22 1532          Pain Assessment    Pretreatment Pain Rating 0/10 - no pain  -     Posttreatment Pain Rating 0/10 - no pain  -     Row Name 11/08/22 1532          Plan of Care Review    Plan of Care Reviewed With patient  -     Progress no change  -     Outcome Evaluation Patient seen for OT evaluation. He presents with overall functional decline and generalized weakness impacting functional performance with daily occupations. Recommend OT skilled services and home health upon discharge.  -     Row Name 11/08/22 1532          Therapy Assessment/Plan (OT)     Patient/Family Therapy Goal Statement (OT) Return home  -     Rehab Potential (OT) good, to achieve stated therapy goals  -     Criteria for Skilled Therapeutic Interventions Met (OT) yes;meets criteria;skilled treatment is necessary  -     Therapy Frequency (OT) 3 times/wk  3-5x/week as able and available to support functional progress  -     Predicted Duration of Therapy Intervention (OT) discharge  -     Row Name 11/08/22 1532          Therapy Plan Review/Discharge Plan (OT)    Anticipated Discharge Disposition (OT) home with home health  -     Row Name 11/08/22 1532          Positioning and Restraints    Pre-Treatment Position in bed  -     Post Treatment Position bed  -     In Bed call light within reach  -           User Key  (r) = Recorded By, (t) = Taken By, (c) = Cosigned By    Initials Name Provider Type    Gela Watson OT Occupational Therapist               Outcome Measures     Row Name 11/08/22 0740          How much help from another person do you currently need...    Turning from your back to your side while in flat bed without using bedrails? 3  -VM     Moving from lying on back to sitting on the side of a flat bed without bedrails? 3  -VM     Moving to and from a bed to a chair (including a wheelchair)? 3  -VM     Standing up from a chair using your arms (e.g., wheelchair, bedside chair)? 3  -VM     Climbing 3-5 steps with a railing? 2  -VM     To walk in hospital room? 2  -VM     AM-PAC 6 Clicks Score (PT) 16  -VM     Highest level of mobility 5 --> Static standing  -           User Key  (r) = Recorded By, (t) = Taken By, (c) = Cosigned By    Initials Name Provider Type    Nette Bustos, RN Registered Nurse                  OT Recommendation and Plan  Planned Therapy Interventions (OT): activity tolerance training, BADL retraining, ROM/therapeutic exercise, passive ROM/stretching, strengthening exercise, transfer/mobility retraining, patient/caregiver  education/training  Therapy Frequency (OT): 3 times/wk (3-5x/week as able and available to support functional progress)  Plan of Care Review  Plan of Care Reviewed With: patient  Progress: no change  Outcome Evaluation: Patient seen for OT evaluation. He presents with overall functional decline and generalized weakness impacting functional performance with daily occupations. Recommend OT skilled services and home health upon discharge.     Time Calculation:    Time Calculation- OT     Row Name 11/08/22 1540             Time Calculation- OT    OT Start Time 1400  -      OT Received On 11/08/22  -            User Key  (r) = Recorded By, (t) = Taken By, (c) = Cosigned By    Initials Name Provider Type     Gela Banegas OT Occupational Therapist              Therapy Charges for Today     Code Description Service Date Service Provider Modifiers Qty    [id number]  OT EVAL MOD COMPLEXITY 4 11/8/2022 Gela Banegas OT GO 1               Gela Banegas OT  11/8/2022    Electronically signed by Gela Banegas OT at 11/08/22 1541       ADL Documentation (most recent)    Flowsheet Row Most Recent Value   Transferring 2 - assistive person   Toileting 2 - assistive person   Bathing 2 - assistive person   Dressing 2 - assistive person   Eating 0 - independent   Communication 0 - understands/communicates without difficulty   Swallowing 0 - swallows foods/liquids without difficulty   Equipment Currently Used at Home wheelchair, walker, rolling  [Provider unknown.]            Discharge Summary    No notes of this type exist for this encounter.         Discharge Order (From admission, onward)     Start     Ordered    11/18/22 0856  Discharge patient  Once        Expected Discharge Date: 11/18/22    Discharge Disposition: Home or Self Care    Physician of Record for Attribution - Please select from Treatment Team: JEFFREY MONTGOMERY [[id number]]    Review needed by CMO to determine Physician of Record: No       Question Answer  Comment   Physician of Record for Attribution - Please select from Treatment Team JEFFREY MONTGOMERY    Review needed by CMO to determine Physician of Record No        11/18/22 0869

## 2022-11-18 NOTE — DISCHARGE INSTRUCTIONS
Please take medications as prescribed. Please go to follow-up appointments as recommended. Please seek medical attention if you have worsening shortness of breath, chest pain, or worsening of any symptoms. Please follow low potassium diet.     Please continue social distancing and isolation efforts as recommended by CDC and [facility] to help prevent spread of COVID-19.

## 2022-11-18 NOTE — DISCHARGE PLACEMENT REQUEST
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  BP: (116-126)/(77-89) 125/85  SpO2:  [94 %-95 %] 95 %  on   ;   Device (Oxygen Therapy): room air    Body mass index is 24.98 kg/m².  Wt Readings from Last 3 Encounters:   11/04/22 74.5 kg (164 lb 4.8 oz)   11/01/22 82.1 kg (181 lb)   10/24/22 80.4 kg (177 lb 3.2 oz)       PHYSICAL EXAM:  Constitutional:  Well-developed and well-nourished.  No respiratory distress.      HENT:  Head:  Normocephalic and atraumatic.  Mouth:  Moist mucous membranes.    Eyes:  Conjunctivae and EOM are normal.  Pupils are equal, round, and reactive to light.  No scleral icterus.    Cardiovascular:  Normal rate, regular rhythm and normal heart sounds with no murmur.  Pulmonary/Chest:  No respiratory distress, no wheezes, no crackles, with normal breath sounds and good air movement.  Abdominal:  Soft.  Bowel sounds are normal.  No distension and no tenderness.    Musculoskeletal:  No edema, no tenderness, and no deformity.  No red or swollen joints anywhere.    Neurological:  Alert and oriented to person, place, and time.  No gross neurological deficit.   Skin:  Skin is warm and dry. No rash noted. No pallor.   Peripheral vascular:  Strong pulses in all 4 extremities with no clubbing, no cyanosis, no edema.    DISCHARGE DISPOSITION   Stable    DISCHARGE MEDICATIONS:     Discharge Medications      New Medications      Instructions Start Date   budesonide-formoterol 160-4.5 MCG/ACT inhaler  Commonly known as: SYMBICORT   Inhale 2 puffs by mouth into the lungs 2 (Two) Times a Day.      cefuroxime 500 MG tablet  Commonly known as: CEFTIN   500 mg, Oral, 2 Times Daily      doxycycline 100 MG capsule  Commonly known as: MONODOX   Take 1 capsule by mouth every 12 hours for 7 days as part of COPD Rescue Kit. (Only Start if in YELLOW ZONE.)      ipratropium-albuterol 0.5-2.5 mg/3 ml nebulizer  Commonly known as: DUO-NEB   Take 3 mL by neb every 30 minutes as needed for shortness of air for up to 6 doses. Part of COPD Rescue Kit. (Only Start if in YELLOW ZONE.)      metoprolol tartrate 25 MG tablet  Commonly known as: LOPRESSOR   25 mg, Oral, Every 12 Hours Scheduled      predniSONE 20 MG tablet  Commonly known as: DELTASONE   40 mg, Oral, Daily, Take 2 tablets daily for 5 days as part of COPD Rescue Kit. (Only Start if in YELLOW ZONE.)      tiotropium 18 MCG per inhalation capsule  Commonly known as: Spiriva HandiHaler   1 capsule, Inhalation, Daily - RT         Continue These Medications      Instructions Start Date   apixaban 5 MG tablet tablet  Commonly known as: ELIQUIS   5 mg, Oral, Every 12 Hours Scheduled      atorvastatin 40 MG tablet  Commonly known as: LIPITOR   40 mg, Oral, Nightly      calcium acetate 667 MG capsule capsule  Commonly known as: PHOS BINDER)   2,668 mg, Oral, 3 Times Daily With Meals      cetirizine 10 MG tablet  Commonly known as: zyrTEC   10 mg, Oral,  Daily      levothyroxine 88 MCG tablet  Commonly known as: SYNTHROID, LEVOTHROID   88 mcg, Oral, Every Early Morning      metFORMIN 500 MG tablet  Commonly known as: Glucophage   500 mg, Oral, 2 Times Daily With Meals      nitroglycerin 0.4 MG SL tablet  Commonly known as: NITROSTAT   0.4 mg, Sublingual, Every 5 Minutes PRN      TOMASA-GAB PO   1 tablet, Oral, Daily      thiamine 100 MG tablet  Commonly known as: VITAMIN B1   100 mg, Oral, Daily         Stop These Medications    carvedilol 25 MG tablet  Commonly known as: COREG        ASK your doctor about these medications      Instructions Start Date   doxycycline 100 MG capsule  Commonly known as: MONODOX  Ask about: Should I take this medication?   100 mg, Oral, 2 Times Daily               Additional Instructions for the Follow-ups that You Need to Schedule     Ambulatory Referral to Home Health   As directed      Face to Face Visit Date: 11/18/2022    Follow-up provider for Plan of Care?: I treated the patient in an acute care facility and will not continue treatment after discharge.    Follow-up provider: LIZANDRO MARTINEZ [[id number]]    Reason/Clinical Findings: ESRD, debility    Describe mobility limitations that make leaving home difficult: ESRD, debility    Nursing/Therapeutic Services Requested: Skilled Nursing Physical Therapy    Skilled nursing orders: Medication education    PT orders: Home safety assessment Strengthening    Frequency: 1 Week 1            Contact information for follow-up providers     Lizandro Martinez APRN In 2 days.    Specialty: Nurse Practitioner  Contact information:  [address] [place] [address]  [phone]                   Contact information for after-discharge care     Home Medical Care     [facility] .    Service: Home Health Services  Contact information:  [address] [facility] [address]  [phone]                              TEST  RESULTS PENDING AT DISCHARGE       CODE STATUS  Code Status  and Medical Interventions:   Ordered at: 11/03/22 2230     Level Of Support Discussed With:    Patient     Code Status (Patient has no pulse and is not breathing):    CPR (Attempt to Resuscitate)     Medical Interventions (Patient has pulse or is breathing):    Full Support       The ASCVD Risk score (Bandar MORALES, et al., 2019) failed to calculate for the following reasons:    The patient has a prior MI or stroke diagnosis     Isaias Cooper MD  [facility]  11/18/22  10:56 EST    Please note that this discharge summary required more than 30 minutes to complete.      Electronically signed by Isaias Cooper MD at 11/18/22 1114       Discharge Order (From admission, onward)     Start     Ordered    11/18/22 0856  Discharge patient  Once        Expected Discharge Date: 11/18/22    Discharge Disposition: Home or Self Care    Physician of Record for Attribution - Please select from Treatment Team: ISAIAS COOPER [[id number]]    Review needed by CMO to determine Physician of Record: No       Question Answer Comment   Physician of Record for Attribution - Please select from Treatment Team ISAIAS COOPER    Review needed by CMO to determine Physician of Record No        11/18/22 0858

## 2022-11-18 NOTE — PROGRESS NOTES
Nephrology Progress Note      Subjective     Seen on dialysis, tolerating well. No chest pain or shortness of breath.     Objective       Vital signs :     Temp:  [97.8 °F (36.6 °C)-98.3 °F (36.8 °C)] 97.8 °F (36.6 °C)  Heart Rate:  [] 106  Resp:  [18] 18  BP: (116-126)/(77-89) 125/85    Intake/Output                       11/16/22 0701 - 11/17/22 0700 11/17/22 0701 - 11/18/22 0700     1502-7959 1628-9916 Total 1530-2017 5446-0800 Total                 Intake    P.O.  --  480 480  530  60 590    Total Intake -- 480 480 530 60 590       Output    Other  2800  -- 2800  --  -- --    Ultrafiltration (mL) 2800 -- 2800 -- -- --    Total Output 2800 -- 2800 -- -- --           Physical Exam:    General Appearance : not in acute distress  Lungs : clear to auscultation, respirations regular  Heart :  regular rhythm & normal rate, normal S1, S2 and no murmur, no rub  Abdomen : soft, non distended  Extremities : 2+ edema  Neurologic :   orientated to person, place, time and situation, Grossly no focal deficits    Laboratory Data :     Albumin No results found for: ALBUMIN   Magnesium No results found for: MG       PTH               No results found for: PTH    CBC and coagulation:  Results from last 7 days   Lab Units 11/18/22  0049 11/17/22 0118 11/16/22 0216   WBC 10*3/mm3 8.71 10.27 8.69   HEMOGLOBIN g/dL 9.3* 9.1* 9.1*   HEMATOCRIT % 27.8* 27.5* 27.3*   MCV fL 100.4* 102.2* 102.2*   MCHC g/dL 33.5 33.1 33.3   PLATELETS 10*3/mm3 78* 92* 86*     Acid/base balance:      Renal and electrolytes:    Results from last 7 days   Lab Units 11/18/22  0130 11/17/22  0118 11/16/22  0216 11/15/22  0400 11/14/22  0121   SODIUM mmol/L 121* 125* 128* 127* 124*   POTASSIUM mmol/L 5.3* 4.2 5.3* 5.0 6.1*   CHLORIDE mmol/L 86* 87* 90* 88* 88*   CO2 mmol/L 22.2 27.1 26.3 27.6 20.9*   BUN mg/dL 58* 45* 74* 59* 86*   CREATININE mg/dL 3.85* 3.17* 4.75* 3.79* 5.30*   CALCIUM mg/dL 8.3* 8.4* 8.2* 8.3* 8.2*     Estimated Creatinine Clearance:  20.4 mL/min (A) (by C-G formula based on SCr of 3.85 mg/dL (H)).  @GFRCG:3@   Liver and pancreatic function:        Invalid input(s): PROT      Cardiac:      Liver and pancreatic function:        Invalid input(s): PROT    Medications :     apixaban, 5 mg, Oral, Q12H  budesonide-formoterol, 2 puff, Inhalation, BID - RT  calcium acetate, 2,668 mg, Oral, TID With Meals  cetirizine, 5 mg, Oral, Daily  Insulin Aspart, 0-9 Units, Subcutaneous, TID AC  insulin detemir, 10 Units, Subcutaneous, Daily  levothyroxine, 88 mcg, Oral, Q AM  melatonin, 10 mg, Oral, Nightly  metoprolol tartrate, 12.5 mg, Oral, Q12H  pantoprazole, 40 mg, Oral, Q AM  Renal, 1 capsule, Oral, Daily  sodium chloride, 3 mL, Intravenous, Q12H  thiamine, 100 mg, Oral, Daily        Assessment & Plan     1. ESKD on IHDx  2. Anemia  3. Hyponatremia, hypervolemic  4. Anion gap metabolic acidosis  5. SHPT  6. Acute bacterial pneumonia  7. Hyperkalemia    Evaluated on dialysis, tolerating well. Grossly stable from renal stands point.   , continue on IHDX MWF    Hyponatremia; FR and Low K diet  Anemia; HH at target  SHPT; follow outpatient management        Bladimir Alvarado MD  11/18/22  10:09 EST

## 2022-11-18 NOTE — PLAN OF CARE
Problem: Adult Inpatient Plan of Care  Goal: Plan of Care Review  Outcome: Ongoing, Progressing  Flowsheets (Taken 11/18/2022 0248)  Progress: no change  Plan of Care Reviewed With: patient  Outcome Evaluation: Patient resting comfortably in bed at this time. no new complaints  Goal: Patient-Specific Goal (Individualized)  Outcome: Ongoing, Progressing  Goal: Absence of Hospital-Acquired Illness or Injury  Outcome: Ongoing, Progressing  Intervention: Identify and Manage Fall Risk  Recent Flowsheet Documentation  Taken 11/18/2022 0100 by Zaida Alvarez RN  Safety Promotion/Fall Prevention: safety round/check completed  Taken 11/17/2022 2100 by Zaida Alvarez RN  Safety Promotion/Fall Prevention: safety round/check completed  Taken 11/17/2022 1959 by Zaida Alvarez RN  Safety Promotion/Fall Prevention: safety round/check completed  Taken 11/17/2022 1900 by Zaida Alvarez RN  Safety Promotion/Fall Prevention: safety round/check completed  Intervention: Prevent Skin Injury  Recent Flowsheet Documentation  Taken 11/17/2022 1959 by Zaiad Alvarez RN  Skin Protection: adhesive use limited  Intervention: Prevent and Manage VTE (Venous Thromboembolism) Risk  Recent Flowsheet Documentation  Taken 11/17/2022 1959 by Zaida Alvarez RN  VTE Prevention/Management: (see MAR) --  Goal: Optimal Comfort and Wellbeing  Outcome: Ongoing, Progressing  Intervention: Provide Person-Centered Care  Recent Flowsheet Documentation  Taken 11/17/2022 1959 by Zaida Alvarez RN  Trust Relationship/Rapport:   care explained   emotional support provided   choices provided   empathic listening provided   questions answered   questions encouraged   reassurance provided   thoughts/feelings acknowledged  Goal: Readiness for Transition of Care  Outcome: Ongoing, Progressing   Goal Outcome Evaluation:  Plan of Care Reviewed With: patient        Progress: no change  Outcome Evaluation: Patient  resting comfortably in bed at this time. no new complaints

## 2022-11-19 LAB
ACANTHOCYTES BLD QL SMEAR: NORMAL
ANION GAP SERPL CALCULATED.3IONS-SCNC: 11.3 MMOL/L (ref 5–15)
ANION GAP SERPL CALCULATED.3IONS-SCNC: 17.9 MMOL/L (ref 5–15)
ANISOCYTOSIS BLD QL: NORMAL
BASOPHILS # BLD AUTO: 0.01 10*3/MM3 (ref 0–0.2)
BASOPHILS NFR BLD AUTO: 0.1 % (ref 0–1.5)
BUN SERPL-MCNC: 44 MG/DL (ref 8–23)
BUN SERPL-MCNC: 47 MG/DL (ref 8–23)
BUN/CREAT SERPL: 11.8 (ref 7–25)
BUN/CREAT SERPL: 13.1 (ref 7–25)
CALCIUM SPEC-SCNC: 8.1 MG/DL (ref 8.6–10.5)
CALCIUM SPEC-SCNC: 8.1 MG/DL (ref 8.6–10.5)
CHLORIDE SERPL-SCNC: 92 MMOL/L (ref 98–107)
CHLORIDE SERPL-SCNC: 93 MMOL/L (ref 98–107)
CO2 SERPL-SCNC: 23.1 MMOL/L (ref 22–29)
CO2 SERPL-SCNC: 29.7 MMOL/L (ref 22–29)
CREAT SERPL-MCNC: 3.6 MG/DL (ref 0.76–1.27)
CREAT SERPL-MCNC: 3.74 MG/DL (ref 0.76–1.27)
D-LACTATE SERPL-SCNC: 1.7 MMOL/L (ref 0.5–2)
D-LACTATE SERPL-SCNC: 2.6 MMOL/L (ref 0.5–2)
DEPRECATED RDW RBC AUTO: 79 FL (ref 37–54)
EGFRCR SERPLBLD CKD-EPI 2021: 17.3 ML/MIN/1.73
EGFRCR SERPLBLD CKD-EPI 2021: 18.1 ML/MIN/1.73
EOSINOPHIL # BLD AUTO: 0.03 10*3/MM3 (ref 0–0.4)
EOSINOPHIL NFR BLD AUTO: 0.4 % (ref 0.3–6.2)
ERYTHROCYTE [DISTWIDTH] IN BLOOD BY AUTOMATED COUNT: 19.9 % (ref 12.3–15.4)
GLUCOSE BLDC GLUCOMTR-MCNC: 156 MG/DL (ref 70–130)
GLUCOSE BLDC GLUCOMTR-MCNC: 175 MG/DL (ref 70–130)
GLUCOSE BLDC GLUCOMTR-MCNC: 212 MG/DL (ref 70–130)
GLUCOSE SERPL-MCNC: 200 MG/DL (ref 65–99)
GLUCOSE SERPL-MCNC: 214 MG/DL (ref 65–99)
HCT VFR BLD AUTO: 29.8 % (ref 37.5–51)
HGB BLD-MCNC: 9.2 G/DL (ref 13–17.7)
IMM GRANULOCYTES # BLD AUTO: 0.12 10*3/MM3 (ref 0–0.05)
IMM GRANULOCYTES NFR BLD AUTO: 1.6 % (ref 0–0.5)
LYMPHOCYTES # BLD AUTO: 0.97 10*3/MM3 (ref 0.7–3.1)
LYMPHOCYTES NFR BLD AUTO: 13.1 % (ref 19.6–45.3)
MACROCYTES BLD QL SMEAR: NORMAL
MCH RBC QN AUTO: 33.6 PG (ref 26.6–33)
MCHC RBC AUTO-ENTMCNC: 30.9 G/DL (ref 31.5–35.7)
MCV RBC AUTO: 108.8 FL (ref 79–97)
MONOCYTES # BLD AUTO: 0.79 10*3/MM3 (ref 0.1–0.9)
MONOCYTES NFR BLD AUTO: 10.7 % (ref 5–12)
NEUTROPHILS NFR BLD AUTO: 5.49 10*3/MM3 (ref 1.7–7)
NEUTROPHILS NFR BLD AUTO: 74.1 % (ref 42.7–76)
NRBC BLD AUTO-RTO: 0 /100 WBC (ref 0–0.2)
PLATELET # BLD AUTO: 64 10*3/MM3 (ref 140–450)
PMV BLD AUTO: 12.5 FL (ref 6–12)
POTASSIUM SERPL-SCNC: 4.7 MMOL/L (ref 3.5–5.2)
POTASSIUM SERPL-SCNC: 5.9 MMOL/L (ref 3.5–5.2)
RBC # BLD AUTO: 2.74 10*6/MM3 (ref 4.14–5.8)
SMALL PLATELETS BLD QL SMEAR: NORMAL
SODIUM SERPL-SCNC: 132 MMOL/L (ref 136–145)
SODIUM SERPL-SCNC: 133 MMOL/L (ref 136–145)
SODIUM SERPL-SCNC: 134 MMOL/L (ref 136–145)
WBC NRBC COR # BLD: 7.41 10*3/MM3 (ref 3.4–10.8)

## 2022-11-19 PROCEDURE — 94799 UNLISTED PULMONARY SVC/PX: CPT

## 2022-11-19 PROCEDURE — 85007 BL SMEAR W/DIFF WBC COUNT: CPT | Performed by: INTERNAL MEDICINE

## 2022-11-19 PROCEDURE — 80048 BASIC METABOLIC PNL TOTAL CA: CPT | Performed by: INTERNAL MEDICINE

## 2022-11-19 PROCEDURE — 83605 ASSAY OF LACTIC ACID: CPT | Performed by: INTERNAL MEDICINE

## 2022-11-19 PROCEDURE — 82962 GLUCOSE BLOOD TEST: CPT

## 2022-11-19 PROCEDURE — 94761 N-INVAS EAR/PLS OXIMETRY MLT: CPT

## 2022-11-19 PROCEDURE — 63710000001 INSULIN ASPART PER 5 UNITS: Performed by: INTERNAL MEDICINE

## 2022-11-19 PROCEDURE — 63710000001 INSULIN DETEMIR PER 5 UNITS: Performed by: INTERNAL MEDICINE

## 2022-11-19 PROCEDURE — 85025 COMPLETE CBC W/AUTO DIFF WBC: CPT | Performed by: INTERNAL MEDICINE

## 2022-11-19 PROCEDURE — 84295 ASSAY OF SERUM SODIUM: CPT | Performed by: INTERNAL MEDICINE

## 2022-11-19 PROCEDURE — 99232 SBSQ HOSP IP/OBS MODERATE 35: CPT | Performed by: INTERNAL MEDICINE

## 2022-11-19 RX ADMIN — CALCIUM ACETATE 2668 MG: 667 CAPSULE ORAL at 17:00

## 2022-11-19 RX ADMIN — CETIRIZINE HYDROCHLORIDE 5 MG: 10 TABLET, FILM COATED ORAL at 08:27

## 2022-11-19 RX ADMIN — CALCIUM ACETATE 2668 MG: 667 CAPSULE ORAL at 08:27

## 2022-11-19 RX ADMIN — APIXABAN 5 MG: 5 TABLET, FILM COATED ORAL at 08:27

## 2022-11-19 RX ADMIN — INSULIN DETEMIR 10 UNITS: 100 INJECTION, SOLUTION SUBCUTANEOUS at 08:31

## 2022-11-19 RX ADMIN — INSULIN ASPART 2 UNITS: 100 INJECTION, SOLUTION INTRAVENOUS; SUBCUTANEOUS at 08:27

## 2022-11-19 RX ADMIN — Medication 10 MG: at 20:09

## 2022-11-19 RX ADMIN — Medication 100 MG: at 08:27

## 2022-11-19 RX ADMIN — INSULIN ASPART 4 UNITS: 100 INJECTION, SOLUTION INTRAVENOUS; SUBCUTANEOUS at 11:57

## 2022-11-19 RX ADMIN — CALCIUM ACETATE 2668 MG: 667 CAPSULE ORAL at 11:06

## 2022-11-19 RX ADMIN — BUDESONIDE AND FORMOTEROL FUMARATE DIHYDRATE 2 PUFF: 160; 4.5 AEROSOL RESPIRATORY (INHALATION) at 19:31

## 2022-11-19 RX ADMIN — METOPROLOL TARTRATE 25 MG: 25 TABLET, FILM COATED ORAL at 08:27

## 2022-11-19 RX ADMIN — RENO CAPS 1 MG: 100; 1.5; 1.7; 20; 10; 1; 150; 5; 6 CAPSULE ORAL at 08:27

## 2022-11-19 RX ADMIN — BUDESONIDE AND FORMOTEROL FUMARATE DIHYDRATE 2 PUFF: 160; 4.5 AEROSOL RESPIRATORY (INHALATION) at 07:36

## 2022-11-19 RX ADMIN — LEVOTHYROXINE SODIUM 88 MCG: 88 TABLET ORAL at 04:50

## 2022-11-19 RX ADMIN — APIXABAN 5 MG: 5 TABLET, FILM COATED ORAL at 20:08

## 2022-11-19 RX ADMIN — INSULIN ASPART 2 UNITS: 100 INJECTION, SOLUTION INTRAVENOUS; SUBCUTANEOUS at 17:48

## 2022-11-19 RX ADMIN — METOPROLOL TARTRATE 37.5 MG: 25 TABLET, FILM COATED ORAL at 20:08

## 2022-11-19 RX ADMIN — PANTOPRAZOLE SODIUM 40 MG: 40 TABLET, DELAYED RELEASE ORAL at 04:50

## 2022-11-19 NOTE — PROGRESS NOTES
Nephrology Progress Note      Subjective      No chest pain or shortness of breath.     Objective       Vital signs :     Temp:  [97.6 °F (36.4 °C)-98.2 °F (36.8 °C)] 97.6 °F (36.4 °C)  Heart Rate:  [100-130] 130  Resp:  [18-20] 18  BP: (107-126)/(67-85) 107/67    Intake/Output                             11/17/22 0701 - 11/18/22 0700 11/18/22 0701 - 11/19/22 0700 11/19/22 0701 - 11/20/22 0700     6773-9170 5303-1008 Total 3208-5008 9087-4786 Total 3175-7618 7747-6383 Total                    Intake    P.O.  530  60 590  530  150 680  236  -- 236    Total Intake 530 60 590 530 150 680 236 -- 236       Output    Other  --  -- --  3000  -- 3000  --  -- --    Ultrafiltration (mL) -- -- -- 3000 -- 3000 -- -- --    Total Output -- -- -- 3000 -- 3000 -- -- --           Physical Exam:    General Appearance : not in acute distress  Lungs : clear to auscultation, respirations regular  Heart :  regular rhythm & normal rate, normal S1, S2 and no murmur, no rub  Abdomen : soft, non distended  Extremities : 2+ edema  Neurologic :   orientated to person, place, time and situation, Grossly no focal deficits    Laboratory Data :     Albumin No results found for: ALBUMIN   Magnesium No results found for: MG       PTH               No results found for: PTH    CBC and coagulation:  Results from last 7 days   Lab Units 11/18/22  0049 11/17/22 0118 11/16/22 0216   WBC 10*3/mm3 8.71 10.27 8.69   HEMOGLOBIN g/dL 9.3* 9.1* 9.1*   HEMATOCRIT % 27.8* 27.5* 27.3*   MCV fL 100.4* 102.2* 102.2*   MCHC g/dL 33.5 33.1 33.3   PLATELETS 10*3/mm3 78* 92* 86*     Acid/base balance:      Renal and electrolytes:    Results from last 7 days   Lab Units 11/19/22  0821 11/18/22  0130 11/17/22  0118 11/16/22 0216 11/15/22  0400   SODIUM mmol/L 133* 121* 125* 128* 127*   POTASSIUM mmol/L 5.9* 5.3* 4.2 5.3* 5.0   CHLORIDE mmol/L 92* 86* 87* 90* 88*   CO2 mmol/L 23.1 22.2 27.1 26.3 27.6   BUN mg/dL 44* 58* 45* 74* 59*   CREATININE mg/dL 3.74* 3.85* 3.17*  4.75* 3.79*   CALCIUM mg/dL 8.1* 8.3* 8.4* 8.2* 8.3*     Estimated Creatinine Clearance: 21 mL/min (A) (by C-G formula based on SCr of 3.74 mg/dL (H)).  @GFRCG:3@   Liver and pancreatic function:        Invalid input(s): PROT      Cardiac:      Liver and pancreatic function:        Invalid input(s): PROT    Medications :     apixaban, 5 mg, Oral, Q12H  budesonide-formoterol, 2 puff, Inhalation, BID - RT  calcium acetate, 2,668 mg, Oral, TID With Meals  cetirizine, 5 mg, Oral, Daily  Insulin Aspart, 0-9 Units, Subcutaneous, TID AC  insulin detemir, 10 Units, Subcutaneous, Daily  levothyroxine, 88 mcg, Oral, Q AM  melatonin, 10 mg, Oral, Nightly  metoprolol tartrate, 37.5 mg, Oral, Q12H  pantoprazole, 40 mg, Oral, Q AM  Renal, 1 capsule, Oral, Daily  sodium chloride, 3 mL, Intravenous, Q12H  thiamine, 100 mg, Oral, Daily        Assessment & Plan     1. ESKD on IHDx  2. Anemia  3. Hyponatremia, hypervolemic  4. Anion gap metabolic acidosis  5. SHPT  6. Acute bacterial pneumonia  7. Hyperkalemia    Repeat BMP  Most likely inaccurate, to repeat if K less than 5.5 can be discharged from renal stands point,   Discussed with Dr. Cooper     Hyponatremia; FR and Low K diet  Anemia; HH at target  SHPT; follow outpatient management        Bladimir Alvarado MD  11/19/22  11:56 EST

## 2022-11-19 NOTE — PLAN OF CARE
Goal Outcome Evaluation:              Outcome Evaluation: patient has slept some tonnight. a/o. patient frequently calls out asking for food.

## 2022-11-19 NOTE — PLAN OF CARE
Goal Outcome Evaluation:           Progress: no change  Outcome Evaluation: pt has rested in bed, pt rings out frequently for food, no acute changes, will continue plan of care.

## 2022-11-20 ENCOUNTER — READMISSION MANAGEMENT (OUTPATIENT)
Dept: CALL CENTER | Facility: HOSPITAL | Age: 62
End: 2022-11-20

## 2022-11-20 VITALS
HEIGHT: 68 IN | SYSTOLIC BLOOD PRESSURE: 110 MMHG | BODY MASS INDEX: 24.9 KG/M2 | TEMPERATURE: 98 F | WEIGHT: 164.3 LBS | HEART RATE: 120 BPM | DIASTOLIC BLOOD PRESSURE: 85 MMHG | OXYGEN SATURATION: 100 % | RESPIRATION RATE: 18 BRPM

## 2022-11-20 LAB
ANION GAP SERPL CALCULATED.3IONS-SCNC: 13.5 MMOL/L (ref 5–15)
BUN SERPL-MCNC: 55 MG/DL (ref 8–23)
BUN/CREAT SERPL: 12.5 (ref 7–25)
CALCIUM SPEC-SCNC: 7.9 MG/DL (ref 8.6–10.5)
CHLORIDE SERPL-SCNC: 90 MMOL/L (ref 98–107)
CO2 SERPL-SCNC: 25.5 MMOL/L (ref 22–29)
CREAT SERPL-MCNC: 4.41 MG/DL (ref 0.76–1.27)
EGFRCR SERPLBLD CKD-EPI 2021: 14.2 ML/MIN/1.73
GLUCOSE BLDC GLUCOMTR-MCNC: 122 MG/DL (ref 70–130)
GLUCOSE BLDC GLUCOMTR-MCNC: 183 MG/DL (ref 70–130)
GLUCOSE BLDC GLUCOMTR-MCNC: 295 MG/DL (ref 70–130)
GLUCOSE SERPL-MCNC: 218 MG/DL (ref 65–99)
POTASSIUM SERPL-SCNC: 5.2 MMOL/L (ref 3.5–5.2)
SODIUM SERPL-SCNC: 129 MMOL/L (ref 136–145)

## 2022-11-20 PROCEDURE — 94799 UNLISTED PULMONARY SVC/PX: CPT

## 2022-11-20 PROCEDURE — 82962 GLUCOSE BLOOD TEST: CPT

## 2022-11-20 PROCEDURE — 63710000001 INSULIN DETEMIR PER 5 UNITS: Performed by: INTERNAL MEDICINE

## 2022-11-20 PROCEDURE — 80048 BASIC METABOLIC PNL TOTAL CA: CPT | Performed by: INTERNAL MEDICINE

## 2022-11-20 PROCEDURE — 99239 HOSP IP/OBS DSCHRG MGMT >30: CPT | Performed by: INTERNAL MEDICINE

## 2022-11-20 PROCEDURE — 94761 N-INVAS EAR/PLS OXIMETRY MLT: CPT

## 2022-11-20 PROCEDURE — 63710000001 INSULIN ASPART PER 5 UNITS: Performed by: INTERNAL MEDICINE

## 2022-11-20 RX ORDER — METOPROLOL TARTRATE 37.5 MG/1
1 TABLET, FILM COATED ORAL EVERY 12 HOURS SCHEDULED
Qty: 60 TABLET | Refills: 0 | Status: SHIPPED | OUTPATIENT
Start: 2022-11-20 | End: 2022-12-03 | Stop reason: HOSPADM

## 2022-11-20 RX ADMIN — RENO CAPS 1 MG: 100; 1.5; 1.7; 20; 10; 1; 150; 5; 6 CAPSULE ORAL at 09:10

## 2022-11-20 RX ADMIN — CALCIUM ACETATE 2668 MG: 667 CAPSULE ORAL at 17:42

## 2022-11-20 RX ADMIN — INSULIN DETEMIR 10 UNITS: 100 INJECTION, SOLUTION SUBCUTANEOUS at 09:10

## 2022-11-20 RX ADMIN — CETIRIZINE HYDROCHLORIDE 5 MG: 10 TABLET, FILM COATED ORAL at 09:09

## 2022-11-20 RX ADMIN — INSULIN ASPART 6 UNITS: 100 INJECTION, SOLUTION INTRAVENOUS; SUBCUTANEOUS at 12:06

## 2022-11-20 RX ADMIN — Medication 100 MG: at 09:09

## 2022-11-20 RX ADMIN — BUDESONIDE AND FORMOTEROL FUMARATE DIHYDRATE 2 PUFF: 160; 4.5 AEROSOL RESPIRATORY (INHALATION) at 07:19

## 2022-11-20 RX ADMIN — LEVOTHYROXINE SODIUM 88 MCG: 88 TABLET ORAL at 05:15

## 2022-11-20 RX ADMIN — INSULIN ASPART 2 UNITS: 100 INJECTION, SOLUTION INTRAVENOUS; SUBCUTANEOUS at 17:42

## 2022-11-20 RX ADMIN — CALCIUM ACETATE 2668 MG: 667 CAPSULE ORAL at 12:05

## 2022-11-20 RX ADMIN — PANTOPRAZOLE SODIUM 40 MG: 40 TABLET, DELAYED RELEASE ORAL at 05:15

## 2022-11-20 RX ADMIN — METOPROLOL TARTRATE 37.5 MG: 25 TABLET, FILM COATED ORAL at 09:09

## 2022-11-20 RX ADMIN — APIXABAN 5 MG: 5 TABLET, FILM COATED ORAL at 09:09

## 2022-11-20 RX ADMIN — CALCIUM ACETATE 2668 MG: 667 CAPSULE ORAL at 09:10

## 2022-11-20 NOTE — PROGRESS NOTES
[facility] HOSPITALIST PROGRESS NOTE     Patient Identification:  Name:  Axel Desir  Age:  62 y.o.  Sex:  male  :  1960  MRN:  [id number]  Visit Number:  [id number]  ROOM: [address] [place] [address]     Primary Care Provider:  Isabelle Martinez APRN    Length of stay in inpatient status:  8    Subjective     Chief Compliant:    Chief Complaint   Patient presents with   • Weakness - Generalized       History of Presenting Illness:    Unable to get transport for patient to return home. He was tachycardic up to 130's during dialysis but morning metoprolol had been held. Continues to request sweets and ambulates to bathroom.     ROS:  Otherwise 10 point ROS negative other than documented above in HPI.     Objective     Current Hospital Meds:apixaban, 5 mg, Oral, Q12H  budesonide-formoterol, 2 puff, Inhalation, BID - RT  calcium acetate, 2,668 mg, Oral, TID With Meals  cetirizine, 5 mg, Oral, Daily  Insulin Aspart, 0-9 Units, Subcutaneous, TID AC  insulin detemir, 10 Units, Subcutaneous, Daily  levothyroxine, 88 mcg, Oral, Q AM  melatonin, 10 mg, Oral, Nightly  metoprolol tartrate, 37.5 mg, Oral, Q12H  pantoprazole, 40 mg, Oral, Q AM  Renal, 1 capsule, Oral, Daily  sodium chloride, 3 mL, Intravenous, Q12H  thiamine, 100 mg, Oral, Daily         Current Antimicrobial Therapy:  Anti-Infectives (From admission, onward)    Ordered     Dose/Rate Route Frequency Start Stop    22 1227  doxycycline (MONODOX) 100 MG capsule        Ordering Provider: Isaias Cooper MD       22 0000      22 0213  cefdinir (OMNICEF) capsule 300 mg        Ordering Provider: Palma Todd PA-C    300 mg Oral Every 24 Hours Scheduled 22 0900 11/10/22 0922 0213  doxycycline (VIBRAMYCIN) 100 mg in sodium chloride 0.9 % 100 mL IVPB-VTB        Ordering Provider: Palma Todd PA-C    100 mg  over 60 Minutes Intravenous Every 12 Hours 22 0900 11/10/22 2300    22  cefTRIAXone  (ROCEPHIN) 1 g/100 mL 0.9% NS (MBP)        Ordering Provider: Monica Rae PA    1 g  over 30 Minutes Intravenous Once 11/03/22 2130 11/03/22 2305 11/03/22 1912  doxycycline (MONODOX) capsule 100 mg        Ordering Provider: Monica Rae PA    100 mg Oral Once 11/03/22 1914 11/03/22 2119        Current Diuretic Therapy:  Diuretics (From admission, onward)    None        ----------------------------------------------------------------------------------------------------------------------  Vital Signs:  Temp:  [97.6 °F (36.4 °C)-98 °F (36.7 °C)] 98 °F (36.7 °C)  Heart Rate:  [100-130] 100  Resp:  [18-20] 18  BP: (107-126)/(67-79) 110/74  SpO2:  [95 %-100 %] 96 %  on   ;   Device (Oxygen Therapy): room air  Body mass index is 24.98 kg/m².    Wt Readings from Last 3 Encounters:   11/04/22 74.5 kg (164 lb 4.8 oz)   11/01/22 82.1 kg (181 lb)   10/24/22 80.4 kg (177 lb 3.2 oz)     Intake & Output (last 3 days)       11/17 0701  11/18 0700 11/18 0701  11/19 0700 11/19 0701  11/20 0700    P.O. 590 680 716    Total Intake(mL/kg) 590 (7.9) 680 (9.1) 716 (9.6)    Other  3000     Total Output  3000     Net +590 -2320 +716           Urine Unmeasured Occurrence 0 x 0 x         Diet Soft to Chew (NDD 3); Chopped Meat; Regular Consistency; Cardiac Diets, Diabetic Diets, Renal Diets, Fluid Restriction (240 mL/tray) Diets; Healthy Heart (2-3 Na+); Consistent Carbohydrate; Low Sodium (2-3g); Other (Specify mL/day) (800)  ----------------------------------------------------------------------------------------------------------------------  Physical exam:  Constitutional:  Well-developed and well-nourished.  No respiratory distress.      HENT:  Head:  Normocephalic and atraumatic.  Mouth:  Moist mucous membranes.    Eyes:  Conjunctivae and EOM are normal. No scleral icterus.    Neck:  Neck supple.  No JVD present.    Cardiovascular:  Normal rate, regular rhythm and normal heart sounds with no murmur.  Pulmonary/Chest:  No  respiratory distress, no wheezes, no crackles, with normal breath sounds and good air movement.  Abdominal:  Soft.  Bowel sounds are normal.  No distension and no tenderness.   Musculoskeletal:  No edema, no tenderness, and no deformity.  No red or swollen joints anywhere.    Neurological:  Alert and oriented to person, place, and time.  No cranial nerve deficit.  No tongue deviation.  No facial droop.  No slurred speech.   Skin:  Skin is warm and dry. No rash noted. No pallor.   Peripheral vascular:  Pulses in all 4 extremities with no clubbing, no cyanosis, no edema.  ----------------------------------------------------------------------------------------------------------------------  Tele:    ----------------------------------------------------------------------------------------------------------------------  Results from last 7 days   Lab Units 11/19/22  1200 11/18/22  0049 11/17/22  0118   LACTATE mmol/L 2.6*  --   --    WBC 10*3/mm3 7.41 8.71 10.27   HEMOGLOBIN g/dL 9.2* 9.3* 9.1*   HEMATOCRIT % 29.8* 27.8* 27.5*   MCV fL 108.8* 100.4* 102.2*   MCHC g/dL 30.9* 33.5 33.1   PLATELETS 10*3/mm3 64* 78* 92*         Results from last 7 days   Lab Units 11/19/22  1618 11/19/22  1246 11/19/22  0821 11/18/22  0130   SODIUM mmol/L 132* 134* 133* 121*   POTASSIUM mmol/L  --  4.7 5.9* 5.3*   CHLORIDE mmol/L  --  93* 92* 86*   CO2 mmol/L  --  29.7* 23.1 22.2   BUN mg/dL  --  47* 44* 58*   CREATININE mg/dL  --  3.60* 3.74* 3.85*   CALCIUM mg/dL  --  8.1* 8.1* 8.3*   GLUCOSE mg/dL  --  214* 200* 204*   Estimated Creatinine Clearance: 21.8 mL/min (A) (by C-G formula based on SCr of 3.6 mg/dL (H)).  No results found for: AMMONIA  Results from last 7 days   Lab Units 11/15/22  0922   TROPONIN T ng/mL 0.040*             Glucose   Date/Time Value Ref Range Status   11/19/2022 1659 156 (H) 70 - 130 mg/dL Final     Comment:     Meter: [id number] : [id number] DARON NICK   11/19/2022 1110 212 (H) 70 - 130 mg/dL Final      Comment:     Meter: [id number] : [id number] DARON MILLSREL   11/19/2022 0619 175 (H) 70 - 130 mg/dL Final     Comment:     Meter: [id number] : [id number] KEISHA SCHNEIDER   11/18/2022 1739 286 (H) 70 - 130 mg/dL Final     Comment:     Meter: [id number] : [id number] KAURA GAMBREL   11/18/2022 1222 127 70 - 130 mg/dL Final     Comment:     Meter: [id number] : [id number] KAURA GAMBREL   11/18/2022 0700 129 70 - 130 mg/dL Final     Comment:     Meter: [id number] : [id number] LINNETTE WHITSYMONE   11/17/2022 1958 232 (H) 70 - 130 mg/dL Final     Comment:     Meter: [id number] : [id number] otto barclay   11/17/2022 1636 247 (H) 70 - 130 mg/dL Final     Comment:     Meter: [id number] : [id number] DARON MILLSREL     Lab Results   Component Value Date    TSH 9.130 (H) 11/01/2022    FREET4 1.02 11/01/2022     No results found for: PREGTESTUR, PREGSERUM, HCG, HCGQUANT  Pain Management Panel     Pain Management Panel Latest Ref Rng & Units 4/3/2019 4/2/2019    CREATININE UR mg/dL 18.4 44.3    AMPHETAMINES SCREEN, URINE Negative - -    BARBITURATES SCREEN Negative - -    BENZODIAZEPINE SCREEN, URINE Negative - -    BUPRENORPHINEUR Negative - -    COCAINE SCREEN, URINE Negative - -    METHADONE SCREEN, URINE Negative - -    METHAMPHETAMINEUR Negative - -        Brief Urine Lab Results     None        No results found for: BLOODCX  No results found for: URINECX  No results found for: WOUNDCX  No results found for: STOOLCX  No results found for: RESPCX  No results found for: AFBCX  Results from last 7 days   Lab Units 11/19/22  1200   LACTATE mmol/L 2.6*       I have personally looked at the labs and they are summarized above.  ----------------------------------------------------------------------------------------------------------------------  Detailed radiology reports for the last 24 hours:    Imaging Results (Last 24 Hours)     ** No results found for the last 24 hours. **        Assessment  & Plan      #Right-sided pneumonia  #Acute exacerbation of COPD  #ESRD on HD  #DM II, non-insulin dependent  #Hyponatremia  #Hyperkalemia  #Paroxysmal Afib  #Chronic macrocytic anemia  #Thrombocytopenia     Patient completed abx course. Continue nebs. Had been on steroids around 8 days, stopped prednisone on 11/15. This has helped with hyeprglycemia. A1C recently 8.6%. Patient had episode of hypoglycemia which improved with with decreased insulin regimen. Continue dialysis as per nephrology. Hyperkalemia slightly worse at 5.3. Will repeat in AM after dialysis today. Platelets around baseline.      Dispo: Possibly discharge tomorrow.        VTE Prophylaxis:   Mechanical Order History:     None      Pharmalogical Order History:      Ordered     Dose Route Frequency Stop    11/07/22 1520  apixaban (ELIQUIS) tablet 5 mg         5 mg PO Every 12 Hours Scheduled --    Pending  apixaban (ELIQUIS) tablet 5 mg         5 mg PO Every 12 Hours Scheduled 11/23/22 0859                  Isaias Cooper MD  [facility] Hospitalist  11/19/22  19:05 EST

## 2022-11-20 NOTE — NURSING NOTE
Phone call to Candace Mustafa , Cousin, whom states ok to send patient home by "[employer] ( approved for payment by [facility] appx 25.00)  Gave Phone number for neighbor Ms Lowe [phone] to call her to meet Taxi with Wheelchair and another neighbor to assist Mr. Desir into apartment.  Spoke with Ankush at [employer] [phone].  [employer] cab arranged to come as soon as available to transport pt to residence with billing to the hospital.

## 2022-11-20 NOTE — DISCHARGE INSTR - APPOINTMENTS
[facility] has been arranged for home services.  You can reach the office at .    Keep your scheduled dialysis appointment at [facility] on Mondays, Wednesdays and Fridays.  You can reach the office at .    Please call as soon possible to schedule an appointment to see IVETTE Li within one week.  You can reach the office at .

## 2022-11-20 NOTE — PROGRESS NOTES
[facility] HOSPITALIST PROGRESS NOTE     Patient Identification:  Name:  Axel Desir  Age:  62 y.o.  Sex:  male  :  1960  MRN:  [id number]  Visit Number:  [id number]  ROOM: [address] [place] [address]     Primary Care Provider:  Isabelle Martinez APRN    Length of stay in inpatient status:  8    Subjective     Chief Compliant:    Chief Complaint   Patient presents with   • Weakness - Generalized       History of Presenting Illness:    Patient without new complaints. Continues to request sweets. HR has been in the 130's again today at times.     ROS:  Otherwise 10 point ROS negative other than documented above in HPI.     Objective     Current Hospital Meds:apixaban, 5 mg, Oral, Q12H  budesonide-formoterol, 2 puff, Inhalation, BID - RT  calcium acetate, 2,668 mg, Oral, TID With Meals  cetirizine, 5 mg, Oral, Daily  Insulin Aspart, 0-9 Units, Subcutaneous, TID AC  insulin detemir, 10 Units, Subcutaneous, Daily  levothyroxine, 88 mcg, Oral, Q AM  melatonin, 10 mg, Oral, Nightly  metoprolol tartrate, 37.5 mg, Oral, Q12H  pantoprazole, 40 mg, Oral, Q AM  Renal, 1 capsule, Oral, Daily  sodium chloride, 3 mL, Intravenous, Q12H  thiamine, 100 mg, Oral, Daily         Current Antimicrobial Therapy:  Anti-Infectives (From admission, onward)    Ordered     Dose/Rate Route Frequency Start Stop    22 1227  doxycycline (MONODOX) 100 MG capsule        Ordering Provider: Isaias Cooper MD       22 0000      22 0213  cefdinir (OMNICEF) capsule 300 mg        Ordering Provider: Palma Todd PA-C    300 mg Oral Every 24 Hours Scheduled 22 0900 11/10/22 0921    22 0213  doxycycline (VIBRAMYCIN) 100 mg in sodium chloride 0.9 % 100 mL IVPB-VTB        Ordering Provider: Palma Todd PA-C    100 mg  over 60 Minutes Intravenous Every 12 Hours 22 0900 11/10/22 2300    22  cefTRIAXone (ROCEPHIN) 1 g/100 mL 0.9% NS (MBP)        Ordering Provider: Monica Rae PA    1  g  over 30 Minutes Intravenous Once 11/03/22 2130 11/03/22 2305 11/03/22 1912  doxycycline (MONODOX) capsule 100 mg        Ordering Provider: Monica Rae PA    100 mg Oral Once 11/03/22 1914 11/03/22 2119        Current Diuretic Therapy:  Diuretics (From admission, onward)    None        ----------------------------------------------------------------------------------------------------------------------  Vital Signs:  Temp:  [97.6 °F (36.4 °C)-98 °F (36.7 °C)] 98 °F (36.7 °C)  Heart Rate:  [100-130] 100  Resp:  [18-20] 18  BP: (107-126)/(67-79) 110/74  SpO2:  [95 %-100 %] 96 %  on   ;   Device (Oxygen Therapy): room air  Body mass index is 24.98 kg/m².    Wt Readings from Last 3 Encounters:   11/04/22 74.5 kg (164 lb 4.8 oz)   11/01/22 82.1 kg (181 lb)   10/24/22 80.4 kg (177 lb 3.2 oz)     Intake & Output (last 3 days)       11/17 0701 11/18 0700 11/18 0701  11/19 0700 11/19 0701  11/20 0700    P.O. 590 680 716    Total Intake(mL/kg) 590 (7.9) 680 (9.1) 716 (9.6)    Other  3000     Total Output  3000     Net +590 -2320 +716           Urine Unmeasured Occurrence 0 x 0 x         Diet Soft to Chew (NDD 3); Chopped Meat; Regular Consistency; Cardiac Diets, Diabetic Diets, Renal Diets, Fluid Restriction (240 mL/tray) Diets; Healthy Heart (2-3 Na+); Consistent Carbohydrate; Low Sodium (2-3g); Other (Specify mL/day) (800)  ----------------------------------------------------------------------------------------------------------------------  Physical exam:  Constitutional:  Well-developed and well-nourished.  No respiratory distress.      HENT:  Head:  Normocephalic and atraumatic.  Mouth:  Moist mucous membranes.    Eyes:  Conjunctivae and EOM are normal. No scleral icterus.    Neck:  Neck supple.  No JVD present.    Cardiovascular:  Normal rate, regular rhythm and normal heart sounds with no murmur.  Pulmonary/Chest:  No respiratory distress, no wheezes, no crackles, with normal breath sounds and good air  movement.  Abdominal:  Soft.  Bowel sounds are normal.  No distension and no tenderness.   Musculoskeletal:  No edema, no tenderness, and no deformity.  No red or swollen joints anywhere.    Neurological:  Alert and oriented to person, place, and time.  No cranial nerve deficit.  No tongue deviation.  No facial droop.  No slurred speech.   Skin:  Skin is warm and dry. No rash noted. No pallor.   Peripheral vascular:  Pulses in all 4 extremities with no clubbing, no cyanosis, no edema.  ----------------------------------------------------------------------------------------------------------------------  Tele:    ----------------------------------------------------------------------------------------------------------------------  Results from last 7 days   Lab Units 11/19/22  1200 11/18/22  0049 11/17/22  0118   LACTATE mmol/L 2.6*  --   --    WBC 10*3/mm3 7.41 8.71 10.27   HEMOGLOBIN g/dL 9.2* 9.3* 9.1*   HEMATOCRIT % 29.8* 27.8* 27.5*   MCV fL 108.8* 100.4* 102.2*   MCHC g/dL 30.9* 33.5 33.1   PLATELETS 10*3/mm3 64* 78* 92*         Results from last 7 days   Lab Units 11/19/22  1618 11/19/22  1246 11/19/22  0821 11/18/22  0130   SODIUM mmol/L 132* 134* 133* 121*   POTASSIUM mmol/L  --  4.7 5.9* 5.3*   CHLORIDE mmol/L  --  93* 92* 86*   CO2 mmol/L  --  29.7* 23.1 22.2   BUN mg/dL  --  47* 44* 58*   CREATININE mg/dL  --  3.60* 3.74* 3.85*   CALCIUM mg/dL  --  8.1* 8.1* 8.3*   GLUCOSE mg/dL  --  214* 200* 204*   Estimated Creatinine Clearance: 21.8 mL/min (A) (by C-G formula based on SCr of 3.6 mg/dL (H)).  No results found for: AMMONIA  Results from last 7 days   Lab Units 11/15/22  0922   TROPONIN T ng/mL 0.040*             Glucose   Date/Time Value Ref Range Status   11/19/2022 1659 156 (H) 70 - 130 mg/dL Final     Comment:     Meter: [id number] : [id number] DARON NICK   11/19/2022 1110 212 (H) 70 - 130 mg/dL Final     Comment:     Meter: [id number] : [id number] DARON MILLSLEOPOLDO   11/19/2022 0619 175  (H) 70 - 130 mg/dL Final     Comment:     Meter: [id number] : [id number] KEISHA SCHNEIDER   11/18/2022 1739 286 (H) 70 - 130 mg/dL Final     Comment:     Meter: [id number] : [id number] DARON GAMBREL   11/18/2022 1222 127 70 - 130 mg/dL Final     Comment:     Meter: [id number] : [id number] DARON GAMBREL   11/18/2022 0700 129 70 - 130 mg/dL Final     Comment:     Meter: [id number] : [id number] LINNETTE HERNANDEZ   11/17/2022 1958 232 (H) 70 - 130 mg/dL Final     Comment:     Meter: [id number] : [id number] otto barclay   11/17/2022 1636 247 (H) 70 - 130 mg/dL Final     Comment:     Meter: [id number] : [id number] DARON MILLSREL     Lab Results   Component Value Date    TSH 9.130 (H) 11/01/2022    FREET4 1.02 11/01/2022     No results found for: PREGTESTUR, PREGSERUM, HCG, HCGQUANT  Pain Management Panel     Pain Management Panel Latest Ref Rng & Units 4/3/2019 4/2/2019    CREATININE UR mg/dL 18.4 44.3    AMPHETAMINES SCREEN, URINE Negative - -    BARBITURATES SCREEN Negative - -    BENZODIAZEPINE SCREEN, URINE Negative - -    BUPRENORPHINEUR Negative - -    COCAINE SCREEN, URINE Negative - -    METHADONE SCREEN, URINE Negative - -    METHAMPHETAMINEUR Negative - -        Brief Urine Lab Results     None        No results found for: BLOODCX  No results found for: URINECX  No results found for: WOUNDCX  No results found for: STOOLCX  No results found for: RESPCX  No results found for: AFBCX  Results from last 7 days   Lab Units 11/19/22  1200   LACTATE mmol/L 2.6*       I have personally looked at the labs and they are summarized above.  ----------------------------------------------------------------------------------------------------------------------  Detailed radiology reports for the last 24 hours:    Imaging Results (Last 24 Hours)     ** No results found for the last 24 hours. **        Assessment & Plan      #Right-sided pneumonia  #Acute exacerbation of COPD  #ESRD on HD  #DM II,  non-insulin dependent  #Hyponatremia  #Hyperkalemia  #Paroxysmal Afib  #Chronic macrocytic anemia  #Thrombocytopenia     Patient completed abx course. Continue nebs. Had been on steroids around 8 days, stopped prednisone on 11/15. This has helped with hyeprglycemia. A1C recently 8.6%. Patient had episode of hypoglycemia which improved with with decreased insulin regimen.     Continue dialysis as per nephrology. Hyperkalemia slightly worse at 5.7 but suspect hemolyzed sample as repeat 4.2.     Sodium has appeared to increase quickly. Nephrology noted this is incorrect. I discussed similar repeat lab. Nephrology still noted patient safe to discharge from their perspective. I repeated isolated sodium as well. Based on last value sodium, if real, has increased around 10 in 40 hours which is appropriate. Will continue to monitor.     Patient still tachycardic much of the time in afib w/ RVR. Increased metoprolol to 37.5 mg BID.     Lactate mildly elevated, normal BP, will repeat.     Platelets around baseline.      Dispo: Possibly discharge tomorrow.     VTE Prophylaxis:   Mechanical Order History:     None      Pharmalogical Order History:      Ordered     Dose Route Frequency Stop    11/07/22 1520  apixaban (ELIQUIS) tablet 5 mg         5 mg PO Every 12 Hours Scheduled --    Pending  apixaban (ELIQUIS) tablet 5 mg         5 mg PO Every 12 Hours Scheduled 11/23/22 0859                  Isaias Cooper MD  [facility] Hospitalist  11/19/22  19:08 EST

## 2022-11-20 NOTE — NURSING NOTE
Spoke to the answering service for [employer] [facility] [employer] [facility]. Left my phone number and they will have someone call back for report.   1411: [facility]

## 2022-11-20 NOTE — PROGRESS NOTES
Nephrology Progress Note      Subjective      No chest pain or shortness of breath.     Objective       Vital signs :     Temp:  [97.6 °F (36.4 °C)-98 °F (36.7 °C)] 98 °F (36.7 °C)  Heart Rate:  [] 120  Resp:  [16-18] 18  BP: (107-121)/(69-85) 110/85    Intake/Output                             11/18/22 0701 - 11/19/22 0700 11/19/22 0701 - 11/20/22 0700 11/20/22 0701 - 11/21/22 0700     4894-5587 0095-7795 Total 5535-5969 5472-7777 Total 9123-6121 5231-1719 Total                    Intake    P.O.  530  150 680  716  -- 716  290  -- 290    Total Intake 530 150 680 716 -- 716 290 -- 290       Output    Other  3000  -- 3000  --  -- --  --  -- --    Ultrafiltration (mL) 3000 -- 3000 -- -- -- -- -- --    Total Output 3000 -- 3000 -- -- -- -- -- --           Physical Exam:    General Appearance : not in acute distress  Lungs : clear to auscultation, respirations regular  Heart :  regular rhythm & normal rate, normal S1, S2 and no murmur, no rub  Abdomen : soft, non distended  Extremities : 2+ edema  Neurologic :   orientated to person, place, time and situation, Grossly no focal deficits    Laboratory Data :     Albumin No results found for: ALBUMIN   Magnesium No results found for: MG       PTH               No results found for: PTH    CBC and coagulation:  Results from last 7 days   Lab Units 11/19/22  1926 11/19/22  1200 11/18/22  0049 11/17/22  0118   LACTATE mmol/L 1.7 2.6*  --   --    WBC 10*3/mm3  --  7.41 8.71 10.27   HEMOGLOBIN g/dL  --  9.2* 9.3* 9.1*   HEMATOCRIT %  --  29.8* 27.8* 27.5*   MCV fL  --  108.8* 100.4* 102.2*   MCHC g/dL  --  30.9* 33.5 33.1   PLATELETS 10*3/mm3  --  64* 78* 92*     Acid/base balance:      Renal and electrolytes:    Results from last 7 days   Lab Units 11/20/22  0840 11/19/22  1618 11/19/22  1246 11/19/22  0821 11/18/22  0130 11/17/22  0118   SODIUM mmol/L 129* 132* 134* 133* 121* 125*   POTASSIUM mmol/L 5.2  --  4.7 5.9* 5.3* 4.2   CHLORIDE mmol/L 90*  --  93* 92* 86* 87*    CO2 mmol/L 25.5  --  29.7* 23.1 22.2 27.1   BUN mg/dL 55*  --  47* 44* 58* 45*   CREATININE mg/dL 4.41*  --  3.60* 3.74* 3.85* 3.17*   CALCIUM mg/dL 7.9*  --  8.1* 8.1* 8.3* 8.4*     Estimated Creatinine Clearance: 17.8 mL/min (A) (by C-G formula based on SCr of 4.41 mg/dL (H)).  @GFRCG:3@   Liver and pancreatic function:        Invalid input(s): PROT      Cardiac:      Liver and pancreatic function:        Invalid input(s): PROT    Medications :     apixaban, 5 mg, Oral, Q12H  budesonide-formoterol, 2 puff, Inhalation, BID - RT  calcium acetate, 2,668 mg, Oral, TID With Meals  cetirizine, 5 mg, Oral, Daily  Insulin Aspart, 0-9 Units, Subcutaneous, TID AC  insulin detemir, 10 Units, Subcutaneous, Daily  levothyroxine, 88 mcg, Oral, Q AM  melatonin, 10 mg, Oral, Nightly  metoprolol tartrate, 37.5 mg, Oral, Q12H  pantoprazole, 40 mg, Oral, Q AM  Renal, 1 capsule, Oral, Daily  sodium chloride, 3 mL, Intravenous, Q12H  thiamine, 100 mg, Oral, Daily        Assessment & Plan     1. ESKD on IHDx  2. Anemia  3. Hyponatremia, hypervolemic  4. Anion gap metabolic acidosis  5. SHPT  6. Acute bacterial pneumonia  7. Hyperkalemia    Educated and counseled on FR, ok to be discharged from renal stands point and follow up outpatient dialysis.     Hyponatremia; FR and Low K diet  Anemia; HH at target  SHPT; follow outpatient management        Bladimir Alvarado MD  11/20/22  10:35 EST

## 2022-11-20 NOTE — NURSING NOTE
Phone call to [employer], [phone], spoke with Isaias whom states it will be approximately 2 hours before can send cab to transport patient residence.  Called neighbor Ms. Lowe  to let her know.

## 2022-11-20 NOTE — PLAN OF CARE
Goal Outcome Evaluation:              Outcome Evaluation: patient frequently rings out asking for snacks and water. no distress noted. patient has slept a few hours during shift.

## 2022-11-20 NOTE — PLAN OF CARE
Goal Outcome Evaluation:           Progress: improving  Outcome Evaluation: Patient is being diacharged home on todays date with home health, patient frequently rings out for snacks, at 1300 while being assisted back to the bed from the chair by the CNA the patient laid on the bed and then went to his knees, patient was assisted back to bed head to toe assessment was done and Dr. Cooper made aware.

## 2022-11-21 ENCOUNTER — APPOINTMENT (OUTPATIENT)
Dept: CT IMAGING | Facility: HOSPITAL | Age: 62
End: 2022-11-21

## 2022-11-21 ENCOUNTER — TELEPHONE (OUTPATIENT)
Dept: MEDSURG UNIT | Facility: HOSPITAL | Age: 62
End: 2022-11-21

## 2022-11-21 ENCOUNTER — TRANSITIONAL CARE MANAGEMENT TELEPHONE ENCOUNTER (OUTPATIENT)
Dept: CALL CENTER | Facility: HOSPITAL | Age: 62
End: 2022-11-21

## 2022-11-21 ENCOUNTER — HOSPITAL ENCOUNTER (INPATIENT)
Facility: HOSPITAL | Age: 62
LOS: 11 days | Discharge: NURSING, REHAB OR LONG-TERM CARE | End: 2022-12-03
Attending: EMERGENCY MEDICINE | Admitting: STUDENT IN AN ORGANIZED HEALTH CARE EDUCATION/TRAINING PROGRAM

## 2022-11-21 ENCOUNTER — APPOINTMENT (OUTPATIENT)
Dept: GENERAL RADIOLOGY | Facility: HOSPITAL | Age: 62
End: 2022-11-21

## 2022-11-21 DIAGNOSIS — N17.9 ACUTE RENAL FAILURE SUPERIMPOSED ON STAGE 5 CHRONIC KIDNEY DISEASE, NOT ON CHRONIC DIALYSIS, UNSPECIFIED ACUTE RENAL FAILURE TYPE: ICD-10-CM

## 2022-11-21 DIAGNOSIS — I25.119 CORONARY ARTERY DISEASE INVOLVING NATIVE HEART WITH ANGINA PECTORIS, UNSPECIFIED VESSEL OR LESION TYPE: ICD-10-CM

## 2022-11-21 DIAGNOSIS — N18.5 ACUTE RENAL FAILURE SUPERIMPOSED ON STAGE 5 CHRONIC KIDNEY DISEASE, NOT ON CHRONIC DIALYSIS, UNSPECIFIED ACUTE RENAL FAILURE TYPE: ICD-10-CM

## 2022-11-21 DIAGNOSIS — I48.0 PAF (PAROXYSMAL ATRIAL FIBRILLATION): ICD-10-CM

## 2022-11-21 DIAGNOSIS — R53.81 DEBILITY: ICD-10-CM

## 2022-11-21 DIAGNOSIS — R62.7 FAILURE TO THRIVE IN ADULT: Primary | ICD-10-CM

## 2022-11-21 LAB
ALBUMIN SERPL-MCNC: 3.08 G/DL (ref 3.5–5.2)
ALBUMIN/GLOB SERPL: 1.4 G/DL
ALP SERPL-CCNC: 77 U/L (ref 39–117)
ALT SERPL W P-5'-P-CCNC: 41 U/L (ref 1–41)
ANION GAP SERPL CALCULATED.3IONS-SCNC: 13.9 MMOL/L (ref 5–15)
ANISOCYTOSIS BLD QL: NORMAL
AST SERPL-CCNC: 31 U/L (ref 1–40)
BASOPHILS # BLD AUTO: 0.01 10*3/MM3 (ref 0–0.2)
BASOPHILS NFR BLD AUTO: 0.1 % (ref 0–1.5)
BILIRUB SERPL-MCNC: 0.7 MG/DL (ref 0–1.2)
BUN SERPL-MCNC: 68 MG/DL (ref 8–23)
BUN/CREAT SERPL: 12.6 (ref 7–25)
CALCIUM SPEC-SCNC: 8.1 MG/DL (ref 8.6–10.5)
CHLORIDE SERPL-SCNC: 91 MMOL/L (ref 98–107)
CK SERPL-CCNC: 63 U/L (ref 20–200)
CO2 SERPL-SCNC: 24.1 MMOL/L (ref 22–29)
CREAT SERPL-MCNC: 5.39 MG/DL (ref 0.76–1.27)
CRP SERPL-MCNC: 1.11 MG/DL (ref 0–0.5)
DEPRECATED RDW RBC AUTO: 71.1 FL (ref 37–54)
EGFRCR SERPLBLD CKD-EPI 2021: 11.1 ML/MIN/1.73
EOSINOPHIL # BLD AUTO: 0.04 10*3/MM3 (ref 0–0.4)
EOSINOPHIL NFR BLD AUTO: 0.5 % (ref 0.3–6.2)
ERYTHROCYTE [DISTWIDTH] IN BLOOD BY AUTOMATED COUNT: 19.2 % (ref 12.3–15.4)
FLUAV RNA RESP QL NAA+PROBE: NOT DETECTED
FLUBV RNA RESP QL NAA+PROBE: NOT DETECTED
GLOBULIN UR ELPH-MCNC: 2.2 GM/DL
GLUCOSE SERPL-MCNC: 155 MG/DL (ref 65–99)
HCT VFR BLD AUTO: 28 % (ref 37.5–51)
HGB BLD-MCNC: 9.3 G/DL (ref 13–17.7)
HOLD SPECIMEN: NORMAL
HOLD SPECIMEN: NORMAL
IMM GRANULOCYTES # BLD AUTO: 0.09 10*3/MM3 (ref 0–0.05)
IMM GRANULOCYTES NFR BLD AUTO: 1 % (ref 0–0.5)
LYMPHOCYTES # BLD AUTO: 1.04 10*3/MM3 (ref 0.7–3.1)
LYMPHOCYTES NFR BLD AUTO: 11.8 % (ref 19.6–45.3)
MACROCYTES BLD QL SMEAR: NORMAL
MCH RBC QN AUTO: 33.5 PG (ref 26.6–33)
MCHC RBC AUTO-ENTMCNC: 33.2 G/DL (ref 31.5–35.7)
MCV RBC AUTO: 100.7 FL (ref 79–97)
MONOCYTES # BLD AUTO: 0.81 10*3/MM3 (ref 0.1–0.9)
MONOCYTES NFR BLD AUTO: 9.2 % (ref 5–12)
NEUTROPHILS NFR BLD AUTO: 6.82 10*3/MM3 (ref 1.7–7)
NEUTROPHILS NFR BLD AUTO: 77.4 % (ref 42.7–76)
NRBC BLD AUTO-RTO: 0 /100 WBC (ref 0–0.2)
PLATELET # BLD AUTO: 83 10*3/MM3 (ref 140–450)
PMV BLD AUTO: 10.8 FL (ref 6–12)
POTASSIUM SERPL-SCNC: 5.5 MMOL/L (ref 3.5–5.2)
PROT SERPL-MCNC: 5.3 G/DL (ref 6–8.5)
RBC # BLD AUTO: 2.78 10*6/MM3 (ref 4.14–5.8)
SARS-COV-2 RNA RESP QL NAA+PROBE: NOT DETECTED
SMALL PLATELETS BLD QL SMEAR: NORMAL
SODIUM SERPL-SCNC: 129 MMOL/L (ref 136–145)
TROPONIN T SERPL-MCNC: 0.09 NG/ML (ref 0–0.03)
WBC NRBC COR # BLD: 8.81 10*3/MM3 (ref 3.4–10.8)
WHOLE BLOOD HOLD COAG: NORMAL
WHOLE BLOOD HOLD SPECIMEN: NORMAL

## 2022-11-21 PROCEDURE — 70450 CT HEAD/BRAIN W/O DYE: CPT

## 2022-11-21 PROCEDURE — 82550 ASSAY OF CK (CPK): CPT | Performed by: NURSE PRACTITIONER

## 2022-11-21 PROCEDURE — 86140 C-REACTIVE PROTEIN: CPT | Performed by: NURSE PRACTITIONER

## 2022-11-21 PROCEDURE — 99222 1ST HOSP IP/OBS MODERATE 55: CPT

## 2022-11-21 PROCEDURE — 84484 ASSAY OF TROPONIN QUANT: CPT | Performed by: NURSE PRACTITIONER

## 2022-11-21 PROCEDURE — 70450 CT HEAD/BRAIN W/O DYE: CPT | Performed by: RADIOLOGY

## 2022-11-21 PROCEDURE — 85025 COMPLETE CBC W/AUTO DIFF WBC: CPT | Performed by: NURSE PRACTITIONER

## 2022-11-21 PROCEDURE — 87636 SARSCOV2 & INF A&B AMP PRB: CPT | Performed by: NURSE PRACTITIONER

## 2022-11-21 PROCEDURE — 36415 COLL VENOUS BLD VENIPUNCTURE: CPT

## 2022-11-21 PROCEDURE — 93005 ELECTROCARDIOGRAM TRACING: CPT | Performed by: NURSE PRACTITIONER

## 2022-11-21 PROCEDURE — 80053 COMPREHEN METABOLIC PANEL: CPT | Performed by: NURSE PRACTITIONER

## 2022-11-21 PROCEDURE — 85007 BL SMEAR W/DIFF WBC COUNT: CPT | Performed by: NURSE PRACTITIONER

## 2022-11-21 PROCEDURE — 71045 X-RAY EXAM CHEST 1 VIEW: CPT

## 2022-11-21 PROCEDURE — 99285 EMERGENCY DEPT VISIT HI MDM: CPT

## 2022-11-21 RX ORDER — BUSPIRONE HYDROCHLORIDE 7.5 MG/1
7.5 TABLET ORAL DAILY
COMMUNITY
End: 2022-11-21

## 2022-11-21 RX ORDER — FOLIC ACID 1 MG/1
1 TABLET ORAL DAILY
COMMUNITY
End: 2022-11-21

## 2022-11-21 RX ORDER — FERROUS SULFATE 325(65) MG
325 TABLET ORAL
COMMUNITY
End: 2022-11-21

## 2022-11-21 RX ORDER — DIPHENHYDRAMINE HCL 25 MG
25 CAPSULE ORAL EVERY 6 HOURS PRN
COMMUNITY
End: 2022-11-21

## 2022-11-21 RX ORDER — SODIUM CHLORIDE 0.9 % (FLUSH) 0.9 %
10 SYRINGE (ML) INJECTION AS NEEDED
Status: DISCONTINUED | OUTPATIENT
Start: 2022-11-21 | End: 2022-12-03 | Stop reason: HOSPADM

## 2022-11-21 NOTE — PAYOR COMM NOTE
"[facility]  VARUN MÉNDEZ  PHONE  [phone]  FAX  [phone]  NPI:  [id number]    PATIENT D/C 11/20/2022    Axel Desir (62 y.o. Male)     Date of Birth   1960    Social Security Number       Address   [address] [address]    Home Phone   [phone]    MRN   [id number]       Gnosticism   Presybeterian    Marital Status   Single                            Admission Date   11/3/22    Admission Type   Emergency    Admitting Provider   Hilaria James MD    Attending Provider       Department, Room/Bed   [address], 3327/1P       Discharge Date   11/20/2022    Discharge Disposition   Home or Self Care    Discharge Destination                               Attending Provider: (none)   Allergies: No Known Allergies    Isolation: None   Infection: None   Code Status: Prior    Ht: 172.7 cm (68\")   Wt: 74.5 kg (164 lb 4.8 oz)    Admission Cmt: None   Principal Problem: Pneumonia of right upper lobe due to infectious organism [J18.9]                 Active Insurance as of 11/3/2022     Primary Coverage     Payor Plan Insurance Group Employer/Plan Group    ANTHEM MEDICARE REPLACEMENT ANTHEM MEDICARE ADVANTAGE [id number]     Payor Plan Address Payor Plan Phone Number Payor Plan Fax Number Effective Dates    [address] [phone]  9/1/2022 - None Entered    [facility] [address]       Subscriber Name Subscriber Birth Date Member ID       AXEL DESIR 1960 [id number]           Secondary Coverage     Payor Plan Insurance Group Employer/Plan Group    [place]      Payor Plan Address Payor Plan Phone Number Payor Plan Fax Number Effective Dates    [address] [phone]  11/1/2022 - None Entered    [facility] [id number]       Subscriber Name Subscriber Birth Date Member ID       AXEL DESIR FARHAN 1960 [id number]                 Emergency Contacts      (Rel.) Home Phone Work Phone Mobile Phone    " ANDRE JONES ([facility]) [phone] -- [phone]

## 2022-11-21 NOTE — OUTREACH NOTE
Prep Survey    Flowsheet Row Responses   [facility] facility patient discharged from? [facility]   Is LACE score < 7 ? No   Emergency Room discharge w/ pulse ox? No   Eligibility [facility]   [facility]   Date of Admission 11/03/22   Date of Discharge 11/20/22   Discharge Disposition Home-Health Care Sv   Discharge diagnosis Acute exacerb COPD, right sided PNA, A-fib, chronic anemia, ESRD on HD   Does the patient have one of the following disease processes/diagnoses(primary or secondary)? COPD   Does the patient have Home health ordered? Yes   What is the Home health agency?  HH ordered   Is there a DME ordered? No   General alerts for this patient ESRD on HD   Prep survey completed? Yes          KENDAL MENDZE - Registered Nurse

## 2022-11-21 NOTE — OUTREACH NOTE
Call Center TCM Note    Flowsheet Row Responses   [facility] patient discharged from? [facility]   Does the patient have one of the following disease processes/diagnoses(primary or secondary)? Pneumonia   TCM attempt successful? Yes  [VR listing jansinCandace]   Call start time 1138   Call end time 1208   General alerts for this patient ESRD on HD   Discharge diagnosis Acute exacerb COPD, right sided PNA, A-fib, chronic anemia, ESRD on HD   Medication alerts for this patient Healthcare surrogate says someone will need to organize his medications. Pt lives alone.   Meds reviewed with patient/caregiver? Yes   Is the patient having any side effects they believe may be caused by any medication additions or changes? No   Does the patient have all medications ordered at discharge? Yes   Is the patient taking all medications as directed (includes completed medication regime)? Yes   Comments Hosp fu appt 11-30-22   Does the patient have an appointment with their PCP within 7 days of discharge? Yes   What is the Home health agency?  HH ordered   Has home health visited the patient within 72 hours of discharge? Call prior to 72 hours   DME comments Per jansin, pt has never had home O2.   Psychosocial issues? No   Psychosocial comments Pt did not go to HD this morning.   Comments Per philippe, the pt lives alone, has no helper with him, pt also has no home O2 in place. Per Candace, the pt is unable to ambulate from couch to bathroom upon DC yesterday. She is working to try and arrange rehab.   Did the patient receive a copy of their discharge instructions? Yes   Nursing interventions Reviewed instructions with patient   What is the patient's perception of their health status since discharge? Worsening   Nursing Interventions Nurse provided patient education   Additional teach back comments HD is working with PCP to get pt placed in rehab facility from home.    Is the patient/caregiver able to teach back signs and symptoms of  "worsening condition: Fever/chills   TCM call completed? Yes   Wrap up additional comments Pt surrogate does not wish to \"be bothered anymore\".   Call end time 1208   Would this patient benefit from a Referral to [facility] Social Work? No   Is the patient interested in additional calls from an ambulatory ?  NOTE:  applies to high risk patients requiring additional follow-up. No          Jayleen Logan RN    11/21/2022, 12:09 EST      "

## 2022-11-22 PROBLEM — R62.7 FAILURE TO THRIVE IN ADULT: Status: ACTIVE | Noted: 2022-11-22

## 2022-11-22 LAB
ACANTHOCYTES BLD QL SMEAR: NORMAL
ALBUMIN SERPL-MCNC: 3.06 G/DL (ref 3.5–5.2)
ALBUMIN/GLOB SERPL: 1.3 G/DL
ALP SERPL-CCNC: 80 U/L (ref 39–117)
ALT SERPL W P-5'-P-CCNC: 38 U/L (ref 1–41)
ANION GAP SERPL CALCULATED.3IONS-SCNC: 15.2 MMOL/L (ref 5–15)
ANISOCYTOSIS BLD QL: NORMAL
AST SERPL-CCNC: 25 U/L (ref 1–40)
BASOPHILS # BLD AUTO: 0.02 10*3/MM3 (ref 0–0.2)
BASOPHILS NFR BLD AUTO: 0.3 % (ref 0–1.5)
BILIRUB SERPL-MCNC: 0.8 MG/DL (ref 0–1.2)
BUN SERPL-MCNC: 78 MG/DL (ref 8–23)
BUN/CREAT SERPL: 13.8 (ref 7–25)
CALCIUM SPEC-SCNC: 8.2 MG/DL (ref 8.6–10.5)
CHLORIDE SERPL-SCNC: 92 MMOL/L (ref 98–107)
CO2 SERPL-SCNC: 23.8 MMOL/L (ref 22–29)
CREAT SERPL-MCNC: 5.65 MG/DL (ref 0.76–1.27)
DEPRECATED RDW RBC AUTO: 74.1 FL (ref 37–54)
EGFRCR SERPLBLD CKD-EPI 2021: 10.5 ML/MIN/1.73
EOSINOPHIL # BLD AUTO: 0.04 10*3/MM3 (ref 0–0.4)
EOSINOPHIL NFR BLD AUTO: 0.6 % (ref 0.3–6.2)
ERYTHROCYTE [DISTWIDTH] IN BLOOD BY AUTOMATED COUNT: 19.9 % (ref 12.3–15.4)
GLOBULIN UR ELPH-MCNC: 2.3 GM/DL
GLUCOSE BLDC GLUCOMTR-MCNC: 246 MG/DL (ref 70–130)
GLUCOSE SERPL-MCNC: 143 MG/DL (ref 65–99)
HCT VFR BLD AUTO: 28.5 % (ref 37.5–51)
HGB BLD-MCNC: 9.1 G/DL (ref 13–17.7)
HYPOCHROMIA BLD QL: NORMAL
IMM GRANULOCYTES # BLD AUTO: 0.07 10*3/MM3 (ref 0–0.05)
IMM GRANULOCYTES NFR BLD AUTO: 1.1 % (ref 0–0.5)
LARGE PLATELETS: NORMAL
LYMPHOCYTES # BLD AUTO: 1.07 10*3/MM3 (ref 0.7–3.1)
LYMPHOCYTES NFR BLD AUTO: 16.9 % (ref 19.6–45.3)
MACROCYTES BLD QL SMEAR: NORMAL
MAGNESIUM SERPL-MCNC: 2.3 MG/DL (ref 1.6–2.4)
MCH RBC QN AUTO: 33.3 PG (ref 26.6–33)
MCHC RBC AUTO-ENTMCNC: 31.9 G/DL (ref 31.5–35.7)
MCV RBC AUTO: 104.4 FL (ref 79–97)
MONOCYTES # BLD AUTO: 0.58 10*3/MM3 (ref 0.1–0.9)
MONOCYTES NFR BLD AUTO: 9.1 % (ref 5–12)
NEUTROPHILS NFR BLD AUTO: 4.57 10*3/MM3 (ref 1.7–7)
NEUTROPHILS NFR BLD AUTO: 72 % (ref 42.7–76)
NRBC BLD AUTO-RTO: 0 /100 WBC (ref 0–0.2)
PLATELET # BLD AUTO: 85 10*3/MM3 (ref 140–450)
PMV BLD AUTO: 11.4 FL (ref 6–12)
POTASSIUM SERPL-SCNC: 6 MMOL/L (ref 3.5–5.2)
PROT SERPL-MCNC: 5.4 G/DL (ref 6–8.5)
QT INTERVAL: 314 MS
QT INTERVAL: 340 MS
QTC INTERVAL: 438 MS
QTC INTERVAL: 482 MS
RBC # BLD AUTO: 2.73 10*6/MM3 (ref 4.14–5.8)
SMALL PLATELETS BLD QL SMEAR: NORMAL
SODIUM SERPL-SCNC: 131 MMOL/L (ref 136–145)
WBC NRBC COR # BLD: 6.35 10*3/MM3 (ref 3.4–10.8)

## 2022-11-22 PROCEDURE — 82962 GLUCOSE BLOOD TEST: CPT

## 2022-11-22 PROCEDURE — 5A1D70Z PERFORMANCE OF URINARY FILTRATION, INTERMITTENT, LESS THAN 6 HOURS PER DAY: ICD-10-PCS | Performed by: INTERNAL MEDICINE

## 2022-11-22 PROCEDURE — 85025 COMPLETE CBC W/AUTO DIFF WBC: CPT | Performed by: NURSE PRACTITIONER

## 2022-11-22 PROCEDURE — 94799 UNLISTED PULMONARY SVC/PX: CPT

## 2022-11-22 PROCEDURE — 85007 BL SMEAR W/DIFF WBC COUNT: CPT | Performed by: NURSE PRACTITIONER

## 2022-11-22 PROCEDURE — 93005 ELECTROCARDIOGRAM TRACING: CPT | Performed by: PHYSICIAN ASSISTANT

## 2022-11-22 PROCEDURE — 83735 ASSAY OF MAGNESIUM: CPT

## 2022-11-22 PROCEDURE — 80053 COMPREHEN METABOLIC PANEL: CPT | Performed by: NURSE PRACTITIONER

## 2022-11-22 RX ORDER — POLYETHYLENE GLYCOL 3350 17 G/17G
17 POWDER, FOR SOLUTION ORAL DAILY PRN
Status: DISCONTINUED | OUTPATIENT
Start: 2022-11-22 | End: 2022-12-03 | Stop reason: HOSPADM

## 2022-11-22 RX ORDER — CETIRIZINE HYDROCHLORIDE 10 MG/1
5 TABLET ORAL DAILY
Status: DISCONTINUED | OUTPATIENT
Start: 2022-11-22 | End: 2022-12-03 | Stop reason: HOSPADM

## 2022-11-22 RX ORDER — ASCORBIC ACID, THIAMINE MONONITRATE,RIBOFLAVIN, NIACINAMIDE, PYRIDOXINE HYDROCHLORIDE, FOLIC ACID, CYANOCOBALAMIN, BIOTIN, CALCIUM PANTOTHENATE, 100; 1.5; 1.7; 20; 10; 1; 6000; 150000; 5 MG/1; MG/1; MG/1; MG/1; MG/1; MG/1; UG/1; UG/1; MG/1
1 CAPSULE, LIQUID FILLED ORAL DAILY
Status: DISCONTINUED | OUTPATIENT
Start: 2022-11-22 | End: 2022-12-03 | Stop reason: HOSPADM

## 2022-11-22 RX ORDER — SODIUM CHLORIDE 9 MG/ML
40 INJECTION, SOLUTION INTRAVENOUS AS NEEDED
Status: DISCONTINUED | OUTPATIENT
Start: 2022-11-22 | End: 2022-12-03 | Stop reason: HOSPADM

## 2022-11-22 RX ORDER — NICOTINE POLACRILEX 4 MG
15 LOZENGE BUCCAL
Status: DISCONTINUED | OUTPATIENT
Start: 2022-11-22 | End: 2022-11-22

## 2022-11-22 RX ORDER — ONDANSETRON 4 MG/1
4 TABLET, ORALLY DISINTEGRATING ORAL EVERY 12 HOURS PRN
Status: DISCONTINUED | OUTPATIENT
Start: 2022-11-22 | End: 2022-12-03 | Stop reason: HOSPADM

## 2022-11-22 RX ORDER — LEVOTHYROXINE SODIUM 88 UG/1
88 TABLET ORAL
Status: DISCONTINUED | OUTPATIENT
Start: 2022-11-23 | End: 2022-12-03 | Stop reason: HOSPADM

## 2022-11-22 RX ORDER — AMOXICILLIN 250 MG
2 CAPSULE ORAL NIGHTLY
Status: DISCONTINUED | OUTPATIENT
Start: 2022-11-22 | End: 2022-12-03 | Stop reason: HOSPADM

## 2022-11-22 RX ORDER — SODIUM CHLORIDE 0.9 % (FLUSH) 0.9 %
10 SYRINGE (ML) INJECTION EVERY 12 HOURS SCHEDULED
Status: DISCONTINUED | OUTPATIENT
Start: 2022-11-22 | End: 2022-12-03 | Stop reason: HOSPADM

## 2022-11-22 RX ORDER — BUDESONIDE AND FORMOTEROL FUMARATE DIHYDRATE 160; 4.5 UG/1; UG/1
2 AEROSOL RESPIRATORY (INHALATION)
Status: DISCONTINUED | OUTPATIENT
Start: 2022-11-22 | End: 2022-12-03 | Stop reason: HOSPADM

## 2022-11-22 RX ORDER — CHOLECALCIFEROL (VITAMIN D3) 125 MCG
5 CAPSULE ORAL NIGHTLY PRN
Status: DISCONTINUED | OUTPATIENT
Start: 2022-11-22 | End: 2022-12-03 | Stop reason: HOSPADM

## 2022-11-22 RX ORDER — SODIUM CHLORIDE 0.9 % (FLUSH) 0.9 %
10 SYRINGE (ML) INJECTION AS NEEDED
Status: DISCONTINUED | OUTPATIENT
Start: 2022-11-22 | End: 2022-12-03 | Stop reason: HOSPADM

## 2022-11-22 RX ORDER — ACETAMINOPHEN 500 MG
500 TABLET ORAL EVERY 6 HOURS PRN
Status: DISCONTINUED | OUTPATIENT
Start: 2022-11-22 | End: 2022-12-03 | Stop reason: HOSPADM

## 2022-11-22 RX ORDER — NITROGLYCERIN 0.4 MG/1
0.4 TABLET SUBLINGUAL
Status: DISCONTINUED | OUTPATIENT
Start: 2022-11-22 | End: 2022-12-03 | Stop reason: HOSPADM

## 2022-11-22 RX ORDER — DEXTROSE MONOHYDRATE 25 G/50ML
25 INJECTION, SOLUTION INTRAVENOUS
Status: DISCONTINUED | OUTPATIENT
Start: 2022-11-22 | End: 2022-12-03 | Stop reason: HOSPADM

## 2022-11-22 RX ORDER — NICOTINE POLACRILEX 4 MG
15 LOZENGE BUCCAL
Status: DISCONTINUED | OUTPATIENT
Start: 2022-11-22 | End: 2022-12-03 | Stop reason: HOSPADM

## 2022-11-22 RX ORDER — METHION/INOS/CHOL BT/B COM/LIV 110MG-86MG
100 CAPSULE ORAL DAILY
Status: DISCONTINUED | OUTPATIENT
Start: 2022-11-23 | End: 2022-12-03 | Stop reason: HOSPADM

## 2022-11-22 RX ORDER — ATORVASTATIN CALCIUM 40 MG/1
40 TABLET, FILM COATED ORAL NIGHTLY
Status: DISCONTINUED | OUTPATIENT
Start: 2022-11-22 | End: 2022-12-03 | Stop reason: HOSPADM

## 2022-11-22 RX ORDER — INSULIN ASPART 100 [IU]/ML
0-7 INJECTION, SOLUTION INTRAVENOUS; SUBCUTANEOUS
Status: DISCONTINUED | OUTPATIENT
Start: 2022-11-23 | End: 2022-11-24

## 2022-11-22 RX ORDER — CALCIUM ACETATE 667 MG/1
2668 CAPSULE ORAL
Status: DISCONTINUED | OUTPATIENT
Start: 2022-11-22 | End: 2022-12-03 | Stop reason: HOSPADM

## 2022-11-22 RX ORDER — NITROGLYCERIN 0.4 MG/1
0.4 TABLET SUBLINGUAL
Status: CANCELLED | OUTPATIENT
Start: 2022-11-22

## 2022-11-22 RX ADMIN — RENO CAPS 1 MG: 100; 1.5; 1.7; 20; 10; 1; 150; 5; 6 CAPSULE ORAL at 18:25

## 2022-11-22 RX ADMIN — SODIUM ZIRCONIUM CYCLOSILICATE 10 G: 10 POWDER, FOR SUSPENSION ORAL at 07:30

## 2022-11-22 RX ADMIN — DOCUSATE SODIUM 50 MG AND SENNOSIDES 8.6 MG 2 TABLET: 8.6; 5 TABLET, FILM COATED ORAL at 21:04

## 2022-11-22 RX ADMIN — CETIRIZINE HYDROCHLORIDE 5 MG: 10 TABLET, FILM COATED ORAL at 18:25

## 2022-11-22 RX ADMIN — METOPROLOL TARTRATE 37.5 MG: 25 TABLET, FILM COATED ORAL at 21:04

## 2022-11-22 RX ADMIN — CALCIUM ACETATE 2668 MG: 667 CAPSULE ORAL at 18:25

## 2022-11-22 RX ADMIN — ATORVASTATIN CALCIUM 40 MG: 40 TABLET, FILM COATED ORAL at 21:04

## 2022-11-22 RX ADMIN — APIXABAN 5 MG: 5 TABLET, FILM COATED ORAL at 21:04

## 2022-11-22 RX ADMIN — SODIUM ZIRCONIUM CYCLOSILICATE 10 G: 10 POWDER, FOR SUSPENSION ORAL at 18:25

## 2022-11-23 LAB
ACANTHOCYTES BLD QL SMEAR: NORMAL
ALBUMIN SERPL-MCNC: 2.8 G/DL (ref 3.5–5.2)
ALBUMIN/GLOB SERPL: 1.4 G/DL
ALP SERPL-CCNC: 85 U/L (ref 39–117)
ALT SERPL W P-5'-P-CCNC: 31 U/L (ref 1–41)
ANION GAP SERPL CALCULATED.3IONS-SCNC: 13.2 MMOL/L (ref 5–15)
ANISOCYTOSIS BLD QL: NORMAL
AST SERPL-CCNC: 19 U/L (ref 1–40)
BASOPHILS # BLD AUTO: 0.01 10*3/MM3 (ref 0–0.2)
BASOPHILS NFR BLD AUTO: 0.2 % (ref 0–1.5)
BILIRUB SERPL-MCNC: 0.6 MG/DL (ref 0–1.2)
BUN SERPL-MCNC: 42 MG/DL (ref 8–23)
BUN/CREAT SERPL: 10.8 (ref 7–25)
CALCIUM SPEC-SCNC: 8.1 MG/DL (ref 8.6–10.5)
CHLORIDE SERPL-SCNC: 92 MMOL/L (ref 98–107)
CO2 SERPL-SCNC: 27.8 MMOL/L (ref 22–29)
CREAT SERPL-MCNC: 3.88 MG/DL (ref 0.76–1.27)
DEPRECATED RDW RBC AUTO: 72 FL (ref 37–54)
EGFRCR SERPLBLD CKD-EPI 2021: 16.5 ML/MIN/1.73
EOSINOPHIL # BLD AUTO: 0.03 10*3/MM3 (ref 0–0.4)
EOSINOPHIL NFR BLD AUTO: 0.6 % (ref 0.3–6.2)
ERYTHROCYTE [DISTWIDTH] IN BLOOD BY AUTOMATED COUNT: 19.5 % (ref 12.3–15.4)
GLOBULIN UR ELPH-MCNC: 2 GM/DL
GLUCOSE BLDC GLUCOMTR-MCNC: 161 MG/DL (ref 70–130)
GLUCOSE BLDC GLUCOMTR-MCNC: 203 MG/DL (ref 70–130)
GLUCOSE BLDC GLUCOMTR-MCNC: 214 MG/DL (ref 70–130)
GLUCOSE BLDC GLUCOMTR-MCNC: 241 MG/DL (ref 70–130)
GLUCOSE SERPL-MCNC: 316 MG/DL (ref 65–99)
HCT VFR BLD AUTO: 25 % (ref 37.5–51)
HGB BLD-MCNC: 8.3 G/DL (ref 13–17.7)
HYPOCHROMIA BLD QL: NORMAL
IMM GRANULOCYTES # BLD AUTO: 0.07 10*3/MM3 (ref 0–0.05)
IMM GRANULOCYTES NFR BLD AUTO: 1.4 % (ref 0–0.5)
LYMPHOCYTES # BLD AUTO: 0.71 10*3/MM3 (ref 0.7–3.1)
LYMPHOCYTES NFR BLD AUTO: 13.7 % (ref 19.6–45.3)
MACROCYTES BLD QL SMEAR: NORMAL
MCH RBC QN AUTO: 34 PG (ref 26.6–33)
MCHC RBC AUTO-ENTMCNC: 33.2 G/DL (ref 31.5–35.7)
MCV RBC AUTO: 102.5 FL (ref 79–97)
MONOCYTES # BLD AUTO: 0.74 10*3/MM3 (ref 0.1–0.9)
MONOCYTES NFR BLD AUTO: 14.3 % (ref 5–12)
NEUTROPHILS NFR BLD AUTO: 3.62 10*3/MM3 (ref 1.7–7)
NEUTROPHILS NFR BLD AUTO: 69.8 % (ref 42.7–76)
NRBC BLD AUTO-RTO: 0 /100 WBC (ref 0–0.2)
PLATELET # BLD AUTO: 87 10*3/MM3 (ref 140–450)
PMV BLD AUTO: 11 FL (ref 6–12)
POTASSIUM SERPL-SCNC: 3.9 MMOL/L (ref 3.5–5.2)
PROT SERPL-MCNC: 4.8 G/DL (ref 6–8.5)
RBC # BLD AUTO: 2.44 10*6/MM3 (ref 4.14–5.8)
SCHISTOCYTES BLD QL SMEAR: NORMAL
SMALL PLATELETS BLD QL SMEAR: NORMAL
SODIUM SERPL-SCNC: 133 MMOL/L (ref 136–145)
WBC NRBC COR # BLD: 5.18 10*3/MM3 (ref 3.4–10.8)

## 2022-11-23 PROCEDURE — 80053 COMPREHEN METABOLIC PANEL: CPT | Performed by: NURSE PRACTITIONER

## 2022-11-23 PROCEDURE — 99232 SBSQ HOSP IP/OBS MODERATE 35: CPT

## 2022-11-23 PROCEDURE — 63710000001 INSULIN ASPART PER 5 UNITS: Performed by: STUDENT IN AN ORGANIZED HEALTH CARE EDUCATION/TRAINING PROGRAM

## 2022-11-23 PROCEDURE — 97161 PT EVAL LOW COMPLEX 20 MIN: CPT

## 2022-11-23 PROCEDURE — 82962 GLUCOSE BLOOD TEST: CPT

## 2022-11-23 PROCEDURE — 85007 BL SMEAR W/DIFF WBC COUNT: CPT | Performed by: NURSE PRACTITIONER

## 2022-11-23 PROCEDURE — 94799 UNLISTED PULMONARY SVC/PX: CPT

## 2022-11-23 PROCEDURE — 97166 OT EVAL MOD COMPLEX 45 MIN: CPT

## 2022-11-23 PROCEDURE — 85025 COMPLETE CBC W/AUTO DIFF WBC: CPT | Performed by: NURSE PRACTITIONER

## 2022-11-23 RX ADMIN — Medication 10 ML: at 21:50

## 2022-11-23 RX ADMIN — CALCIUM ACETATE 2668 MG: 667 CAPSULE ORAL at 18:35

## 2022-11-23 RX ADMIN — Medication 100 MG: at 09:33

## 2022-11-23 RX ADMIN — INSULIN ASPART 2 UNITS: 100 INJECTION, SOLUTION INTRAVENOUS; SUBCUTANEOUS at 18:35

## 2022-11-23 RX ADMIN — METOPROLOL TARTRATE 37.5 MG: 25 TABLET, FILM COATED ORAL at 09:33

## 2022-11-23 RX ADMIN — LINAGLIPTIN 5 MG: 5 TABLET, FILM COATED ORAL at 09:32

## 2022-11-23 RX ADMIN — LEVOTHYROXINE SODIUM 88 MCG: 88 TABLET ORAL at 18:35

## 2022-11-23 RX ADMIN — INSULIN ASPART 3 UNITS: 100 INJECTION, SOLUTION INTRAVENOUS; SUBCUTANEOUS at 09:32

## 2022-11-23 RX ADMIN — CALCIUM ACETATE 2668 MG: 667 CAPSULE ORAL at 12:06

## 2022-11-23 RX ADMIN — CETIRIZINE HYDROCHLORIDE 5 MG: 10 TABLET, FILM COATED ORAL at 09:32

## 2022-11-23 RX ADMIN — ATORVASTATIN CALCIUM 40 MG: 40 TABLET, FILM COATED ORAL at 21:50

## 2022-11-23 RX ADMIN — CALCIUM ACETATE 2668 MG: 667 CAPSULE ORAL at 09:32

## 2022-11-23 RX ADMIN — APIXABAN 5 MG: 5 TABLET, FILM COATED ORAL at 09:32

## 2022-11-23 RX ADMIN — METOPROLOL TARTRATE 37.5 MG: 25 TABLET, FILM COATED ORAL at 21:50

## 2022-11-23 RX ADMIN — APIXABAN 5 MG: 5 TABLET, FILM COATED ORAL at 21:50

## 2022-11-23 RX ADMIN — RENO CAPS 1 MG: 100; 1.5; 1.7; 20; 10; 1; 150; 5; 6 CAPSULE ORAL at 09:32

## 2022-11-23 RX ADMIN — INSULIN ASPART 3 UNITS: 100 INJECTION, SOLUTION INTRAVENOUS; SUBCUTANEOUS at 12:06

## 2022-11-23 RX ADMIN — Medication 10 ML: at 09:33

## 2022-11-23 RX ADMIN — Medication 5 MG: at 21:50

## 2022-11-23 RX ADMIN — DOCUSATE SODIUM 50 MG AND SENNOSIDES 8.6 MG 2 TABLET: 8.6; 5 TABLET, FILM COATED ORAL at 21:50

## 2022-11-24 LAB
ALBUMIN SERPL-MCNC: 2.77 G/DL (ref 3.5–5.2)
ALBUMIN/GLOB SERPL: 1.3 G/DL
ALP SERPL-CCNC: 80 U/L (ref 39–117)
ALT SERPL W P-5'-P-CCNC: 25 U/L (ref 1–41)
ANION GAP SERPL CALCULATED.3IONS-SCNC: 11.9 MMOL/L (ref 5–15)
ANISOCYTOSIS BLD QL: NORMAL
AST SERPL-CCNC: 20 U/L (ref 1–40)
BASOPHILS # BLD AUTO: 0.02 10*3/MM3 (ref 0–0.2)
BASOPHILS NFR BLD AUTO: 0.3 % (ref 0–1.5)
BILIRUB SERPL-MCNC: 0.6 MG/DL (ref 0–1.2)
BUN SERPL-MCNC: 57 MG/DL (ref 8–23)
BUN/CREAT SERPL: 11.9 (ref 7–25)
CALCIUM SPEC-SCNC: 8.5 MG/DL (ref 8.6–10.5)
CHLORIDE SERPL-SCNC: 91 MMOL/L (ref 98–107)
CO2 SERPL-SCNC: 28.1 MMOL/L (ref 22–29)
CREAT SERPL-MCNC: 4.81 MG/DL (ref 0.76–1.27)
DEPRECATED RDW RBC AUTO: 74.4 FL (ref 37–54)
EGFRCR SERPLBLD CKD-EPI 2021: 12.8 ML/MIN/1.73
EOSINOPHIL # BLD AUTO: 0.09 10*3/MM3 (ref 0–0.4)
EOSINOPHIL NFR BLD AUTO: 1.5 % (ref 0.3–6.2)
ERYTHROCYTE [DISTWIDTH] IN BLOOD BY AUTOMATED COUNT: 19.6 % (ref 12.3–15.4)
GLOBULIN UR ELPH-MCNC: 2.1 GM/DL
GLUCOSE BLDC GLUCOMTR-MCNC: 123 MG/DL (ref 70–130)
GLUCOSE BLDC GLUCOMTR-MCNC: 195 MG/DL (ref 70–130)
GLUCOSE BLDC GLUCOMTR-MCNC: 204 MG/DL (ref 70–130)
GLUCOSE BLDC GLUCOMTR-MCNC: 292 MG/DL (ref 70–130)
GLUCOSE BLDC GLUCOMTR-MCNC: 353 MG/DL (ref 70–130)
GLUCOSE SERPL-MCNC: 135 MG/DL (ref 65–99)
HCT VFR BLD AUTO: 27.4 % (ref 37.5–51)
HGB BLD-MCNC: 8.6 G/DL (ref 13–17.7)
HYPOCHROMIA BLD QL: NORMAL
IMM GRANULOCYTES # BLD AUTO: 0.11 10*3/MM3 (ref 0–0.05)
IMM GRANULOCYTES NFR BLD AUTO: 1.8 % (ref 0–0.5)
LYMPHOCYTES # BLD AUTO: 1.01 10*3/MM3 (ref 0.7–3.1)
LYMPHOCYTES NFR BLD AUTO: 16.8 % (ref 19.6–45.3)
MACROCYTES BLD QL SMEAR: NORMAL
MAGNESIUM SERPL-MCNC: 1.8 MG/DL (ref 1.6–2.4)
MCH RBC QN AUTO: 33 PG (ref 26.6–33)
MCHC RBC AUTO-ENTMCNC: 31.4 G/DL (ref 31.5–35.7)
MCV RBC AUTO: 105 FL (ref 79–97)
MONOCYTES # BLD AUTO: 0.78 10*3/MM3 (ref 0.1–0.9)
MONOCYTES NFR BLD AUTO: 13 % (ref 5–12)
NEUTROPHILS NFR BLD AUTO: 4.01 10*3/MM3 (ref 1.7–7)
NEUTROPHILS NFR BLD AUTO: 66.6 % (ref 42.7–76)
NRBC BLD AUTO-RTO: 0.3 /100 WBC (ref 0–0.2)
PLATELET # BLD AUTO: 91 10*3/MM3 (ref 140–450)
PMV BLD AUTO: 10.5 FL (ref 6–12)
POTASSIUM SERPL-SCNC: 4.3 MMOL/L (ref 3.5–5.2)
POTASSIUM SERPL-SCNC: 4.8 MMOL/L (ref 3.5–5.2)
PROT SERPL-MCNC: 4.9 G/DL (ref 6–8.5)
RBC # BLD AUTO: 2.61 10*6/MM3 (ref 4.14–5.8)
SMALL PLATELETS BLD QL SMEAR: NORMAL
SODIUM SERPL-SCNC: 131 MMOL/L (ref 136–145)
WBC NRBC COR # BLD: 6.02 10*3/MM3 (ref 3.4–10.8)

## 2022-11-24 PROCEDURE — 99232 SBSQ HOSP IP/OBS MODERATE 35: CPT | Performed by: STUDENT IN AN ORGANIZED HEALTH CARE EDUCATION/TRAINING PROGRAM

## 2022-11-24 PROCEDURE — 85007 BL SMEAR W/DIFF WBC COUNT: CPT | Performed by: NURSE PRACTITIONER

## 2022-11-24 PROCEDURE — 63710000001 INSULIN ASPART PER 5 UNITS: Performed by: STUDENT IN AN ORGANIZED HEALTH CARE EDUCATION/TRAINING PROGRAM

## 2022-11-24 PROCEDURE — 80053 COMPREHEN METABOLIC PANEL: CPT | Performed by: NURSE PRACTITIONER

## 2022-11-24 PROCEDURE — 94799 UNLISTED PULMONARY SVC/PX: CPT

## 2022-11-24 PROCEDURE — 82962 GLUCOSE BLOOD TEST: CPT

## 2022-11-24 PROCEDURE — 93005 ELECTROCARDIOGRAM TRACING: CPT | Performed by: PHYSICIAN ASSISTANT

## 2022-11-24 PROCEDURE — 84132 ASSAY OF SERUM POTASSIUM: CPT | Performed by: PHYSICIAN ASSISTANT

## 2022-11-24 PROCEDURE — 83735 ASSAY OF MAGNESIUM: CPT | Performed by: PHYSICIAN ASSISTANT

## 2022-11-24 PROCEDURE — 85025 COMPLETE CBC W/AUTO DIFF WBC: CPT | Performed by: NURSE PRACTITIONER

## 2022-11-24 RX ORDER — INSULIN ASPART 100 [IU]/ML
0-9 INJECTION, SOLUTION INTRAVENOUS; SUBCUTANEOUS
Status: DISCONTINUED | OUTPATIENT
Start: 2022-11-24 | End: 2022-12-03 | Stop reason: HOSPADM

## 2022-11-24 RX ORDER — HYDROXYZINE HYDROCHLORIDE 10 MG/1
10 TABLET, FILM COATED ORAL ONCE
Status: DISCONTINUED | OUTPATIENT
Start: 2022-11-24 | End: 2022-12-01

## 2022-11-24 RX ORDER — METOPROLOL TARTRATE 5 MG/5ML
5 INJECTION INTRAVENOUS ONCE
Status: COMPLETED | OUTPATIENT
Start: 2022-11-24 | End: 2022-11-24

## 2022-11-24 RX ADMIN — CALCIUM ACETATE 2668 MG: 667 CAPSULE ORAL at 12:17

## 2022-11-24 RX ADMIN — CALCIUM ACETATE 2668 MG: 667 CAPSULE ORAL at 17:11

## 2022-11-24 RX ADMIN — Medication 100 MG: at 12:16

## 2022-11-24 RX ADMIN — CETIRIZINE HYDROCHLORIDE 5 MG: 10 TABLET, FILM COATED ORAL at 12:17

## 2022-11-24 RX ADMIN — LEVOTHYROXINE SODIUM 88 MCG: 88 TABLET ORAL at 06:00

## 2022-11-24 RX ADMIN — METOPROLOL TARTRATE 5 MG: 1 INJECTION, SOLUTION INTRAVENOUS at 23:22

## 2022-11-24 RX ADMIN — Medication 5 MG: at 20:37

## 2022-11-24 RX ADMIN — Medication 10 ML: at 12:19

## 2022-11-24 RX ADMIN — LINAGLIPTIN 5 MG: 5 TABLET, FILM COATED ORAL at 12:17

## 2022-11-24 RX ADMIN — METOPROLOL TARTRATE 37.5 MG: 25 TABLET, FILM COATED ORAL at 20:33

## 2022-11-24 RX ADMIN — APIXABAN 5 MG: 5 TABLET, FILM COATED ORAL at 12:17

## 2022-11-24 RX ADMIN — DOCUSATE SODIUM 50 MG AND SENNOSIDES 8.6 MG 2 TABLET: 8.6; 5 TABLET, FILM COATED ORAL at 20:37

## 2022-11-24 RX ADMIN — ATORVASTATIN CALCIUM 40 MG: 40 TABLET, FILM COATED ORAL at 20:36

## 2022-11-24 RX ADMIN — APIXABAN 5 MG: 5 TABLET, FILM COATED ORAL at 20:36

## 2022-11-24 RX ADMIN — RENO CAPS 1 MG: 100; 1.5; 1.7; 20; 10; 1; 150; 5; 6 CAPSULE ORAL at 12:16

## 2022-11-24 RX ADMIN — INSULIN ASPART 8 UNITS: 100 INJECTION, SOLUTION INTRAVENOUS; SUBCUTANEOUS at 17:21

## 2022-11-25 ENCOUNTER — APPOINTMENT (OUTPATIENT)
Dept: CARDIOLOGY | Facility: HOSPITAL | Age: 62
End: 2022-11-25

## 2022-11-25 ENCOUNTER — APPOINTMENT (OUTPATIENT)
Dept: GENERAL RADIOLOGY | Facility: HOSPITAL | Age: 62
End: 2022-11-25

## 2022-11-25 LAB
ACANTHOCYTES BLD QL SMEAR: NORMAL
ALBUMIN SERPL-MCNC: 2.29 G/DL (ref 3.5–5.2)
ALBUMIN/GLOB SERPL: 1.1 G/DL
ALP SERPL-CCNC: 89 U/L (ref 39–117)
ALT SERPL W P-5'-P-CCNC: 22 U/L (ref 1–41)
ANION GAP SERPL CALCULATED.3IONS-SCNC: 10.6 MMOL/L (ref 5–15)
ANISOCYTOSIS BLD QL: NORMAL
AST SERPL-CCNC: 25 U/L (ref 1–40)
BASOPHILS # BLD AUTO: 0.02 10*3/MM3 (ref 0–0.2)
BASOPHILS NFR BLD AUTO: 0.4 % (ref 0–1.5)
BH CV ECHO MEAS - ACS: 1.6 CM
BH CV ECHO MEAS - AO MAX PG: 3.4 MMHG
BH CV ECHO MEAS - AO MEAN PG: 2 MMHG
BH CV ECHO MEAS - AO ROOT DIAM: 3.2 CM
BH CV ECHO MEAS - AO V2 MAX: 92.6 CM/SEC
BH CV ECHO MEAS - AO V2 VTI: 17.4 CM
BH CV ECHO MEAS - EDV(CUBED): 148.9 ML
BH CV ECHO MEAS - EDV(MOD-SP4): 76.8 ML
BH CV ECHO MEAS - EF(MOD-SP4): 40.1 %
BH CV ECHO MEAS - ESV(CUBED): 54.9 ML
BH CV ECHO MEAS - ESV(MOD-SP4): 46 ML
BH CV ECHO MEAS - FS: 28.3 %
BH CV ECHO MEAS - IVS/LVPW: 0.62 CM
BH CV ECHO MEAS - IVSD: 0.8 CM
BH CV ECHO MEAS - LA DIMENSION: 4.6 CM
BH CV ECHO MEAS - LAT PEAK E' VEL: 11.1 CM/SEC
BH CV ECHO MEAS - LV DIASTOLIC VOL/BSA (35-75): 38.1 CM2
BH CV ECHO MEAS - LV MASS(C)D: 213.9 GRAMS
BH CV ECHO MEAS - LV SYSTOLIC VOL/BSA (12-30): 22.8 CM2
BH CV ECHO MEAS - LVIDD: 5.3 CM
BH CV ECHO MEAS - LVIDS: 3.8 CM
BH CV ECHO MEAS - LVOT AREA: 3.1 CM2
BH CV ECHO MEAS - LVOT DIAM: 2 CM
BH CV ECHO MEAS - LVPWD: 1.3 CM
BH CV ECHO MEAS - MED PEAK E' VEL: 9.7 CM/SEC
BH CV ECHO MEAS - MR MAX PG: 63.4 MMHG
BH CV ECHO MEAS - MR MAX VEL: 398 CM/SEC
BH CV ECHO MEAS - MR MEAN PG: 44 MMHG
BH CV ECHO MEAS - MR MEAN VEL: 323 CM/SEC
BH CV ECHO MEAS - MR VTI: 119 CM
BH CV ECHO MEAS - MV E MAX VEL: 107 CM/SEC
BH CV ECHO MEAS - PA ACC TIME: 0.07 SEC
BH CV ECHO MEAS - PA PR(ACCEL): 48.8 MMHG
BH CV ECHO MEAS - RAP SYSTOLE: 10 MMHG
BH CV ECHO MEAS - RVSP: 36 MMHG
BH CV ECHO MEAS - SI(MOD-SP4): 15.3 ML/M2
BH CV ECHO MEAS - SV(MOD-SP4): 30.8 ML
BH CV ECHO MEAS - TAPSE (>1.6): 1.75 CM
BH CV ECHO MEAS - TR MAX PG: 26 MMHG
BH CV ECHO MEAS - TR MAX VEL: 255 CM/SEC
BH CV ECHO MEASUREMENTS AVERAGE E/E' RATIO: 10.29
BILIRUB SERPL-MCNC: 0.6 MG/DL (ref 0–1.2)
BUN SERPL-MCNC: 46 MG/DL (ref 8–23)
BUN/CREAT SERPL: 11.1 (ref 7–25)
BURR CELLS BLD QL SMEAR: NORMAL
CALCIUM SPEC-SCNC: 7.7 MG/DL (ref 8.6–10.5)
CHLORIDE SERPL-SCNC: 92 MMOL/L (ref 98–107)
CO2 SERPL-SCNC: 26.4 MMOL/L (ref 22–29)
CREAT SERPL-MCNC: 4.15 MG/DL (ref 0.76–1.27)
DEPRECATED RDW RBC AUTO: 79 FL (ref 37–54)
EGFRCR SERPLBLD CKD-EPI 2021: 15.2 ML/MIN/1.73
EOSINOPHIL # BLD AUTO: 0.07 10*3/MM3 (ref 0–0.4)
EOSINOPHIL NFR BLD AUTO: 1.3 % (ref 0.3–6.2)
ERYTHROCYTE [DISTWIDTH] IN BLOOD BY AUTOMATED COUNT: 19.9 % (ref 12.3–15.4)
GLOBULIN UR ELPH-MCNC: 2 GM/DL
GLUCOSE BLDC GLUCOMTR-MCNC: 103 MG/DL (ref 70–130)
GLUCOSE BLDC GLUCOMTR-MCNC: 273 MG/DL (ref 70–130)
GLUCOSE BLDC GLUCOMTR-MCNC: 329 MG/DL (ref 70–130)
GLUCOSE BLDC GLUCOMTR-MCNC: 341 MG/DL (ref 70–130)
GLUCOSE BLDC GLUCOMTR-MCNC: 86 MG/DL (ref 70–130)
GLUCOSE SERPL-MCNC: 341 MG/DL (ref 65–99)
HCT VFR BLD AUTO: 24.4 % (ref 37.5–51)
HGB BLD-MCNC: 7.5 G/DL (ref 13–17.7)
HYPOCHROMIA BLD QL: NORMAL
IMM GRANULOCYTES # BLD AUTO: 0.11 10*3/MM3 (ref 0–0.05)
IMM GRANULOCYTES NFR BLD AUTO: 2.1 % (ref 0–0.5)
LEFT ATRIUM VOLUME INDEX: 31 ML/M2
LYMPHOCYTES # BLD AUTO: 0.68 10*3/MM3 (ref 0.7–3.1)
LYMPHOCYTES NFR BLD AUTO: 12.9 % (ref 19.6–45.3)
MACROCYTES BLD QL SMEAR: NORMAL
MAGNESIUM SERPL-MCNC: 2.2 MG/DL (ref 1.6–2.4)
MAXIMAL PREDICTED HEART RATE: 156 BPM
MCH RBC QN AUTO: 33.9 PG (ref 26.6–33)
MCHC RBC AUTO-ENTMCNC: 30.7 G/DL (ref 31.5–35.7)
MCV RBC AUTO: 110.4 FL (ref 79–97)
MONOCYTES # BLD AUTO: 0.62 10*3/MM3 (ref 0.1–0.9)
MONOCYTES NFR BLD AUTO: 11.7 % (ref 5–12)
NEUTROPHILS NFR BLD AUTO: 3.78 10*3/MM3 (ref 1.7–7)
NEUTROPHILS NFR BLD AUTO: 71.6 % (ref 42.7–76)
NRBC BLD AUTO-RTO: 0.4 /100 WBC (ref 0–0.2)
PLAT MORPH BLD: NORMAL
PLATELET # BLD AUTO: 79 10*3/MM3 (ref 140–450)
PMV BLD AUTO: 11.3 FL (ref 6–12)
POTASSIUM SERPL-SCNC: 4.6 MMOL/L (ref 3.5–5.2)
PROT SERPL-MCNC: 4.3 G/DL (ref 6–8.5)
QT INTERVAL: 296 MS
QTC INTERVAL: 421 MS
RBC # BLD AUTO: 2.21 10*6/MM3 (ref 4.14–5.8)
SCHISTOCYTES BLD QL SMEAR: NORMAL
SODIUM SERPL-SCNC: 129 MMOL/L (ref 136–145)
STRESS TARGET HR: 133 BPM
WBC NRBC COR # BLD: 5.28 10*3/MM3 (ref 3.4–10.8)

## 2022-11-25 PROCEDURE — 99231 SBSQ HOSP IP/OBS SF/LOW 25: CPT

## 2022-11-25 PROCEDURE — 94799 UNLISTED PULMONARY SVC/PX: CPT

## 2022-11-25 PROCEDURE — 63710000001 INSULIN ASPART PER 5 UNITS: Performed by: STUDENT IN AN ORGANIZED HEALTH CARE EDUCATION/TRAINING PROGRAM

## 2022-11-25 PROCEDURE — 83735 ASSAY OF MAGNESIUM: CPT | Performed by: PHYSICIAN ASSISTANT

## 2022-11-25 PROCEDURE — 71045 X-RAY EXAM CHEST 1 VIEW: CPT

## 2022-11-25 PROCEDURE — 85007 BL SMEAR W/DIFF WBC COUNT: CPT | Performed by: NURSE PRACTITIONER

## 2022-11-25 PROCEDURE — 85025 COMPLETE CBC W/AUTO DIFF WBC: CPT | Performed by: NURSE PRACTITIONER

## 2022-11-25 PROCEDURE — 94761 N-INVAS EAR/PLS OXIMETRY MLT: CPT

## 2022-11-25 PROCEDURE — 71045 X-RAY EXAM CHEST 1 VIEW: CPT | Performed by: RADIOLOGY

## 2022-11-25 PROCEDURE — 25010000002 MAGNESIUM SULFATE IN D5W 1G/100ML (PREMIX) 1-5 GM/100ML-% SOLUTION: Performed by: STUDENT IN AN ORGANIZED HEALTH CARE EDUCATION/TRAINING PROGRAM

## 2022-11-25 PROCEDURE — 93306 TTE W/DOPPLER COMPLETE: CPT

## 2022-11-25 PROCEDURE — 63710000001 ONDANSETRON ODT 4 MG TABLET DISPERSIBLE: Performed by: STUDENT IN AN ORGANIZED HEALTH CARE EDUCATION/TRAINING PROGRAM

## 2022-11-25 PROCEDURE — 80053 COMPREHEN METABOLIC PANEL: CPT | Performed by: NURSE PRACTITIONER

## 2022-11-25 PROCEDURE — 94640 AIRWAY INHALATION TREATMENT: CPT

## 2022-11-25 PROCEDURE — 82962 GLUCOSE BLOOD TEST: CPT

## 2022-11-25 RX ORDER — METOPROLOL TARTRATE 5 MG/5ML
5 INJECTION INTRAVENOUS ONCE
Status: COMPLETED | OUTPATIENT
Start: 2022-11-25 | End: 2022-11-25

## 2022-11-25 RX ORDER — INSULIN ASPART 100 [IU]/ML
3 INJECTION, SOLUTION INTRAVENOUS; SUBCUTANEOUS
Status: DISCONTINUED | OUTPATIENT
Start: 2022-11-25 | End: 2022-11-27

## 2022-11-25 RX ORDER — MAGNESIUM SULFATE HEPTAHYDRATE 40 MG/ML
2 INJECTION, SOLUTION INTRAVENOUS AS NEEDED
Status: DISCONTINUED | OUTPATIENT
Start: 2022-11-25 | End: 2022-12-03 | Stop reason: HOSPADM

## 2022-11-25 RX ORDER — DILTIAZEM HCL/D5W 125 MG/125
5 PLASTIC BAG, INJECTION (ML) INTRAVENOUS CONTINUOUS
Status: DISCONTINUED | OUTPATIENT
Start: 2022-11-25 | End: 2022-11-25

## 2022-11-25 RX ORDER — MAGNESIUM SULFATE 1 G/100ML
1 INJECTION INTRAVENOUS AS NEEDED
Status: DISCONTINUED | OUTPATIENT
Start: 2022-11-25 | End: 2022-12-03 | Stop reason: HOSPADM

## 2022-11-25 RX ORDER — METOPROLOL TARTRATE 50 MG/1
50 TABLET, FILM COATED ORAL EVERY 12 HOURS SCHEDULED
Status: DISCONTINUED | OUTPATIENT
Start: 2022-11-25 | End: 2022-11-29

## 2022-11-25 RX ORDER — MAGNESIUM SULFATE 1 G/100ML
1 INJECTION INTRAVENOUS ONCE
Status: COMPLETED | OUTPATIENT
Start: 2022-11-25 | End: 2022-11-25

## 2022-11-25 RX ADMIN — MAGNESIUM SULFATE IN DEXTROSE 1 G: 10 INJECTION, SOLUTION INTRAVENOUS at 06:10

## 2022-11-25 RX ADMIN — BUDESONIDE AND FORMOTEROL FUMARATE DIHYDRATE 2 PUFF: 160; 4.5 AEROSOL RESPIRATORY (INHALATION) at 18:27

## 2022-11-25 RX ADMIN — METOPROLOL TARTRATE 50 MG: 50 TABLET, FILM COATED ORAL at 21:09

## 2022-11-25 RX ADMIN — ACETAMINOPHEN 500 MG: 500 TABLET ORAL at 21:09

## 2022-11-25 RX ADMIN — CALCIUM ACETATE 2668 MG: 667 CAPSULE ORAL at 12:18

## 2022-11-25 RX ADMIN — INSULIN ASPART 7 UNITS: 100 INJECTION, SOLUTION INTRAVENOUS; SUBCUTANEOUS at 08:48

## 2022-11-25 RX ADMIN — LEVOTHYROXINE SODIUM 88 MCG: 88 TABLET ORAL at 06:09

## 2022-11-25 RX ADMIN — RENO CAPS 1 MG: 100; 1.5; 1.7; 20; 10; 1; 150; 5; 6 CAPSULE ORAL at 08:50

## 2022-11-25 RX ADMIN — INSULIN ASPART 3 UNITS: 100 INJECTION, SOLUTION INTRAVENOUS; SUBCUTANEOUS at 08:52

## 2022-11-25 RX ADMIN — CALCIUM ACETATE 2668 MG: 667 CAPSULE ORAL at 08:49

## 2022-11-25 RX ADMIN — APIXABAN 5 MG: 5 TABLET, FILM COATED ORAL at 08:50

## 2022-11-25 RX ADMIN — Medication 5 MG/HR: at 01:48

## 2022-11-25 RX ADMIN — ATORVASTATIN CALCIUM 40 MG: 40 TABLET, FILM COATED ORAL at 21:09

## 2022-11-25 RX ADMIN — CALCIUM ACETATE 2668 MG: 667 CAPSULE ORAL at 17:41

## 2022-11-25 RX ADMIN — Medication 100 MG: at 08:49

## 2022-11-25 RX ADMIN — INSULIN ASPART 7 UNITS: 100 INJECTION, SOLUTION INTRAVENOUS; SUBCUTANEOUS at 12:18

## 2022-11-25 RX ADMIN — DOCUSATE SODIUM 50 MG AND SENNOSIDES 8.6 MG 2 TABLET: 8.6; 5 TABLET, FILM COATED ORAL at 21:09

## 2022-11-25 RX ADMIN — INSULIN ASPART 3 UNITS: 100 INJECTION, SOLUTION INTRAVENOUS; SUBCUTANEOUS at 12:18

## 2022-11-25 RX ADMIN — Medication 5 MG: at 21:09

## 2022-11-25 RX ADMIN — CETIRIZINE HYDROCHLORIDE 5 MG: 10 TABLET, FILM COATED ORAL at 08:50

## 2022-11-25 RX ADMIN — ONDANSETRON 4 MG: 4 TABLET, ORALLY DISINTEGRATING ORAL at 15:29

## 2022-11-25 RX ADMIN — Medication 10 ML: at 08:50

## 2022-11-25 RX ADMIN — LINAGLIPTIN 5 MG: 5 TABLET, FILM COATED ORAL at 08:50

## 2022-11-25 RX ADMIN — INSULIN ASPART 3 UNITS: 100 INJECTION, SOLUTION INTRAVENOUS; SUBCUTANEOUS at 17:41

## 2022-11-25 RX ADMIN — METOPROLOL TARTRATE 50 MG: 50 TABLET, FILM COATED ORAL at 08:49

## 2022-11-25 RX ADMIN — APIXABAN 5 MG: 5 TABLET, FILM COATED ORAL at 21:09

## 2022-11-25 RX ADMIN — Medication 10 ML: at 21:10

## 2022-11-25 RX ADMIN — METOPROLOL TARTRATE 5 MG: 1 INJECTION, SOLUTION INTRAVENOUS at 00:25

## 2022-11-26 LAB
ACANTHOCYTES BLD QL SMEAR: NORMAL
ALBUMIN SERPL-MCNC: 2.75 G/DL (ref 3.5–5.2)
ALBUMIN/GLOB SERPL: 1.2 G/DL
ALP SERPL-CCNC: 69 U/L (ref 39–117)
ALT SERPL W P-5'-P-CCNC: 23 U/L (ref 1–41)
ANION GAP SERPL CALCULATED.3IONS-SCNC: 11.3 MMOL/L (ref 5–15)
ANISOCYTOSIS BLD QL: NORMAL
AST SERPL-CCNC: 21 U/L (ref 1–40)
BASOPHILS # BLD AUTO: 0.03 10*3/MM3 (ref 0–0.2)
BASOPHILS NFR BLD AUTO: 0.4 % (ref 0–1.5)
BILIRUB SERPL-MCNC: 0.6 MG/DL (ref 0–1.2)
BUN SERPL-MCNC: 61 MG/DL (ref 8–23)
BUN/CREAT SERPL: 12.8 (ref 7–25)
CALCIUM SPEC-SCNC: 8.7 MG/DL (ref 8.6–10.5)
CHLORIDE SERPL-SCNC: 91 MMOL/L (ref 98–107)
CO2 SERPL-SCNC: 26.7 MMOL/L (ref 22–29)
CREAT SERPL-MCNC: 4.75 MG/DL (ref 0.76–1.27)
DEPRECATED RDW RBC AUTO: 75.4 FL (ref 37–54)
EGFRCR SERPLBLD CKD-EPI 2021: 13 ML/MIN/1.73
EOSINOPHIL # BLD AUTO: 0.11 10*3/MM3 (ref 0–0.4)
EOSINOPHIL NFR BLD AUTO: 1.5 % (ref 0.3–6.2)
ERYTHROCYTE [DISTWIDTH] IN BLOOD BY AUTOMATED COUNT: 19.7 % (ref 12.3–15.4)
GLOBULIN UR ELPH-MCNC: 2.3 GM/DL
GLUCOSE BLDC GLUCOMTR-MCNC: 103 MG/DL (ref 70–130)
GLUCOSE BLDC GLUCOMTR-MCNC: 132 MG/DL (ref 70–130)
GLUCOSE BLDC GLUCOMTR-MCNC: 230 MG/DL (ref 70–130)
GLUCOSE BLDC GLUCOMTR-MCNC: 294 MG/DL (ref 70–130)
GLUCOSE SERPL-MCNC: 85 MG/DL (ref 65–99)
HCT VFR BLD AUTO: 28.4 % (ref 37.5–51)
HGB BLD-MCNC: 9 G/DL (ref 13–17.7)
HYPOCHROMIA BLD QL: NORMAL
IMM GRANULOCYTES # BLD AUTO: 0.13 10*3/MM3 (ref 0–0.05)
IMM GRANULOCYTES NFR BLD AUTO: 1.7 % (ref 0–0.5)
LYMPHOCYTES # BLD AUTO: 1.13 10*3/MM3 (ref 0.7–3.1)
LYMPHOCYTES NFR BLD AUTO: 14.9 % (ref 19.6–45.3)
MACROCYTES BLD QL SMEAR: NORMAL
MCH RBC QN AUTO: 33.7 PG (ref 26.6–33)
MCHC RBC AUTO-ENTMCNC: 31.7 G/DL (ref 31.5–35.7)
MCV RBC AUTO: 106.4 FL (ref 79–97)
MONOCYTES # BLD AUTO: 0.78 10*3/MM3 (ref 0.1–0.9)
MONOCYTES NFR BLD AUTO: 10.3 % (ref 5–12)
NEUTROPHILS NFR BLD AUTO: 5.38 10*3/MM3 (ref 1.7–7)
NEUTROPHILS NFR BLD AUTO: 71.2 % (ref 42.7–76)
NRBC BLD AUTO-RTO: 0 /100 WBC (ref 0–0.2)
PLAT MORPH BLD: NORMAL
PLATELET # BLD AUTO: 98 10*3/MM3 (ref 140–450)
PMV BLD AUTO: 11.1 FL (ref 6–12)
POTASSIUM SERPL-SCNC: 5.1 MMOL/L (ref 3.5–5.2)
PROT SERPL-MCNC: 5 G/DL (ref 6–8.5)
RBC # BLD AUTO: 2.67 10*6/MM3 (ref 4.14–5.8)
SODIUM SERPL-SCNC: 129 MMOL/L (ref 136–145)
STOMATOCYTES BLD QL SMEAR: NORMAL
TARGETS BLD QL SMEAR: NORMAL
WBC NRBC COR # BLD: 7.56 10*3/MM3 (ref 3.4–10.8)

## 2022-11-26 PROCEDURE — 85025 COMPLETE CBC W/AUTO DIFF WBC: CPT | Performed by: NURSE PRACTITIONER

## 2022-11-26 PROCEDURE — 80053 COMPREHEN METABOLIC PANEL: CPT | Performed by: NURSE PRACTITIONER

## 2022-11-26 PROCEDURE — 63710000001 INSULIN DETEMIR PER 5 UNITS: Performed by: STUDENT IN AN ORGANIZED HEALTH CARE EDUCATION/TRAINING PROGRAM

## 2022-11-26 PROCEDURE — 94761 N-INVAS EAR/PLS OXIMETRY MLT: CPT

## 2022-11-26 PROCEDURE — 94799 UNLISTED PULMONARY SVC/PX: CPT

## 2022-11-26 PROCEDURE — 63710000001 INSULIN ASPART PER 5 UNITS: Performed by: STUDENT IN AN ORGANIZED HEALTH CARE EDUCATION/TRAINING PROGRAM

## 2022-11-26 PROCEDURE — 99233 SBSQ HOSP IP/OBS HIGH 50: CPT

## 2022-11-26 PROCEDURE — 82962 GLUCOSE BLOOD TEST: CPT

## 2022-11-26 PROCEDURE — 85007 BL SMEAR W/DIFF WBC COUNT: CPT | Performed by: NURSE PRACTITIONER

## 2022-11-26 RX ADMIN — APIXABAN 5 MG: 5 TABLET, FILM COATED ORAL at 20:24

## 2022-11-26 RX ADMIN — DOCUSATE SODIUM 50 MG AND SENNOSIDES 8.6 MG 2 TABLET: 8.6; 5 TABLET, FILM COATED ORAL at 20:24

## 2022-11-26 RX ADMIN — BUDESONIDE AND FORMOTEROL FUMARATE DIHYDRATE 2 PUFF: 160; 4.5 AEROSOL RESPIRATORY (INHALATION) at 18:35

## 2022-11-26 RX ADMIN — CETIRIZINE HYDROCHLORIDE 5 MG: 10 TABLET, FILM COATED ORAL at 13:27

## 2022-11-26 RX ADMIN — INSULIN ASPART 3 UNITS: 100 INJECTION, SOLUTION INTRAVENOUS; SUBCUTANEOUS at 17:38

## 2022-11-26 RX ADMIN — Medication 10 ML: at 13:28

## 2022-11-26 RX ADMIN — APIXABAN 5 MG: 5 TABLET, FILM COATED ORAL at 13:28

## 2022-11-26 RX ADMIN — ATORVASTATIN CALCIUM 40 MG: 40 TABLET, FILM COATED ORAL at 20:24

## 2022-11-26 RX ADMIN — CALCIUM ACETATE 2668 MG: 667 CAPSULE ORAL at 13:27

## 2022-11-26 RX ADMIN — INSULIN ASPART 4 UNITS: 100 INJECTION, SOLUTION INTRAVENOUS; SUBCUTANEOUS at 17:38

## 2022-11-26 RX ADMIN — BUDESONIDE AND FORMOTEROL FUMARATE DIHYDRATE 2 PUFF: 160; 4.5 AEROSOL RESPIRATORY (INHALATION) at 06:30

## 2022-11-26 RX ADMIN — LINAGLIPTIN 5 MG: 5 TABLET, FILM COATED ORAL at 13:27

## 2022-11-26 RX ADMIN — Medication 100 MG: at 13:27

## 2022-11-26 RX ADMIN — METOPROLOL TARTRATE 50 MG: 50 TABLET, FILM COATED ORAL at 20:24

## 2022-11-26 RX ADMIN — CALCIUM ACETATE 2668 MG: 667 CAPSULE ORAL at 17:38

## 2022-11-26 RX ADMIN — METOPROLOL TARTRATE 50 MG: 50 TABLET, FILM COATED ORAL at 13:27

## 2022-11-26 RX ADMIN — LEVOTHYROXINE SODIUM 88 MCG: 88 TABLET ORAL at 05:58

## 2022-11-26 RX ADMIN — INSULIN DETEMIR 5 UNITS: 100 INJECTION, SOLUTION SUBCUTANEOUS at 22:08

## 2022-11-27 LAB
ALBUMIN SERPL-MCNC: 2.83 G/DL (ref 3.5–5.2)
ALBUMIN/GLOB SERPL: 1.2 G/DL
ALP SERPL-CCNC: 76 U/L (ref 39–117)
ALT SERPL W P-5'-P-CCNC: 21 U/L (ref 1–41)
ANION GAP SERPL CALCULATED.3IONS-SCNC: 9.7 MMOL/L (ref 5–15)
ANISOCYTOSIS BLD QL: NORMAL
AST SERPL-CCNC: 19 U/L (ref 1–40)
BASOPHILS # BLD AUTO: 0.03 10*3/MM3 (ref 0–0.2)
BASOPHILS NFR BLD AUTO: 0.5 % (ref 0–1.5)
BILIRUB SERPL-MCNC: 0.5 MG/DL (ref 0–1.2)
BUN SERPL-MCNC: 44 MG/DL (ref 8–23)
BUN/CREAT SERPL: 12.3 (ref 7–25)
BURR CELLS BLD QL SMEAR: NORMAL
CALCIUM SPEC-SCNC: 8.5 MG/DL (ref 8.6–10.5)
CHLORIDE SERPL-SCNC: 91 MMOL/L (ref 98–107)
CO2 SERPL-SCNC: 28.3 MMOL/L (ref 22–29)
CREAT SERPL-MCNC: 3.58 MG/DL (ref 0.76–1.27)
DEPRECATED RDW RBC AUTO: 79.1 FL (ref 37–54)
EGFRCR SERPLBLD CKD-EPI 2021: 18.2 ML/MIN/1.73
EOSINOPHIL # BLD AUTO: 0.09 10*3/MM3 (ref 0–0.4)
EOSINOPHIL NFR BLD AUTO: 1.4 % (ref 0.3–6.2)
ERYTHROCYTE [DISTWIDTH] IN BLOOD BY AUTOMATED COUNT: 19.9 % (ref 12.3–15.4)
GLOBULIN UR ELPH-MCNC: 2.4 GM/DL
GLUCOSE BLDC GLUCOMTR-MCNC: 105 MG/DL (ref 70–130)
GLUCOSE BLDC GLUCOMTR-MCNC: 170 MG/DL (ref 70–130)
GLUCOSE BLDC GLUCOMTR-MCNC: 262 MG/DL (ref 70–130)
GLUCOSE BLDC GLUCOMTR-MCNC: 377 MG/DL (ref 70–130)
GLUCOSE BLDC GLUCOMTR-MCNC: 94 MG/DL (ref 70–130)
GLUCOSE SERPL-MCNC: 211 MG/DL (ref 65–99)
HCT VFR BLD AUTO: 29.7 % (ref 37.5–51)
HGB BLD-MCNC: 9.1 G/DL (ref 13–17.7)
HYPOCHROMIA BLD QL: NORMAL
IMM GRANULOCYTES # BLD AUTO: 0.09 10*3/MM3 (ref 0–0.05)
IMM GRANULOCYTES NFR BLD AUTO: 1.4 % (ref 0–0.5)
LYMPHOCYTES # BLD AUTO: 0.97 10*3/MM3 (ref 0.7–3.1)
LYMPHOCYTES NFR BLD AUTO: 14.7 % (ref 19.6–45.3)
MACROCYTES BLD QL SMEAR: NORMAL
MCH RBC QN AUTO: 33.6 PG (ref 26.6–33)
MCHC RBC AUTO-ENTMCNC: 30.6 G/DL (ref 31.5–35.7)
MCV RBC AUTO: 109.6 FL (ref 79–97)
MONOCYTES # BLD AUTO: 0.69 10*3/MM3 (ref 0.1–0.9)
MONOCYTES NFR BLD AUTO: 10.4 % (ref 5–12)
NEUTROPHILS NFR BLD AUTO: 4.74 10*3/MM3 (ref 1.7–7)
NEUTROPHILS NFR BLD AUTO: 71.6 % (ref 42.7–76)
NRBC BLD AUTO-RTO: 0.3 /100 WBC (ref 0–0.2)
PLAT MORPH BLD: NORMAL
PLATELET # BLD AUTO: 105 10*3/MM3 (ref 140–450)
PMV BLD AUTO: 11 FL (ref 6–12)
POTASSIUM SERPL-SCNC: 5 MMOL/L (ref 3.5–5.2)
PROT SERPL-MCNC: 5.2 G/DL (ref 6–8.5)
RBC # BLD AUTO: 2.71 10*6/MM3 (ref 4.14–5.8)
SODIUM SERPL-SCNC: 129 MMOL/L (ref 136–145)
WBC NRBC COR # BLD: 6.61 10*3/MM3 (ref 3.4–10.8)

## 2022-11-27 PROCEDURE — 85025 COMPLETE CBC W/AUTO DIFF WBC: CPT | Performed by: NURSE PRACTITIONER

## 2022-11-27 PROCEDURE — 63710000001 INSULIN DETEMIR PER 5 UNITS: Performed by: STUDENT IN AN ORGANIZED HEALTH CARE EDUCATION/TRAINING PROGRAM

## 2022-11-27 PROCEDURE — 63710000001 INSULIN ASPART PER 5 UNITS: Performed by: STUDENT IN AN ORGANIZED HEALTH CARE EDUCATION/TRAINING PROGRAM

## 2022-11-27 PROCEDURE — 80053 COMPREHEN METABOLIC PANEL: CPT | Performed by: NURSE PRACTITIONER

## 2022-11-27 PROCEDURE — 94761 N-INVAS EAR/PLS OXIMETRY MLT: CPT

## 2022-11-27 PROCEDURE — 99232 SBSQ HOSP IP/OBS MODERATE 35: CPT

## 2022-11-27 PROCEDURE — 82962 GLUCOSE BLOOD TEST: CPT

## 2022-11-27 PROCEDURE — 85007 BL SMEAR W/DIFF WBC COUNT: CPT | Performed by: NURSE PRACTITIONER

## 2022-11-27 PROCEDURE — 94799 UNLISTED PULMONARY SVC/PX: CPT

## 2022-11-27 RX ORDER — INSULIN ASPART 100 [IU]/ML
5 INJECTION, SOLUTION INTRAVENOUS; SUBCUTANEOUS
Status: DISCONTINUED | OUTPATIENT
Start: 2022-11-27 | End: 2022-11-27

## 2022-11-27 RX ORDER — INSULIN ASPART 100 [IU]/ML
3 INJECTION, SOLUTION INTRAVENOUS; SUBCUTANEOUS
Status: DISCONTINUED | OUTPATIENT
Start: 2022-11-27 | End: 2022-11-28

## 2022-11-27 RX ADMIN — Medication 100 MG: at 10:14

## 2022-11-27 RX ADMIN — APIXABAN 5 MG: 5 TABLET, FILM COATED ORAL at 21:31

## 2022-11-27 RX ADMIN — LINAGLIPTIN 5 MG: 5 TABLET, FILM COATED ORAL at 10:19

## 2022-11-27 RX ADMIN — ATORVASTATIN CALCIUM 40 MG: 40 TABLET, FILM COATED ORAL at 21:29

## 2022-11-27 RX ADMIN — CETIRIZINE HYDROCHLORIDE 5 MG: 10 TABLET, FILM COATED ORAL at 10:20

## 2022-11-27 RX ADMIN — INSULIN ASPART 5 UNITS: 100 INJECTION, SOLUTION INTRAVENOUS; SUBCUTANEOUS at 12:45

## 2022-11-27 RX ADMIN — BUDESONIDE AND FORMOTEROL FUMARATE DIHYDRATE 2 PUFF: 160; 4.5 AEROSOL RESPIRATORY (INHALATION) at 18:16

## 2022-11-27 RX ADMIN — CALCIUM ACETATE 2668 MG: 667 CAPSULE ORAL at 18:10

## 2022-11-27 RX ADMIN — DOCUSATE SODIUM 50 MG AND SENNOSIDES 8.6 MG 2 TABLET: 8.6; 5 TABLET, FILM COATED ORAL at 21:29

## 2022-11-27 RX ADMIN — RENO CAPS 1 MG: 100; 1.5; 1.7; 20; 10; 1; 150; 5; 6 CAPSULE ORAL at 10:14

## 2022-11-27 RX ADMIN — INSULIN ASPART 3 UNITS: 100 INJECTION, SOLUTION INTRAVENOUS; SUBCUTANEOUS at 10:20

## 2022-11-27 RX ADMIN — LEVOTHYROXINE SODIUM 88 MCG: 88 TABLET ORAL at 06:08

## 2022-11-27 RX ADMIN — Medication 10 ML: at 21:29

## 2022-11-27 RX ADMIN — CALCIUM ACETATE 2668 MG: 667 CAPSULE ORAL at 12:41

## 2022-11-27 RX ADMIN — INSULIN ASPART 3 UNITS: 100 INJECTION, SOLUTION INTRAVENOUS; SUBCUTANEOUS at 18:10

## 2022-11-27 RX ADMIN — METOPROLOL TARTRATE 50 MG: 50 TABLET, FILM COATED ORAL at 21:29

## 2022-11-27 RX ADMIN — CALCIUM ACETATE 2668 MG: 667 CAPSULE ORAL at 10:13

## 2022-11-27 RX ADMIN — Medication 10 ML: at 10:15

## 2022-11-27 RX ADMIN — INSULIN ASPART 2 UNITS: 100 INJECTION, SOLUTION INTRAVENOUS; SUBCUTANEOUS at 10:15

## 2022-11-27 RX ADMIN — INSULIN ASPART 6 UNITS: 100 INJECTION, SOLUTION INTRAVENOUS; SUBCUTANEOUS at 12:46

## 2022-11-27 RX ADMIN — INSULIN DETEMIR 5 UNITS: 100 INJECTION, SOLUTION SUBCUTANEOUS at 21:29

## 2022-11-27 RX ADMIN — BUDESONIDE AND FORMOTEROL FUMARATE DIHYDRATE 2 PUFF: 160; 4.5 AEROSOL RESPIRATORY (INHALATION) at 06:16

## 2022-11-27 RX ADMIN — APIXABAN 5 MG: 5 TABLET, FILM COATED ORAL at 10:14

## 2022-11-28 LAB
ALBUMIN SERPL-MCNC: 2.91 G/DL (ref 3.5–5.2)
ALBUMIN/GLOB SERPL: 1.3 G/DL
ALP SERPL-CCNC: 70 U/L (ref 39–117)
ALT SERPL W P-5'-P-CCNC: 19 U/L (ref 1–41)
ANION GAP SERPL CALCULATED.3IONS-SCNC: 11.1 MMOL/L (ref 5–15)
ANISOCYTOSIS BLD QL: NORMAL
AST SERPL-CCNC: 18 U/L (ref 1–40)
BASOPHILS # BLD AUTO: 0.02 10*3/MM3 (ref 0–0.2)
BASOPHILS NFR BLD AUTO: 0.2 % (ref 0–1.5)
BILIRUB SERPL-MCNC: 0.5 MG/DL (ref 0–1.2)
BUN SERPL-MCNC: 59 MG/DL (ref 8–23)
BUN/CREAT SERPL: 12.3 (ref 7–25)
CALCIUM SPEC-SCNC: 8.5 MG/DL (ref 8.6–10.5)
CHLORIDE SERPL-SCNC: 94 MMOL/L (ref 98–107)
CO2 SERPL-SCNC: 27.9 MMOL/L (ref 22–29)
CREAT SERPL-MCNC: 4.78 MG/DL (ref 0.76–1.27)
DEPRECATED RDW RBC AUTO: 74.9 FL (ref 37–54)
EGFRCR SERPLBLD CKD-EPI 2021: 12.9 ML/MIN/1.73
EOSINOPHIL # BLD AUTO: 0.1 10*3/MM3 (ref 0–0.4)
EOSINOPHIL NFR BLD AUTO: 1.1 % (ref 0.3–6.2)
ERYTHROCYTE [DISTWIDTH] IN BLOOD BY AUTOMATED COUNT: 19.8 % (ref 12.3–15.4)
GLOBULIN UR ELPH-MCNC: 2.2 GM/DL
GLUCOSE BLDC GLUCOMTR-MCNC: 139 MG/DL (ref 70–130)
GLUCOSE BLDC GLUCOMTR-MCNC: 170 MG/DL (ref 70–130)
GLUCOSE BLDC GLUCOMTR-MCNC: 218 MG/DL (ref 70–130)
GLUCOSE BLDC GLUCOMTR-MCNC: 99 MG/DL (ref 70–130)
GLUCOSE SERPL-MCNC: 56 MG/DL (ref 65–99)
HCT VFR BLD AUTO: 28.3 % (ref 37.5–51)
HGB BLD-MCNC: 9.1 G/DL (ref 13–17.7)
IMM GRANULOCYTES # BLD AUTO: 0.12 10*3/MM3 (ref 0–0.05)
IMM GRANULOCYTES NFR BLD AUTO: 1.4 % (ref 0–0.5)
LYMPHOCYTES # BLD AUTO: 1.6 10*3/MM3 (ref 0.7–3.1)
LYMPHOCYTES NFR BLD AUTO: 18.3 % (ref 19.6–45.3)
MACROCYTES BLD QL SMEAR: NORMAL
MCH RBC QN AUTO: 34 PG (ref 26.6–33)
MCHC RBC AUTO-ENTMCNC: 32.2 G/DL (ref 31.5–35.7)
MCV RBC AUTO: 105.6 FL (ref 79–97)
MONOCYTES # BLD AUTO: 0.92 10*3/MM3 (ref 0.1–0.9)
MONOCYTES NFR BLD AUTO: 10.5 % (ref 5–12)
NEUTROPHILS NFR BLD AUTO: 5.97 10*3/MM3 (ref 1.7–7)
NEUTROPHILS NFR BLD AUTO: 68.5 % (ref 42.7–76)
NRBC BLD AUTO-RTO: 0.3 /100 WBC (ref 0–0.2)
PLATELET # BLD AUTO: 115 10*3/MM3 (ref 140–450)
PMV BLD AUTO: 10.6 FL (ref 6–12)
POLYCHROMASIA BLD QL SMEAR: NORMAL
POTASSIUM SERPL-SCNC: 5.2 MMOL/L (ref 3.5–5.2)
PROT SERPL-MCNC: 5.1 G/DL (ref 6–8.5)
RBC # BLD AUTO: 2.68 10*6/MM3 (ref 4.14–5.8)
SMALL PLATELETS BLD QL SMEAR: NORMAL
SODIUM SERPL-SCNC: 133 MMOL/L (ref 136–145)
WBC NRBC COR # BLD: 8.73 10*3/MM3 (ref 3.4–10.8)

## 2022-11-28 PROCEDURE — 63710000001 INSULIN ASPART PER 5 UNITS: Performed by: STUDENT IN AN ORGANIZED HEALTH CARE EDUCATION/TRAINING PROGRAM

## 2022-11-28 PROCEDURE — 85025 COMPLETE CBC W/AUTO DIFF WBC: CPT | Performed by: NURSE PRACTITIONER

## 2022-11-28 PROCEDURE — 82962 GLUCOSE BLOOD TEST: CPT

## 2022-11-28 PROCEDURE — 99232 SBSQ HOSP IP/OBS MODERATE 35: CPT

## 2022-11-28 PROCEDURE — 94761 N-INVAS EAR/PLS OXIMETRY MLT: CPT

## 2022-11-28 PROCEDURE — 94799 UNLISTED PULMONARY SVC/PX: CPT

## 2022-11-28 PROCEDURE — 80053 COMPREHEN METABOLIC PANEL: CPT | Performed by: INTERNAL MEDICINE

## 2022-11-28 PROCEDURE — 85007 BL SMEAR W/DIFF WBC COUNT: CPT | Performed by: NURSE PRACTITIONER

## 2022-11-28 RX ADMIN — LEVOTHYROXINE SODIUM 88 MCG: 88 TABLET ORAL at 05:50

## 2022-11-28 RX ADMIN — APIXABAN 5 MG: 5 TABLET, FILM COATED ORAL at 20:40

## 2022-11-28 RX ADMIN — DOCUSATE SODIUM 50 MG AND SENNOSIDES 8.6 MG 2 TABLET: 8.6; 5 TABLET, FILM COATED ORAL at 20:40

## 2022-11-28 RX ADMIN — Medication 100 MG: at 13:17

## 2022-11-28 RX ADMIN — Medication 10 ML: at 13:18

## 2022-11-28 RX ADMIN — RENO CAPS 1 MG: 100; 1.5; 1.7; 20; 10; 1; 150; 5; 6 CAPSULE ORAL at 13:17

## 2022-11-28 RX ADMIN — INSULIN ASPART 3 UNITS: 100 INJECTION, SOLUTION INTRAVENOUS; SUBCUTANEOUS at 13:17

## 2022-11-28 RX ADMIN — BUDESONIDE AND FORMOTEROL FUMARATE DIHYDRATE 2 PUFF: 160; 4.5 AEROSOL RESPIRATORY (INHALATION) at 06:27

## 2022-11-28 RX ADMIN — LINAGLIPTIN 5 MG: 5 TABLET, FILM COATED ORAL at 13:17

## 2022-11-28 RX ADMIN — ATORVASTATIN CALCIUM 40 MG: 40 TABLET, FILM COATED ORAL at 20:40

## 2022-11-28 RX ADMIN — CALCIUM ACETATE 2668 MG: 667 CAPSULE ORAL at 13:18

## 2022-11-28 RX ADMIN — METOPROLOL TARTRATE 50 MG: 50 TABLET, FILM COATED ORAL at 22:36

## 2022-11-28 RX ADMIN — CALCIUM ACETATE 2668 MG: 667 CAPSULE ORAL at 17:22

## 2022-11-28 RX ADMIN — CETIRIZINE HYDROCHLORIDE 5 MG: 10 TABLET, FILM COATED ORAL at 13:17

## 2022-11-28 RX ADMIN — Medication 10 ML: at 20:40

## 2022-11-28 RX ADMIN — BUDESONIDE AND FORMOTEROL FUMARATE DIHYDRATE 2 PUFF: 160; 4.5 AEROSOL RESPIRATORY (INHALATION) at 18:21

## 2022-11-28 RX ADMIN — METOPROLOL TARTRATE 50 MG: 50 TABLET, FILM COATED ORAL at 13:17

## 2022-11-28 RX ADMIN — INSULIN ASPART 4 UNITS: 100 INJECTION, SOLUTION INTRAVENOUS; SUBCUTANEOUS at 17:21

## 2022-11-29 LAB
ALBUMIN SERPL-MCNC: 2.86 G/DL (ref 3.5–5.2)
ALBUMIN/GLOB SERPL: 1.3 G/DL
ALP SERPL-CCNC: 83 U/L (ref 39–117)
ALT SERPL W P-5'-P-CCNC: 22 U/L (ref 1–41)
ANION GAP SERPL CALCULATED.3IONS-SCNC: 10.2 MMOL/L (ref 5–15)
ANISOCYTOSIS BLD QL: NORMAL
AST SERPL-CCNC: 23 U/L (ref 1–40)
BASOPHILS # BLD AUTO: 0.02 10*3/MM3 (ref 0–0.2)
BASOPHILS NFR BLD AUTO: 0.3 % (ref 0–1.5)
BILIRUB SERPL-MCNC: 0.5 MG/DL (ref 0–1.2)
BUN SERPL-MCNC: 48 MG/DL (ref 8–23)
BUN/CREAT SERPL: 10.6 (ref 7–25)
CALCIUM SPEC-SCNC: 7.9 MG/DL (ref 8.6–10.5)
CHLORIDE SERPL-SCNC: 91 MMOL/L (ref 98–107)
CO2 SERPL-SCNC: 30.8 MMOL/L (ref 22–29)
CREAT SERPL-MCNC: 4.52 MG/DL (ref 0.76–1.27)
DEPRECATED RDW RBC AUTO: 76.2 FL (ref 37–54)
EGFRCR SERPLBLD CKD-EPI 2021: 13.8 ML/MIN/1.73
EOSINOPHIL # BLD AUTO: 0.08 10*3/MM3 (ref 0–0.4)
EOSINOPHIL NFR BLD AUTO: 1.2 % (ref 0.3–6.2)
ERYTHROCYTE [DISTWIDTH] IN BLOOD BY AUTOMATED COUNT: 19.9 % (ref 12.3–15.4)
GLOBULIN UR ELPH-MCNC: 2.2 GM/DL
GLUCOSE BLDC GLUCOMTR-MCNC: 104 MG/DL (ref 70–130)
GLUCOSE BLDC GLUCOMTR-MCNC: 141 MG/DL (ref 70–130)
GLUCOSE BLDC GLUCOMTR-MCNC: 222 MG/DL (ref 70–130)
GLUCOSE BLDC GLUCOMTR-MCNC: 321 MG/DL (ref 70–130)
GLUCOSE BLDC GLUCOMTR-MCNC: 63 MG/DL (ref 70–130)
GLUCOSE BLDC GLUCOMTR-MCNC: 72 MG/DL (ref 70–130)
GLUCOSE SERPL-MCNC: 239 MG/DL (ref 65–99)
HCT VFR BLD AUTO: 27.4 % (ref 37.5–51)
HGB BLD-MCNC: 8.7 G/DL (ref 13–17.7)
IMM GRANULOCYTES # BLD AUTO: 0.07 10*3/MM3 (ref 0–0.05)
IMM GRANULOCYTES NFR BLD AUTO: 1 % (ref 0–0.5)
LYMPHOCYTES # BLD AUTO: 1.17 10*3/MM3 (ref 0.7–3.1)
LYMPHOCYTES NFR BLD AUTO: 17.2 % (ref 19.6–45.3)
MACROCYTES BLD QL SMEAR: NORMAL
MCH RBC QN AUTO: 33.7 PG (ref 26.6–33)
MCHC RBC AUTO-ENTMCNC: 31.8 G/DL (ref 31.5–35.7)
MCV RBC AUTO: 106.2 FL (ref 79–97)
MONOCYTES # BLD AUTO: 0.77 10*3/MM3 (ref 0.1–0.9)
MONOCYTES NFR BLD AUTO: 11.3 % (ref 5–12)
NEUTROPHILS NFR BLD AUTO: 4.7 10*3/MM3 (ref 1.7–7)
NEUTROPHILS NFR BLD AUTO: 69 % (ref 42.7–76)
NRBC BLD AUTO-RTO: 0 /100 WBC (ref 0–0.2)
PLAT MORPH BLD: NORMAL
PLATELET # BLD AUTO: 107 10*3/MM3 (ref 140–450)
PMV BLD AUTO: 10.7 FL (ref 6–12)
POLYCHROMASIA BLD QL SMEAR: NORMAL
POTASSIUM SERPL-SCNC: 5 MMOL/L (ref 3.5–5.2)
PROT SERPL-MCNC: 5.1 G/DL (ref 6–8.5)
RBC # BLD AUTO: 2.58 10*6/MM3 (ref 4.14–5.8)
SODIUM SERPL-SCNC: 132 MMOL/L (ref 136–145)
WBC NRBC COR # BLD: 6.81 10*3/MM3 (ref 3.4–10.8)

## 2022-11-29 PROCEDURE — 63710000001 INSULIN ASPART PER 5 UNITS: Performed by: STUDENT IN AN ORGANIZED HEALTH CARE EDUCATION/TRAINING PROGRAM

## 2022-11-29 PROCEDURE — 99232 SBSQ HOSP IP/OBS MODERATE 35: CPT

## 2022-11-29 PROCEDURE — 85025 COMPLETE CBC W/AUTO DIFF WBC: CPT | Performed by: NURSE PRACTITIONER

## 2022-11-29 PROCEDURE — 82962 GLUCOSE BLOOD TEST: CPT

## 2022-11-29 PROCEDURE — 94799 UNLISTED PULMONARY SVC/PX: CPT

## 2022-11-29 PROCEDURE — 63710000001 INSULIN DETEMIR PER 5 UNITS

## 2022-11-29 PROCEDURE — 94761 N-INVAS EAR/PLS OXIMETRY MLT: CPT

## 2022-11-29 PROCEDURE — 85007 BL SMEAR W/DIFF WBC COUNT: CPT | Performed by: NURSE PRACTITIONER

## 2022-11-29 PROCEDURE — 80053 COMPREHEN METABOLIC PANEL: CPT | Performed by: INTERNAL MEDICINE

## 2022-11-29 RX ADMIN — Medication 10 ML: at 09:48

## 2022-11-29 RX ADMIN — DOCUSATE SODIUM 50 MG AND SENNOSIDES 8.6 MG 2 TABLET: 8.6; 5 TABLET, FILM COATED ORAL at 21:28

## 2022-11-29 RX ADMIN — ATORVASTATIN CALCIUM 40 MG: 40 TABLET, FILM COATED ORAL at 21:28

## 2022-11-29 RX ADMIN — METOPROLOL TARTRATE 50 MG: 50 TABLET, FILM COATED ORAL at 09:47

## 2022-11-29 RX ADMIN — BUDESONIDE AND FORMOTEROL FUMARATE DIHYDRATE 2 PUFF: 160; 4.5 AEROSOL RESPIRATORY (INHALATION) at 06:23

## 2022-11-29 RX ADMIN — BUDESONIDE AND FORMOTEROL FUMARATE DIHYDRATE 2 PUFF: 160; 4.5 AEROSOL RESPIRATORY (INHALATION) at 18:32

## 2022-11-29 RX ADMIN — LINAGLIPTIN 5 MG: 5 TABLET, FILM COATED ORAL at 09:47

## 2022-11-29 RX ADMIN — INSULIN ASPART 7 UNITS: 100 INJECTION, SOLUTION INTRAVENOUS; SUBCUTANEOUS at 12:39

## 2022-11-29 RX ADMIN — CETIRIZINE HYDROCHLORIDE 5 MG: 10 TABLET, FILM COATED ORAL at 09:46

## 2022-11-29 RX ADMIN — CALCIUM ACETATE 2668 MG: 667 CAPSULE ORAL at 09:46

## 2022-11-29 RX ADMIN — CALCIUM ACETATE 2668 MG: 667 CAPSULE ORAL at 12:39

## 2022-11-29 RX ADMIN — Medication 10 ML: at 21:29

## 2022-11-29 RX ADMIN — INSULIN DETEMIR 5 UNITS: 100 INJECTION, SOLUTION SUBCUTANEOUS at 21:28

## 2022-11-29 RX ADMIN — APIXABAN 5 MG: 5 TABLET, FILM COATED ORAL at 21:28

## 2022-11-29 RX ADMIN — ACETAMINOPHEN 500 MG: 500 TABLET ORAL at 11:30

## 2022-11-29 RX ADMIN — CALCIUM ACETATE 2668 MG: 667 CAPSULE ORAL at 17:10

## 2022-11-29 RX ADMIN — LEVOTHYROXINE SODIUM 88 MCG: 88 TABLET ORAL at 05:13

## 2022-11-29 RX ADMIN — APIXABAN 5 MG: 5 TABLET, FILM COATED ORAL at 09:46

## 2022-11-29 RX ADMIN — Medication 100 MG: at 09:47

## 2022-11-29 RX ADMIN — INSULIN ASPART 4 UNITS: 100 INJECTION, SOLUTION INTRAVENOUS; SUBCUTANEOUS at 09:47

## 2022-11-29 RX ADMIN — RENO CAPS 1 MG: 100; 1.5; 1.7; 20; 10; 1; 150; 5; 6 CAPSULE ORAL at 09:46

## 2022-11-30 LAB
ALBUMIN SERPL-MCNC: 2.86 G/DL (ref 3.5–5.2)
ALBUMIN/GLOB SERPL: 1.3 G/DL
ALP SERPL-CCNC: 76 U/L (ref 39–117)
ALT SERPL W P-5'-P-CCNC: 20 U/L (ref 1–41)
ANION GAP SERPL CALCULATED.3IONS-SCNC: 12.9 MMOL/L (ref 5–15)
ANISOCYTOSIS BLD QL: NORMAL
AST SERPL-CCNC: 20 U/L (ref 1–40)
BASO STIPL COARSE BLD QL SMEAR: NORMAL
BASOPHILS # BLD AUTO: 0.03 10*3/MM3 (ref 0–0.2)
BASOPHILS NFR BLD AUTO: 0.4 % (ref 0–1.5)
BILIRUB SERPL-MCNC: 0.5 MG/DL (ref 0–1.2)
BUN SERPL-MCNC: 61 MG/DL (ref 8–23)
BUN/CREAT SERPL: 10.6 (ref 7–25)
CALCIUM SPEC-SCNC: 8.3 MG/DL (ref 8.6–10.5)
CHLORIDE SERPL-SCNC: 91 MMOL/L (ref 98–107)
CO2 SERPL-SCNC: 28.1 MMOL/L (ref 22–29)
CREAT SERPL-MCNC: 5.76 MG/DL (ref 0.76–1.27)
DEPRECATED RDW RBC AUTO: 76.2 FL (ref 37–54)
EGFRCR SERPLBLD CKD-EPI 2021: 10.3 ML/MIN/1.73
ELLIPTOCYTES BLD QL SMEAR: NORMAL
EOSINOPHIL # BLD AUTO: 0.08 10*3/MM3 (ref 0–0.4)
EOSINOPHIL NFR BLD AUTO: 1.2 % (ref 0.3–6.2)
ERYTHROCYTE [DISTWIDTH] IN BLOOD BY AUTOMATED COUNT: 19.9 % (ref 12.3–15.4)
GLOBULIN UR ELPH-MCNC: 2.2 GM/DL
GLUCOSE BLDC GLUCOMTR-MCNC: 120 MG/DL (ref 70–130)
GLUCOSE BLDC GLUCOMTR-MCNC: 169 MG/DL (ref 70–130)
GLUCOSE BLDC GLUCOMTR-MCNC: 213 MG/DL (ref 70–130)
GLUCOSE BLDC GLUCOMTR-MCNC: 256 MG/DL (ref 70–130)
GLUCOSE BLDC GLUCOMTR-MCNC: 295 MG/DL (ref 70–130)
GLUCOSE SERPL-MCNC: 239 MG/DL (ref 65–99)
HCT VFR BLD AUTO: 28.4 % (ref 37.5–51)
HGB BLD-MCNC: 9.1 G/DL (ref 13–17.7)
HYPOCHROMIA BLD QL: NORMAL
IMM GRANULOCYTES # BLD AUTO: 0.08 10*3/MM3 (ref 0–0.05)
IMM GRANULOCYTES NFR BLD AUTO: 1.2 % (ref 0–0.5)
LYMPHOCYTES # BLD AUTO: 1.3 10*3/MM3 (ref 0.7–3.1)
LYMPHOCYTES NFR BLD AUTO: 19.1 % (ref 19.6–45.3)
MACROCYTES BLD QL SMEAR: NORMAL
MCH RBC QN AUTO: 34 PG (ref 26.6–33)
MCHC RBC AUTO-ENTMCNC: 32 G/DL (ref 31.5–35.7)
MCV RBC AUTO: 106 FL (ref 79–97)
MONOCYTES # BLD AUTO: 0.68 10*3/MM3 (ref 0.1–0.9)
MONOCYTES NFR BLD AUTO: 10 % (ref 5–12)
NEUTROPHILS NFR BLD AUTO: 4.63 10*3/MM3 (ref 1.7–7)
NEUTROPHILS NFR BLD AUTO: 68.1 % (ref 42.7–76)
NRBC BLD AUTO-RTO: 0 /100 WBC (ref 0–0.2)
PLAT MORPH BLD: NORMAL
PLATELET # BLD AUTO: 113 10*3/MM3 (ref 140–450)
PMV BLD AUTO: 10.8 FL (ref 6–12)
POTASSIUM SERPL-SCNC: 5.4 MMOL/L (ref 3.5–5.2)
PROT SERPL-MCNC: 5.1 G/DL (ref 6–8.5)
RBC # BLD AUTO: 2.68 10*6/MM3 (ref 4.14–5.8)
SODIUM SERPL-SCNC: 132 MMOL/L (ref 136–145)
TARGETS BLD QL SMEAR: NORMAL
WBC NRBC COR # BLD: 6.8 10*3/MM3 (ref 3.4–10.8)

## 2022-11-30 PROCEDURE — 85007 BL SMEAR W/DIFF WBC COUNT: CPT | Performed by: NURSE PRACTITIONER

## 2022-11-30 PROCEDURE — 63710000001 INSULIN ASPART PER 5 UNITS: Performed by: STUDENT IN AN ORGANIZED HEALTH CARE EDUCATION/TRAINING PROGRAM

## 2022-11-30 PROCEDURE — 99232 SBSQ HOSP IP/OBS MODERATE 35: CPT

## 2022-11-30 PROCEDURE — 85025 COMPLETE CBC W/AUTO DIFF WBC: CPT | Performed by: NURSE PRACTITIONER

## 2022-11-30 PROCEDURE — 94799 UNLISTED PULMONARY SVC/PX: CPT

## 2022-11-30 PROCEDURE — 82962 GLUCOSE BLOOD TEST: CPT

## 2022-11-30 PROCEDURE — 63710000001 INSULIN DETEMIR PER 5 UNITS

## 2022-11-30 PROCEDURE — 80053 COMPREHEN METABOLIC PANEL: CPT | Performed by: INTERNAL MEDICINE

## 2022-11-30 PROCEDURE — 94761 N-INVAS EAR/PLS OXIMETRY MLT: CPT

## 2022-11-30 RX ADMIN — INSULIN DETEMIR 5 UNITS: 100 INJECTION, SOLUTION SUBCUTANEOUS at 21:51

## 2022-11-30 RX ADMIN — RENO CAPS 1 MG: 100; 1.5; 1.7; 20; 10; 1; 150; 5; 6 CAPSULE ORAL at 12:02

## 2022-11-30 RX ADMIN — ATORVASTATIN CALCIUM 40 MG: 40 TABLET, FILM COATED ORAL at 21:51

## 2022-11-30 RX ADMIN — BUDESONIDE AND FORMOTEROL FUMARATE DIHYDRATE 2 PUFF: 160; 4.5 AEROSOL RESPIRATORY (INHALATION) at 18:31

## 2022-11-30 RX ADMIN — METOPROLOL TARTRATE 75 MG: 50 TABLET, FILM COATED ORAL at 12:09

## 2022-11-30 RX ADMIN — Medication 10 ML: at 12:03

## 2022-11-30 RX ADMIN — CETIRIZINE HYDROCHLORIDE 5 MG: 10 TABLET, FILM COATED ORAL at 12:02

## 2022-11-30 RX ADMIN — Medication 100 MG: at 12:02

## 2022-11-30 RX ADMIN — CALCIUM ACETATE 2668 MG: 667 CAPSULE ORAL at 12:02

## 2022-11-30 RX ADMIN — CALCIUM ACETATE 2668 MG: 667 CAPSULE ORAL at 17:59

## 2022-11-30 RX ADMIN — Medication 10 ML: at 21:52

## 2022-11-30 RX ADMIN — METOPROLOL TARTRATE 75 MG: 50 TABLET, FILM COATED ORAL at 21:51

## 2022-11-30 RX ADMIN — APIXABAN 5 MG: 5 TABLET, FILM COATED ORAL at 12:02

## 2022-11-30 RX ADMIN — BUDESONIDE AND FORMOTEROL FUMARATE DIHYDRATE 2 PUFF: 160; 4.5 AEROSOL RESPIRATORY (INHALATION) at 06:15

## 2022-11-30 RX ADMIN — LINAGLIPTIN 5 MG: 5 TABLET, FILM COATED ORAL at 12:02

## 2022-11-30 RX ADMIN — LEVOTHYROXINE SODIUM 88 MCG: 88 TABLET ORAL at 05:40

## 2022-11-30 RX ADMIN — APIXABAN 5 MG: 5 TABLET, FILM COATED ORAL at 21:50

## 2022-11-30 RX ADMIN — INSULIN ASPART 4 UNITS: 100 INJECTION, SOLUTION INTRAVENOUS; SUBCUTANEOUS at 17:59

## 2022-11-30 RX ADMIN — ACETAMINOPHEN 500 MG: 500 TABLET ORAL at 23:34

## 2022-12-01 LAB
ALBUMIN SERPL-MCNC: 2.94 G/DL (ref 3.5–5.2)
ALBUMIN/GLOB SERPL: 1.4 G/DL
ALP SERPL-CCNC: 76 U/L (ref 39–117)
ALT SERPL W P-5'-P-CCNC: 21 U/L (ref 1–41)
ANION GAP SERPL CALCULATED.3IONS-SCNC: 12 MMOL/L (ref 5–15)
ANISOCYTOSIS BLD QL: NORMAL
AST SERPL-CCNC: 20 U/L (ref 1–40)
BASOPHILS # BLD AUTO: 0.03 10*3/MM3 (ref 0–0.2)
BASOPHILS NFR BLD AUTO: 0.5 % (ref 0–1.5)
BILIRUB SERPL-MCNC: 0.5 MG/DL (ref 0–1.2)
BUN SERPL-MCNC: 48 MG/DL (ref 8–23)
BUN/CREAT SERPL: 11.3 (ref 7–25)
CALCIUM SPEC-SCNC: 8.3 MG/DL (ref 8.6–10.5)
CHLORIDE SERPL-SCNC: 93 MMOL/L (ref 98–107)
CO2 SERPL-SCNC: 30 MMOL/L (ref 22–29)
CREAT SERPL-MCNC: 4.23 MG/DL (ref 0.76–1.27)
DEPRECATED RDW RBC AUTO: 76.5 FL (ref 37–54)
EGFRCR SERPLBLD CKD-EPI 2021: 14.9 ML/MIN/1.73
EOSINOPHIL # BLD AUTO: 0.09 10*3/MM3 (ref 0–0.4)
EOSINOPHIL NFR BLD AUTO: 1.4 % (ref 0.3–6.2)
ERYTHROCYTE [DISTWIDTH] IN BLOOD BY AUTOMATED COUNT: 19.8 % (ref 12.3–15.4)
GLOBULIN UR ELPH-MCNC: 2.2 GM/DL
GLUCOSE BLDC GLUCOMTR-MCNC: 132 MG/DL (ref 70–130)
GLUCOSE BLDC GLUCOMTR-MCNC: 153 MG/DL (ref 70–130)
GLUCOSE BLDC GLUCOMTR-MCNC: 155 MG/DL (ref 70–130)
GLUCOSE BLDC GLUCOMTR-MCNC: 164 MG/DL (ref 70–130)
GLUCOSE BLDC GLUCOMTR-MCNC: 253 MG/DL (ref 70–130)
GLUCOSE SERPL-MCNC: 188 MG/DL (ref 65–99)
HCT VFR BLD AUTO: 27.3 % (ref 37.5–51)
HGB BLD-MCNC: 8.5 G/DL (ref 13–17.7)
HYPOCHROMIA BLD QL: NORMAL
IMM GRANULOCYTES # BLD AUTO: 0.07 10*3/MM3 (ref 0–0.05)
IMM GRANULOCYTES NFR BLD AUTO: 1.1 % (ref 0–0.5)
LYMPHOCYTES # BLD AUTO: 1.26 10*3/MM3 (ref 0.7–3.1)
LYMPHOCYTES NFR BLD AUTO: 19.8 % (ref 19.6–45.3)
MCH RBC QN AUTO: 33.2 PG (ref 26.6–33)
MCHC RBC AUTO-ENTMCNC: 31.1 G/DL (ref 31.5–35.7)
MCV RBC AUTO: 106.6 FL (ref 79–97)
MONOCYTES # BLD AUTO: 0.8 10*3/MM3 (ref 0.1–0.9)
MONOCYTES NFR BLD AUTO: 12.6 % (ref 5–12)
NEUTROPHILS NFR BLD AUTO: 4.1 10*3/MM3 (ref 1.7–7)
NEUTROPHILS NFR BLD AUTO: 64.6 % (ref 42.7–76)
NRBC BLD AUTO-RTO: 0 /100 WBC (ref 0–0.2)
PLAT MORPH BLD: NORMAL
PLATELET # BLD AUTO: 120 10*3/MM3 (ref 140–450)
PMV BLD AUTO: 10.3 FL (ref 6–12)
POTASSIUM SERPL-SCNC: 5.2 MMOL/L (ref 3.5–5.2)
PROT SERPL-MCNC: 5.1 G/DL (ref 6–8.5)
RBC # BLD AUTO: 2.56 10*6/MM3 (ref 4.14–5.8)
SARS-COV-2 AG RESP QL IA.RAPID: NORMAL
SODIUM SERPL-SCNC: 135 MMOL/L (ref 136–145)
STOMATOCYTES BLD QL SMEAR: NORMAL
TARGETS BLD QL SMEAR: NORMAL
WBC NRBC COR # BLD: 6.35 10*3/MM3 (ref 3.4–10.8)

## 2022-12-01 PROCEDURE — 82962 GLUCOSE BLOOD TEST: CPT

## 2022-12-01 PROCEDURE — 63710000001 INSULIN DETEMIR PER 5 UNITS

## 2022-12-01 PROCEDURE — 99232 SBSQ HOSP IP/OBS MODERATE 35: CPT

## 2022-12-01 PROCEDURE — 85025 COMPLETE CBC W/AUTO DIFF WBC: CPT | Performed by: NURSE PRACTITIONER

## 2022-12-01 PROCEDURE — 97535 SELF CARE MNGMENT TRAINING: CPT

## 2022-12-01 PROCEDURE — 94799 UNLISTED PULMONARY SVC/PX: CPT

## 2022-12-01 PROCEDURE — 63710000001 INSULIN ASPART PER 5 UNITS: Performed by: STUDENT IN AN ORGANIZED HEALTH CARE EDUCATION/TRAINING PROGRAM

## 2022-12-01 PROCEDURE — 87426 SARSCOV CORONAVIRUS AG IA: CPT | Performed by: STUDENT IN AN ORGANIZED HEALTH CARE EDUCATION/TRAINING PROGRAM

## 2022-12-01 PROCEDURE — 85007 BL SMEAR W/DIFF WBC COUNT: CPT | Performed by: NURSE PRACTITIONER

## 2022-12-01 PROCEDURE — 80053 COMPREHEN METABOLIC PANEL: CPT | Performed by: INTERNAL MEDICINE

## 2022-12-01 PROCEDURE — 25010000002 EPOETIN ALFA-EPBX 10000 UNIT/ML SOLUTION: Performed by: STUDENT IN AN ORGANIZED HEALTH CARE EDUCATION/TRAINING PROGRAM

## 2022-12-01 RX ORDER — METOPROLOL TARTRATE 100 MG/1
100 TABLET ORAL EVERY 12 HOURS SCHEDULED
Status: DISCONTINUED | OUTPATIENT
Start: 2022-12-01 | End: 2022-12-03 | Stop reason: HOSPADM

## 2022-12-01 RX ADMIN — INSULIN DETEMIR 5 UNITS: 100 INJECTION, SOLUTION SUBCUTANEOUS at 21:50

## 2022-12-01 RX ADMIN — INSULIN ASPART 6 UNITS: 100 INJECTION, SOLUTION INTRAVENOUS; SUBCUTANEOUS at 11:43

## 2022-12-01 RX ADMIN — CETIRIZINE HYDROCHLORIDE 5 MG: 10 TABLET, FILM COATED ORAL at 09:02

## 2022-12-01 RX ADMIN — CALCIUM ACETATE 2668 MG: 667 CAPSULE ORAL at 11:43

## 2022-12-01 RX ADMIN — RENO CAPS 1 MG: 100; 1.5; 1.7; 20; 10; 1; 150; 5; 6 CAPSULE ORAL at 09:02

## 2022-12-01 RX ADMIN — METOPROLOL TARTRATE 100 MG: 100 TABLET, FILM COATED ORAL at 21:49

## 2022-12-01 RX ADMIN — METOPROLOL TARTRATE 75 MG: 50 TABLET, FILM COATED ORAL at 09:02

## 2022-12-01 RX ADMIN — SODIUM ZIRCONIUM CYCLOSILICATE 10 G: 10 POWDER, FOR SUSPENSION ORAL at 14:06

## 2022-12-01 RX ADMIN — APIXABAN 5 MG: 5 TABLET, FILM COATED ORAL at 21:49

## 2022-12-01 RX ADMIN — CALCIUM ACETATE 2668 MG: 667 CAPSULE ORAL at 09:02

## 2022-12-01 RX ADMIN — INSULIN ASPART 2 UNITS: 100 INJECTION, SOLUTION INTRAVENOUS; SUBCUTANEOUS at 17:54

## 2022-12-01 RX ADMIN — Medication 10 ML: at 09:02

## 2022-12-01 RX ADMIN — LEVOTHYROXINE SODIUM 88 MCG: 88 TABLET ORAL at 05:43

## 2022-12-01 RX ADMIN — ATORVASTATIN CALCIUM 40 MG: 40 TABLET, FILM COATED ORAL at 21:49

## 2022-12-01 RX ADMIN — CALCIUM ACETATE 2668 MG: 667 CAPSULE ORAL at 17:54

## 2022-12-01 RX ADMIN — Medication 100 MG: at 09:02

## 2022-12-01 RX ADMIN — Medication 10 ML: at 21:50

## 2022-12-01 RX ADMIN — LINAGLIPTIN 5 MG: 5 TABLET, FILM COATED ORAL at 09:02

## 2022-12-01 RX ADMIN — EPOETIN ALFA-EPBX 20000 UNITS: 10000 INJECTION, SOLUTION INTRAVENOUS; SUBCUTANEOUS at 11:43

## 2022-12-01 RX ADMIN — DOCUSATE SODIUM 50 MG AND SENNOSIDES 8.6 MG 2 TABLET: 8.6; 5 TABLET, FILM COATED ORAL at 21:49

## 2022-12-01 RX ADMIN — BUDESONIDE AND FORMOTEROL FUMARATE DIHYDRATE 2 PUFF: 160; 4.5 AEROSOL RESPIRATORY (INHALATION) at 06:13

## 2022-12-01 RX ADMIN — APIXABAN 5 MG: 5 TABLET, FILM COATED ORAL at 09:02

## 2022-12-01 RX ADMIN — BUDESONIDE AND FORMOTEROL FUMARATE DIHYDRATE 2 PUFF: 160; 4.5 AEROSOL RESPIRATORY (INHALATION) at 18:13

## 2022-12-01 RX ADMIN — INSULIN ASPART 2 UNITS: 100 INJECTION, SOLUTION INTRAVENOUS; SUBCUTANEOUS at 09:01

## 2022-12-01 NOTE — PLAN OF CARE
Goal Outcome Evaluation:      Pt is lying in bed with no S&S of distress. No complaints at this time. Will continue to monitor and follow plan of care.

## 2022-12-01 NOTE — PLAN OF CARE
Goal Outcome Evaluation:      Pt is resting in bed with eyes closed. Chest is rising and falling evenly. No s/s of acute distress noted. No complaints of chest pain or nausea. Pt had complaints of pain. Gave PRN Tylenol. See mar. Pt has rested mostly through the night with no other complaints.

## 2022-12-01 NOTE — PROGRESS NOTES
Nephrology Progress Note      Subjective     No chest pain or shortness of breath.     Objective       Vital signs :     Temp:  [97.9 °F (36.6 °C)-98.2 °F (36.8 °C)] 98.2 °F (36.8 °C)  Heart Rate:  [] 96  Resp:  [18-20] 20  BP: (101-135)/(60-76) 135/60    Intake/Output                       11/29/22 0701 - 11/30/22 0700 11/30/22 0701 - 12/01/22 0700     [id number] 1992-7851 Total 7010-6393 6294-9639 Total                 Intake    P.O.  660  -- 660  700  237 937    Total Intake 660 -- 660 700 237 937       Output    Urine  --  -- --  400  -- 400    Other  --  -- --  3000  -- 3000    Ultrafiltration (mL) -- -- -- 3000 -- 3000    Total Output -- -- -- 3400 -- 3400           Physical Exam:    General Appearance : not in acute distress  Lungs : B/L lower zone crackles.   Heart :  regular rhythm & normal rate, normal S1, S2 and no murmur, no rub  Abdomen : soft, non distended  Extremities : 2+ edema  Neurologic :   orientated to person, place, time and situation, Grossly no focal deficits    Laboratory Data :     Albumin Albumin   Date Value Ref Range Status   12/01/2022 2.94 (L) 3.50 - 5.20 g/dL Final   11/30/2022 2.86 (L) 3.50 - 5.20 g/dL Final   11/29/2022 2.86 (L) 3.50 - 5.20 g/dL Final      Magnesium No results found for: MG       PTH               No results found for: PTH    CBC and coagulation:  Results from last 7 days   Lab Units 12/01/22  0131 11/30/22  0114 11/29/22  0211   WBC 10*3/mm3 6.35 6.80 6.81   HEMOGLOBIN g/dL 8.5* 9.1* 8.7*   HEMATOCRIT % 27.3* 28.4* 27.4*   MCV fL 106.6* 106.0* 106.2*   MCHC g/dL 31.1* 32.0 31.8   PLATELETS 10*3/mm3 120* 113* 107*     Acid/base balance:      Renal and electrolytes:    Results from last 7 days   Lab Units 12/01/22  0131 11/30/22  0114 11/29/22  0211 11/28/22  0213 11/27/22  0357 11/26/22  0302 11/25/22  1733 11/25/22  0432 11/24/22  2223   SODIUM mmol/L 135* 132* 132* 133* 129*   < >  --    < >  --    POTASSIUM mmol/L 5.2 5.4* 5.0 5.2 5.0   < >  --    < > 4.3    MAGNESIUM mg/dL  --   --   --   --   --   --  2.2  --  1.8   CHLORIDE mmol/L 93* 91* 91* 94* 91*   < >  --    < >  --    CO2 mmol/L 30.0* 28.1 30.8* 27.9 28.3   < >  --    < >  --    BUN mg/dL 48* 61* 48* 59* 44*   < >  --    < >  --    CREATININE mg/dL 4.23* 5.76* 4.52* 4.78* 3.58*   < >  --    < >  --    CALCIUM mg/dL 8.3* 8.3* 7.9* 8.5* 8.5*   < >  --    < >  --     < > = values in this interval not displayed.     Estimated Creatinine Clearance: 18.9 mL/min (A) (by C-G formula based on SCr of 4.23 mg/dL (H)).  @GFRCG:3@   Liver and pancreatic function:  Results from last 7 days   Lab Units 12/01/22 0131 11/30/22  0114 11/29/22  0211   ALBUMIN g/dL 2.94* 2.86* 2.86*   BILIRUBIN mg/dL 0.5 0.5 0.5   ALK PHOS U/L 76 76 83   AST (SGOT) U/L 20 20 23   ALT (SGPT) U/L 21 20 22         Cardiac:      Liver and pancreatic function:  Results from last 7 days   Lab Units 12/01/22 0131 11/30/22  0114 11/29/22  0211   ALBUMIN g/dL 2.94* 2.86* 2.86*   BILIRUBIN mg/dL 0.5 0.5 0.5   ALK PHOS U/L 76 76 83   AST (SGOT) U/L 20 20 23   ALT (SGPT) U/L 21 20 22       Medications :     apixaban, 5 mg, Oral, Q12H  atorvastatin, 40 mg, Oral, Nightly  budesonide-formoterol, 2 puff, Inhalation, BID - RT  calcium acetate, 2,668 mg, Oral, TID With Meals  cetirizine, 5 mg, Oral, Daily  hydrOXYzine, 10 mg, Oral, Once  Insulin Aspart, 0-9 Units, Subcutaneous, TID AC  insulin detemir, 5 Units, Subcutaneous, Nightly  levothyroxine, 88 mcg, Oral, Q AM  linagliptin, 5 mg, Oral, Daily  metoprolol tartrate, 75 mg, Oral, Q12H  Renal, 1 capsule, Oral, Daily  senna-docusate sodium, 2 tablet, Oral, Nightly  sodium chloride, 10 mL, Intravenous, Q12H  thiamine, 100 mg, Oral, Daily             Assessment & Plan     1. ESKD on IHDx  2. Debilitty   3. Hyperkalemia, sever  4. Anemia  5. SHPT  6. Hyponatremia    Continue on diaysis MWF, grossly stable from renal stands point.   Educated and counseled on FR and low K diet.   Anemia; below goal, give EPO  Dose  today 20,000IU    Plan of care was discussed with the patient he understood verbalized agreed    Bladimir Alvarado MD  12/01/22  09:51 EST

## 2022-12-01 NOTE — PROGRESS NOTES
Patient Identification:  Name:  Axel Desir  Age:  62 y.o.  Sex:  male  :  1960  MRN:  [id number]  Visit Number:  [id number]  Primary Care Provider:  Isabelle Martinez APRN    Length of stay:  9    Subjective/Interval History/Consultants/Procedures     Chief complaint: Follow-up, Debility     Subjective/Interval History:  Axel Desir is our 62 y.o. male patient admitted on 2022 with failure to thrive and debility. He has a pMH significant for CAD s/p CABG, persistent A. Fib, chronic anticoagulation with Eliquis, T2DM, ESRD on HD, history of hepatitis C, HTN, HLD, hypothyroidism, chronic anemia, arthritis, chronic back pain, anxiety and depression. For further and complete admitting details, please see admission H&P.     He was admitted to the Telemetry floor for further monitoring, evaluation, and treatment. PT/OT consults placed and CM is assisting with discharge plans as patient is requesting nursing home placement. Patient with known hx of Afib; has been tachycardic throughout hospital stay. Several dose changes have been made to metoprolol tartrate for rate control. Now on 75 mg BID and BP has tolerated. Continuous cardiac monitoring in place. Denies palpitations or chest pain. Eliquis has been continued for stroke prevention. Will increase metoprolol tartrate to 100 mg BID as he remains mildly tachycardic on exam, although asymptomatic. Continue to closely monitor BP. Nephrology was also consulted for assistance with hemodialysis; currently on MWF schedule. Pre-authorization for admission to [facility] pending.      Procedures/Imaging:  • Chest xray x2  • CT head without contrast  • Transthoracic echocardiogram     Consults:  • Nephrology    • PT/OT/CM     On today's exam, patient was resting in bed with no s/s acute distress noted. He just finished eating breakfast and has asked for an ice cream. Blood glucose levels have been overall controlled on current regimen, ranging  from 120-290's overnight/today. Have encouraged patient to adhere to CC diet. He currently denies any chest pain, shortness of breath, or palpitations. Remaining in afib 's on my exam. BP has been stable with systolic 100-130s recently. Has received last 3 doses of metoprolol. Will increase to 100 mg BID with holding parameters for systolic < 100. Potassium was also mildly elevated at 5.2 this morning. Received dialysis yesterday as scheduled. 1x dose of Lokelma given. Plan discussed with patient; voices agreement with no further questions or concerns at this time.      No acute events reported overnight. Room location at the time of evaluation was [address]. Discussed with attending physician, Jessica James DO.   ----------------------------------------------------------------------------------------------------------------------  Current Hospital Meds:  apixaban, 5 mg, Oral, Q12H  atorvastatin, 40 mg, Oral, Nightly  budesonide-formoterol, 2 puff, Inhalation, BID - RT  calcium acetate, 2,668 mg, Oral, TID With Meals  cetirizine, 5 mg, Oral, Daily  Insulin Aspart, 0-9 Units, Subcutaneous, TID AC  insulin detemir, 5 Units, Subcutaneous, Nightly  levothyroxine, 88 mcg, Oral, Q AM  linagliptin, 5 mg, Oral, Daily  metoprolol tartrate, 75 mg, Oral, Q12H  Renal, 1 capsule, Oral, Daily  senna-docusate sodium, 2 tablet, Oral, Nightly  sodium chloride, 10 mL, Intravenous, Q12H  sodium zirconium cyclosilicate, 10 g, Oral, Once  thiamine, 100 mg, Oral, Daily         ----------------------------------------------------------------------------------------------------------------------      Objective     Vital Signs:  Temp:  [97.9 °F (36.6 °C)-98.2 °F (36.8 °C)] 98.2 °F (36.8 °C)  Heart Rate:  [] 109  Resp:  [18-20] 18  BP: (101-135)/(60-79) 116/79      11/29/22  0500 11/30/22  0300 12/01/22  0500   Weight: 88.8 kg (195 lb 12.8 oz) 88.9 kg (196 lb) 87 kg (191 lb 12.8 oz)     Body mass index is 29.16  kg/m².    Intake/Output Summary (Last 24 hours) at 12/1/2022 1348  Last data filed at 12/1/2022 1033  Gross per 24 hour   Intake 637 ml   Output 400 ml   Net 237 ml     I/O this shift:  In: 200 [P.O.:200]  Out: -   Diet: Renal Diets; Low Potassium; Texture: Regular Texture (IDDSI 7); Fluid Consistency: Thin (IDDSI 0)  ----------------------------------------------------------------------------------------------------------------------    Physical Exam  Vitals reviewed.   Constitutional:       General: He is awake. He is not in acute distress.     Appearance: He is ill-appearing (chronically). He is not diaphoretic.      Comments: Currently on room air.   HENT:      Head: Normocephalic and atraumatic.      Mouth/Throat:      Mouth: Mucous membranes are moist.      Pharynx: Oropharynx is clear.   Cardiovascular:      Rate and Rhythm: Tachycardia present. Rhythm irregular.      Pulses:           Dorsalis pedis pulses are 1+ on the right side and 1+ on the left side.      Heart sounds: No murmur heard.    No friction rub.      Comments: Trace edema observed to BLE; has improved.   Pulmonary:      Effort: Pulmonary effort is normal. No respiratory distress.      Breath sounds: No wheezing, rhonchi or rales.      Comments: Lungs mildly diminished/clear bilaterally. No wheezing, rhonchi or rales.   Abdominal:      General: Bowel sounds are normal. There is no distension.      Palpations: Abdomen is soft.      Tenderness: There is no abdominal tenderness. There is no guarding.   Musculoskeletal:      Cervical back: Neck supple. No rigidity.   Skin:     General: Skin is warm and dry.      Capillary Refill: Capillary refill takes 2 to 3 seconds.   Neurological:      Mental Status: He is alert and oriented to person, place, and time.      Motor: Weakness (generalized) present. No tremor.   Psychiatric:         Attention and Perception: Attention normal.         Mood and Affect: Mood normal.         Speech: Speech normal.          Behavior: Behavior is cooperative.         Thought Content: Thought content normal.         ----------------------------------------------------------------------------------------------------------------------  Tele:  Appearing atrial fib 90-110s on my exam.   ----------------------------------------------------------------------------------------------------------------------      Results from last 7 days   Lab Units 12/01/22 0131 11/30/22 0114 11/29/22 0211   WBC 10*3/mm3 6.35 6.80 6.81   HEMOGLOBIN g/dL 8.5* 9.1* 8.7*   HEMATOCRIT % 27.3* 28.4* 27.4*   MCV fL 106.6* 106.0* 106.2*   MCHC g/dL 31.1* 32.0 31.8   PLATELETS 10*3/mm3 120* 113* 107*         Results from last 7 days   Lab Units 12/01/22 0131 11/30/22 0114 11/29/22 0211 11/26/22  0302 11/25/22  1733 11/25/22  0432 11/24/22  2223   SODIUM mmol/L 135* 132* 132*   < >  --    < >  --    POTASSIUM mmol/L 5.2 5.4* 5.0   < >  --    < > 4.3   MAGNESIUM mg/dL  --   --   --   --  2.2  --  1.8   CHLORIDE mmol/L 93* 91* 91*   < >  --    < >  --    CO2 mmol/L 30.0* 28.1 30.8*   < >  --    < >  --    BUN mg/dL 48* 61* 48*   < >  --    < >  --    CREATININE mg/dL 4.23* 5.76* 4.52*   < >  --    < >  --    CALCIUM mg/dL 8.3* 8.3* 7.9*   < >  --    < >  --    GLUCOSE mg/dL 188* 239* 239*   < >  --    < >  --    ALBUMIN g/dL 2.94* 2.86* 2.86*   < >  --    < >  --    BILIRUBIN mg/dL 0.5 0.5 0.5   < >  --    < >  --    ALK PHOS U/L 76 76 83   < >  --    < >  --    AST (SGOT) U/L 20 20 23   < >  --    < >  --    ALT (SGPT) U/L 21 20 22   < >  --    < >  --     < > = values in this interval not displayed.   Estimated Creatinine Clearance: 18.9 mL/min (A) (by C-G formula based on SCr of 4.23 mg/dL (H)).  No results found for: AMMONIA        ----------------------------------------------------------------------------------------------------------------------  Imaging Results (Last 24 Hours)     ** No results found for the last 24 hours. **         ----------------------------------------------------------------------------------------------------------------------   I have reviewed the above laboratory values for 12/01/22    Assessment/Plan     Active Hospital Problems    Diagnosis  POA   • **Failure to thrive in adult [R62.7]  Yes       ASSESSMENT/PLAN:     Chronic illness related debility  Fragility   Multiple recent falls  Failure to thrive  Mr. Desir was recently hospitalized and SNF placement was recommended at that time.  Patient was not agreeable and discharged home.  After having a fall within 48 hours of discharge patient realized that he was no longer self-sufficient with ADLs.  Admitted currently for hemodialysis and SNF placement.  PT OT following.   Case management working on discharge plans.   Pre-authorization has been submitted for admission to [facility], pending.   Will need to continue dialysis MWF in the outpatient setting.      ESRD on HD (MWF)  Hyperkalemia  Azotemia  Hyponatremia  Nephrology following, continue MWF HD.   Continue fluid restriction.   Avoid nephrotoxic agents as able.   Hyponatremia stable.   Potassium mildly elevated at 5.2 today. 1x dose of Lokelma administered.   Repeat BMP in a.m.     Chronic anemia of ESRD  H&H remaining stable this a.m.  Hgb near recent baseline.      T2DM with acute hyperglycemia  Continue moderate dose SSI with Accu-Checks.   Continue Levemir 5 units nightly.   Continue Tradjenta.   Hypoglycemia protocol in place if necessary.   BG levels overall controlled on current regimen.      Paroxysmal A. fib with RVR  Chronic HFrEF  Continue metoprolol tartrate for rate control. Increase dose to 100 mg BID as patient remains mildly tachycardic with rate 90-110s. BP has tolerated current dose of 75 mg BID. Holding parameters for systolic < 100 and HR < 60.   Echo from 11/25/2022 revealed HFrEF, right ventricular dilation with moderately reduced systolic function, moderate dilation of left atrial  cavity, mild to moderate dilation of the right atrial cavity, calcification of the aortic valve leaflets no evidence of stenosis or regurgitation, moderate mitral valve regurgitation, moderate tricuspid valve regurgitation, and mild pulmonary hypertension.  CXR from 11/25/2022 revealed mild CHF changes and bibasilar airspace disease.  Monitor strict I's and O's; daily weights.   Appearing euvolemic on today's exam.   Magnesium 2.2 on 11/25. Recheck level in the a.m.   Continuous cardiac monitoring in place.     Chronic:  COPD; Currently stable on room air. Continue Symbicort and Zyrtec. Encourage turn/cough/deep breathing exercises.   CAD s/p CABG; Denies CP on exam.   Hyperlipidemia; Continue statin.  Chronically elevated troponin secondary to ESRD  HTN; BP appearing stable currently with systolic ranging from 100-130s overnight/today. Continue to monitor VS per hospital policy.   Hypothyroidism; Continue Levothyroxine.   MDD/ALEE; Continue supportive care.   Tobacco usage with nicotine dependence; Denies need for NRT.   History of hep C  Chronic lower back pain; Continue Tylenol PRN for mild pain.      -----------  -DVT prophylaxis: Chronically anticoagulated with Eliquis  -Disposition plans/anticipated needs: Pending SNF placement           The patient is high risk due to the following diagnoses/reasons: Chronic illness related debility, multiple recent falls.         IVETTE Bowens  12/01/22  13:48 EST  Pager #[phone]

## 2022-12-01 NOTE — CASE MANAGEMENT/SOCIAL WORK
Discharge Planning Assessment  [facility]     Patient Name: Axel Desir  MRN: [id number]  Today's Date: 12/1/2022    Admit Date: 11/21/2022    Plan: SS received call from [facility] k[facility] pt has been approved and can be accepted tomorrow. Pt will need covid test prior to being discharge. SS notified Dr. Santos. SS tried to contact pt Surrogate Candace and was not successful. SS to follow.      Discharge Plan     Row Name 12/01/22 1540       Plan    Plan SS received call from [facility] pt has been approved and can be accepted tomorrow. Pt will need covid test prior to being discharge. SS notified Dr. Santos. SS tried to contact pt Surrogate Candace and was not successful. SS to follow.              Continued Care and Services - Admitted Since 11/21/2022     Destination     Service Provider Request Status Selected Services Address Phone Fax Patient Preferred    [facility] Accept[facility] N/A [address] [address] [phone] --    [facility] [place] [facility]  Out of network N/A [address] [place] [address] [phone] --             JAXON Orellana
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
## 2022-12-02 LAB
ACANTHOCYTES BLD QL SMEAR: NORMAL
ALBUMIN SERPL-MCNC: 2.81 G/DL (ref 3.5–5.2)
ALBUMIN/GLOB SERPL: 1.3 G/DL
ALP SERPL-CCNC: 69 U/L (ref 39–117)
ALT SERPL W P-5'-P-CCNC: 17 U/L (ref 1–41)
ANION GAP SERPL CALCULATED.3IONS-SCNC: 13.6 MMOL/L (ref 5–15)
ANISOCYTOSIS BLD QL: NORMAL
AST SERPL-CCNC: 18 U/L (ref 1–40)
BASOPHILS # BLD AUTO: 0.03 10*3/MM3 (ref 0–0.2)
BASOPHILS NFR BLD AUTO: 0.5 % (ref 0–1.5)
BILIRUB SERPL-MCNC: 0.5 MG/DL (ref 0–1.2)
BUN SERPL-MCNC: 69 MG/DL (ref 8–23)
BUN/CREAT SERPL: 13.3 (ref 7–25)
BURR CELLS BLD QL SMEAR: NORMAL
CALCIUM SPEC-SCNC: 8.1 MG/DL (ref 8.6–10.5)
CHLORIDE SERPL-SCNC: 92 MMOL/L (ref 98–107)
CO2 SERPL-SCNC: 27.4 MMOL/L (ref 22–29)
CREAT SERPL-MCNC: 5.17 MG/DL (ref 0.76–1.27)
DEPRECATED RDW RBC AUTO: 79.5 FL (ref 37–54)
EGFRCR SERPLBLD CKD-EPI 2021: 11.7 ML/MIN/1.73
ELLIPTOCYTES BLD QL SMEAR: NORMAL
EOSINOPHIL # BLD AUTO: 0.07 10*3/MM3 (ref 0–0.4)
EOSINOPHIL NFR BLD AUTO: 1.1 % (ref 0.3–6.2)
ERYTHROCYTE [DISTWIDTH] IN BLOOD BY AUTOMATED COUNT: 19.9 % (ref 12.3–15.4)
GLOBULIN UR ELPH-MCNC: 2.2 GM/DL
GLUCOSE BLDC GLUCOMTR-MCNC: 165 MG/DL (ref 70–130)
GLUCOSE BLDC GLUCOMTR-MCNC: 237 MG/DL (ref 70–130)
GLUCOSE BLDC GLUCOMTR-MCNC: 255 MG/DL (ref 70–130)
GLUCOSE BLDC GLUCOMTR-MCNC: 71 MG/DL (ref 70–130)
GLUCOSE BLDC GLUCOMTR-MCNC: 97 MG/DL (ref 70–130)
GLUCOSE SERPL-MCNC: 174 MG/DL (ref 65–99)
HCT VFR BLD AUTO: 30 % (ref 37.5–51)
HGB BLD-MCNC: 9.2 G/DL (ref 13–17.7)
HYPOCHROMIA BLD QL: NORMAL
IMM GRANULOCYTES # BLD AUTO: 0.11 10*3/MM3 (ref 0–0.05)
IMM GRANULOCYTES NFR BLD AUTO: 1.7 % (ref 0–0.5)
LYMPHOCYTES # BLD AUTO: 1.31 10*3/MM3 (ref 0.7–3.1)
LYMPHOCYTES NFR BLD AUTO: 20.4 % (ref 19.6–45.3)
MACROCYTES BLD QL SMEAR: NORMAL
MCH RBC QN AUTO: 33.7 PG (ref 26.6–33)
MCHC RBC AUTO-ENTMCNC: 30.7 G/DL (ref 31.5–35.7)
MCV RBC AUTO: 109.9 FL (ref 79–97)
MONOCYTES # BLD AUTO: 0.79 10*3/MM3 (ref 0.1–0.9)
MONOCYTES NFR BLD AUTO: 12.3 % (ref 5–12)
NEUTROPHILS NFR BLD AUTO: 4.1 10*3/MM3 (ref 1.7–7)
NEUTROPHILS NFR BLD AUTO: 64 % (ref 42.7–76)
NRBC BLD AUTO-RTO: 0 /100 WBC (ref 0–0.2)
PLAT MORPH BLD: NORMAL
PLATELET # BLD AUTO: 120 10*3/MM3 (ref 140–450)
PMV BLD AUTO: 11 FL (ref 6–12)
POTASSIUM SERPL-SCNC: 5.2 MMOL/L (ref 3.5–5.2)
PROT SERPL-MCNC: 5 G/DL (ref 6–8.5)
RBC # BLD AUTO: 2.73 10*6/MM3 (ref 4.14–5.8)
SODIUM SERPL-SCNC: 133 MMOL/L (ref 136–145)
WBC NRBC COR # BLD: 6.41 10*3/MM3 (ref 3.4–10.8)

## 2022-12-02 PROCEDURE — 94799 UNLISTED PULMONARY SVC/PX: CPT

## 2022-12-02 PROCEDURE — 63710000001 INSULIN DETEMIR PER 5 UNITS

## 2022-12-02 PROCEDURE — 82962 GLUCOSE BLOOD TEST: CPT

## 2022-12-02 PROCEDURE — 99239 HOSP IP/OBS DSCHRG MGMT >30: CPT

## 2022-12-02 PROCEDURE — 85025 COMPLETE CBC W/AUTO DIFF WBC: CPT | Performed by: NURSE PRACTITIONER

## 2022-12-02 PROCEDURE — 63710000001 ONDANSETRON ODT 4 MG TABLET DISPERSIBLE: Performed by: STUDENT IN AN ORGANIZED HEALTH CARE EDUCATION/TRAINING PROGRAM

## 2022-12-02 PROCEDURE — 85007 BL SMEAR W/DIFF WBC COUNT: CPT | Performed by: NURSE PRACTITIONER

## 2022-12-02 PROCEDURE — 80053 COMPREHEN METABOLIC PANEL: CPT | Performed by: INTERNAL MEDICINE

## 2022-12-02 PROCEDURE — 94761 N-INVAS EAR/PLS OXIMETRY MLT: CPT

## 2022-12-02 PROCEDURE — 94762 N-INVAS EAR/PLS OXIMTRY CONT: CPT

## 2022-12-02 PROCEDURE — 63710000001 INSULIN ASPART PER 5 UNITS: Performed by: STUDENT IN AN ORGANIZED HEALTH CARE EDUCATION/TRAINING PROGRAM

## 2022-12-02 PROCEDURE — 25010000002 PROCHLORPERAZINE 10 MG/2ML SOLUTION: Performed by: INTERNAL MEDICINE

## 2022-12-02 RX ORDER — METOPROLOL TARTRATE 100 MG/1
100 TABLET ORAL EVERY 12 HOURS SCHEDULED
Qty: 60 TABLET | Refills: 0
Start: 2022-12-02 | End: 2022-12-02 | Stop reason: SDUPTHER

## 2022-12-02 RX ORDER — PROCHLORPERAZINE EDISYLATE 5 MG/ML
5 INJECTION INTRAMUSCULAR; INTRAVENOUS ONCE
Status: COMPLETED | OUTPATIENT
Start: 2022-12-02 | End: 2022-12-02

## 2022-12-02 RX ORDER — METOPROLOL TARTRATE 100 MG/1
100 TABLET ORAL EVERY 12 HOURS SCHEDULED
Qty: 60 TABLET | Refills: 0 | Status: SHIPPED | OUTPATIENT
Start: 2022-12-02 | End: 2023-01-01

## 2022-12-02 RX ORDER — AMOXICILLIN 250 MG
2 CAPSULE ORAL NIGHTLY
Qty: 60 TABLET | Refills: 0 | Status: SHIPPED | OUTPATIENT
Start: 2022-12-02 | End: 2023-01-01

## 2022-12-02 RX ORDER — INSULIN ASPART 100 [IU]/ML
0-9 INJECTION, SOLUTION INTRAVENOUS; SUBCUTANEOUS
Qty: 8.1 ML | Refills: 0 | Status: SHIPPED | OUTPATIENT
Start: 2022-12-02 | End: 2023-01-01

## 2022-12-02 RX ORDER — AMOXICILLIN 250 MG
2 CAPSULE ORAL NIGHTLY
Qty: 60 TABLET | Refills: 0
Start: 2022-12-02 | End: 2022-12-02 | Stop reason: SDUPTHER

## 2022-12-02 RX ORDER — INSULIN ASPART 100 [IU]/ML
0-9 INJECTION, SOLUTION INTRAVENOUS; SUBCUTANEOUS
Qty: 8.1 ML | Refills: 0
Start: 2022-12-02 | End: 2022-12-02 | Stop reason: SDUPTHER

## 2022-12-02 RX ORDER — ATORVASTATIN CALCIUM 40 MG/1
40 TABLET, FILM COATED ORAL NIGHTLY
Qty: 30 TABLET | Refills: 0 | Status: SHIPPED | OUTPATIENT
Start: 2022-12-02 | End: 2022-12-02 | Stop reason: SDUPTHER

## 2022-12-02 RX ORDER — ATORVASTATIN CALCIUM 40 MG/1
40 TABLET, FILM COATED ORAL NIGHTLY
Qty: 30 TABLET | Refills: 0 | Status: SHIPPED | OUTPATIENT
Start: 2022-12-02 | End: 2023-01-01

## 2022-12-02 RX ORDER — POLYETHYLENE GLYCOL 3350 17 G/17G
17 POWDER, FOR SOLUTION ORAL DAILY PRN
Qty: 30 PACKET | Refills: 0
Start: 2022-12-02 | End: 2022-12-02 | Stop reason: SDUPTHER

## 2022-12-02 RX ORDER — POLYETHYLENE GLYCOL 3350 17 G/17G
17 POWDER, FOR SOLUTION ORAL DAILY PRN
Qty: 30 PACKET | Refills: 0 | Status: SHIPPED | OUTPATIENT
Start: 2022-12-02 | End: 2023-01-01

## 2022-12-02 RX ADMIN — CALCIUM ACETATE 2668 MG: 667 CAPSULE ORAL at 09:01

## 2022-12-02 RX ADMIN — INSULIN ASPART 2 UNITS: 100 INJECTION, SOLUTION INTRAVENOUS; SUBCUTANEOUS at 09:00

## 2022-12-02 RX ADMIN — CALCIUM ACETATE 2668 MG: 667 CAPSULE ORAL at 11:39

## 2022-12-02 RX ADMIN — PROCHLORPERAZINE EDISYLATE 5 MG: 5 INJECTION INTRAMUSCULAR; INTRAVENOUS at 06:55

## 2022-12-02 RX ADMIN — Medication 5 MG: at 21:14

## 2022-12-02 RX ADMIN — Medication 100 MG: at 11:39

## 2022-12-02 RX ADMIN — ONDANSETRON 4 MG: 4 TABLET, ORALLY DISINTEGRATING ORAL at 03:28

## 2022-12-02 RX ADMIN — ATORVASTATIN CALCIUM 40 MG: 40 TABLET, FILM COATED ORAL at 21:14

## 2022-12-02 RX ADMIN — CETIRIZINE HYDROCHLORIDE 5 MG: 10 TABLET, FILM COATED ORAL at 09:01

## 2022-12-02 RX ADMIN — Medication 10 ML: at 09:01

## 2022-12-02 RX ADMIN — LINAGLIPTIN 5 MG: 5 TABLET, FILM COATED ORAL at 09:01

## 2022-12-02 RX ADMIN — CALCIUM ACETATE 2668 MG: 667 CAPSULE ORAL at 18:01

## 2022-12-02 RX ADMIN — DOCUSATE SODIUM 50 MG AND SENNOSIDES 8.6 MG 2 TABLET: 8.6; 5 TABLET, FILM COATED ORAL at 21:14

## 2022-12-02 RX ADMIN — BUDESONIDE AND FORMOTEROL FUMARATE DIHYDRATE 2 PUFF: 160; 4.5 AEROSOL RESPIRATORY (INHALATION) at 06:10

## 2022-12-02 RX ADMIN — INSULIN DETEMIR 5 UNITS: 100 INJECTION, SOLUTION SUBCUTANEOUS at 21:14

## 2022-12-02 RX ADMIN — METOPROLOL TARTRATE 100 MG: 100 TABLET, FILM COATED ORAL at 09:01

## 2022-12-02 RX ADMIN — METOPROLOL TARTRATE 100 MG: 100 TABLET, FILM COATED ORAL at 21:14

## 2022-12-02 RX ADMIN — INSULIN ASPART 4 UNITS: 100 INJECTION, SOLUTION INTRAVENOUS; SUBCUTANEOUS at 12:04

## 2022-12-02 RX ADMIN — APIXABAN 5 MG: 5 TABLET, FILM COATED ORAL at 09:01

## 2022-12-02 RX ADMIN — LEVOTHYROXINE SODIUM 88 MCG: 88 TABLET ORAL at 06:41

## 2022-12-02 RX ADMIN — RENO CAPS 1 MG: 100; 1.5; 1.7; 20; 10; 1; 150; 5; 6 CAPSULE ORAL at 11:39

## 2022-12-02 RX ADMIN — APIXABAN 5 MG: 5 TABLET, FILM COATED ORAL at 21:14

## 2022-12-02 NOTE — DISCHARGE SUMMARY
[facility] Medicine Services  DISCHARGE SUMMARY    Date of Admission: 11/21/2022    Date of Discharge:  12/2/2022    PCP: Isabelle Martinez APRN    Discharging Provider: IVETTE Bowens  Attending Physician on day of DC: Jessica James, DO    Admission Diagnosis:   Please see admission H&P    Discharge Diagnosis:   Chronic illness related debility  Fragility  Multiple recent falls  Failure to thrive  ESRD on HD (MWF)  Hyperkalemia, resolved  Chronic, mild hyponatremia, stable  Anemia of chronic disease, stable  Insulin-dependent type II DM  Paroxysmal atrial fib with RVR, improved  Chronic HFrEF  COPD without acute exacerbation  CAD s/p CABG  Hyperlipidemia  Chronically elevated troponin secondary to ESRD  Essential hypertension  Hypothyroidism  Depression  Anxiety  Tobacco abuse  History of hepatitis C  Chronic lower back pain      Procedures Performed:  Chest xray x2  CT head without contrast  Transthoracic echocardiogram     Consults:   Consults     Date and Time Order Name Status Description    11/22/2022  5:21 PM Inpatient Nephrology Consult      11/4/2022 12:34 AM Inpatient Nephrology Consult Completed     11/1/2022  2:42 AM Inpatient Nephrology Consult Completed           [facility]     History of Present Illness:  Axel Desir is our 62 y.o. male patient admitted on 11/21/2022 with failure to thrive and debility. He has a pMH significant for CAD s/p CABG, persistent A. Fib, chronic anticoagulation with Eliquis, T2DM, ESRD on HD, history of hepatitis C, HTN, HLD, hypothyroidism, chronic anemia, arthritis, chronic back pain, anxiety and depression. For further and complete admitting details, please see admission H&P.       Hospital Course     Hospital Course  Axel Desir was admitted as outlined in above HPI.     He was admitted to the Telemetry floor for further monitoring, evaluation, and treatment. PT/OT was consulted and patient was able to participate. He is able to transfer  and sit in wheelchair for transportation to dialysis. Nephrology was also consulted for management of HD while inpatient. Patient underwent dialysis as scheduled (Monday, Wednesday, Friday) and tolerated well. Patient has intermittently experienced mild hyperkalemia; improved with dialysis sessions and occasional dose of Lokelma. Potassium currently 5.2 and he is to have dialysis today prior to discharge. He has AV fistula in the LUE. Limb precautions maintained. Volume status has also improved with dialysis; appearing euvolemic today. Has not required diuretics or supplemental O2. Stable on room air. Patient requested nursing home placement on admission as he is unable to safely live alone anymore, as he previously had been. CM has assisted with discharge plans and patient has been accepted to [facility] as of 12/1. Patient with known hx of Afib; has been tachycardic throughout hospital stay. Several dose changes have been made to metoprolol tartrate for rate control. Now on 100 mg BID and tolerating. Continuous cardiac monitoring has remained in place throughout hospital course. Appearing atrial fib 90s with occasional PVCs on the monitor this morning. BP has remained stable with systolic ranging 100-110s overnight/today. Eliquis has been continued for stroke prevention. Hgb remaining stable. Patient also with insulin-dependent type II DM. BG levels remaining controlled on current regimen including: Tradjenta, Levemir 5 units nightly, and moderate dose SSI TID. Recommend continued Accu-Checks TID AC. Also with known hx of COPD; has been compensated. Have continued Symbicort and Zyrtec; held albuterol due to tachycardia. Patient reported continued tobacco abuse on admission; however, denied need for NRT and states that he plans to quit. Plan discussed with patient for discharge to skilled nursing facility today and he is agreeable. Recommend follow-up CBC and BMP in 1 week. Will have post hospital  discharge evaluation with nursing home provider. He will continue dialysis MWF in the outpatient setting. Patient is hemodynamically stable. Feel that he has reached maximum benefit of inpatient stay and orders placed for discharge to [facility] this morning. Discussed with Jessica James DO.     Telemetry on day of discharge was atrial fib 90s with occasional PVCs.     Oxygen saturation on the day of discharge was 98% on room air.      Pertinent Laboratory and Radiology Results     Pertinent Test Results:          Results from last 7 days   Lab Units 12/02/22 0105 12/01/22 0131 11/30/22  0114 11/29/22  0211 11/28/22  0213 11/27/22  0357 11/26/22  0302   WBC 10*3/mm3 6.41 6.35 6.80 6.81 8.73 6.61 7.56   HEMOGLOBIN g/dL 9.2* 8.5* 9.1* 8.7* 9.1* 9.1* 9.0*   HEMATOCRIT % 30.0* 27.3* 28.4* 27.4* 28.3* 29.7* 28.4*   PLATELETS 10*3/mm3 120* 120* 113* 107* 115* 105* 98*   MCV fL 109.9* 106.6* 106.0* 106.2* 105.6* 109.6* 106.4*   SODIUM mmol/L 133* 135* 132* 132* 133* 129* 129*   POTASSIUM mmol/L 5.2 5.2 5.4* 5.0 5.2 5.0 5.1   CHLORIDE mmol/L 92* 93* 91* 91* 94* 91* 91*   CO2 mmol/L 27.4 30.0* 28.1 30.8* 27.9 28.3 26.7   BUN mg/dL 69* 48* 61* 48* 59* 44* 61*   CREATININE mg/dL 5.17* 4.23* 5.76* 4.52* 4.78* 3.58* 4.75*   GLUCOSE mg/dL 174* 188* 239* 239* 56* 211* 85   CALCIUM mg/dL 8.1* 8.3* 8.3* 7.9* 8.5* 8.5* 8.7          Results from last 7 days   Lab Units 12/02/22 0105 12/01/22 0131 11/30/22  0114 11/29/22  0211 11/28/22  0213 11/27/22  0357 11/26/22  0302   WBC 10*3/mm3 6.41 6.35 6.80 6.81 8.73 6.61 7.56     Results from last 7 days   Lab Units 12/02/22  0105 12/01/22  0131 11/30/22  0114 11/29/22  0211 11/28/22 0213 11/27/22  0357 11/26/22  0302   BILIRUBIN mg/dL 0.5 0.5 0.5 0.5 0.5 0.5 0.6   ALK PHOS U/L 69 76 76 83 70 76 69   ALT (SGPT) U/L 17 21 20 22 19 21 23   AST (SGOT) U/L 18 20 20 23 18 19 21           Invalid input(s): TG, LDLCALC, LDLREALC      Brief Urine Lab Results     None                   Results for orders placed during the hospital encounter of 11/21/22    Adult Transthoracic Echo Complete W/ Cont if Necessary Per Protocol    Interpretation Summary  •  Left ventricular systolic function is mildly decreased.  Estimated LVEF is 41 -45%.  •  The right ventricular cavity is mildly dilated with moderately reduced systolic function.  •  Left atrial cavity is moderately dilated.  •  The right atrial cavity is mild to moderately  dilated.  •  The aortic valve leaflets showed moderate calcification with no evidence of stenosis or regurgitation.  •  Moderate mitral valve regurgitation is present.  •  Moderate tricuspid valve regurgitation is present.  •  Mild pulmonary hypertension is present.  •  There is no evidence of pericardial effusion            ----------------------------------------------------------------------------------------------------------------------  CT Head Without Contrast    Result Date: 11/21/2022    1. Atrophy 2. No parenchymal mass, hemorrhage, or midline shift.  This report was finalized on 11/21/2022 5:41 PM by Dr. Amando Urban MD.      US Liver    Result Date: 11/1/2022   1.  No evidence of gallstones or biliary tract obstruction. 2.  No discrete liver mass identified. 3.  Nonvisualization of the right kidney due to patient positioning and large amount of bowel gas.  This report was finalized on 11/1/2022 1:43 PM by José Luis Barker MD.      XR Chest 1 View    Result Date: 11/25/2022  Mild CHF and bibasilar airspace disease.  This report was finalized on 11/25/2022 10:15 AM by Dr. Amando Urban MD.      XR Chest 1 View    Result Date: 11/21/2022  Patchy opacity in the right lung base, decreased in conspicuity compared to 11/3/2022, likely improving/resolving pneumonia. No new consolidation. Signer Name: Franco Garza MD  Signed: 11/21/2022 10:12 PM  Workstation Name: [id number]  [facility]    XR Chest 1 View    Result Date:  11/3/2022  Cardiomegaly, probable CHF, and right perihilar pneumonia  This report was finalized on 11/3/2022 4:44 PM by Dr. Amando Urban MD.      CT Angiogram Chest Pulmonary Embolism    Result Date: 11/10/2022  1.  No PE. 2.  Right lower lobe consolidative pneumonia. 3.  CHF/edema with right greater than left pleural effusions. 4.  Anasarca. 5.  Upper abdominal ascites. 6.  Polycystic kidneys, stable. 7.  Other nonacute findings detailed above.  This report was finalized on 11/10/2022 11:44 AM by Dr. uJstino Huggins MD.          Microbiology Results (last 10 days)     Procedure Component Value - Date/Time    COVID-19 RAPID AG,VERITOR,COR/CAN/PAD/DADA/MAD/CLEO/LAG/MATHIEU/ IN-HOUSE,DRY SWAB, 1-2 HR TAT - Swab, Nasal Cavity [[id number]]  (Normal) Collected: 12/01/22 1755    Lab Status: Final result Specimen: Swab from Nasal Cavity Updated: 12/01/22 1816     COVID19 Presumptive Negative    Narrative:      Fact sheets for providers: https://www.fda.gov/media/780898/download    Fact sheets for patients: https://www.fda.gov/media/271984/download          Labs above have been reviewed on the day of discharge.  Radiology images from prior 30 days were reviewed prior to discharge as incorporated into this document.     Discharge Vitals and Physical Examination       Vital Signs  Temp:  [97.5 °F (36.4 °C)-98.2 °F (36.8 °C)] 97.8 °F (36.6 °C)  Heart Rate:  [] 111  Resp:  [18-24] 20  BP: (100-125)/(67-79) 110/74     PHYSICAL EXAMINATION:   Physical Exam  Vitals and nursing note reviewed.   Constitutional:       General: He is awake. He is not in acute distress.     Appearance: He is ill-appearing (chronically). He is not diaphoretic.      Comments: Room air.   HENT:      Head: Normocephalic and atraumatic.      Mouth/Throat:      Pharynx: Oropharynx is clear.   Eyes:      Extraocular Movements: Extraocular movements intact.      Pupils: Pupils are equal, round, and reactive to light.   Cardiovascular:      Rate and Rhythm:  Regular rhythm. Tachycardia present.      Pulses: Normal pulses.      Heart sounds: Normal heart sounds. No murmur heard.    No friction rub.   Pulmonary:      Effort: Pulmonary effort is normal. No accessory muscle usage, respiratory distress or retractions.      Breath sounds: No wheezing, rhonchi or rales.      Comments: Lung sounds mildly decreased/clear bilaterally. No wheezing, rhonchi or rales.  Abdominal:      General: Bowel sounds are normal. There is no distension.      Palpations: Abdomen is soft.      Tenderness: There is no abdominal tenderness. There is no guarding or rebound.   Musculoskeletal:      Cervical back: Neck supple. No rigidity.      Right lower leg: Edema present.      Left lower leg: Edema present.   Skin:     General: Skin is warm and dry.      Capillary Refill: Capillary refill takes 2 to 3 seconds.      Coloration: Skin is pale.   Neurological:      Mental Status: He is alert and oriented to person, place, and time. Mental status is at baseline.      Motor: Weakness (generalized) present. No tremor.   Psychiatric:         Attention and Perception: Attention normal.         Mood and Affect: Mood normal.         Speech: Speech normal.         Behavior: Behavior normal. Behavior is cooperative.         Thought Content: Thought content normal.         Discharge Disposition, Discharge Medications, and Discharge Appointments     Discharge Disposition: Skilled Nursing Facility    Condition on Discharge: Stable    Discharge Medications:     Discharge Medications      New Medications      Instructions Start Date   Insulin Aspart 100 UNIT/ML injection  Commonly known as: novoLOG   0-9 Units, Subcutaneous, 3 Times Daily Before Meals      insulin detemir 100 UNIT/ML injection  Commonly known as: LEVEMIR   5 Units, Subcutaneous, Nightly      polyethylene glycol 17 g packet  Commonly known as: MIRALAX   17 g, Oral, Daily PRN      sennosides-docusate 8.6-50 MG per tablet  Commonly known as:  PERICOLACE   2 tablets, Oral, Nightly         Changes to Medications      Instructions Start Date   metoprolol tartrate 100 MG tablet  Commonly known as: LOPRESSOR  What changed:   medication strength  how much to take   100 mg, Oral, Every 12 Hours Scheduled         Continue These Medications      Instructions Start Date   apixaban 5 MG tablet tablet  Commonly known as: ELIQUIS   5 mg, Oral, Every 12 Hours Scheduled      atorvastatin 40 MG tablet  Commonly known as: LIPITOR   40 mg, Oral, Nightly      budesonide-formoterol 160-4.5 MCG/ACT inhaler  Commonly known as: SYMBICORT   Inhale 2 puffs by mouth into the lungs 2 (Two) Times a Day.      calcium acetate 667 MG capsule capsule  Commonly known as: PHOS BINDER)   2,668 mg, Oral, 3 Times Daily With Meals      cetirizine 10 MG tablet  Commonly known as: zyrTEC   5 mg, Oral, Daily      Januvia 25 MG tablet  Generic drug: SITagliptin   25 mg, Oral, Daily      levothyroxine 88 MCG tablet  Commonly known as: SYNTHROID, LEVOTHROID   88 mcg, Oral, Every Early Morning      nitroglycerin 0.4 MG SL tablet  Commonly known as: NITROSTAT   0.4 mg, Sublingual, Every 5 Minutes PRN      TOMASA-GAB PO   1 tablet, Oral, Daily      thiamine 100 MG tablet  Commonly known as: VITAMIN B1   100 mg, Oral, Daily         Stop These Medications    Spiriva HandiHaler 18 MCG per inhalation capsule  Generic drug: tiotropium            Discharged medication regimen discussed with attending physician prior to discharge.     Discharge Diet: Regular, Renal, CC    Dietary Orders (From admission, onward)     Start     Ordered    11/22/22 0716  Diet: Renal Diets; Low Potassium; Texture: Regular Texture (IDDSI 7); Fluid Consistency: Thin (IDDSI 0)  Diet Effective Now        References:    Diet Order [place]   Question Answer Comment   Diets: Renal Diets    Renal Diet: Low Potassium    Texture: Regular Texture (IDDSI 7)    Fluid Consistency: Thin (IDDSI 0)        11/22/22 0715                Activity  at Discharge: Up with assist, fall precautions.        Discharge Disposition:    Skilled Nursing Facility (DC - External)        Follow-up Appointments:  Your Scheduled Appointments    Jan 23, 2023 10:45 AM  Office Visit with IVETTE Li  [facility] ([place]) [address] [place] [id number] [phone]   Arrive 15 minutes prior to appointment.  If you are in need of a language or hearing  please call the Department.            Additional Instructions for the Follow-ups that You Need to Schedule     Discharge Follow-up with PCP   As directed       Currently Documented PCP:    Isabelle Martinez APRN    PCP Phone Number:    [phone]     Follow Up Details: Follow up with Nursing Home Provider; Patient being discharged to [facility]. Recommend repeat CBC/BMP in 1 week.         Discharge Follow-up with Specialty: Continued Dialysis MWF   As directed      Specialty: Continued Dialysis MWF            Follow-up Information     Isabelle Martinez APRN .    Specialty: Nurse Practitioner  Why: Follow up with Nursing Home Provider; Patient being discharged to [facility]. Recommend repeat CBC/BMP in 1 week.  Contact information:  [address] [place] [address]  [phone]                         Additional Instructions for the Follow-ups that You Need to Schedule     Discharge Follow-up with PCP   As directed       Currently Documented PCP:    Isabelle Martinez APRN    PCP Phone Number:    [phone]     Follow Up Details: Follow up with Nursing Home Provider; [facility] Recommend repeat CBC/BMP in 1 week.         Discharge Follow-up with Specialty: Continued Dialysis MWF   As directed      Specialty: Continued Dialysis MWF                IVETTE Bowens   [facility] Medicine Team  12/02/22  11:00 EST      Time: Greater than 30 minutes spent on this discharge.  I spent 45 minutes on this discharge activity which included:  Face-to-face  encounter with the patient, discussing plan with attending physician, reviewing the data in the system, coordination of the care with the nursing staff as well as consultations, documentation, and entering orders.

## 2022-12-02 NOTE — DISCHARGE PLACEMENT REQUEST
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 COLONOSCOPY;  Surgeon: Yan Espana MD;  Location:   [facility];  Service: Gastroenterology   • CORONARY ARTERY BYPASS GRAFT N/A 9/17/2018    Procedure: CORONARY ARTERY BYPASSx 2 WITH INTERNAL MAMMARY WITH EVH OF THE RIGHT GREATER SAPHENOUS VEIN;  Surgeon: Oral Calero MD;  Location:  [facility];  Service: Cardiothoracic   • ENDOSCOPY N/A 6/21/2019    Procedure: ESOPHAGOGASTRODUODENOSCOPY;  Surgeon: Yan Espana MD;  Location:  [facility];  Service: Gastroenterology   • GTUBE INSERTION     • INSERTION HEMODIALYSIS CATHETER N/A 4/15/2019    Procedure: HEMODIALYSIS CATHETER INSERTION;  Surgeon: Nelly Lemus MD;  Location:  [facility];  Service: General   • SHOULDER SURGERY Right 1980s   • TONSILLECTOMY           OT ASSESSMENT FLOWSHEET (last 12 hours)     OT Evaluation and Treatment     Row Name 12/01/22 1419                   OT Time and Intention    Subjective Information complains of;weakness;fatigue  -LM        Document Type therapy note (daily note)  -LM        Mode of Treatment occupational therapy  -LM        Patient Effort fair  -LM        Comment Patient seen this date for adl retraining/education.  Currently patient performing self-feeding with setup, min asssit with Grooming.  Patient verbalizes that he is able to perform fxl mobility independently and frequently walks into bathroom.  After discussion with [facility] staff, [facility] reports that patient has not performed fxl mobility independently and frequently requests bedpan.  Will continue to follow due to decreased BADL and fxl mobility.  -LM           General Information    Existing Precautions/Restrictions fall  -LM           Cognition    Affect/Mental Status (Cognition) WFL  -LM        Orientation Status (Cognition) oriented x 3  -LM           Positioning and Restraints    Post Treatment Position bed  -LM        In Bed call light within reach;encouraged to call for assist  -LM              User Key  (r) = Recorded By, (t) = Taken By, (c) = Cosigned By    Initials Name Effective Dates    Vani Cox  LISANDRA REAL 06/16/21 -                        OT Recommendation and Plan              Time Calculation:     Therapy Charges for Today     Code Description Service Date Service Provider Modifiers Qty    [id number] HC OT SELF CARE/MGMT/TRAIN EA 15 MIN 12/1/2022 Vani Jose OT GO 1            Vani Jose OT  12/1/2022    Electronically signed by Vani Jose OT at 12/01/22 1422       Discharge Order (From admission, onward)     Start     Ordered    12/02/22 1051  Discharge patient  Once        Expected Discharge Date: 12/02/22    Expected Discharge Time: Morning    Discharge Disposition: Skilled Nursing Facility (DC - External)    Physician of Record for Attribution - Please select from Treatment Team: NEETU ROJAS [[id number]]    Review needed by CMO to determine Physician of Record: No       Question Answer Comment   Physician of Record for Attribution - Please select from Treatment Team NEETU ROJAS    Review needed by CMO to determine Physician of Record No        12/02/22 1051

## 2022-12-02 NOTE — PLAN OF CARE
Goal Outcome Evaluation:      Pt is awake in bed watching TV. No s/s of acute distress noted. No complaints of chest pain. Pt had complaints on nausea after his turkey box. Gave PRN Zofran. See Mar. Pt felt better and insisted on cereal. Pt was nauseated after the cereal. Made Dr. Cespedes aware. Gave one time dose of compazine. Pt called out multiple times during my shift for various different reasons. Pt did not sleep last night because he said he would sleep during dialysis.

## 2022-12-02 NOTE — NURSING NOTE
Report called to Lesa Davison clinical coordinator at [facility]

## 2022-12-02 NOTE — CASE MANAGEMENT/SOCIAL WORK
Discharge Planning Assessment  [facility]     Patient Name: Axel Desir  MRN: [id number]  Today's Date: 12/2/2022    Admit Date: 11/21/2022    Plan: SS received call from [facility] per Isabelle pt has been approved and can be accepted tomorrow. Pt will need covid test prior to being discharge. SS notified Dr. Santos. SS tried to contact pt Surrogate Candace and was not successful. SS to follow.      Discharge Plan     Row Name 12/02/22 1700       Plan    Final Discharge Disposition Code 03 - skilled nursing facility (SNF)    Final Note Pt is being discharged to [facility] on this date. SS faxed updated clinical and AVS to [phone].  notified RN with report number [phone]. SS contacted pt and pt Surrogate Candace who is agreeable for pt to be discharged to [facility]. SS notified RN that transportation will need to be arranged once pt is back from dialysis. No other needs identified.              Continued Care and Services - Admitted Since 11/21/2022     Destination     Service Provider Request Status Selected Services Address Phone Fax Patient Preferred    [facility] [address] [facility] [address] [phone] --    [facility]  Out of network N/A [address] [facility] [address] [phone] --                SORAIDA OrellanaW

## 2022-12-02 NOTE — PLAN OF CARE
Goal Outcome Evaluation:  Patient is resting in bed watching television. Patient had dialysis today and they removed 4L. Patient has tolerated all interventions. No complaints or concerns at this time. No signs of acute distress noted. Will continue to follow plan of care.

## 2022-12-03 VITALS
OXYGEN SATURATION: 92 % | SYSTOLIC BLOOD PRESSURE: 102 MMHG | RESPIRATION RATE: 18 BRPM | DIASTOLIC BLOOD PRESSURE: 70 MMHG | WEIGHT: 191.6 LBS | HEIGHT: 68 IN | TEMPERATURE: 97.8 F | BODY MASS INDEX: 29.04 KG/M2 | HEART RATE: 107 BPM

## 2022-12-03 LAB
ACANTHOCYTES BLD QL SMEAR: NORMAL
ALBUMIN SERPL-MCNC: 2.79 G/DL (ref 3.5–5.2)
ALBUMIN/GLOB SERPL: 1.3 G/DL
ALP SERPL-CCNC: 69 U/L (ref 39–117)
ALT SERPL W P-5'-P-CCNC: 14 U/L (ref 1–41)
ANION GAP SERPL CALCULATED.3IONS-SCNC: 11.2 MMOL/L (ref 5–15)
ANISOCYTOSIS BLD QL: NORMAL
AST SERPL-CCNC: 20 U/L (ref 1–40)
BASOPHILS # BLD AUTO: 0.04 10*3/MM3 (ref 0–0.2)
BASOPHILS NFR BLD AUTO: 0.7 % (ref 0–1.5)
BILIRUB SERPL-MCNC: 0.5 MG/DL (ref 0–1.2)
BUN SERPL-MCNC: 49 MG/DL (ref 8–23)
BUN/CREAT SERPL: 11.3 (ref 7–25)
BURR CELLS BLD QL SMEAR: NORMAL
CALCIUM SPEC-SCNC: 8.5 MG/DL (ref 8.6–10.5)
CHLORIDE SERPL-SCNC: 96 MMOL/L (ref 98–107)
CO2 SERPL-SCNC: 28.8 MMOL/L (ref 22–29)
CREAT SERPL-MCNC: 4.32 MG/DL (ref 0.76–1.27)
DEPRECATED RDW RBC AUTO: 76.2 FL (ref 37–54)
EGFRCR SERPLBLD CKD-EPI 2021: 14.5 ML/MIN/1.73
EOSINOPHIL # BLD AUTO: 0.07 10*3/MM3 (ref 0–0.4)
EOSINOPHIL NFR BLD AUTO: 1.2 % (ref 0.3–6.2)
ERYTHROCYTE [DISTWIDTH] IN BLOOD BY AUTOMATED COUNT: 19.5 % (ref 12.3–15.4)
GLOBULIN UR ELPH-MCNC: 2.2 GM/DL
GLUCOSE SERPL-MCNC: 183 MG/DL (ref 65–99)
HCT VFR BLD AUTO: 28.1 % (ref 37.5–51)
HGB BLD-MCNC: 8.8 G/DL (ref 13–17.7)
HYPOCHROMIA BLD QL: NORMAL
IMM GRANULOCYTES # BLD AUTO: 0.08 10*3/MM3 (ref 0–0.05)
IMM GRANULOCYTES NFR BLD AUTO: 1.3 % (ref 0–0.5)
LYMPHOCYTES # BLD AUTO: 1.13 10*3/MM3 (ref 0.7–3.1)
LYMPHOCYTES NFR BLD AUTO: 19.1 % (ref 19.6–45.3)
MACROCYTES BLD QL SMEAR: NORMAL
MCH RBC QN AUTO: 33.6 PG (ref 26.6–33)
MCHC RBC AUTO-ENTMCNC: 31.3 G/DL (ref 31.5–35.7)
MCV RBC AUTO: 107.3 FL (ref 79–97)
MONOCYTES # BLD AUTO: 0.73 10*3/MM3 (ref 0.1–0.9)
MONOCYTES NFR BLD AUTO: 12.3 % (ref 5–12)
NEUTROPHILS NFR BLD AUTO: 3.88 10*3/MM3 (ref 1.7–7)
NEUTROPHILS NFR BLD AUTO: 65.4 % (ref 42.7–76)
NRBC BLD AUTO-RTO: 0.5 /100 WBC (ref 0–0.2)
PLAT MORPH BLD: NORMAL
PLATELET # BLD AUTO: 128 10*3/MM3 (ref 140–450)
PMV BLD AUTO: 11.1 FL (ref 6–12)
POTASSIUM SERPL-SCNC: 4.7 MMOL/L (ref 3.5–5.2)
PROT SERPL-MCNC: 5 G/DL (ref 6–8.5)
RBC # BLD AUTO: 2.62 10*6/MM3 (ref 4.14–5.8)
SCHISTOCYTES BLD QL SMEAR: NORMAL
SODIUM SERPL-SCNC: 136 MMOL/L (ref 136–145)
WBC NRBC COR # BLD: 5.93 10*3/MM3 (ref 3.4–10.8)

## 2022-12-03 PROCEDURE — 85007 BL SMEAR W/DIFF WBC COUNT: CPT | Performed by: NURSE PRACTITIONER

## 2022-12-03 PROCEDURE — 85025 COMPLETE CBC W/AUTO DIFF WBC: CPT | Performed by: NURSE PRACTITIONER

## 2022-12-03 PROCEDURE — 80053 COMPREHEN METABOLIC PANEL: CPT | Performed by: INTERNAL MEDICINE

## 2022-12-03 NOTE — PROGRESS NOTES
Nephrology Progress Note    Interval History:     Patient Complaints: Uneventful dialysis today.  No shortness of breath.  Swelling better.        Vital Signs  Temp:  [97.5 °F (36.4 °C)-98.4 °F (36.9 °C)] 98.4 °F (36.9 °C)  Heart Rate:  [] 79  Resp:  [18-24] 20  BP: (109-113)/(67-78) 112/78    Physical Exam:    General:           No distress      HEENT:  Pallor               Neck:  JVD       Lungs:    Occasional expiratory wheeze   Heart:   Regular,  no rub       Abdomen:   Normal bowel sounds, soft non-tender, non-distended, no guarding       Extremities:  Trace edema                        Results Review:    I reviewed the patient's new clinical results.    Lab Results (last 24 hours)     Procedure Component Value Units Date/Time    POC Glucose Once [[id number]]  (Abnormal) Collected: 12/02/22 2107    Specimen: Blood Updated: 12/02/22 2113     Glucose 255 mg/dL      Comment: Meter: [id number] : [id number] Opal Waldrop       POC Glucose Once [[id number]]  (Normal) Collected: 12/02/22 1920    Specimen: Blood Updated: 12/02/22 1926     Glucose 97 mg/dL      Comment: RN Notified Meter: [id number] : [id number] HAMZAH MCKEON       POC Glucose Once [[id number]]  (Normal) Collected: 12/02/22 1757    Specimen: Blood Updated: 12/02/22 1807     Glucose 71 mg/dL      Comment: Meter: [id number] : [id number] GRACIELA ALMAGUER       POC Glucose Once [[id number]]  (Abnormal) Collected: 12/02/22 1037    Specimen: Blood Updated: 12/02/22 1044     Glucose 237 mg/dL      Comment: Meter: [id number] : [id number] SHER GEMINI       POC Glucose Once [[id number]]  (Abnormal) Collected: 12/02/22 0635    Specimen: Blood Updated: 12/02/22 0643     Glucose 165 mg/dL      Comment: Meter: [id number] : [id number] SHER GEMINI       CBC & Differential [[id number]]  (Abnormal) Collected: 12/02/22 0105    Specimen: Blood Updated: 12/02/22 0257    Narrative:       The following orders were created for panel order CBC & Differential.  Procedure                               Abnormality         Status                     ---------                               -----------         ------                     CBC Auto Differential[[id number]]        Abnormal            Final result               Scan Slide[[id number]]                                       Final result                 Please view results for these tests on the individual orders.    Scan Slide [[id number]] Collected: 12/02/22 0105    Specimen: Blood Updated: 12/02/22 0257     Acanthocytes Slight/1+     Anisocytosis Mod/2+     Rome Cells Slight/1+     Elliptocytes Slight/1+     Hypochromia Slight/1+     Macrocytes Mod/2+     Platelet Morphology Normal    Comprehensive Metabolic Panel [[id number]]  (Abnormal) Collected: 12/02/22 0105    Specimen: Blood Updated: 12/02/22 0232     Glucose 174 mg/dL      BUN 69 mg/dL      Creatinine 5.17 mg/dL      Sodium 133 mmol/L      Potassium 5.2 mmol/L      Comment: Slight hemolysis detected by analyzer. Results may be affected.        Chloride 92 mmol/L      CO2 27.4 mmol/L      Calcium 8.1 mg/dL      Total Protein 5.0 g/dL      Albumin 2.81 g/dL      ALT (SGPT) 17 U/L      AST (SGOT) 18 U/L      Alkaline Phosphatase 69 U/L      Total Bilirubin 0.5 mg/dL      Globulin 2.2 gm/dL      A/G Ratio 1.3 g/dL      BUN/Creatinine Ratio 13.3     Anion Gap 13.6 mmol/L      eGFR 11.7 mL/min/1.73      Comment: <15 Indicative of kidney failure       Narrative:      GFR Normal >60  Chronic Kidney Disease <60  Kidney Failure <15      CBC Auto Differential [[id number]]  (Abnormal) Collected: 12/02/22 0105    Specimen: Blood Updated: 12/02/22 0218     WBC 6.41 10*3/mm3      RBC 2.73 10*6/mm3      Hemoglobin 9.2 g/dL      Hematocrit 30.0 %      .9 fL      MCH 33.7 pg      MCHC 30.7 g/dL      RDW 19.9 %      RDW-SD 79.5 fl      MPV 11.0 fL      Platelets 120 10*3/mm3      Neutrophil % 64.0 %       Lymphocyte % 20.4 %      Monocyte % 12.3 %      Eosinophil % 1.1 %      Basophil % 0.5 %      Immature Grans % 1.7 %      Neutrophils, Absolute 4.10 10*3/mm3      Lymphocytes, Absolute 1.31 10*3/mm3      Monocytes, Absolute 0.79 10*3/mm3      Eosinophils, Absolute 0.07 10*3/mm3      Basophils, Absolute 0.03 10*3/mm3      Immature Grans, Absolute 0.11 10*3/mm3      nRBC 0.0 /100 WBC           Imaging Results (Last 24 Hours)     ** No results found for the last 24 hours. **          Assessment and Plan:       1. ESKD on IHDx  2. Debility   3. Hyperkalemia  4. Anemia  5. Anasarca  6. Hyponatremia    We gave him dialysis with UF. We have fluid restricted him. His potassium and sodium better    Bobby Mccurdy MD  12/02/22  23:10 EST

## 2022-12-03 NOTE — PAYOR COMM NOTE
"[facility]  VARUN MÉNDEZ  PHONE  [phone]  [phone]  NPI:  [id number]    PATIENT D/C 12/3/2022    Axel Desir (62 y.o. Male)     Date of Birth   1960    Social Security Number       Address   [address] [place] [address]    Home Phone   [phone]    MRN   [id number]       Nondenominational   Muslim    Marital Status   Single                            Admission Date   11/21/22    Admission Type   Emergency    Admitting Provider   Sanya Soni MD    Attending Provider       Department, Room/Bed   [facility], 3303/2S       Discharge Date   12/3/2022    Discharge Disposition   Skilled Nursing Facility (DC - External)    Discharge Destination                               Attending Provider: (none)   Allergies: No Known Allergies    Isolation: None   Infection: None   Code Status: Prior    Ht: 172.7 cm (68\")   Wt: 86.9 kg (191 lb 9.6 oz)    Admission Cmt: None   Principal Problem: Failure to thrive in adult [R62.7]                 Active Insurance as of 11/21/2022     Primary Coverage     Payor Plan Insurance Group Employer/Plan Group    ANTHEM MEDICARE REPLACEMENT ANTHEM MEDICARE ADVANTAGE [id number]     Payor Plan Address Payor Plan Phone Number Payor Plan Fax Number Effective Dates    [address] [phone]  9/1/2022 - None Entered    [facility] [phone]       Subscriber Name Subscriber Birth Date Member ID       AXEL DESIR 1960 [id number]           Secondary Coverage     Payor Plan Insurance Group Employer/Plan Group    [place]      Payor Plan Address Payor Plan Phone Number Payor Plan Fax Number Effective Dates    [address] [phone]  11/1/2022 - None Entered    [facility] [id number]       Subscriber Name Subscriber Birth Date Member ID       AXEL DESIR 1960 [id number]                 Emergency Contacts      (Rel.) Home Phone Work Phone Mobile Phone    ANDRE JONES " (surrogate) [phone] -- [phone]

## 2022-12-03 NOTE — NURSING NOTE
Spoke to Shama at 0045 at [facility] and Pt gave report. Took Pt's vitals before Pt was DC. See flowsheet. Pt left at 0132. No s/s of acute distress. No complaints of chest pain or nausea.

## 2022-12-03 NOTE — NURSING NOTE
1931: Called [facility] for transports, no ambulances available for the night    2032: Called [facility] back, was told they would call back.

## 2022-12-06 ENCOUNTER — APPOINTMENT (OUTPATIENT)
Dept: GENERAL RADIOLOGY | Facility: HOSPITAL | Age: 62
End: 2022-12-06

## 2022-12-06 ENCOUNTER — HOSPITAL ENCOUNTER (EMERGENCY)
Facility: HOSPITAL | Age: 62
Discharge: NURSING, REHAB OR LONG-TERM CARE | End: 2022-12-07
Attending: STUDENT IN AN ORGANIZED HEALTH CARE EDUCATION/TRAINING PROGRAM | Admitting: EMERGENCY MEDICINE

## 2022-12-06 ENCOUNTER — APPOINTMENT (OUTPATIENT)
Dept: CT IMAGING | Facility: HOSPITAL | Age: 62
End: 2022-12-06

## 2022-12-06 DIAGNOSIS — J11.1 INFLUENZA: ICD-10-CM

## 2022-12-06 DIAGNOSIS — Z99.2 ESRD (END STAGE RENAL DISEASE) ON DIALYSIS: ICD-10-CM

## 2022-12-06 DIAGNOSIS — N18.6 ESRD (END STAGE RENAL DISEASE) ON DIALYSIS: ICD-10-CM

## 2022-12-06 DIAGNOSIS — E87.5 HYPERKALEMIA: Primary | ICD-10-CM

## 2022-12-06 LAB
ACANTHOCYTES BLD QL SMEAR: NORMAL
ALBUMIN SERPL-MCNC: 3.12 G/DL (ref 3.5–5.2)
ALBUMIN/GLOB SERPL: 1.3 G/DL
ALP SERPL-CCNC: 81 U/L (ref 39–117)
ALT SERPL W P-5'-P-CCNC: 38 U/L (ref 1–41)
ANION GAP SERPL CALCULATED.3IONS-SCNC: 21.2 MMOL/L (ref 5–15)
ANISOCYTOSIS BLD QL: NORMAL
APTT PPP: 34.1 SECONDS (ref 26.5–34.5)
AST SERPL-CCNC: 53 U/L (ref 1–40)
BASOPHILS # BLD AUTO: 0.04 10*3/MM3 (ref 0–0.2)
BASOPHILS NFR BLD AUTO: 0.6 % (ref 0–1.5)
BILIRUB SERPL-MCNC: 1.2 MG/DL (ref 0–1.2)
BUN SERPL-MCNC: 100 MG/DL (ref 8–23)
BUN/CREAT SERPL: 13.3 (ref 7–25)
BURR CELLS BLD QL SMEAR: NORMAL
CALCIUM SPEC-SCNC: 9.4 MG/DL (ref 8.6–10.5)
CHLORIDE SERPL-SCNC: 89 MMOL/L (ref 98–107)
CO2 SERPL-SCNC: 20.8 MMOL/L (ref 22–29)
CREAT SERPL-MCNC: 7.53 MG/DL (ref 0.76–1.27)
CRP SERPL-MCNC: 4.26 MG/DL (ref 0–0.5)
DEPRECATED RDW RBC AUTO: 74.8 FL (ref 37–54)
EGFRCR SERPLBLD CKD-EPI 2021: 7.5 ML/MIN/1.73
EOSINOPHIL # BLD AUTO: 0 10*3/MM3 (ref 0–0.4)
EOSINOPHIL NFR BLD AUTO: 0 % (ref 0.3–6.2)
ERYTHROCYTE [DISTWIDTH] IN BLOOD BY AUTOMATED COUNT: 20.2 % (ref 12.3–15.4)
FLUAV RNA RESP QL NAA+PROBE: DETECTED
FLUBV RNA RESP QL NAA+PROBE: DETECTED
GLOBULIN UR ELPH-MCNC: 2.5 GM/DL
GLUCOSE BLDC GLUCOMTR-MCNC: 78 MG/DL (ref 70–130)
GLUCOSE SERPL-MCNC: 79 MG/DL (ref 65–99)
HCT VFR BLD AUTO: 32.2 % (ref 37.5–51)
HGB BLD-MCNC: 10.3 G/DL (ref 13–17.7)
HOLD SPECIMEN: NORMAL
HOLD SPECIMEN: NORMAL
HYPOCHROMIA BLD QL: NORMAL
IMM GRANULOCYTES # BLD AUTO: 0.18 10*3/MM3 (ref 0–0.05)
IMM GRANULOCYTES NFR BLD AUTO: 2.5 % (ref 0–0.5)
INR PPP: 2.28 (ref 0.9–1.1)
LIPASE SERPL-CCNC: 36 U/L (ref 13–60)
LYMPHOCYTES # BLD AUTO: 1.19 10*3/MM3 (ref 0.7–3.1)
LYMPHOCYTES NFR BLD AUTO: 16.7 % (ref 19.6–45.3)
MAGNESIUM SERPL-MCNC: 2.3 MG/DL (ref 1.6–2.4)
MCH RBC QN AUTO: 33.9 PG (ref 26.6–33)
MCHC RBC AUTO-ENTMCNC: 32 G/DL (ref 31.5–35.7)
MCV RBC AUTO: 105.9 FL (ref 79–97)
MONOCYTES # BLD AUTO: 0.91 10*3/MM3 (ref 0.1–0.9)
MONOCYTES NFR BLD AUTO: 12.8 % (ref 5–12)
NEUTROPHILS NFR BLD AUTO: 4.8 10*3/MM3 (ref 1.7–7)
NEUTROPHILS NFR BLD AUTO: 67.4 % (ref 42.7–76)
NRBC BLD AUTO-RTO: 4.8 /100 WBC (ref 0–0.2)
PLAT MORPH BLD: NORMAL
PLATELET # BLD AUTO: 196 10*3/MM3 (ref 140–450)
PMV BLD AUTO: 11.2 FL (ref 6–12)
POTASSIUM SERPL-SCNC: 7.4 MMOL/L (ref 3.5–5.2)
PROT SERPL-MCNC: 5.6 G/DL (ref 6–8.5)
PROTHROMBIN TIME: 25.8 SECONDS (ref 12.1–14.7)
RBC # BLD AUTO: 3.04 10*6/MM3 (ref 4.14–5.8)
SARS-COV-2 RNA RESP QL NAA+PROBE: NOT DETECTED
SCHISTOCYTES BLD QL SMEAR: NORMAL
SODIUM SERPL-SCNC: 131 MMOL/L (ref 136–145)
TROPONIN T SERPL-MCNC: 0.1 NG/ML (ref 0–0.03)
TROPONIN T SERPL-MCNC: 0.1 NG/ML (ref 0–0.03)
WBC NRBC COR # BLD: 7.12 10*3/MM3 (ref 3.4–10.8)
WHOLE BLOOD HOLD COAG: NORMAL
WHOLE BLOOD HOLD SPECIMEN: NORMAL

## 2022-12-06 PROCEDURE — 85730 THROMBOPLASTIN TIME PARTIAL: CPT | Performed by: PHYSICIAN ASSISTANT

## 2022-12-06 PROCEDURE — 36415 COLL VENOUS BLD VENIPUNCTURE: CPT

## 2022-12-06 PROCEDURE — 86140 C-REACTIVE PROTEIN: CPT | Performed by: PHYSICIAN ASSISTANT

## 2022-12-06 PROCEDURE — 83690 ASSAY OF LIPASE: CPT | Performed by: PHYSICIAN ASSISTANT

## 2022-12-06 PROCEDURE — 71250 CT THORAX DX C-: CPT

## 2022-12-06 PROCEDURE — 74176 CT ABD & PELVIS W/O CONTRAST: CPT

## 2022-12-06 PROCEDURE — 83735 ASSAY OF MAGNESIUM: CPT | Performed by: PHYSICIAN ASSISTANT

## 2022-12-06 PROCEDURE — 85610 PROTHROMBIN TIME: CPT | Performed by: PHYSICIAN ASSISTANT

## 2022-12-06 PROCEDURE — 84484 ASSAY OF TROPONIN QUANT: CPT | Performed by: PHYSICIAN ASSISTANT

## 2022-12-06 PROCEDURE — 94640 AIRWAY INHALATION TREATMENT: CPT

## 2022-12-06 PROCEDURE — 94799 UNLISTED PULMONARY SVC/PX: CPT

## 2022-12-06 PROCEDURE — 93010 ELECTROCARDIOGRAM REPORT: CPT | Performed by: INTERNAL MEDICINE

## 2022-12-06 PROCEDURE — 99284 EMERGENCY DEPT VISIT MOD MDM: CPT

## 2022-12-06 PROCEDURE — C9803 HOPD COVID-19 SPEC COLLECT: HCPCS | Performed by: PHYSICIAN ASSISTANT

## 2022-12-06 PROCEDURE — 80053 COMPREHEN METABOLIC PANEL: CPT | Performed by: PHYSICIAN ASSISTANT

## 2022-12-06 PROCEDURE — 96374 THER/PROPH/DIAG INJ IV PUSH: CPT

## 2022-12-06 PROCEDURE — 85060 BLOOD SMEAR INTERPRETATION: CPT | Performed by: PHYSICIAN ASSISTANT

## 2022-12-06 PROCEDURE — 63710000001 INSULIN REGULAR HUMAN PER 5 UNITS: Performed by: STUDENT IN AN ORGANIZED HEALTH CARE EDUCATION/TRAINING PROGRAM

## 2022-12-06 PROCEDURE — 71045 X-RAY EXAM CHEST 1 VIEW: CPT

## 2022-12-06 PROCEDURE — 87636 SARSCOV2 & INF A&B AMP PRB: CPT | Performed by: PHYSICIAN ASSISTANT

## 2022-12-06 PROCEDURE — 82962 GLUCOSE BLOOD TEST: CPT

## 2022-12-06 PROCEDURE — 85007 BL SMEAR W/DIFF WBC COUNT: CPT | Performed by: PHYSICIAN ASSISTANT

## 2022-12-06 PROCEDURE — 99285 EMERGENCY DEPT VISIT HI MDM: CPT

## 2022-12-06 PROCEDURE — 93005 ELECTROCARDIOGRAM TRACING: CPT | Performed by: PHYSICIAN ASSISTANT

## 2022-12-06 PROCEDURE — 85025 COMPLETE CBC W/AUTO DIFF WBC: CPT | Performed by: PHYSICIAN ASSISTANT

## 2022-12-06 RX ORDER — SODIUM POLYSTYRENE SULFONATE 4.1 MEQ/G
30 POWDER, FOR SUSPENSION ORAL; RECTAL ONCE
Status: COMPLETED | OUTPATIENT
Start: 2022-12-06 | End: 2022-12-06

## 2022-12-06 RX ORDER — LACTULOSE 10 G/15ML
30 SOLUTION ORAL ONCE
Status: COMPLETED | OUTPATIENT
Start: 2022-12-06 | End: 2022-12-06

## 2022-12-06 RX ORDER — IPRATROPIUM BROMIDE AND ALBUTEROL SULFATE 2.5; .5 MG/3ML; MG/3ML
3 SOLUTION RESPIRATORY (INHALATION) ONCE
Status: COMPLETED | OUTPATIENT
Start: 2022-12-06 | End: 2022-12-06

## 2022-12-06 RX ORDER — SODIUM CHLORIDE 0.9 % (FLUSH) 0.9 %
10 SYRINGE (ML) INJECTION AS NEEDED
Status: DISCONTINUED | OUTPATIENT
Start: 2022-12-06 | End: 2022-12-07 | Stop reason: HOSPADM

## 2022-12-06 RX ORDER — LEVOTHYROXINE SODIUM 88 UG/1
88 TABLET ORAL DAILY
COMMUNITY

## 2022-12-06 RX ORDER — ALBUTEROL SULFATE 2.5 MG/3ML
2.5 SOLUTION RESPIRATORY (INHALATION) ONCE
Status: COMPLETED | OUTPATIENT
Start: 2022-12-06 | End: 2022-12-06

## 2022-12-06 RX ORDER — DEXTROSE MONOHYDRATE 25 G/50ML
25 INJECTION, SOLUTION INTRAVENOUS ONCE
Status: COMPLETED | OUTPATIENT
Start: 2022-12-06 | End: 2022-12-06

## 2022-12-06 RX ADMIN — IPRATROPIUM BROMIDE AND ALBUTEROL SULFATE 3 ML: .5; 2.5 SOLUTION RESPIRATORY (INHALATION) at 19:19

## 2022-12-06 RX ADMIN — SODIUM POLYSTYRENE SULFONATE 30 G: 1 POWDER ORAL; RECTAL at 22:55

## 2022-12-06 RX ADMIN — HUMAN INSULIN 10 UNITS: 100 INJECTION, SOLUTION SUBCUTANEOUS at 22:21

## 2022-12-06 RX ADMIN — DEXTROSE MONOHYDRATE 25 G: 25 INJECTION, SOLUTION INTRAVENOUS at 22:21

## 2022-12-06 RX ADMIN — LACTULOSE 30 G: 20 SOLUTION ORAL at 22:56

## 2022-12-06 RX ADMIN — ALBUTEROL SULFATE 2.5 MG: 2.5 SOLUTION RESPIRATORY (INHALATION) at 22:08

## 2022-12-06 RX ADMIN — LACTULOSE 30 G: 20 SOLUTION ORAL at 22:55

## 2022-12-07 VITALS
HEIGHT: 68 IN | WEIGHT: 195 LBS | BODY MASS INDEX: 29.55 KG/M2 | DIASTOLIC BLOOD PRESSURE: 49 MMHG | RESPIRATION RATE: 20 BRPM | OXYGEN SATURATION: 100 % | SYSTOLIC BLOOD PRESSURE: 136 MMHG | HEART RATE: 95 BPM | TEMPERATURE: 98.9 F

## 2022-12-07 LAB
ANION GAP SERPL CALCULATED.3IONS-SCNC: 18.1 MMOL/L (ref 5–15)
ANION GAP SERPL CALCULATED.3IONS-SCNC: 25.4 MMOL/L (ref 5–15)
BUN SERPL-MCNC: 100 MG/DL (ref 8–23)
BUN SERPL-MCNC: 103 MG/DL (ref 8–23)
BUN/CREAT SERPL: 13.1 (ref 7–25)
BUN/CREAT SERPL: 14.1 (ref 7–25)
CALCIUM SPEC-SCNC: 8.3 MG/DL (ref 8.6–10.5)
CALCIUM SPEC-SCNC: 9.1 MG/DL (ref 8.6–10.5)
CHLORIDE SERPL-SCNC: 86 MMOL/L (ref 98–107)
CHLORIDE SERPL-SCNC: 91 MMOL/L (ref 98–107)
CO2 SERPL-SCNC: 16.6 MMOL/L (ref 22–29)
CO2 SERPL-SCNC: 17.9 MMOL/L (ref 22–29)
CREAT SERPL-MCNC: 7.32 MG/DL (ref 0.76–1.27)
CREAT SERPL-MCNC: 7.62 MG/DL (ref 0.76–1.27)
EGFRCR SERPLBLD CKD-EPI 2021: 7.3 ML/MIN/1.73
EGFRCR SERPLBLD CKD-EPI 2021: 7.7 ML/MIN/1.73
GLUCOSE BLDC GLUCOMTR-MCNC: 124 MG/DL (ref 70–130)
GLUCOSE BLDC GLUCOMTR-MCNC: 33 MG/DL (ref 70–130)
GLUCOSE BLDC GLUCOMTR-MCNC: 41 MG/DL (ref 70–130)
GLUCOSE BLDC GLUCOMTR-MCNC: 74 MG/DL (ref 70–130)
GLUCOSE BLDC GLUCOMTR-MCNC: 95 MG/DL (ref 70–130)
GLUCOSE BLDC GLUCOMTR-MCNC: 95 MG/DL (ref 70–130)
GLUCOSE BLDC GLUCOMTR-MCNC: 99 MG/DL (ref 70–130)
GLUCOSE SERPL-MCNC: 145 MG/DL (ref 65–99)
GLUCOSE SERPL-MCNC: 155 MG/DL (ref 65–99)
POTASSIUM SERPL-SCNC: 5.4 MMOL/L (ref 3.5–5.2)
POTASSIUM SERPL-SCNC: 6.8 MMOL/L (ref 3.5–5.2)
SODIUM SERPL-SCNC: 127 MMOL/L (ref 136–145)
SODIUM SERPL-SCNC: 128 MMOL/L (ref 136–145)

## 2022-12-07 PROCEDURE — 63710000001 INSULIN REGULAR HUMAN PER 5 UNITS: Performed by: PHYSICIAN ASSISTANT

## 2022-12-07 PROCEDURE — 82962 GLUCOSE BLOOD TEST: CPT

## 2022-12-07 PROCEDURE — 96376 TX/PRO/DX INJ SAME DRUG ADON: CPT

## 2022-12-07 PROCEDURE — 80048 BASIC METABOLIC PNL TOTAL CA: CPT | Performed by: PHYSICIAN ASSISTANT

## 2022-12-07 PROCEDURE — 96361 HYDRATE IV INFUSION ADD-ON: CPT

## 2022-12-07 RX ORDER — LEVOTHYROXINE SODIUM 88 UG/1
88 TABLET ORAL DAILY
Status: CANCELLED | OUTPATIENT
Start: 2022-12-07

## 2022-12-07 RX ORDER — NICOTINE POLACRILEX 4 MG
LOZENGE BUCCAL
Status: COMPLETED
Start: 2022-12-07 | End: 2022-12-07

## 2022-12-07 RX ORDER — POLYETHYLENE GLYCOL 3350 17 G/17G
17 POWDER, FOR SOLUTION ORAL DAILY PRN
Status: CANCELLED | OUTPATIENT
Start: 2022-12-07 | End: 2023-01-02

## 2022-12-07 RX ORDER — ASCORBIC ACID, THIAMINE MONONITRATE,RIBOFLAVIN, NIACINAMIDE, PYRIDOXINE HYDROCHLORIDE, FOLIC ACID, CYANOCOBALAMIN, BIOTIN, CALCIUM PANTOTHENATE, 100; 1.5; 1.7; 20; 10; 1; 6000; 150000; 5 MG/1; MG/1; MG/1; MG/1; MG/1; MG/1; UG/1; UG/1; MG/1
1 CAPSULE, LIQUID FILLED ORAL DAILY
Status: CANCELLED | OUTPATIENT
Start: 2022-12-07

## 2022-12-07 RX ORDER — DEXTROSE MONOHYDRATE 25 G/50ML
INJECTION, SOLUTION INTRAVENOUS
Status: COMPLETED
Start: 2022-12-07 | End: 2022-12-07

## 2022-12-07 RX ORDER — AMOXICILLIN 250 MG
2 CAPSULE ORAL NIGHTLY
Status: CANCELLED | OUTPATIENT
Start: 2022-12-07 | End: 2023-01-01

## 2022-12-07 RX ORDER — NICOTINE POLACRILEX 4 MG
15 LOZENGE BUCCAL ONCE
Status: COMPLETED | OUTPATIENT
Start: 2022-12-07 | End: 2022-12-07

## 2022-12-07 RX ORDER — INSULIN ASPART 100 [IU]/ML
0-9 INJECTION, SOLUTION INTRAVENOUS; SUBCUTANEOUS
Status: CANCELLED | OUTPATIENT
Start: 2022-12-07

## 2022-12-07 RX ORDER — DEXTROSE MONOHYDRATE 25 G/50ML
25 INJECTION, SOLUTION INTRAVENOUS ONCE
Status: COMPLETED | OUTPATIENT
Start: 2022-12-07 | End: 2022-12-07

## 2022-12-07 RX ORDER — METOPROLOL TARTRATE 50 MG/1
100 TABLET, FILM COATED ORAL EVERY 12 HOURS SCHEDULED
Status: CANCELLED | OUTPATIENT
Start: 2022-12-07

## 2022-12-07 RX ORDER — NITROGLYCERIN 0.4 MG/1
0.4 TABLET SUBLINGUAL
Status: CANCELLED | OUTPATIENT
Start: 2022-12-07

## 2022-12-07 RX ORDER — POLYETHYLENE GLYCOL 3350 17 G/17G
17 POWDER, FOR SOLUTION ORAL ONCE
Status: COMPLETED | OUTPATIENT
Start: 2022-12-07 | End: 2022-12-07

## 2022-12-07 RX ORDER — METHION/INOS/CHOL BT/B COM/LIV 110MG-86MG
100 CAPSULE ORAL DAILY
Status: CANCELLED | OUTPATIENT
Start: 2022-12-07

## 2022-12-07 RX ORDER — CALCIUM ACETATE 667 MG/1
2668 CAPSULE ORAL
Status: CANCELLED | OUTPATIENT
Start: 2022-12-07

## 2022-12-07 RX ORDER — DEXTROSE MONOHYDRATE 25 G/50ML
50 INJECTION, SOLUTION INTRAVENOUS ONCE
Status: COMPLETED | OUTPATIENT
Start: 2022-12-07 | End: 2022-12-07

## 2022-12-07 RX ORDER — DEXTROSE AND SODIUM CHLORIDE 5; .9 G/100ML; G/100ML
125 INJECTION, SOLUTION INTRAVENOUS CONTINUOUS
Status: DISCONTINUED | OUTPATIENT
Start: 2022-12-07 | End: 2022-12-07 | Stop reason: HOSPADM

## 2022-12-07 RX ORDER — BUDESONIDE AND FORMOTEROL FUMARATE DIHYDRATE 160; 4.5 UG/1; UG/1
2 AEROSOL RESPIRATORY (INHALATION)
Status: CANCELLED | OUTPATIENT
Start: 2022-12-07

## 2022-12-07 RX ORDER — CETIRIZINE HYDROCHLORIDE 10 MG/1
5 TABLET ORAL DAILY
Status: CANCELLED | OUTPATIENT
Start: 2022-12-07

## 2022-12-07 RX ORDER — ATORVASTATIN CALCIUM 40 MG/1
40 TABLET, FILM COATED ORAL NIGHTLY
Status: CANCELLED | OUTPATIENT
Start: 2022-12-07 | End: 2023-01-01

## 2022-12-07 RX ADMIN — INSULIN HUMAN 10 UNITS: 100 INJECTION, SOLUTION PARENTERAL at 02:48

## 2022-12-07 RX ADMIN — DEXTROSE AND SODIUM CHLORIDE 125 ML/HR: 5; 900 INJECTION, SOLUTION INTRAVENOUS at 06:00

## 2022-12-07 RX ADMIN — DEXTROSE MONOHYDRATE 25 ML: 25 INJECTION, SOLUTION INTRAVENOUS at 05:03

## 2022-12-07 RX ADMIN — POLYETHYLENE GLYCOL 3350 17 G: 17 POWDER, FOR SOLUTION ORAL at 02:55

## 2022-12-07 RX ADMIN — DEXTROSE AND SODIUM CHLORIDE 125 ML/HR: 5; 900 INJECTION, SOLUTION INTRAVENOUS at 09:10

## 2022-12-07 RX ADMIN — DEXTROSE 15 G: 15 GEL ORAL at 05:04

## 2022-12-07 RX ADMIN — DEXTROSE MONOHYDRATE 50 ML: 25 INJECTION, SOLUTION INTRAVENOUS at 02:48

## 2022-12-07 RX ADMIN — Medication 15 G: at 05:04

## 2022-12-07 RX ADMIN — DEXTROSE MONOHYDRATE 25 ML: 25 INJECTION, SOLUTION INTRAVENOUS at 05:25

## 2022-12-07 NOTE — ED NOTES
[facility] at facility to transport patient at this time to dialysis. Discharge instructions reviewed with [facility] and patient.

## 2022-12-07 NOTE — ED NOTES
By the time patient was transferred to wheelchair and IVs were removed patient was transported to front [place] of the ER and [facility] was noted to be gone. [facility] number contacted and spoke with nnamdi, she advised they would be right back in the nextg 10-15min. Dialysis made aware.

## 2022-12-07 NOTE — ED NOTES
Dr Cao is aware of blood pressure 68/41 to RLL.  Three different nurses has tried for manaul and can not hear.

## 2022-12-07 NOTE — ED NOTES
Called [facility] and spoke with Abbey, patient report given and advised he will be going to diaylsis then back to their facility.
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
## 2022-12-08 LAB
CYTOLOGIST CVX/VAG CYTO: NORMAL
PATH INTERP BLD-IMP: NORMAL
QT INTERVAL: 406 MS
QTC INTERVAL: 531 MS

## 2022-12-12 ENCOUNTER — APPOINTMENT (OUTPATIENT)
Dept: GENERAL RADIOLOGY | Facility: HOSPITAL | Age: 62
End: 2022-12-12

## 2022-12-12 ENCOUNTER — HOSPITAL ENCOUNTER (EMERGENCY)
Facility: HOSPITAL | Age: 62
Discharge: NURSING, REHAB OR LONG-TERM CARE | End: 2022-12-12
Attending: STUDENT IN AN ORGANIZED HEALTH CARE EDUCATION/TRAINING PROGRAM | Admitting: STUDENT IN AN ORGANIZED HEALTH CARE EDUCATION/TRAINING PROGRAM

## 2022-12-12 VITALS
WEIGHT: 192.6 LBS | DIASTOLIC BLOOD PRESSURE: 78 MMHG | TEMPERATURE: 97.4 F | BODY MASS INDEX: 29.19 KG/M2 | HEART RATE: 106 BPM | HEIGHT: 68 IN | OXYGEN SATURATION: 95 % | SYSTOLIC BLOOD PRESSURE: 117 MMHG | RESPIRATION RATE: 19 BRPM

## 2022-12-12 DIAGNOSIS — J98.8 VIRAL RESPIRATORY ILLNESS: Primary | ICD-10-CM

## 2022-12-12 DIAGNOSIS — B97.89 VIRAL RESPIRATORY ILLNESS: Primary | ICD-10-CM

## 2022-12-12 LAB
ACANTHOCYTES BLD QL SMEAR: ABNORMAL
ALBUMIN SERPL-MCNC: 3.42 G/DL (ref 3.5–5.2)
ALBUMIN/GLOB SERPL: 1.5 G/DL
ALP SERPL-CCNC: 81 U/L (ref 39–117)
ALT SERPL W P-5'-P-CCNC: 76 U/L (ref 1–41)
ANION GAP SERPL CALCULATED.3IONS-SCNC: 18.7 MMOL/L (ref 5–15)
ANISOCYTOSIS BLD QL: ABNORMAL
AST SERPL-CCNC: 51 U/L (ref 1–40)
BILIRUB SERPL-MCNC: 0.9 MG/DL (ref 0–1.2)
BUN SERPL-MCNC: 74 MG/DL (ref 8–23)
BUN/CREAT SERPL: 10.9 (ref 7–25)
CALCIUM SPEC-SCNC: 8.8 MG/DL (ref 8.6–10.5)
CHLORIDE SERPL-SCNC: 92 MMOL/L (ref 98–107)
CO2 SERPL-SCNC: 24.3 MMOL/L (ref 22–29)
CREAT SERPL-MCNC: 6.82 MG/DL (ref 0.76–1.27)
DEPRECATED RDW RBC AUTO: 83.6 FL (ref 37–54)
EGFRCR SERPLBLD CKD-EPI 2021: 8.4 ML/MIN/1.73
ERYTHROCYTE [DISTWIDTH] IN BLOOD BY AUTOMATED COUNT: 21.9 % (ref 12.3–15.4)
FLUAV RNA RESP QL NAA+PROBE: NOT DETECTED
FLUBV RNA RESP QL NAA+PROBE: NOT DETECTED
GLOBULIN UR ELPH-MCNC: 2.3 GM/DL
GLUCOSE BLDC GLUCOMTR-MCNC: 115 MG/DL (ref 70–130)
GLUCOSE BLDC GLUCOMTR-MCNC: 46 MG/DL (ref 70–130)
GLUCOSE BLDC GLUCOMTR-MCNC: 54 MG/DL (ref 70–130)
GLUCOSE BLDC GLUCOMTR-MCNC: 69 MG/DL (ref 70–130)
GLUCOSE SERPL-MCNC: 62 MG/DL (ref 65–99)
HCT VFR BLD AUTO: 37 % (ref 37.5–51)
HGB BLD-MCNC: 12 G/DL (ref 13–17.7)
HOLD SPECIMEN: NORMAL
HOLD SPECIMEN: NORMAL
LYMPHOCYTES # BLD MANUAL: 2.14 10*3/MM3 (ref 0.7–3.1)
LYMPHOCYTES NFR BLD MANUAL: 6 % (ref 5–12)
MACROCYTES BLD QL SMEAR: ABNORMAL
MCH RBC QN AUTO: 34.8 PG (ref 26.6–33)
MCHC RBC AUTO-ENTMCNC: 32.4 G/DL (ref 31.5–35.7)
MCV RBC AUTO: 107.2 FL (ref 79–97)
METAMYELOCYTES NFR BLD MANUAL: 1 % (ref 0–0)
MONOCYTES # BLD: 0.53 10*3/MM3 (ref 0.1–0.9)
MYELOCYTES NFR BLD MANUAL: 2 % (ref 0–0)
NEUTROPHILS # BLD AUTO: 5.97 10*3/MM3 (ref 1.7–7)
NEUTROPHILS NFR BLD MANUAL: 67 % (ref 42.7–76)
NRBC SPEC MANUAL: 2 /100 WBC (ref 0–0.2)
NT-PROBNP SERPL-MCNC: ABNORMAL PG/ML (ref 0–900)
PLAT MORPH BLD: NORMAL
PLATELET # BLD AUTO: 132 10*3/MM3 (ref 140–450)
PMV BLD AUTO: 11.4 FL (ref 6–12)
POLYCHROMASIA BLD QL SMEAR: ABNORMAL
POTASSIUM SERPL-SCNC: 5.4 MMOL/L (ref 3.5–5.2)
PROT SERPL-MCNC: 5.7 G/DL (ref 6–8.5)
RBC # BLD AUTO: 3.45 10*6/MM3 (ref 4.14–5.8)
SARS-COV-2 RNA RESP QL NAA+PROBE: NOT DETECTED
SODIUM SERPL-SCNC: 135 MMOL/L (ref 136–145)
TARGETS BLD QL SMEAR: ABNORMAL
TROPONIN T SERPL-MCNC: 0.1 NG/ML (ref 0–0.03)
VARIANT LYMPHS NFR BLD MANUAL: 24 % (ref 19.6–45.3)
WBC NRBC COR # BLD: 8.91 10*3/MM3 (ref 3.4–10.8)
WHOLE BLOOD HOLD COAG: NORMAL

## 2022-12-12 PROCEDURE — 87636 SARSCOV2 & INF A&B AMP PRB: CPT | Performed by: NURSE PRACTITIONER

## 2022-12-12 PROCEDURE — 71046 X-RAY EXAM CHEST 2 VIEWS: CPT

## 2022-12-12 PROCEDURE — 84484 ASSAY OF TROPONIN QUANT: CPT | Performed by: NURSE PRACTITIONER

## 2022-12-12 PROCEDURE — 93010 ELECTROCARDIOGRAM REPORT: CPT | Performed by: INTERNAL MEDICINE

## 2022-12-12 PROCEDURE — 85025 COMPLETE CBC W/AUTO DIFF WBC: CPT | Performed by: NURSE PRACTITIONER

## 2022-12-12 PROCEDURE — 99284 EMERGENCY DEPT VISIT MOD MDM: CPT

## 2022-12-12 PROCEDURE — 80053 COMPREHEN METABOLIC PANEL: CPT | Performed by: NURSE PRACTITIONER

## 2022-12-12 PROCEDURE — 85007 BL SMEAR W/DIFF WBC COUNT: CPT | Performed by: NURSE PRACTITIONER

## 2022-12-12 PROCEDURE — 83880 ASSAY OF NATRIURETIC PEPTIDE: CPT | Performed by: NURSE PRACTITIONER

## 2022-12-12 PROCEDURE — 93005 ELECTROCARDIOGRAM TRACING: CPT | Performed by: NURSE PRACTITIONER

## 2022-12-12 PROCEDURE — 82962 GLUCOSE BLOOD TEST: CPT

## 2022-12-12 PROCEDURE — 36415 COLL VENOUS BLD VENIPUNCTURE: CPT

## 2022-12-12 RX ORDER — NICOTINE POLACRILEX 4 MG
15 LOZENGE BUCCAL ONCE
Status: COMPLETED | OUTPATIENT
Start: 2022-12-12 | End: 2022-12-12

## 2022-12-12 RX ORDER — DEXTROSE MONOHYDRATE 25 G/50ML
25 INJECTION, SOLUTION INTRAVENOUS ONCE
Status: DISCONTINUED | OUTPATIENT
Start: 2022-12-12 | End: 2022-12-12 | Stop reason: HOSPADM

## 2022-12-12 RX ORDER — NICOTINE POLACRILEX 4 MG
LOZENGE BUCCAL
Status: COMPLETED
Start: 2022-12-12 | End: 2022-12-12

## 2022-12-12 RX ORDER — SODIUM CHLORIDE 0.9 % (FLUSH) 0.9 %
10 SYRINGE (ML) INJECTION AS NEEDED
Status: DISCONTINUED | OUTPATIENT
Start: 2022-12-12 | End: 2022-12-12 | Stop reason: HOSPADM

## 2022-12-12 RX ADMIN — Medication: at 07:51

## 2022-12-12 RX ADMIN — DEXTROSE: 15 GEL ORAL at 07:51

## 2022-12-12 NOTE — ED NOTES
[facility] here to transport pt to dialysis. Report given to ANASTASIIA Hernandez. After dialysis, clinic is supposed to call [facility] to transport pt back to nursing home.

## 2022-12-12 NOTE — ED NOTES
Upon placing EKG stickers on patient, found that the patient had previous EKG stickers from a prior ER visit. Provider and primary RN made aware.

## 2022-12-14 LAB
QT INTERVAL: 384 MS
QTC INTERVAL: 477 MS

## 2022-12-16 ENCOUNTER — TELEPHONE (OUTPATIENT)
Dept: FAMILY MEDICINE CLINIC | Facility: CLINIC | Age: 62
End: 2022-12-16

## 2022-12-16 NOTE — TELEPHONE ENCOUNTER
..called indicating she would like a new referral for Axel to see a provider with [facility] in [place].  The last time he was seen by Dr Eldridge was 2018.  She asked that this be done now and records sent today but I explained to her Isabelle was out of the office and in order for a referral to be made he would need to be seen.  She states there is no way he could be seen today.       Please advise.

## 2022-12-19 NOTE — TELEPHONE ENCOUNTER
Patients care giver called today made them aware he is currently being treated under the care of the provider in his long term care facility and this is something that they will handle.

## 2022-12-21 ENCOUNTER — OFFICE VISIT (OUTPATIENT)
Dept: CARDIOLOGY | Facility: CLINIC | Age: 62
End: 2022-12-21

## 2022-12-21 VITALS
BODY MASS INDEX: 28.95 KG/M2 | OXYGEN SATURATION: 97 % | HEIGHT: 68 IN | SYSTOLIC BLOOD PRESSURE: 104 MMHG | HEART RATE: 95 BPM | DIASTOLIC BLOOD PRESSURE: 68 MMHG | WEIGHT: 191 LBS

## 2022-12-21 DIAGNOSIS — I48.19 ATRIAL FIBRILLATION, PERSISTENT: ICD-10-CM

## 2022-12-21 DIAGNOSIS — E11.22 TYPE 2 DIABETES MELLITUS WITH STAGE 4 CHRONIC KIDNEY DISEASE, WITH LONG-TERM CURRENT USE OF INSULIN: Chronic | ICD-10-CM

## 2022-12-21 DIAGNOSIS — Z79.4 TYPE 2 DIABETES MELLITUS WITH STAGE 4 CHRONIC KIDNEY DISEASE, WITH LONG-TERM CURRENT USE OF INSULIN: Chronic | ICD-10-CM

## 2022-12-21 DIAGNOSIS — N18.4 TYPE 2 DIABETES MELLITUS WITH STAGE 4 CHRONIC KIDNEY DISEASE, WITH LONG-TERM CURRENT USE OF INSULIN: Chronic | ICD-10-CM

## 2022-12-21 DIAGNOSIS — I25.119 CORONARY ARTERY DISEASE INVOLVING NATIVE HEART WITH ANGINA PECTORIS, UNSPECIFIED VESSEL OR LESION TYPE: Primary | Chronic | ICD-10-CM

## 2022-12-21 PROCEDURE — 99214 OFFICE O/P EST MOD 30 MIN: CPT | Performed by: INTERNAL MEDICINE

## 2022-12-21 PROCEDURE — 93000 ELECTROCARDIOGRAM COMPLETE: CPT | Performed by: INTERNAL MEDICINE
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
## 2023-05-18 NOTE — PAT
Notified Nasir FARMER on call of patient's abnormal BUN and Creat 74/2.73.  Patient has known renal history and sees nephrologist every 3 months.  Also informed Nasir of positive urine drug screen despite denying drug use to me.  Also mentioned patient's chronic nasal congestion with post nasal drip to Nasir.  Patient denies temperature or cough.  WBC was normal.  Order received  for a repeat BMP upon arrival tomorrow to preop to check status of kidneys.  Order entered into EPIC.    Per Dr Middleton patient to stop using Suboxone films today in preparation for surgery.  Patient has only had one film today.    Patient has run out of several medicines and has not been taking them as prescribed for various reasons.  One of those is his aspirin.  I explained to him that he needs to stop and buy some aspirin tonight and taked 325 mg on aspirin tonight.  But verbalized understanding and said he would stop and get a new bottle of aspirin.    EKG 8/16/18    BACTROBAN APPLIED TO EACH NOSTRIL DURING PAT VISIT       Patient to apply Chlorhexadine wipes  to surgical area (as instructed) the night before procedure and the AM of procedure. Wipes provided.    Patient given CABG booklet and handouts during PAT visit.    After PAT visit completed, family member arrived and requested that a living will be completed.  Called  to see if available.  Because of two deaths in the hospital, the  would be unavailable for several hours.  Living will literature given to patient and family member (cousin) and were instructed to read over the information this evening.  Thus patient would be prepared to complete the living will in the AM when he arrives to preop.   [de-identified] : 68 year old male  (retired) b/l knee pain Sept 2019 that has been worsening. The pain is medial and anterior and deep and has a sense of catching and clicking. He also has some swelling. he has tried HEP and activity modification with no relief.\par \par 1/15/20 - had been doing PT and takin nsaids with mild relief, went for doppler neg but pop cysy, wants to talk about visco\par 1/21/20 - euflexxa #2\par 2/4/20 - euflexxa #3\par \par 10/28/20 - had CSI with Dr. Laguna in July with some relief but both knees bothering now.\par 11/4/20 - b/l euflexxa #2\par 11/11/20 - b/l euflexxa #3\par \par 5/11/21 - was doing well now knee acting back up and asking about repats visco\par 5/18/21 - b/l euflexxa #2\par 5/25/21 - b/l euflexxa #3\par 11/9/21 - knees starting to act back up, doing HEP learned at PT\par 12/21/21 - wants to discuss repast visco before he leave for [place]\par 7/12/22 - had b/l gel one with relief, but now knee pain again worse with activity\par 1/24/23-  had good relief with b/l gel one injections at last visit , knees starting to act up again\par 5/18/23- B/L Gel one 1/24/23 with relief, R  knee pain has returned.  L knee just some [id number]\par

## 2023-05-23 NOTE — PROGRESS NOTES
[facility] HOSPITALIST PROGRESS NOTE    Subjective     History:   Axel Desir is a 62 y.o. male admitted on 11/3/2022 secondary to Pneumonia of right upper lobe due to infectious organism     Procedures: None    CC: Follow up COPD exacerbation    Patient seen and examined with ANASTASIIA Saavedra. Awake and alert. States he feels about the same today. Cough and dyspnea overall improved. No reported CP or palpitations. No reported nausea or vomiting.  No acute events overnight per RN.     History taken from: patient, chart, and RN.      Objective     Vital Signs  Temp:  [97.5 °F (36.4 °C)-98.5 °F (36.9 °C)] 98.5 °F (36.9 °C)  Heart Rate:  [100-133] 102  Resp:  [18] 18  BP: (101-140)/() 121/85    Intake/Output Summary (Last 24 hours) at 11/12/2022 1253  Last data filed at 11/12/2022 0046  Gross per 24 hour   Intake 480 ml   Output 1000 ml   Net -520 ml         Physical Exam:  General:    Awake, alert, in no acute distress, chronically ill appearing   Heart:      Normal S1 and S2. Tachycardic. No significant murmur, rubs or gallops appreciated.   Lungs:     Respirations regular, even and unlabored. Diminished breath sounds at bases but aeration overall improved with no wheezes appreciated.       Abdomen:   Soft and nontender. No guarding, rebound tenderness or  organomegaly noted. Bowel sounds present x 4.   Extremities:  Trace-1+ bilateral lower extremity edema. Moves UE and LE equally B/L.     Results Review:    Results from last 7 days   Lab Units 11/12/22  0514 11/11/22 0411 11/10/22  0056 11/09/22  0044 11/08/22  0226 11/07/22  0123 11/06/22  0129   WBC 10*3/mm3 7.99 7.56 7.29 7.28 6.63 5.24 6.14   HEMOGLOBIN g/dL 10.2* 10.7* 10.3* 10.1* 10.0* 10.1* 10.9*   PLATELETS 10*3/mm3 109* 102* 103* 86* 72* 60* 61*     Results from last 7 days   Lab Units 11/12/22  0514 11/11/22  0411 11/10/22  0056 11/09/22  1347 11/09/22  0044 11/08/22  0226 11/07/22  0123   SODIUM mmol/L 130* 122* 128* 129* 123* 127*  "[facility]    Medicine Progress Note - Hospitalist Service, GOLD TEAM 18    Date of Admission:  5/22/2023    Assessment & Plan   Antonio Canseco is a 50 year old male admitted on 5/22/2023. He has a pmh of Sarcoma of right lower extremity, hypertension, diabetes mellitus, previous history of tobacco use disorder, new reduced EF based on (possible cardiomyopathy), BRIAN on CPAP.  Patient presents for excision of neoplasm of right lower extremity.    #Right lower extremity sarcoma  -S/p excision of neoplasm  -EBL ~100 ML  -Postop care per Ortho  -PT, OT consult  -ostop H&H 7.8    #New reduced EF  #Cardiomyopathy, heart failure with reduced ejection fraction  -Okay to resume PTA losartan 12.5 mg daily   -BMP wnl     #Diabetes mellitus  -Okay to resume PTA metformin on discharge    # IBS  -Okay to resume PTA duloxetine.    #Anemia  -Based on work-up this likely represents anemia of inflammation given significantly elevated ferritin level while TIBC is low and iron is low.  -okay for patient to continue taking ferrous sulfate     #BRIAN - home CPAP             Diet: Advance Diet as Tolerated: Regular Diet Adult  Diet    DVT Prophylaxis: ASA  [place] Catheter: Not present  Lines: None     Cardiac Monitoring: None  Code Status: Full Code      Clinically Significant Risk Factors Present on Admission                  # Hypertension: Noted on problem list      # Obesity: Estimated body mass index is 36.93 kg/m  as calculated from the following:    Height as of this encounter: 1.803 m (5' 11\").    Weight as of this encounter: 120.1 kg (264 lb 12.4 oz).            Disposition Plan  medically cleared for discharge     Expected Discharge Date: 05/23/2023,  9:00 AM                RAVI SPANN MD  Hospitalist Service, GOLD TEAM 18  [facility]  Securely message with [employer] (more info)  Text page via [employer] Paging/Directory   See signed in provider " 127*   POTASSIUM mmol/L 4.9 5.4* 4.5 4.4 4.5 4.0 5.3*   CHLORIDE mmol/L 90* 85* 89* 90* 86* 87* 90*   CO2 mmol/L 26.4 21.9* 23.9 22.0 21.7* 22.6 19.0*   BUN mg/dL 53* 74* 46* 35* 66* 52* 77*   CREATININE mg/dL 3.95* 4.94* 4.06* 3.28* 5.09* 4.58* 6.07*   CALCIUM mg/dL 8.4* 8.3* 8.2* 7.8* 7.8* 7.7* 7.3*   GLUCOSE mg/dL 100* 202* 158* 252* 262* 229* 493*     Results from last 7 days   Lab Units 11/08/22  0226   BILIRUBIN mg/dL 0.8   ALK PHOS U/L 69   AST (SGOT) U/L 23   ALT (SGPT) U/L 69*                   Imaging Results (Last 24 Hours)     ** No results found for the last 24 hours. **            Medications:  apixaban, 5 mg, Oral, Q12H  budesonide-formoterol, 2 puff, Inhalation, BID - RT  calcium acetate, 2,668 mg, Oral, TID With Meals  carvedilol, 6.25 mg, Oral, BID With Meals  cetirizine, 10 mg, Oral, Daily  Insulin Aspart, 0-9 Units, Subcutaneous, TID AC  insulin detemir, 20 Units, Subcutaneous, Daily  levothyroxine, 88 mcg, Oral, Q AM  melatonin, 10 mg, Oral, Nightly  pantoprazole, 40 mg, Oral, Q AM  [START ON 11/13/2022] predniSONE, 40 mg, Oral, Daily With Breakfast  Renal, 1 capsule, Oral, Daily  sodium chloride, 3 mL, Intravenous, Q12H  thiamine, 100 mg, Oral, Daily               Assessment & Plan   Right-sided pneumonia: Sputum culture revealed normal respiratory joanne. Procal is normal. Completed course of Omincef and Doxycycline.     Acute exacerbation of COPD: Completed antibiotics as above. Cont steroids and nebs. Cont Symbicort. Transition to Prednisone.     ESRD on HD: CT reveals CHF/edema with R>L pleural effusions, anasarca and upper abdominal ascites. Cont HD (MWF schedule) per nephrology with input appreciated.     DM II, non-insulin dependent: Recent HgbA1c 8.6. Steroids likely contributing to hyperglycemia. Overall improved with recent insulin adjustments. Cont current regimen today.      Hyponatremia: Likely hypervolemic. Stable today. Cont fluid restriction and HD. Repeat labs in the AM.  for up to date coverage information  ______________________________________________________________________    Interval History   - NAEON  - Afebrile and HDS  - pain control overnight has been adequate.     Physical Exam   Vital Signs: Temp: 97.1  F (36.2  C) Temp src: Oral BP: 110/68 Pulse: 65   Resp: 14 SpO2: 100 % O2 Device: Nasal cannula Oxygen Delivery: 2 LPM  Weight: 264 lbs 12.36 oz    General Appearance: Lying comfortably in bed, on room air, in no acute distress of discomfort  HEENT: PERRL: EOMI; moist mucous membrane w/o lesions  Neck: No JVD  Pulmonary: Normal WOB  GI: soft nontender, no rebound or guarding   Extremities: RLE in dressing.   Skin: No rashes or lesions  Neurologic: A&O x3    Medical Decision Making       35 MINUTES SPENT BY ME on the date of service doing chart review, history, exam, documentation & further activities per the note.      Data   ------------------------- PAST 24 HR DATA REVIEWED -----------------------------------------------    I have personally reviewed the following data over the past 24 hrs:    N/A  \   7.8 (L)   / N/A     139 103 13.6 /  114 (H)   4.3 27 0.73 \       Procal: N/A CRP: N/A Lactic Acid: 0.7         Imaging results reviewed over the past 24 hrs:   No results found for this or any previous visit (from the past 24 hour(s)).       Paroxysmal Afib: Currently in sinus rhythm. Cont Coreg. Cont home Eliquis for stroke prevention.     Chronic macrocytic anemia: B12 and folate are replete. Stable today. Repeat CBC in the AM.     Thrombocytopenia: Possibly 2/2 above. B12 and folate replete. No schistocytes on peripheral smear. Overall improved and stable today. Repeat CBC in the AM.     Generalized weakness: PT/OT    DVT PPX: Eliquis.     Disposition: Pt and his cousin/HCS requesting rehab placement in [place] to be closer to family in [place].      Andres Reardon,   11/12/22  12:53 EST

## (undated) DEVICE — Device: Brand: MEDEX

## (undated) DEVICE — BIOPATCH™ ANTIMICROBIAL DRESSING WITH CHLORHEXIDINE GLUCONATE IS A HYDROPHILLIC POLYURETHANE ABSORPTIVE FOAM WITH CHLORHEXIDINE GLUCONATE (CHG) WHICH INHIBITS BACTERIAL GROWTH UNDER THE DRESSING. THE DRESSING IS INTENDED TO BE USED TO ABSORB EXUDATE, COVER A WOUND CAUSED BY VASCULAR AND NONVASCULAR PERCUTANEOUS MEDICAL DEVICES DURING SURGERY, AS WELL AS REDUCE LOCAL INFECTION AND COLONIZATION OF MICROORGANISMS.: Brand: BIOPATCH

## (undated) DEVICE — DRAPE, RADIAL, STERILE: Brand: MEDLINE

## (undated) DEVICE — ELECTRD BLD EZ CLN STD 2.5IN

## (undated) DEVICE — CANNULA,OXY,ADULT,SUPER SOFT,W/14'TUB,UC: Brand: MEDLINE INDUSTRIES, INC.

## (undated) DEVICE — SUT PROLN 6/0 C1 D/A 30IN 8706H

## (undated) DEVICE — MEDI-VAC YANKAUER SUCTION HANDLE W/BULBOUS TIP: Brand: CARDINAL HEALTH

## (undated) DEVICE — DR ROGERS OH: Brand: MEDLINE INDUSTRIES, INC.

## (undated) DEVICE — AIRWY SZ11

## (undated) DEVICE — PK HEART OPN 10

## (undated) DEVICE — Device

## (undated) DEVICE — HOLDER: Brand: DEROYAL

## (undated) DEVICE — DRSNG SURESITE WNDW 4X4.5

## (undated) DEVICE — ANTIBACTERIAL UNDYED BRAIDED (POLYGLACTIN 910), SYNTHETIC ABSORBABLE SUTURE: Brand: COATED VICRYL

## (undated) DEVICE — SAFETY SCALPEL: Brand: DEROYAL

## (undated) DEVICE — CONNECTOR PH 6-IN-1 Y ST: Brand: CARDINAL HEALTH

## (undated) DEVICE — SUT SILK 4/0 TIES 18IN A183H

## (undated) DEVICE — ENDOCUFF VISION GRN LG 11.2

## (undated) DEVICE — SUT ETHIB 1 CTX CR8 18IN CX30D

## (undated) DEVICE — ST INF PRI SMRTSTE 20DRP 2VLV 24ML 117

## (undated) DEVICE — TUBING, SUCTION, 1/4" X 20', STRAIGHT: Brand: MEDLINE INDUSTRIES, INC.

## (undated) DEVICE — BNDG ELAS CO-FLEX SLF ADHR 4IN5YD LF STRL

## (undated) DEVICE — ENDOGATOR AUXILIARY WATER JET CONNECTOR: Brand: ENDOGATOR

## (undated) DEVICE — SUT PROLN 7/0 8747H

## (undated) DEVICE — MODEL AT P65, P/N 701554-001KIT CONTENTS: HAND CONTROLLER, 3-WAY HIGH-PRESSURE STOPCOCK WITH ROTATING END AND PREMIUM HIGH-PRESSURE TUBING: Brand: ANGIOTOUCH® KIT

## (undated) DEVICE — DRAPE,T,LAPARO,TRANS,STERILE: Brand: MEDLINE

## (undated) DEVICE — PRESSURE MONITORING SET: Brand: TRUWAVE

## (undated) DEVICE — ST BLOOD ADMIN YTP 80IN

## (undated) DEVICE — KT CENTRL LN DRS CHNG W/BIOPATCH CUST

## (undated) DEVICE — ENCORE® LATEX MICRO SIZE 7, STERILE LATEX POWDER-FREE SURGICAL GLOVE: Brand: ENCORE

## (undated) DEVICE — PAD GRND REM POLYHESIVE A/ DISP

## (undated) DEVICE — TUBING, SUCTION, 1/4" X 10', STRAIGHT: Brand: MEDLINE

## (undated) DEVICE — BNDG ELAS CO-FLEX SLF ADHR 6IN 5YD LF STRL

## (undated) DEVICE — SOL NS 500ML

## (undated) DEVICE — CONN IV MAXPLUS NDLESS ACC

## (undated) DEVICE — GOWN,REINF,POLY,ECL,PP SLV,XL: Brand: MEDLINE

## (undated) DEVICE — GLIDESHEATH SLENDER ACCESS KIT: Brand: GLIDESHEATH SLENDER

## (undated) DEVICE — PAD ARMBRD SURG CONVOL 7.5X20X2IN

## (undated) DEVICE — KT CVR ULTRASND PROB PULL UP LXF 5X8

## (undated) DEVICE — A2000 MULTI-USE SYRINGE KIT, P/N 701277-003KIT CONTENTS: 100ML CONTRAST RESERVOIR AND TUBING WITH CONTRAST SPIKE AND CLAMP: Brand: A2000 MULTI-USE SYRINGE KIT

## (undated) DEVICE — MARKR SKIN W/RULR AND LBL

## (undated) DEVICE — TUBING, SUCTION, 3/16" X 10', STRAIGHT: Brand: MEDLINE

## (undated) DEVICE — PRESSURE MONITORING ACCESSORY: Brand: TRUWAVE

## (undated) DEVICE — SUCTION CANISTER, 2500CC, RIGID: Brand: DEROYAL

## (undated) DEVICE — SUT SILK 2/0 FS BLK 18IN 685G

## (undated) DEVICE — CLTH CLENS READYCLEANSE PERI CARE PK/5

## (undated) DEVICE — SYR LUERLOK 30CC

## (undated) DEVICE — DRP SLUSH MACH

## (undated) DEVICE — TRAP,MUCUS SPECIMEN,40CC: Brand: MEDLINE

## (undated) DEVICE — TR BAND RADIAL ARTERY COMPRESSION DEVICE: Brand: TR BAND

## (undated) DEVICE — PK CATH CARD 70

## (undated) DEVICE — Device: Brand: DEFENDO AIR/WATER/SUCTION AND BIOPSY VALVE

## (undated) DEVICE — GW INQW FIX/CORE PTFE J/3MM .035 260CM

## (undated) DEVICE — ADULT DISPOSABLE SINGLE-PATIENT USE PULSE OXIMETER SENSOR: Brand: NONIN

## (undated) DEVICE — RADIFOCUS OPTITORQUE ANGIOGRAPHIC CATHETER: Brand: OPTITORQUE

## (undated) DEVICE — DRP C/ARM W/BAND W/CLIPS 41X74IN

## (undated) DEVICE — PK BASIC 70

## (undated) DEVICE — SUT PROLN 7/0 BV1 D/A 24IN 8702H

## (undated) DEVICE — KT CATH TAL PALINDROME SAPPHIRE 14.5F28CM

## (undated) DEVICE — SUT ETHIB 2/0 SH SH 36IN X523H

## (undated) DEVICE — INTRAOPERATIVE COVER KIT, 10 PACK: Brand: SITE-RITE

## (undated) DEVICE — SINGLE PORT MANIFOLD: Brand: NEPTUNE 2

## (undated) DEVICE — SOL NACL 0.9PCT 1000ML

## (undated) DEVICE — VASOVIEW HEMOPRO: Brand: VASOVIEW HEMOPRO

## (undated) DEVICE — 2963 MEDIPORE SOFT CLOTH TAPE 3 IN X 10 YD 12 RLS/CS: Brand: 3M™ MEDIPORE™

## (undated) DEVICE — MEDI-VAC NON-CONDUCTIVE SUCTION TUBING: Brand: CARDINAL HEALTH

## (undated) DEVICE — DRSNG WND BORDR/ADHS NONADHR/GZ LF 2X2IN STRL

## (undated) DEVICE — CANNULA,OXY,ADULT,SUPERSOFT,W/7'TUB,UC: Brand: MEDLINE

## (undated) DEVICE — SUT SILK 2/0 CT1 CR8 18IN C022D

## (undated) DEVICE — SUT SILK 0/0 CT2 18IN C027D

## (undated) DEVICE — CVR HNDL LT SURG ACCSSRY BLU STRL

## (undated) DEVICE — SUCTION CANISTER, 1500CC, RIGID: Brand: DEROYAL

## (undated) DEVICE — THE BITE BLOCK MAXI, LATEX FREE STRAP IS USED TO PROTECT THE ENDOSCOPE INSERTION TUBE FROM BEING BITTEN BY THE PATIENT.

## (undated) DEVICE — APPL CHLORAPREP W/TINT 26ML ORNG

## (undated) DEVICE — PRESSURE MONITORING SET: Brand: TRUWAVE, VAMP

## (undated) DEVICE — 10 FR. PTFE PEEL-APART PERCUTANEOUS INTRODUCER KIT: Brand: PEEL-APART PERCUTANEOUS INTRODUCER KIT

## (undated) DEVICE — ST EXT IV SMARTSITE 2VLV SP M LL 5ML IV1

## (undated) DEVICE — RUNWAY RADL W/TOP PAD

## (undated) DEVICE — SUT SILK 2/0 TIES 18IN A185H

## (undated) DEVICE — SUT SILK 2 SUTUPAK TIE 60IN SA8H 2STRAND

## (undated) DEVICE — SPNG GZ WOVN 4X4IN 12PLY 10/BX STRL

## (undated) DEVICE — DRSNG WND BORDR/ADHS NONADHR/GZ LF 4X14IN STRL

## (undated) DEVICE — CONN Y IRR DISP 1P/U

## (undated) DEVICE — SYR LUERLOK 5CC

## (undated) DEVICE — ST PRIM GRVTY NDLESS 3 INJ PORT 105IN

## (undated) DEVICE — SUT SILK B CARDIO BB 5/0 30IN K880H BX/36